# Patient Record
Sex: FEMALE | Race: BLACK OR AFRICAN AMERICAN | NOT HISPANIC OR LATINO | Employment: OTHER | URBAN - METROPOLITAN AREA
[De-identification: names, ages, dates, MRNs, and addresses within clinical notes are randomized per-mention and may not be internally consistent; named-entity substitution may affect disease eponyms.]

---

## 2017-01-23 ENCOUNTER — LAB VISIT (OUTPATIENT)
Dept: LAB | Facility: HOSPITAL | Age: 72
End: 2017-01-23
Attending: NURSE PRACTITIONER
Payer: MEDICARE

## 2017-01-23 DIAGNOSIS — Z79.4 TYPE 2 DIABETES MELLITUS WITH HYPERGLYCEMIA, WITH LONG-TERM CURRENT USE OF INSULIN: Chronic | ICD-10-CM

## 2017-01-23 DIAGNOSIS — E11.65 TYPE 2 DIABETES MELLITUS WITH HYPERGLYCEMIA, WITH LONG-TERM CURRENT USE OF INSULIN: Chronic | ICD-10-CM

## 2017-01-23 LAB
ALBUMIN SERPL BCP-MCNC: 3.3 G/DL
ALP SERPL-CCNC: 109 U/L
ALT SERPL W/O P-5'-P-CCNC: 13 U/L
ANION GAP SERPL CALC-SCNC: 6 MMOL/L
AST SERPL-CCNC: 14 U/L
BILIRUB SERPL-MCNC: 0.3 MG/DL
BUN SERPL-MCNC: 18 MG/DL
CALCIUM SERPL-MCNC: 9.6 MG/DL
CHLORIDE SERPL-SCNC: 104 MMOL/L
CHOLEST/HDLC SERPL: 3.7 {RATIO}
CO2 SERPL-SCNC: 30 MMOL/L
CREAT SERPL-MCNC: 0.9 MG/DL
EST. GFR  (AFRICAN AMERICAN): >60 ML/MIN/1.73 M^2
EST. GFR  (NON AFRICAN AMERICAN): >60 ML/MIN/1.73 M^2
ESTIMATED AVG GLUCOSE: 246 MG/DL
GLUCOSE SERPL-MCNC: 99 MG/DL
HBA1C MFR BLD HPLC: 10.2 %
HDL/CHOLESTEROL RATIO: 27.4 %
HDLC SERPL-MCNC: 157 MG/DL
HDLC SERPL-MCNC: 43 MG/DL
LDLC SERPL CALC-MCNC: 99 MG/DL
NONHDLC SERPL-MCNC: 114 MG/DL
POTASSIUM SERPL-SCNC: 4.2 MMOL/L
PROT SERPL-MCNC: 7.5 G/DL
SODIUM SERPL-SCNC: 140 MMOL/L
TRIGL SERPL-MCNC: 75 MG/DL

## 2017-01-23 PROCEDURE — 83036 HEMOGLOBIN GLYCOSYLATED A1C: CPT

## 2017-01-23 PROCEDURE — 80061 LIPID PANEL: CPT

## 2017-01-23 PROCEDURE — 80053 COMPREHEN METABOLIC PANEL: CPT

## 2017-01-23 PROCEDURE — 36415 COLL VENOUS BLD VENIPUNCTURE: CPT

## 2017-02-02 ENCOUNTER — CLINICAL SUPPORT (OUTPATIENT)
Dept: DIABETES | Facility: CLINIC | Age: 72
End: 2017-02-02
Payer: MEDICARE

## 2017-02-02 VITALS
HEIGHT: 65 IN | HEART RATE: 72 BPM | WEIGHT: 202.81 LBS | SYSTOLIC BLOOD PRESSURE: 140 MMHG | DIASTOLIC BLOOD PRESSURE: 90 MMHG | BODY MASS INDEX: 33.79 KG/M2

## 2017-02-02 DIAGNOSIS — I10 BENIGN ESSENTIAL HYPERTENSION: ICD-10-CM

## 2017-02-02 DIAGNOSIS — E66.9 OBESITY (BMI 30-39.9): Chronic | ICD-10-CM

## 2017-02-02 DIAGNOSIS — E11.42 TYPE 2 DIABETES MELLITUS WITH DIABETIC POLYNEUROPATHY, WITH LONG-TERM CURRENT USE OF INSULIN: Chronic | ICD-10-CM

## 2017-02-02 DIAGNOSIS — L65.9 ALOPECIA, UNSPECIFIED: ICD-10-CM

## 2017-02-02 DIAGNOSIS — E11.65 TYPE 2 DIABETES MELLITUS WITH HYPERGLYCEMIA, WITH LONG-TERM CURRENT USE OF INSULIN: Primary | Chronic | ICD-10-CM

## 2017-02-02 DIAGNOSIS — E04.2 MULTINODULAR GOITER: ICD-10-CM

## 2017-02-02 DIAGNOSIS — I15.2 HYPERTENSION ASSOCIATED WITH DIABETES: ICD-10-CM

## 2017-02-02 DIAGNOSIS — E78.5 HYPERLIPIDEMIA, UNSPECIFIED HYPERLIPIDEMIA TYPE: ICD-10-CM

## 2017-02-02 DIAGNOSIS — E11.59 HYPERTENSION ASSOCIATED WITH DIABETES: ICD-10-CM

## 2017-02-02 DIAGNOSIS — K21.00 GASTROESOPHAGEAL REFLUX DISEASE WITH ESOPHAGITIS: ICD-10-CM

## 2017-02-02 DIAGNOSIS — B37.2 CANDIDAL INTERTRIGO: ICD-10-CM

## 2017-02-02 DIAGNOSIS — E11.42 TYPE 2 DIABETES MELLITUS WITH DIABETIC POLYNEUROPATHY, UNSPECIFIED LONG TERM INSULIN USE STATUS: ICD-10-CM

## 2017-02-02 DIAGNOSIS — E11.22 CKD STAGE 2 DUE TO TYPE 2 DIABETES MELLITUS: Chronic | ICD-10-CM

## 2017-02-02 DIAGNOSIS — Z79.4 TYPE 2 DIABETES MELLITUS WITH HYPERGLYCEMIA, WITH LONG-TERM CURRENT USE OF INSULIN: Primary | Chronic | ICD-10-CM

## 2017-02-02 DIAGNOSIS — N18.2 CKD STAGE 2 DUE TO TYPE 2 DIABETES MELLITUS: Chronic | ICD-10-CM

## 2017-02-02 DIAGNOSIS — F43.9 STRESS: ICD-10-CM

## 2017-02-02 DIAGNOSIS — L21.9 SEBORRHEIC DERMATITIS, UNSPECIFIED: ICD-10-CM

## 2017-02-02 DIAGNOSIS — E78.5 HYPERLIPIDEMIA LDL GOAL <70: Chronic | ICD-10-CM

## 2017-02-02 DIAGNOSIS — I70.0 AORTIC ATHEROSCLEROSIS: ICD-10-CM

## 2017-02-02 DIAGNOSIS — Z79.4 TYPE 2 DIABETES MELLITUS WITH DIABETIC POLYNEUROPATHY, WITH LONG-TERM CURRENT USE OF INSULIN: Chronic | ICD-10-CM

## 2017-02-02 LAB
CREAT UR-MCNC: 117 MG/DL
MICROALBUMIN UR DL<=1MG/L-MCNC: 6 UG/ML
MICROALBUMIN/CREATININE RATIO: 5.1 UG/MG

## 2017-02-02 PROCEDURE — 99499 UNLISTED E&M SERVICE: CPT | Mod: S$PBB,,, | Performed by: NURSE PRACTITIONER

## 2017-02-02 PROCEDURE — 99999 PR PBB SHADOW E&M-EST. PATIENT-LVL IV: CPT | Mod: PBBFAC,,,

## 2017-02-02 PROCEDURE — 99214 OFFICE O/P EST MOD 30 MIN: CPT | Mod: S$GLB,,, | Performed by: NURSE PRACTITIONER

## 2017-02-02 PROCEDURE — 82570 ASSAY OF URINE CREATININE: CPT

## 2017-02-02 RX ORDER — INSULIN DEGLUDEC 200 U/ML
22 INJECTION, SOLUTION SUBCUTANEOUS DAILY
Qty: 3 SYRINGE | Refills: 3 | Status: SHIPPED | OUTPATIENT
Start: 2017-02-02 | End: 2017-03-03 | Stop reason: SDUPTHER

## 2017-02-02 RX ORDER — CANDESARTAN 8 MG/1
8 TABLET ORAL DAILY
Qty: 90 TABLET | Refills: 3 | Status: SHIPPED | OUTPATIENT
Start: 2017-02-02 | End: 2017-09-28 | Stop reason: SDUPTHER

## 2017-02-02 RX ORDER — METFORMIN HYDROCHLORIDE 1000 MG/1
1000 TABLET ORAL 2 TIMES DAILY WITH MEALS
Qty: 180 TABLET | Refills: 4 | Status: SHIPPED | OUTPATIENT
Start: 2017-02-02 | End: 2017-02-02

## 2017-02-02 RX ORDER — METFORMIN HYDROCHLORIDE 500 MG/1
500 TABLET, EXTENDED RELEASE ORAL 2 TIMES DAILY WITH MEALS
Qty: 360 TABLET | Refills: 3 | Status: SHIPPED | OUTPATIENT
Start: 2017-02-02 | End: 2017-03-03 | Stop reason: SDUPTHER

## 2017-02-02 RX ORDER — CANDESARTAN 8 MG/1
8 TABLET ORAL DAILY
Qty: 90 TABLET | Refills: 3 | Status: CANCELLED | OUTPATIENT
Start: 2017-02-02

## 2017-02-02 RX ORDER — HYDROCHLOROTHIAZIDE 25 MG/1
25 TABLET ORAL DAILY
Qty: 90 TABLET | Refills: 3 | Status: SHIPPED | OUTPATIENT
Start: 2017-02-02 | End: 2017-03-03 | Stop reason: SDUPTHER

## 2017-02-02 RX ORDER — ATORVASTATIN CALCIUM 40 MG/1
40 TABLET, FILM COATED ORAL DAILY
Qty: 90 TABLET | Refills: 3 | Status: SHIPPED | OUTPATIENT
Start: 2017-02-02 | End: 2017-03-03 | Stop reason: SDUPTHER

## 2017-02-02 RX ORDER — HYDROCHLOROTHIAZIDE 25 MG/1
25 TABLET ORAL DAILY
Qty: 90 TABLET | Refills: 3 | Status: CANCELLED | OUTPATIENT
Start: 2017-02-02

## 2017-02-02 RX ORDER — GABAPENTIN 300 MG/1
300 CAPSULE ORAL NIGHTLY
Qty: 90 CAPSULE | Refills: 3 | Status: SHIPPED | OUTPATIENT
Start: 2017-02-02 | End: 2018-04-13 | Stop reason: SDUPTHER

## 2017-02-02 NOTE — MR AVS SNAPSHOT
[facility]  [address]  [facility] [address]  Phone: [phone]                  Buck Ibarra   2017 7:30 AM   Clinical Support    Description:  Female : 1945   Provider:  DIABETES EMPOWERMENT PROGRAM   Department:  [facility]           Reason for Visit     Diabetes           Diagnoses this Visit        Comments    Type 2 diabetes mellitus with hyperglycemia, with long-term current use of insulin    -  Primary     Type 2 diabetes mellitus with diabetic polyneuropathy, with long-term current use of insulin         Gastroesophageal reflux disease with esophagitis         Aortic atherosclerosis         Benign essential hypertension         CKD stage 2 due to type 2 diabetes mellitus         Hyperlipidemia LDL goal <70         Multinodular goiter         Obesity (BMI 30-39.9)                To Do List           Future Appointments        Provider Department Dept Phone    2/15/2017 2:00 PM CGMS, ENDOCRINE [facility] [phone]    2017 8:20 AM CGMS, ENDOCRINE [facility] [phone]    2017 7:30 AM DIABETES EMPOWERMENT PROGRAM [facility] [phone]    3/16/2017 8:00 AM [facility] 11 ALL [facility] [phone]    10/26/2017 11:00 AM LAB, APPOINTMENT [place] [facility] [phone]      Goals (5 Years of Data)     None      Follow-Up and Disposition     Return in about 2 weeks (around 2017).       These Medications        Disp Refills Start End    atorvastatin (LIPITOR) 40 MG tablet 90 tablet 3 2017     Take 1 tablet (40 mg total) by mouth once daily. - Oral    Pharmacy: [facility] - [place], [place] - [address] [facility] Ph #: [phone]       metformin (GLUCOPHAGE-XR) 500 MG 24 hr tablet 360 tablet 3 2017     Take 1 tablet (500 mg total) by mouth 2 (two) times daily with meals. - Oral    Pharmacy: [facility] - [place], [address] Ph #: [phone]       gabapentin (NEURONTIN) 300 MG capsule 90 capsule 3 2/2/2017     Take 1 capsule (300 mg total) by mouth every evening. - Oral    Pharmacy: [facility] - [place], [address] Ph #: [phone]       insulin degludec (TRESIBA FLEXTOUCH U-200) 200 unit/mL (3 mL) InPn 3 Syringe 3 2/2/2017     Inject 22 Units into the skin once daily. - Subcutaneous    Pharmacy: [facility] - [place], [address] [facility] Ph #: [phone]         [facility]     [facility] Care Line - 24/7 Assistance  Registered nurses in the [facility] provide clinical advisement, health education, appointment booking, and other advisory services.  Call for this free service at [phone].             Medications           Message regarding Medications     Verify the changes and/or additions to your medication regime listed below are the same as discussed with your clinician today.  If any of these changes or additions are incorrect, please notify your healthcare provider.        START taking these NEW medications        Refills    metformin (GLUCOPHAGE-XR) 500 MG 24 hr tablet 3    Sig: Take 1 tablet (500 mg total) by mouth 2 (two) times daily with meals.    Class: Normal    Route: Oral      CHANGE how you are taking these medications     Start Taking Instead of    atorvastatin (LIPITOR) 40 MG tablet atorvastatin (LIPITOR) 20 MG tablet    Dosage:  Take 1 tablet (40 mg total) by mouth once daily. Dosage:  Take 1 tablet (20 mg total) by mouth once daily.    Reason for Change:  Reorder     gabapentin (NEURONTIN) 300 MG capsule gabapentin (NEURONTIN) 300 MG capsule    Dosage:  Take 1 capsule (300 mg total) by mouth every evening. Dosage:  Take 1 capsule (300 mg total) by mouth 3 (three) times daily.    Reason for Change:  Reorder     insulin degludec (TRESIBA FLEXTOUCH U-200) 200  unit/mL (3 mL) InPn insulin degludec (TRESIBA FLEXTOUCH U-200) 200 unit/mL (3 mL) InPn    Dosage:  Inject 22 Units into the skin once daily. Dosage:  Inject 24 Units into the skin once daily.    Reason for Change:  Reorder       STOP taking these medications     metformin (GLUCOPHAGE) 1000 MG tablet Take 1 tablet (1,000 mg total) by mouth 2 (two) times daily with meals.           Verify that the below list of medications is an accurate representation of the medications you are currently taking.  If none reported, the list may be blank. If incorrect, please contact your healthcare provider. Carry this list with you in case of emergency.           Current Medications     aspirin (ECOTRIN) 81 MG EC tablet Take 1 tablet (81 mg total) by mouth once daily.    atorvastatin (LIPITOR) 40 MG tablet Take 1 tablet (40 mg total) by mouth once daily.    blood sugar diagnostic Strp Bid testing    candesartan (ATACAND) 8 MG tablet Take 1 tablet (8 mg total) by mouth once daily.    clotrimazole-betamethasone 1-0.05% (LOTRISONE) cream Apply topically 2 (two) times daily.    fluocinonide (LIDEX) 0.05 % external solution AAA scalp qd prn pruritus    gabapentin (NEURONTIN) 300 MG capsule Take 1 capsule (300 mg total) by mouth every evening.    hydrochlorothiazide (HYDRODIURIL) 25 MG tablet Take 1 tablet (25 mg total) by mouth once daily.    insulin degludec (TRESIBA FLEXTOUCH U-200) 200 unit/mL (3 mL) InPn Inject 22 Units into the skin once daily.    ketoconazole (NIZORAL) 2 % shampoo Wash hair with medicated shampoo at least 2x/week - let sit on scalp at least 5 minutes prior to rinsing    lancets 33 gauge Misc 1 lancet by Misc.(Non-Drug; Combo Route) route 2 (two) times daily.    nicotine (NICODERM CQ) 14 mg/24 hr Place 1 patch onto the skin once daily.    nicotine polacrilex 2 MG Lozg Nicorette Mini Lozenge -one 2 mg lozenge by mouth as needed, max 5 in 6 hr period.    pen needle, diabetic (BD ULTRA-FINE MILAN PEN NEEDLES) 32 gauge x  "5/32" Ndle Uses 1 time daily, on insulin injections    ranitidine (ZANTAC) 150 MG tablet Take 1 tablet (150 mg total) by mouth 2 (two) times daily.    lorazepam (ATIVAN) 0.5 MG tablet Take 1 tablet (0.5 mg total) by mouth nightly as needed for Anxiety.    metformin (GLUCOPHAGE-XR) 500 MG 24 hr tablet Take 1 tablet (500 mg total) by mouth 2 (two) times daily with meals.    ZOSTAVAX, PF, 19,400 unit/0.65 mL injection            Clinical Reference Information           Vital Signs - Last Recorded  Most recent update: 2/2/2017  7:29 AM by Oz Marin LPN    BP Pulse Ht Wt BMI    (!) 140/90 72 5' 5" (1.651 m) 41.7 kg (92 lb) 15.31 kg/m2      Blood Pressure          Most Recent Value    BP  (!)  140/90      Allergies as of 2/2/2017     Erythromycin    Erythropoietin Analogues    Pegasys [Peginterferon Gonzalo-2a]    Lisinopril      Immunizations Administered on Date of Encounter - 2/2/2017     None      Orders Placed During Today's Visit      Normal Orders This Visit    Microalbumin/creatinine urine ratio       [facility] Sign-Up     Activating your [facility] account is as easy as 1-2-3!     1) Visit [email or web address], select Sign Up Now, enter this activation code and your date of birth, then select Next.  [email or web address]  Expires: 3/19/2017  8:03 AM      2) Create a username and password to use when you visit [facility] in the future and select a security question in case you lose your password and select Next.    3) Enter your e-mail address and click Sign Up!    Additional Information  If you have questions, please e-mail [email or web address] or call [phone] to talk to our [facility] staff. Remember, [facility] is NOT to be used for urgent needs. For medical emergencies, dial 911.         Instructions    Increase Lipitor to 40 mg daily    Decrease Tresiba to 22 units daily    Change Metformin to XR and continue 1000 mg twice daily (it will be 2 tablets twice daily) - if you still continue to have diarrhea, " decrease to 1000 mg once daily     Check your blood sugar twice daily before breakfast and before dinner    Continuous Glucose Monitoring (CGM)  Your healthcare provider has put you on the Freestyle Naomi Pro Sensor system. At your next appointment, your CGM will be downloaded and reviewed so your healthcare provider can see your blood sugar trends and patterns. Your medication and diet may be adjusted based on this downloaded information.    What You Need To Do:   Wear the sensor on the back of your upper arm for the amount of time designated by the CGM clinic    You have no restrictions on your diet or activity.    Maintain a daily log of your blood glucose readings, diet, exercise, and dosing/timing of your diabetes medications.     Continue regular blood glucose self-testing per your providers recommendation using your own glucometer.    Important Things About Your CGM Test   If you have an MRI, a CT scan, or a diathermy treatment, you must remove your sensor prior to the procedure. If this occurs, notify your healthcare provider.      A Little Extra Care:   Showering, Bathing, and Swimming: The sensor is water-resistant and can be worn while bathing, showering, or swimming as long as you do not take it deeper than 3 feet or keep it underwater for more than 30 minutes at a time.   Getting dressed: Use care to avoid catching the sensor on clothing while getting dressed.   Exercising: Intensive exercise may cause the sensor to loosen due to sweat or movement of the sensor.     Day of Test (Inserting the CGM Sensor device)    The CGM Sensor device is placed on the top of your skin on the back of your upper arm     Throughout the Test    Continue regular blood glucose self-testing per your providers recommendation using your own glucometer.   Maintain a daily log of your blood glucose readings, diet, exercise, and dosing/timing of your diabetes medications.      Last Day of the Test (Removing the CGM  Sensor device)    Loosen the adhesive attached to the CGM Sensor device.    Pull the CGM Sensor device away from your skin    Place the CGM Sensor device in the biohazard plastic bag   Return all items you were given on the first day of the test including log sheets and items you placed in the biohazard bag.    Bring items between 7:30 am and 9:00 am to the Endocrine/ Diabetes Specialty Clinic located at [address] [place] [address] 6th [place] [address].     When to Contact the Clinic   Call [phone] (Monday-Friday) or [phone] after hours if:    Your CGM Sensor falls off    You have pain, severe itching, or bleeding at the site     Revised: 01/2017      © 2015 [facility] ([email or web address]) is a non-profit, academic, multi-specialty, healthcare delivery system dedicated to patient care, research and education.     UNDERSTANDING CONTINUOUS GLUCOSE MONITORING     What is continuous glucose monitoring?   Continuous glucose monitoring system (CGMS) is a method used to record your blood sugar levels over a period of time (usually 5 days). Your home blood glucose monitoring is very helpful, but only measures blood glucose levels at various points in time and can miss dangerous high and low patterns, especially during the night while you sleep. Continuous glucose monitoring provides a continuous 24-hour measurement of your blood glucose levels.     Who benefits from continuous glucose monitoring?    People who experience dangerous high and low blood glucose readings.    People who suffer from hypoglycemia unawareness and cannot feel when their blood glucose is dropping.    People who desire better blood glucose control.    People with elevated A1C levels.     How does continuous glucose monitoring work?   A glucose sensor is inserted on the top of your skin on the back of your arm. We do not leave a needle under your skin. The blood glucose sensor measures your blood sugar level  every 15 minutes, 24 hours a day. At the end of the 5 days, the CGM Sensor is then downloaded and reviewed so your healthcare provider can see your blood sugar trends and patterns.     What are your responsibilities to ensure successful testing?   It is recommended that you monitor your blood readings as directed by your healthcare provider.  It is vital that you document the correct times of your medications, meals, snacks, blood sugar readings, and any exercise.       How do you prepare for the test?   There is nothing you need to do to prepare for the test. You do not need to fast (restrict food intake) the night before the test.     Revised: 01/2017    © 2015 [facility] ([email or web address]) is a non-profit, academic, multi-specialty, healthcare delivery system dedicated to patient care, research and education.          Smoking Cessation     If you would like to quit smoking:   You may be eligible for free services if you are a [place] resident and started smoking cigarettes before September 1, 1988.  Call the Smoking Cessation Trust (SCT) toll free at (489) 602-7418 or (659) 735-9939.   Call 4-800-QUIT-NOW if you do not meet the above criteria.

## 2017-02-02 NOTE — PROGRESS NOTES
CC:  Diabetes.     HPI: Buck Ibarra is a 71 y.o. female referred by Dr. Cortez  for [facility] - Visit # 5. The patient has had type 2 diabetes along with associated comorbidities indicated in the Visit Diagnosis section of this encounter. The patient was diagnosed with Type 2 diabetes in 2014. + family history of DM in multiple family members. Diagnosed after polyuria.     1st visit - She presented alone with no BG logs. At this visit, her Metformin was increased, Glipizide was discontinued and Januvia was started.     2nd visit - Presented with no BG logs. Had not increased Metformin as instructed at last visit, and attempted to start the Januvia but reported palpitations and dizziness and stopped the Januvia after 4 days (although she still had dizziness and palpitations since stopping the Januvia 2 months prior). She had resumed Glipizide when she stopped the Januvia. + hypoglycemia since Glipizide dose increased. At this visit, Victoza was started, Glipizide was stopped and Metformin was increased.     3rd visit - Presented with no BG readings. Was doing great with Victoza, but fluctuating dose from 0.6 to 1.2 to 1.8 mg daily every week because she misunderstood instructions. No exercise but does watch her grandchildren 7 days per week and is very active during that time. At this visit, Metformin was continued and Victoza dose was clarified    4th visit - Presented with no BG logs. Reported + belching and gas with Victoza, stopped Victoza on her own, then BG markedly elevated on labs. Started on Levemir at that time. At last visit, changed to Tresiba, Metformin continued.     5th visit - Today, she presents with logs, does not bring logs to visit/ CGMS not completed. Reports hypoglycemia, ~2pm. Started smoking again     PREFERRED METHOD OF COMMUNICATION:  Mail     DIABETES COMPLICATIONS: nephropathy and peripheral neuropathy     CURRENT A1C:    Hemoglobin A1C   Date Value Ref Range Status    01/23/2017 10.2 (H) 4.5 - 6.2 % Final     Comment:     According to ADA guidelines, hemoglobin A1C <7.0% represents  optimal control in non-pregnant diabetic patients.  Different  metrics may apply to specific populations.   Standards of Medical Care in Diabetes - 2016.  For the purpose of screening for the presence of diabetes:  <5.7%     Consistent with the absence of diabetes  5.7-6.4%  Consistent with increasing risk for diabetes   (prediabetes)  >or=6.5%  Consistent with diabetes  Currently no consensus exists for use of hemoglobin A1C  for diagnosis of diabetes for children.     10/19/2016 12.5 (H) 4.5 - 6.2 % Final     Comment:     According to ADA guidelines, hemoglobin A1C <7.0% represents  optimal control in non-pregnant diabetic patients.  Different  metrics may apply to specific populations.   Standards of Medical Care in Diabetes - 2016.  For the purpose of screening for the presence of diabetes:  <5.7%     Consistent with the absence of diabetes  5.7-6.4%  Consistent with increasing risk for diabetes   (prediabetes)  >or=6.5%  Consistent with diabetes  Currently no consensus exists for use of hemoglobin A1C  for diagnosis of diabetes for children.     07/14/2016 8.3 (H) 4.5 - 6.2 % Final     Comment:     According to ADA guidelines, hemoglobin A1C <7.0% represents  optimal control in non-pregnant diabetic patients.  Different  metrics may apply to specific populations.   Standards of Medical Care in Diabetes - 2016.  For the purpose of screening for the presence of diabetes:  <5.7%     Consistent with the absence of diabetes  5.7-6.4%  Consistent with increasing risk for diabetes   (prediabetes)  >or=6.5%  Consistent with diabetes  Currently no consensus exists for use of hemoglobin A1C  for diagnosis of diabetes for children.         GOAL A1C: <7%    MEDICATIONS UPON START OF PROGRAM: Metformin 1000 mg once daily, Glipizide 10 mg BID  Denies missing any doses of medications     CURRENT DIABETES  MEDICATIONS: Metformin 1000 mg BID, Tresiba  24 units daily    Denies missing any doses of medications     ANY DIFFICULTY AFFORDING YOUR CURRENT MEDICATION REGIMEN? None     HAVE YOU EVER APPLIED FOR MEDICARE EXTRA HELP PROGRAM? None     SIGNIFICANT DIABETES MED HISTORY: Victoza - gas and belching, Metformin, Tresiba    HOSPITALIZATIONS FOR DIABETES OR RELATED COMPLICATIONS:  No    BLOOD GLUCOSE MONITORING:  Reports checking BG once daily, no logs     HYPOGLYCEMIA:  Yes - reports BG 60's - approx once weekly     OCCUPATION: retired    CURRENT DAILY ROUTINE (meals/meds):   Eats 2 meals, skips either breakfast or lunch   Denies snacking in between meals or before bedtime     CURRENT EXERCISE:  None, takes care of her grandchildren often 1 year old grandchild     ALCOHOL: none     SMOKING: yes -     MEDICATIONS, ALLERGIES, LABS, VS's, MEDICAL, SURGICAL, SOCIAL, AND FAMILY HISTORY reviewed and updated in the appropriate sections during this encounter    ROS:   Constitutional: Negative for weight change  Endocrine:  Denies polyuria, polydipsia, nocturia.  HENT: Negative for mouth sores or dental problems. Denies any personal or family history of thyroid cancer.  Denies neck swelling, lumps, horseness or trouble swallowing.   Eyes: Negative for visual disturbance.   Respiratory: Negative for cough or shortness or breath.  Cardiovascular: Negative for chest pain or claudication, no history of amputation.  Gastrointestinal: Negative for nausea, vomiting, bloating.  No history of pancreatitis or gastroparesis.  Genitourinary: Negative for urgency, frequency, frequent urinary tract infections.    Musculoskeletal: Negative for back pain.   Skin: Denies prolonged wound healing, negative for rashes.  Neurological: Negative for syncope, gustatory sweating, + improved numbness/burning of extremities.  Psychiatric/Behavioral: Negative for depression or anxiety  All other systems reviewed and are negative.    CGMS interpretation:   "To be performed in 2 weeks     PE:  Constitutional:   Visit Vitals    BP (!) 140/90    Pulse 72    Ht 5' 5" (1.651 m)    Wt 92 kg (202 lb 13.2 oz)    BMI 33.75 kg/m2      Well developed, well nourished, NAD.    Neck:  No trachial deviation present, No neck masses noticed   Thyroid:  No thyromegaly present.  No thyroid tenderness.      Cardiovascular:    Auscultation:  No murmurs or abnormal sounds   Lower extremities:  No edema or cyanosis.     Respiratory:    Effort:  Normal, no use of accessory muscles.   Auscultation: breath sounds normal, no adventitious sounds.  Skin:    Inspection: no rashes, lesion or ulcers, + acanthosis nigracans.   Palpation: no induration or nodules.   Insulin injection sites: no lipoatrophy or lipohypertrophy.  Psychiatric:  Judgement and insight good with normal mood and affect.    Protective Sensation (w/ 10 gram monofilament):  Right: Intact  Left: Intact    Visual Inspection:  Callus -  bilateral heels    Pedal Pulses:   Right: Present  Left: Present    Posterior tibialis:   Right:Present  Left: Present    . Decreased vibratory response to 128 Hz tuning fork bilaterally.      ______________________________________________________________________   Microalbumin/Creatinine ratio:  Lab Results   Component Value Date    MICALBCREAT 7.9 04/21/2016     ______________________________________________________________________     ASSESSMENT and PLAN:    1- Type 2 diabetes with CKD, neuropathy and hyperglycemia - A1c remains markedly elevated. Suspect high BG due to uncontrolled prandial excursions. Pt reports hypoglycemia, decrease Tresiba to 22 units daily. Continue Metformin 1000 mg BID.    Continue to monitor BG 2 times daily, document on BG logs, and bring complete BG logs to all visits     MAC today   Empowerment 1-2 weeks after CGMS completed     2- CKD stage 2 -  Estimated Creatinine Clearance: 66.1 mL/min (based on Cr of 0.9). discussed with patient correlation between CKD and " uncontrolled DM, continue improved BG control     3 - Peripheral neuropathy - Educated patient to check feet daily for any foreign objects and/or wounds. Discussed with patient the importance of wearing appropriate footwear at all times, not to walk barefoot ever, and to check shoes before putting them on feet. Instructed patient to keep feet dry by regularly changing shoes and socks and drying feet after baths and exercises. Also, instructed patient to report any new lesions, discolorations, or swelling to a healthcare professional.    4- Hypertension - goal < 140/80 mmHg -  At goal, on ARB, continue current medications  BP Readings from Last 3 Encounters:   02/02/17 (!) 140/90   10/27/16 (!) 144/89   10/10/16 120/68     5- Hyperlipidemia - LDL goal <70 due to atherosclerosis of aorta in history,  Increase Lipitor to 40 mg daily, recheck CMP and lipid panel with RTC. LFT's WNL    Lab Results   Component Value Date    LDLCALC 99.0 01/23/2017       6- Body mass index is 33.75 kg/(m^2). = Obesity. Modest weight loss (5-10%) may greatly improve BG control. Encouraged patient to start a walking program when not watching grandchildren.     7- - Chronic Hep C - followed by hepatology. 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 Michelle treatment completed.     8 - Multinodular goiter - last US and FNA done 2015, recommend to repeat US yearly     9 - Tobacco use  - recommended to re-enroll in [facility] smoking cessation program, does not wish to enroll at this time        STANDARDS of CARE:  Statin:  Increase lipitor to 40 mg   ACEI or ARB:  Yes - ARB  ASA:  Yes - 81 mg   Last eye exam 12/2016 no retinopathy

## 2017-02-02 NOTE — PROGRESS NOTES
02/02/17 0000   Diabetes Type   Diabetes Type  Type II   Barriers to Change   Barriers to Change None   Learning Challenges  None   Diabetes Education Assessment/Progress   Acute Complications (preventing, detecting, and treating acute complications) 3;DC;IS  (Reviewed s/s and treatment of hypoglycemia)   Medications (states correct name, dose, onset, peak, duration, side effects & timing of meds) DC;IS;5  (reviewed timing and MOA of Metformin and Tresiba. Per NP, pt to decrease Tresiba to 22 units daily - pt states that the pen does not go to 22 - showed her using demo pen that it does go to 22, but there is only a has bark to identify it not a number. Stressed importance of taking meds as they are prescribed and not skipping doses)   Monitoring (monitoring blood glucose/other parameters &using results) DC;IS;5  (Instructed to continue SMBG BID at alternating times and bring logs to next empowerment visit. 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 that hinders he ablility to check her BG as often as she needs to and it also hinders her ability to record it)   Goal Setting and Problem Solving (verbalizes behavior change strategies & sets realistic goals) 1   Behavior Change (developing personal strategies to health & behavior change) 1   Psychosocial Issues (deveopling personal srategies to address psychosocial concerns) 1   Goals   Healthy Eating % Met  (Pt will begin using label reading to keep meals and snacks within rec range)   Met Percentage  0%   Monitoring Set  (Pt will SMBG BID and bring logs to next visit)   Start Date 02/02/17   Medications Set  (Pt will take all meds as prescribed)   Start Date 02/02/17   Target Date 04/06/17   Diabetes Self-Management Support Plan   Stress Management family   Review Status Patient reviewed and agreed to support plan.   Diabetes Care Plan/Intervention   Education Plan/Intervention In F/U DSMT    Education Units of Time    Time Spent 15 min

## 2017-02-02 NOTE — TELEPHONE ENCOUNTER
----- Message from Brenda Fairbanks sent at 2/2/2017  9:03 AM CST -----  Contact: Self/   Type: Rx    Name of medication(s): hydrochlorothiazide (HYDRODIURIL) 25 MG tablet, candesartan (ATACAND) 8 MG tablet, and ranitidine (ZANTAC) 150 MG tablet    Is this a refill? New rx? Refill     Who prescribed medication? Dr. Cortez     Pharmacy Name, Phone, & Location: [facility]     Comments: pt is calling to have a refill on the medication above. Please call and advise       Thank you

## 2017-02-02 NOTE — PATIENT INSTRUCTIONS
Increase Lipitor to 40 mg daily    Decrease Tresiba to 22 units daily    Change Metformin to XR and continue 1000 mg twice daily (it will be 2 tablets twice daily) - if you still continue to have diarrhea, decrease to 1000 mg once daily     Check your blood sugar twice daily before breakfast and before dinner    Continuous Glucose Monitoring (CGM)  Your healthcare provider has put you on the Freestyle Naomi Pro Sensor system. At your next appointment, your CGM will be downloaded and reviewed so your healthcare provider can see your blood sugar trends and patterns. Your medication and diet may be adjusted based on this downloaded information.    What You Need To Do:   Wear the sensor on the back of your upper arm for the amount of time designated by the CGM clinic    You have no restrictions on your diet or activity.    Maintain a daily log of your blood glucose readings, diet, exercise, and dosing/timing of your diabetes medications.     Continue regular blood glucose self-testing per your providers recommendation using your own glucometer.    Important Things About Your CGM Test   If you have an MRI, a CT scan, or a diathermy treatment, you must remove your sensor prior to the procedure. If this occurs, notify your healthcare provider.      A Little Extra Care:   Showering, Bathing, and Swimming: The sensor is water-resistant and can be worn while bathing, showering, or swimming as long as you do not take it deeper than 3 feet or keep it underwater for more than 30 minutes at a time.   Getting dressed: Use care to avoid catching the sensor on clothing while getting dressed.   Exercising: Intensive exercise may cause the sensor to loosen due to sweat or movement of the sensor.     Day of Test (Inserting the CGM Sensor device)    The CGM Sensor device is placed on the top of your skin on the back of your upper arm     Throughout the Test    Continue regular blood glucose self-testing per your providers  recommendation using your own glucometer.   Maintain a daily log of your blood glucose readings, diet, exercise, and dosing/timing of your diabetes medications.      Last Day of the Test (Removing the CGM Sensor device)    Loosen the adhesive attached to the CGM Sensor device.    Pull the CGM Sensor device away from your skin    Place the CGM Sensor device in the biohazard plastic bag   Return all items you were given on the first day of the test including log sheets and items you placed in the biohazard bag.    Bring items between 7:30 am and 9:00 am to the [facility] located at [address] [place] [address] [address] [place] [address].     When to Contact the Clinic   Call [phone] (Monday-Friday) or [phone] after hours if:    Your CGM Sensor falls off    You have pain, severe itching, or bleeding at the site     Revised: 01/2017      © 2015 [facility] ([email or web address]) is a non-profit, academic, multi-specialty, healthcare delivery system dedicated to patient care, research and education.     UNDERSTANDING CONTINUOUS GLUCOSE MONITORING     What is continuous glucose monitoring?   Continuous glucose monitoring system (CGMS) is a method used to record your blood sugar levels over a period of time (usually 5 days). Your home blood glucose monitoring is very helpful, but only measures blood glucose levels at various points in time and can miss dangerous high and low patterns, especially during the night while you sleep. Continuous glucose monitoring provides a continuous 24-hour measurement of your blood glucose levels.     Who benefits from continuous glucose monitoring?    People who experience dangerous high and low blood glucose readings.    People who suffer from hypoglycemia unawareness and cannot feel when their blood glucose is dropping.    People who desire better blood glucose control.    People with elevated A1C levels.     How does  continuous glucose monitoring work?   A glucose sensor is inserted on the top of your skin on the back of your arm. We do not leave a needle under your skin. The blood glucose sensor measures your blood sugar level every 15 minutes, 24 hours a day. At the end of the 5 days, the CGM Sensor is then downloaded and reviewed so your healthcare provider can see your blood sugar trends and patterns.     What are your responsibilities to ensure successful testing?   It is recommended that you monitor your blood readings as directed by your healthcare provider.  It is vital that you document the correct times of your medications, meals, snacks, blood sugar readings, and any exercise.       How do you prepare for the test?   There is nothing you need to do to prepare for the test. You do not need to fast (restrict food intake) the night before the test.     Revised: 01/2017    © 2015 [facility] [employer] [facility] ([email or web address]) is a non-profit, academic, multi-specialty, healthcare delivery system dedicated to patient care, research and education.

## 2017-02-03 RX ORDER — CLOTRIMAZOLE AND BETAMETHASONE DIPROPIONATE 10; .64 MG/G; MG/G
CREAM TOPICAL 2 TIMES DAILY
Qty: 45 G | Refills: 1 | Status: SHIPPED | OUTPATIENT
Start: 2017-02-03 | End: 2023-01-01

## 2017-02-03 RX ORDER — LORAZEPAM 0.5 MG/1
0.5 TABLET ORAL NIGHTLY PRN
Qty: 30 TABLET | Refills: 0 | Status: SHIPPED | OUTPATIENT
Start: 2017-02-03 | End: 2017-06-16

## 2017-02-03 RX ORDER — ASPIRIN 81 MG/1
81 TABLET ORAL DAILY
Qty: 30 TABLET | Refills: 3 | Status: SHIPPED | OUTPATIENT
Start: 2017-02-03 | End: 2022-08-18 | Stop reason: SDUPTHER

## 2017-02-03 RX ORDER — FLUOCINONIDE TOPICAL SOLUTION USP, 0.05% 0.5 MG/ML
SOLUTION TOPICAL
Qty: 60 ML | Refills: 1 | Status: SHIPPED | OUTPATIENT
Start: 2017-02-03 | End: 2021-07-29

## 2017-02-15 ENCOUNTER — CLINICAL SUPPORT (OUTPATIENT)
Dept: ENDOCRINOLOGY | Facility: CLINIC | Age: 72
End: 2017-02-15
Payer: MEDICARE

## 2017-02-15 DIAGNOSIS — E11.65 TYPE 2 DIABETES MELLITUS WITH HYPERGLYCEMIA, WITH LONG-TERM CURRENT USE OF INSULIN: Chronic | ICD-10-CM

## 2017-02-15 DIAGNOSIS — Z79.4 TYPE 2 DIABETES MELLITUS WITH HYPERGLYCEMIA, WITH LONG-TERM CURRENT USE OF INSULIN: Chronic | ICD-10-CM

## 2017-02-15 NOTE — PROGRESS NOTES
"DIABETES EDUCATOR NOTE   PLACEMENT OF FREESTYLE CHIARA PRO SENSOR  CONTINOUS GLUCOSE MONITORING SYSTEM (CGMS)    Patient is here in clinic today for placement of continuous glucose monitoring sensor.      Each patient verified that they were here for CGMS procedure ordered by their provider and that they have a working glucose meter and supplies at home.   Patient will be provided with a Freestyle Chiara Sensor and a copy of the Continuous Glucose Monitoring Patient Log to fill out during the study.   A detailed  explanation of Continuous Glucose Monitoring was  provided. Patient informed that this is a blind procedure and that they will not actually see the blood sugar tracing in real time.  Reviewed with patient the [employer] patient education handout called "Your Freestyle Chiara Pro sensor: What you need to know" to review self-care during the study to avoid sensor loosening or removal ie... Bathing, Swimming, dressing, and exercising.   Instructed patient to check blood sugar using home glucometer and to record the following on provided patient log sheets:Blood sugar taken at home, Meals and snacks, Activity, and Diabetes medications taken and dosage    Patient was brought to a private location.  Arm for insertion was selected and prepared and allowed to dry. Glucose Sensor Serial Number [id number]  was inserted to back of patient's left upper arm.    The following forms  were given and reviewed in detail with patient and all questions answered.   · Continuous Glucose Monitoring Patient Log #[id number]  · [employer] Patient Handout "Your Freestyle Chiara Pro Sensor: What you need to know"     Instructions held in a group: Time: 15 min   Insertion of sensor done individually in private:  Time: 5 minutes       "

## 2017-02-21 ENCOUNTER — CLINICAL SUPPORT (OUTPATIENT)
Dept: ENDOCRINOLOGY | Facility: CLINIC | Age: 72
End: 2017-02-21
Payer: MEDICARE

## 2017-02-21 DIAGNOSIS — E11.42 TYPE 2 DIABETES MELLITUS WITH DIABETIC POLYNEUROPATHY, WITH LONG-TERM CURRENT USE OF INSULIN: Chronic | ICD-10-CM

## 2017-02-21 DIAGNOSIS — Z79.4 TYPE 2 DIABETES MELLITUS WITH DIABETIC POLYNEUROPATHY, WITH LONG-TERM CURRENT USE OF INSULIN: Chronic | ICD-10-CM

## 2017-02-21 PROCEDURE — 95250 CONT GLUC MNTR PHYS/QHP EQP: CPT | Mod: S$GLB,,, | Performed by: DIETITIAN, REGISTERED

## 2017-02-21 PROCEDURE — 95251 CONT GLUC MNTR ANALYSIS I&R: CPT | Mod: S$GLB,,, | Performed by: NURSE PRACTITIONER

## 2017-02-22 NOTE — PROGRESS NOTES
DIABETES EDUCATOR NOTE   Return of the Freestyle Naomi Pro Sensor and Patient Log.    Patient returned to clinic today to return Glucose Sensor and signed patient log form used in CMGS procedure.    The CGMS Sensor will be scanned and downloaded. All reports will be imported into the patient's electronic medical record.    Endocrine Nurse Practitioner will complete data interpretation and make recommendations; will forward recommendations to the ordering provider for follow up with patient.

## 2017-03-03 ENCOUNTER — CLINICAL SUPPORT (OUTPATIENT)
Dept: DIABETES | Facility: CLINIC | Age: 72
End: 2017-03-03
Payer: MEDICARE

## 2017-03-03 ENCOUNTER — TELEPHONE (OUTPATIENT)
Dept: INTERNAL MEDICINE | Facility: CLINIC | Age: 72
End: 2017-03-03

## 2017-03-03 VITALS
WEIGHT: 202.81 LBS | BODY MASS INDEX: 33.79 KG/M2 | HEART RATE: 76 BPM | HEIGHT: 65 IN | SYSTOLIC BLOOD PRESSURE: 136 MMHG | DIASTOLIC BLOOD PRESSURE: 82 MMHG

## 2017-03-03 DIAGNOSIS — Z72.0 TOBACCO USE: ICD-10-CM

## 2017-03-03 DIAGNOSIS — E04.2 MULTINODULAR GOITER: ICD-10-CM

## 2017-03-03 DIAGNOSIS — E66.9 OBESITY (BMI 30-39.9): Chronic | ICD-10-CM

## 2017-03-03 DIAGNOSIS — E11.42 TYPE 2 DIABETES MELLITUS WITH DIABETIC POLYNEUROPATHY, WITH LONG-TERM CURRENT USE OF INSULIN: Primary | Chronic | ICD-10-CM

## 2017-03-03 DIAGNOSIS — E78.5 HYPERLIPIDEMIA LDL GOAL <70: Chronic | ICD-10-CM

## 2017-03-03 DIAGNOSIS — N18.2 CKD STAGE 2 DUE TO TYPE 2 DIABETES MELLITUS: Chronic | ICD-10-CM

## 2017-03-03 DIAGNOSIS — I10 BENIGN ESSENTIAL HYPERTENSION: ICD-10-CM

## 2017-03-03 DIAGNOSIS — I65.23 BILATERAL CAROTID ARTERY STENOSIS: ICD-10-CM

## 2017-03-03 DIAGNOSIS — E11.59 HYPERTENSION ASSOCIATED WITH DIABETES: ICD-10-CM

## 2017-03-03 DIAGNOSIS — E11.65 TYPE 2 DIABETES MELLITUS WITH HYPERGLYCEMIA, WITH LONG-TERM CURRENT USE OF INSULIN: Chronic | ICD-10-CM

## 2017-03-03 DIAGNOSIS — I70.0 AORTIC ATHEROSCLEROSIS: ICD-10-CM

## 2017-03-03 DIAGNOSIS — E11.22 CKD STAGE 2 DUE TO TYPE 2 DIABETES MELLITUS: Chronic | ICD-10-CM

## 2017-03-03 DIAGNOSIS — Z79.4 TYPE 2 DIABETES MELLITUS WITH DIABETIC POLYNEUROPATHY, WITH LONG-TERM CURRENT USE OF INSULIN: Primary | Chronic | ICD-10-CM

## 2017-03-03 DIAGNOSIS — I15.2 HYPERTENSION ASSOCIATED WITH DIABETES: ICD-10-CM

## 2017-03-03 DIAGNOSIS — Z79.4 TYPE 2 DIABETES MELLITUS WITH HYPERGLYCEMIA, WITH LONG-TERM CURRENT USE OF INSULIN: Chronic | ICD-10-CM

## 2017-03-03 PROCEDURE — 99999 PR PBB SHADOW E&M-EST. PATIENT-LVL III: CPT | Mod: PBBFAC,,,

## 2017-03-03 PROCEDURE — 99214 OFFICE O/P EST MOD 30 MIN: CPT | Mod: S$GLB,,, | Performed by: NURSE PRACTITIONER

## 2017-03-03 PROCEDURE — 99499 UNLISTED E&M SERVICE: CPT | Mod: S$PBB,,, | Performed by: NURSE PRACTITIONER

## 2017-03-03 RX ORDER — PEN NEEDLE, DIABETIC 30 GX3/16"
NEEDLE, DISPOSABLE MISCELLANEOUS
Qty: 90 EACH | Refills: 4 | Status: SHIPPED | OUTPATIENT
Start: 2017-03-03 | End: 2017-06-16 | Stop reason: SDUPTHER

## 2017-03-03 RX ORDER — METFORMIN HYDROCHLORIDE 500 MG/1
1000 TABLET, EXTENDED RELEASE ORAL 2 TIMES DAILY WITH MEALS
Qty: 360 TABLET | Refills: 3 | Status: SHIPPED | OUTPATIENT
Start: 2017-03-03 | End: 2017-06-16 | Stop reason: SDUPTHER

## 2017-03-03 RX ORDER — INSULIN DEGLUDEC 200 U/ML
18 INJECTION, SOLUTION SUBCUTANEOUS DAILY
Qty: 3 SYRINGE | Refills: 3 | Status: SHIPPED | OUTPATIENT
Start: 2017-03-03 | End: 2017-06-16 | Stop reason: SDUPTHER

## 2017-03-03 RX ORDER — ATORVASTATIN CALCIUM 40 MG/1
40 TABLET, FILM COATED ORAL DAILY
Qty: 90 TABLET | Refills: 3 | Status: SHIPPED | OUTPATIENT
Start: 2017-03-03 | End: 2017-06-16 | Stop reason: SDUPTHER

## 2017-03-03 RX ORDER — HYDROCHLOROTHIAZIDE 25 MG/1
25 TABLET ORAL DAILY
Qty: 90 TABLET | Refills: 3 | Status: SHIPPED | OUTPATIENT
Start: 2017-03-03 | End: 2017-03-03 | Stop reason: SDUPTHER

## 2017-03-03 RX ORDER — HYDROCHLOROTHIAZIDE 12.5 MG/1
12.5 TABLET ORAL DAILY
Qty: 90 TABLET | Refills: 3 | Status: SHIPPED | OUTPATIENT
Start: 2017-03-03 | End: 2017-06-16 | Stop reason: SDUPTHER

## 2017-03-03 NOTE — PROGRESS NOTES
CC:  Diabetes.     HPI: Buck Ibarra is a 71 y.o. female referred by Dr. Cortez  for [facility] - Visit # 6. The patient has had type 2 diabetes along with associated comorbidities indicated in the Visit Diagnosis section of this encounter. The patient was diagnosed with Type 2 diabetes in 2014. + family history of DM in multiple family members. Diagnosed after polyuria.     1st visit - She presented alone with no BG logs. At this visit, her Metformin was increased, Glipizide was discontinued and Januvia was started.     2nd visit - Presented with no BG logs. Had not increased Metformin as instructed at last visit, and attempted to start the Januvia but reported palpitations and dizziness and stopped the Januvia after 4 days (although she still had dizziness and palpitations since stopping the Januvia 2 months prior). She had resumed Glipizide when she stopped the Januvia. + hypoglycemia since Glipizide dose increased. At this visit, Victoza was started, Glipizide was stopped and Metformin was increased.     3rd visit - Presented with no BG readings. Was doing great with Victoza, but fluctuating dose from 0.6 to 1.2 to 1.8 mg daily every week because she misunderstood instructions. No exercise but does watch her grandchildren 7 days per week and is very active during that time. At this visit, Metformin was continued and Victoza dose was clarified    4th visit - Presented with no BG logs. Reported + belching and gas with Victoza, stopped Victoza on her own, then BG markedly elevated on labs. Started on Levemir at that time. At last visit, changed to Tresiba, Metformin continued.     5th visit - No logs to visit/ CGMS not completed. Reports hypoglycemia, ~2pm. Started smoking again. At this visit, Metformin continued and tresiba decreased     PREFERRED METHOD OF COMMUNICATION:  Mail     DIABETES COMPLICATIONS: nephropathy and peripheral neuropathy     CURRENT A1C:    Hemoglobin A1C   Date Value Ref  Range Status   01/23/2017 10.2 (H) 4.5 - 6.2 % Final     Comment:     According to ADA guidelines, hemoglobin A1C <7.0% represents  optimal control in non-pregnant diabetic patients.  Different  metrics may apply to specific populations.   Standards of Medical Care in Diabetes - 2016.  For the purpose of screening for the presence of diabetes:  <5.7%     Consistent with the absence of diabetes  5.7-6.4%  Consistent with increasing risk for diabetes   (prediabetes)  >or=6.5%  Consistent with diabetes  Currently no consensus exists for use of hemoglobin A1C  for diagnosis of diabetes for children.     10/19/2016 12.5 (H) 4.5 - 6.2 % Final     Comment:     According to ADA guidelines, hemoglobin A1C <7.0% represents  optimal control in non-pregnant diabetic patients.  Different  metrics may apply to specific populations.   Standards of Medical Care in Diabetes - 2016.  For the purpose of screening for the presence of diabetes:  <5.7%     Consistent with the absence of diabetes  5.7-6.4%  Consistent with increasing risk for diabetes   (prediabetes)  >or=6.5%  Consistent with diabetes  Currently no consensus exists for use of hemoglobin A1C  for diagnosis of diabetes for children.     07/14/2016 8.3 (H) 4.5 - 6.2 % Final     Comment:     According to ADA guidelines, hemoglobin A1C <7.0% represents  optimal control in non-pregnant diabetic patients.  Different  metrics may apply to specific populations.   Standards of Medical Care in Diabetes - 2016.  For the purpose of screening for the presence of diabetes:  <5.7%     Consistent with the absence of diabetes  5.7-6.4%  Consistent with increasing risk for diabetes   (prediabetes)  >or=6.5%  Consistent with diabetes  Currently no consensus exists for use of hemoglobin A1C  for diagnosis of diabetes for children.         GOAL A1C: <7%    MEDICATIONS UPON START OF PROGRAM: Metformin 1000 mg once daily, Glipizide 10 mg BID  Denies missing any doses of medications     CURRENT  "DIABETES MEDICATIONS: Metformin 500 mg BID, Tresiba  22 units daily    Denies missing any doses of medications     ANY DIFFICULTY AFFORDING YOUR CURRENT MEDICATION REGIMEN? None     HAVE YOU EVER APPLIED FOR MEDICARE EXTRA HELP PROGRAM? None     SIGNIFICANT DIABETES MED HISTORY: Victoza - gas and belching, Metformin, Tresiba, Januvia (dizziness, palpitations, vague ?)    HOSPITALIZATIONS FOR DIABETES OR RELATED COMPLICATIONS:  No    BLOOD GLUCOSE MONITORING:  Reports checking BG  1-2 times daily, see CGMS, hypo noted on logs     HYPOGLYCEMIA:  Yes - reports BG <70 1-2 times per week     OCCUPATION: retired    CURRENT DAILY ROUTINE (meals/meds):   Eats 2 meals, skips either breakfast or lunch   Denies snacking in between meals or before bedtime     CURRENT EXERCISE:  None, takes care of her grandchildren often 1 year old grandchild     ALCOHOL: none     SMOKING: Yes - smoking a pack every 3 days     MEDICATIONS, ALLERGIES, LABS, VS's, MEDICAL, SURGICAL, SOCIAL, AND FAMILY HISTORY reviewed and updated in the appropriate sections during this encounter    ROS:   Constitutional: Negative for weight change  Endocrine:  Denies polyuria, polydipsia, nocturia.  HENT: Negative for mouth sores or dental problems. Denies any personal or family history of thyroid cancer.  Denies neck swelling, lumps, horseness or trouble swallowing.   Eyes: Negative for visual disturbance.   Respiratory: Negative for cough or shortness or breath.  Cardiovascular: Negative for chest pain or claudication, no history of amputation.  Gastrointestinal: Negative for nausea, vomiting, bloating.  No history of pancreatitis or gastroparesis.  Genitourinary: Negative for urgency, frequency, frequent urinary tract infections.    Musculoskeletal: Negative for back pain.  +BLE "stiffness" in AM  Skin: Denies prolonged wound healing, negative for rashes.  Neurological: Negative for syncope, gustatory sweating, + improved numbness/burning of " "extremities.  Psychiatric/Behavioral: Negative for depression or anxiety  All other systems reviewed and are negative.    CGMS interpretation:  Done 2/2017  1. FBG at goal most days   2. Prandial excursions uncontrolled, some markedly uncontrolled   3. Some hypo 60-70 noted on logs submitted by patient, mostly at bedtime     PE:  Constitutional:   /82  Pulse 76  Ht 5' 5" (1.651 m)  Wt 92 kg (202 lb 13.2 oz)  BMI 33.75 kg/m2   Well developed, well nourished, NAD.    Neck:  No trachial deviation present, No neck masses noticed   Thyroid:  No thyromegaly present.  No thyroid tenderness.      Cardiovascular:    Auscultation:  No murmurs or abnormal sounds   Lower extremities:  No edema or cyanosis.     Respiratory:    Effort:  Normal, no use of accessory muscles.   Auscultation: breath sounds normal, no adventitious sounds.  Skin:    Inspection: no rashes, lesion or ulcers, + acanthosis nigracans.   Palpation: no induration or nodules.   Insulin injection sites: no lipoatrophy or lipohypertrophy.  Psychiatric:  Judgement and insight good with normal mood and affect.    Protective Sensation (w/ 10 gram monofilament):  Right: Intact  Left: Intact    Visual Inspection:  Callus -  bilateral heels    Pedal Pulses:   Right: Present  Left: Present    Posterior tibialis:   Right:Present  Left: Present    . Decreased vibratory response to 128 Hz tuning fork bilaterally.      ______________________________________________________________________   Microalbumin/Creatinine ratio:  Lab Results   Component Value Date    MICALBCREAT 5.1 02/02/2017     ______________________________________________________________________     ASSESSMENT and PLAN:    1- Type 2 diabetes with CKD, neuropathy and hyperglycemia - A1c improving but remains elevated, FBG at goal on CGMS and logs, prandial excursions uncontrolled. Discussed treatment options including re-attempting DPP-4 versus SGLT-2. GFR stable, increase Metformin XR to 1000 mg " BID. Start Invokana 100 mg once a day  x1 month, then increase to 300 mg once a day indefinitely. Discussed MOA, possible side effects including yeast infection, UTI, dehydration and ketoacidosis, importance of maintaining hydration and avoiding low carb diets. Decrease Tresiba to 18 units daily.     Continue to monitor BG 2 times daily, document on BG logs, and bring complete BG logs to all visits - instructed to notify me of any BG <70 so that insulin can be titrated     F/u in empowerment in 3 months with CMP, lipid and A1c before      2- CKD stage 2 - discussed with patient correlation between CKD and uncontrolled DM, continue improved BG control     3 - Peripheral neuropathy - Educated patient to check feet daily for any foreign objects and/or wounds. Discussed with patient the importance of wearing appropriate footwear at all times, not to walk barefoot ever, and to check shoes before putting them on feet. Instructed patient to keep feet dry by regularly changing shoes and socks and drying feet after baths and exercises. Also, instructed patient to report any new lesions, discolorations, or swelling to a healthcare professional.    4- Hypertension - goal < 140/80 mmHg -  At goal, on ARB, decrease HCTZ to 12.5 mg daily with start of Invokana.  BP Readings from Last 3 Encounters:   03/03/17 136/82   02/02/17 (!) 140/90   10/27/16 (!) 144/89     5- Hyperlipidemia, bilateral carotid artery stenosis - LDL goal <70 due to atherosclerosis of aorta in history,  Lipitor increased to 40 mg daily in Feb, recheck CMP and lipid panel with RTC. LFT's WNL    Lab Results   Component Value Date    LDLCALC 99.0 01/23/2017       6- Body mass index is 33.75 kg/(m^2). = Obesity. Modest weight loss (5-10%) may greatly improve BG control. Encouraged patient to start a walking program when not watching grandchildren.     7- - Chronic Hep C - followed by hepatology. 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 Harvoni treatment completed.     8 - Multinodular goiter - last US and FNA done 8/2016, recommend to repeat US yearly     9 - Tobacco use  - recommended to re-enroll in [facility] smoking cessation program, does not wish to enroll at this time        STANDARDS of CARE:  Statin: lipitor 40 mg   ACEI or ARB:  Yes - ARB  ASA:  Yes - 81 mg   Last eye exam 12/2016 no retinopathy

## 2017-03-03 NOTE — MR AVS SNAPSHOT
[facility]  [address]  [facility] [address]  Phone: [phone]                  Buck Ibarra   3/3/2017 7:30 AM   Clinical Support    Description:  Female : 1945   Provider:  DIABETES EMPOWERMENT PROGRAM   Department:  [facility]           Reason for Visit     Blood Sugar Problem           Diagnoses this Visit        Comments    Type 2 diabetes mellitus with diabetic polyneuropathy, with long-term current use of insulin    -  Primary     Hypertension associated with diabetes         Type 2 diabetes mellitus with hyperglycemia, with long-term current use of insulin         Aortic atherosclerosis         Benign essential hypertension                To Do List           Future Appointments        Provider Department Dept Phone    3/16/2017 8:00 AM RUST 11 ALL [facility] [phone]    3/31/2017 7:00 AM LAB, APPOINTMENT [facility] [phone]    2017 7:00 AM LAB, APPOINTMENT [facility] [phone]    2017 7:30 AM DIABETES EMPOWERMENT PROGRAM [facility] [phone]    10/26/2017 11:00 AM LAB, APPOINTMENT [place] [facility] [phone]      Goals (5 Years of Data)     None      Follow-Up and Disposition     Return in about 3 months (around 6/3/2017).       These Medications        Disp Refills Start End    hydrochlorothiazide (HYDRODIURIL) 12.5 MG Tab 90 tablet 3 3/3/2017     Take 1 tablet (12.5 mg total) by mouth once daily. - Oral    Pharmacy: [facility] - [place], [place] - [address] [facility] Ph #: [phone]       insulin degludec (TRESIBA FLEXTOUCH U-200) 200 unit/mL (3 mL) InPn 3 Syringe 3 3/3/2017     Inject 18 Units into the skin once daily. - Subcutaneous    Pharmacy: [facility] - [place], [place] - [address] [facility] Ph #: [phone]       metformin  "(GLUCOPHAGE-XR) 500 MG 24 hr tablet 360 tablet 3 3/3/2017     Take 2 tablets (1,000 mg total) by mouth 2 (two) times daily with meals. - Oral    Pharmacy: [facility] - [facility] [place] [facility] #: [phone]       canagliflozin (INVOKANA) 100 mg Tab 30 tablet 1 3/3/2017     Take 1 tablet (100 mg total) by mouth once daily. - Oral    Pharmacy: [facility] - [facility] [address] [place] [address] Ph #: [phone]       pen needle, diabetic (BD ULTRA-FINE MILAN PEN NEEDLES) 32 gauge x 5/32" Ndle 90 each 4 3/3/2017     Uses 1 time daily, on insulin injections    Pharmacy: [facility] - [place], [place] - [address] Ph #: [phone]         [facility]     [facility] Line - 24/7 Assistance  Registered nurses in the [facility] provide clinical advisement, health education, appointment booking, and other advisory services.  Call for this free service at [phone].             Medications           Message regarding Medications     Verify the changes and/or additions to your medication regime listed below are the same as discussed with your clinician today.  If any of these changes or additions are incorrect, please notify your healthcare provider.        START taking these NEW medications        Refills    canagliflozin (INVOKANA) 100 mg Tab 1    Sig: Take 1 tablet (100 mg total) by mouth once daily.    Class: Normal    Route: Oral      CHANGE how you are taking these medications     Start Taking Instead of    hydrochlorothiazide (HYDRODIURIL) 12.5 MG Tab hydrochlorothiazide (HYDRODIURIL) 25 MG tablet    Dosage:  Take 1 tablet (12.5 mg total) by mouth once daily. Dosage:  Take 1 tablet (25 mg total) by mouth once daily.    Reason for Change:  Reorder     insulin degludec (TRESIBA FLEXTOUCH U-200) 200 unit/mL (3 mL) InPn insulin degludec (TRESIBA FLEXTOUCH U-200) 200 unit/mL (3 mL) InPn    Dosage:  Inject 18 " Units into the skin once daily. Dosage:  Inject 22 Units into the skin once daily.    Reason for Change:  Reorder     metformin (GLUCOPHAGE-XR) 500 MG 24 hr tablet metformin (GLUCOPHAGE-XR) 500 MG 24 hr tablet    Dosage:  Take 2 tablets (1,000 mg total) by mouth 2 (two) times daily with meals. Dosage:  Take 1 tablet (500 mg total) by mouth 2 (two) times daily with meals.    Reason for Change:  Reorder            Verify that the below list of medications is an accurate representation of the medications you are currently taking.  If none reported, the list may be blank. If incorrect, please contact your healthcare provider. Carry this list with you in case of emergency.           Current Medications     aspirin (ECOTRIN) 81 MG EC tablet Take 1 tablet (81 mg total) by mouth once daily.    atorvastatin (LIPITOR) 40 MG tablet Take 1 tablet (40 mg total) by mouth once daily.    blood sugar diagnostic Strp Bid testing    candesartan (ATACAND) 8 MG tablet Take 1 tablet (8 mg total) by mouth once daily.    clotrimazole-betamethasone 1-0.05% (LOTRISONE) cream Apply topically 2 (two) times daily.    fluocinonide (LIDEX) 0.05 % external solution AAA scalp qd prn pruritus    gabapentin (NEURONTIN) 300 MG capsule Take 1 capsule (300 mg total) by mouth every evening.    hydrochlorothiazide (HYDRODIURIL) 12.5 MG Tab Take 1 tablet (12.5 mg total) by mouth once daily.    insulin degludec (TRESIBA FLEXTOUCH U-200) 200 unit/mL (3 mL) InPn Inject 18 Units into the skin once daily.    ketoconazole (NIZORAL) 2 % shampoo Wash hair with medicated shampoo at least 2x/week - let sit on scalp at least 5 minutes prior to rinsing    lancets 33 gauge Misc 1 lancet by Misc.(Non-Drug; Combo Route) route 2 (two) times daily.    lorazepam (ATIVAN) 0.5 MG tablet Take 1 tablet (0.5 mg total) by mouth nightly as needed for Anxiety.    metformin (GLUCOPHAGE-XR) 500 MG 24 hr tablet Take 2 tablets (1,000 mg total) by mouth 2 (two) times daily with meals.  "   nicotine (NICODERM CQ) 14 mg/24 hr Place 1 patch onto the skin once daily.    nicotine polacrilex 2 MG Lozg Nicorette Mini Lozenge -one 2 mg lozenge by mouth as needed, max 5 in 6 hr period.    pen needle, diabetic (BD ULTRA-FINE MILAN PEN NEEDLES) 32 gauge x 5/32" Ndle Uses 1 time daily, on insulin injections    ranitidine (ZANTAC) 150 MG tablet Take 1 tablet (150 mg total) by mouth 2 (two) times daily.    ZOSTAVAX, PF, 19,400 unit/0.65 mL injection     canagliflozin (INVOKANA) 100 mg Tab Take 1 tablet (100 mg total) by mouth once daily.           Clinical Reference Information           Your Vitals Were     BP Pulse Height Weight BMI    136/82 76 5' 5" (1.651 m) 92 kg (202 lb 13.2 oz) 33.75 kg/m2      Blood Pressure          Most Recent Value    BP  136/82      Allergies as of 3/3/2017     Erythromycin    Erythropoietin Analogues    Pegasys [Peginterferon Gonzalo-2a]    Lisinopril      Immunizations Administered on Date of Encounter - 3/3/2017     None      Orders Placed During Today's Visit     Future Labs/Procedures Expected by Expires    Basic metabolic panel  3/3/2017 3/3/2018    Comprehensive metabolic panel  3/3/2017 3/3/2018    Hemoglobin A1c  3/3/2017 3/3/2018    Lipid panel  3/3/2017 3/3/2018      [facility] Sign-Up     Activating your [facility] account is as easy as 1-2-3!     1) Visit [email or web address], select Sign Up Now, enter this activation code and your date of birth, then select Next.  [email or web address]  Expires: 3/19/2017  8:03 AM      2) Create a username and password to use when you visit [facility] in the future and select a security question in case you lose your password and select Next.    3) Enter your e-mail address and click Sign Up!    Additional Information  If you have questions, please e-mail [email or web address] or call [phone] to talk to our [facility] staff. Remember, [facility] is NOT to be used for urgent needs. For medical emergencies, dial 911.         Instructions    Decrease " your Tresiba to 18 units daily     Metformin Increase    This week: Take 2 tablets (1000 mg) with breakfast and take 1 tablet (500mg) with dinner    When you are ready: Take 2 tablets (1000 mg) with breakfast and with dinner - then stay on this dose if you can tolerate it     Start Invokana 100 mg once a day  x1 month, then increase to 300 mg once a day indefinitely. Discussed MOA, possible side effects including yeast infection, UTI, dehydration and ketoacidosis, importance of maintaining hydration and avoiding low carb diets..     I will check your labs in 1 month to make sure that you are not dehydrated          Smoking Cessation     If you would like to quit smoking:   You may be eligible for free services if you are a [place] resident and started smoking cigarettes before September 1, 1988.  Call the Smoking Cessation [employer] ([facility]) toll free at ([phone] or (737) [phone].   Call 4-800-QUIT-NOW if you do not meet the above criteria.            Language Assistance Services     ATTENTION: Language assistance services are available, free of charge. Please call [phone].      ATENCIÓN: Si habla español, tiene a eduardo disposición servicios gratuitos de asistencia lingüística. Llame al [phone].     CHÚ Ý: N?u b?n nói Ti?ng Vi?t, có các d?ch v? h? tr? ngôn ng? mi?n phí dành cho b?n. G?i s? [phone].         Rob Borjas -  Diabetes Program complies with applicable Federal civil rights laws and does not discriminate on the basis of race, color, national origin, age, disability, or sex.

## 2017-03-03 NOTE — PATIENT INSTRUCTIONS
Decrease your Tresiba to 18 units daily     Metformin Increase    This week: Take 2 tablets (1000 mg) with breakfast and take 1 tablet (500mg) with dinner    When you are ready: Take 2 tablets (1000 mg) with breakfast and with dinner - then stay on this dose if you can tolerate it     Start Invokana 100 mg once a day  x1 month, then increase to 300 mg once a day indefinitely. Discussed MOA, possible side effects including yeast infection, UTI, dehydration and ketoacidosis, importance of maintaining hydration and avoiding low carb diets..     I will check your labs in 1 month to make sure that you are not dehydrated

## 2017-03-16 ENCOUNTER — HOSPITAL ENCOUNTER (OUTPATIENT)
Dept: RADIOLOGY | Facility: HOSPITAL | Age: 72
Discharge: HOME | End: 2017-03-16
Attending: INTERNAL MEDICINE
Payer: MEDICARE

## 2017-03-16 DIAGNOSIS — Z86.19 HISTORY OF HEPATITIS C: ICD-10-CM

## 2017-03-16 DIAGNOSIS — K76.9 HEPATIC LESION: ICD-10-CM

## 2017-03-16 PROCEDURE — 76700 US EXAM ABDOM COMPLETE: CPT | Mod: 26,,, | Performed by: RADIOLOGY

## 2017-03-16 PROCEDURE — 76700 US EXAM ABDOM COMPLETE: CPT | Mod: TC

## 2017-03-21 ENCOUNTER — TELEPHONE (OUTPATIENT)
Dept: HEPATOLOGY | Facility: CLINIC | Age: 72
End: 2017-03-21

## 2017-03-21 DIAGNOSIS — K76.9 HEPATIC LESION: Primary | ICD-10-CM

## 2017-03-21 NOTE — TELEPHONE ENCOUNTER
Please call pt.   Imaging reviewed w/ Dr. Tobias, Ultrasound recommended in 6 months --pls schedule.   Lesion in liver is remains quite small and indeterminate.

## 2017-03-31 ENCOUNTER — LAB VISIT (OUTPATIENT)
Dept: LAB | Facility: HOSPITAL | Age: 72
End: 2017-03-31
Attending: INTERNAL MEDICINE
Payer: MEDICARE

## 2017-03-31 DIAGNOSIS — Z79.4 TYPE 2 DIABETES MELLITUS WITH DIABETIC POLYNEUROPATHY, WITH LONG-TERM CURRENT USE OF INSULIN: Chronic | ICD-10-CM

## 2017-03-31 DIAGNOSIS — E11.42 TYPE 2 DIABETES MELLITUS WITH DIABETIC POLYNEUROPATHY, WITH LONG-TERM CURRENT USE OF INSULIN: Chronic | ICD-10-CM

## 2017-03-31 LAB
ANION GAP SERPL CALC-SCNC: 8 MMOL/L
BUN SERPL-MCNC: 30 MG/DL
CALCIUM SERPL-MCNC: 9.4 MG/DL
CHLORIDE SERPL-SCNC: 104 MMOL/L
CO2 SERPL-SCNC: 27 MMOL/L
CREAT SERPL-MCNC: 1.1 MG/DL
EST. GFR  (AFRICAN AMERICAN): 59 ML/MIN/1.73 M^2
EST. GFR  (NON AFRICAN AMERICAN): 51 ML/MIN/1.73 M^2
GLUCOSE SERPL-MCNC: 101 MG/DL
POTASSIUM SERPL-SCNC: 4.8 MMOL/L
SODIUM SERPL-SCNC: 139 MMOL/L

## 2017-03-31 PROCEDURE — 36415 COLL VENOUS BLD VENIPUNCTURE: CPT

## 2017-03-31 PROCEDURE — 80048 BASIC METABOLIC PNL TOTAL CA: CPT

## 2017-04-02 DIAGNOSIS — E11.42 TYPE 2 DIABETES MELLITUS WITH DIABETIC POLYNEUROPATHY, WITH LONG-TERM CURRENT USE OF INSULIN: Chronic | ICD-10-CM

## 2017-04-02 DIAGNOSIS — Z79.4 TYPE 2 DIABETES MELLITUS WITH DIABETIC POLYNEUROPATHY, WITH LONG-TERM CURRENT USE OF INSULIN: Chronic | ICD-10-CM

## 2017-04-03 RX ORDER — CANAGLIFLOZIN 100 MG/1
TABLET, FILM COATED ORAL
Qty: 30 TABLET | Refills: 0 | OUTPATIENT
Start: 2017-04-03

## 2017-04-07 ENCOUNTER — TELEPHONE (OUTPATIENT)
Dept: DIABETES | Facility: CLINIC | Age: 72
End: 2017-04-07

## 2017-04-07 DIAGNOSIS — E11.42 TYPE 2 DIABETES MELLITUS WITH DIABETIC POLYNEUROPATHY, WITH LONG-TERM CURRENT USE OF INSULIN: Primary | ICD-10-CM

## 2017-04-07 DIAGNOSIS — Z79.4 TYPE 2 DIABETES MELLITUS WITH DIABETIC POLYNEUROPATHY, WITH LONG-TERM CURRENT USE OF INSULIN: Primary | ICD-10-CM

## 2017-04-07 NOTE — TELEPHONE ENCOUNTER
Called patient to discuss labs, including BUN elevated, kidney function slightly decreased. No answer, left message for patient to return my call

## 2017-04-07 NOTE — TELEPHONE ENCOUNTER
Please call pt to schedule patient for BMP in 2 weeks    Spoke to patient about elevated BUN. Pt reports drinking ~6 bottles of water daily. Increase to 7 bottles daily, repeat labs in 2 weeks. HCTZ dose confirmed 12.5 mg daily

## 2017-04-10 ENCOUNTER — TELEPHONE (OUTPATIENT)
Dept: ENDOCRINOLOGY | Facility: CLINIC | Age: 72
End: 2017-04-10

## 2017-04-10 NOTE — TELEPHONE ENCOUNTER
----- Message from Елена Otero sent at 4/7/2017  3:17 PM CDT -----  Contact: Patient  Patient is returning a call from Anika Catherine.    Please call [phone].

## 2017-04-18 ENCOUNTER — TELEPHONE (OUTPATIENT)
Dept: HEPATOLOGY | Facility: CLINIC | Age: 72
End: 2017-04-18

## 2017-04-18 ENCOUNTER — TELEPHONE (OUTPATIENT)
Dept: GASTROENTEROLOGY | Facility: CLINIC | Age: 72
End: 2017-04-18

## 2017-04-18 NOTE — TELEPHONE ENCOUNTER
----- Message from Suresh Canela sent at 4/18/2017  8:22 AM CDT -----  Contact: Pt:[phone]  Pt called and states she would like to know if she is due for her cancer screening.

## 2017-04-18 NOTE — TELEPHONE ENCOUNTER
Returned call to pt. Informed her that she does not need f/u at this time. Only repeat imaging in 9/2017 as scheduled.   Verbalized understanding.

## 2017-04-18 NOTE — TELEPHONE ENCOUNTER
----- Message from Darcie Masters PA-C sent at 2017 10:38 AM CDT -----  Contact: pt   No, just needs US on 2017 as scheduled.   ----- Message -----     From: Mali Suárez RN     Sent: 2017  10:33 AM       To: Darcie Masters PA-C    Does pt need f/u at this time? Please advise.   Thanks  ----- Message -----     From: Celso Dye     Sent: 2017   8:32 AM       To: [facility] Hepatitis C Staff    ..Patient wants to schedule a follow up appointment.     Patient's Name: Buck Ibarra  Patient's Phone Number:   Patient's : 1945

## 2017-04-21 ENCOUNTER — LAB VISIT (OUTPATIENT)
Dept: LAB | Facility: HOSPITAL | Age: 72
End: 2017-04-21
Attending: NURSE PRACTITIONER
Payer: MEDICARE

## 2017-04-21 DIAGNOSIS — Z79.4 TYPE 2 DIABETES MELLITUS WITH DIABETIC POLYNEUROPATHY, WITH LONG-TERM CURRENT USE OF INSULIN: ICD-10-CM

## 2017-04-21 DIAGNOSIS — E11.42 TYPE 2 DIABETES MELLITUS WITH DIABETIC POLYNEUROPATHY, WITH LONG-TERM CURRENT USE OF INSULIN: ICD-10-CM

## 2017-04-21 LAB
ANION GAP SERPL CALC-SCNC: 9 MMOL/L
BUN SERPL-MCNC: 21 MG/DL
CALCIUM SERPL-MCNC: 9.6 MG/DL
CHLORIDE SERPL-SCNC: 109 MMOL/L
CO2 SERPL-SCNC: 27 MMOL/L
CREAT SERPL-MCNC: 0.9 MG/DL
EST. GFR  (AFRICAN AMERICAN): >60 ML/MIN/1.73 M^2
EST. GFR  (NON AFRICAN AMERICAN): >60 ML/MIN/1.73 M^2
GLUCOSE SERPL-MCNC: 78 MG/DL
POTASSIUM SERPL-SCNC: 4.8 MMOL/L
SODIUM SERPL-SCNC: 145 MMOL/L

## 2017-04-21 PROCEDURE — 36415 COLL VENOUS BLD VENIPUNCTURE: CPT

## 2017-04-21 PROCEDURE — 80048 BASIC METABOLIC PNL TOTAL CA: CPT

## 2017-04-23 ENCOUNTER — TELEPHONE (OUTPATIENT)
Dept: DIABETES | Facility: CLINIC | Age: 72
End: 2017-04-23

## 2017-04-23 NOTE — TELEPHONE ENCOUNTER
Please call patient and notify her that her kidney function and hydration marker on recent labs was normal. Continue current medications and fluid intake. Much improved     Thanks

## 2017-04-24 NOTE — TELEPHONE ENCOUNTER
Spoke to the patient and notified her that her kidney function and hydration marker on recent labs was normal. Continue current medications and fluid intake. Much improved. Pt verbalzied understanding.

## 2017-05-10 ENCOUNTER — HOSPITAL ENCOUNTER (EMERGENCY)
Facility: HOSPITAL | Age: 72
Discharge: HOME | End: 2017-05-10
Attending: FAMILY MEDICINE
Payer: MEDICARE

## 2017-05-10 VITALS
DIASTOLIC BLOOD PRESSURE: 83 MMHG | OXYGEN SATURATION: 99 % | TEMPERATURE: 99 F | RESPIRATION RATE: 18 BRPM | HEART RATE: 66 BPM | SYSTOLIC BLOOD PRESSURE: 193 MMHG | BODY MASS INDEX: 33.75 KG/M2 | HEIGHT: 66 IN | WEIGHT: 210 LBS

## 2017-05-10 DIAGNOSIS — K62.5 RECTAL BLEEDING: ICD-10-CM

## 2017-05-10 DIAGNOSIS — R10.9 ABDOMINAL PAIN, UNSPECIFIED LOCATION: Primary | ICD-10-CM

## 2017-05-10 DIAGNOSIS — K62.89 PROCTITIS: ICD-10-CM

## 2017-05-10 DIAGNOSIS — R10.9 ABDOMINAL PAIN: ICD-10-CM

## 2017-05-10 LAB
ALBUMIN SERPL BCP-MCNC: 3.1 G/DL
ALP SERPL-CCNC: 99 U/L
ALT SERPL W/O P-5'-P-CCNC: 38 U/L
ANION GAP SERPL CALC-SCNC: 8 MMOL/L
AST SERPL-CCNC: 35 U/L
BASOPHILS # BLD AUTO: 0.03 K/UL
BASOPHILS NFR BLD: 0.4 %
BILIRUB SERPL-MCNC: 0.3 MG/DL
BUN SERPL-MCNC: 17 MG/DL
CALCIUM SERPL-MCNC: 8.7 MG/DL
CHLORIDE SERPL-SCNC: 107 MMOL/L
CO2 SERPL-SCNC: 22 MMOL/L
CREAT SERPL-MCNC: 0.8 MG/DL
DIFFERENTIAL METHOD: ABNORMAL
EOSINOPHIL # BLD AUTO: 0.1 K/UL
EOSINOPHIL NFR BLD: 0.8 %
ERYTHROCYTE [DISTWIDTH] IN BLOOD BY AUTOMATED COUNT: 16 %
EST. GFR  (AFRICAN AMERICAN): >60 ML/MIN/1.73 M^2
EST. GFR  (NON AFRICAN AMERICAN): >60 ML/MIN/1.73 M^2
GLUCOSE SERPL-MCNC: 158 MG/DL
HCT VFR BLD AUTO: 42.9 %
HGB BLD-MCNC: 13.7 G/DL
LIPASE SERPL-CCNC: 40 U/L
LYMPHOCYTES # BLD AUTO: 2.7 K/UL
LYMPHOCYTES NFR BLD: 35.7 %
MCH RBC QN AUTO: 27.6 PG
MCHC RBC AUTO-ENTMCNC: 31.9 %
MCV RBC AUTO: 87 FL
MONOCYTES # BLD AUTO: 0.2 K/UL
MONOCYTES NFR BLD: 2.9 %
NEUTROPHILS # BLD AUTO: 4.6 K/UL
NEUTROPHILS NFR BLD: 59.9 %
PLATELET # BLD AUTO: 358 K/UL
PMV BLD AUTO: 10 FL
POTASSIUM SERPL-SCNC: 3.9 MMOL/L
PROT SERPL-MCNC: 7 G/DL
RBC # BLD AUTO: 4.96 M/UL
SODIUM SERPL-SCNC: 137 MMOL/L
WBC # BLD AUTO: 7.67 K/UL

## 2017-05-10 PROCEDURE — 25500020 PHARM REV CODE 255: Performed by: FAMILY MEDICINE

## 2017-05-10 PROCEDURE — 83690 ASSAY OF LIPASE: CPT

## 2017-05-10 PROCEDURE — 93010 ELECTROCARDIOGRAM REPORT: CPT | Mod: ,,, | Performed by: INTERNAL MEDICINE

## 2017-05-10 PROCEDURE — 99285 EMERGENCY DEPT VISIT HI MDM: CPT | Mod: ,,, | Performed by: PHYSICIAN ASSISTANT

## 2017-05-10 PROCEDURE — 99284 EMERGENCY DEPT VISIT MOD MDM: CPT | Mod: 25

## 2017-05-10 PROCEDURE — 93005 ELECTROCARDIOGRAM TRACING: CPT

## 2017-05-10 PROCEDURE — 80053 COMPREHEN METABOLIC PANEL: CPT

## 2017-05-10 PROCEDURE — 85025 COMPLETE CBC W/AUTO DIFF WBC: CPT

## 2017-05-10 RX ORDER — METRONIDAZOLE 500 MG/1
500 TABLET ORAL 3 TIMES DAILY
Qty: 21 TABLET | Refills: 0 | Status: SHIPPED | OUTPATIENT
Start: 2017-05-10 | End: 2017-05-17

## 2017-05-10 RX ORDER — CIPROFLOXACIN 500 MG/1
500 TABLET ORAL 2 TIMES DAILY
Qty: 20 TABLET | Refills: 0 | Status: SHIPPED | OUTPATIENT
Start: 2017-05-10 | End: 2017-05-20

## 2017-05-10 RX ADMIN — IOHEXOL 100 ML: 350 INJECTION, SOLUTION INTRAVENOUS at 07:05

## 2017-05-10 NOTE — PROVIDER PROGRESS NOTES - EMERGENCY DEPT.
Encounter Date: 5/10/2017    ED Physician Progress Notes        Physician Note:   Normal sinus rhythm.  Normal EKG    EKG - STEMI Decision  Initial Reading: No STEMI present.

## 2017-05-10 NOTE — ED PROVIDER NOTES
Encounter Date: 5/10/2017       History     Chief Complaint   Patient presents with    Abdominal Pain     Pt reporting mid abdominal pain with nausea since last night.  Pt also reporting blood with wiping after a BM today.     Review of patient's allergies indicates:   Allergen Reactions    Erythromycin Anaphylaxis    Erythropoietin analogues Anaphylaxis    Pegasys [peginterferon ruben-2a] Anaphylaxis    Lisinopril Hives     HPI Comments: This is a 71-year-old female with a past medical history of hypertension, hyperlipidemia, hepatitis C, gastritis, GERD who presents to the ED with a chief complaint of abdominal pain and rectal bleeding.  Patient states that yesterday she developed sharp pain across her mid abdomen which is intermittent and progressively worsening.  She rates the pain 10/10.  Associated symptoms include urge to stool.  Patient states she awoke early this morning and had a large bowel movement.  She noticed a small amount of blood on the toilet tissue when wiping.  Denies gross bleeding.  Patient reports persistent abdominal pain, prompting her visit.  She had a colonoscopy in 2016 revealing multiple benign polyps without evidence of diverticulitis.  She denies fever, chills, nausea/vomiting, dysuria, urinary frequency, lightheadedness or dizziness.  Surgical history of hysterectomy.    The history is provided by the patient.     Past Medical History:   Diagnosis Date    Allergy     Cataract     Diabetes mellitus type II     Gastritis     with gastric ulcer    GERD (gastroesophageal reflux disease)     H. pylori infection     Hepatitis C     Hyperlipidemia     Hypertension     Osteopenia     Type 2 diabetes mellitus with diabetic polyneuropathy      Past Surgical History:   Procedure Laterality Date    COLONOSCOPY      COLONOSCOPY N/A 1/28/2016    Procedure: COLONOSCOPY;  Surgeon: Emeka Ahn MD;  Location: [address];  Service: Endoscopy;  Laterality: N/A;     HYSTERECTOMY      LIVER BIOPSY      UPPER GASTROINTESTINAL ENDOSCOPY       Family History   Problem Relation Age of Onset    Heart disease Mother     Diabetes Mother     Heart disease Father     Cancer Sister      breast cancer    Diabetes Sister     Cancer Sister 70     colon CA    Diabetes Sister     Diabetes Sister     Glaucoma Neg Hx     Melanoma Neg Hx     Cirrhosis Neg Hx     Psoriasis Neg Hx     Lupus Neg Hx      Social History   Substance Use Topics    Smoking status: Current Every Day Smoker     Packs/day: 0.25     Years: 48.00     Last attempt to quit: 9/4/2016    Smokeless tobacco: None    Alcohol use No      Comment: special occasions     Review of Systems   Constitutional: Negative for chills and fever.   HENT: Negative for sore throat.    Respiratory: Negative for shortness of breath.    Cardiovascular: Negative for chest pain.   Gastrointestinal: Positive for abdominal pain and anal bleeding. Negative for blood in stool, nausea and vomiting.   Genitourinary: Negative for dysuria and hematuria.   Musculoskeletal: Negative for back pain.   Skin: Negative for rash.   Neurological: Negative for weakness.   Hematological: Does not bruise/bleed easily.       Physical Exam   Initial Vitals   BP Pulse Resp Temp SpO2   05/10/17 1653 05/10/17 1653 05/10/17 1653 05/10/17 1653 05/10/17 1653   193/83 66 18 98.6 °F (37 °C) 99 %     Physical Exam    Constitutional: She appears well-developed and well-nourished. No distress.   HENT:   Head: Atraumatic.   Eyes: Conjunctivae and EOM are normal. Pupils are equal, round, and reactive to light.   Neck: Normal range of motion. Neck supple.   Cardiovascular: Normal rate, regular rhythm and normal heart sounds.   Pulmonary/Chest: Breath sounds normal. No respiratory distress. She has no wheezes. She has no rhonchi. She has no rales.   Abdominal: Soft. Bowel sounds are normal. There is generalized tenderness. There is guarding. There is no rebound.    Genitourinary: Rectal exam shows external hemorrhoid. Rectal exam shows guaiac negative stool. Guaiac negative stool. : Acceptable.  Neurological: She is alert and oriented to person, place, and time.   Skin: Skin is warm and dry. No rash noted.         ED Course   Procedures  Labs Reviewed   COMPREHENSIVE METABOLIC PANEL - Abnormal; Notable for the following:        Result Value    CO2 22 (*)     Glucose 158 (*)     Albumin 3.1 (*)     All other components within normal limits   CBC W/ AUTO DIFFERENTIAL - Abnormal; Notable for the following:     MCHC 31.9 (*)     RDW 16.0 (*)     Platelets 358 (*)     Mono # 0.2 (*)     Mono% 2.9 (*)     All other components within normal limits   LIPASE   URINALYSIS        Imaging Results         CT Abdomen Pelvis With Contrast (Final result) Result time:  05/10/17 19:45:30    Final result by Fox Osullivan MD (05/10/17 19:45:30)    Impression:         Scattered colonic diverticula.    There is circumferential wall thickening within the rectum, with narrowing of the rectal lumen. Suggest follow up proctoscopy to exclude neoplasm.    Subcentimeter hypodensity within the left hepatic lobe, which is too small to characterize, unchanged from prior study.    Simple right-sided renal cysts    Moderate calcific atherosclerosis of the aorta.    ______________________________________     Electronically signed by resident: FOX OSLULIVAN MD  Date:     05/10/17  Time:    19:31            As the supervising and teaching physician, I personally reviewed the images and resident's interpretation and I agree with the findings.          Electronically signed by: ANGELIQUE CARABALLO MD  Date:     05/10/17  Time:    19:45     Narrative:    CT ABDOMEN, and, PELVIS  with IV contrast    Protocol:  Axial images of the abdomen and pelvis were acquired  after the use of 100 cc cc Vezt047 IV contrast.  Coronal and sagittal reconstructions were also obtained    HISTORY:  71 year old F with  abdominal pain    COMPARISON: None.     FINDINGS:  Heart: Normal in size. No pericardial effusion.     Lung Bases: Well aerated, without consolidation or pleural fluid.     Liver: The liver is normal in size and attenuation, with a subcentimeter hypodensity within the left hepatic lobe.  This finding is too small to characterize, however, unchanged from prior study.       Gallbladder: No calcified gallstones.     Bile Ducts: No evidence of dilated ducts.     Pancreas: No mass or peripancreatic fat stranding.      Spleen: Unremarkable.     Adrenals: Unremarkable.     Kidneys/ Ureters: Normal in size and location.  There 2 simple right-sided renal cyst.  Normal concentration and excretion of contrast. No hydronephrosis or nephrolithiasis. No ureteral dilatation.     Bladder: No evidence of wall thickening.     Reproductive organs: Status post hysterectomy.     GI Tract/Mesentery: No evidence of bowel obstruction or inflammation. There are scattered colonic diverticula.  There is circumferential wall thickening with narrowing of the lumen in the distal rectum (axial series 2 image 78).  Appendix is visualized without abnormality.    Peritoneal Space: No ascites. No free air.     Retroperitoneum:  No significant adenopathy.      Abdominal wall:  Unremarkable.     Vasculature: There is moderate calcific atherosclerosis of the abdominal aorta, without evidence of aneurysmal dilatation.     Bones: No acute fracture. Age-appropriate degenerative changes.                 Medical Decision Making:   History:   Old Medical Records: I decided to obtain old medical records.       APC / Resident Notes:   71-year-old female presents with abdominal pain and anal bleeding.  On exam she is afebrile and nontoxic.  Heart rate and rhythm are regular.  Lungs are clear bilaterally.  Abdomen is soft diffuse tenderness to palpation  There is guarding in the left lower quadrant.  No CVA tenderness.  Rectal exam demonstrates a nonbleeding  "external hemorrhoid and rectal vault with no stool.  Rectal secretions are guaiac negative.    DDX includes but is not limited to diverticulitis, colitis, UTI, pyelonephritis, pancreatitis.    I will evaluate with labs and CT.  Patient was offered and declined pain medications.    Labs demonstrate no acute significant findings, WBC 7.67, H&H 13/42, lipase 40, Cr 0.8. Normal LFTs.  CT abdomen pelvis demonstrates "circumferential wall thickening within the rectum, with narrowing of the rectal lumen. Suggest follow up proctoscopy to exclude neoplasm."     Advised patient of findings. Given temporal history and acuity of onset feel that this is favored to represent an infectious etiology - will start cipro/flagyl. Recommend prompt follow up with colon and rectal surgery for further evaluation. Patient verbalized understanding and agrees with the course of treatment. Detailed return to ED precautions given. She is stable for discharge. I discussed the care of this patient with my supervising MD.               ED Course     Clinical Impression:   The primary encounter diagnosis was Abdominal pain, unspecified location. Diagnoses of Abdominal pain, Rectal bleeding, and Proctitis were also pertinent to this visit.    Disposition:   Disposition: Discharged  Condition: Stable       ISABEL Stringer  05/10/17 2059    "

## 2017-05-10 NOTE — ED NOTES
Pt reports Right and Left upper quadrant pain and noticing a small amount of blood after passing hard stool. She denies fever, chills and is not taking blood thinners.

## 2017-05-10 NOTE — ED AVS SNAPSHOT
[facility]  [address] [facility] [address]               Buck Ibarra   5/10/2017  5:12 PM   ED    Description:  Female : 1945   Department:  [facility]           Your Care was Coordinated By:     Provider Role From To    Khang Brar MD Attending Provider 05/10/17 7351 --    ISABEL Stringer Physician Assistant 05/10/17 0790 --      Reason for Visit     Abdominal Pain           Diagnoses this Visit        Comments    Abdominal pain, unspecified location    -  Primary     Abdominal pain         Rectal bleeding         Proctitis           ED Disposition     None           To Do List           Follow-up Information     Schedule an appointment as soon as possible for a visit with Ericka Cortez MD.    Specialty:  Internal Medicine    Contact information:    [address]  [place] [address]  [phone]          Schedule an appointment as soon as possible for a visit with [facility].    Specialty:  Colon and Rectal Surgery    Contact information:    [address] [facility] [address] [phone]    Additional information:    [facility] 2nd Floor, Please check in on the 1st floor       These Medications        Disp Refills Start End    ciprofloxacin HCl (CIPRO) 500 MG tablet 20 tablet 0 5/10/2017 2017    Take 1 tablet (500 mg total) by mouth 2 (two) times daily. - Oral    Pharmacy: [facility] - [place], [place] - [address] Ph #: [phone]       metronidazole (FLAGYL) 500 MG tablet 21 tablet 0 5/10/2017 2017    Take 1 tablet (500 mg total) by mouth 3 (three) times daily. - Oral    Pharmacy: [facility] - [place], [place] - [address] [place] [address] Ph #: [phone]         [facility]     [facility] -  Assistance  Unless otherwise directed by your provider, please contact [facility], our nurse  care line that is available for 24/7 assistance.     Registered nurses in the [facility] provide: appointment scheduling, clinical advisement, health education, and other advisory services.  Call: [phone] (toll free)               Medications           Message regarding Medications     Verify the changes and/or additions to your medication regime listed below are the same as discussed with your clinician today.  If any of these changes or additions are incorrect, please notify your healthcare provider.        START taking these NEW medications        Refills    ciprofloxacin HCl (CIPRO) 500 MG tablet 0    Sig: Take 1 tablet (500 mg total) by mouth 2 (two) times daily.    Class: Print    Route: Oral    metronidazole (FLAGYL) 500 MG tablet 0    Sig: Take 1 tablet (500 mg total) by mouth 3 (three) times daily.    Class: Print    Route: Oral      These medications were administered today        Dose Freq    omnipaque 350 iohexol 100 mL 100 mL IMG once as needed    Sig: Inject 100 mLs into the vein ONCE PRN for contrast.    Class: Normal    Route: Intravenous           Verify that the below list of medications is an accurate representation of the medications you are currently taking.  If none reported, the list may be blank. If incorrect, please contact your healthcare provider. Carry this list with you in case of emergency.           Current Medications     aspirin (ECOTRIN) 81 MG EC tablet Take 1 tablet (81 mg total) by mouth once daily.    atorvastatin (LIPITOR) 40 MG tablet Take 1 tablet (40 mg total) by mouth once daily.    blood sugar diagnostic Strp Bid testing    canagliflozin (INVOKANA) 100 mg Tab Take 1 tablet (100 mg total) by mouth once daily.    candesartan (ATACAND) 8 MG tablet Take 1 tablet (8 mg total) by mouth once daily.    clotrimazole-betamethasone 1-0.05% (LOTRISONE) cream Apply topically 2 (two) times daily.    fluocinonide (LIDEX) 0.05 % external solution AAA scalp qd prn  "pruritus    gabapentin (NEURONTIN) 300 MG capsule Take 1 capsule (300 mg total) by mouth every evening.    hydrochlorothiazide (HYDRODIURIL) 12.5 MG Tab Take 1 tablet (12.5 mg total) by mouth once daily.    insulin degludec (TRESIBA FLEXTOUCH U-200) 200 unit/mL (3 mL) InPn Inject 18 Units into the skin once daily.    ketoconazole (NIZORAL) 2 % shampoo Wash hair with medicated shampoo at least 2x/week - let sit on scalp at least 5 minutes prior to rinsing    lancets 33 gauge Misc 1 lancet by Misc.(Non-Drug; Combo Route) route 2 (two) times daily.    metformin (GLUCOPHAGE-XR) 500 MG 24 hr tablet Take 2 tablets (1,000 mg total) by mouth 2 (two) times daily with meals.    nicotine (NICODERM CQ) 14 mg/24 hr Place 1 patch onto the skin once daily.    nicotine polacrilex 2 MG Lozg Nicorette Mini Lozenge -one 2 mg lozenge by mouth as needed, max 5 in 6 hr period.    pen needle, diabetic (BD ULTRA-FINE MILAN PEN NEEDLES) 32 gauge x 5/32" Ndle Uses 1 time daily, on insulin injections    ranitidine (ZANTAC) 150 MG tablet Take 1 tablet (150 mg total) by mouth 2 (two) times daily.    ZOSTAVAX, PF, 19,400 unit/0.65 mL injection     ciprofloxacin HCl (CIPRO) 500 MG tablet Take 1 tablet (500 mg total) by mouth 2 (two) times daily.    influenza vaccine 180 mcg/0.5 mL(for patients > 65) injection Inject 0.5 mLs into the muscle vaccine x 1 dose for Meets Vaccination Criteria.    lorazepam (ATIVAN) 0.5 MG tablet Take 1 tablet (0.5 mg total) by mouth nightly as needed for Anxiety.    metronidazole (FLAGYL) 500 MG tablet Take 1 tablet (500 mg total) by mouth 3 (three) times daily.           Clinical Reference Information           Your Vitals Were     BP Pulse Temp Resp Height Weight    193/83 (BP Location: Right arm, Patient Position: Sitting) 66 98.6 °F (37 °C) (Oral) 18 5' 6" (1.676 m) 95.3 kg (210 lb)    SpO2 BMI             99% 33.89 kg/m2         Allergies as of 5/10/2017        Reactions    Erythromycin Anaphylaxis    " Erythropoietin Analogues Anaphylaxis    Pegasys [Peginterferon Gonzalo-2a] Anaphylaxis    Lisinopril Hives      Immunizations Administered on Date of Encounter - 5/10/2017     None      ED Micro, Lab, POCT     Start Ordered       Status Ordering Provider    05/10/17 1719 05/10/17 1719  Urinalysis  STAT      Acknowledged     05/10/17 1719 05/10/17 1719  Comprehensive metabolic panel  STAT      Final result     05/10/17 1719 05/10/17 1719  CBC auto differential  STAT      Final result     05/10/17 1719 05/10/17 1719  Lipase  STAT      Final result       ED Imaging Orders     Start Ordered       Status Ordering Provider    05/10/17 1743 05/10/17 1742  CT Abdomen Pelvis With Contrast  1 time imaging      Final result         Discharge Instructions         Lower GI Bleeding (Stable)  You have signs of blood in your stool. This is called rectal bleeding. The bleeding may have begun in another part of your gastrointestinal (GI) tract. If the blood is bright red, it is likely coming from the lower part of the GI tract. If the blood is black or dark, it might be coming from higher up in the GI tract. Very small amounts of GI bleeding may not be visible and can only be discovered during a test on your stool. Possible causes of lower GI bleeding include:  · Hemorrhoids  · Anal fissures  · Diverticulitis  · Inflammatory bowel disease (Crohn's disease or ulcerative colitis)  · Polyps (growths) in the intestine  Note: Iron supplements and medicines for diarrhea or upset stomach can cause black stools. Foods such as licorice and red beets can also discolor the stool and be mistaken for bleeding. These are not bleeding and are not a cause for alarm.  Home care  You have not lost a large amount of blood and your condition appears stable at this time. You may resume normal activity as long as you feel well.  Avoid NSAIDs, such as aspirin, ibuprofen, or naproxen. They can irritate the stomach and cause further bleeding. If you are taking  these medicines for other medical reasons, talk to your healthcare provider before you stop them.   Follow-up care  Follow up with your healthcare provider as advised. Further tests may be needed to find the cause of your bleeding.  When to seek medical advice  Call your healthcare provider for any of the following:  · Large amount of rectal bleeding   · Increasing abdominal pain  · Weakness, dizziness  Call 911  Get emergency medical care if any of the following occur:  · Loss of consciousness  · Vomiting blood  Date Last Reviewed: 6/24/2015  © 2625-8083 [employer]. [address] [place] [address]. All rights reserved. This information is not intended as a substitute for professional medical care. Always follow your healthcare professional's instructions.          Your Scheduled Appointments     Jun 09, 2017  7:00 AM CDT   Fasting Lab with LAB, APPOINTMENT [facility] INTMED [facility] ([facility])    [address]  [place] [address]   [phone]            Jun 16, 2017  7:30 AM CDT   Established Patient Visit with DIABETES EMPOWERMENT PROGRAM   [facility] -  Diabetes Program ([facility])    [address]  [place] [address]   [phone]            Sep 13, 2017  8:00 AM CDT   Us Abdomen with [facility] US 11 ALL [facility] ([facility] )    [address] [facility] [address]   [phone]            Oct 26, 2017 11:00 AM CDT   Non-Fasting Lab with LAB, APPOINTMENT [place] [facility] ([facility])    [address] [facility] [address]   [phone]              [facility] Sign-Up     Activating your [facility] account is as easy as 1-2-3!     1) Visit [email or web address], select Sign Up Now, enter this activation code and your date of birth, then select Next.  [email or web address]  Expires:  6/24/2017  8:05 PM      2) Create a username and password to use when you visit [facility] in the future and select a security question in case you lose your password and select Next.    3) Enter your e-mail address and click Sign Up!    Additional Information  If you have questions, please e-mail [email or web address] or call [phone] to talk to our [facility] staff. Remember, [facility] is NOT to be used for urgent needs. For medical emergencies, dial 911.         Smoking Cessation     If you would like to quit smoking:   You may be eligible for free services if you are a [place] resident and started smoking cigarettes before September 1, 1988.  Call the Smoking Cessation Trust (SCT) toll free at (962) 854-9203 or [phone]) 479-6393.   Call -800-QUIT-NOW if you do not meet the above criteria.   Contact us via email: [email or web address]   View our website for more information: [email or web address]         [facility] complies with applicable Federal civil rights laws and does not discriminate on the basis of race, color, national origin, age, disability, or sex.        Language Assistance Services     ATTENTION: Language assistance services are available, free of charge. Please call [phone].      ATENCIÓN: Si habla español, tiene a eduardo disposición servicios gratuitos de asistencia lingüística. Llame al [phone].     CHÚ Ý: N?u b?n nói Ti?ng Vi?t, có các d?ch v? h? tr? ngôn ng? mi?n phí dành cho b?n. G?i s? [phone].

## 2017-05-10 NOTE — ED NOTES
Patient identifiers verified and correct for Buck Ibarra.    LOC: The patient is awake, alert and aware of environment with an appropriate affect, the patient is oriented x 3 and speaking appropriately.  APPEARANCE: Patient resting comfortably and in no acute distress, patient is clean and well groomed, patient's clothing is properly fastened.  SKIN: The skin is warm and dry, color consistent with ethnicity, patient has normal skin turgor and moist mucus membranes, skin intact, no breakdown or bruising noted.  MUSCULOSKELETAL: Patient moving all extremities spontaneously, no obvious swelling or deformities noted.  RESPIRATORY: Airway is open and patent, respirations are spontaneous, patient has a normal effort and rate, no accessory muscle use noted, bilateral breath sounds clear and present.   CARDIAC: Patient has a normal rate and regular rhythm, no periphreal edema noted, capillary refill < 3 seconds.  ABDOMEN: Soft and non tender to palpation, no distention noted, normoactive bowel sounds present in all four quadrants.  NEUROLOGIC: PERRL, 3 mm bilaterally, eyes open spontaneously, behavior appropriate to situation, follows commands, facial expression symmetrical, bilateral hand grasp equal and even, purposeful motor response noted, normal sensation in all extremities when touched with a finger.

## 2017-05-11 ENCOUNTER — TELEPHONE (OUTPATIENT)
Dept: INTERNAL MEDICINE | Facility: CLINIC | Age: 72
End: 2017-05-11

## 2017-05-11 NOTE — TELEPHONE ENCOUNTER
----- Message from Elvi Ivey sent at 5/11/2017 12:23 PM CDT -----  Hi,    Ms. Ibarra has a few questions concerning her ED visit.  She is asking why suggested she f/u at [facility].    Please call the patient with any information.    Thanks,    Elvi Ivey  [facility]

## 2017-05-11 NOTE — DISCHARGE INSTRUCTIONS
Lower GI Bleeding (Stable)  You have signs of blood in your stool. This is called rectal bleeding. The bleeding may have begun in another part of your gastrointestinal (GI) tract. If the blood is bright red, it is likely coming from the lower part of the GI tract. If the blood is black or dark, it might be coming from higher up in the GI tract. Very small amounts of GI bleeding may not be visible and can only be discovered during a test on your stool. Possible causes of lower GI bleeding include:  · Hemorrhoids  · Anal fissures  · Diverticulitis  · Inflammatory bowel disease (Crohn's disease or ulcerative colitis)  · Polyps (growths) in the intestine  Note: Iron supplements and medicines for diarrhea or upset stomach can cause black stools. Foods such as licorice and red beets can also discolor the stool and be mistaken for bleeding. These are not bleeding and are not a cause for alarm.  Home care  You have not lost a large amount of blood and your condition appears stable at this time. You may resume normal activity as long as you feel well.  Avoid NSAIDs, such as aspirin, ibuprofen, or naproxen. They can irritate the stomach and cause further bleeding. If you are taking these medicines for other medical reasons, talk to your healthcare provider before you stop them.   Follow-up care  Follow up with your healthcare provider as advised. Further tests may be needed to find the cause of your bleeding.  When to seek medical advice  Call your healthcare provider for any of the following:  · Large amount of rectal bleeding   · Increasing abdominal pain  · Weakness, dizziness  Call 911  Get emergency medical care if any of the following occur:  · Loss of consciousness  · Vomiting blood  Date Last Reviewed: 6/24/2015  © 5291-0214 [employer]. [address] [place] [address]. All rights reserved. This information is not intended as a substitute for professional medical care. Always follow your  healthcare professional's instructions.

## 2017-05-17 ENCOUNTER — NURSE TRIAGE (OUTPATIENT)
Dept: ADMINISTRATIVE | Facility: CLINIC | Age: 72
End: 2017-05-17

## 2017-05-17 ENCOUNTER — OFFICE VISIT (OUTPATIENT)
Dept: INTERNAL MEDICINE | Facility: CLINIC | Age: 72
End: 2017-05-17
Payer: MEDICARE

## 2017-05-17 VITALS
HEART RATE: 79 BPM | OXYGEN SATURATION: 99 % | HEIGHT: 65 IN | SYSTOLIC BLOOD PRESSURE: 126 MMHG | BODY MASS INDEX: 33.24 KG/M2 | WEIGHT: 199.5 LBS | DIASTOLIC BLOOD PRESSURE: 88 MMHG | TEMPERATURE: 99 F

## 2017-05-17 DIAGNOSIS — R10.13 EPIGASTRIC PAIN: Primary | ICD-10-CM

## 2017-05-17 DIAGNOSIS — K21.00 GASTROESOPHAGEAL REFLUX DISEASE WITH ESOPHAGITIS: ICD-10-CM

## 2017-05-17 PROCEDURE — 3079F DIAST BP 80-89 MM HG: CPT | Mod: S$GLB,,, | Performed by: NURSE PRACTITIONER

## 2017-05-17 PROCEDURE — 99499 UNLISTED E&M SERVICE: CPT | Mod: S$PBB,,, | Performed by: NURSE PRACTITIONER

## 2017-05-17 PROCEDURE — 1125F AMNT PAIN NOTED PAIN PRSNT: CPT | Mod: S$GLB,,, | Performed by: NURSE PRACTITIONER

## 2017-05-17 PROCEDURE — 1159F MED LIST DOCD IN RCRD: CPT | Mod: S$GLB,,, | Performed by: NURSE PRACTITIONER

## 2017-05-17 PROCEDURE — 1160F RVW MEDS BY RX/DR IN RCRD: CPT | Mod: S$GLB,,, | Performed by: NURSE PRACTITIONER

## 2017-05-17 PROCEDURE — 3074F SYST BP LT 130 MM HG: CPT | Mod: S$GLB,,, | Performed by: NURSE PRACTITIONER

## 2017-05-17 PROCEDURE — 99213 OFFICE O/P EST LOW 20 MIN: CPT | Mod: S$GLB,,, | Performed by: NURSE PRACTITIONER

## 2017-05-17 PROCEDURE — 99999 PR PBB SHADOW E&M-EST. PATIENT-LVL V: CPT | Mod: PBBFAC,,, | Performed by: NURSE PRACTITIONER

## 2017-05-17 RX ORDER — ESOMEPRAZOLE MAGNESIUM 40 MG/1
40 CAPSULE, DELAYED RELEASE ORAL
Qty: 30 CAPSULE | Refills: 0 | Status: SHIPPED | OUTPATIENT
Start: 2017-05-17 | End: 2017-08-03 | Stop reason: SDUPTHER

## 2017-05-17 NOTE — PROGRESS NOTES
Subjective:       Patient ID: Buck Ibarra is a 71 y.o. female.    Chief Complaint: Abdominal Pain    Ms. Ibarra presents today for abdominal pain.  She was seen for this pain in the ED 1 week ago.  She was put on cipro and flagyl. She continues to have upper left quadrant and supraumbilical pain, which occurs every few hours and is a stabbing sensation. The lower abdominal pain has improved significantly. She has been taking Aleve for the pain.     Abdominal Pain   This is a recurrent problem. The current episode started in the past 7 days. The onset quality is sudden. The problem occurs 2 to 4 times per day. The most recent episode lasted 5 hours. The problem has been gradually worsening. The pain is located in the epigastric region. The pain is at a severity of 10/10. The pain is severe. The quality of the pain is sharp. Associated symptoms include anorexia, belching, flatus, nausea and weight loss. Pertinent negatives include no arthralgias, constipation, diarrhea, dysuria, fever, frequency, headaches, hematochezia, hematuria, melena, myalgias or vomiting. The pain is relieved by nothing. She has tried antibiotics for the symptoms. The treatment provided no relief. Her past medical history is significant for GERD. There is no history of abdominal surgery, colon cancer, Crohn's disease, gallstones, irritable bowel syndrome, pancreatitis, PUD or ulcerative colitis.     Review of Systems   Constitutional: Positive for weight loss. Negative for fever.   HENT: Negative for facial swelling.    Eyes: Negative for visual disturbance.   Respiratory: Negative for shortness of breath.    Cardiovascular: Negative for chest pain and leg swelling.   Gastrointestinal: Positive for abdominal pain, anorexia, flatus and nausea. Negative for constipation, diarrhea, hematochezia, melena and vomiting.   Genitourinary: Negative for dysuria, frequency and hematuria.   Musculoskeletal: Negative for arthralgias and myalgias.    Skin: Negative for rash.   Neurological: Negative for headaches.       Objective:      Physical Exam   Constitutional: She is oriented to person, place, and time. She appears well-developed and well-nourished. No distress.   HENT:   Head: Atraumatic.   Eyes: No scleral icterus.   Neck: Normal range of motion. Neck supple.   Cardiovascular: Normal rate, regular rhythm and normal heart sounds.  Exam reveals no friction rub.    No murmur heard.  Pulmonary/Chest: Effort normal and breath sounds normal.   Abdominal: Soft. Normal appearance and bowel sounds are normal. She exhibits no distension and no mass. There is no rebound, no guarding, no CVA tenderness, no tenderness at McBurney's point and negative Angel's sign.   Mild epigastric tenderness   Neurological: She is alert and oriented to person, place, and time.   Skin: Skin is warm and dry. She is not diaphoretic.   Psychiatric: She has a normal mood and affect. Her behavior is normal.   Nursing note and vitals reviewed.      Assessment:       1. Epigastric pain    2. Gastroesophageal reflux disease with esophagitis        Plan:   1. Epigastric pain  - Stop Aleve.   - esomeprazole (NEXIUM) 40 MG capsule; Take 1 capsule (40 mg total) by mouth before breakfast.  Dispense: 30 capsule; Refill: 0    2. Gastroesophageal reflux disease with esophagitis  - esomeprazole (NEXIUM) 40 MG capsule; Take 1 capsule (40 mg total) by mouth before breakfast.  Dispense: 30 capsule; Refill: 0  - If no improvement, will order endoscopy      Pt has been given instructions populated from [employer] database and has verbalized understanding of the after visit summary and information contained wherein.    Follow up with a primary care provider. May go to ER for acute shortness of breath, lightheadedness, fever, or any other emergent complaints or changes in condition.  Answers for HPI/ROS submitted by the patient on 5/17/2017   kidney stones: No  UTI: Yes

## 2017-05-17 NOTE — PATIENT INSTRUCTIONS
- Stop Ranitidine for 1 month and start taking Nexium daily    - Next time the pain hits, please take OTC Maalox, as directed.     - If this does not fix your pain, please let me know.  You may need an appointment with a GI specialist to have an upper endoscopy.

## 2017-05-17 NOTE — MR AVS SNAPSHOT
[facility]  [address]  [facility] [address]  Phone: [phone]  Fax: [phone]                  Buck Ibarra   2017 3:00 PM   Office Visit    Description:  Female : 1945   Provider:  Charlotte Botello NP   Department:  [facility]           Reason for Visit     Abdominal Pain           Diagnoses this Visit        Comments    Epigastric pain    -  Primary     Gastroesophageal reflux disease with esophagitis                To Do List           Future Appointments        Provider Department Dept Phone    2017 8:45 AM MD Rob Jhaveri [facility] [phone]    2017 11:40 AM Ericka Cortez MD [facility] [phone]    2017 7:00 AM LAB, APPOINTMENT [facility] INTMED [facility] [phone]    2017 7:30 AM DIABETES EMPOWERMENT PROGRAM [facility] [phone]    2017 8:00 AM [facility] US 11 ALL [facility] [phone]      Goals (5 Years of Data)     None       These Medications        Disp Refills Start End    esomeprazole (NEXIUM) 40 MG capsule 30 capsule 0 2017    Take 1 capsule (40 mg total) by mouth before breakfast. - Oral    Pharmacy: [facility] - [place], [place] - [address] Ph #: [phone]         [facility]     [facility] Nurse Care Line - / Assistance  Unless otherwise directed by your provider, please contact [facility], our nurse care line that is available for 24/ assistance.     Registered nurses in the [facility] provide: appointment scheduling, clinical advisement, health education, and other advisory services.  Call: [phone] (toll free)               Medications           Message regarding Medications     Verify the changes and/or additions to your medication regime listed below are the same as discussed with your clinician today.  If  any of these changes or additions are incorrect, please notify your healthcare provider.        START taking these NEW medications        Refills    esomeprazole (NEXIUM) 40 MG capsule 0    Sig: Take 1 capsule (40 mg total) by mouth before breakfast.    Class: Normal    Route: Oral           Verify that the below list of medications is an accurate representation of the medications you are currently taking.  If none reported, the list may be blank. If incorrect, please contact your healthcare provider. Carry this list with you in case of emergency.           Current Medications     aspirin (ECOTRIN) 81 MG EC tablet Take 1 tablet (81 mg total) by mouth once daily.    atorvastatin (LIPITOR) 40 MG tablet Take 1 tablet (40 mg total) by mouth once daily.    blood sugar diagnostic Strp Bid testing    canagliflozin (INVOKANA) 100 mg Tab Take 1 tablet (100 mg total) by mouth once daily.    candesartan (ATACAND) 8 MG tablet Take 1 tablet (8 mg total) by mouth once daily.    ciprofloxacin HCl (CIPRO) 500 MG tablet Take 1 tablet (500 mg total) by mouth 2 (two) times daily.    clotrimazole-betamethasone 1-0.05% (LOTRISONE) cream Apply topically 2 (two) times daily.    esomeprazole (NEXIUM) 40 MG capsule Take 1 capsule (40 mg total) by mouth before breakfast.    fluocinonide (LIDEX) 0.05 % external solution AAA scalp qd prn pruritus    gabapentin (NEURONTIN) 300 MG capsule Take 1 capsule (300 mg total) by mouth every evening.    hydrochlorothiazide (HYDRODIURIL) 12.5 MG Tab Take 1 tablet (12.5 mg total) by mouth once daily.    insulin degludec (TRESIBA FLEXTOUCH U-200) 200 unit/mL (3 mL) InPn Inject 18 Units into the skin once daily.    ketoconazole (NIZORAL) 2 % shampoo Wash hair with medicated shampoo at least 2x/week - let sit on scalp at least 5 minutes prior to rinsing    lancets 33 gauge Misc 1 lancet by Misc.(Non-Drug; Combo Route) route 2 (two) times daily.    lorazepam (ATIVAN) 0.5 MG tablet Take 1 tablet (0.5 mg total)  "by mouth nightly as needed for Anxiety.    metformin (GLUCOPHAGE-XR) 500 MG 24 hr tablet Take 2 tablets (1,000 mg total) by mouth 2 (two) times daily with meals.    metronidazole (FLAGYL) 500 MG tablet Take 1 tablet (500 mg total) by mouth 3 (three) times daily.    nicotine (NICODERM CQ) 14 mg/24 hr Place 1 patch onto the skin once daily.    nicotine polacrilex 2 MG Lozg Nicorette Mini Lozenge -one 2 mg lozenge by mouth as needed, max 5 in 6 hr period.    pen needle, diabetic (BD ULTRA-FINE MILAN PEN NEEDLES) 32 gauge x 5/32" Ndle Uses 1 time daily, on insulin injections    ranitidine (ZANTAC) 150 MG tablet Take 1 tablet (150 mg total) by mouth 2 (two) times daily.    ZOSTAVAX, PF, 19,400 unit/0.65 mL injection            Clinical Reference Information           Your Vitals Were     BP Pulse Temp Height Weight SpO2    126/88 79 98.7 °F (37.1 °C) (Oral) 5' 5" (1.651 m) 90.5 kg (199 lb 8.3 oz) 99%    BMI                33.2 kg/m2          Blood Pressure          Most Recent Value    BP  126/88      Allergies as of 5/17/2017     Erythromycin    Erythropoietin Analogues    Pegasys [Peginterferon Gonzalo-2a]    Lisinopril      Immunizations Administered on Date of Encounter - 5/17/2017     None      [facility] Sign-Up     Activating your [facility] account is as easy as 1-2-3!     1) Visit [email or web address], select Sign Up Now, enter this activation code and your date of birth, then select Next.  [email or web address]  Expires: 6/24/2017  8:05 PM      2) Create a username and password to use when you visit [facility] in the future and select a security question in case you lose your password and select Next.    3) Enter your e-mail address and click Sign Up!    Additional Information  If you have questions, please e-mail [email or web address] or call [phone] to talk to our [facility] staff. Remember, [facility] is NOT to be used for urgent needs. For medical emergencies, dial 911.         Instructions    - Stop Ranitidine for 1 " month and start taking Nexium daily    - Next time the pain hits, please take OTC Maalox, as directed.     - If this does not fix your pain, please let me know.  You may need an appointment with a GI specialist to have an upper endoscopy.        Smoking Cessation     If you would like to quit smoking:   You may be eligible for free services if you are a [place] resident and started smoking cigarettes before September 1, 1988.  Call the Smoking Cessation [employer] ([facility]) toll free at [phone] or [phone].   Call 1-800-QUIT-NOW if you do not meet the above criteria.   Contact us via email: [email or web address]   View our website for more information: [email or web address]        Language Assistance Services     ATTENTION: Language assistance services are available, free of charge. Please call [phone].      ATENCIÓN: Si habla español, tiene a eduardo disposición servicios gratuitos de asistencia lingüística. Llame al [phone].     ALOK Ý: N?u b?n nói Ti?ng Vi?t, có các d?ch v? h? tr? ngôn ng? mi?n phí dành cho b?n. G?i s? [phone].         [facility] complies with applicable Federal civil rights laws and does not discriminate on the basis of race, color, national origin, age, disability, or sex.

## 2017-05-22 ENCOUNTER — OFFICE VISIT (OUTPATIENT)
Dept: SURGERY | Facility: CLINIC | Age: 72
End: 2017-05-22
Payer: MEDICARE

## 2017-05-22 VITALS
BODY MASS INDEX: 33.09 KG/M2 | DIASTOLIC BLOOD PRESSURE: 92 MMHG | WEIGHT: 198.88 LBS | SYSTOLIC BLOOD PRESSURE: 162 MMHG

## 2017-05-22 DIAGNOSIS — Z86.010 HISTORY OF COLON POLYPS: ICD-10-CM

## 2017-05-22 DIAGNOSIS — K62.5 RECTAL BLEEDING: ICD-10-CM

## 2017-05-22 DIAGNOSIS — Z80.0 FAMILY HISTORY OF COLON CANCER: ICD-10-CM

## 2017-05-22 DIAGNOSIS — K64.4 EXTERNAL HEMORRHOIDS: Primary | ICD-10-CM

## 2017-05-22 DIAGNOSIS — K64.8 INTERNAL HEMORRHOIDS: ICD-10-CM

## 2017-05-22 PROCEDURE — 99203 OFFICE O/P NEW LOW 30 MIN: CPT | Mod: 25,S$GLB,, | Performed by: COLON & RECTAL SURGERY

## 2017-05-22 PROCEDURE — 1126F AMNT PAIN NOTED NONE PRSNT: CPT | Mod: S$GLB,,, | Performed by: COLON & RECTAL SURGERY

## 2017-05-22 PROCEDURE — 99499 UNLISTED E&M SERVICE: CPT | Mod: S$PBB,,, | Performed by: COLON & RECTAL SURGERY

## 2017-05-22 PROCEDURE — 99999 PR PBB SHADOW E&M-EST. PATIENT-LVL II: CPT | Mod: PBBFAC,,, | Performed by: COLON & RECTAL SURGERY

## 2017-05-22 PROCEDURE — 1159F MED LIST DOCD IN RCRD: CPT | Mod: S$GLB,,, | Performed by: COLON & RECTAL SURGERY

## 2017-05-22 PROCEDURE — 46600 DIAGNOSTIC ANOSCOPY SPX: CPT | Mod: S$GLB,,, | Performed by: COLON & RECTAL SURGERY

## 2017-05-22 NOTE — PROGRESS NOTES
CRS Office Visit    SUBJECTIVE:     Chief Complaint: Rectal bleeding    History of Present Illness: 71F presents s/p one episode of epigastric pain, BRBPR, and subsequent ED visit 2 weeks prior.  Was found to have an external hemorrhoid and and rectal thickening on CT scan.  She was discharged on oral abx and an antacid.  She followed up with her PCP who started her on Nexium, for acid reflux which she reports is helping.  She denies any more episodes of BRBPR and states she is having normal BMs.  Last CSP 2016 with 4 adenomatous polyps removed, recommended follow up in 3 years for polyps and family history.      Family history of colon CA in her sister at age 70.      Review of patient's allergies indicates:   Allergen Reactions    Erythromycin Anaphylaxis    Erythropoietin analogues Anaphylaxis    Pegasys [peginterferon ruben-2a] Anaphylaxis    Lisinopril Hives       Past Medical History:   Diagnosis Date    Allergy     Cataract     Diabetes mellitus type II     Gastritis     with gastric ulcer    GERD (gastroesophageal reflux disease)     H. pylori infection     Hepatitis C     Hyperlipidemia     Hypertension     Osteopenia     Type 2 diabetes mellitus with diabetic polyneuropathy      Past Surgical History:   Procedure Laterality Date    COLONOSCOPY      COLONOSCOPY N/A 1/28/2016    Procedure: COLONOSCOPY;  Surgeon: Emeka Ahn MD;  Location: [facility] ([address] [place] [address]);  Service: Endoscopy;  Laterality: N/A;    HYSTERECTOMY      LIVER BIOPSY      UPPER GASTROINTESTINAL ENDOSCOPY       Family History   Problem Relation Age of Onset    Heart disease Mother     Diabetes Mother     Heart disease Father     Cancer Sister      breast cancer    Diabetes Sister     Cancer Sister 70     colon CA    Diabetes Sister     Diabetes Sister     Glaucoma Neg Hx     Melanoma Neg Hx     Cirrhosis Neg Hx     Psoriasis Neg Hx     Lupus Neg Hx      Social History   Substance Use Topics    Smoking status:  Current Every Day Smoker     Packs/day: 0.25     Years: 48.00     Last attempt to quit: 9/4/2016    Smokeless tobacco: Not on file    Alcohol use No      Comment: special occasions        Review of Systems:  Constitutional: no fever or chills  Eyes: no visual changes  ENT: no nasal congestion or sore throat  Respiratory: no cough or shortness of breath  Cardiovascular: no chest pain or palpitations  Gastrointestinal: no nausea or vomiting, tolerating diet  Genitourinary: no hematuria or dysuria    OBJECTIVE:     Vital Signs (Most Recent)  BP: (!) 162/92 (05/22/17 0857)    Physical Exam:  General: Black or  female in NAD sitting in chair in clinic  Neuro: aaox4 maex4 perrl  Respiratory: resps even unlabored  Cardiac: cap refill <2 sec  Abdomen: Normal, benign.  Extremities: Warm dry and intact  Anorectal: negative    Anoscopy Procedure Note:   Verbal consent obtained  Indications:  Rectal bleeding  Post procedure diagnosis:  Normal  Procedure: anoscopy  Surgeon Suleiman  Assistant: ANG  Specimen None  Findings:  Normal anal and distal rectal mucosa  Right posterior hemorrhoid grade grade 1  Right anterior hemorrhoid grade grade 1  Left lateral hemorrhoid grade grade 1  Other findings:   None  Pt tolerated procedure well.    No complications.      ASSESSMENT/PLAN:     Rectal bleeding   No further interventions required   Follow up with PCP for reflux   Repeat CSP at recommended schedule    Canelo Connors MD  Colon and Rectal Surgery Fellow  [phone]    I have interviewed and examined the patient, reviewed the notes and assessments, and/or personally supervised the procedure(s) performed by Dr. Connors, and I concur with her/his documentation of Buck Ibarra.  See below addendum for my evaluation and additional findings.    72 yo F seen in ED 2 weeks ago with epigastric pain and single episode of BRBPR.  Found to have external hemorrhoid on exam and referred for further management.  No bleeding  since then,  Started on PPI by PCP with resolution of epigastric pain.  No constipation.  Last c'scope Jan 2016 - 4 adenomas (4-12 mm) removed. +Family hx of CRC - sister age 70 at dx/    Exam as noted above.  I personally supervised the anoscopy.     IMPRESSION:  Rectal bleeding  Internal/external hemorrhoidal disease - not presently symptomatic  Personal hx of colon polyps  Family hx of CRC    RECOMMENDATIONS:  Increased fiber/fluid intake, avoid constipation/straining  RTO prn  Colonoscopy 3 yrs from last    Porfirio Bearden MD, FACS, FASCRS

## 2017-05-22 NOTE — LETTER
May 28, 2017        Ericka Cortez MD  [address]  [facility] [address]             [facility]  [address]  [facility] [address]  Phone: [phone]   Patient: Buck Ibarra   MR Number: [id number]   YOB: 1945   Date of Visit: 5/22/2017       Dear Dr. Cortez:    Thank you for referring Buck Ibarra to me for evaluation. Attached you will find relevant portions of my assessment and plan of care.    If you have questions, please do not hesitate to call me. I look forward to following Buck Ibarra along with you.    Sincerely,      Porfirio Bearden MD            CC  No Recipients    Enclosure

## 2017-05-26 ENCOUNTER — OFFICE VISIT (OUTPATIENT)
Dept: INTERNAL MEDICINE | Facility: CLINIC | Age: 72
End: 2017-05-26
Payer: MEDICARE

## 2017-05-26 VITALS
WEIGHT: 199 LBS | SYSTOLIC BLOOD PRESSURE: 122 MMHG | RESPIRATION RATE: 17 BRPM | TEMPERATURE: 98 F | DIASTOLIC BLOOD PRESSURE: 76 MMHG | HEIGHT: 65 IN | HEART RATE: 74 BPM | BODY MASS INDEX: 33.15 KG/M2

## 2017-05-26 DIAGNOSIS — E11.69 DIABETES MELLITUS TYPE 2 IN OBESE: ICD-10-CM

## 2017-05-26 DIAGNOSIS — Z87.19 HISTORY OF COLITIS: Primary | ICD-10-CM

## 2017-05-26 DIAGNOSIS — Z23 NEED FOR STREPTOCOCCUS PNEUMONIAE VACCINATION: ICD-10-CM

## 2017-05-26 DIAGNOSIS — E66.9 DIABETES MELLITUS TYPE 2 IN OBESE: ICD-10-CM

## 2017-05-26 DIAGNOSIS — E11.59 HYPERTENSION ASSOCIATED WITH DIABETES: ICD-10-CM

## 2017-05-26 DIAGNOSIS — I15.2 HYPERTENSION ASSOCIATED WITH DIABETES: ICD-10-CM

## 2017-05-26 PROCEDURE — 1159F MED LIST DOCD IN RCRD: CPT | Mod: S$GLB,,, | Performed by: INTERNAL MEDICINE

## 2017-05-26 PROCEDURE — 99499 UNLISTED E&M SERVICE: CPT | Mod: S$PBB,,, | Performed by: INTERNAL MEDICINE

## 2017-05-26 PROCEDURE — 99999 PR PBB SHADOW E&M-EST. PATIENT-LVL III: CPT | Mod: PBBFAC,,, | Performed by: INTERNAL MEDICINE

## 2017-05-26 PROCEDURE — 90732 PPSV23 VACC 2 YRS+ SUBQ/IM: CPT | Mod: S$GLB,,, | Performed by: INTERNAL MEDICINE

## 2017-05-26 PROCEDURE — 3046F HEMOGLOBIN A1C LEVEL >9.0%: CPT | Mod: S$GLB,,, | Performed by: INTERNAL MEDICINE

## 2017-05-26 PROCEDURE — 1126F AMNT PAIN NOTED NONE PRSNT: CPT | Mod: S$GLB,,, | Performed by: INTERNAL MEDICINE

## 2017-05-26 PROCEDURE — 4010F ACE/ARB THERAPY RXD/TAKEN: CPT | Mod: S$GLB,,, | Performed by: INTERNAL MEDICINE

## 2017-05-26 PROCEDURE — 99214 OFFICE O/P EST MOD 30 MIN: CPT | Mod: 25,S$GLB,, | Performed by: INTERNAL MEDICINE

## 2017-05-26 PROCEDURE — G0009 ADMIN PNEUMOCOCCAL VACCINE: HCPCS | Mod: 59,S$GLB,, | Performed by: INTERNAL MEDICINE

## 2017-05-26 NOTE — PROGRESS NOTES
"Subjective:       Patient ID: Buck Ibarra is a 71 y.o. female.    Chief Complaint: Hospital Follow Up (ER follow up) and Hemorrhoids    HPI   Had constipation for a few days. When she finally had a BM, she saw a spot of blood.   She had a Cscope 1/2016 w/ colon polyps. Does have hemorrhoids.     Was having epigastric pain and went to ED. CT A/P 5/20/17 - diverticulosis, circumferential wall thickening w/in the rectum, L hepatic lobe, R renal cysts, aorta - moderate atherosclerosis. Given flagyl and cipro. Reports this resolved.     Saw Charlotte Botello 5/17/17. Stopped on aleve. Started on Nexium. Better. Every now and then she'll get a light pain. Saw Dr. Connors on 5/22/17 for office visit.     DM2 - invokana 100mg daily, metformin xr 1000 daily, tresiba 18 units daily. Reports when she was in pain, glucose in the 220s. No polyuria/polydipsia. Cannot drive at night 2/2 glare from lights.  MAC 2/2/17  EYE 12/2016  FOOT 2/2/17  a1c - 1/2017    HTN - hctz 12.5mg daily, candesartan 8mg daily. No CP/SOB/ROMERO/palpitations.     Review of Systems  no fevers/chills. Mood was good.   Comprehensive review of systems otherwise negative. See history/subjective section for more details.    Objective:      Physical Exam    /76   Pulse 74   Temp 97.7 °F (36.5 °C) (Oral)   Resp 17   Ht 5' 5" (1.651 m)   Wt 90.3 kg (199 lb)   BMI 33.12 kg/m²     Gen - A+OX4, NAD  HEENT - PERRL, OP clear. MMM.   Neck - thought initially she had R post cervical LAD but resolved when she laid down so likely muscular. No thyromegaly or nodules noted.   CV - RRR, no m/r  Chest - CTAB, no wheezing/rhonchi  abd - s/nt/nd/+bs  eXT -2 + b radial pulses. 1+ B DP. No LE edema.   MSK - normal gait.    Labs reviewed.    Assessment/Plan     Buck was seen today for hospital follow up and hemorrhoids.    Diagnoses and all orders for this visit:    History of colitis - s/p antibiotics. Pain resolved.     Diabetes mellitus type 2 in obese - recheck " a1c. UTD on foot and eye exams.   -     Hemoglobin A1c; Future  -     Comprehensive metabolic panel; Future    Hypertension associated with diabetes - Stable and controlled. Continue current medications.  -     TSH; Future  -     Comprehensive metabolic panel; Future    Need for Streptococcus pneumoniae vaccination  -     Pneumococcal Polysaccharide Vaccine (23 Valent) (SQ/IM)    Return in about 4 months (around 9/26/2017).      Ericka Cortez MD  Department of Internal Medicine - [facility]  11:44 AM

## 2017-06-01 ENCOUNTER — LAB VISIT (OUTPATIENT)
Dept: LAB | Facility: HOSPITAL | Age: 72
End: 2017-06-01
Attending: NURSE PRACTITIONER
Payer: MEDICARE

## 2017-06-01 DIAGNOSIS — Z79.4 TYPE 2 DIABETES MELLITUS WITH DIABETIC POLYNEUROPATHY, WITH LONG-TERM CURRENT USE OF INSULIN: Chronic | ICD-10-CM

## 2017-06-01 DIAGNOSIS — E11.59 HYPERTENSION ASSOCIATED WITH DIABETES: ICD-10-CM

## 2017-06-01 DIAGNOSIS — E11.42 TYPE 2 DIABETES MELLITUS WITH DIABETIC POLYNEUROPATHY, WITH LONG-TERM CURRENT USE OF INSULIN: Chronic | ICD-10-CM

## 2017-06-01 DIAGNOSIS — I15.2 HYPERTENSION ASSOCIATED WITH DIABETES: ICD-10-CM

## 2017-06-01 LAB
ALBUMIN SERPL BCP-MCNC: 3 G/DL
ALP SERPL-CCNC: 102 U/L
ALT SERPL W/O P-5'-P-CCNC: 47 U/L
ANION GAP SERPL CALC-SCNC: 9 MMOL/L
AST SERPL-CCNC: 33 U/L
BILIRUB SERPL-MCNC: 0.4 MG/DL
BUN SERPL-MCNC: 16 MG/DL
CALCIUM SERPL-MCNC: 9.1 MG/DL
CHLORIDE SERPL-SCNC: 104 MMOL/L
CHOLEST/HDLC SERPL: 3.6 {RATIO}
CO2 SERPL-SCNC: 27 MMOL/L
CREAT SERPL-MCNC: 1 MG/DL
EST. GFR  (AFRICAN AMERICAN): >60 ML/MIN/1.73 M^2
EST. GFR  (NON AFRICAN AMERICAN): 57.6 ML/MIN/1.73 M^2
ESTIMATED AVG GLUCOSE: 186 MG/DL
GLUCOSE SERPL-MCNC: 85 MG/DL
HBA1C MFR BLD HPLC: 8.1 %
HDL/CHOLESTEROL RATIO: 27.9 %
HDLC SERPL-MCNC: 154 MG/DL
HDLC SERPL-MCNC: 43 MG/DL
LDLC SERPL CALC-MCNC: 93.6 MG/DL
NONHDLC SERPL-MCNC: 111 MG/DL
POTASSIUM SERPL-SCNC: 3.7 MMOL/L
PROT SERPL-MCNC: 7.1 G/DL
SODIUM SERPL-SCNC: 140 MMOL/L
TRIGL SERPL-MCNC: 87 MG/DL
TSH SERPL DL<=0.005 MIU/L-ACNC: 2.18 UIU/ML

## 2017-06-01 PROCEDURE — 80061 LIPID PANEL: CPT

## 2017-06-01 PROCEDURE — 83036 HEMOGLOBIN GLYCOSYLATED A1C: CPT

## 2017-06-01 PROCEDURE — 36415 COLL VENOUS BLD VENIPUNCTURE: CPT

## 2017-06-01 PROCEDURE — 84443 ASSAY THYROID STIM HORMONE: CPT

## 2017-06-01 PROCEDURE — 80053 COMPREHEN METABOLIC PANEL: CPT

## 2017-06-09 DIAGNOSIS — Z79.4 TYPE 2 DIABETES MELLITUS WITH DIABETIC POLYNEUROPATHY, WITH LONG-TERM CURRENT USE OF INSULIN: Chronic | ICD-10-CM

## 2017-06-09 DIAGNOSIS — E11.42 TYPE 2 DIABETES MELLITUS WITH DIABETIC POLYNEUROPATHY, WITH LONG-TERM CURRENT USE OF INSULIN: Chronic | ICD-10-CM

## 2017-06-09 RX ORDER — CANAGLIFLOZIN 100 MG/1
TABLET, FILM COATED ORAL
Qty: 30 TABLET | Refills: 1 | OUTPATIENT
Start: 2017-06-09

## 2017-06-09 NOTE — TELEPHONE ENCOUNTER
----- Message from Laurie Jha sent at 6/9/2017  1:30 PM CDT -----  Contact: Self  Good afternoon,    Pt needs a refill on canagliflozin (INVOKANA) 100 mg Tab called into [facility] at [phone].    Pt can be reached at [phone].    Thank you!

## 2017-06-16 ENCOUNTER — CLINICAL SUPPORT (OUTPATIENT)
Dept: DIABETES | Facility: CLINIC | Age: 72
End: 2017-06-16
Payer: MEDICARE

## 2017-06-16 VITALS
HEIGHT: 66 IN | DIASTOLIC BLOOD PRESSURE: 78 MMHG | WEIGHT: 199.94 LBS | SYSTOLIC BLOOD PRESSURE: 160 MMHG | HEART RATE: 62 BPM | BODY MASS INDEX: 32.13 KG/M2

## 2017-06-16 DIAGNOSIS — E11.42 TYPE 2 DIABETES MELLITUS WITH DIABETIC POLYNEUROPATHY, WITH LONG-TERM CURRENT USE OF INSULIN: Chronic | ICD-10-CM

## 2017-06-16 DIAGNOSIS — I65.23 BILATERAL CAROTID ARTERY STENOSIS: ICD-10-CM

## 2017-06-16 DIAGNOSIS — Z86.19 HISTORY OF HEPATITIS C: ICD-10-CM

## 2017-06-16 DIAGNOSIS — E11.22 CKD STAGE 2 DUE TO TYPE 2 DIABETES MELLITUS: Chronic | ICD-10-CM

## 2017-06-16 DIAGNOSIS — E66.9 OBESITY (BMI 30-39.9): Chronic | ICD-10-CM

## 2017-06-16 DIAGNOSIS — E78.5 HYPERLIPIDEMIA LDL GOAL <70: Chronic | ICD-10-CM

## 2017-06-16 DIAGNOSIS — N18.2 CKD STAGE 2 DUE TO TYPE 2 DIABETES MELLITUS: Chronic | ICD-10-CM

## 2017-06-16 DIAGNOSIS — I70.0 AORTIC ATHEROSCLEROSIS: ICD-10-CM

## 2017-06-16 DIAGNOSIS — I10 BENIGN ESSENTIAL HYPERTENSION: ICD-10-CM

## 2017-06-16 DIAGNOSIS — Z72.0 TOBACCO USE: ICD-10-CM

## 2017-06-16 DIAGNOSIS — E11.65 TYPE 2 DIABETES MELLITUS WITH HYPERGLYCEMIA, WITH LONG-TERM CURRENT USE OF INSULIN: Primary | Chronic | ICD-10-CM

## 2017-06-16 DIAGNOSIS — Z79.4 TYPE 2 DIABETES MELLITUS WITH HYPERGLYCEMIA, WITH LONG-TERM CURRENT USE OF INSULIN: Primary | Chronic | ICD-10-CM

## 2017-06-16 DIAGNOSIS — K21.00 GASTROESOPHAGEAL REFLUX DISEASE WITH ESOPHAGITIS: ICD-10-CM

## 2017-06-16 DIAGNOSIS — Z79.4 TYPE 2 DIABETES MELLITUS WITH DIABETIC POLYNEUROPATHY, WITH LONG-TERM CURRENT USE OF INSULIN: Chronic | ICD-10-CM

## 2017-06-16 DIAGNOSIS — E04.2 MULTINODULAR GOITER: ICD-10-CM

## 2017-06-16 PROCEDURE — 99999 PR PBB SHADOW E&M-EST. PATIENT-LVL IV: CPT | Mod: PBBFAC,,,

## 2017-06-16 PROCEDURE — 99214 OFFICE O/P EST MOD 30 MIN: CPT | Mod: S$GLB,,, | Performed by: NURSE PRACTITIONER

## 2017-06-16 PROCEDURE — 99499 UNLISTED E&M SERVICE: CPT | Mod: S$PBB,,, | Performed by: NURSE PRACTITIONER

## 2017-06-16 RX ORDER — PEN NEEDLE, DIABETIC 30 GX3/16"
NEEDLE, DISPOSABLE MISCELLANEOUS
Qty: 90 EACH | Refills: 4 | Status: SHIPPED | OUTPATIENT
Start: 2017-06-16 | End: 2017-12-07 | Stop reason: SDUPTHER

## 2017-06-16 RX ORDER — INSULIN DEGLUDEC 200 U/ML
22 INJECTION, SOLUTION SUBCUTANEOUS NIGHTLY
Qty: 3 SYRINGE | Refills: 3 | Status: SHIPPED | OUTPATIENT
Start: 2017-06-16 | End: 2017-08-09 | Stop reason: SDUPTHER

## 2017-06-16 RX ORDER — ATORVASTATIN CALCIUM 40 MG/1
40 TABLET, FILM COATED ORAL DAILY
Qty: 90 TABLET | Refills: 3 | Status: SHIPPED | OUTPATIENT
Start: 2017-06-16 | End: 2017-12-07 | Stop reason: SDUPTHER

## 2017-06-16 RX ORDER — HYDROCHLOROTHIAZIDE 25 MG/1
25 TABLET ORAL DAILY
Qty: 90 TABLET | Refills: 2 | Status: SHIPPED | OUTPATIENT
Start: 2017-06-16 | End: 2017-09-28 | Stop reason: SDUPTHER

## 2017-06-16 RX ORDER — METFORMIN HYDROCHLORIDE 500 MG/1
1000 TABLET, EXTENDED RELEASE ORAL 2 TIMES DAILY WITH MEALS
Qty: 360 TABLET | Refills: 3 | Status: SHIPPED | OUTPATIENT
Start: 2017-06-16 | End: 2017-08-24 | Stop reason: SDUPTHER

## 2017-06-16 NOTE — PATIENT INSTRUCTIONS
Continue current medications until Vgo training     Continuous Glucose Monitoring (CGM)  Your healthcare provider has put you on the Freestyle Naomi Pro Sensor system. At your next appointment, your CGM will be downloaded and reviewed so your healthcare provider can see your blood sugar trends and patterns. Your medication and diet may be adjusted based on this downloaded information.    What You Need To Do:   Wear the sensor on the back of your upper arm for the amount of time designated by the CGM clinic    You have no restrictions on your diet or activity.    Maintain a daily log of your blood glucose readings, diet, exercise, and dosing/timing of your diabetes medications.     Continue regular blood glucose self-testing per your providers recommendation using your own glucometer.    Important Things About Your CGM Test   If you have an MRI, a CT scan, or a diathermy treatment, you must remove your sensor prior to the procedure. If this occurs, notify your healthcare provider.      A Little Extra Care:   Showering, Bathing, and Swimming: The sensor is water-resistant and can be worn while bathing, showering, or swimming as long as you do not take it deeper than 3 feet or keep it underwater for more than 30 minutes at a time.   Getting dressed: Use care to avoid catching the sensor on clothing while getting dressed.   Exercising: Intensive exercise may cause the sensor to loosen due to sweat or movement of the sensor.     Day of Test (Inserting the CGM Sensor device)    The CGM Sensor device is placed on the top of your skin on the back of your upper arm     Throughout the Test    Continue regular blood glucose self-testing per your providers recommendation using your own glucometer.   Maintain a daily log of your blood glucose readings, diet, exercise, and dosing/timing of your diabetes medications.      Last Day of the Test (Removing the CGM Sensor device)    Loosen the adhesive attached to the CGM  Sensor device.    Pull the CGM Sensor device away from your skin    Place the CGM Sensor device in the biohazard plastic bag   Return all items you were given on the first day of the test including log sheets and items you placed in the biohazard bag.    Bring items between 7:30 am and 9:00 am to the [facility] located at [address] [place] [address], 6th [place] [address].     When to Contact the Clinic   Call [phone] (Monday-Friday) or [phone] after hours if:    Your CGM Sensor falls off    You have pain, severe itching, or bleeding at the site     Revised: 01/2017      © 2015 [facility] ([email or web address]) is a non-profit, academic, multi-specialty, healthcare delivery system dedicated to patient care, research and education.         UNDERSTANDING CONTINUOUS GLUCOSE MONITORING     What is continuous glucose monitoring?   Continuous glucose monitoring system (CGMS) is a method used to record your blood sugar levels over a period of time (usually 5 days). Your home blood glucose monitoring is very helpful, but only measures blood glucose levels at various points in time and can miss dangerous high and low patterns, especially during the night while you sleep. Continuous glucose monitoring provides a continuous 24-hour measurement of your blood glucose levels.     Who benefits from continuous glucose monitoring?    People who experience dangerous high and low blood glucose readings.    People who suffer from hypoglycemia unawareness and cannot feel when their blood glucose is dropping.    People who desire better blood glucose control.    People with elevated A1C levels.     How does continuous glucose monitoring work?   A glucose sensor is inserted on the top of your skin on the back of your arm. We do not leave a needle under your skin. The blood glucose sensor measures your blood sugar level every 15 minutes, 24 hours a day. At the end of the 5  days, the CGM Sensor is then downloaded and reviewed so your healthcare provider can see your blood sugar trends and patterns.     What are your responsibilities to ensure successful testing?   It is recommended that you monitor your blood readings as directed by your healthcare provider.  It is vital that you document the correct times of your medications, meals, snacks, blood sugar readings, and any exercise.       How do you prepare for the test?   There is nothing you need to do to prepare for the test. You do not need to fast (restrict food intake) the night before the test.     Revised: 01/2017    © 2015 [facility] [employer] [facility] ([email or web address]) is a non-profit, academic, multi-specialty, healthcare delivery system dedicated to patient care, research and education.

## 2017-06-16 NOTE — PROGRESS NOTES
CC:  Diabetes.     HPI: Buck Ibarra is a 71 y.o. female referred by Dr. Cortez  for [facility]. The patient has had type 2 diabetes along with associated comorbidities indicated in the Visit Diagnosis section of this encounter. The patient was diagnosed with Type 2 diabetes in 2014. + family history of DM in multiple family members. Diagnosed after polyuria.     Visit #7    Of note, in previous visits, Metformin was increased, Glipizide discontinued, Januvia started, stopped Januvia on her own, restarted Glipizide, started Victoza, stopped Glipizide, Metformin increased, stopped Victoza, started Levemir, changed to Tresiba.     Has history of noncompliance with suggest medication adjustments, along with multiple reported side effects (dizziness and palpitations with januvia causing her to stop, although symptoms did not improve when she stopped; Victoza caused +belching and gas)      Today, she presents with no BG logs. A1c markedly improved but remains elevated, pt reports FBG always <130 but dinner/bedtime reading elevated.     PREFERRED METHOD OF COMMUNICATION:  Mail     DIABETES COMPLICATIONS: nephropathy, peripheral neuropathy and cardiovascular disease     CURRENT A1C:    Hemoglobin A1C   Date Value Ref Range Status   06/01/2017 8.1 (H) 4.5 - 6.2 % Final     Comment:     According to ADA guidelines, hemoglobin A1C <7.0% represents  optimal control in non-pregnant diabetic patients.  Different  metrics may apply to specific populations.   Standards of Medical Care in Diabetes - 2016.  For the purpose of screening for the presence of diabetes:  <5.7%     Consistent with the absence of diabetes  5.7-6.4%  Consistent with increasing risk for diabetes   (prediabetes)  >or=6.5%  Consistent with diabetes  Currently no consensus exists for use of hemoglobin A1C  for diagnosis of diabetes for children.     01/23/2017 10.2 (H) 4.5 - 6.2 % Final     Comment:     According to ADA guidelines, hemoglobin  A1C <7.0% represents  optimal control in non-pregnant diabetic patients.  Different  metrics may apply to specific populations.   Standards of Medical Care in Diabetes - 2016.  For the purpose of screening for the presence of diabetes:  <5.7%     Consistent with the absence of diabetes  5.7-6.4%  Consistent with increasing risk for diabetes   (prediabetes)  >or=6.5%  Consistent with diabetes  Currently no consensus exists for use of hemoglobin A1C  for diagnosis of diabetes for children.     10/19/2016 12.5 (H) 4.5 - 6.2 % Final     Comment:     According to ADA guidelines, hemoglobin A1C <7.0% represents  optimal control in non-pregnant diabetic patients.  Different  metrics may apply to specific populations.   Standards of Medical Care in Diabetes - 2016.  For the purpose of screening for the presence of diabetes:  <5.7%     Consistent with the absence of diabetes  5.7-6.4%  Consistent with increasing risk for diabetes   (prediabetes)  >or=6.5%  Consistent with diabetes  Currently no consensus exists for use of hemoglobin A1C  for diagnosis of diabetes for children.         GOAL A1C: <7%    MEDICATIONS UPON START OF PROGRAM: Metformin 1000 mg once daily, Glipizide 10 mg BID  Denies missing any doses of medications     CURRENT DIABETES MEDICATIONS: Metformin 1000 mg BID, Tresiba  22 units nightly, Invokana 100 mg daily   Denies missing any doses of medications     ANY DIFFICULTY AFFORDING YOUR CURRENT MEDICATION REGIMEN? None     HAVE YOU EVER APPLIED FOR MEDICARE EXTRA HELP PROGRAM? None     SIGNIFICANT DIABETES MED HISTORY: Victoza - gas and belching, Metformin, Tresiba, Januvia (dizziness, palpitations, vague ?)    HOSPITALIZATIONS FOR DIABETES OR RELATED COMPLICATIONS:  No    BLOOD GLUCOSE MONITORING:  Reports checking BG  1-2 times daily, reports FBG always <130, reports BG elevated in evening     HYPOGLYCEMIA:  No     OCCUPATION: retired    CURRENT DAILY ROUTINE (meals/meds):   Eats 2 meals, skips either  "breakfast or lunch   Denies snacking in between meals or before bedtime     CURRENT EXERCISE:  None, takes care of her grandchildren often 1 year old grandchild     SMOKING: Yes - smoking 5-6 cigarettes daily     MEDICATIONS, ALLERGIES, LABS, VS's, MEDICAL, SURGICAL, SOCIAL, AND FAMILY HISTORY reviewed and updated in the appropriate sections during this encounter    ROS:   Constitutional: Negative for weight change  Endocrine:  Denies polyuria, polydipsia, nocturia.  HENT: Negative for mouth sores or dental problems. Denies any personal or family history of thyroid cancer.  Denies neck swelling, lumps, horseness or trouble swallowing.   Eyes: Negative for visual disturbance.   Respiratory: Negative for cough or shortness or breath.  Cardiovascular: Negative for chest pain or claudication, no history of amputation.  Gastrointestinal: Negative for nausea, vomiting, bloating.  No history of pancreatitis or gastroparesis.  Genitourinary: Negative for urgency, frequency, frequent urinary tract infections.    Musculoskeletal: Negative for back pain.    Skin: Denies prolonged wound healing, negative for rashes.  Neurological: Negative for syncope, gustatory sweating, + improved numbness/burning of extremities.  Psychiatric/Behavioral: Negative for depression or anxiety  All other systems reviewed and are negative.    CGMS interpretation:  Done 2/2017  1. FBG at goal most days   2. Prandial excursions uncontrolled, some markedly uncontrolled   3. Some hypo 60-70 noted on logs submitted by patient, mostly at bedtime     PE:  Constitutional:   BP (!) 160/78 (BP Location: Left arm, Patient Position: Sitting, BP Method: Manual)   Pulse 62   Ht 5' 5.5" (1.664 m)   Wt 90.7 kg (199 lb 15.3 oz)   BMI 32.77 kg/m²    Well developed, well nourished, NAD.    Neck:  No trachial deviation present, No neck masses noticed   Thyroid:  No thyromegaly present.  No thyroid tenderness.      Cardiovascular:    Auscultation:  No murmurs or " abnormal sounds   Lower extremities:  No edema or cyanosis.     Respiratory:    Effort:  Normal, no use of accessory muscles.   Auscultation: breath sounds normal, no adventitious sounds.  Skin:    Inspection: no rashes, lesion or ulcers, + acanthosis nigracans.   Palpation: no induration or nodules.   Insulin injection sites: no lipoatrophy or lipohypertrophy.  Psychiatric:  Judgement and insight good with normal mood and affect.    Protective Sensation (w/ 10 gram monofilament):  Right: Intact  Left: Intact    Visual Inspection:  Callus -  bilateral heels    Pedal Pulses:   Right: Present  Left: Present    Posterior tibialis:   Right:Present  Left: Present    . Decreased vibratory response to 128 Hz tuning fork bilaterally.      ______________________________________________________________________   Microalbumin/Creatinine ratio:  Lab Results   Component Value Date    MICALBCREAT 5.1 02/02/2017     ______________________________________________________________________     ASSESSMENT and PLAN:    1- Type 2 diabetes with CKD 2, neuropathy and hyperglycemia - A1c much improved but remains elevated, FBG at goal per patient report, reports BG readings elevated in evening, likely due to prandial excursions uncontrolled.     Limited treatment options (adverse effects with GLP-1 and DPP-4 medications, caution with increasing Invokana dose due to renal function) - likely needs prandial insulin with meals    Discussed use of the Vgo insulin delivery device. Patient wishes to try, prefers to start in August. Until Vgo start, continue Metformin 1000 mg BID, Invokana 100 mg daily, Tresiba 22 units nightly. Once Vgo started, D/c Tresiba and Invokana. Will use Vgo 20 with 2 clicks with meals.     Continue to monitor BG 2 times daily, document on BG logs, and bring complete BG logs to all visits - instructed to notify me of any BG <70 so that insulin can be titrated     F/u in [facility] in 3 months with BMP and A1c  before    2- CKD stage 2 - discussed with patient correlation between CKD and uncontrolled DM, continue improved BG control     3 - Peripheral neuropathy - Educated patient to check feet daily for any foreign objects and/or wounds. Discussed with patient the importance of wearing appropriate footwear at all times, not to walk barefoot ever, and to check shoes before putting them on feet. Instructed patient to keep feet dry by regularly changing shoes and socks and drying feet after baths and exercises. Also, instructed patient to report any new lesions, discolorations, or swelling to a healthcare professional.    4- Hypertension - goal < 140/80 mmHg -  above goal, on ARB, increase HCTZ to 25 mg daily   BP Readings from Last 3 Encounters:   06/16/17 (!) 160/78   05/26/17 122/76   05/22/17 (!) 162/92     5- Hyperlipidemia, bilateral carotid artery stenosis - LDL goal <70 due to atherosclerosis of aorta in history,  Lipitor increased to 40 mg daily in Feb but patient unsure if she started higher dose, continue current lipitor dose, recheck CMP and lipid panel with RTC. LFT's WNL    Lab Results   Component Value Date    LDLCALC 93.6 06/01/2017       6- Body mass index is 32.77 kg/m². = Obesity. Modest weight loss (5-10%) may greatly improve BG control. Encouraged patient to start a walking program when not watching grandchildren.     7- - Chronic Hep C - followed by hepatology. 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 Michelle treatment completed.     8 - Multinodular goiter - last US and FNA done 8/2016, recommend to repeat US yearly     9 - Tobacco use  -reports she plans to enroll in the [facility] smoking cessation program      STANDARDS of CARE:  Statin: lipitor 40 mg   ACEI or ARB:  Yes - ARB  ASA:  Yes - 81 mg   Last eye exam 12/2016 no retinopathy

## 2017-07-19 ENCOUNTER — TELEPHONE (OUTPATIENT)
Dept: INTERNAL MEDICINE | Facility: CLINIC | Age: 72
End: 2017-07-19

## 2017-07-19 DIAGNOSIS — Z12.31 ENCOUNTER FOR SCREENING MAMMOGRAM FOR BREAST CANCER: Primary | ICD-10-CM

## 2017-07-19 NOTE — TELEPHONE ENCOUNTER
----- Message from Sushil Gamboa sent at 7/19/2017  4:07 PM CDT -----  Contact: self/ [phone] home  Type: Orders Request    What orders/ testing are being requested? Mammogram    Is there a future appointment scheduled for the patient with PCP? Yes    When? 09/29/2017    Comments: Pt would like orders put in and scheduled for the first available [facility] app[facility].  Please call when orders are in.      Thank you

## 2017-07-30 DIAGNOSIS — I15.2 HYPERTENSION ASSOCIATED WITH DIABETES: ICD-10-CM

## 2017-07-30 DIAGNOSIS — E11.59 HYPERTENSION ASSOCIATED WITH DIABETES: ICD-10-CM

## 2017-07-30 RX ORDER — CANDESARTAN 8 MG/1
TABLET ORAL
Qty: 90 TABLET | Refills: 3 | OUTPATIENT
Start: 2017-07-30

## 2017-08-03 ENCOUNTER — HOSPITAL ENCOUNTER (OUTPATIENT)
Dept: RADIOLOGY | Facility: HOSPITAL | Age: 72
Discharge: HOME | End: 2017-08-03
Attending: INTERNAL MEDICINE
Payer: MEDICARE

## 2017-08-03 VITALS — WEIGHT: 199 LBS | HEIGHT: 65 IN | BODY MASS INDEX: 33.15 KG/M2

## 2017-08-03 DIAGNOSIS — Z12.31 ENCOUNTER FOR SCREENING MAMMOGRAM FOR BREAST CANCER: ICD-10-CM

## 2017-08-03 DIAGNOSIS — R10.13 EPIGASTRIC PAIN: ICD-10-CM

## 2017-08-03 DIAGNOSIS — I15.2 HYPERTENSION ASSOCIATED WITH DIABETES: ICD-10-CM

## 2017-08-03 DIAGNOSIS — K21.00 GASTROESOPHAGEAL REFLUX DISEASE WITH ESOPHAGITIS: ICD-10-CM

## 2017-08-03 DIAGNOSIS — E11.59 HYPERTENSION ASSOCIATED WITH DIABETES: ICD-10-CM

## 2017-08-03 PROCEDURE — 77067 SCR MAMMO BI INCL CAD: CPT | Mod: 26,,, | Performed by: RADIOLOGY

## 2017-08-03 PROCEDURE — 77067 SCR MAMMO BI INCL CAD: CPT | Mod: TC

## 2017-08-03 PROCEDURE — 77063 BREAST TOMOSYNTHESIS BI: CPT | Mod: 26,,, | Performed by: RADIOLOGY

## 2017-08-03 RX ORDER — ESOMEPRAZOLE MAGNESIUM 40 MG/1
40 CAPSULE, DELAYED RELEASE ORAL
Qty: 30 CAPSULE | Refills: 0 | Status: SHIPPED | OUTPATIENT
Start: 2017-08-03 | End: 2017-09-28

## 2017-08-03 RX ORDER — HYDROCHLOROTHIAZIDE 25 MG/1
TABLET ORAL
Qty: 90 TABLET | Refills: 3 | Status: SHIPPED | OUTPATIENT
Start: 2017-08-03 | End: 2021-02-09

## 2017-08-09 ENCOUNTER — TELEPHONE (OUTPATIENT)
Dept: DIABETES | Facility: CLINIC | Age: 72
End: 2017-08-09

## 2017-08-09 DIAGNOSIS — E11.65 TYPE 2 DIABETES MELLITUS WITH HYPERGLYCEMIA, WITH LONG-TERM CURRENT USE OF INSULIN: Primary | Chronic | ICD-10-CM

## 2017-08-09 DIAGNOSIS — Z79.4 TYPE 2 DIABETES MELLITUS WITH DIABETIC POLYNEUROPATHY, WITH LONG-TERM CURRENT USE OF INSULIN: Chronic | ICD-10-CM

## 2017-08-09 DIAGNOSIS — Z79.4 TYPE 2 DIABETES MELLITUS WITH HYPERGLYCEMIA, WITH LONG-TERM CURRENT USE OF INSULIN: Primary | Chronic | ICD-10-CM

## 2017-08-09 DIAGNOSIS — E11.42 TYPE 2 DIABETES MELLITUS WITH DIABETIC POLYNEUROPATHY, WITH LONG-TERM CURRENT USE OF INSULIN: Chronic | ICD-10-CM

## 2017-08-09 RX ORDER — INSULIN ASPART 100 [IU]/ML
INJECTION, SOLUTION INTRAVENOUS; SUBCUTANEOUS
Qty: 3 VIAL | Refills: 3 | Status: SHIPPED | OUTPATIENT
Start: 2017-08-09 | End: 2017-08-24

## 2017-08-09 RX ORDER — INSULIN DEGLUDEC 200 U/ML
22 INJECTION, SOLUTION SUBCUTANEOUS NIGHTLY
Qty: 3 SYRINGE | Refills: 3
Start: 2017-08-09 | End: 2017-08-17 | Stop reason: ALTCHOICE

## 2017-08-09 NOTE — TELEPHONE ENCOUNTER
Please notify pt that I sent her prescriptions for her Vgo insulin delivery devices and her bottle Novolog insulin to the [facility] primary care pharmacy.    For her upcoming diabetes education visit for Vgo training, she needs to bring 1 bottle of the Novolog and the box of Vgo devices to the education visit.     The night before her diabetes education visit for Vgo start, she should NOT give her Tresiba insulin.     I will start calling her every week starting the week after she starts the Vgo device.    Thanks

## 2017-08-10 NOTE — TELEPHONE ENCOUNTER
Patient was notified of the following: Prescriptions for  Vgo insulin delivery devices and  bottle Novolog insulin was sent to the [facility] primary care pharmacy.     For upcoming diabetes education visit for Vgo training, you need to bring 1 bottle of the Novolog and the box of Vgo devices to the education visit.      The night before diabetes education visit for Vgo start, you should NOT take Tresiba insulin.      NP will start calling you every week starting the week after you start the Vgo device.

## 2017-08-17 ENCOUNTER — CLINICAL SUPPORT (OUTPATIENT)
Dept: DIABETES | Facility: CLINIC | Age: 72
End: 2017-08-17
Payer: MEDICARE

## 2017-08-17 DIAGNOSIS — Z79.4 TYPE 2 DIABETES MELLITUS WITH HYPERGLYCEMIA, WITH LONG-TERM CURRENT USE OF INSULIN: Chronic | ICD-10-CM

## 2017-08-17 DIAGNOSIS — E11.65 TYPE 2 DIABETES MELLITUS WITH HYPERGLYCEMIA, WITH LONG-TERM CURRENT USE OF INSULIN: Chronic | ICD-10-CM

## 2017-08-17 PROCEDURE — 99999 PR PBB SHADOW E&M-EST. PATIENT-LVL II: CPT | Mod: PBBFAC,,,

## 2017-08-17 PROCEDURE — G0108 DIAB MANAGE TRN  PER INDIV: HCPCS | Mod: S$GLB,,, | Performed by: INTERNAL MEDICINE

## 2017-08-17 NOTE — PROGRESS NOTES
Diabetes Education  Author: Cynthia Perry RD, CDE  Date: 8/17/2017    Diabetes Education Visit  Diabetes Education Record Assessment/Progress: Comprehensive/Group (Patient is here for VGO start - see note for details)    Diabetes Type  Diabetes Type : Type II      Diabetes Education Assessment/Progress  Medications (states correct name, dose, onset, peak, duration, side effects & timing of meds): Instructed, Discussion, Individual Session, Written Materials Provided, Competent (verbalizes/demonstrates) (Patient instructed on VGO)      Education Units of Time   Time Spent: 60 min      Health Maintenance Topics with due status: Not Due       Topic Last Completion Date    TETANUS VACCINE 07/22/2015    Colonoscopy 02/26/2016    Eye Exam 12/15/2016    Foot Exam 02/02/2017    Lipid Panel 06/01/2017    Hemoglobin A1c 06/01/2017    Mammogram 08/03/2017     Health Maintenance Due   Topic Date Due    DEXA SCAN  07/15/2017    Influenza Vaccine  08/01/2017

## 2017-08-17 NOTE — PROGRESS NOTES
DIABETES EDUCATOR NOTE   V-GO INSULIN DELIVERY INSTRUCTIONS     Patient is here for instructions on use of the V-GO 20 which will provide 20 units of basal insulin given over 24 hours (0.83 units/hr) and up to 36 units of on-demand bolus dosing in 2-Unit increments. Patient will be giving 6 units of Novolog at each meal (3 clicks).     Patient was instructed on the following:    Insert vial into EZ Fill and leave in place until vial is empty   Remove plug and insert V-Go    Draw insulin into EZ Fill and fill V-Go with insulin (check that V-Go is full of insulin)    Remove V-GO and clean and replace plug (advised to wipe the circular opening with an alcohol swab before replacing)    Select site for V-Go (site selection reviewed) and prepare infusion site with aseptic technique    Remove button cover and adhesive liner    Attach V-Go to site selected and insert needle by pushing needle button in to activate basal rate    Instructed patient on how to activate the bolus ready button and to push the bolus delivery button into the V-Go to deliver 2 units of insulin (1 push = 2 units). Patient advised that once all 36 units of the on-demand bolus have been given, the bolus buttons will lock, but the basal insulin will continue for the remainder of the 24 hours    To remove, release needle by sliding and pressing the needle release button    Remove the V-Go and discard (the V-Go is 100% disposable)   Patient instructed that the V-Go needs to be changed every 24 hours.    Patient was able to perform successful return demonstration of all.     General precautions reviewed with the patient:    V-Go needs to be removed before having an X-ray, MRI or CT scan (or any similar test or procedure). Replace with a new V-Go after the test or procedure is completed. Patient also advised to check with her doctor before any type of surgical procedure to see if the V-Go can be worn.    Patient advised to monitor her BG 4 times  a day (pre-meals and at HS)    Patient also advised that she should have directions from her MD regarding insulin doses should she need to switch back to MDI temporarily.    The V-Go should not be exposed to direct sunlight, hot tub, whirlpool or sauna use (replace with a new filled V-Go afterward)    Check that the V-Go is securely in place during and after periods of increased physical activity, exposure to water or gone under water to the depth of 3 feet, 3 inches (the V-Go can go under water and continue to work safely)    Reviewed s/s and tx of hypoglycemia     All of patient's questions and concerns were addressed. Patient instructed to keep a BG log (log sheets provided) and send BG readings to MD in 1 week for review. Phone number was provided and patient advised to call with any questions or concerns.     Time spent with patient: 60 minutes

## 2017-08-18 ENCOUNTER — NURSE TRIAGE (OUTPATIENT)
Dept: ADMINISTRATIVE | Facility: CLINIC | Age: 72
End: 2017-08-18

## 2017-08-19 NOTE — TELEPHONE ENCOUNTER
"    Reason for Disposition   [1] Low blood glucose (< 70 mg/dl  or 3.9 mmol/l) persists > 15 minutes AND [2] using low blood sugar Care Advice     Called Dr Maldonado, on call for Treva Catherine in Endocrine.  Briefed her on Buck's issues (lows of 59 last night, 65 this morning, 114 mid-day after eating) since she was put on Vgo 20.  She also had shakiness, and nausea early today, now completely resolved.  Dr Maldonado recommended she discontinue the Vgo completely, and resume the Tresiva, but now at reduced dose of 18 units instead of the 22 units she was on before.  She also wants her to call endocrine on Monday to check in with them regarding her CBGs.  Did relay this information to Buck, and she states understanding.  She will remove the Vgo now.  She states she will not begin the Tresiva until tomorrow night, as she is concerned she will be low again tonight.  Home care advice given, and information on appropriate dosing of glucose/sugar for lows shared.  Message to Dr Maldonado, as well as to Treva Catherine and Ericka Cortez , pcp.  Please contact caller directly with any additional care advice.    Answer Assessment - Initial Assessment Questions  1. SYMPTOMS: "What symptoms are you concerned about?"      I started doing the Vgo machine for my insulin yesterday, and I am shaky, my stomach is hot, and I get real nervous.    2. ONSET:  "When did the symptoms start?"      The first low was last night, and it was 59.  I took a peppermint.    3. BLOOD GLUCOSE: "What is your blood glucose level?"       114 at 1500; she did not check since.    4. USUAL RANGE: "What is your blood glucose level usually?" (e.g., usual fasting morning value, usual evening value)      Usually it is 92 in the morning. Usually in the evening it is .    5. TYPE 1 or 2:  "Do you know what type of diabetes you have?"  (e.g., Type 1, Type 2, Gestational; doesn't know)       Type 2.    6. INSULIN: "Do you take insulin?"       Yes. Novolog via " "Vgo.    7. DIABETES PILLS: "Do you take any pills for your diabetes?"      No pills.    8. OTHER SYMPTOMS: "Do you have any symptoms?" (e.g., fever, frequent urination, difficulty breathing, vomiting)        No but earlier today I felt hot, shaky, and I eat something.    9. LOW BLOOD GLUCOSE TREATMENT: "What have you done so far to treat the low blood glucose level?"      See notes.    10. ALONE: "Are you alone right now or is someone with you?"         No symptoms now. Yes, family with me.    11. PREGNANCY: "Is there any chance you are pregnant?" "When was your last menstrual period?"        N/a    Protocols used: ST DIABETES - LOW BLOOD SUGAR-A-      "

## 2017-08-21 ENCOUNTER — TELEPHONE (OUTPATIENT)
Dept: ENDOCRINOLOGY | Facility: CLINIC | Age: 72
End: 2017-08-21

## 2017-08-21 DIAGNOSIS — E11.65 TYPE 2 DIABETES MELLITUS WITH HYPERGLYCEMIA, WITH LONG-TERM CURRENT USE OF INSULIN: Primary | Chronic | ICD-10-CM

## 2017-08-21 DIAGNOSIS — Z79.4 TYPE 2 DIABETES MELLITUS WITH HYPERGLYCEMIA, WITH LONG-TERM CURRENT USE OF INSULIN: Primary | Chronic | ICD-10-CM

## 2017-08-21 NOTE — TELEPHONE ENCOUNTER
Patient stopped V-go as per Dr. Maldonado on call when she was having the low blood sugars.  She is now on Tresiba, Invokana, and metformin.  She is feeling much better since off the V-go.

## 2017-08-21 NOTE — TELEPHONE ENCOUNTER
Called and spoke with patient to follow up on this weekend blood sugars while off the V-go and since hypoglycemia Thursday and Friday am.    Thurs. At 12-1am- 59- woke up from a deep sleep with shaking, heat, stomach upset.  Ate something and went back to bed.  Fri morning- 92  Fri Ac lunch- 65  Sat. 114, 102, 100  Sun. 4-5pm because she was busy- 120

## 2017-08-21 NOTE — TELEPHONE ENCOUNTER
----- Message from Pinky Kingsley sent at 8/21/2017  9:33 AM CDT -----  Contact: Self [phone]  Physician on called advised pt to take the VGO off because her BG kept dropping.

## 2017-08-22 NOTE — TELEPHONE ENCOUNTER
Called and spoke with patient.  Explains that her medications for Diabetes are as follows:    Metformin XR 1000 mg once a day  Invokana 100mg once a day  Tresiba 22 units once a day

## 2017-08-24 RX ORDER — METFORMIN HYDROCHLORIDE 500 MG/1
1000 TABLET, EXTENDED RELEASE ORAL DAILY
Qty: 180 TABLET | Refills: 3
Start: 2017-08-24 | End: 2017-12-07 | Stop reason: SDUPTHER

## 2017-08-24 RX ORDER — INSULIN DEGLUDEC 100 U/ML
22 INJECTION, SOLUTION SUBCUTANEOUS DAILY
Qty: 5 SYRINGE | Refills: 3
Start: 2017-08-24 | End: 2017-09-28 | Stop reason: SDUPTHER

## 2017-08-24 NOTE — TELEPHONE ENCOUNTER
Noted, thanks. Please notify pt that I recommend a CGMS before next visit to fully assess BG control on her current regimen. Please schedule CGMS before Sept empowerment visit    Thanks

## 2017-08-25 NOTE — TELEPHONE ENCOUNTER
Called and spoke with patient regarding scheduling CGMS.  Patient is scheduled for application 8/30/17 and removal 9/5/17.  Reminded that her appointment for lab work is 9/21/17 and appointment with Treva Catherine NP at [facility] is for 9/28/2017.  Patient verbalizes understanding and requests an appointment reminder letter to be mailed.  Sent to address on file in profile as requested.

## 2017-08-30 ENCOUNTER — CLINICAL SUPPORT (OUTPATIENT)
Dept: ENDOCRINOLOGY | Facility: CLINIC | Age: 72
End: 2017-08-30
Payer: MEDICARE

## 2017-08-30 DIAGNOSIS — Z79.4 TYPE 2 DIABETES MELLITUS WITH HYPERGLYCEMIA, WITH LONG-TERM CURRENT USE OF INSULIN: Chronic | ICD-10-CM

## 2017-08-30 DIAGNOSIS — E11.65 TYPE 2 DIABETES MELLITUS WITH HYPERGLYCEMIA, WITH LONG-TERM CURRENT USE OF INSULIN: Chronic | ICD-10-CM

## 2017-08-30 NOTE — PROGRESS NOTES
"DIABETES EDUCATOR NOTE   PLACEMENT OF FREESTYLE CHIARA PRO SENSOR  CONTINOUS GLUCOSE MONITORING SYSTEM (CGMS)    Patient is here in clinic today for placement of continuous glucose monitoring sensor.      Each patient verified that they were here for CGMS procedure ordered by their provider and that they have a working glucose meter and supplies at home.   Patient will be provided with a Freestyle Chiara Sensor and a copy of the Continuous Glucose Monitoring Patient Log to fill out during the study.   A detailed  explanation of Continuous Glucose Monitoring was  provided. Patient informed that this is a blind procedure and that they will not actually see the blood sugar tracing in real time.  Reviewed with patient the [employer] patient education handout called "Your Freestyle Chiara Pro sensor: What you need to know" to review self-care during the study to avoid sensor loosening or removal ie... Bathing, Swimming, dressing, and exercising.   Instructed patient to check blood sugar using home glucometer and to record the following on provided patient log sheets:Blood sugar taken at home, Meals and snacks, Activity, and Diabetes medications taken and dosage    Patient was brought to a private location.  Arm for insertion was selected and prepared and allowed to dry. Glucose Sensor Serial Number [id number]  was inserted to back of patient's left upper arm.    The following forms  were given and reviewed in detail with patient and all questions answered.   · Continuous Glucose Monitoring Patient Log #[id number]  · Freestyle [employer] Patient Handout "Your Freestyle Chiara Pro Sensor: What you need to know"     Instructions held in a group: Time: 15 min   Insertion of sensor done individually in private:  Time: 5 minutes       "

## 2017-09-05 ENCOUNTER — CLINICAL SUPPORT (OUTPATIENT)
Dept: ENDOCRINOLOGY | Facility: CLINIC | Age: 72
End: 2017-09-05
Payer: MEDICARE

## 2017-09-05 DIAGNOSIS — Z79.4 TYPE 2 DIABETES MELLITUS WITH HYPERGLYCEMIA, WITH LONG-TERM CURRENT USE OF INSULIN: Chronic | ICD-10-CM

## 2017-09-05 DIAGNOSIS — E11.65 TYPE 2 DIABETES MELLITUS WITH HYPERGLYCEMIA, WITH LONG-TERM CURRENT USE OF INSULIN: Chronic | ICD-10-CM

## 2017-09-05 PROCEDURE — 95251 CONT GLUC MNTR ANALYSIS I&R: CPT | Mod: S$GLB,,, | Performed by: NURSE PRACTITIONER

## 2017-09-05 PROCEDURE — 95250 CONT GLUC MNTR PHYS/QHP EQP: CPT | Mod: S$GLB,,, | Performed by: DIETITIAN, REGISTERED

## 2017-09-13 ENCOUNTER — HOSPITAL ENCOUNTER (OUTPATIENT)
Dept: RADIOLOGY | Facility: HOSPITAL | Age: 72
Discharge: HOME | End: 2017-09-13
Attending: INTERNAL MEDICINE
Payer: MEDICARE

## 2017-09-13 DIAGNOSIS — K76.9 HEPATIC LESION: ICD-10-CM

## 2017-09-13 PROCEDURE — 76700 US EXAM ABDOM COMPLETE: CPT | Mod: 26,,, | Performed by: RADIOLOGY

## 2017-09-13 PROCEDURE — 76700 US EXAM ABDOM COMPLETE: CPT | Mod: TC

## 2017-09-15 ENCOUNTER — TELEPHONE (OUTPATIENT)
Dept: HEPATOLOGY | Facility: CLINIC | Age: 72
End: 2017-09-15

## 2017-09-15 DIAGNOSIS — K76.9 LIVER LESION: Primary | ICD-10-CM

## 2017-09-15 NOTE — TELEPHONE ENCOUNTER
Please call pt.   Ultrasound reviewed and Liver lesion is again unchanged, very small and indeterminate   have added a lab to her upcoming draw on 9/21, so will call her after that is reviewed w/ further recommendations as to when next imaging should be done

## 2017-09-21 ENCOUNTER — TELEPHONE (OUTPATIENT)
Dept: HEPATOLOGY | Facility: CLINIC | Age: 72
End: 2017-09-21

## 2017-09-21 ENCOUNTER — LAB VISIT (OUTPATIENT)
Dept: LAB | Facility: HOSPITAL | Age: 72
End: 2017-09-21
Attending: NURSE PRACTITIONER
Payer: MEDICARE

## 2017-09-21 DIAGNOSIS — Z79.4 TYPE 2 DIABETES MELLITUS WITH HYPERGLYCEMIA, WITH LONG-TERM CURRENT USE OF INSULIN: Chronic | ICD-10-CM

## 2017-09-21 DIAGNOSIS — Z86.19 HISTORY OF HEPATITIS C: Primary | ICD-10-CM

## 2017-09-21 DIAGNOSIS — E11.65 TYPE 2 DIABETES MELLITUS WITH HYPERGLYCEMIA, WITH LONG-TERM CURRENT USE OF INSULIN: Chronic | ICD-10-CM

## 2017-09-21 DIAGNOSIS — K76.9 LIVER LESION: ICD-10-CM

## 2017-09-21 LAB
AFP SERPL-MCNC: 1.9 NG/ML
ANION GAP SERPL CALC-SCNC: 9 MMOL/L
BUN SERPL-MCNC: 23 MG/DL
CALCIUM SERPL-MCNC: 9.1 MG/DL
CHLORIDE SERPL-SCNC: 107 MMOL/L
CO2 SERPL-SCNC: 25 MMOL/L
CREAT SERPL-MCNC: 0.8 MG/DL
EST. GFR  (AFRICAN AMERICAN): >60 ML/MIN/1.73 M^2
EST. GFR  (NON AFRICAN AMERICAN): >60 ML/MIN/1.73 M^2
ESTIMATED AVG GLUCOSE: 166 MG/DL
GLUCOSE SERPL-MCNC: 142 MG/DL
HBA1C MFR BLD HPLC: 7.4 %
POTASSIUM SERPL-SCNC: 4.1 MMOL/L
SODIUM SERPL-SCNC: 141 MMOL/L

## 2017-09-21 PROCEDURE — 36415 COLL VENOUS BLD VENIPUNCTURE: CPT

## 2017-09-21 PROCEDURE — 80048 BASIC METABOLIC PNL TOTAL CA: CPT

## 2017-09-21 PROCEDURE — 82105 ALPHA-FETOPROTEIN SERUM: CPT

## 2017-09-21 PROCEDURE — 83036 HEMOGLOBIN GLYCOSYLATED A1C: CPT

## 2017-09-21 NOTE — TELEPHONE ENCOUNTER
Lab Results   Component Value Date    AFP 1.9 09/21/2017     Please call pt.   Liver lab is normal   DrPatty Recommends continue checking this lesion in 6 months with an Ultrasound & AFP (orders in)

## 2017-09-21 NOTE — TELEPHONE ENCOUNTER
----- Message from Rubio Tobias MD sent at 9/21/2017  1:46 PM CDT -----  Regarding: RE: nonspecific liver lesion rt lobe ,   Yes- US only      ----- Message -----  From: Darcie Masters PA-C  Sent: 9/21/2017   1:07 PM  To: Rubio Tobias MD  Subject: nonspecific liver lesion rt lobe ,               Pt w/ h/o HCV.  NO cirrhosis. AFP normal    and liver lesion (rt lobe unclear , left lobe cyst)      u/s in 12/2012 that commented on at 12 mm cystic lesion    1/14/16 tPCT --hypodensity in the left hepatic lobe, likely   cyst.       8-2016 US There is a focal area of hyperechogenicity within the right hepatic lobe measuring 0.8 x 0.6 x 0.8 cm which is a new finding compared to a CT from 1/14/2016.     9-2016 tpCT: There is a 7 mm hypoenhancing lesion seen within the mid right lobe of the liver. This lesion does not demonstrate early enhancement. This correlates in location with the lesion seen on the August 11, 2016 US.This lesion does not demonstrate early enhancement. This lesion is new from January 14, 2016.  This lesion remains indeterminate. HCC cannot be excluded.  a 9 mm cyst seen within the left lobe of the liver.     3-2017 US: Stable appearance of right hepatic echogenic focus, this lesion remains nonspecific and continued surveillance is recommended. Left hepatic lobe cyst.    9-2017 US: There is a anechoic lesion in the left hepatic lobe most compatible with a simple hepatic cyst, measuring 1.1 cm. Again visualized is a stable-appearing, nonspecific 0.9 x 0.9 x 0.7 cm echogenic focus in the right hepatic lobe.    Impression: Stable-appearing nonspecific subcentimeter echogenic focus in the right hepatic lobe. Recommend continued surveillance with a follow-up abdominal ultrasound in 6 months.  AFP normal.     Continue w/ Ultrasound again in 6 months?

## 2017-09-28 ENCOUNTER — TELEPHONE (OUTPATIENT)
Dept: DIABETES | Facility: CLINIC | Age: 72
End: 2017-09-28

## 2017-09-28 ENCOUNTER — OFFICE VISIT (OUTPATIENT)
Dept: DIABETES | Facility: CLINIC | Age: 72
End: 2017-09-28
Payer: MEDICARE

## 2017-09-28 VITALS
HEART RATE: 70 BPM | HEIGHT: 65 IN | WEIGHT: 207.69 LBS | DIASTOLIC BLOOD PRESSURE: 90 MMHG | BODY MASS INDEX: 34.6 KG/M2 | SYSTOLIC BLOOD PRESSURE: 146 MMHG

## 2017-09-28 DIAGNOSIS — Z86.19 HISTORY OF HEPATITIS C: ICD-10-CM

## 2017-09-28 DIAGNOSIS — Z72.0 TOBACCO USE: ICD-10-CM

## 2017-09-28 DIAGNOSIS — Z79.4 TYPE 2 DIABETES MELLITUS WITH DIABETIC POLYNEUROPATHY, WITH LONG-TERM CURRENT USE OF INSULIN: Chronic | ICD-10-CM

## 2017-09-28 DIAGNOSIS — E11.42 TYPE 2 DIABETES MELLITUS WITH DIABETIC POLYNEUROPATHY, WITH LONG-TERM CURRENT USE OF INSULIN: Chronic | ICD-10-CM

## 2017-09-28 DIAGNOSIS — E04.2 MULTINODULAR GOITER: ICD-10-CM

## 2017-09-28 DIAGNOSIS — E11.22 CKD STAGE 2 DUE TO TYPE 2 DIABETES MELLITUS: Chronic | ICD-10-CM

## 2017-09-28 DIAGNOSIS — E66.9 OBESITY (BMI 30-39.9): Chronic | ICD-10-CM

## 2017-09-28 DIAGNOSIS — E11.65 TYPE 2 DIABETES MELLITUS WITH HYPERGLYCEMIA, WITH LONG-TERM CURRENT USE OF INSULIN: Primary | Chronic | ICD-10-CM

## 2017-09-28 DIAGNOSIS — I15.2 HYPERTENSION ASSOCIATED WITH DIABETES: ICD-10-CM

## 2017-09-28 DIAGNOSIS — Z79.4 TYPE 2 DIABETES MELLITUS WITH HYPERGLYCEMIA, WITH LONG-TERM CURRENT USE OF INSULIN: Primary | Chronic | ICD-10-CM

## 2017-09-28 DIAGNOSIS — N18.2 CKD STAGE 2 DUE TO TYPE 2 DIABETES MELLITUS: Chronic | ICD-10-CM

## 2017-09-28 DIAGNOSIS — E11.59 HYPERTENSION ASSOCIATED WITH DIABETES: ICD-10-CM

## 2017-09-28 DIAGNOSIS — E78.5 HYPERLIPIDEMIA LDL GOAL <70: Chronic | ICD-10-CM

## 2017-09-28 DIAGNOSIS — I10 BENIGN ESSENTIAL HYPERTENSION: ICD-10-CM

## 2017-09-28 PROCEDURE — 99999 PR PBB SHADOW E&M-EST. PATIENT-LVL IV: CPT | Mod: PBBFAC,,, | Performed by: NURSE PRACTITIONER

## 2017-09-28 PROCEDURE — 3080F DIAST BP >= 90 MM HG: CPT | Mod: S$GLB,,, | Performed by: NURSE PRACTITIONER

## 2017-09-28 PROCEDURE — 99499 UNLISTED E&M SERVICE: CPT | Mod: S$PBB,,, | Performed by: NURSE PRACTITIONER

## 2017-09-28 PROCEDURE — 3008F BODY MASS INDEX DOCD: CPT | Mod: S$GLB,,, | Performed by: NURSE PRACTITIONER

## 2017-09-28 PROCEDURE — 99214 OFFICE O/P EST MOD 30 MIN: CPT | Mod: S$GLB,,, | Performed by: NURSE PRACTITIONER

## 2017-09-28 PROCEDURE — 3077F SYST BP >= 140 MM HG: CPT | Mod: S$GLB,,, | Performed by: NURSE PRACTITIONER

## 2017-09-28 PROCEDURE — 1126F AMNT PAIN NOTED NONE PRSNT: CPT | Mod: S$GLB,,, | Performed by: NURSE PRACTITIONER

## 2017-09-28 PROCEDURE — 1159F MED LIST DOCD IN RCRD: CPT | Mod: S$GLB,,, | Performed by: NURSE PRACTITIONER

## 2017-09-28 RX ORDER — CANDESARTAN 8 MG/1
8 TABLET ORAL DAILY
Qty: 90 TABLET | Refills: 3 | Status: SHIPPED | OUTPATIENT
Start: 2017-09-28 | End: 2017-09-28 | Stop reason: SDUPTHER

## 2017-09-28 RX ORDER — CANDESARTAN 8 MG/1
8 TABLET ORAL DAILY
Qty: 90 TABLET | Refills: 3 | Status: CANCELLED | OUTPATIENT
Start: 2017-09-28

## 2017-09-28 RX ORDER — CANDESARTAN 8 MG/1
8 TABLET ORAL DAILY
Qty: 90 TABLET | Refills: 3 | Status: SHIPPED | OUTPATIENT
Start: 2017-09-28 | End: 2018-06-07 | Stop reason: SDUPTHER

## 2017-09-28 RX ORDER — INSULIN DEGLUDEC 100 U/ML
16 INJECTION, SOLUTION SUBCUTANEOUS DAILY
Qty: 5 SYRINGE | Refills: 3 | Status: SHIPPED | OUTPATIENT
Start: 2017-09-28 | End: 2017-12-07 | Stop reason: SDUPTHER

## 2017-09-28 NOTE — PROGRESS NOTES
"CC:  Diabetes.     HPI: Buck Ibarra is a 71 y.o. female referred by Dr. Cortez  [facility]. The patient has had type 2 diabetes along with associated comorbidities indicated in the Visit Diagnosis section of this encounter. The patient was diagnosed with Type 2 diabetes in 2014. + family history of DM in multiple family members. Diagnosed after polyuria.     Visit #8    Of note, in previous visits, Metformin was increased, Glipizide discontinued, Januvia started, stopped Januvia on her own, restarted Glipizide, started Victoza, stopped Glipizide, Metformin increased, stopped Victoza, started Levemir, changed to Tresiba, attempted Vgo but resulted in overnight hypoglycemia on lowest dose, resumed Tresiba and Invokana  But pt then stopped Invokana on her own.   Has history of noncompliance with suggest medication adjustments, along with multiple reported side effects (dizziness and palpitations with januvia causing her to stop, although symptoms did not improve when she stopped; Victoza caused +belching and gas)    Today's visit: Presents alone today with no BG logs. Reports checking BG 3 times a day, FBG 70-80, before lunch 120-136., and bedtime 142 highest. She reports being off Invokana for over 1 month. She states she stopped it because "I could not refill it, so someone must have taken me off this" - no notation of discontinuation in chart. She also has been off her BP medicine for 1 month due to needing refills. Reports having BP cuff at home but it is broken. BP elevated today but states she is controlled on her current dose when she is taking it.  Some uncontrolled prandial excursions on CGMS    Complains of sharp pain in chest that started yesterday. Denies chest pain currently. Describes the CP as intermittent, coming anytime of the day. No provoking factors noted. She states it hurts slightly when she presses on left chest area. Denies any injury or trauma to left arm or chest. She " also reports palpitations for about one week. Denies palpitations at this time. Patient already planned to go to ER across the street after this visit.    DIABETES COMPLICATIONS: nephropathy, peripheral neuropathy and cardiovascular disease     CURRENT A1C:    Hemoglobin A1C   Date Value Ref Range Status   09/21/2017 7.4 (H) 4.0 - 5.6 % Final     Comment:     According to ADA guidelines, hemoglobin A1c <7.0% represents  optimal control in non-pregnant diabetic patients. Different  metrics may apply to specific patient populations.   Standards of Medical Care in Diabetes-2016.  For the purpose of screening for the presence of diabetes:  <5.7%     Consistent with the absence of diabetes  5.7-6.4%  Consistent with increasing risk for diabetes   (prediabetes)  >or=6.5%  Consistent with diabetes  Currently, no consensus exists for use of hemoglobin A1c  for diagnosis of diabetes for children.  This Hemoglobin A1c assay has significant interference with fetal   hemoglobin   (HbF). The results are invalid for patients with abnormal amounts of   HbF,   including those with known Hereditary Persistence   of Fetal Hemoglobin. Heterozygous hemoglobin variants (HbAS, HbAC,   HbAD, HbAE, HbA2) do not significantly interfere with this assay;   however, presence of multiple variants in a sample may impact the %   interference.     06/01/2017 8.1 (H) 4.5 - 6.2 % Final     Comment:     According to ADA guidelines, hemoglobin A1C <7.0% represents  optimal control in non-pregnant diabetic patients.  Different  metrics may apply to specific populations.   Standards of Medical Care in Diabetes - 2016.  For the purpose of screening for the presence of diabetes:  <5.7%     Consistent with the absence of diabetes  5.7-6.4%  Consistent with increasing risk for diabetes   (prediabetes)  >or=6.5%  Consistent with diabetes  Currently no consensus exists for use of hemoglobin A1C  for diagnosis of diabetes for children.     01/23/2017 10.2 (H)  4.5 - 6.2 % Final     Comment:     According to ADA guidelines, hemoglobin A1C <7.0% represents  optimal control in non-pregnant diabetic patients.  Different  metrics may apply to specific populations.   Standards of Medical Care in Diabetes - 2016.  For the purpose of screening for the presence of diabetes:  <5.7%     Consistent with the absence of diabetes  5.7-6.4%  Consistent with increasing risk for diabetes   (prediabetes)  >or=6.5%  Consistent with diabetes  Currently no consensus exists for use of hemoglobin A1C  for diagnosis of diabetes for children.         GOAL A1C: <7%    MEDICATIONS UPON START OF PROGRAM: Metformin 1000 mg once daily, Glipizide 10 mg BID  Denies missing any doses of medications     CURRENT DIABETES MEDICATIONS: Metformin 1000 mg daily, Tresiba 22 units nightly, Invokana 100 mg daily (stopped invokana 1 month ago)  Reports stopping Invokana 1 month ago; however, CGMS indicates she was still taking it during this.    ANY DIFFICULTY AFFORDING YOUR CURRENT MEDICATION REGIMEN? None     SIGNIFICANT DIABETES MED HISTORY:   Victoza - gas and belching  Metformin  Tresiba  Januvia (dizziness, palpitations)    HOSPITALIZATIONS FOR DIABETES OR RELATED COMPLICATIONS:  No    BLOOD GLUCOSE MONITORING:  Reports checking BG 3 times daily, reports FBG always <130, reports BG elevated in evening , see CGMS report     HYPOGLYCEMIA:  Denies but hypo x1 overnight on CGMS    OCCUPATION: retired    CURRENT DAILY ROUTINE (meals/meds):   Eats 2 meals, skips either breakfast or lunch   Denies snacking in between meals or before bedtime     Reports dinner can be as late as 9 pm at night - home cooked meal, veggies (okra), rice, protein     CURRENT EXERCISE:  None, takes care of her grandchildren often 1 year old grandchild     SMOKING: Yes - smoking 5-6 cigarettes daily     MEDICATIONS, ALLERGIES, LABS, VS's, MEDICAL, SURGICAL, SOCIAL, AND FAMILY HISTORY reviewed and updated in the appropriate sections during  "this encounter    ROS:  Constitutional: (+) weight gain (8 lbs)  Endocrine:  Denies polyuria, polydipsia, nocturia.  HENT: Denies neck swelling, lumps, horseness or trouble swallowing. Denies any personal or family history of thyroid cancer.    Eyes: + blurry vision  Cardiovascular: (+) chest pain (started yesterday), (+) palpitations (one week) - NO current chest pain   Respiratory:  Denies dyspnea or SOB.  Gastrointestinal: Negative for nausea, diarrhea, vomiting, bloating.  No history of pancreatitis or gastroparesis.  Genitourinary: Negative for urgency, frequency, frequent urinary tract infections.  Neurological: Negative for syncope, + improved numbness/burning of extremities.      CGMS interpretation:  Done 8/2017  1. FBG at or near goal most days, hypo x1 from 6a-8a  2. Some uncontrolled prandial excursions , notably after dinner/extending into overnight    PE:  Constitutional:   BP (!) 146/90 (BP Location: Left arm, Patient Position: Sitting, BP Method: Medium (Manual))   Pulse 70   Ht 5' 5" (1.651 m)   Wt 94.2 kg (207 lb 10.8 oz)   BMI 34.56 kg/m²   Well developed, well nourished, NAD.    Neck:  No trachial deviation present, No neck masses noticed   Thyroid:  No thyromegaly present.  No thyroid tenderness.      Cardiovascular:    Auscultation:  No murmurs or abnormal sounds   Lower extremities:  No edema or cyanosis.     Respiratory:    Effort:  Normal, no use of accessory muscles.   Auscultation: breath sounds normal, no adventitious sounds.  Skin:    Inspection: no rashes, lesion or ulcers, + acanthosis nigracans.   Palpation: no induration or nodules.   Tresiba injection sites: no lipoatrophy or lipohypertrophy.  Psychiatric:  Judgement and insight good with normal mood and affect.      Foot Examination:  Full foot exam done 6/16/2017 with intact PS, decreased vibratory   Visual Inspection:  Callus -  bilateral heels    Pedal Pulses:   Right: Present  Left: Present    Posterior tibialis: "   Right:Present  Left: Present    ______________________________________________________________________   Microalbumin/Creatinine ratio:  Lab Results   Component Value Date    MICALBCREAT 5.1 02/02/2017     ______________________________________________________________________     ASSESSMENT and PLAN:    1- Type 2 diabetes with CKD 2, neuropathy and hyperglycemia - A1c much improved but remains elevated > 7%, FBG at goal per patient report, reports BG readings elevated in evening, likely due to prandial excursions uncontrolled.   Limited treatment options (adverse effects with GLP-1 and Januvia, caution with increasing Invokana dose higher than low dose due to renal function).     Patient tried Vgo 20 with 2 clicks with meals - experience hypos, stopped device.    Continue Metformin 1000 mg daily, restart Invokana 100 mg daily, decrease Tresiba to 16 units nightly as FBG within range and 2 episodes of hypoglycemia noted on CGMS 8/2017.     Start Trajenta 5 mg daily to help with prandial excursions in evening.    Discussed with pt recent finding of increased risk of lower extremity amputations with invokana use. Discussed with patient that she does fall into higher risk category (presence of CAD, bilateral carotid artery stenosis, PN, and current smoker). Pt aware of all risks, no current foot ulcers, wishes to continue medication     Continue to monitor BG 2 times daily, document on BG logs, and bring complete BG logs to all visits - instructed to notify me of any BG <80 so that insulin can be titrated     F/u in empowerment in 3 months with BMP and A1c before    3- CKD stage 2 - discussed with patient correlation between CKD and uncontrolled DM, continue improved BG control     4 - Peripheral neuropathy - Educated patient to check feet daily for any foreign objects and/or wounds. Discussed with patient the importance of wearing appropriate footwear at all times, not to walk barefoot ever, and to check shoes before  "putting them on feet. Instructed patient to keep feet dry by regularly changing shoes and socks and drying feet after baths and exercises. Also, instructed patient to report any new lesions, discolorations, or swelling to a healthcare professional.    5- Hypertension - goal < 140/80 mmHg -  above goal (due to nonadherence to HTN medications in past month), on ARB and HCTZ, refilled BP prescription, instructed pt to  medication on way out and start ASAP this AM. Patient scheduled to see PCP Dr. Cortez 11/15/2017.  BP Readings from Last 3 Encounters:   09/28/17 (!) 146/90   06/16/17 (!) 160/78   05/26/17 122/76     6- Hyperlipidemia, aortic atherosclerosis, bilateral carotid artery stenosis - LDL goal <70 due to atherosclerosis of aorta in history,  On high dose statin, Lipitor 40 mg daily. Repeat CMP and lipid panel with RTC  LFT's WNL    Lab Results   Component Value Date    LDLCALC 93.6 06/01/2017       7- Body mass index is 34.56 kg/m². = Obesity. Modest weight loss (5-10%) may greatly improve BG control. Encouraged patient to start a walking program when not watching grandchildren.     8- - Chronic Hep C - followed by hepatology. 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 Harvcurt treatment completed.     9 - Multinodular goiter - last US and FNA done 8/2016, recommend to repeat US yearly     10 - Tobacco use - reports she tried [facility] smoking cessation program but had "real bad withdrawals" around day 40 because the nicoderm patch " burned her". Returned to smoking 5-6 cigarettes a day. Encouraged patient to re-enroll in [facility] smoking cessation program as there are other options besides patch that can help with withdrawal symptoms.    11 - Chest pain and palpitations - Patient not experiencing chest pain or palpitations currently but has been experiencing CP for 1 day and palpitations for 1 week. Patient already planned to go to ER across the " street after visit, pt stable to proceed to ER alone       STANDARDS of CARE:  Statin: lipitor 40 mg   ACEI or ARB:  Yes - ARB   ASA:  Yes - 81 mg   Last eye exam 12/2016 no retinopathy- due for eye exam at next visit, message sent to staff to call patient to schedule this

## 2017-09-28 NOTE — PATIENT INSTRUCTIONS
Start Tradjenta 5 mg daily    Restart Invokana 100 mg daily    Decrease Tresiba to 16 units daily     Notify me of any sugar <80

## 2017-10-04 ENCOUNTER — TELEPHONE (OUTPATIENT)
Dept: SMOKING CESSATION | Facility: CLINIC | Age: 72
End: 2017-10-04

## 2017-10-04 ENCOUNTER — CLINICAL SUPPORT (OUTPATIENT)
Dept: SMOKING CESSATION | Facility: CLINIC | Age: 72
End: 2017-10-04
Payer: COMMERCIAL

## 2017-10-04 DIAGNOSIS — F17.200 NICOTINE DEPENDENCE: Primary | ICD-10-CM

## 2017-10-04 PROCEDURE — 99407 BEHAV CHNG SMOKING > 10 MIN: CPT | Mod: S$GLB,,, | Performed by: INTERNAL MEDICINE

## 2017-10-24 ENCOUNTER — TELEPHONE (OUTPATIENT)
Dept: ADMINISTRATIVE | Facility: HOSPITAL | Age: 72
End: 2017-10-24

## 2017-10-24 NOTE — TELEPHONE ENCOUNTER
Please see message below from [facility] nurse-     Buck Ibarra [id number] Patient is currently in blood pressure measure, last bp on file 146/90 DOS 9/28/17 endo. Measure considers blood pressure controlled <140/90. This prompted  review of medications,  gaps in fills of Candesartan was noted 90d last fills: 9/28/17, 12/1/16. Patient has a scheduled appointment with Dr. Cortez 11/15/17.  Please review with patient at scheduled appointment medication adherence.    Thanks,    Jane Newton, RN  RN Navigator  [facility]

## 2017-11-15 ENCOUNTER — OFFICE VISIT (OUTPATIENT)
Dept: INTERNAL MEDICINE | Facility: CLINIC | Age: 72
End: 2017-11-15
Payer: MEDICARE

## 2017-11-15 VITALS
WEIGHT: 204.19 LBS | DIASTOLIC BLOOD PRESSURE: 80 MMHG | SYSTOLIC BLOOD PRESSURE: 130 MMHG | TEMPERATURE: 99 F | HEART RATE: 79 BPM | HEIGHT: 65 IN | BODY MASS INDEX: 34.02 KG/M2 | RESPIRATION RATE: 16 BRPM

## 2017-11-15 DIAGNOSIS — E11.69 DIABETES MELLITUS TYPE 2 IN OBESE: Primary | ICD-10-CM

## 2017-11-15 DIAGNOSIS — R14.0 BLOATING: ICD-10-CM

## 2017-11-15 DIAGNOSIS — E11.59 HYPERTENSION ASSOCIATED WITH DIABETES: ICD-10-CM

## 2017-11-15 DIAGNOSIS — I15.2 HYPERTENSION ASSOCIATED WITH DIABETES: ICD-10-CM

## 2017-11-15 DIAGNOSIS — E66.9 DIABETES MELLITUS TYPE 2 IN OBESE: Primary | ICD-10-CM

## 2017-11-15 DIAGNOSIS — K63.5 POLYP OF COLON, UNSPECIFIED PART OF COLON, UNSPECIFIED TYPE: ICD-10-CM

## 2017-11-15 DIAGNOSIS — Z78.0 POSTMENOPAUSAL ESTROGEN DEFICIENCY: ICD-10-CM

## 2017-11-15 DIAGNOSIS — E78.5 HYPERLIPIDEMIA ASSOCIATED WITH TYPE 2 DIABETES MELLITUS: ICD-10-CM

## 2017-11-15 DIAGNOSIS — E11.69 HYPERLIPIDEMIA ASSOCIATED WITH TYPE 2 DIABETES MELLITUS: ICD-10-CM

## 2017-11-15 DIAGNOSIS — D75.839 THROMBOCYTOSIS: ICD-10-CM

## 2017-11-15 DIAGNOSIS — K21.9 GASTROESOPHAGEAL REFLUX DISEASE, ESOPHAGITIS PRESENCE NOT SPECIFIED: ICD-10-CM

## 2017-11-15 DIAGNOSIS — E04.2 MULTIPLE THYROID NODULES: ICD-10-CM

## 2017-11-15 PROCEDURE — 99214 OFFICE O/P EST MOD 30 MIN: CPT | Mod: S$GLB,,, | Performed by: INTERNAL MEDICINE

## 2017-11-15 PROCEDURE — 99999 PR PBB SHADOW E&M-EST. PATIENT-LVL IV: CPT | Mod: PBBFAC,,, | Performed by: INTERNAL MEDICINE

## 2017-11-15 PROCEDURE — 99499 UNLISTED E&M SERVICE: CPT | Mod: S$PBB,,, | Performed by: INTERNAL MEDICINE

## 2017-11-15 RX ORDER — ESOMEPRAZOLE MAGNESIUM 40 MG/1
40 CAPSULE, DELAYED RELEASE ORAL
Qty: 30 CAPSULE | Refills: 1 | Status: SHIPPED | OUTPATIENT
Start: 2017-11-15 | End: 2018-02-28 | Stop reason: SDUPTHER

## 2017-11-15 NOTE — PROGRESS NOTES
"Subjective:       Patient ID: Buck Ibarra is a 71 y.o. female.    Chief Complaint: Hyperlipidemia; Hypertension; and Diabetes    HPI   DM2 - VGo. Metformin XR 1000mg daily, invokana 100mg daily, tresiba 16 units nightly, tradjenta 5mg daily.   Follows w/ Treva Catherine NP in [facility].  Upcoming eye exam w/ Dr. Wasserman 12/7/17  Lately checking her glucose twice a day. 120s.   a1c 9/21/17 7.4<--8.1  Foot 2/2/17  MAC 2/2/17  Eye 12/15/16  LDL 6/1/17 93.6    CKD2 - Cr 0.8-1    Chronic Hep c s/p treatment w/ Harvoni 5/2016.     HTN - atacand 8mg daily, hctz 25mg daily    Thrombocytosis on CBC 5/10/17. Needs repeat CBC.    CT A/P 5/10/17 - moderate calcific atherosclerosis of the abdominal aorta  Circumferential wall thickening of colon.  Saw Dr. Bearden and Dr. Connors - anoscopy performed w/ ext/int hemorrhoid.   Cscope 1/28/16 - benign polyps. Neg. Repeat in 3 yrs.     Thyroid nodules. Need repeat US.     Review of Systems  occasional rhinorrhea.   Bloating in the mid epigastric region. No pain. Reports lots of burping. Sometimes w/ acid reflux - takes nexium occasionally. No weight loss.   Comprehensive review of systems otherwise negative. See history/subjective section for more details.    Objective:      Physical Exam    /80   Pulse 79   Temp 98.5 °F (36.9 °C) (Oral)   Resp 16   Ht 5' 5" (1.651 m)   Wt 92.6 kg (204 lb 3.2 oz)   BMI 33.98 kg/m²     Gen - A+OX4, NAD  HEENT - PERRL, OP clear. MMM.   Neck - no LAD. Thyroid fullness.   Cardiovascular-regular rate and rhythm, no murmurs or rubs.  Chest-clear to auscultation bilaterally, no wheezing or rhonchi  Abdomen-soft nontender nondistended positive bowel sounds ×4.  Extremities 2+ bilateral DP and radial and DP pulses.  No lower extremity edema.  MSK-normal gait.  2+ DTRs.  Skin-no rash.    Labs reviewed with patient.  CT A/P 5/10/17 reviewed.    Assessment/Plan     Buck was seen today for hyperlipidemia, hypertension and " diabetes.    Diagnoses and all orders for this visit:    Diabetes mellitus type 2 in obese - a1c at goal. Continue to work w/ DM empowerment. Eye exam upcoming.     Hypertension associated with diabetes - Stable and controlled. Continue current medications.    Hyperlipidemia associated with type 2 diabetes mellitus - cont atorva 40mg daily.     Thrombocytosis  -     CBC auto differential; Future    Polyp of colon, unspecified part of colon, unspecified type - repeat Cscope 1/2019    Multiple thyroid nodules  -     US Soft Tissue Head Neck Thyroid; Future    Postmenopausal estrogen deficiency  -     DXA Bone Density Spine And Hip; Future    Bloating - CT A/P 5/2017 reviewed.  -     Lipase; Future  -     esomeprazole (NEXIUM) 40 MG capsule; Take 1 capsule (40 mg total) by mouth before breakfast.    Gastroesophageal reflux disease, esophagitis presence not specified  -     esomeprazole (NEXIUM) 40 MG capsule; Take 1 capsule (40 mg total) by mouth before breakfast.    Return in about 4 months (around 3/15/2018).      Ericka Cortez MD  Department of Internal Medicine - [facility]  8:37 AM

## 2017-11-18 ENCOUNTER — HOSPITAL ENCOUNTER (EMERGENCY)
Facility: HOSPITAL | Age: 72
Discharge: HOME | End: 2017-11-19
Attending: EMERGENCY MEDICINE
Payer: MEDICARE

## 2017-11-18 DIAGNOSIS — R35.89 POLYURIA: ICD-10-CM

## 2017-11-18 DIAGNOSIS — R31.9 URINARY TRACT INFECTION WITH HEMATURIA, SITE UNSPECIFIED: Primary | ICD-10-CM

## 2017-11-18 DIAGNOSIS — N39.0 URINARY TRACT INFECTION WITH HEMATURIA, SITE UNSPECIFIED: Primary | ICD-10-CM

## 2017-11-18 LAB
BACTERIA #/AREA URNS AUTO: ABNORMAL /HPF
BILIRUB UR QL STRIP: NEGATIVE
CLARITY UR REFRACT.AUTO: ABNORMAL
COLOR UR AUTO: ABNORMAL
GLUCOSE UR QL STRIP: ABNORMAL
HGB UR QL STRIP: ABNORMAL
HYALINE CASTS UR QL AUTO: 10 /LPF
KETONES UR QL STRIP: NEGATIVE
LEUKOCYTE ESTERASE UR QL STRIP: ABNORMAL
MICROSCOPIC COMMENT: ABNORMAL
NITRITE UR QL STRIP: NEGATIVE
PH UR STRIP: 5 [PH] (ref 5–8)
PROT UR QL STRIP: NEGATIVE
RBC #/AREA URNS AUTO: 1 /HPF (ref 0–4)
SP GR UR STRIP: 1.03 (ref 1–1.03)
SQUAMOUS #/AREA URNS AUTO: 6 /HPF
URN SPEC COLLECT METH UR: ABNORMAL
UROBILINOGEN UR STRIP-ACNC: NEGATIVE EU/DL
WBC #/AREA URNS AUTO: >100 /HPF (ref 0–5)
YEAST UR QL AUTO: ABNORMAL

## 2017-11-18 PROCEDURE — 99283 EMERGENCY DEPT VISIT LOW MDM: CPT

## 2017-11-18 PROCEDURE — 87077 CULTURE AEROBIC IDENTIFY: CPT | Mod: 59

## 2017-11-18 PROCEDURE — 87186 SC STD MICRODIL/AGAR DIL: CPT

## 2017-11-18 PROCEDURE — 99284 EMERGENCY DEPT VISIT MOD MDM: CPT | Mod: ,,, | Performed by: PHYSICIAN ASSISTANT

## 2017-11-18 PROCEDURE — 87088 URINE BACTERIA CULTURE: CPT

## 2017-11-18 PROCEDURE — 87086 URINE CULTURE/COLONY COUNT: CPT

## 2017-11-18 PROCEDURE — 81001 URINALYSIS AUTO W/SCOPE: CPT

## 2017-11-19 VITALS
WEIGHT: 202 LBS | DIASTOLIC BLOOD PRESSURE: 81 MMHG | OXYGEN SATURATION: 100 % | HEART RATE: 65 BPM | SYSTOLIC BLOOD PRESSURE: 189 MMHG | BODY MASS INDEX: 33.66 KG/M2 | TEMPERATURE: 98 F | HEIGHT: 65 IN | RESPIRATION RATE: 16 BRPM

## 2017-11-19 RX ORDER — CEPHALEXIN 500 MG/1
500 CAPSULE ORAL 2 TIMES DAILY
Qty: 14 CAPSULE | Refills: 0 | Status: SHIPPED | OUTPATIENT
Start: 2017-11-19 | End: 2017-11-26

## 2017-11-19 NOTE — ED TRIAGE NOTES
Buck Ibarra, a 71 y.o. female presents to the ED c/o polyuria. Pt reports slight burning with urination. Pt denies hematuria. +urgency. Pt reports similar symptoms when first diagnosed with DM but CBG PTA was 115.      Chief Complaint   Patient presents with    Polyuria     Pt reports polyuria and believes she has a UTI. Pt also has DM but BG was 115 at home.     Review of patient's allergies indicates:   Allergen Reactions    Erythromycin Anaphylaxis    Erythropoietin analogues Anaphylaxis    Pegasys [peginterferon ruben-2a] Anaphylaxis    Lisinopril Hives     Past Medical History:   Diagnosis Date    Allergy     Cataract     Diabetes mellitus type II     Gastritis     with gastric ulcer    GERD (gastroesophageal reflux disease)     H. pylori infection     Hepatitis C     Hyperlipidemia     Hypertension     Osteopenia     Type 2 diabetes mellitus with diabetic polyneuropathy         Adult Physical Assessment  LOC: Buck Ibarra, 71 y.o. female verified via two identifiers.  The patient is awake, alert, oriented and speaking appropriately at this time.  APPEARANCE: Patient resting comfortably and appears to be in no acute distress at this time. Patient is clean and well groomed, patient's clothing is properly fastened.  SKIN:The skin is warm and dry, color consistent with ethnicity, patient has normal skin turgor and moist mucus membranes, skin intact, no breakdown or brusing noted.  MUSCULOSKELETAL: Patient moving all extremities well, no obvious swelling or deformities noted.  RESPIRATORY: Airway is open and patent, respirations are spontaneous, patient has a normal effort and rate, no accessory muscle use noted.  CARDIAC: Patient has a normal rate and rhythm, no periphreal edema noted in any extremity, capillary refill < 3 seconds in all extremities  ABDOMEN: Soft and non tender to palpation, no abdominal distention noted. Bowel sounds present in all four quadrants.  NEUROLOGIC: Eyes  open spontaneously, behavior appropriate to situation, follows commands, facial expression symmetrical, bilateral hand grasp equal and even, purposeful motor response noted, normal sensation in all extremities when touched with a finger.

## 2017-11-19 NOTE — ED PROVIDER NOTES
Encounter Date: 11/18/2017       History     Chief Complaint   Patient presents with    Polyuria     Pt reports polyuria and believes she has a UTI. Pt also has DM but BG was 115 at home.     Patient is a 71 year old female with PMHx of Hep C, HTN, HLD, DM2, and GERD with ulcer. She presents to the ED for polyuria. She reports increased urination beginning today associated with dysuria, frequency, hesitancy, and urgency. She denies fever,chills, nausea, vomiting, sob, chest pain, abd pain, diarrhea, or constipation. She is a current everyday smoker and reports alcohol use.             Review of patient's allergies indicates:   Allergen Reactions    Erythromycin Anaphylaxis    Erythropoietin analogues Anaphylaxis    Pegasys [peginterferon ruben-2a] Anaphylaxis    Lisinopril Hives     Past Medical History:   Diagnosis Date    Allergy     Cataract     Diabetes mellitus type II     Gastritis     with gastric ulcer    GERD (gastroesophageal reflux disease)     H. pylori infection     Hepatitis C     Hyperlipidemia     Hypertension     Osteopenia     Type 2 diabetes mellitus with diabetic polyneuropathy      Past Surgical History:   Procedure Laterality Date    COLONOSCOPY      COLONOSCOPY N/A 1/28/2016    Procedure: COLONOSCOPY;  Surgeon: Emeka Ahn MD;  Location: [address]);  Service: Endoscopy;  Laterality: N/A;    HYSTERECTOMY      LIVER BIOPSY      OOPHORECTOMY      UPPER GASTROINTESTINAL ENDOSCOPY       Family History   Problem Relation Age of Onset    Heart disease Mother     Diabetes Mother     Heart disease Father     Cancer Sister      breast cancer    Diabetes Sister     Cancer Sister 70     colon CA    Diabetes Sister     Diabetes Sister     Glaucoma Neg Hx     Melanoma Neg Hx     Cirrhosis Neg Hx     Psoriasis Neg Hx     Lupus Neg Hx      Social History   Substance Use Topics    Smoking status: Current Every Day Smoker     Packs/day: 0.25     Years: 48.00      Last attempt to quit: 9/4/2016    Smokeless tobacco: Never Used    Alcohol use No      Comment: special occasions     Review of Systems   Constitutional: Negative for fever.   HENT: Negative for sore throat.    Respiratory: Negative for shortness of breath.    Cardiovascular: Negative for chest pain.   Gastrointestinal: Negative for nausea.   Genitourinary: Positive for dysuria, frequency and urgency. Negative for flank pain.   Musculoskeletal: Negative for back pain.   Skin: Negative for rash.   Neurological: Negative for weakness.   Hematological: Does not bruise/bleed easily.       Physical Exam     Initial Vitals [11/18/17 2214]   BP Pulse Resp Temp SpO2   (!) 198/88 75 17 97.7 °F (36.5 °C) 100 %      MAP       124.67         Physical Exam    Vitals reviewed.  Constitutional: She appears well-developed and well-nourished. She is not diaphoretic. No distress.   Patient is resting comfortably in NAD on RA.    HENT:   Head: Normocephalic and atraumatic.   Nose: Nose normal.   Eyes: Conjunctivae and EOM are normal. Pupils are equal, round, and reactive to light.   Neck: Normal range of motion.   Cardiovascular: Normal rate and regular rhythm. Exam reveals no friction rub.    No murmur heard.  Pulmonary/Chest: Breath sounds normal. No respiratory distress. She has no wheezes. She has no rales.   Abdominal: Soft. Bowel sounds are normal. She exhibits no distension. There is no tenderness. There is no rebound.   Musculoskeletal: Normal range of motion.   Neurological: She is alert and oriented to person, place, and time. She has normal strength. No sensory deficit.   Skin: Skin is warm and dry. No erythema.   Psychiatric: She has a normal mood and affect. Thought content normal.         ED Course   Procedures  Labs Reviewed   URINALYSIS, REFLEX TO URINE CULTURE - Abnormal; Notable for the following:        Result Value    Appearance, UA Cloudy (*)     Glucose, UA 3+ (*)     Occult Blood UA 1+ (*)     Leukocytes, UA  1+ (*)     All other components within normal limits    Narrative:     Preferred Collection Type->Urine, Clean Catch   URINALYSIS MICROSCOPIC - Abnormal; Notable for the following:     WBC, UA >100 (*)     Bacteria, UA Many (*)     Hyaline Casts, UA 10 (*)     All other components within normal limits    Narrative:     Preferred Collection Type->Urine, Clean Catch   CULTURE, URINE                   APC / Resident Notes:   Patient is a 71 year old female with PMHx of Hep C, HTN, HLD, DM2, and GERD with ulcer. She presents to the ED for polyuria. Patient is in no acute distress. Vitals stable. AAOx3. RRR. Lungs CTA. Abdomen is soft, non tender, non distended. Skin is normal appearance without rashes.     Will order urinalysis with culture.     Differential diagnoses include, but are not limited to: UTI, vaginitis, urethritis, or pelvic inflammatory disease. I considered but do not suspect pyelonephritis.     UA remarkable for 3+ sugar, 1+ occult blood, 1+ leukocytes. On microscopic analysis found to have >100 WBC and many bacteria.   Will discharge home with keflex for seven days and F/U with PCP.     I have discussed and reviewed with my supervising physician.                  ED Course      Clinical Impression:   The primary encounter diagnosis was Urinary tract infection with hematuria, site unspecified. A diagnosis of Polyuria was also pertinent to this visit.    Disposition:   Disposition: Discharged  Condition: Stable                        Yaneth Paez PA-C  11/19/17 0038

## 2017-11-22 LAB
BACTERIA UR CULT: NORMAL
BACTERIA UR CULT: NORMAL

## 2017-12-04 ENCOUNTER — LAB VISIT (OUTPATIENT)
Dept: LAB | Facility: HOSPITAL | Age: 72
End: 2017-12-04
Payer: MEDICARE

## 2017-12-04 DIAGNOSIS — E11.65 TYPE 2 DIABETES MELLITUS WITH HYPERGLYCEMIA, WITH LONG-TERM CURRENT USE OF INSULIN: Chronic | ICD-10-CM

## 2017-12-04 DIAGNOSIS — R14.0 BLOATING: ICD-10-CM

## 2017-12-04 DIAGNOSIS — Z79.4 TYPE 2 DIABETES MELLITUS WITH HYPERGLYCEMIA, WITH LONG-TERM CURRENT USE OF INSULIN: Chronic | ICD-10-CM

## 2017-12-04 DIAGNOSIS — D75.839 THROMBOCYTOSIS: ICD-10-CM

## 2017-12-04 LAB
ALBUMIN SERPL BCP-MCNC: 3.1 G/DL
ALP SERPL-CCNC: 110 U/L
ALT SERPL W/O P-5'-P-CCNC: 19 U/L
ANION GAP SERPL CALC-SCNC: 10 MMOL/L
AST SERPL-CCNC: 17 U/L
BASOPHILS # BLD AUTO: 0.04 K/UL
BASOPHILS NFR BLD: 0.4 %
BILIRUB SERPL-MCNC: 0.4 MG/DL
BUN SERPL-MCNC: 27 MG/DL
CALCIUM SERPL-MCNC: 9.6 MG/DL
CHLORIDE SERPL-SCNC: 103 MMOL/L
CHOLEST SERPL-MCNC: 149 MG/DL
CHOLEST/HDLC SERPL: 3.7 {RATIO}
CO2 SERPL-SCNC: 26 MMOL/L
CREAT SERPL-MCNC: 1.1 MG/DL
DIFFERENTIAL METHOD: ABNORMAL
EOSINOPHIL # BLD AUTO: 0.1 K/UL
EOSINOPHIL NFR BLD: 0.7 %
ERYTHROCYTE [DISTWIDTH] IN BLOOD BY AUTOMATED COUNT: 16.7 %
EST. GFR  (AFRICAN AMERICAN): 59 ML/MIN/1.73 M^2
EST. GFR  (NON AFRICAN AMERICAN): 51 ML/MIN/1.73 M^2
ESTIMATED AVG GLUCOSE: 180 MG/DL
GLUCOSE SERPL-MCNC: 177 MG/DL
HBA1C MFR BLD HPLC: 7.9 %
HCT VFR BLD AUTO: 46.4 %
HDLC SERPL-MCNC: 40 MG/DL
HDLC SERPL: 26.8 %
HGB BLD-MCNC: 14.5 G/DL
LDLC SERPL CALC-MCNC: 88.4 MG/DL
LIPASE SERPL-CCNC: 18 U/L
LYMPHOCYTES # BLD AUTO: 3.9 K/UL
LYMPHOCYTES NFR BLD: 42.6 %
MCH RBC QN AUTO: 27 PG
MCHC RBC AUTO-ENTMCNC: 31.3 G/DL
MCV RBC AUTO: 86 FL
MONOCYTES # BLD AUTO: 0.5 K/UL
MONOCYTES NFR BLD: 5.5 %
NEUTROPHILS # BLD AUTO: 4.6 K/UL
NEUTROPHILS NFR BLD: 50.6 %
NONHDLC SERPL-MCNC: 109 MG/DL
PLATELET # BLD AUTO: 352 K/UL
PMV BLD AUTO: 10.9 FL
POTASSIUM SERPL-SCNC: 4.4 MMOL/L
PROT SERPL-MCNC: 7.3 G/DL
RBC # BLD AUTO: 5.37 M/UL
SODIUM SERPL-SCNC: 139 MMOL/L
TRIGL SERPL-MCNC: 103 MG/DL
WBC # BLD AUTO: 9.13 K/UL

## 2017-12-04 PROCEDURE — 83690 ASSAY OF LIPASE: CPT

## 2017-12-04 PROCEDURE — 85025 COMPLETE CBC W/AUTO DIFF WBC: CPT

## 2017-12-04 PROCEDURE — 36415 COLL VENOUS BLD VENIPUNCTURE: CPT

## 2017-12-04 PROCEDURE — 83036 HEMOGLOBIN GLYCOSYLATED A1C: CPT

## 2017-12-04 PROCEDURE — 80053 COMPREHEN METABOLIC PANEL: CPT

## 2017-12-04 PROCEDURE — 80061 LIPID PANEL: CPT

## 2017-12-07 ENCOUNTER — CLINICAL SUPPORT (OUTPATIENT)
Dept: DIABETES | Facility: CLINIC | Age: 72
End: 2017-12-07
Payer: MEDICARE

## 2017-12-07 ENCOUNTER — OFFICE VISIT (OUTPATIENT)
Dept: OPTOMETRY | Facility: CLINIC | Age: 72
End: 2017-12-07
Payer: MEDICARE

## 2017-12-07 VITALS
HEART RATE: 70 BPM | DIASTOLIC BLOOD PRESSURE: 80 MMHG | BODY MASS INDEX: 34.6 KG/M2 | HEIGHT: 65 IN | RESPIRATION RATE: 16 BRPM | SYSTOLIC BLOOD PRESSURE: 132 MMHG | WEIGHT: 207.69 LBS

## 2017-12-07 DIAGNOSIS — E66.9 OBESITY (BMI 30-39.9): Chronic | ICD-10-CM

## 2017-12-07 DIAGNOSIS — H26.9 CORTICAL CATARACT OF BOTH EYES: ICD-10-CM

## 2017-12-07 DIAGNOSIS — Z13.5 SCREENING FOR EYE CONDITION: ICD-10-CM

## 2017-12-07 DIAGNOSIS — E11.42 TYPE 2 DIABETES MELLITUS WITH DIABETIC POLYNEUROPATHY, WITH LONG-TERM CURRENT USE OF INSULIN: Chronic | ICD-10-CM

## 2017-12-07 DIAGNOSIS — I65.23 BILATERAL CAROTID ARTERY STENOSIS: ICD-10-CM

## 2017-12-07 DIAGNOSIS — Z79.4 TYPE 2 DIABETES MELLITUS WITH DIABETIC POLYNEUROPATHY, WITH LONG-TERM CURRENT USE OF INSULIN: Chronic | ICD-10-CM

## 2017-12-07 DIAGNOSIS — E11.22 CKD STAGE 2 DUE TO TYPE 2 DIABETES MELLITUS: Chronic | ICD-10-CM

## 2017-12-07 DIAGNOSIS — E11.9 TYPE 2 DIABETES MELLITUS WITHOUT RETINOPATHY: ICD-10-CM

## 2017-12-07 DIAGNOSIS — E11.9 DIABETIC EYE EXAM: Primary | ICD-10-CM

## 2017-12-07 DIAGNOSIS — E04.2 MULTINODULAR GOITER: ICD-10-CM

## 2017-12-07 DIAGNOSIS — I70.0 AORTIC ATHEROSCLEROSIS: ICD-10-CM

## 2017-12-07 DIAGNOSIS — I10 BENIGN ESSENTIAL HYPERTENSION: ICD-10-CM

## 2017-12-07 DIAGNOSIS — N18.2 CKD STAGE 2 DUE TO TYPE 2 DIABETES MELLITUS: Chronic | ICD-10-CM

## 2017-12-07 DIAGNOSIS — H25.13 NUCLEAR SCLEROSIS, BILATERAL: ICD-10-CM

## 2017-12-07 DIAGNOSIS — Z72.0 TOBACCO USE: ICD-10-CM

## 2017-12-07 DIAGNOSIS — E78.5 HYPERLIPIDEMIA LDL GOAL <70: Chronic | ICD-10-CM

## 2017-12-07 DIAGNOSIS — H52.03 HYPERMETROPIA OF BOTH EYES: ICD-10-CM

## 2017-12-07 DIAGNOSIS — E11.65 TYPE 2 DIABETES MELLITUS WITH HYPERGLYCEMIA, WITH LONG-TERM CURRENT USE OF INSULIN: Primary | Chronic | ICD-10-CM

## 2017-12-07 DIAGNOSIS — Z01.00 DIABETIC EYE EXAM: Primary | ICD-10-CM

## 2017-12-07 DIAGNOSIS — Z79.4 TYPE 2 DIABETES MELLITUS WITH HYPERGLYCEMIA, WITH LONG-TERM CURRENT USE OF INSULIN: Primary | Chronic | ICD-10-CM

## 2017-12-07 DIAGNOSIS — I10 ESSENTIAL HYPERTENSION: ICD-10-CM

## 2017-12-07 DIAGNOSIS — H52.4 PRESBYOPIA OF BOTH EYES: ICD-10-CM

## 2017-12-07 DIAGNOSIS — Z86.19 HISTORY OF HEPATITIS C: ICD-10-CM

## 2017-12-07 PROCEDURE — 99214 OFFICE O/P EST MOD 30 MIN: CPT | Mod: S$GLB,,, | Performed by: NURSE PRACTITIONER

## 2017-12-07 PROCEDURE — 99499 UNLISTED E&M SERVICE: CPT | Mod: S$PBB,,, | Performed by: OPTOMETRIST

## 2017-12-07 PROCEDURE — 99499 UNLISTED E&M SERVICE: CPT | Mod: S$PBB,,, | Performed by: NURSE PRACTITIONER

## 2017-12-07 PROCEDURE — 92014 COMPRE OPH EXAM EST PT 1/>: CPT | Mod: S$GLB,,, | Performed by: OPTOMETRIST

## 2017-12-07 PROCEDURE — 99999 PR PBB SHADOW E&M-EST. PATIENT-LVL II: CPT | Mod: PBBFAC,,, | Performed by: OPTOMETRIST

## 2017-12-07 PROCEDURE — 92015 DETERMINE REFRACTIVE STATE: CPT | Mod: S$GLB,,, | Performed by: OPTOMETRIST

## 2017-12-07 PROCEDURE — 99999 PR PBB SHADOW E&M-EST. PATIENT-LVL IV: CPT | Mod: PBBFAC,,, | Performed by: NURSE PRACTITIONER

## 2017-12-07 RX ORDER — METFORMIN HYDROCHLORIDE 500 MG/1
500 TABLET, EXTENDED RELEASE ORAL 2 TIMES DAILY WITH MEALS
Qty: 180 TABLET | Refills: 3 | Status: SHIPPED | OUTPATIENT
Start: 2017-12-07 | End: 2018-12-20

## 2017-12-07 RX ORDER — INSULIN DEGLUDEC 100 U/ML
16 INJECTION, SOLUTION SUBCUTANEOUS DAILY
Qty: 15 ML | Refills: 3 | Status: SHIPPED | OUTPATIENT
Start: 2017-12-07 | End: 2018-06-07 | Stop reason: SDUPTHER

## 2017-12-07 RX ORDER — PEN NEEDLE, DIABETIC 30 GX3/16"
NEEDLE, DISPOSABLE MISCELLANEOUS
Qty: 90 EACH | Refills: 4 | Status: SHIPPED | OUTPATIENT
Start: 2017-12-07 | End: 2020-06-11 | Stop reason: SDUPTHER

## 2017-12-07 RX ORDER — ATORVASTATIN CALCIUM 80 MG/1
80 TABLET, FILM COATED ORAL DAILY
Qty: 90 TABLET | Refills: 3 | Status: SHIPPED | OUTPATIENT
Start: 2017-12-07 | End: 2019-01-24

## 2017-12-07 NOTE — PATIENT INSTRUCTIONS
"Early nuclear sclerosis of lens of each eye, and early peripheral cortical cataract in both eyes. No need for cataract surgery in either eye.  Otherwise, good ocular health in each eye.   No evidence of diabetic or hypertensive retinopathy.    Ms. Ibarra reports periodic episodes of pain/discomfort in the right eye.  No apparent cause for symptoms found on examination.     Hyperopia in each eye, with good correctable VA in each eye.  Presbyopia consistent with age.  Spectacle lens Rx issued for use as desired. Okay to use +1.00  single vision glasses for distance and +3.00 single vision glasses for reading, if desired.   Ms. Ibarra notes "droopy" eyelids bilaterally.  No evidence of restriction of superior visual field on confrontation visual field.  No compelling need for ptosis repair surgery.    Recheck in one year, or prior if any problems in the interim.            "

## 2017-12-07 NOTE — PATIENT INSTRUCTIONS
Increase atorvastatin to 80 mg daily for cholesterol    Increase Invokana to 300 mg daily    Continue Metformin 500 mg twice daily     Continue insulin 16 units daily and Tradjenta 5 mg daily
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
## 2017-12-07 NOTE — PROGRESS NOTES
CC:  Diabetes.     HPI: Buck Ibarra is a 72 y.o. female presents for diabetes visit. The patient has had type 2 diabetes along with associated comorbidities indicated in the Visit Diagnosis section of this encounter. The patient was diagnosed with Type 2 diabetes in 2014. + family history of DM in multiple family members. Diagnosed after polyuria.     Previous visits - Metformin increased, Glipizide discontinued, Januvia started, stopped Januvia on her own, restarted Glipizide, started Victoza, stopped Glipizide, Metformin increased, stopped Victoza, started Levemir, changed to Tresiba, attempted Vgo but resulted in overnight hypoglycemia on lowest dose, resumed Tresiba and Invokana, stopped Invokana on her own, resumed Invokana, started Tradjenta, decreased basal insulin.       Has history of noncompliance with suggest medication adjustments, along with multiple reported side effects (dizziness and palpitations with januvia causing her to stop, although symptoms did not improve when she stopped; Victoza caused +belching and gas)    Today, presents alone with no BG logs, did not know that she had an appt today so did not bring them. Reports BG readings much improved, most <140 but some 160s. FBG always <140 per pt report. Doing well on current medication regime. Attempted to change to Metformin 1000 mg daily but + diarrhea, so changed back to 500 mg BID    DIABETES COMPLICATIONS: nephropathy, peripheral neuropathy and cardiovascular disease     CURRENT A1C:    Hemoglobin A1C   Date Value Ref Range Status   12/04/2017 7.9 (H) 4.0 - 5.6 % Final     Comment:     According to ADA guidelines, hemoglobin A1c <7.0% represents  optimal control in non-pregnant diabetic patients. Different  metrics may apply to specific patient populations.   Standards of Medical Care in Diabetes-2016.  For the purpose of screening for the presence of diabetes:  <5.7%     Consistent with the absence of diabetes  5.7-6.4%  Consistent  with increasing risk for diabetes   (prediabetes)  >or=6.5%  Consistent with diabetes  Currently, no consensus exists for use of hemoglobin A1c  for diagnosis of diabetes for children.  This Hemoglobin A1c assay has significant interference with fetal   hemoglobin   (HbF). The results are invalid for patients with abnormal amounts of   HbF,   including those with known Hereditary Persistence   of Fetal Hemoglobin. Heterozygous hemoglobin variants (HbAS, HbAC,   HbAD, HbAE, HbA2) do not significantly interfere with this assay;   however, presence of multiple variants in a sample may impact the %   interference.     09/21/2017 7.4 (H) 4.0 - 5.6 % Final     Comment:     According to ADA guidelines, hemoglobin A1c <7.0% represents  optimal control in non-pregnant diabetic patients. Different  metrics may apply to specific patient populations.   Standards of Medical Care in Diabetes-2016.  For the purpose of screening for the presence of diabetes:  <5.7%     Consistent with the absence of diabetes  5.7-6.4%  Consistent with increasing risk for diabetes   (prediabetes)  >or=6.5%  Consistent with diabetes  Currently, no consensus exists for use of hemoglobin A1c  for diagnosis of diabetes for children.  This Hemoglobin A1c assay has significant interference with fetal   hemoglobin   (HbF). The results are invalid for patients with abnormal amounts of   HbF,   including those with known Hereditary Persistence   of Fetal Hemoglobin. Heterozygous hemoglobin variants (HbAS, HbAC,   HbAD, HbAE, HbA2) do not significantly interfere with this assay;   however, presence of multiple variants in a sample may impact the %   interference.     06/01/2017 8.1 (H) 4.5 - 6.2 % Final     Comment:     According to ADA guidelines, hemoglobin A1C <7.0% represents  optimal control in non-pregnant diabetic patients.  Different  metrics may apply to specific populations.   Standards of Medical Care in Diabetes - 2016.  For the purpose of  screening for the presence of diabetes:  <5.7%     Consistent with the absence of diabetes  5.7-6.4%  Consistent with increasing risk for diabetes   (prediabetes)  >or=6.5%  Consistent with diabetes  Currently no consensus exists for use of hemoglobin A1C  for diagnosis of diabetes for children.         GOAL A1C: <7%    MEDICATIONS UPON START OF PROGRAM: Metformin 1000 mg once daily, Glipizide 10 mg BID  Denies missing any doses of medications     CURRENT DIABETES MEDICATIONS: Metformin 500 mg daily, Tresiba 16 units nightly, Invokana 100 mg daily, Tradjenta 5 mg daily     ANY DIFFICULTY AFFORDING YOUR CURRENT MEDICATION REGIMEN? None     SIGNIFICANT DIABETES MED HISTORY:   Victoza - gas and belching  Metformin - diarrhea with higher dose  Tresiba  Januvia (dizziness, palpitations)  Tradjenta  Glipizide     BLOOD GLUCOSE MONITORING:  Reports checking BG 2-3 times daily, reports FBG always <130, reports BG elevated in evening, rarely to 160s    HYPOGLYCEMIA: none     OCCUPATION: retired    CURRENT DAILY ROUTINE (meals/meds):   Eats 2 meals, skips either breakfast or lunch   Denies snacking in between meals or before bedtime       CURRENT EXERCISE:  None, takes care of her grandchildren often 1 year old grandchild     SMOKING: Yes - smoking 5-6 cigarettes daily     MEDICATIONS, ALLERGIES, LABS, VS's, MEDICAL, SURGICAL, SOCIAL, AND FAMILY HISTORY reviewed and updated in the appropriate sections during this encounter    ROS:  Constitutional: no significant weight change   Endocrine:  Denies polyuria, polydipsia, nocturia.  HENT: Denies neck swelling, lumps, horseness or trouble swallowing. Denies any personal or family history of thyroid cancer.    Eyes: + blurry vision  Cardiovascular: denies chest pain   Respiratory:  Denies dyspnea or SOB.  Gastrointestinal: Negative for nausea, diarrhea, vomiting, bloating.  No history of pancreatitis or gastroparesis.  Genitourinary: Negative for urgency, frequency, frequent  "urinary tract infections.  Neurological: Negative for syncope, + improved numbness/burning of extremities.      CGMS interpretation:  Done 8/2017  1. FBG at or near goal most days, hypo x1 from 6a-8a  2. Some uncontrolled prandial excursions , notably after dinner/extending into overnight    PE:  Constitutional:   BP (!) 146/90 (BP Location: Left arm, Patient Position: Sitting, BP Method: Medium (Manual))   Pulse 70   Ht 5' 5" (1.651 m)   Wt 94.2 kg (207 lb 10.8 oz)   BMI 34.56 kg/m²   Well developed, well nourished, NAD.    Neck:  No trachial deviation present, No neck masses noticed   Thyroid:  No thyromegaly present.  No thyroid tenderness.      Cardiovascular:    Auscultation:  No murmurs or abnormal sounds   Lower extremities:  No edema or cyanosis.     Respiratory:    Effort:  Normal, no use of accessory muscles.   Auscultation: breath sounds normal, no adventitious sounds.  Skin:    Inspection: no rashes, lesion or ulcers, + acanthosis nigracans.   Palpation: no induration or nodules.   Tresiba injection sites: no lipoatrophy or lipohypertrophy.  Psychiatric:  Judgement and insight good with normal mood and affect.      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 FBG at goal per patient report, reports BG readings elevated in evening, likely due to prandial excursions uncontrolled.   Continue Metformin 500 mg BID, increase Invokana to 300 mg daily, " continue Tresiba 16 units nightly and Tresiba 5 mg daily     Previously discussed with pt recent finding of increased risk of lower extremity amputations with invokana use. Discussed with patient that she does fall into higher risk category (presence of CAD, bilateral carotid artery stenosis, PN, and current smoker). Pt aware of all risks, no current foot ulcers, wishes to continue medication     Continue to monitor BG 2 times daily, document on BG logs, and bring complete BG logs to all visits - instructed to notify me of any BG <80 so that insulin can be titrated     Return in about 3 months (around 3/7/2018). in empowerment with labs before  Orders Placed This Encounter   Procedures    Comprehensive metabolic panel    Hemoglobin A1c    Lipid panel    Microalbumin/creatinine urine ratio       3- CKD stage 2 - discussed with patient correlation between CKD and uncontrolled DM, continue improved BG control     4 - Peripheral neuropathy - Educated patient to check feet daily for any foreign objects and/or wounds. Discussed with patient the importance of wearing appropriate footwear at all times, not to walk barefoot ever, and to check shoes before putting them on feet. Instructed patient to keep feet dry by regularly changing shoes and socks and drying feet after baths and exercises. Also, instructed patient to report any new lesions, discolorations, or swelling to a healthcare professional.    5- Hypertension - goal < 140/80 mmHg -  At goal, on ARB, continue current medications   BP Readings from Last 3 Encounters:   12/07/17 132/80   11/19/17 (!) 189/81   11/15/17 130/80     6- Hyperlipidemia, aortic atherosclerosis, bilateral carotid artery stenosis - LDL goal <70 due to atherosclerosis of aorta in history,  On high dose statin, increase Lipitorto 80 mg daily. Repeat CMP and lipid panel with RTC  LFT's WNL    Lab Results   Component Value Date    LDLCALC 88.4 12/04/2017       7- Body mass index is 34.56 kg/m².  = Obesity. Modest weight loss (5-10%) may greatly improve BG control. Encouraged patient to start a walking program when not watching grandchildren.     8- - Chronic Hep C - followed by hepatology. 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 Harvoni treatment completed.     9 - Multinodular goiter - last US and FNA done 8/2016, US to be scheduled     10 - Tobacco use - encouraged to attend smoking cessation program if interested      STANDARDS of CARE:  Statin: lipitor 40 mg - increase to 80 mg daily today   ACEI or ARB:  Yes - ARB   ASA:  Yes - 81 mg   Last eye exam 12/2016 no retinopathy-eye exam done today 12/2017 no retinopathy

## 2017-12-10 ENCOUNTER — HOSPITAL ENCOUNTER (EMERGENCY)
Facility: HOSPITAL | Age: 72
Discharge: HOME | End: 2017-12-10
Attending: FAMILY MEDICINE
Payer: MEDICARE

## 2017-12-10 VITALS
RESPIRATION RATE: 18 BRPM | HEART RATE: 99 BPM | DIASTOLIC BLOOD PRESSURE: 83 MMHG | BODY MASS INDEX: 32.62 KG/M2 | OXYGEN SATURATION: 99 % | TEMPERATURE: 99 F | HEIGHT: 66 IN | SYSTOLIC BLOOD PRESSURE: 165 MMHG | WEIGHT: 203 LBS

## 2017-12-10 DIAGNOSIS — R04.0 EPISTAXIS: ICD-10-CM

## 2017-12-10 DIAGNOSIS — R07.9 CHEST PAIN: Primary | ICD-10-CM

## 2017-12-10 LAB
ALBUMIN SERPL BCP-MCNC: 3.1 G/DL
ALP SERPL-CCNC: 127 U/L
ALT SERPL W/O P-5'-P-CCNC: 22 U/L
ANION GAP SERPL CALC-SCNC: 9 MMOL/L
AST SERPL-CCNC: 17 U/L
BASOPHILS # BLD AUTO: 0.06 K/UL
BASOPHILS NFR BLD: 0.8 %
BILIRUB SERPL-MCNC: 0.4 MG/DL
BNP SERPL-MCNC: 14 PG/ML
BUN SERPL-MCNC: 23 MG/DL
CALCIUM SERPL-MCNC: 9.5 MG/DL
CHLORIDE SERPL-SCNC: 101 MMOL/L
CO2 SERPL-SCNC: 25 MMOL/L
CREAT SERPL-MCNC: 1.2 MG/DL
DIFFERENTIAL METHOD: ABNORMAL
EOSINOPHIL # BLD AUTO: 0.1 K/UL
EOSINOPHIL NFR BLD: 1.1 %
ERYTHROCYTE [DISTWIDTH] IN BLOOD BY AUTOMATED COUNT: 16.2 %
EST. GFR  (AFRICAN AMERICAN): 52.9 ML/MIN/1.73 M^2
EST. GFR  (NON AFRICAN AMERICAN): 45.9 ML/MIN/1.73 M^2
GLUCOSE SERPL-MCNC: 305 MG/DL
HCT VFR BLD AUTO: 46.5 %
HGB BLD-MCNC: 14.6 G/DL
IMM GRANULOCYTES # BLD AUTO: 0.02 K/UL
IMM GRANULOCYTES NFR BLD AUTO: 0.3 %
LYMPHOCYTES # BLD AUTO: 2.5 K/UL
LYMPHOCYTES NFR BLD: 34 %
MCH RBC QN AUTO: 27.2 PG
MCHC RBC AUTO-ENTMCNC: 31.4 G/DL
MCV RBC AUTO: 87 FL
MONOCYTES # BLD AUTO: 0.4 K/UL
MONOCYTES NFR BLD: 4.8 %
NEUTROPHILS # BLD AUTO: 4.3 K/UL
NEUTROPHILS NFR BLD: 59 %
NRBC BLD-RTO: 0 /100 WBC
PLATELET # BLD AUTO: 367 K/UL
PMV BLD AUTO: 10.7 FL
POTASSIUM SERPL-SCNC: 4 MMOL/L
PROT SERPL-MCNC: 7.6 G/DL
RBC # BLD AUTO: 5.36 M/UL
SODIUM SERPL-SCNC: 135 MMOL/L
TROPONIN I SERPL DL<=0.01 NG/ML-MCNC: <0.006 NG/ML
WBC # BLD AUTO: 7.32 K/UL

## 2017-12-10 PROCEDURE — 93005 ELECTROCARDIOGRAM TRACING: CPT

## 2017-12-10 PROCEDURE — 80053 COMPREHEN METABOLIC PANEL: CPT

## 2017-12-10 PROCEDURE — 25000003 PHARM REV CODE 250: Performed by: PHYSICIAN ASSISTANT

## 2017-12-10 PROCEDURE — 99285 EMERGENCY DEPT VISIT HI MDM: CPT | Mod: ,,, | Performed by: PHYSICIAN ASSISTANT

## 2017-12-10 PROCEDURE — 85025 COMPLETE CBC W/AUTO DIFF WBC: CPT

## 2017-12-10 PROCEDURE — 99284 EMERGENCY DEPT VISIT MOD MDM: CPT | Mod: 25

## 2017-12-10 PROCEDURE — 93010 ELECTROCARDIOGRAM REPORT: CPT | Mod: ,,, | Performed by: INTERNAL MEDICINE

## 2017-12-10 PROCEDURE — 84484 ASSAY OF TROPONIN QUANT: CPT

## 2017-12-10 PROCEDURE — 83880 ASSAY OF NATRIURETIC PEPTIDE: CPT

## 2017-12-10 RX ORDER — HYDROCHLOROTHIAZIDE 25 MG/1
25 TABLET ORAL
Status: DISCONTINUED | OUTPATIENT
Start: 2017-12-10 | End: 2017-12-10 | Stop reason: HOSPADM

## 2017-12-10 RX ORDER — ASPIRIN 325 MG
325 TABLET ORAL
Status: COMPLETED | OUTPATIENT
Start: 2017-12-10 | End: 2017-12-10

## 2017-12-10 RX ORDER — CANDESARTAN 8 MG/1
8 TABLET ORAL
Status: DISCONTINUED | OUTPATIENT
Start: 2017-12-10 | End: 2017-12-10 | Stop reason: HOSPADM

## 2017-12-10 RX ADMIN — ASPIRIN 325 MG ORAL TABLET 325 MG: 325 PILL ORAL at 02:12
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
## 2017-12-10 NOTE — ED NOTES
Patient identifiers verified and correct for Buck Ibarra.    LOC: The patient is awake, alert and aware of environment with an appropriate affect, the patient is oriented x 3 and speaking appropriately.  APPEARANCE: Patient resting comfortably and in no acute distress, patient is clean and well groomed, patient's clothing is properly fastened.  SKIN: The skin is warm and dry, color consistent with ethnicity, patient has normal skin turgor and moist mucus membranes, skin intact, no breakdown or bruising noted.  MUSCULOSKELETAL: Patient moving all extremities spontaneously, no obvious swelling or deformities noted.  RESPIRATORY: Airway is open and patent, respirations are spontaneous, patient has a normal effort and rate, no accessory muscle use noted,   CARDIAC: Patient has a normal rate and regular rhythm, no periphreal edema noted, capillary refill < 3 seconds.  ABDOMEN: Soft and non tender to palpation, no distention noted, behavior appropriate to situation, follows commands, facial expression symmetrical, bilateral hand grasp equal and even, purposeful motor response noted, normal sensation in all extremities when touched with a finger.

## 2017-12-10 NOTE — ED NOTES
"Patient reports nosebleed on Friday after grandbaby hit her face with head. Patient reports second episode of nosebleed Friday night, 1 episode yesterday, and most recent nosebleed episode at 3am.  Nosebleeds last approx 3 minutes. Patient reports bright red/clots. Patient reports with last nosebleed at 3 am, she felt a sharp pain in the left side of neck, accompanied with left sided "pressure" chest pain that lasted 5 minutes.  Denies chest pain, SOB currently. Denies use of blood thinners.  Denies blurred vision.  Patient reports she has not taken BP meds this morning.     "

## 2017-12-10 NOTE — ED NOTES
Per PA, ok to hold BP meds until labs come back. Patient stating she can not take them unless she eats.

## 2017-12-10 NOTE — PROVIDER PROGRESS NOTES - EMERGENCY DEPT.
Encounter Date: 12/10/2017    72 year old female with past medical history of DMII, HTN, CKD, GERD presenting to the ED with the chief complaint of epitaxis. Patient reports her 30-40 pound grandchild falling on her face 2 days ago. She reports experiencing a 3-5 minute nose bleed out of her left nostril with blood clots. She reports achieving hemostasis by holding pressure on her nose with a kleenex. She reports two unprovoked episodes of epitaxis 1 days ago that also lasted 3-5 minutes. She reports today at 3am she experienced another 3-5 minute episode of epitaxis. She reports during that time she also experienced chest pain. She describes it as a left-sided pressure type feeling in her chest. She reports it lasted about 5 minutes. She has not experienced chest pain since then. She denies fever, shortness of breath, nausea, vomiting, abdominal pain. She reports smoking 1/4 PPD cigarettes per day. She denies ETOH and recreational drug use. She reports she did not take her blood pressure medications today.     Physical exam:  Vitals significant for BP elevated (198/86), otherwise stable  Patient is AAOx3 and follows commands appropriately. There is no tenderness or edema to the nose. Heart and lung sounds unremarkable. She reports the chest pain is reproducible when the left chest wall is palpated. She denies pain with moving her arms. There is no abdominal or CVA tenderness. There is no midline spinal tenderness. Patient is neurovascularly intact.     Will proceed with a chest pain workup given patient's comorbidities. Will sign patient out to the pods for follow-up. Will defer to them regarding blood pressure management.     I initially evaluated this patient and ordered workup while in intake.  The patient will receive a full evaluation in an ED pod when space is available.  All results from tests ordered in intake will not be followed by the intake team, including myself. All results will be followed by the ED  Pod team.    ED Physician Progress Notes

## 2017-12-14 ENCOUNTER — HOSPITAL ENCOUNTER (OUTPATIENT)
Dept: RADIOLOGY | Facility: HOSPITAL | Age: 72
Discharge: HOME | End: 2017-12-14
Attending: INTERNAL MEDICINE
Payer: MEDICARE

## 2017-12-14 DIAGNOSIS — E04.2 MULTIPLE THYROID NODULES: ICD-10-CM

## 2017-12-14 PROCEDURE — 76536 US EXAM OF HEAD AND NECK: CPT | Mod: TC

## 2017-12-14 PROCEDURE — 76536 US EXAM OF HEAD AND NECK: CPT | Mod: 26,,, | Performed by: RADIOLOGY

## 2018-02-28 DIAGNOSIS — R14.0 BLOATING: ICD-10-CM

## 2018-02-28 DIAGNOSIS — K21.9 GASTROESOPHAGEAL REFLUX DISEASE, ESOPHAGITIS PRESENCE NOT SPECIFIED: ICD-10-CM

## 2018-02-28 RX ORDER — ESOMEPRAZOLE MAGNESIUM 40 MG/1
40 CAPSULE, DELAYED RELEASE ORAL DAILY PRN
Qty: 30 CAPSULE | Refills: 3 | Status: SHIPPED | OUTPATIENT
Start: 2018-02-28 | End: 2018-07-12 | Stop reason: SDUPTHER

## 2018-03-15 ENCOUNTER — HOSPITAL ENCOUNTER (OUTPATIENT)
Dept: RADIOLOGY | Facility: HOSPITAL | Age: 73
Discharge: HOME | End: 2018-03-15
Attending: PHYSICIAN ASSISTANT
Payer: MEDICARE

## 2018-03-15 ENCOUNTER — OFFICE VISIT (OUTPATIENT)
Dept: INTERNAL MEDICINE | Facility: CLINIC | Age: 73
End: 2018-03-15
Payer: MEDICARE

## 2018-03-15 VITALS
OXYGEN SATURATION: 98 % | WEIGHT: 200 LBS | HEART RATE: 68 BPM | HEIGHT: 65 IN | SYSTOLIC BLOOD PRESSURE: 138 MMHG | BODY MASS INDEX: 33.32 KG/M2 | DIASTOLIC BLOOD PRESSURE: 89 MMHG

## 2018-03-15 DIAGNOSIS — E78.5 HYPERLIPIDEMIA ASSOCIATED WITH TYPE 2 DIABETES MELLITUS: ICD-10-CM

## 2018-03-15 DIAGNOSIS — K76.9 LIVER LESION: ICD-10-CM

## 2018-03-15 DIAGNOSIS — E11.69 DIABETES MELLITUS TYPE 2 IN OBESE: Primary | ICD-10-CM

## 2018-03-15 DIAGNOSIS — I15.2 HYPERTENSION ASSOCIATED WITH DIABETES: ICD-10-CM

## 2018-03-15 DIAGNOSIS — E11.69 HYPERLIPIDEMIA ASSOCIATED WITH TYPE 2 DIABETES MELLITUS: ICD-10-CM

## 2018-03-15 DIAGNOSIS — E11.65 TYPE 2 DIABETES MELLITUS WITH HYPERGLYCEMIA, WITH LONG-TERM CURRENT USE OF INSULIN: ICD-10-CM

## 2018-03-15 DIAGNOSIS — I70.0 AORTIC ATHEROSCLEROSIS: ICD-10-CM

## 2018-03-15 DIAGNOSIS — Z79.4 TYPE 2 DIABETES MELLITUS WITH HYPERGLYCEMIA, WITH LONG-TERM CURRENT USE OF INSULIN: ICD-10-CM

## 2018-03-15 DIAGNOSIS — E66.9 DIABETES MELLITUS TYPE 2 IN OBESE: Primary | ICD-10-CM

## 2018-03-15 DIAGNOSIS — Z86.19 HISTORY OF HEPATITIS C: ICD-10-CM

## 2018-03-15 DIAGNOSIS — D75.839 THROMBOCYTOSIS: ICD-10-CM

## 2018-03-15 DIAGNOSIS — E11.59 HYPERTENSION ASSOCIATED WITH DIABETES: ICD-10-CM

## 2018-03-15 PROCEDURE — 76700 US EXAM ABDOM COMPLETE: CPT | Mod: 26,,, | Performed by: RADIOLOGY

## 2018-03-15 PROCEDURE — 3079F DIAST BP 80-89 MM HG: CPT | Mod: CPTII,S$GLB,, | Performed by: INTERNAL MEDICINE

## 2018-03-15 PROCEDURE — 99999 PR PBB SHADOW E&M-EST. PATIENT-LVL III: CPT | Mod: PBBFAC,,, | Performed by: INTERNAL MEDICINE

## 2018-03-15 PROCEDURE — 99499 UNLISTED E&M SERVICE: CPT | Mod: S$GLB,,, | Performed by: INTERNAL MEDICINE

## 2018-03-15 PROCEDURE — 3075F SYST BP GE 130 - 139MM HG: CPT | Mod: CPTII,S$GLB,, | Performed by: INTERNAL MEDICINE

## 2018-03-15 PROCEDURE — 76700 US EXAM ABDOM COMPLETE: CPT | Mod: TC

## 2018-03-15 PROCEDURE — 99214 OFFICE O/P EST MOD 30 MIN: CPT | Mod: S$GLB,,, | Performed by: INTERNAL MEDICINE

## 2018-03-15 PROCEDURE — 3045F PR MOST RECENT HEMOGLOBIN A1C LEVEL 7.0-9.0%: CPT | Mod: CPTII,S$GLB,, | Performed by: INTERNAL MEDICINE

## 2018-03-15 NOTE — PROGRESS NOTES
Subjective:       Patient ID: Buck Ibarra is a 72 y.o. female.    Chief Complaint: Follow-up (4 month follow up.)    HPI   DM2 - VGo. Metformin XR 500mg daily, invokana 300mg daily, tresiba 16 units nightly, tradjenta 5mg daily.   [facility] in [facility]. Next appt w/ Graeme Forde, NP 3/20/18  Hasn't been checking her glucose as she's been very busy taking care of her 3 y/o son 7 days a week.   Sometimes w/ lightheadedness 2x/week and that's when she checks her glucose - last one was 170s.  a1c 9/21/17 7.4-->12/4/17 7.9  Foot 2/2/17 - due  MAC 2/2/17 - ordered  Eye 12/7/17  LDL 12/4/17 - 88.4  asa 81, atacand, atorvastatin.    Chronic Hep c s/p treatment w/ Harvoni 5/2016.   Hep C quant <12 10/27/16  US 9/2017 w/ anechoic lesion of L hepatic lobe, likely simple cyst. R hepatic lobe echogenic focus. Repeat US in 6 mo.    HTN - atacand 8mg daily, hctz 25mg daily     Thrombocytosis stable. No h/o blood clot.     Carotid US 7/22/15 - 1-39% B.     CT A/P 5/10/17 - moderate calcific atherosclerosis of the abdominal aorta  Circumferential wall thickening of colon.  Saw Dr. Bearden and Dr. Connors - anoscopy performed w/ ext/int hemorrhoid.   Cscope 1/28/16 - benign polyps. Neg. Repeat in 3 yrs.     Multiple thyroid nodules - US 12/14/17 - grossly stable.     Sometimes w/ urgent BM. Occasionally w/ loose stools but most times normal. No bloody stools. No abdominal pain/nausea/vomiting.    Review of Systems   Constitutional: Negative for chills and fever.   HENT: Positive for rhinorrhea. Negative for congestion, ear pain, hearing loss, sinus pain and sore throat.    Respiratory: Negative for cough, shortness of breath and wheezing.    Cardiovascular: Negative for chest pain, palpitations and leg swelling.   Gastrointestinal: Negative for abdominal pain, blood in stool, constipation, diarrhea, nausea and vomiting.   Genitourinary: Negative for dysuria, frequency and urgency.   Musculoskeletal:        C/o R upper thigh w/  "sharp pain on Sunday and all of a sudden. Lasted all day. Pain whenever she was putting her leg down while walking. Improved w/ rest. Improved on Monday. By Wednesday, moved to knee. Now both resolved. No trauma. No heavy lifting. No weakness.   Skin: Negative for rash.   Neurological: Positive for dizziness (occasional when sugars are high).         Objective:      Physical Exam    /89 (BP Location: Left arm, Patient Position: Sitting, BP Method: Medium (Manual))   Pulse 68   Ht 5' 5" (1.651 m)   Wt 90.7 kg (200 lb)   SpO2 98%   BMI 33.28 kg/m²     Gen - A+OX4, NAD  HEENT - PERRL, OP clear. MMM.   Neck - multiple thyroid nodules  CV - RRR  Chest - ctab, no wheezing/rhonchi.   Abd - S/NT/ND/+BS  Ext -2 + B DP and radial pulses. No LE edema.   Feet - dry skin. Thickened nails. Sensation to monofilament intact except at the heels B. No open lesions.     Labs reviewed.    Assessment/Plan     Buck was seen today for follow-up.    Diagnoses and all orders for this visit:    Diabetes mellitus type 2 in obese - has appt w/ [facility] next Tues.   -     MICROALBUMIN / CREATININE RATIO URINE; Future    Hypertension associated with diabetes - cont current meds.     Hyperlipidemia associated with type 2 diabetes mellitus - cont atorva.     History of hepatitis C - US scheduled today.     Aortic atherosclerosis - cont asa 81 and atorva 80    Thrombocytosis - stable. No h/o clots.     Type 2 diabetes mellitus with hyperglycemia, with long-term current use of insulin  -     MICROALBUMIN / CREATININE RATIO URINE; Future      Follow-up in about 3 months (around 6/15/2018).      Ericka Cortez MD  Department of Internal Medicine - [facility]  7:11 AM  "

## 2018-03-16 ENCOUNTER — TELEPHONE (OUTPATIENT)
Dept: HEPATOLOGY | Facility: CLINIC | Age: 73
End: 2018-03-16

## 2018-03-16 DIAGNOSIS — Z86.19 HISTORY OF HEPATITIS C: Primary | ICD-10-CM

## 2018-03-16 DIAGNOSIS — K76.9 HEPATIC LESION: ICD-10-CM

## 2018-03-16 NOTE — TELEPHONE ENCOUNTER
----- Message from Drew Cortes PA-C sent at 3/16/2018  8:41 AM CDT -----  Please let her know that Darcie is no longer here, so I reviewed her U/S and labs which are stable. The spots in her liver is stable from previous scans. She will be due for repeat labs and blood work with clinic visit in 6 months, please enter recall  Thanks

## 2018-03-16 NOTE — TELEPHONE ENCOUNTER
Pt informed of this via msg left onpts VM today pt also added to recalllist for f/u with stefan in 6 months with labs and u/s prior to that appt.

## 2018-03-19 ENCOUNTER — TELEPHONE (OUTPATIENT)
Dept: ENDOCRINOLOGY | Facility: CLINIC | Age: 73
End: 2018-03-19

## 2018-03-19 NOTE — TELEPHONE ENCOUNTER
----- Message from Reji Yanez MA sent at 3/19/2018  3:12 PM CDT -----  Contact: Pt   Pt called and would like to reschedule her appt on [id number].    Pt can be reached at [phone].    Thanks

## 2018-03-20 ENCOUNTER — TELEPHONE (OUTPATIENT)
Dept: INTERNAL MEDICINE | Facility: CLINIC | Age: 73
End: 2018-03-20

## 2018-03-20 DIAGNOSIS — R15.2 FECAL URGENCY: Primary | ICD-10-CM

## 2018-03-20 NOTE — TELEPHONE ENCOUNTER
Spoke with pt. Pt states when she called on 9/16 her bowels were watery. Pt states since then her bowels have corrected itself but she is still feeling the urgency.       Please advise

## 2018-03-20 NOTE — TELEPHONE ENCOUNTER
Spoke with pt, pt states she did not have any other symptoms. Pt states her stool has returned back to normal. Pt states at times she barely makes it to the bathroom in time.

## 2018-03-20 NOTE — TELEPHONE ENCOUNTER
Any other symptoms? Abdominal pain? Bloody stools? Constipation? Urgency as in when she feels like she has to go, she has to go right away? But the stool is normal?

## 2018-03-20 NOTE — TELEPHONE ENCOUNTER
----- Message from Joselin Alonzo sent at 3/16/2018 10:51 AM CDT -----  Contact: Patient [phone]  Prescription Request:     Name of medication: See Symptoms    Reason for request: Loose bowels    Pharmacy: [facility] - [place] [id number] [address]    Please advise.    Thank You

## 2018-04-13 DIAGNOSIS — E11.65 TYPE 2 DIABETES MELLITUS WITH HYPERGLYCEMIA, WITH LONG-TERM CURRENT USE OF INSULIN: Chronic | ICD-10-CM

## 2018-04-13 DIAGNOSIS — Z79.4 TYPE 2 DIABETES MELLITUS WITH HYPERGLYCEMIA, WITH LONG-TERM CURRENT USE OF INSULIN: Chronic | ICD-10-CM

## 2018-04-13 RX ORDER — GABAPENTIN 300 MG/1
CAPSULE ORAL
Qty: 90 CAPSULE | Refills: 3 | Status: SHIPPED | OUTPATIENT
Start: 2018-04-13 | End: 2020-03-04

## 2018-06-07 ENCOUNTER — OFFICE VISIT (OUTPATIENT)
Dept: INTERNAL MEDICINE | Facility: CLINIC | Age: 73
End: 2018-06-07
Payer: MEDICARE

## 2018-06-07 ENCOUNTER — TELEPHONE (OUTPATIENT)
Dept: INTERNAL MEDICINE | Facility: CLINIC | Age: 73
End: 2018-06-07

## 2018-06-07 ENCOUNTER — LAB VISIT (OUTPATIENT)
Dept: LAB | Facility: HOSPITAL | Age: 73
End: 2018-06-07
Attending: INTERNAL MEDICINE
Payer: MEDICARE

## 2018-06-07 VITALS
HEART RATE: 64 BPM | SYSTOLIC BLOOD PRESSURE: 134 MMHG | TEMPERATURE: 98 F | HEIGHT: 65 IN | BODY MASS INDEX: 33.02 KG/M2 | DIASTOLIC BLOOD PRESSURE: 74 MMHG | WEIGHT: 198.19 LBS

## 2018-06-07 DIAGNOSIS — E04.2 MULTINODULAR GOITER: ICD-10-CM

## 2018-06-07 DIAGNOSIS — Z79.4 TYPE 2 DIABETES MELLITUS WITH HYPERGLYCEMIA, WITH LONG-TERM CURRENT USE OF INSULIN: Chronic | ICD-10-CM

## 2018-06-07 DIAGNOSIS — I15.2 HYPERTENSION ASSOCIATED WITH DIABETES: ICD-10-CM

## 2018-06-07 DIAGNOSIS — Z12.31 ENCOUNTER FOR SCREENING MAMMOGRAM FOR BREAST CANCER: ICD-10-CM

## 2018-06-07 DIAGNOSIS — Z78.0 POSTMENOPAUSAL ESTROGEN DEFICIENCY: ICD-10-CM

## 2018-06-07 DIAGNOSIS — I70.0 AORTIC ATHEROSCLEROSIS: ICD-10-CM

## 2018-06-07 DIAGNOSIS — Z79.4 TYPE 2 DIABETES MELLITUS WITH DIABETIC POLYNEUROPATHY, WITH LONG-TERM CURRENT USE OF INSULIN: Primary | Chronic | ICD-10-CM

## 2018-06-07 DIAGNOSIS — E11.69 HYPERLIPIDEMIA ASSOCIATED WITH TYPE 2 DIABETES MELLITUS: ICD-10-CM

## 2018-06-07 DIAGNOSIS — Z79.4 TYPE 2 DIABETES MELLITUS WITH DIABETIC POLYNEUROPATHY, WITH LONG-TERM CURRENT USE OF INSULIN: Chronic | ICD-10-CM

## 2018-06-07 DIAGNOSIS — E78.5 HYPERLIPIDEMIA ASSOCIATED WITH TYPE 2 DIABETES MELLITUS: ICD-10-CM

## 2018-06-07 DIAGNOSIS — E11.65 TYPE 2 DIABETES MELLITUS WITH HYPERGLYCEMIA, WITH LONG-TERM CURRENT USE OF INSULIN: Chronic | ICD-10-CM

## 2018-06-07 DIAGNOSIS — E11.69 DIABETES MELLITUS TYPE 2 IN OBESE: ICD-10-CM

## 2018-06-07 DIAGNOSIS — E11.42 TYPE 2 DIABETES MELLITUS WITH DIABETIC POLYNEUROPATHY, WITH LONG-TERM CURRENT USE OF INSULIN: Chronic | ICD-10-CM

## 2018-06-07 DIAGNOSIS — E11.42 TYPE 2 DIABETES MELLITUS WITH DIABETIC POLYNEUROPATHY, WITH LONG-TERM CURRENT USE OF INSULIN: Primary | Chronic | ICD-10-CM

## 2018-06-07 DIAGNOSIS — E66.9 DIABETES MELLITUS TYPE 2 IN OBESE: ICD-10-CM

## 2018-06-07 DIAGNOSIS — E11.59 HYPERTENSION ASSOCIATED WITH DIABETES: ICD-10-CM

## 2018-06-07 LAB
ALBUMIN SERPL BCP-MCNC: 3.4 G/DL
ALP SERPL-CCNC: 111 U/L
ALT SERPL W/O P-5'-P-CCNC: 25 U/L
ANION GAP SERPL CALC-SCNC: 8 MMOL/L
AST SERPL-CCNC: 16 U/L
BILIRUB SERPL-MCNC: 0.4 MG/DL
BUN SERPL-MCNC: 11 MG/DL
CALCIUM SERPL-MCNC: 9.5 MG/DL
CHLORIDE SERPL-SCNC: 103 MMOL/L
CO2 SERPL-SCNC: 27 MMOL/L
CREAT SERPL-MCNC: 0.9 MG/DL
EST. GFR  (AFRICAN AMERICAN): >60 ML/MIN/1.73 M^2
EST. GFR  (NON AFRICAN AMERICAN): >60 ML/MIN/1.73 M^2
ESTIMATED AVG GLUCOSE: 255 MG/DL
GLUCOSE SERPL-MCNC: 243 MG/DL
HBA1C MFR BLD HPLC: 10.5 %
POTASSIUM SERPL-SCNC: 4.5 MMOL/L
PROT SERPL-MCNC: 7 G/DL
SODIUM SERPL-SCNC: 138 MMOL/L
TSH SERPL DL<=0.005 MIU/L-ACNC: 1.17 UIU/ML

## 2018-06-07 PROCEDURE — 83036 HEMOGLOBIN GLYCOSYLATED A1C: CPT

## 2018-06-07 PROCEDURE — 3046F HEMOGLOBIN A1C LEVEL >9.0%: CPT | Mod: CPTII,S$GLB,, | Performed by: INTERNAL MEDICINE

## 2018-06-07 PROCEDURE — 84443 ASSAY THYROID STIM HORMONE: CPT

## 2018-06-07 PROCEDURE — 99499 UNLISTED E&M SERVICE: CPT | Mod: S$GLB,,, | Performed by: INTERNAL MEDICINE

## 2018-06-07 PROCEDURE — 99999 PR PBB SHADOW E&M-EST. PATIENT-LVL IV: CPT | Mod: PBBFAC,,, | Performed by: INTERNAL MEDICINE

## 2018-06-07 PROCEDURE — 3075F SYST BP GE 130 - 139MM HG: CPT | Mod: CPTII,S$GLB,, | Performed by: INTERNAL MEDICINE

## 2018-06-07 PROCEDURE — 3078F DIAST BP <80 MM HG: CPT | Mod: CPTII,S$GLB,, | Performed by: INTERNAL MEDICINE

## 2018-06-07 PROCEDURE — 36415 COLL VENOUS BLD VENIPUNCTURE: CPT

## 2018-06-07 PROCEDURE — 99214 OFFICE O/P EST MOD 30 MIN: CPT | Mod: S$GLB,,, | Performed by: INTERNAL MEDICINE

## 2018-06-07 PROCEDURE — 80053 COMPREHEN METABOLIC PANEL: CPT

## 2018-06-07 RX ORDER — CANDESARTAN 8 MG/1
8 TABLET ORAL DAILY
Qty: 90 TABLET | Refills: 3 | Status: ON HOLD | OUTPATIENT
Start: 2018-06-07 | End: 2019-04-21 | Stop reason: SDUPTHER

## 2018-06-07 RX ORDER — INSULIN DEGLUDEC 100 U/ML
20 INJECTION, SOLUTION SUBCUTANEOUS DAILY
Qty: 15 ML | Refills: 3
Start: 2018-06-07 | End: 2018-06-08 | Stop reason: SDUPTHER

## 2018-06-07 NOTE — PROGRESS NOTES
"Subjective:       Patient ID: Buck Ibarra is a 72 y.o. female.    Chief Complaint: No chief complaint on file.    HPI f/u for DM    DM2 - VGo. Metformin XR 500mg BID, invokana 300mg daily, tresiba 16 units nightly, tradjenta 5mg daily.   Reports invokana made her neuropathy worse - taking it every other day instead and that helped w/ neuropathy.   Follows w/ [facility]. Missed last appt w/ Ms. Forde. Next appt 7/12/18  A1C 3/15/18 9.3<--12/4/17 7.9  MAC neg 3/15/18  Eye 12/7/17 Dr. Wasserman. No retinopathy.  Foot 3/20/18.   Peripheral neuropathy - gabapentin 300mg nightly.  LDL 65.4 atorva 80mg daily.    HTN - atacand 8mg daily, hctz 25mg daily.     CT A/P 5/10/17 - moderate calcific atherosclerosis of the abdominal aorta  Circumferential wall thickening of colon.  Saw Dr. Bearden and Dr. Connors - anoscopy performed w/ ext/int hemorrhoid.   Cscope 1/28/16 - benign polyps. Neg. Repeat in 3 yrs.     Thyroid nodule.  US thyroid 12/14/17 - grossly stable multinodular thyroid.    Hep C s/p Harvoni 5/2016. AFP 3/15/18 1.7  Follows w/ hepatology.    Due for DEXA    Review of Systems   Constitutional: Negative for chills and fever.   HENT: Negative.    Respiratory: Negative for cough, shortness of breath and wheezing.    Cardiovascular: Negative for chest pain, palpitations and leg swelling.   Gastrointestinal: Negative.         Reports passing gas.   Endocrine: Positive for polydipsia. Negative for polyuria.   Genitourinary: Negative for dysuria, frequency and vaginal discharge.   Skin: Negative for rash.   Neurological: Negative for dizziness, weakness, numbness and headaches.   Psychiatric/Behavioral: Negative for dysphoric mood. The patient is not nervous/anxious.          Objective:      Physical Exam    /74 (BP Location: Left arm, Patient Position: Sitting, BP Method: Large (Manual))   Pulse 64   Temp 98 °F (36.7 °C)   Ht 5' 5" (1.651 m)   Wt 89.9 kg (198 lb 3.1 oz)   BMI 32.98 kg/m²     Gen - " A+OX4, NAD  HEENT - PERRL, OP clear. MMM.   Neck - multiple thyroid nodules  CV - RRR  Chest - ctab, no wheezing/rhonchi.   Abd - S/NT/ND/+BS  Ext -2 + B DP and radial pulses. No LE edema.   MSK - 1+ DTRs.  No spinal tenderness to palpation. Normal gait.   Skin - no rash.     Labs reviewed.     Assessment/Plan     Buck was seen today for follow-up.    Diagnoses and all orders for this visit:    Type 2 diabetes mellitus with diabetic polyneuropathy, with long-term current use of insulin - cont current meds. F/u w/ MsPatty Forde in July.   -     Comprehensive metabolic panel; Future  -     linagliptin (TRADJENTA) 5 mg Tab tablet; Take 1 tablet (5 mg total) by mouth once daily.    Diabetes mellitus type 2 in obese  -     Hemoglobin A1c; Future  -     Comprehensive metabolic panel; Future  -     linagliptin (TRADJENTA) 5 mg Tab tablet; Take 1 tablet (5 mg total) by mouth once daily.    Hypertension associated with diabetes - Stable and controlled. Continue current medications.  -     Comprehensive metabolic panel; Future  -     candesartan (ATACAND) 8 MG tablet; Take 1 tablet (8 mg total) by mouth once daily.    Hyperlipidemia associated with type 2 diabetes mellitus - cont current meds.   -     Comprehensive metabolic panel; Future    Aortic atherosclerosis - cont asa and statin.   -     Comprehensive metabolic panel; Future    Multinodular goiter - stable nodules on US 12/2017. Repeat in a yr.   -     TSH; Future    Postmenopausal estrogen deficiency  -     DXA Bone Density Spine And Hip; Future    Encounter for screening mammogram for breast cancer - schedule p 8/3/18.  -     Mammo Digital Screening Bilat with CAD; Future    Type 2 diabetes mellitus with hyperglycemia, with long-term current use of insulin  -     linagliptin (TRADJENTA) 5 mg Tab tablet; Take 1 tablet (5 mg total) by mouth once daily.      Follow-up in about 6 months (around 12/7/2018).      Ericka Cortez MD  Department of Internal Medicine - [facility]  [address]  7:15 AM

## 2018-06-07 NOTE — TELEPHONE ENCOUNTER
Please call and let patient know that her A1c has worsened from 9.3 to 10.5.  Increased tresiba to 20 units daily. Please confirm she's using her Vgo. F/u w/ endo as scheduled for next mo.

## 2018-06-08 RX ORDER — INSULIN DEGLUDEC 100 U/ML
20 INJECTION, SOLUTION SUBCUTANEOUS DAILY
Qty: 15 ML | Refills: 3 | Status: SHIPPED | OUTPATIENT
Start: 2018-06-08 | End: 2018-07-12 | Stop reason: SDUPTHER

## 2018-06-08 NOTE — TELEPHONE ENCOUNTER
----- Message from Shayla Bonilla MA sent at 6/8/2018  7:29 AM CDT -----  Regarding: FW: New Rx Request      ----- Message -----  From: Alec La, PharmD  Sent: 6/7/2018   5:01 PM  To: [facility] Staff  Subject: New Rx Request                                   Old Rx was canceled, due to dosage change. Please send new Rx for Tresiba to [facility].    Thanks,  Alec La PharmD.

## 2018-06-21 ENCOUNTER — HOSPITAL ENCOUNTER (OUTPATIENT)
Dept: RADIOLOGY | Facility: CLINIC | Age: 73
Discharge: HOME | End: 2018-06-21
Attending: INTERNAL MEDICINE
Payer: MEDICARE

## 2018-06-21 DIAGNOSIS — Z78.0 POSTMENOPAUSAL ESTROGEN DEFICIENCY: ICD-10-CM

## 2018-06-21 PROCEDURE — 77080 DXA BONE DENSITY AXIAL: CPT | Mod: 26,,, | Performed by: INTERNAL MEDICINE

## 2018-06-21 PROCEDURE — 77080 DXA BONE DENSITY AXIAL: CPT | Mod: TC

## 2018-07-12 ENCOUNTER — OFFICE VISIT (OUTPATIENT)
Dept: ENDOCRINOLOGY | Facility: CLINIC | Age: 73
End: 2018-07-12
Payer: MEDICARE

## 2018-07-12 VITALS
WEIGHT: 198.5 LBS | RESPIRATION RATE: 20 BRPM | HEART RATE: 78 BPM | SYSTOLIC BLOOD PRESSURE: 164 MMHG | HEIGHT: 65 IN | DIASTOLIC BLOOD PRESSURE: 84 MMHG | BODY MASS INDEX: 33.07 KG/M2

## 2018-07-12 DIAGNOSIS — Z79.4 TYPE 2 DIABETES MELLITUS WITH HYPERGLYCEMIA, WITH LONG-TERM CURRENT USE OF INSULIN: Chronic | ICD-10-CM

## 2018-07-12 DIAGNOSIS — E04.2 MULTINODULAR GOITER: ICD-10-CM

## 2018-07-12 DIAGNOSIS — E11.22 CKD STAGE 2 DUE TO TYPE 2 DIABETES MELLITUS: Chronic | ICD-10-CM

## 2018-07-12 DIAGNOSIS — E11.65 TYPE 2 DIABETES MELLITUS WITH HYPERGLYCEMIA, WITH LONG-TERM CURRENT USE OF INSULIN: Chronic | ICD-10-CM

## 2018-07-12 DIAGNOSIS — K76.9 HEPATIC LESION: ICD-10-CM

## 2018-07-12 DIAGNOSIS — N18.2 CKD STAGE 2 DUE TO TYPE 2 DIABETES MELLITUS: Chronic | ICD-10-CM

## 2018-07-12 DIAGNOSIS — E11.42 TYPE 2 DIABETES MELLITUS WITH DIABETIC POLYNEUROPATHY, WITH LONG-TERM CURRENT USE OF INSULIN: Primary | Chronic | ICD-10-CM

## 2018-07-12 DIAGNOSIS — E78.5 HYPERLIPIDEMIA LDL GOAL <70: Chronic | ICD-10-CM

## 2018-07-12 DIAGNOSIS — L84 CALLUS OF FOOT: ICD-10-CM

## 2018-07-12 DIAGNOSIS — I10 BENIGN ESSENTIAL HYPERTENSION: ICD-10-CM

## 2018-07-12 DIAGNOSIS — Z72.0 TOBACCO USE: ICD-10-CM

## 2018-07-12 DIAGNOSIS — E66.9 OBESITY (BMI 30-39.9): Chronic | ICD-10-CM

## 2018-07-12 DIAGNOSIS — Z79.4 TYPE 2 DIABETES MELLITUS WITH DIABETIC POLYNEUROPATHY, WITH LONG-TERM CURRENT USE OF INSULIN: Primary | Chronic | ICD-10-CM

## 2018-07-12 DIAGNOSIS — G47.00 INSOMNIA, UNSPECIFIED TYPE: ICD-10-CM

## 2018-07-12 PROCEDURE — 3079F DIAST BP 80-89 MM HG: CPT | Mod: CPTII,S$GLB,, | Performed by: NURSE PRACTITIONER

## 2018-07-12 PROCEDURE — 3077F SYST BP >= 140 MM HG: CPT | Mod: CPTII,S$GLB,, | Performed by: NURSE PRACTITIONER

## 2018-07-12 PROCEDURE — 99215 OFFICE O/P EST HI 40 MIN: CPT | Mod: S$GLB,,, | Performed by: NURSE PRACTITIONER

## 2018-07-12 PROCEDURE — 99999 PR PBB SHADOW E&M-EST. PATIENT-LVL V: CPT | Mod: PBBFAC,,, | Performed by: NURSE PRACTITIONER

## 2018-07-12 PROCEDURE — 3046F HEMOGLOBIN A1C LEVEL >9.0%: CPT | Mod: CPTII,S$GLB,, | Performed by: NURSE PRACTITIONER

## 2018-07-12 RX ORDER — INSULIN DEGLUDEC 100 U/ML
18 INJECTION, SOLUTION SUBCUTANEOUS NIGHTLY
Qty: 15 ML | Refills: 3
Start: 2018-07-12 | End: 2018-10-11 | Stop reason: SDUPTHER

## 2018-07-12 NOTE — PROGRESS NOTES
"CHIEF COMPLAINT: Type 2 Diabetes     HPI: Mrs. Buck Ibarra is a 72 y.o. female who was diagnosed with Type 2 DM x 5-6 years.  Pt was last seen by ADRIANA Catherine NP and is now being seen by me for the first time.  Lab Results   Component Value Date    HGBA1C 10.5 (H) 06/07/2018     A1c has been elevated, pt takes invokana every other day-reports neuropathy/cramping.     Pt checks bg 2 times a day.     Retired . Has children (5), will watch grandchildren---age  2, 8, 11.    PREVIOUS DIABETES MEDICATIONS TRIED  novolog  vgo--did not feel right on it.  victoza     CURRENT DIABETIC MEDS: invokana 300 mg daily, tradjenta 5 mg daily, tresiba 20 units daily, metformin 500 mg bid    Pt is monitoring blood glucose readings 3 times a day (max).  Needs >100 strips per month related to fluctuations with blood glucose reading, a1c trends, and activity level.    Last Podiatry Exam: > 1 year ago     REVIEW OF SYSTEMS  General: no weakness, fatigue, or weight changes #5-6 (loss)-since Dec 2017.   Eyes: no double or blurred vision, eye pain, or redness; Last Eye Exam=Dec 2017.   Cardiovascular: no chest pain, palpitations, edema, or murmurs.   Respiratory: no cough or dyspnea.   GI: no heartburn, nausea, or changes in bowel patterns; good appetite.   Skin: no rashes, dryness, itching, or reactions at insulin injection sites.  Neuro: + numbness, tingling, tremors, or vertigo.   Endocrine: no polyuria, polydipsia, polyphagia, heat or cold intolerance.     Vital Signs  BP (!) 164/84 (BP Location: Left arm, Patient Position: Sitting, BP Method: Medium (Manual))   Pulse 78   Resp 20   Ht 5' 5" (1.651 m)   Wt 90.1 kg (198 lb 8.4 oz)   BMI 33.04 kg/m²     Hemoglobin A1C   Date Value Ref Range Status   06/07/2018 10.5 (H) 4.0 - 5.6 % Final     Comment:     ADA Screening Guidelines:  5.7-6.4%  Consistent with prediabetes  >or=6.5%  Consistent with diabetes  High levels of fetal hemoglobin interfere with the HbA1C  assay. " Heterozygous hemoglobin variants (HbS, HgC, etc)do  not significantly interfere with this assay.   However, presence of multiple variants may affect accuracy.     03/15/2018 9.3 (H) 4.0 - 5.6 % Final     Comment:     According to ADA guidelines, hemoglobin A1c <7.0% represents  optimal control in non-pregnant diabetic patients. Different  metrics may apply to specific patient populations.   Standards of Medical Care in Diabetes-2016.  For the purpose of screening for the presence of diabetes:  <5.7%     Consistent with the absence of diabetes  5.7-6.4%  Consistent with increasing risk for diabetes   (prediabetes)  >or=6.5%  Consistent with diabetes  Currently, no consensus exists for use of hemoglobin A1c  for diagnosis of diabetes for children.  This Hemoglobin A1c assay has significant interference with fetal   hemoglobin   (HbF). The results are invalid for patients with abnormal amounts of   HbF,   including those with known Hereditary Persistence   of Fetal Hemoglobin. Heterozygous hemoglobin variants (HbAS, HbAC,   HbAD, HbAE, HbA2) do not significantly interfere with this assay;   however, presence of multiple variants in a sample may impact the %   interference.     12/04/2017 7.9 (H) 4.0 - 5.6 % Final     Comment:     According to ADA guidelines, hemoglobin A1c <7.0% represents  optimal control in non-pregnant diabetic patients. Different  metrics may apply to specific patient populations.   Standards of Medical Care in Diabetes-2016.  For the purpose of screening for the presence of diabetes:  <5.7%     Consistent with the absence of diabetes  5.7-6.4%  Consistent with increasing risk for diabetes   (prediabetes)  >or=6.5%  Consistent with diabetes  Currently, no consensus exists for use of hemoglobin A1c  for diagnosis of diabetes for children.  This Hemoglobin A1c assay has significant interference with fetal   hemoglobin   (HbF). The results are invalid for patients with abnormal amounts of   HbF,    including those with known Hereditary Persistence   of Fetal Hemoglobin. Heterozygous hemoglobin variants (HbAS, HbAC,   HbAD, HbAE, HbA2) do not significantly interfere with this assay;   however, presence of multiple variants in a sample may impact the %   interference.          Chemistry        Component Value Date/Time     06/07/2018 0200    K 4.5 06/07/2018 0200     06/07/2018 0200    CO2 27 06/07/2018 0200    BUN 11 06/07/2018 0200    CREATININE 0.9 06/07/2018 0200     (H) 06/07/2018 0200        Component Value Date/Time    CALCIUM 9.5 06/07/2018 0200    ALKPHOS 111 06/07/2018 0200    AST 16 06/07/2018 0200    ALT 25 06/07/2018 0200    BILITOT 0.4 06/07/2018 0200    ESTGFRAFRICA >60 06/07/2018 0200    EGFRNONAA >60 06/07/2018 0200           Lab Results   Component Value Date    TSH 1.168 06/07/2018      Lab Results   Component Value Date    CHOL 118 (L) 03/15/2018    CHOL 149 12/04/2017    CHOL 154 06/01/2017     Lab Results   Component Value Date    HDL 35 (L) 03/15/2018    HDL 40 12/04/2017    HDL 43 06/01/2017     Lab Results   Component Value Date    LDLCALC 65.4 03/15/2018    LDLCALC 88.4 12/04/2017    LDLCALC 93.6 06/01/2017     Lab Results   Component Value Date    TRIG 88 03/15/2018    TRIG 103 12/04/2017    TRIG 87 06/01/2017     Lab Results   Component Value Date    CHOLHDL 29.7 03/15/2018    CHOLHDL 26.8 12/04/2017    CHOLHDL 27.9 06/01/2017         PHYSICAL EXAMINATION  Constitutional: Appears well, no distress  Neck: Supple, trachea midline.   Respiratory: CTA without wheezes, even and unlabored.  Cardiovascular: RRR; no carotid bruits or murmurs; (+) DP pulses; no edema.   Lymph: no lymphadenopathy palpated  Skin: warm and dry; no injection site reactions, + acanthosis nigracans observed.  Neuro:patient alert and cooperative, normal affect; steady gait.    Diabetes Foot Exam:   Protective Sensation (w/ 10 gram monofilament):  Right: Intact  Left: Intact    Visual  Inspection:  Callus -  Bilateral and Dry Skin -  Bilateral   Ball of feet, heels -bilateral     Pedal Pulses:   Right: Present  Left: Present    Posterior tibialis:   Right:Present  Left: Present        Assessment/Plan  1. Type 2 diabetes mellitus with diabetic polyneuropathy, with long-term current use of insulin  Hemoglobin A1c next time  F/u in 3 mos  a1c goal less than 8%  Change tresiba 18 units at night  Change invokana to 100 mg daily vs 300 mg q other day  D/c tradjenta  Start trulicity 0.75 mg weekly  BG at least 3 times a day.  Logs given, support group info given, contact info given.    Podiatry referral ---thurs morning appt preferred    Counseling >35 mins       Ambulatory Referral to Diabetes Education-in 2 weeks-nutrition/log review   2. Type 2 diabetes mellitus with hyperglycemia, with long-term current use of insulin  See above   3. Benign essential hypertension  Elevated this visit, continue med(s)   4. CKD stage 2 due to type 2 diabetes mellitus  stable   5. Hyperlipidemia LDL goal <70  Lab Results   Component Value Date    LDLCALC 65.4 03/15/2018     At goal   6. Obesity (BMI 30-39.9)  Body mass index is 33.04 kg/m². may increase insulin resistance  Discussed exercise at least 15 min (2 intervals) or 30 mins 3 times a week   7. Tobacco use  Discussed cessation   6 cigs a day  Has tried cessation program in the past    8. Multinodular goiter  Thyroid scan   F/u with endocrinology (general) q 1 to 2 years   9. Insomnia, unspecified type  Discussed insulin resistance w/ sleep disturbances.   10. Hepatic lesion  F/u with hepatology          FOLLOW UP  Follow-up in about 3 months (around 10/12/2018).

## 2018-07-12 NOTE — PATIENT INSTRUCTIONS
Snacks can be an important part of a balanced, healthy meal plan. They allow you to eat more frequently, feeling full and satisfied throughout the day. Also, they allow you to spread carbohydrates evenly, which may stabilize blood sugars.  Plus, snacks are enjoyable!     The amount of carbohydrate needed at snacks varies. Generally, about 15-30 grams of carbohydrate per snack is recommended.  Below you will find some tasty treats.       0-5 gm carb   Crystal Light   Vitamin Water Zero   Herbal tea, unsweetened   2 tsp peanut butter on celery   1./2 cup sugar-free jell-o   1 sugar-free popsicle   ¼ cup blueberries   8oz Blue Viola unsweetened almond milk   5 baby carrots & celery sticks, cucumbers, bell peppers dipped in ¼ cup salsa, 2Tbsp light ranch dressing or 2Tbsp plain Greek yogurt   10 Goldfish crackers   ½ oz low-fat cheese or string cheese   1 closed handful of nuts, unsalted   1 Tbsp of sunflower seeds, unsalted   1 cup Smart Pop popcorn   1 whole grain brown rice cake        15 gm carb   1 small piece of fruit or ½ banana or 1/2 cup lite canned fruit   3 octavia cracker squares   3 cups Smart Pop popcorn, top spray butter, Dodson lite salt or cinnamon and Truvia   5 Vanilla Wafers   ½ cup low fat, no added sugar ice cream or frozen yogurt (Blue bell, Blue Bunny, Weight Watchers, Skinny Cow)   ½ turkey, ham, or chicken sandwich   ½ c fruit with ½ c Cottage cheese   4-6 unsalted wheat crackers with 1 oz low fat cheese or 1 tbsp peanut butter    30-45 goldfish crackers (depending on flavor)    7-8 Spiritism mini brown rice cakes (caramel, apple cinnamon, chocolate)    12 Spiritism mini brown rice cakes (cheddar, bbq, ranch)    1/3 cup hummus dip with raw veg   1/2 whole wheat rose, 1Tbsp hummus   Mini Pizza (1/2 whole wheat English muffin, low-fat  cheese, tomato sauce)   100 calorie snack pack (Oreo, Chips Ahoy, Ritz Mix, Baked Cheetos)   4-6 oz. light or Greek Style yogurt  (Dago, Gaurang, Kenia, [facility])   ½ cup sugar-free pudding     6 in. wheat tortilla or rose oven toasted chips (topped with spray butter flavoring, cinnamon, Truvia OR spray butter, garlic powder, chili powder)    18 BBQ Popchips (available at Target, Whole Foods, Fresh Market)                   Diabetes Support Group Meetings         Date: Topic:   February 8 Health Promotion/Cooking Demo   March 8 Taking Care of Your Kidneys   April 12 Taking Care of Your Feet   May 10 Ease Your Mind with Diabetes   Gini 14 Summer Treats/Cooking Demo   July 12 Super Market Sweep   August 9 Taking Care of Your Eyes   Sept 13 Technology/ADA updates   October 11 Recipes & Treats/Cooking Demo   November 8 Heart Health/Pump it up!   December 13 Year-End Close Out        Meetings are held in the [facility] (A) of the [facility] located at [address] [place] [address]. Please call [phone] for additional information.    Free service, offered every 2nd Thursday of every month! Family members and/or friends are welcome as well!  Support group is for patients with type 1 or type 2 diabetes.    From 3:30p to 4:30p        Dulaglutide injection  What is this medicine?  DULAGLUTIDE (DOO la GLOO tide) is used to improve blood sugar control in adults with type 2 diabetes. This medicine may be used with other oral diabetes medicines.  How should I use this medicine?  This medicine is for injection under the skin of your upper leg (thigh), stomach area, or upper arm. It is usually given once every week (every 7 days). You will be taught how to prepare and give this medicine. Use exactly as directed. Take your medicine at regular intervals. Do not take it more often than directed.  If you use this medicine with insulin, you should inject this medicine and the insulin separately. Do not mix them together. Do not give the injections right next to each other. Change (rotate) injection  sites with each injection.  It is important that you put your used needles and syringes in a special sharps container. Do not put them in a trash can. If you do not have a sharps container, call your pharmacist or healthcare provider to get one.  A special MedGuide will be given to you by the pharmacist with each prescription and refill. Be sure to read this information carefully each time.  Talk to your pediatrician regarding the use of this medicine in children. Special care may be needed.  What side effects may I notice from receiving this medicine?  Side effects that you should report to your doctor or health care professional as soon as possible:  · allergic reactions like skin rash, itching or hives, swelling of the face, lips, or tongue  · breathing problems  · signs and symptoms of low blood sugar such as feeling anxious, confusion, dizziness, increased hunger, unusually weak or tired, sweating, shakiness, cold, irritable, headache, blurred vision, fast heartbeat, loss of consciousness  · unusual stomach upset or pain  · vomiting  Side effects that usually do not require medical attention (Report these to your doctor or health care professional if they continue or are bothersome.):diarrhea  · heartburn  · loss of appetite  · nausea  · pain, redness, or irritation at site where injected  What may interact with this medicine?  Do not take this medicine with any of the following medications:  · gatifloxacin  Many medications may cause changes in blood sugar, these include:  · alcohol containing beverages  · aspirin and aspirin-like drugs  · chloramphenicol  · chromium  · diuretics  · female hormones, such as estrogens or progestins, birth control pills  · heart medicines  · isoniazid  · male hormones or anabolic steroids  · medications for weight loss  · medicines for allergies, asthma, cold, or cough  · medicines for mental problems  · medicines called MAO inhibitors - Nardil, Parnate, Marplan,  Eldepryl  · niacin  · NSAIDS, such as ibuprofen  · pentamidine  · phenytoin  · probenecid  · quinolone antibiotics such as ciprofloxacin, levofloxacin, ofloxacin  · some herbal dietary supplements  · steroid medicines such as prednisone or cortisone  · thyroid hormonesSome medications can hide the warning symptoms of low blood sugar (hypoglycemia). You may need to monitor your blood sugar more closely if you are taking one of these medications. These include:  · beta-blockers, often used for high blood pressure or heart problems (examples include atenolol, metoprolol, propranolol)  · clonidine  · guanethidine  · reserpine  What if I miss a dose?  If you miss a dose, take it as soon as you can within 3 days after the missed dose. Then take your next dose at your regular weekly time. If it has been longer than 3 days after the missed dose, do not take the missed dose. Take the next dose at your regular time. Do not take double or extra doses. If you have questions about a missed dose, contact your health care provider for advice.  Where should I keep my medicine?  Keep out of the reach of children.  Store this medicine in a refrigerator between 2 and 8 degrees C (36 and 46 degrees F). Do not freeze or use if the medicine has been frozen. Protect from light and excessive heat. Each single-dose pen or prefilled syringe can be kept at room temperature, not to exceed 30 degrees C (86 degrees F) for a total of 14 days, if needed. Store in the carton until use. Throw away any unused medicine after the expiration date.  What should I tell my health care provider before I take this medicine?  They need to know if you have any of these conditions:  · endocrine tumors (MEN 2) or if someone in your family had these tumors  · history of pancreatitis  · kidney disease  · liver disease  · stomach problems  · thyroid cancer or if someone in your family had thyroid cancer  · an unusual or allergic reaction to dulaglutide, other  medicines, foods, dyes, or preservatives  · pregnant or trying to get pregnant  · breast-feeding  What should I watch for while using this medicine?  Visit your doctor or health care professional for regular checks on your progress.  A test called the HbA1C (A1C) will be monitored. This is a simple blood test. It measures your blood sugar control over the last 2 to 3 months. You will receive this test every 3 to 6 months.  Learn how to check your blood sugar. Learn the symptoms of low and high blood sugar and how to manage them.  Always carry a quick-source of sugar with you in case you have symptoms of low blood sugar. Examples include hard sugar candy or glucose tablets. Make sure others know that you can choke if you eat or drink when you develop serious symptoms of low blood sugar, such as seizures or unconsciousness. They must get medical help at once.  Tell your doctor or health care professional if you have high blood sugar. You might need to change the dose of your medicine. If you are sick or exercising more than usual, you might need to change the dose of your medicine.  Do not skip meals. Ask your doctor or health care professional if you should avoid alcohol. Many nonprescription cough and cold products contain sugar or alcohol. These can affect blood sugar.  Wear a medical ID bracelet or chain, and carry a card that describes your disease and details of your medicine and dosage times.  NOTE:This sheet is a summary. It may not cover all possible information. If you have questions about this medicine, talk to your doctor, pharmacist, or health care provider. Copyright© 2017 Gold Standard

## 2018-07-19 ENCOUNTER — CLINICAL SUPPORT (OUTPATIENT)
Dept: DIABETES | Facility: CLINIC | Age: 73
End: 2018-07-19
Payer: MEDICARE

## 2018-07-19 DIAGNOSIS — E11.42 TYPE 2 DIABETES MELLITUS WITH DIABETIC POLYNEUROPATHY, WITH LONG-TERM CURRENT USE OF INSULIN: Chronic | ICD-10-CM

## 2018-07-19 DIAGNOSIS — E11.65 TYPE 2 DIABETES MELLITUS WITH HYPERGLYCEMIA, WITH LONG-TERM CURRENT USE OF INSULIN: Chronic | ICD-10-CM

## 2018-07-19 DIAGNOSIS — Z79.4 TYPE 2 DIABETES MELLITUS WITH DIABETIC POLYNEUROPATHY, WITH LONG-TERM CURRENT USE OF INSULIN: Chronic | ICD-10-CM

## 2018-07-19 DIAGNOSIS — Z79.4 TYPE 2 DIABETES MELLITUS WITH HYPERGLYCEMIA, WITH LONG-TERM CURRENT USE OF INSULIN: Chronic | ICD-10-CM

## 2018-07-19 PROCEDURE — 99999 PR PBB SHADOW E&M-EST. PATIENT-LVL II: CPT | Mod: PBBFAC,,,

## 2018-07-19 PROCEDURE — G0108 DIAB MANAGE TRN  PER INDIV: HCPCS | Mod: S$GLB,,, | Performed by: INTERNAL MEDICINE

## 2018-07-19 RX ORDER — INSULIN DEGLUDEC 100 U/ML
18 INJECTION, SOLUTION SUBCUTANEOUS DAILY
Qty: 3 SYRINGE | Refills: 6 | Status: CANCELLED | OUTPATIENT
Start: 2018-07-19

## 2018-07-19 NOTE — PROGRESS NOTES
Diabetes Education  Author: Cynthia Perry RD, CDE  Date: 7/19/2018    Diabetes Education Visit  Diabetes Education Record Assessment/Progress: Initial    Diabetes Type  Diabetes Type : Type II    Diabetes History  Diabetes Diagnosis: 5-10 years    Nutrition  Meal Planning: water, eats out seldom (Tends to eat 2 meals a day - Skips bfast; does not usually have a snack; occasionally has SF punch)  What type of sweetener do you use?: none  What type of beverages do you drink?: water, sport drinks (Occasionally has a small amount of G2 (4 oz))    Monitoring   Self Monitoring :  (Checks BG BID)  Blood Glucose Logs: No  Do you use a personal glucose monitor?: No  In the last month, how often have you had a low blood sugar reaction?: once  What are your symptoms of low blood sugar?:  (Sylvia)  How do you treat low blood sugar?:  (Gatorade, eat something, keeps peppermints in purse)  Can you tell when your blood sugar is too high?: no  How do you treat high blood sugar?:  (None)    Exercise   Exercise Type: none (No formal, but watches grandchildren)    Current Diabetes Treatment   Current Treatment: Oral Medication, Injectable, Insulin (Denies missing doses of medication)    Social History  Preferred Learning Method: Face to Face  Primary Support: Self  Smoking Status: Current Smoker  Alcohol Use: Rarely    DDS-2 Score  ( > 3 = SIGNIFICANT DISTRESS): 1.5    Barriers to Change  Barriers to Change: None  Learning Challenges : None    Readiness to Learn   Readiness to Learn : Acceptance    Cultural Influences  Cultural Influences: No    Diabetes Education Assessment/Progress  Diabetes Disease Process (diabetes disease process and treatment options): Instructed, Discussion, Individual Session, Written Materials Provided  Nutrition (Incorporating nutritional management into one's lifestyle): Instructed, Discussion, Individual Session, Written Materials Provided (Reviewed general nutrition guidelines and plate method)  Physical  Activity (incorporating physical activity into one's lifestyle): Instructed, Discussion, Individual Session, Written Materials Provided (Reviewed goals and benefits; helped set up health clarita on phone to track steps)  Medications (states correct name, dose, onset, peak, duration, side effects & timing of meds): Instructed, Discussion, Individual Session, Written Materials Provided (Reviewed medication regimen)  Monitoring (monitoring blood glucose/other parameters & using results): Instructed, Discussion, Individual Session, Written Materials Provided (Reviewed SMBG schedule and BG goals)  Acute Complications (preventing, detecting, and treating acute complications): Instructed, Discussion, Individual Session, Written Materials Provided (Reviewed s/s and treatment of hypoglycemia)  Chronic Complications (preventing, detecting, and treating chronic complications): Instructed, Discussion, Individual Session, Written Materials Provided (Reviewed standards of care)  Clinical (diabetes, other pertinent medical history, and relevant comorbidities reviewed during visit): Instructed, Discussion, Individual Session  Cognitive (knowledge of self-management skills, functional health literacy): Instructed, Discussion, Individual Session  Psychosocial (emotional response to diabetes): Instructed, Discussion, Individual Session  Diabetes Distress and Support Systems: Instructed, Discussion, Individual Session  Behavioral (readiness for change, lifestyle practices, self-care behaviors): Instructed, Discussion, Individual Session    Goals  Patient has selected/evaluated goals during today's session: Yes, selected  Physical Activity: Set (Track steps with phone clarita)    Diabetes Care Plan/Intervention  Education Plan/Intervention: Individual Follow-Up DSMT    Diabetes Meal Plan  Restrictions: Restricted Carbohydrate    Education Units of Time   Time Spent: 45 min    Health Maintenance was reviewed today with patient. Discussed with  patient importance of routine eye exams, foot exams/foot care, blood work (i.e.: A1c, microalbumin, and lipid), dental visits, yearly flu vaccine, and pneumonia vaccine as indicated by PCP. Patient verbalized understanding.     Health Maintenance Topics with due status: Not Due       Topic Last Completion Date    TETANUS VACCINE 07/22/2015    Colonoscopy 02/26/2016    Mammogram 08/03/2017    Influenza Vaccine 09/27/2017    Eye Exam 12/07/2017    Lipid Panel 03/15/2018    Urine Microalbumin 03/15/2018    Hemoglobin A1c 06/07/2018    DEXA SCAN 06/21/2018    High Dose Statin 07/12/2018    Foot Exam 07/12/2018     There are no preventive care reminders to display for this patient.

## 2018-07-23 ENCOUNTER — TELEPHONE (OUTPATIENT)
Dept: HEPATOLOGY | Facility: CLINIC | Age: 73
End: 2018-07-23

## 2018-07-23 NOTE — TELEPHONE ENCOUNTER
----- Message from Alissa Guerrero MA sent at 7/23/2018  4:06 PM CDT -----      ----- Message -----  From: Jaclyn Reese  Sent: 7/23/2018   3:30 PM  To: [facility] Non-Clinical Staff    Patient Requesting Sooner Appointment.     Reason for sooner appt.:  When is the first available appointment?  Communication Preference: [phone]  Additional Information: needs f/u scheduled

## 2018-08-09 ENCOUNTER — OFFICE VISIT (OUTPATIENT)
Dept: PODIATRY | Facility: CLINIC | Age: 73
End: 2018-08-09
Payer: MEDICARE

## 2018-08-09 ENCOUNTER — HOSPITAL ENCOUNTER (OUTPATIENT)
Dept: RADIOLOGY | Facility: HOSPITAL | Age: 73
Discharge: HOME | End: 2018-08-09
Attending: INTERNAL MEDICINE
Payer: MEDICARE

## 2018-08-09 VITALS
HEART RATE: 73 BPM | DIASTOLIC BLOOD PRESSURE: 87 MMHG | BODY MASS INDEX: 32.49 KG/M2 | HEIGHT: 65 IN | WEIGHT: 195 LBS | SYSTOLIC BLOOD PRESSURE: 156 MMHG

## 2018-08-09 DIAGNOSIS — B35.1 ONYCHOMYCOSIS DUE TO DERMATOPHYTE: Primary | ICD-10-CM

## 2018-08-09 DIAGNOSIS — E11.49 TYPE II DIABETES MELLITUS WITH NEUROLOGICAL MANIFESTATIONS: ICD-10-CM

## 2018-08-09 DIAGNOSIS — Z12.31 ENCOUNTER FOR SCREENING MAMMOGRAM FOR BREAST CANCER: ICD-10-CM

## 2018-08-09 PROCEDURE — 99213 OFFICE O/P EST LOW 20 MIN: CPT | Mod: S$GLB,,, | Performed by: PODIATRIST

## 2018-08-09 PROCEDURE — 77067 SCR MAMMO BI INCL CAD: CPT | Mod: TC

## 2018-08-09 PROCEDURE — 77067 SCR MAMMO BI INCL CAD: CPT | Mod: 26,,, | Performed by: RADIOLOGY

## 2018-08-09 PROCEDURE — 3046F HEMOGLOBIN A1C LEVEL >9.0%: CPT | Mod: CPTII,S$GLB,, | Performed by: PODIATRIST

## 2018-08-09 PROCEDURE — 3077F SYST BP >= 140 MM HG: CPT | Mod: CPTII,S$GLB,, | Performed by: PODIATRIST

## 2018-08-09 PROCEDURE — 77063 BREAST TOMOSYNTHESIS BI: CPT | Mod: 26,,, | Performed by: RADIOLOGY

## 2018-08-09 PROCEDURE — 99999 PR PBB SHADOW E&M-EST. PATIENT-LVL III: CPT | Mod: PBBFAC,,, | Performed by: PODIATRIST

## 2018-08-09 PROCEDURE — 3079F DIAST BP 80-89 MM HG: CPT | Mod: CPTII,S$GLB,, | Performed by: PODIATRIST

## 2018-08-09 RX ORDER — CICLOPIROX 80 MG/ML
SOLUTION TOPICAL NIGHTLY
Qty: 6.6 ML | Refills: 11 | Status: SHIPPED | OUTPATIENT
Start: 2018-08-09 | End: 2021-07-29

## 2018-08-09 NOTE — PROGRESS NOTES
Subjective:      Patient ID: Buck Ibarra is a 72 y.o. female.    Chief Complaint: Diabetic Foot Exam (ARNLOD Betts, FNP 07/12/2018)    Buck is a 72 y.o. female who presents to the clinic upon referral from Dr. Rivera  for evaluation and treatment of diabetic feet. Buck has a past medical history of Allergy; Cataract; Diabetes mellitus type II; Gastritis; GERD (gastroesophageal reflux disease); H. pylori infection; Hepatitis C; Hyperlipidemia; Hypertension; Osteopenia; and Type 2 diabetes mellitus with diabetic polyneuropathy. Patient relates no major problem with feet. Only complaints today consist of Diabetes, increased risk amputation needing evaluation/management/optomization of foot care.  No current symptoms..    PCP: Ericka Cortez MD    Date Last Seen by PCP:   Chief Complaint   Patient presents with    Diabetic Foot Exam     ARNOLD Betts, FNP 07/12/2018        Current shoe gear: Casual shoes    Hemoglobin A1C   Date Value Ref Range Status   06/07/2018 10.5 (H) 4.0 - 5.6 % Final     Comment:     ADA Screening Guidelines:  5.7-6.4%  Consistent with prediabetes  >or=6.5%  Consistent with diabetes  High levels of fetal hemoglobin interfere with the HbA1C  assay. Heterozygous hemoglobin variants (HbS, HgC, etc)do  not significantly interfere with this assay.   However, presence of multiple variants may affect accuracy.     03/15/2018 9.3 (H) 4.0 - 5.6 % Final     Comment:     According to ADA guidelines, hemoglobin A1c <7.0% represents  optimal control in non-pregnant diabetic patients. Different  metrics may apply to specific patient populations.   Standards of Medical Care in Diabetes-2016.  For the purpose of screening for the presence of diabetes:  <5.7%     Consistent with the absence of diabetes  5.7-6.4%  Consistent with increasing risk for diabetes   (prediabetes)  >or=6.5%  Consistent with diabetes  Currently, no consensus exists for use of hemoglobin A1c  for diagnosis of  diabetes for children.  This Hemoglobin A1c assay has significant interference with fetal   hemoglobin   (HbF). The results are invalid for patients with abnormal amounts of   HbF,   including those with known Hereditary Persistence   of Fetal Hemoglobin. Heterozygous hemoglobin variants (HbAS, HbAC,   HbAD, HbAE, HbA2) do not significantly interfere with this assay;   however, presence of multiple variants in a sample may impact the %   interference.     12/04/2017 7.9 (H) 4.0 - 5.6 % Final     Comment:     According to ADA guidelines, hemoglobin A1c <7.0% represents  optimal control in non-pregnant diabetic patients. Different  metrics may apply to specific patient populations.   Standards of Medical Care in Diabetes-2016.  For the purpose of screening for the presence of diabetes:  <5.7%     Consistent with the absence of diabetes  5.7-6.4%  Consistent with increasing risk for diabetes   (prediabetes)  >or=6.5%  Consistent with diabetes  Currently, no consensus exists for use of hemoglobin A1c  for diagnosis of diabetes for children.  This Hemoglobin A1c assay has significant interference with fetal   hemoglobin   (HbF). The results are invalid for patients with abnormal amounts of   HbF,   including those with known Hereditary Persistence   of Fetal Hemoglobin. Heterozygous hemoglobin variants (HbAS, HbAC,   HbAD, HbAE, HbA2) do not significantly interfere with this assay;   however, presence of multiple variants in a sample may impact the %   interference.             Review of Systems   Constitution: Negative for chills, diaphoresis, fever, malaise/fatigue and night sweats.   Cardiovascular: Negative for claudication, cyanosis, leg swelling and syncope.   Skin: Positive for nail changes. Negative for color change, dry skin, rash, suspicious lesions and unusual hair distribution.   Musculoskeletal: Negative for falls, joint pain, joint swelling, muscle cramps, muscle weakness and stiffness.   Gastrointestinal:  Negative for constipation, diarrhea, nausea and vomiting.   Neurological: Positive for numbness and sensory change. Negative for brief paralysis, disturbances in coordination, focal weakness, paresthesias and tremors.           Objective:      Physical Exam   Constitutional: She is oriented to person, place, and time. She appears well-developed and well-nourished. She is cooperative. No distress.   Cardiovascular:   Pulses:       Popliteal pulses are 2+ on the right side, and 2+ on the left side.        Dorsalis pedis pulses are 1+ on the right side, and 1+ on the left side.        Posterior tibial pulses are 1+ on the right side, and 1+ on the left side.   Capillary refill 3 seconds all toes/distal feet, all toes/both feet warm to touch.      Negative lymphadenopathy bilateral popliteal fossa and tarsal tunnel.      Negavie lower extremity edema bilateral.     Musculoskeletal:        Right ankle: She exhibits normal range of motion, no swelling, no ecchymosis, no deformity, no laceration and normal pulse. Achilles tendon normal. Achilles tendon exhibits no pain, no defect and normal Irene's test results.   Normal angle, base, station of gait. All ten toes without clubbing, cyanosis, or signs of ischemia.  No pain to palpation bilateral lower extremities.  Range of motion, stability, muscle strength, and muscle tone normal bilateral feet and legs.     Lymphadenopathy:   Negative lymphadenopathy bilateral popliteal fossa and tarsal tunnel.    Negative lymphangitic streaking bilateral feet/ankles/legs.   Neurological: She is alert and oriented to person, place, and time. She has normal strength. She displays no atrophy and no tremor. A sensory deficit is present. She exhibits normal muscle tone. Gait normal.   Reflex Scores:       Patellar reflexes are 2+ on the right side and 2+ on the left side.       Achilles reflexes are 2+ on the right side and 2+ on the left side.  Diminished/loss of protective sensation all  toes bilateral to 10 gram monofilament.       Skin: Skin is warm, dry and intact. Capillary refill takes 2 to 3 seconds. No abrasion, no bruising, no burn, no ecchymosis, no laceration, no lesion and no rash noted. She is not diaphoretic. No cyanosis or erythema. No pallor. Nails show no clubbing.   Skin is normal age and health appropriate color, turgor, texture, and temperature bilateral lower extremities without ulceration, hyperpigmentation, discoloration, masses nodules or cords palpated.  No ecchymosis, erythema, edema, or cardinal signs of infection bilateral lower extremities.      Toenails 1st, 2nd, 3rd, 4th, 5th  bilateral are hypertrophic thickened 2-3 mm, dystrophic, discolored tanish brown with tan, gray crumbly subungual debris.  No tender to distal nail plate pressure, without periungual skin abnormality of each.       Psychiatric: She has a normal mood and affect.             Assessment:       Encounter Diagnoses   Name Primary?    Onychomycosis due to dermatophyte Yes    Type II diabetes mellitus with neurological manifestations          Plan:       Buck was seen today for diabetic foot exam.    Diagnoses and all orders for this visit:    Onychomycosis due to dermatophyte    Type II diabetes mellitus with neurological manifestations    Other orders  -     ciclopirox (PENLAC) 8 % Soln; Apply topically nightly.      I counseled the patient on her conditions, their implications and medical management.        - Shoe inspection. Diabetic Foot Education. Patient reminded of the importance of good nutrition and blood sugar control to help prevent podiatric complications of diabetes. Patient instructed on proper foot hygeine. We discussed wearing proper shoe gear, daily foot inspections, never walking without protective shoe gear, never putting sharp instruments to feet, routine podiatric visits at least annually.      Rx penlac    Discussed conservative treatment with shoes of adequate dimensions,  material, and style to alleviate symptoms and delay or prevent surgical intervention.         Follow-up in about 1 year (around 8/9/2019).

## 2018-08-09 NOTE — LETTER
August 9, 2018      Graeme Rivera, APRN, FNP  [address]  [facility] [address]           [facility] Podiat[facility]  [address]  [facility] [address]  Phone: [phone]          Patient: Buck Ibarra   MR Number: [id number]   YOB: 1945   Date of Visit: 8/9/2018       Dear Graeme Rivera:    Thank you for referring Buck Ibarra to me for evaluation. Attached you will find relevant portions of my assessment and plan of care.    If you have questions, please do not hesitate to call me. I look forward to following Buck Ibarra along with you.    Sincerely,    Kyle Terrazas, BILL    Enclosure  CC:  No Recipients    If you would like to receive this communication electronically, please contact [email or web address] or [phone] to request more information on [facility] [employer] access.    For providers and/or their staff who would like to refer a patient to [facility], please contact us through our one-stop-shop provider referral line, [facility] [place], at [phone].    If you feel you have received this communication in error or would no longer like to receive these types of communications, please e-mail [email or web address]

## 2018-08-10 ENCOUNTER — TELEPHONE (OUTPATIENT)
Dept: PODIATRY | Facility: CLINIC | Age: 73
End: 2018-08-10

## 2018-08-10 NOTE — TELEPHONE ENCOUNTER
Received incoming call from [facility] pharmacy stating Ciclopirox is not covered by insurance, it is excluded. Cash price $84. Patient notified.

## 2018-10-02 ENCOUNTER — TELEPHONE (OUTPATIENT)
Dept: HEPATOLOGY | Facility: CLINIC | Age: 73
End: 2018-10-02

## 2018-10-02 DIAGNOSIS — K76.9 LIVER LESION: ICD-10-CM

## 2018-10-02 DIAGNOSIS — K73.9 CHRONIC HEPATITIS: ICD-10-CM

## 2018-10-02 DIAGNOSIS — Z86.19 HISTORY OF HEPATITIS C: Primary | ICD-10-CM

## 2018-10-02 DIAGNOSIS — K74.00 LIVER FIBROSIS: ICD-10-CM

## 2018-10-02 NOTE — TELEPHONE ENCOUNTER
Pt needs f/u in Hepatology clinic w/ Ella Brown, NP - due now  CBC, CMP, INR, AFP, HCV RNA, U/S ABDOMEN

## 2018-10-03 ENCOUNTER — TELEPHONE (OUTPATIENT)
Dept: HEPATOLOGY | Facility: CLINIC | Age: 73
End: 2018-10-03

## 2018-10-03 NOTE — TELEPHONE ENCOUNTER
----- Message from Sia Cesar MA sent at 10/3/2018  2:07 PM CDT -----  Contact: Patient      ----- Message -----  From: Fozia Stark  Sent: 10/3/2018   1:47 PM  To: [facility] Hepatitis C Staff    Patient Returning Call from [facility]    Who Left Message for Patient: Alissa SALMON    Communication Preference: [phone]    Additional Information:

## 2018-10-03 NOTE — TELEPHONE ENCOUNTER
MA called patient back, schedule her labs, US and follow up visit mailed appt reminder to patient.

## 2018-10-04 ENCOUNTER — LAB VISIT (OUTPATIENT)
Dept: LAB | Facility: HOSPITAL | Age: 73
End: 2018-10-04
Payer: MEDICARE

## 2018-10-04 DIAGNOSIS — E11.65 TYPE 2 DIABETES MELLITUS WITH HYPERGLYCEMIA, WITH LONG-TERM CURRENT USE OF INSULIN: Chronic | ICD-10-CM

## 2018-10-04 DIAGNOSIS — Z79.4 TYPE 2 DIABETES MELLITUS WITH DIABETIC POLYNEUROPATHY, WITH LONG-TERM CURRENT USE OF INSULIN: Chronic | ICD-10-CM

## 2018-10-04 DIAGNOSIS — Z79.4 TYPE 2 DIABETES MELLITUS WITH HYPERGLYCEMIA, WITH LONG-TERM CURRENT USE OF INSULIN: Chronic | ICD-10-CM

## 2018-10-04 DIAGNOSIS — E11.42 TYPE 2 DIABETES MELLITUS WITH DIABETIC POLYNEUROPATHY, WITH LONG-TERM CURRENT USE OF INSULIN: Chronic | ICD-10-CM

## 2018-10-04 LAB
ESTIMATED AVG GLUCOSE: 183 MG/DL
HBA1C MFR BLD HPLC: 8 %

## 2018-10-04 PROCEDURE — 83036 HEMOGLOBIN GLYCOSYLATED A1C: CPT

## 2018-10-04 PROCEDURE — 36415 COLL VENOUS BLD VENIPUNCTURE: CPT

## 2018-10-11 ENCOUNTER — OFFICE VISIT (OUTPATIENT)
Dept: ENDOCRINOLOGY | Facility: CLINIC | Age: 73
End: 2018-10-11
Payer: MEDICARE

## 2018-10-11 VITALS
HEART RATE: 76 BPM | DIASTOLIC BLOOD PRESSURE: 72 MMHG | HEIGHT: 65 IN | SYSTOLIC BLOOD PRESSURE: 130 MMHG | WEIGHT: 199.31 LBS | BODY MASS INDEX: 33.21 KG/M2

## 2018-10-11 DIAGNOSIS — Z79.4 TYPE 2 DIABETES MELLITUS WITH DIABETIC POLYNEUROPATHY, WITH LONG-TERM CURRENT USE OF INSULIN: Primary | Chronic | ICD-10-CM

## 2018-10-11 DIAGNOSIS — E78.5 HYPERLIPIDEMIA LDL GOAL <70: Chronic | ICD-10-CM

## 2018-10-11 DIAGNOSIS — E04.2 MULTINODULAR GOITER: ICD-10-CM

## 2018-10-11 DIAGNOSIS — E11.22 CKD STAGE 2 DUE TO TYPE 2 DIABETES MELLITUS: Chronic | ICD-10-CM

## 2018-10-11 DIAGNOSIS — N18.2 CKD STAGE 2 DUE TO TYPE 2 DIABETES MELLITUS: Chronic | ICD-10-CM

## 2018-10-11 DIAGNOSIS — I10 BENIGN ESSENTIAL HYPERTENSION: ICD-10-CM

## 2018-10-11 DIAGNOSIS — E11.42 TYPE 2 DIABETES MELLITUS WITH DIABETIC POLYNEUROPATHY, WITH LONG-TERM CURRENT USE OF INSULIN: Primary | Chronic | ICD-10-CM

## 2018-10-11 DIAGNOSIS — Z79.4 TYPE 2 DIABETES MELLITUS WITH HYPERGLYCEMIA, WITH LONG-TERM CURRENT USE OF INSULIN: Chronic | ICD-10-CM

## 2018-10-11 DIAGNOSIS — E66.9 OBESITY (BMI 30-39.9): Chronic | ICD-10-CM

## 2018-10-11 DIAGNOSIS — E11.65 TYPE 2 DIABETES MELLITUS WITH HYPERGLYCEMIA, WITH LONG-TERM CURRENT USE OF INSULIN: Chronic | ICD-10-CM

## 2018-10-11 PROCEDURE — 99214 OFFICE O/P EST MOD 30 MIN: CPT | Mod: PBBFAC | Performed by: NURSE PRACTITIONER

## 2018-10-11 PROCEDURE — 99999 PR PBB SHADOW E&M-EST. PATIENT-LVL IV: CPT | Mod: PBBFAC,,, | Performed by: NURSE PRACTITIONER

## 2018-10-11 PROCEDURE — 3075F SYST BP GE 130 - 139MM HG: CPT | Mod: CPTII,,, | Performed by: NURSE PRACTITIONER

## 2018-10-11 PROCEDURE — 1101F PT FALLS ASSESS-DOCD LE1/YR: CPT | Mod: CPTII,,, | Performed by: NURSE PRACTITIONER

## 2018-10-11 PROCEDURE — 3078F DIAST BP <80 MM HG: CPT | Mod: CPTII,,, | Performed by: NURSE PRACTITIONER

## 2018-10-11 PROCEDURE — 99214 OFFICE O/P EST MOD 30 MIN: CPT | Mod: S$PBB,,, | Performed by: NURSE PRACTITIONER

## 2018-10-11 PROCEDURE — 3045F PR MOST RECENT HEMOGLOBIN A1C LEVEL 7.0-9.0%: CPT | Mod: CPTII,,, | Performed by: NURSE PRACTITIONER

## 2018-10-11 RX ORDER — INSULIN DEGLUDEC 100 U/ML
20 INJECTION, SOLUTION SUBCUTANEOUS NIGHTLY
Qty: 15 ML | Refills: 3
Start: 2018-10-11 | End: 2019-01-31

## 2018-10-11 RX ORDER — ESOMEPRAZOLE MAGNESIUM 40 MG/1
CAPSULE, DELAYED RELEASE ORAL
Qty: 30 CAPSULE | Refills: 3 | Status: SHIPPED | OUTPATIENT
Start: 2018-10-11 | End: 2019-03-28

## 2018-10-11 NOTE — PATIENT INSTRUCTIONS
Snacks can be an important part of a balanced, healthy meal plan. They allow you to eat more frequently, feeling full and satisfied throughout the day. Also, they allow you to spread carbohydrates evenly, which may stabilize blood sugars.  Plus, snacks are enjoyable!     The amount of carbohydrate needed at snacks varies. Generally, about 15-30 grams of carbohydrate per snack is recommended.  Below you will find some tasty treats.       0-5 gm carb   Crystal Light   Vitamin Water Zero   Herbal tea, unsweetened   2 tsp peanut butter on celery   1./2 cup sugar-free jell-o   1 sugar-free popsicle   ¼ cup blueberries   8oz Blue Viola unsweetened almond milk   5 baby carrots & celery sticks, cucumbers, bell peppers dipped in ¼ cup salsa, 2Tbsp light ranch dressing or 2Tbsp plain Greek yogurt   10 Goldfish crackers   ½ oz low-fat cheese or string cheese   1 closed handful of nuts, unsalted   1 Tbsp of sunflower seeds, unsalted   1 cup Smart Pop popcorn   1 whole grain brown rice cake        15 gm carb   1 small piece of fruit or ½ banana or 1/2 cup lite canned fruit   3 octavia cracker squares   3 cups Smart Pop popcorn, top spray butter, Dodson lite salt or cinnamon and Truvia   5 Vanilla Wafers   ½ cup low fat, no added sugar ice cream or frozen yogurt (Blue bell, Blue Bunny, Weight Watchers, Skinny Cow)   ½ turkey, ham, or chicken sandwich   ½ c fruit with ½ c Cottage cheese   4-6 unsalted wheat crackers with 1 oz low fat cheese or 1 tbsp peanut butter    30-45 goldfish crackers (depending on flavor)    7-8 Adventism mini brown rice cakes (caramel, apple cinnamon, chocolate)    12 Adventism mini brown rice cakes (cheddar, bbq, ranch)    1/3 cup hummus dip with raw veg   1/2 whole wheat rose, 1Tbsp hummus   Mini Pizza (1/2 whole wheat English muffin, low-fat  cheese, tomato sauce)   100 calorie snack pack (Oreo, Chips Ahoy, Ritz Mix, Baked Cheetos)   4-6 oz. light or Greek Style yogurt  (Gaurang Loza, [place], [facility])   ½ cup sugar-free pudding     6 in. wheat tortilla or rose oven toasted chips (topped with spray butter flavoring, cinnamon, Truvia OR spray butter, garlic powder, chili powder)    18 BBQ Popchips (available at [employer], Whole Foods, Fresh Market)                   Diabetes Support Group Meetings         Date: Topic:   February 8 Health Promotion/Cooking Demo   March 8 Taking Care of Your Kidneys   April 12 Taking Care of Your Feet   May 10 Ease Your Mind with Diabetes   Gini 14 Summer Treats/Cooking Demo   July 12 Super Market Sweep   August 9 Taking Care of Your Eyes   Sept 13 Technology/ADA updates   October 11 Recipes & Treats/Cooking Demo   November 8 Heart Health/Pump it up!   December 13 Year-End Close Out        Meetings are held in the [facility] (A) of the [facility] located at [address] [place] [address]. Please call [phone] for additional information.    Free service, offered every 2nd Thursday of every month! Family members and/or friends are welcome as well!  Support group is for patients with type 1 or type 2 diabetes.    From 3:30p to 4:30p

## 2018-10-11 NOTE — PROGRESS NOTES
"CHIEF COMPLAINT: Type 2 Diabetes     HPI: Mrs. Buck Ibarra is a 72 y.o. female who was diagnosed with Type 2 DM x 5-6 years.    Pt was last seen by me in July 2018 and is now being seen by me again today.    a1c has improved by 2.5%, 10.5 to 8%.    Doing well with trulicity addition. Has more energy.     Lab Results   Component Value Date    HGBA1C 8.0 (H) 10/04/2018     Invokana decreased, had dehydration episode late 2017.    Pt checks bg 3 times a day-max. 120s-150s    Retired . Has children (5), 16 grandchildren, babysits grandchildren---age  2, 8, 11, has 10 great grandchildren.    PREVIOUS DIABETES MEDICATIONS TRIED  novolog  vgo--did not feel right on it.  victoza     CURRENT DIABETIC MEDS: invokana 100 mg daily, trulicity 0.75 mg weekly, tresiba 18 units daily, metformin 500 mg bid    Needs >100 strips per month related to fluctuations with blood glucose reading, a1c trends, and activity level.    Last Podiatry Exam: Dr Terrazas 8/9/2018     REVIEW OF SYSTEMS  General: no weakness, fatigue, +weight changes 1# fluctuations  Eyes: no double or blurred vision, eye pain, or redness; Last Eye Exam=Dec 2017.   Cardiovascular: no chest pain, palpitations, edema, or murmurs.   Respiratory: no cough or dyspnea.   GI: no heartburn, nausea, or changes in bowel patterns; good appetite.   Skin: no rashes, dryness, itching, or reactions at insulin injection sites.  Neuro: + numbness, tingling, tremors, or vertigo.   Endocrine: no polyuria, polydipsia, polyphagia, heat or cold intolerance.     Vital Signs  /72 (BP Location: Right arm, Patient Position: Sitting, BP Method: Medium (Manual))   Pulse 76   Ht 5' 5" (1.651 m)   Wt 90.4 kg (199 lb 4.7 oz)   BMI 33.16 kg/m²     Hemoglobin A1C   Date Value Ref Range Status   10/04/2018 8.0 (H) 4.0 - 5.6 % Final     Comment:     ADA Screening Guidelines:  5.7-6.4%  Consistent with prediabetes  >or=6.5%  Consistent with diabetes  High levels of fetal hemoglobin " interfere with the HbA1C  assay. Heterozygous hemoglobin variants (HbS, HgC, etc)do  not significantly interfere with this assay.   However, presence of multiple variants may affect accuracy.     06/07/2018 10.5 (H) 4.0 - 5.6 % Final     Comment:     ADA Screening Guidelines:  5.7-6.4%  Consistent with prediabetes  >or=6.5%  Consistent with diabetes  High levels of fetal hemoglobin interfere with the HbA1C  assay. Heterozygous hemoglobin variants (HbS, HgC, etc)do  not significantly interfere with this assay.   However, presence of multiple variants may affect accuracy.     03/15/2018 9.3 (H) 4.0 - 5.6 % Final     Comment:     According to ADA guidelines, hemoglobin A1c <7.0% represents  optimal control in non-pregnant diabetic patients. Different  metrics may apply to specific patient populations.   Standards of Medical Care in Diabetes-2016.  For the purpose of screening for the presence of diabetes:  <5.7%     Consistent with the absence of diabetes  5.7-6.4%  Consistent with increasing risk for diabetes   (prediabetes)  >or=6.5%  Consistent with diabetes  Currently, no consensus exists for use of hemoglobin A1c  for diagnosis of diabetes for children.  This Hemoglobin A1c assay has significant interference with fetal   hemoglobin   (HbF). The results are invalid for patients with abnormal amounts of   HbF,   including those with known Hereditary Persistence   of Fetal Hemoglobin. Heterozygous hemoglobin variants (HbAS, HbAC,   HbAD, HbAE, HbA2) do not significantly interfere with this assay;   however, presence of multiple variants in a sample may impact the %   interference.          Chemistry        Component Value Date/Time     06/07/2018 0200    K 4.5 06/07/2018 0200     06/07/2018 0200    CO2 27 06/07/2018 0200    BUN 11 06/07/2018 0200    CREATININE 0.9 06/07/2018 0200     (H) 06/07/2018 0200        Component Value Date/Time    CALCIUM 9.5 06/07/2018 0200    ALKPHOS 111 06/07/2018 0200     AST 16 06/07/2018 0200    ALT 25 06/07/2018 0200    BILITOT 0.4 06/07/2018 0200    ESTGFRAFRICA >60 06/07/2018 0200    EGFRNONAA >60 06/07/2018 0200           Lab Results   Component Value Date    TSH 1.168 06/07/2018      Lab Results   Component Value Date    CHOL 118 (L) 03/15/2018    CHOL 149 12/04/2017    CHOL 154 06/01/2017     Lab Results   Component Value Date    HDL 35 (L) 03/15/2018    HDL 40 12/04/2017    HDL 43 06/01/2017     Lab Results   Component Value Date    LDLCALC 65.4 03/15/2018    LDLCALC 88.4 12/04/2017    LDLCALC 93.6 06/01/2017     Lab Results   Component Value Date    TRIG 88 03/15/2018    TRIG 103 12/04/2017    TRIG 87 06/01/2017     Lab Results   Component Value Date    CHOLHDL 29.7 03/15/2018    CHOLHDL 26.8 12/04/2017    CHOLHDL 27.9 06/01/2017         PHYSICAL EXAMINATION  Constitutional: Appears well, no distress  Neck: Supple, trachea midline.   Respiratory: CTA without wheezes, even and unlabored.  Cardiovascular: RRR; no carotid bruits or murmurs; (+) DP pulses; no edema.   Lymph: no lymphadenopathy palpated  Skin: warm and dry; no injection site reactions, + acanthosis nigracans observed.  Neuro:patient alert and cooperative, normal affect; steady gait.    Diabetes Foot Exam:   deferred      Assessment/Plan  1. Type 2 diabetes mellitus with diabetic polyneuropathy, with long-term current use of insulin  F/u in 3 mos  a1c next time  a1c improved by 2.5 % since last visit  a1c goal less than 7.5%  Change trulicity 1.5 mg weekly  Continue metformin 500 mg bid  And invokana 100 mg daily-remind pt to hydrate well   Increase tresiba 20 units daily  BG 2 times a day     2. Type 2 diabetes mellitus with hyperglycemia, with long-term current use of insulin  insulin degludec (TRESIBA FLEXTOUCH U-100) 100 unit/mL (3 mL) InPn-rx changed to 20 units daily  See above   3. Obesity (BMI 30-39.9)  Body mass index is 33.16 kg/m². may increase insulin resistance   Encourage to exercise 3 times a  week  30 mins per session    4. Hyperlipidemia LDL goal <70  Lab Results   Component Value Date    LDLCALC 65.4 03/15/2018     At goal    5. CKD stage 2 due to type 2 diabetes mellitus  See above  Continue lower dose for metformin, invokana    6. Multinodular goiter  U/s 2017 (late) q 2 years   7. Benign essential hypertension  Controlled, continue med(s) arb, asa        FOLLOW UP  Follow-up in about 3 months (around 1/11/2019).

## 2018-11-29 ENCOUNTER — HOSPITAL ENCOUNTER (OUTPATIENT)
Dept: RADIOLOGY | Facility: HOSPITAL | Age: 73
Discharge: HOME | End: 2018-11-29
Attending: PHYSICIAN ASSISTANT
Payer: MEDICARE

## 2018-11-29 ENCOUNTER — OFFICE VISIT (OUTPATIENT)
Dept: HEPATOLOGY | Facility: CLINIC | Age: 73
End: 2018-11-29
Payer: MEDICARE

## 2018-11-29 ENCOUNTER — TELEPHONE (OUTPATIENT)
Dept: HEPATOLOGY | Facility: CLINIC | Age: 73
End: 2018-11-29

## 2018-11-29 VITALS
SYSTOLIC BLOOD PRESSURE: 177 MMHG | BODY MASS INDEX: 33.76 KG/M2 | TEMPERATURE: 98 F | OXYGEN SATURATION: 99 % | HEIGHT: 65 IN | HEART RATE: 78 BPM | DIASTOLIC BLOOD PRESSURE: 84 MMHG | WEIGHT: 202.63 LBS | RESPIRATION RATE: 18 BRPM

## 2018-11-29 DIAGNOSIS — K74.00 LIVER FIBROSIS: ICD-10-CM

## 2018-11-29 DIAGNOSIS — Z86.19 HISTORY OF HEPATITIS C: ICD-10-CM

## 2018-11-29 DIAGNOSIS — R16.0 LIVER MASS: Primary | ICD-10-CM

## 2018-11-29 DIAGNOSIS — K76.9 LIVER LESION: ICD-10-CM

## 2018-11-29 PROCEDURE — 99213 OFFICE O/P EST LOW 20 MIN: CPT | Mod: S$GLB,,, | Performed by: NURSE PRACTITIONER

## 2018-11-29 PROCEDURE — 1101F PT FALLS ASSESS-DOCD LE1/YR: CPT | Mod: CPTII,S$GLB,, | Performed by: NURSE PRACTITIONER

## 2018-11-29 PROCEDURE — 76705 ECHO EXAM OF ABDOMEN: CPT | Mod: 26,,, | Performed by: RADIOLOGY

## 2018-11-29 PROCEDURE — 3079F DIAST BP 80-89 MM HG: CPT | Mod: CPTII,S$GLB,, | Performed by: NURSE PRACTITIONER

## 2018-11-29 PROCEDURE — 99999 PR PBB SHADOW E&M-EST. PATIENT-LVL V: CPT | Mod: PBBFAC,,, | Performed by: NURSE PRACTITIONER

## 2018-11-29 PROCEDURE — 3077F SYST BP >= 140 MM HG: CPT | Mod: CPTII,S$GLB,, | Performed by: NURSE PRACTITIONER

## 2018-11-29 PROCEDURE — 76705 ECHO EXAM OF ABDOMEN: CPT | Mod: TC

## 2018-11-29 NOTE — TELEPHONE ENCOUNTER
----- Message from Ella Zavala, NP sent at 11/29/2018 12:37 PM CST -----  Please inform pt that the labs are stable.

## 2018-12-20 DIAGNOSIS — Z79.4 TYPE 2 DIABETES MELLITUS WITH HYPERGLYCEMIA, WITH LONG-TERM CURRENT USE OF INSULIN: Chronic | ICD-10-CM

## 2018-12-20 DIAGNOSIS — E11.65 TYPE 2 DIABETES MELLITUS WITH HYPERGLYCEMIA, WITH LONG-TERM CURRENT USE OF INSULIN: Chronic | ICD-10-CM

## 2018-12-20 RX ORDER — METFORMIN HYDROCHLORIDE 500 MG/1
500 TABLET, EXTENDED RELEASE ORAL 2 TIMES DAILY WITH MEALS
Qty: 180 TABLET | Refills: 2 | Status: SHIPPED | OUTPATIENT
Start: 2018-12-20 | End: 2020-03-04

## 2018-12-28 RX ORDER — INSULIN DEGLUDEC 200 U/ML
INJECTION, SOLUTION SUBCUTANEOUS
Qty: 15 ML | Refills: 3 | Status: CANCELLED | OUTPATIENT
Start: 2018-12-28 | End: 2019-12-28

## 2018-12-28 RX ORDER — INSULIN DEGLUDEC 200 U/ML
INJECTION, SOLUTION SUBCUTANEOUS
Qty: 15 ML | Refills: 0 | Status: SHIPPED | OUTPATIENT
Start: 2018-12-28 | End: 2019-03-28

## 2019-01-01 ENCOUNTER — HOSPITAL ENCOUNTER (EMERGENCY)
Facility: HOSPITAL | Age: 74
Discharge: HOME | End: 2019-01-01
Attending: EMERGENCY MEDICINE
Payer: MEDICARE

## 2019-01-01 VITALS
SYSTOLIC BLOOD PRESSURE: 189 MMHG | DIASTOLIC BLOOD PRESSURE: 79 MMHG | OXYGEN SATURATION: 100 % | RESPIRATION RATE: 18 BRPM | TEMPERATURE: 98 F | BODY MASS INDEX: 34.95 KG/M2 | HEART RATE: 64 BPM | WEIGHT: 210 LBS

## 2019-01-01 DIAGNOSIS — R07.89 ATYPICAL CHEST PAIN: Primary | ICD-10-CM

## 2019-01-01 DIAGNOSIS — R07.89 CHEST WALL TENDERNESS: ICD-10-CM

## 2019-01-01 DIAGNOSIS — R07.9 CHEST PAIN: ICD-10-CM

## 2019-01-01 DIAGNOSIS — R06.2 WHEEZING: ICD-10-CM

## 2019-01-01 LAB
ALBUMIN SERPL BCP-MCNC: 3.1 G/DL
ALP SERPL-CCNC: 105 U/L
ALT SERPL W/O P-5'-P-CCNC: 19 U/L
ANION GAP SERPL CALC-SCNC: 8 MMOL/L
AST SERPL-CCNC: 19 U/L
BASOPHILS # BLD AUTO: 0.05 K/UL
BASOPHILS NFR BLD: 0.6 %
BILIRUB SERPL-MCNC: 0.2 MG/DL
BNP SERPL-MCNC: 48 PG/ML
BUN SERPL-MCNC: 21 MG/DL
CALCIUM SERPL-MCNC: 9.6 MG/DL
CHLORIDE SERPL-SCNC: 106 MMOL/L
CO2 SERPL-SCNC: 27 MMOL/L
CREAT SERPL-MCNC: 1 MG/DL
DIFFERENTIAL METHOD: ABNORMAL
EOSINOPHIL # BLD AUTO: 0.1 K/UL
EOSINOPHIL NFR BLD: 0.7 %
ERYTHROCYTE [DISTWIDTH] IN BLOOD BY AUTOMATED COUNT: 14.7 %
EST. GFR  (AFRICAN AMERICAN): >60 ML/MIN/1.73 M^2
EST. GFR  (NON AFRICAN AMERICAN): 56.8 ML/MIN/1.73 M^2
GLUCOSE SERPL-MCNC: 196 MG/DL
HCT VFR BLD AUTO: 44.3 %
HGB BLD-MCNC: 13.8 G/DL
IMM GRANULOCYTES # BLD AUTO: 0.05 K/UL
IMM GRANULOCYTES NFR BLD AUTO: 0.6 %
LYMPHOCYTES # BLD AUTO: 3.1 K/UL
LYMPHOCYTES NFR BLD: 35.8 %
MCH RBC QN AUTO: 27.7 PG
MCHC RBC AUTO-ENTMCNC: 31.2 G/DL
MCV RBC AUTO: 89 FL
MONOCYTES # BLD AUTO: 0.4 K/UL
MONOCYTES NFR BLD: 4.3 %
NEUTROPHILS # BLD AUTO: 5 K/UL
NEUTROPHILS NFR BLD: 58 %
NRBC BLD-RTO: 0 /100 WBC
PLATELET # BLD AUTO: 335 K/UL
PMV BLD AUTO: 10.4 FL
POTASSIUM SERPL-SCNC: 3.7 MMOL/L
PROT SERPL-MCNC: 7.1 G/DL
RBC # BLD AUTO: 4.99 M/UL
SODIUM SERPL-SCNC: 141 MMOL/L
TROPONIN I SERPL DL<=0.01 NG/ML-MCNC: <0.006 NG/ML
TROPONIN I SERPL DL<=0.01 NG/ML-MCNC: <0.006 NG/ML
WBC # BLD AUTO: 8.63 K/UL

## 2019-01-01 PROCEDURE — 63600175 PHARM REV CODE 636 W HCPCS: Performed by: EMERGENCY MEDICINE

## 2019-01-01 PROCEDURE — 64420 NJX AA&/STRD NTRCOST NRV 1: CPT | Mod: ,,, | Performed by: EMERGENCY MEDICINE

## 2019-01-01 PROCEDURE — 99285 EMERGENCY DEPT VISIT HI MDM: CPT | Mod: 25,,, | Performed by: EMERGENCY MEDICINE

## 2019-01-01 PROCEDURE — 94640 AIRWAY INHALATION TREATMENT: CPT

## 2019-01-01 PROCEDURE — 85025 COMPLETE CBC W/AUTO DIFF WBC: CPT

## 2019-01-01 PROCEDURE — 93010 EKG 12-LEAD: ICD-10-PCS | Mod: ,,, | Performed by: INTERNAL MEDICINE

## 2019-01-01 PROCEDURE — 94761 N-INVAS EAR/PLS OXIMETRY MLT: CPT

## 2019-01-01 PROCEDURE — 93010 ELECTROCARDIOGRAM REPORT: CPT | Mod: ,,, | Performed by: INTERNAL MEDICINE

## 2019-01-01 PROCEDURE — 99285 EMERGENCY DEPT VISIT HI MDM: CPT | Mod: 25

## 2019-01-01 PROCEDURE — 93005 ELECTROCARDIOGRAM TRACING: CPT | Mod: 59

## 2019-01-01 PROCEDURE — 96374 THER/PROPH/DIAG INJ IV PUSH: CPT

## 2019-01-01 PROCEDURE — 25000242 PHARM REV CODE 250 ALT 637 W/ HCPCS: Performed by: EMERGENCY MEDICINE

## 2019-01-01 PROCEDURE — 64420 PR NERVE BLOCK INJ, ANES/STEROID, INTERCOSTAL, SNGL LVL: ICD-10-PCS | Mod: ,,, | Performed by: EMERGENCY MEDICINE

## 2019-01-01 PROCEDURE — 84484 ASSAY OF TROPONIN QUANT: CPT | Mod: 91

## 2019-01-01 PROCEDURE — 83880 ASSAY OF NATRIURETIC PEPTIDE: CPT

## 2019-01-01 PROCEDURE — 80053 COMPREHEN METABOLIC PANEL: CPT

## 2019-01-01 PROCEDURE — 64420 NJX AA&/STRD NTRCOST NRV 1: CPT | Mod: LT

## 2019-01-01 PROCEDURE — 99285 PR EMERGENCY DEPT VISIT,LEVEL V: ICD-10-PCS | Mod: 25,,, | Performed by: EMERGENCY MEDICINE

## 2019-01-01 PROCEDURE — 25000003 PHARM REV CODE 250: Performed by: EMERGENCY MEDICINE

## 2019-01-01 RX ORDER — LIDOCAINE HYDROCHLORIDE AND EPINEPHRINE 10; 10 MG/ML; UG/ML
20 INJECTION, SOLUTION INFILTRATION; PERINEURAL ONCE
Status: COMPLETED | OUTPATIENT
Start: 2019-01-01 | End: 2019-01-01

## 2019-01-01 RX ORDER — NITROGLYCERIN 0.4 MG/1
0.4 TABLET SUBLINGUAL EVERY 5 MIN PRN
Status: DISCONTINUED | OUTPATIENT
Start: 2019-01-01 | End: 2019-01-02 | Stop reason: HOSPADM

## 2019-01-01 RX ORDER — CLONIDINE HYDROCHLORIDE 0.1 MG/1
0.1 TABLET ORAL
Status: COMPLETED | OUTPATIENT
Start: 2019-01-01 | End: 2019-01-01

## 2019-01-01 RX ORDER — KETOROLAC TROMETHAMINE 30 MG/ML
15 INJECTION, SOLUTION INTRAMUSCULAR; INTRAVENOUS
Status: COMPLETED | OUTPATIENT
Start: 2019-01-01 | End: 2019-01-01

## 2019-01-01 RX ORDER — ALBUTEROL SULFATE 90 UG/1
1-2 AEROSOL, METERED RESPIRATORY (INHALATION) EVERY 6 HOURS PRN
Qty: 18 G | Refills: 0 | Status: SHIPPED | OUTPATIENT
Start: 2019-01-01 | End: 2023-01-01

## 2019-01-01 RX ORDER — ASPIRIN 325 MG
325 TABLET ORAL
Status: COMPLETED | OUTPATIENT
Start: 2019-01-01 | End: 2019-01-01

## 2019-01-01 RX ORDER — LEVOFLOXACIN 750 MG/1
750 TABLET ORAL DAILY
Qty: 5 TABLET | Refills: 0 | Status: SHIPPED | OUTPATIENT
Start: 2019-01-01 | End: 2019-01-08

## 2019-01-01 RX ORDER — IPRATROPIUM BROMIDE AND ALBUTEROL SULFATE 2.5; .5 MG/3ML; MG/3ML
3 SOLUTION RESPIRATORY (INHALATION)
Status: COMPLETED | OUTPATIENT
Start: 2019-01-01 | End: 2019-01-01

## 2019-01-01 RX ADMIN — NITROGLYCERIN 0.4 MG: 0.4 TABLET SUBLINGUAL at 07:01

## 2019-01-01 RX ADMIN — ALUMINUM HYDROXIDE, MAGNESIUM HYDROXIDE, AND SIMETHICONE 50 ML: 200; 200; 20 SUSPENSION ORAL at 06:01

## 2019-01-01 RX ADMIN — ASPIRIN 325 MG ORAL TABLET 325 MG: 325 PILL ORAL at 06:01

## 2019-01-01 RX ADMIN — KETOROLAC TROMETHAMINE 15 MG: 30 INJECTION, SOLUTION INTRAMUSCULAR at 08:01

## 2019-01-01 RX ADMIN — IPRATROPIUM BROMIDE AND ALBUTEROL SULFATE 3 ML: .5; 3 SOLUTION RESPIRATORY (INHALATION) at 06:01

## 2019-01-01 RX ADMIN — CLONIDINE HYDROCHLORIDE 0.1 MG: 0.1 TABLET ORAL at 08:01

## 2019-01-01 RX ADMIN — LIDOCAINE HYDROCHLORIDE AND EPINEPHRINE 20 ML: 10; 10 INJECTION, SOLUTION INFILTRATION; PERINEURAL at 09:01

## 2019-01-02 NOTE — ED PROVIDER NOTES
"Encounter Date: 1/1/2019    SCRIBE #1 NOTE: I, Malachi Ahn, am scribing for, and in the presence of,  Sebastian Connors M.D. I have scribed the entire note.       History     Chief Complaint   Patient presents with    Chest Pain     all days. Denies cardiac hx.      Pt is a 73 y.o. F with PMHx of DM, gastritis, GERD, HTN, and HLD and SHx of smoking 5-6 cigarettes a day since age 26 presents to the ED for evaluation of "stabbing," CP. States that she has been having intermittent episodes of CP and back pain since she woke up at 10:30 am and a cough that produces yellow sputum. Denies any SOB, fever, or any other complaint at this time.     Patient was preparing a meal for the majority of today and reports that exertion does not worsen the chest pain.  Patient states that the chest pain is worsened by deep inspiration and coughing.  Patient reports that for the past 2 weeks she has been experiencing congestion that is associated with a recurrent productive cough.  Patient states that after a few days of coughing the chest pain developed.  She comes in today due to the persistence of the chest pain. Patient denies any orthopnea, shortness of breath, worsening with exertion, or radiation of the chest pain.          Review of patient's allergies indicates:   Allergen Reactions    Erythromycin Anaphylaxis    Erythropoietin analogues Anaphylaxis    Pegasys [peginterferon ruben-2a] Anaphylaxis    Lisinopril Hives     Past Medical History:   Diagnosis Date    Allergy     Cataract     Diabetes mellitus type II     Gastritis     with gastric ulcer    GERD (gastroesophageal reflux disease)     H. pylori infection     History of hepatitis C, SVR as of 8-2016 8/25/2015    Harvoni 12 wks completed.  SVR(12) as of 8/4/16 SVR(24) as of 10-     Hyperlipidemia     Hypertension     Osteopenia     Type 2 diabetes mellitus with diabetic polyneuropathy      Past Surgical History:   Procedure Laterality Date    " COLONOSCOPY      COLONOSCOPY N/A 2016    Performed by [facility] at [facility] ([facility]    HYSTERECTOMY      LIVER BIOPSY      OOPHORECTOMY      UPPER GASTROINTESTINAL ENDOSCOPY       Family History   Problem Relation Age of Onset    Heart disease Mother     Diabetes Mother     Heart disease Father     Cancer Sister         breast cancer    Diabetes Sister     Cancer Sister 70        colon CA    Diabetes Sister     Breast cancer Sister     Diabetes Sister     Glaucoma Neg Hx     Melanoma Neg Hx     Cirrhosis Neg Hx     Psoriasis Neg Hx     Lupus Neg Hx      Social History     Tobacco Use    Smoking status: Current Every Day Smoker     Packs/day: 0.25     Years: 48.00     Pack years: 12.00     Last attempt to quit: 2016     Years since quittin.3    Smokeless tobacco: Never Used   Substance Use Topics    Alcohol use: No     Comment: special occasions    Drug use: No     Review of Systems   Constitutional: Negative for activity change, appetite change, chills, fatigue and fever.   HENT: Negative for congestion, drooling, rhinorrhea, sinus pain, sore throat and trouble swallowing.    Eyes: Negative for pain, discharge and visual disturbance.        Neg vision changes   Respiratory: Negative for cough, chest tightness, shortness of breath, wheezing and stridor.    Cardiovascular: Positive for chest pain. Negative for palpitations and leg swelling.   Gastrointestinal: Negative for abdominal distention, abdominal pain, anal bleeding, blood in stool, constipation, diarrhea, nausea and vomiting.        Neg changes in stool   Endocrine: Negative for polyuria.   Genitourinary: Negative for decreased urine volume, difficulty urinating, dysuria, flank pain, frequency, vaginal bleeding and vaginal discharge.        Neg changes in urination   Musculoskeletal: Negative for arthralgias, back pain, joint swelling, myalgias and neck pain.   Skin: Negative for color change and rash.    Neurological: Negative for dizziness, speech difficulty, weakness, light-headedness, numbness and headaches.   Hematological: Does not bruise/bleed easily.   Psychiatric/Behavioral: Negative for agitation, confusion and suicidal ideas.       Physical Exam     Initial Vitals [01/01/19 1806]   BP Pulse Resp Temp SpO2   (!) 239/114 74 18 98 °F (36.7 °C) 100 %      MAP       --         Physical Exam    Nursing note and vitals reviewed.  Constitutional: She appears well-developed and well-nourished. She is not diaphoretic. No distress.   HENT:   Head: Normocephalic and atraumatic.   Right Ear: External ear normal.   Left Ear: External ear normal.   Nose: Nose normal.   Mouth/Throat: Oropharynx is clear and moist.   Eyes: EOM are normal. Pupils are equal, round, and reactive to light. Right eye exhibits no discharge. Left eye exhibits no discharge. No scleral icterus.   Neck: Normal range of motion. No thyromegaly present. No tracheal deviation present. No JVD present.   Cardiovascular: Normal rate, regular rhythm, normal heart sounds and intact distal pulses.   Pulmonary/Chest: No stridor. No respiratory distress. She has wheezes. She has no rales. She exhibits tenderness. She exhibits no mass, no crepitus, no edema, no deformity, no swelling and no retraction.       +intermittent bilateral wheezing    Abdominal: Soft. She exhibits no distension and no mass. There is no tenderness. There is no rebound and no guarding.   Musculoskeletal: Normal range of motion. She exhibits no edema or tenderness.   Moving all 4 extremities   Neurological: She is alert and oriented to person, place, and time. She displays normal reflexes. No cranial nerve deficit or sensory deficit. GCS score is 15. GCS eye subscore is 4. GCS verbal subscore is 5. GCS motor subscore is 6.   Skin: Skin is warm. Capillary refill takes less than 2 seconds. No rash noted. No erythema.   Psychiatric: She has a normal mood and affect. Her behavior is normal.  "Thought content normal.         ED Course   Nerve Block  Date/Time: 2019 4:45 AM  Location procedure was performed: [facility] EMERGENCY DEPARTMENT  Performed by: Sebastian Connors MD  Authorized by: Sebastian Connors MD   Assisting provider: Sebastian Connors MD  Pre-operative diagnosis: Chest wall tenderness  Post-operative diagnosis: Chest wall tenderness  Consent Done: Yes  Consent: Verbal consent obtained.  Risks and benefits: risks, benefits and alternatives were discussed  Consent given by: patient  Patient understanding: patient states understanding of the procedure being performed  Patient consent: the patient's understanding of the procedure matches consent given  Procedure consent: procedure consent matches procedure scheduled  Relevant documents: relevant documents present and verified  Test results: test results available and properly labeled  Site marked: the operative site was marked  Imaging studies: imaging studies available  Required items: required blood products, implants, devices, and special equipment available  Patient identity confirmed: , name and verbally with patient  Time out: Immediately prior to procedure a "time out" was called to verify the correct patient, procedure, equipment, support staff and site/side marked as required.  Indications: pain relief  Body area: trunk  Nerve: intercostal  Laterality: left  Patient sedated: no  Description of findings: Injection site at the anterior aspect of T3 rib space.   Preparation: Patient was prepped and draped in the usual sterile fashion.  Patient position: supine  Needle size: 24 G  Location technique: anatomical landmarks  Local Anesthetic: lidocaine 1% with epinephrine  Anesthetic total: 5 mL  Technical procedures used: Kallie negative pressure, intermittent injection.  Significant surgical tasks conducted by the assistant(s): I performed the entirety of the procedure.  Complications: No  Estimated blood loss (mL): 0  Specimens: " No  Implants: No  Outcome: pain improved (Pain resolved)  Patient tolerance: Patient tolerated the procedure well with no immediate complications  Comments: Patient verbally consented to procedure.  Risk factors of introducing infection, causing bleeding, the procedure not providing relief, worsening of pain, damage to nerve/muscles/tissue/organs/long/bones/vascular structures were discussed.  Alternatives such as topical anesthetics and oral pain medications were discussed.  Options such as no treatment were also discussed.  Patient expressed understanding and states she consents to the procedure being performed.        Labs Reviewed   CBC W/ AUTO DIFFERENTIAL - Abnormal; Notable for the following components:       Result Value    MCHC 31.2 (*)     RDW 14.7 (*)     Immature Granulocytes 0.6 (*)     Immature Grans (Abs) 0.05 (*)     All other components within normal limits   COMPREHENSIVE METABOLIC PANEL - Abnormal; Notable for the following components:    Glucose 196 (*)     Albumin 3.1 (*)     eGFR if non  56.8 (*)     All other components within normal limits   TROPONIN I   TROPONIN I   B-TYPE NATRIURETIC PEPTIDE     EKG Readings: (Independently Interpreted)   Initial Reading: No STEMI. Previous EKG: Compared with most recent EKG Previous EKG Date: 12/10/2017. Rhythm: Normal Sinus Rhythm. Heart Rate: 72 bpm. ST Segments: Normal ST Segments. T Waves Flipped: AVF, V6 and V5.   18:12  + biphasic T wave in lead II       Imaging Results          X-Ray Chest AP Portable (Final result)  Result time 01/01/19 19:00:32    Final result by Porfirio Hebert MD (01/01/19 19:00:32)                 Impression:      No detrimental change or radiographic acute intrathoracic process seen on this single view.      Electronically signed by: Porfirio Hebert MD  Date:    01/01/2019  Time:    19:00             Narrative:    EXAMINATION:  XR CHEST AP PORTABLE    CLINICAL HISTORY:  Chest Pain;    TECHNIQUE:  Single frontal  view of the chest was performed.    COMPARISON:  None    FINDINGS:  No detrimental change.  Monitoring leads overlie the chest.  Chronic mild nonspecific elevation of the right hemidiaphragm.  Cardiomediastinal silhouette is stable without convincing evidence of failure noting calcific atherosclerosis of the thoracic aorta.  No large consolidation or new focal opacity.  No pleural effusion or pneumothorax.  No acute osseous process seen.  PA and lateral views of the chest can be obtained.                                 Medical Decision Making:   Clinical Tests:   Lab Tests: Ordered and Reviewed  Radiological Study: Ordered and Reviewed  Medical Tests: Ordered and Reviewed  ED Management:  Patient presents without chest pain or shortness of breath. Patient reports that it intermittently reoccurs and is not associated with any exertional activities.  Patient given a GI cocktail with no resolution of chest pain when it did reoccur.  Patient states that she has had a cough for the past 2 weeks that she believes might be associated with her chest pain.  Patient given DuoNeb treatment with resolution of wheezing on examination.  The chest x-ray shows no focal consolidation or other acute abnormality.  Patient given nitroglycerin for treatment of chest pain and hypertension.  The patient denies missing any medication dosages at home.  Patient's hypertension improved with administration of nitroglycerin, but she states the chest pain has not changed.    The patient states that the chest pain has a throbbing pain at a level of 4/10 with intermittent increases to 8/10 that are stabbing in nature.  No radiation.  No associated shortness of breath. Patient's EKG shows no acute ischemic changes.  Patient presents with atypical chest pain.  Chest pain is reproducible with palpation of chest wall.  Palpation associated pain is the same pain patient reports she is experiencing.    Due to the recurrence coughing, patient might have  chest wall tenderness due to musculoskeletal strain.  Patient with 2 troponins within normal limits.    Patient consented for lidocaine injection at site of chest wall tenderness. After procedure performed, the patient reports complete resolution of pain.    Patient discharged with albuterol inhaler and Levaquin.  Patient given strict return precautions.  Patient instructed to monitor her elevated blood pressure.  Patient states she does not currently monitor her blood pressure.  She denies any headaches.  Patient's blood pressure remains improved after nitroglycerin.  Possibly elevated secondary to pain and breathing treatment, but patient instructed to follow up with primary care doctor for re-evaluation of hypertensive medications.  Smoking cessation discussed.            Scribe Attestation:   Scribe #1: I performed the above scribed service and the documentation accurately describes the services I performed. I attest to the accuracy of the note.               Clinical Impression:   The primary encounter diagnosis was Atypical chest pain. Diagnoses of Chest pain, Chest wall tenderness, and Wheezing were also pertinent to this visit.      Disposition:   Disposition: Discharged  Condition: Stable                        Sebastian Connors MD  01/02/19 [id number]

## 2019-01-10 ENCOUNTER — OFFICE VISIT (OUTPATIENT)
Dept: OPTOMETRY | Facility: CLINIC | Age: 74
End: 2019-01-10
Payer: MEDICARE

## 2019-01-10 DIAGNOSIS — E11.9 DIABETIC EYE EXAM: Primary | ICD-10-CM

## 2019-01-10 DIAGNOSIS — Z13.5 SCREENING FOR EYE CONDITION: ICD-10-CM

## 2019-01-10 DIAGNOSIS — H57.11 EYE PAIN, RIGHT: ICD-10-CM

## 2019-01-10 DIAGNOSIS — H52.4 PRESBYOPIA OF BOTH EYES: ICD-10-CM

## 2019-01-10 DIAGNOSIS — H25.13 NUCLEAR SCLEROSIS, BILATERAL: ICD-10-CM

## 2019-01-10 DIAGNOSIS — H52.03 HYPERMETROPIA OF BOTH EYES: ICD-10-CM

## 2019-01-10 DIAGNOSIS — E11.9 TYPE 2 DIABETES MELLITUS WITHOUT RETINOPATHY: ICD-10-CM

## 2019-01-10 DIAGNOSIS — Z01.00 DIABETIC EYE EXAM: Primary | ICD-10-CM

## 2019-01-10 DIAGNOSIS — I10 ESSENTIAL HYPERTENSION: ICD-10-CM

## 2019-01-10 DIAGNOSIS — H26.9 CORTICAL CATARACT OF BOTH EYES: ICD-10-CM

## 2019-01-10 PROCEDURE — 92015 PR REFRACTION: ICD-10-PCS | Mod: S$GLB,,, | Performed by: OPTOMETRIST

## 2019-01-10 PROCEDURE — 99999 PR PBB SHADOW E&M-EST. PATIENT-LVL III: ICD-10-PCS | Mod: PBBFAC,,, | Performed by: OPTOMETRIST

## 2019-01-10 PROCEDURE — 99999 PR PBB SHADOW E&M-EST. PATIENT-LVL III: CPT | Mod: PBBFAC,,, | Performed by: OPTOMETRIST

## 2019-01-10 PROCEDURE — 92014 PR EYE EXAM, EST PATIENT,COMPREHESV: ICD-10-PCS | Mod: S$GLB,,, | Performed by: OPTOMETRIST

## 2019-01-10 PROCEDURE — 92015 DETERMINE REFRACTIVE STATE: CPT | Mod: S$GLB,,, | Performed by: OPTOMETRIST

## 2019-01-10 PROCEDURE — 92014 COMPRE OPH EXAM EST PT 1/>: CPT | Mod: S$GLB,,, | Performed by: OPTOMETRIST

## 2019-01-10 PROCEDURE — 99213 OFFICE O/P EST LOW 20 MIN: CPT | Mod: PBBFAC | Performed by: OPTOMETRIST

## 2019-01-10 NOTE — PROGRESS NOTES
HPI     Diabetic Eye Exam      Additional comments: Diabetic eye examination.   Diabetes x 5+ years  Taking Tulicity.    Wears reading glasses only              Comments     Patient states returns for diabetic eye exam. Pt complains of night   driving         Patient's age: 73 y.o. AA female  Approximate date of last eye examination:  12/07/2017  Name of last eye doctor seen: Dr. Wasserman  Wears glasses? OTC reading glasses      If yes, wears  Full-time or part-time?  Part-time +2.00  Present glasses are: Bifocal, SV Distance, SV Reading?  SV  Got new glasses following last exam, or subsequently?:  no   Any problem with VA with glasses?  No   Wears CLs?:  no  Headaches?  no  Eye pain/discomfort?  No                                                                                   Flashes?  No   Floaters?  Occasionally    Diplopia/Double vision?  no  Patient's Ocular History:         Any eye surgeries? no         Any eye injury?  No          Any treatment for eye disease?  No   Family history of eye disease?  Mother: detached retina and cataracts  Significant patient medical history:         1. Diabetes?  Yes.  X 7 years       If yes, IDDM or NIDDM? IDDM. BS was 156 this morning    2. HBP?  Yes, controlled               3. Other (describe):  High cholesteral.  Takes meds.  States   well controlled   ! OTC eyedrops currently using:  no   ! Prescription eye meds currently using:  no   ! Any history of allergy/adverse reaction to any eye meds used   previously?  no    ! Any history of allergy/adverse reaction to eyedrops used during prior   eye exam(s)? no    ! Any history of allergy/adverse reaction to Novacaine or similar meds?   no   ! Any history of allergy/adverse reaction to Epinephrine or similar meds?   no    ! Patient okay with use of anesthetic eyedrops to check eye pressure?    yes        ! Patient okay with use of eyedrops to dilate pupils today?  yes   !  Allergies/Medications/Medical History/Family  "History reviewed today?    yes      PD =   68/64  Desired reading distance =  15.5"                                                                       Last edited by Manjinder Wasserman, OD on 1/10/2019  1:39 PM. (History)            Assessment /Plan     For exam results, see Encounter Report.    1. Diabetic eye exam     2. Type 2 diabetes mellitus without retinopathy     3. Nuclear sclerosis, bilateral     4. Cortical cataract of both eyes     5. Essential hypertension     6. Screening for eye condition     7. Eye pain, right     8. Hypermetropia of both eyes     9. Presbyopia of both eyes                  Early nuclear sclerosis of lens of each eye, and early peripheral cortical cataract in both eyes.  Still no need for cataract surgery in either eye.  Otherwise, good ocular health in each eye.   No evidence of diabetic or hypertensive retinopathy.     Ms. Ibarra reports periodic episodes of pain/discomfort in the right eye.  No apparent cause for symptoms found on examination.      Hyperopia in each eye, greater in the right eye than in the left eye,  with satisfactory best-correctable VA in each eye.  Presbyopia consistent with age.    Spectacle lens Rx issued for use as desired.   Ms. Ibarra previously noted "droopy" eyelids bilaterally.  No evidence of restriction of superior visual field on confrontation visual field.  No compelling need for ptosis repair surgery.     Recheck in one year, or prior if any problems in the interim.            "

## 2019-01-10 NOTE — PATIENT INSTRUCTIONS
"Early nuclear sclerosis of lens of each eye, and early peripheral cortical cataract in both eyes.  Still no need for cataract surgery in either eye.  Otherwise, good ocular health in each eye.   No evidence of diabetic or hypertensive retinopathy.     Ms. Ibarra reports periodic episodes of pain/discomfort in the right eye.  No apparent cause for symptoms found on examination.      Hyperopia in each eye, greater in the right eye than in the left eye,  with satisfactory best-correctable VA in each eye.  Presbyopia consistent with age.    Spectacle lens Rx issued for use as desired.   Ms. Ibarra previously noted "droopy" eyelids bilaterally.  No evidence of restriction of superior visual field on confrontation visual field.  No compelling need for ptosis repair surgery.     Recheck in one year, or prior if any problems in the interim.      "

## 2019-01-24 ENCOUNTER — CLINICAL SUPPORT (OUTPATIENT)
Dept: DIABETES | Facility: CLINIC | Age: 74
End: 2019-01-24
Payer: MEDICARE

## 2019-01-24 ENCOUNTER — TELEPHONE (OUTPATIENT)
Dept: ENDOCRINOLOGY | Facility: CLINIC | Age: 74
End: 2019-01-24

## 2019-01-24 ENCOUNTER — LAB VISIT (OUTPATIENT)
Dept: LAB | Facility: HOSPITAL | Age: 74
End: 2019-01-24
Payer: MEDICARE

## 2019-01-24 ENCOUNTER — IMMUNIZATION (OUTPATIENT)
Dept: PHARMACY | Facility: CLINIC | Age: 74
End: 2019-01-24
Payer: MEDICARE

## 2019-01-24 DIAGNOSIS — E11.42 TYPE 2 DIABETES MELLITUS WITH DIABETIC POLYNEUROPATHY, WITH LONG-TERM CURRENT USE OF INSULIN: Chronic | ICD-10-CM

## 2019-01-24 DIAGNOSIS — E78.5 HYPERLIPIDEMIA LDL GOAL <70: Chronic | ICD-10-CM

## 2019-01-24 DIAGNOSIS — Z79.4 TYPE 2 DIABETES MELLITUS WITH HYPERGLYCEMIA, WITH LONG-TERM CURRENT USE OF INSULIN: Chronic | ICD-10-CM

## 2019-01-24 DIAGNOSIS — E11.65 TYPE 2 DIABETES MELLITUS WITH HYPERGLYCEMIA, WITH LONG-TERM CURRENT USE OF INSULIN: Chronic | ICD-10-CM

## 2019-01-24 DIAGNOSIS — E11.65 TYPE 2 DIABETES MELLITUS WITH HYPERGLYCEMIA, WITH LONG-TERM CURRENT USE OF INSULIN: Primary | Chronic | ICD-10-CM

## 2019-01-24 DIAGNOSIS — Z79.4 TYPE 2 DIABETES MELLITUS WITH HYPERGLYCEMIA, WITH LONG-TERM CURRENT USE OF INSULIN: Primary | Chronic | ICD-10-CM

## 2019-01-24 DIAGNOSIS — Z79.4 TYPE 2 DIABETES MELLITUS WITH DIABETIC POLYNEUROPATHY, WITH LONG-TERM CURRENT USE OF INSULIN: Chronic | ICD-10-CM

## 2019-01-24 LAB
CHOLEST SERPL-MCNC: 180 MG/DL
CHOLEST/HDLC SERPL: 3.8 {RATIO}
ESTIMATED AVG GLUCOSE: 183 MG/DL
HBA1C MFR BLD HPLC: 8 %
HDLC SERPL-MCNC: 48 MG/DL
HDLC SERPL: 26.7 %
LDLC SERPL CALC-MCNC: 110.2 MG/DL
NONHDLC SERPL-MCNC: 132 MG/DL
TRIGL SERPL-MCNC: 109 MG/DL

## 2019-01-24 PROCEDURE — G0108 PR DIAB MANAGE TRN  PER INDIV: ICD-10-PCS | Mod: S$GLB,,, | Performed by: INTERNAL MEDICINE

## 2019-01-24 PROCEDURE — G0108 DIAB MANAGE TRN  PER INDIV: HCPCS | Mod: S$GLB,,, | Performed by: INTERNAL MEDICINE

## 2019-01-24 PROCEDURE — 99999 PR PBB SHADOW E&M-EST. PATIENT-LVL II: ICD-10-PCS | Mod: PBBFAC,,,

## 2019-01-24 PROCEDURE — 83036 HEMOGLOBIN GLYCOSYLATED A1C: CPT

## 2019-01-24 PROCEDURE — 36415 COLL VENOUS BLD VENIPUNCTURE: CPT

## 2019-01-24 PROCEDURE — 99999 PR PBB SHADOW E&M-EST. PATIENT-LVL II: CPT | Mod: PBBFAC,,,

## 2019-01-24 PROCEDURE — 80061 LIPID PANEL: CPT

## 2019-01-24 RX ORDER — ATORVASTATIN CALCIUM 80 MG/1
80 TABLET, FILM COATED ORAL DAILY
Qty: 90 TABLET | Refills: 3 | Status: SHIPPED | OUTPATIENT
Start: 2019-01-24 | End: 2020-09-16 | Stop reason: SDUPTHER

## 2019-01-24 NOTE — PROGRESS NOTES
Diabetes Education  Author: Cynthia Perry RD, CDE  Date: 1/24/2019    Diabetes Care Management Summary  Diabetes Education Record Assessment/Progress: Post Program/Follow-up(Patient was last seen July 2018)     Diabetes Type  Diabetes Type : Type II    Diabetes History  Diabetes Diagnosis: >10 years  Current Treatment: Oral Medication, Injectable, Insulin    Health Maintenance was reviewed today with patient. Discussed with patient importance of routine eye exams, foot exams/foot care, blood work (i.e.: A1c, microalbumin, and lipid), dental visits, yearly flu vaccine, and pneumonia vaccine as indicated by PCP. Patient verbalized understanding.     Health Maintenance Topics with due status: Not Due       Topic Last Completion Date    TETANUS VACCINE 07/22/2015    Colonoscopy 02/26/2016    Lipid Panel 03/15/2018    DEXA SCAN 06/21/2018    Foot Exam 08/09/2018    Mammogram 08/09/2018    Hemoglobin A1c 10/11/2018    Eye Exam 01/10/2019    High Dose Statin 01/13/2019     Health Maintenance Due   Topic Date Due    Urine Microalbumin  03/15/2019     Nutrition  Meal Planning: skipping meals, water, drinks regular soda, eats out seldom(Tends to skip lunch, may have a snack - banana; very rare, but may have a root beer)  What type of sweetener do you use?: none  What type of beverages do you drink?: water, other (see comments), sport drinks(G2, lemonade)    Monitoring   Self Monitoring : (Checks 2 times a day - states she run 100-140s)  Blood Glucose Logs: No  Do you use a personal continuous glucose monitor?: No  In the last month, how often have you had a low blood sugar reaction?: never  What are your symptoms of low blood sugar?: (Shaky, sweaty)  How do you treat low blood sugar?: (Lemonade or juice)  Can you tell when your blood sugar is too high?: no  How do you treat high blood sugar?: (None)    Exercise   Exercise Type: (Takes care of grandkids 6 days a week)    Current Diabetes Treatment   Current Treatment: Oral  Medication, Injectable, Insulin    Diabetes Education Assessment/Progress  Diabetes Disease Process (diabetes disease process and treatment options): Instructed, Discussion, Individual Session, Written Materials Provided  Nutrition (Incorporating nutritional management into one's lifestyle): Instructed, Discussion, Individual Session, Written Materials Provided(Reviewed general nutrition guidelines and plate method)  Physical Activity (incorporating physical activity into one's lifestyle): Instructed, Discussion, Individual Session, Written Materials Provided(Reviewed goals and benefits)  Medications (states correct name, dose, onset, peak, duration, side effects & timing of meds): Instructed, Discussion, Individual Session, Written Materials Provided(Reviewed medication regimen)  Monitoring (monitoring blood glucose/other parameters & using results): Instructed, Discussion, Individual Session, Written Materials Provided(Reviewed SMBG schedule and BG goals)  Acute Complications (preventing, detecting, and treating acute complications): Instructed, Discussion, Individual Session, Written Materials Provided(Reviewed s/s and treatment of hypoglycemia)  Chronic Complications (preventing, detecting, and treating chronic complications): Instructed, Discussion, Individual Session, Written Materials Provided(Reviewed standards of care)  Clinical (diabetes, other pertinent medical history, and relevant comorbidities reviewed during visit): Instructed, Discussion, Individual Session  Cognitive (knowledge of self-management skills, functional health literacy): Instructed, Discussion, Individual Session  Psychosocial (emotional response to diabetes): Instructed, Discussion, Individual Session  Diabetes Distress and Support Systems: Instructed, Discussion, Individual Session  Behavioral (readiness for change, lifestyle practices, self-care behaviors): Instructed, Discussion, Individual Session    Goals  Patient has  selected/evaluated goals during today's session: Yes, evaluated  Physical Activity: In Progress    Diabetes Self-Management Support Plan  Exercise/Nutrition: other(Track steps with phone clarita)  Review Status: Patient has selected and agrees to support plan.    Diabetes Meal Plan  Restrictions: Restricted Carbohydrate    Today's Self-Management Care Plan was developed with the patient's input and is based on barriers identified during today's assessment.    The long and short-term goals in the care plan were written with the patient/caregiver's input. The patient has agreed to work toward these goals to improve her overall diabetes control.      The patient received a copy of today's self-management plan and verbalized understanding of the care plan, goals, and all of today's instructions.      The patient was encouraged to communicate with her physician and care team regarding her condition(s) and treatment.  I provided the patient with my contact information today and encouraged her to contact me via phone or patient portal as needed.     Education Units of Time   Time Spent: 45 min

## 2019-01-30 PROBLEM — R06.2 WHEEZING: Status: RESOLVED | Noted: 2019-01-01 | Resolved: 2019-01-30

## 2019-01-30 PROBLEM — R07.89 ATYPICAL CHEST PAIN: Status: RESOLVED | Noted: 2019-01-01 | Resolved: 2019-01-30

## 2019-01-30 NOTE — PROGRESS NOTES
"CHIEF COMPLAINT: Type 2 Diabetes     HPI: Mrs. Buck Ibarra is a 73 y.o. female who was diagnosed with Type 2 DM x 5-6 years.    Pt was last seen by me in October 2018 and is now being seen by me again today.  Doing well with trulicity addition.  ED visit Jan 1st for wheezing, atypical chest pain-treated w/ nebulizer, nitro, levaquin.   Pt has been dealing with cough x 4 weeks.    Lab Results   Component Value Date    HGBA1C 8.0 (H) 01/24/2019   Pt checks bg 3 times a day-max.    100-150 --- am    <200 bedtime    Retired . Has children (5), 16 grandchildren, babysits grandchildren---age  2, 8, 11, has 10 great grandchildren.    PREVIOUS DIABETES MEDICATIONS TRIED  novolog  vgo--did not feel right on it.  victoza   invokana-SEs /dehydration episode in 2017    CURRENT DIABETIC MEDS: trulicity 1.5 mg weekly, tresiba 20 units qhs, metformin 500 mg bid    Needs >100 strips per month related to fluctuations with blood glucose reading, a1c trends, and activity level.    Last Podiatry Exam: Dr Terrazas 8/9/2018     REVIEW OF SYSTEMS  General: no weakness, fatigue, +weight gain 5#   Eyes: no double or blurred vision, eye pain, or redness; Last Eye Exam=1/10/19  Cardiovascular: no chest pain, palpitations, edema, or murmurs.   Respiratory: + cough or dyspnea.   GI: no heartburn, nausea, +diarrhea w/ metformin; good appetite.   Skin: no rashes, dryness, itching, or reactions at insulin injection sites.  Neuro: + numbness, tingling, tremors, or vertigo.   Endocrine: no polyuria, polydipsia, polyphagia, heat or cold intolerance.     Vital Signs  /81 (BP Location: Right arm, Patient Position: Sitting, BP Method: Medium (Manual))   Pulse 74   Ht 5' 5" (1.651 m)   Wt 92.9 kg (204 lb 12.9 oz)   BMI 34.08 kg/m²     Hemoglobin A1C   Date Value Ref Range Status   01/24/2019 8.0 (H) 4.0 - 5.6 % Final     Comment:     ADA Screening Guidelines:  5.7-6.4%  Consistent with prediabetes  >or=6.5%  Consistent with " diabetes  High levels of fetal hemoglobin interfere with the HbA1C  assay. Heterozygous hemoglobin variants (HbS, HgC, etc)do  not significantly interfere with this assay.   However, presence of multiple variants may affect accuracy.     10/04/2018 8.0 (H) 4.0 - 5.6 % Final     Comment:     ADA Screening Guidelines:  5.7-6.4%  Consistent with prediabetes  >or=6.5%  Consistent with diabetes  High levels of fetal hemoglobin interfere with the HbA1C  assay. Heterozygous hemoglobin variants (HbS, HgC, etc)do  not significantly interfere with this assay.   However, presence of multiple variants may affect accuracy.     06/07/2018 10.5 (H) 4.0 - 5.6 % Final     Comment:     ADA Screening Guidelines:  5.7-6.4%  Consistent with prediabetes  >or=6.5%  Consistent with diabetes  High levels of fetal hemoglobin interfere with the HbA1C  assay. Heterozygous hemoglobin variants (HbS, HgC, etc)do  not significantly interfere with this assay.   However, presence of multiple variants may affect accuracy.          Chemistry        Component Value Date/Time     01/01/2019 1854    K 3.7 01/01/2019 1854     01/01/2019 1854    CO2 27 01/01/2019 1854    BUN 21 01/01/2019 1854    CREATININE 1.0 01/01/2019 1854     (H) 01/01/2019 1854        Component Value Date/Time    CALCIUM 9.6 01/01/2019 1854    ALKPHOS 105 01/01/2019 1854    AST 19 01/01/2019 1854    ALT 19 01/01/2019 1854    BILITOT 0.2 01/01/2019 1854    ESTGFRAFRICA >60.0 01/01/2019 1854    EGFRNONAA 56.8 (A) 01/01/2019 1854           Lab Results   Component Value Date    TSH 1.168 06/07/2018      Lab Results   Component Value Date    CHOL 180 01/24/2019    CHOL 118 (L) 03/15/2018    CHOL 149 12/04/2017     Lab Results   Component Value Date    HDL 48 01/24/2019    HDL 35 (L) 03/15/2018    HDL 40 12/04/2017     Lab Results   Component Value Date    LDLCALC 110.2 01/24/2019    LDLCALC 65.4 03/15/2018    LDLCALC 88.4 12/04/2017     Lab Results   Component Value  Date    TRIG 109 01/24/2019    TRIG 88 03/15/2018    TRIG 103 12/04/2017     Lab Results   Component Value Date    CHOLHDL 26.7 01/24/2019    CHOLHDL 29.7 03/15/2018    CHOLHDL 26.8 12/04/2017         PHYSICAL EXAMINATION  Constitutional: Appears well, no distress  Neck: Supple, trachea midline.   Respiratory: +CO bilaterally, no wheezes, even and unlabored.  Cardiovascular: RRR; no carotid bruits or murmurs; (+) DP pulses; no edema.   Lymph: no lymphadenopathy palpated  Skin: warm and dry; no injection site reactions, + acanthosis nigracans observed.  Neuro:patient alert and cooperative, normal affect; steady gait.    Diabetes Foot Exam:   deferred      Assessment/Plan  1. Type 2 diabetes mellitus with diabetic polyneuropathy, with long-term current use of insulin  Hemoglobin A1c next time  F/u in 3-4 mos  a1c goal less than 7.5%  Discussed meter > 4 years old  Issue new meter-sent to [facility]  Increase tresiba to 22 units at night  Continue metformin 500 mg bid-do not titrate r/t diarrhea  Continue trulicity 1.5 mg weekly     Notify office if sugars trend up, may need novolog correction scale-message via office line     2. Benign essential hypertension  Controlled, continue med(s)   3. CKD stage 2 due to type 2 diabetes mellitus  Continue to monitor   4. Hyperlipidemia LDL goal <70  Lab Results   Component Value Date    LDLCALC 110.2 01/24/2019     Slightly above goal   On atorvastatin 80 mg qhs   5. Multinodular goiter  Done 12/2017, check q 2 years   6. Insomnia, unspecified type  May increase insulin resistance   7. Tobacco use  Discussed cessation        FOLLOW UP  Follow-up in about 4 months (around 5/31/2019).

## 2019-01-31 ENCOUNTER — OFFICE VISIT (OUTPATIENT)
Dept: ENDOCRINOLOGY | Facility: CLINIC | Age: 74
End: 2019-01-31
Payer: MEDICARE

## 2019-01-31 VITALS
BODY MASS INDEX: 34.12 KG/M2 | HEART RATE: 74 BPM | HEIGHT: 65 IN | DIASTOLIC BLOOD PRESSURE: 81 MMHG | WEIGHT: 204.81 LBS | SYSTOLIC BLOOD PRESSURE: 123 MMHG

## 2019-01-31 DIAGNOSIS — Z72.0 TOBACCO USE: ICD-10-CM

## 2019-01-31 DIAGNOSIS — I10 BENIGN ESSENTIAL HYPERTENSION: ICD-10-CM

## 2019-01-31 DIAGNOSIS — Z79.4 TYPE 2 DIABETES MELLITUS WITH DIABETIC POLYNEUROPATHY, WITH LONG-TERM CURRENT USE OF INSULIN: Primary | Chronic | ICD-10-CM

## 2019-01-31 DIAGNOSIS — G47.00 INSOMNIA, UNSPECIFIED TYPE: ICD-10-CM

## 2019-01-31 DIAGNOSIS — E11.42 TYPE 2 DIABETES MELLITUS WITH DIABETIC POLYNEUROPATHY: Chronic | ICD-10-CM

## 2019-01-31 DIAGNOSIS — E78.5 HYPERLIPIDEMIA LDL GOAL <70: Chronic | ICD-10-CM

## 2019-01-31 DIAGNOSIS — E04.2 MULTINODULAR GOITER: ICD-10-CM

## 2019-01-31 DIAGNOSIS — N18.2 CKD STAGE 2 DUE TO TYPE 2 DIABETES MELLITUS: Chronic | ICD-10-CM

## 2019-01-31 DIAGNOSIS — E11.22 CKD STAGE 2 DUE TO TYPE 2 DIABETES MELLITUS: Chronic | ICD-10-CM

## 2019-01-31 DIAGNOSIS — E11.42 TYPE 2 DIABETES MELLITUS WITH DIABETIC POLYNEUROPATHY, WITH LONG-TERM CURRENT USE OF INSULIN: Primary | Chronic | ICD-10-CM

## 2019-01-31 PROCEDURE — 99999 PR PBB SHADOW E&M-EST. PATIENT-LVL IV: ICD-10-PCS | Mod: PBBFAC,,, | Performed by: NURSE PRACTITIONER

## 2019-01-31 PROCEDURE — 99214 OFFICE O/P EST MOD 30 MIN: CPT | Mod: S$GLB,,, | Performed by: NURSE PRACTITIONER

## 2019-01-31 PROCEDURE — 3045F PR MOST RECENT HEMOGLOBIN A1C LEVEL 7.0-9.0%: CPT | Mod: CPTII,S$GLB,, | Performed by: NURSE PRACTITIONER

## 2019-01-31 PROCEDURE — 3074F SYST BP LT 130 MM HG: CPT | Mod: CPTII,S$GLB,, | Performed by: NURSE PRACTITIONER

## 2019-01-31 PROCEDURE — 3074F PR MOST RECENT SYSTOLIC BLOOD PRESSURE < 130 MM HG: ICD-10-PCS | Mod: CPTII,S$GLB,, | Performed by: NURSE PRACTITIONER

## 2019-01-31 PROCEDURE — 1101F PT FALLS ASSESS-DOCD LE1/YR: CPT | Mod: CPTII,S$GLB,, | Performed by: NURSE PRACTITIONER

## 2019-01-31 PROCEDURE — 99214 PR OFFICE/OUTPT VISIT, EST, LEVL IV, 30-39 MIN: ICD-10-PCS | Mod: S$GLB,,, | Performed by: NURSE PRACTITIONER

## 2019-01-31 PROCEDURE — 3045F PR MOST RECENT HEMOGLOBIN A1C LEVEL 7.0-9.0%: ICD-10-PCS | Mod: CPTII,S$GLB,, | Performed by: NURSE PRACTITIONER

## 2019-01-31 PROCEDURE — 99999 PR PBB SHADOW E&M-EST. PATIENT-LVL IV: CPT | Mod: PBBFAC,,, | Performed by: NURSE PRACTITIONER

## 2019-01-31 PROCEDURE — 3079F PR MOST RECENT DIASTOLIC BLOOD PRESSURE 80-89 MM HG: ICD-10-PCS | Mod: CPTII,S$GLB,, | Performed by: NURSE PRACTITIONER

## 2019-01-31 PROCEDURE — 1101F PR PT FALLS ASSESS DOC 0-1 FALLS W/OUT INJ PAST YR: ICD-10-PCS | Mod: CPTII,S$GLB,, | Performed by: NURSE PRACTITIONER

## 2019-01-31 PROCEDURE — 3079F DIAST BP 80-89 MM HG: CPT | Mod: CPTII,S$GLB,, | Performed by: NURSE PRACTITIONER

## 2019-01-31 RX ORDER — LANCETS 33 GAUGE
EACH MISCELLANEOUS
Qty: 300 EACH | Refills: 3 | Status: SHIPPED | OUTPATIENT
Start: 2019-01-31 | End: 2019-04-04

## 2019-01-31 RX ORDER — INSULIN PUMP SYRINGE, 3 ML
EACH MISCELLANEOUS
Qty: 1 EACH | Refills: 0 | Status: SHIPPED | OUTPATIENT
Start: 2019-01-31 | End: 2023-01-01

## 2019-01-31 RX ORDER — INSULIN PUMP SYRINGE, 3 ML
EACH MISCELLANEOUS
Qty: 1 EACH | Refills: 0 | Status: SHIPPED | OUTPATIENT
Start: 2019-01-31 | End: 2019-01-31 | Stop reason: SDUPTHER

## 2019-01-31 RX ORDER — LANCETS
EACH MISCELLANEOUS
Qty: 300 EACH | Refills: 3 | Status: SHIPPED | OUTPATIENT
Start: 2019-01-31 | End: 2019-04-04

## 2019-01-31 NOTE — TELEPHONE ENCOUNTER
----- Message from Pinky Kingsley sent at 1/31/2019  8:37 AM CST -----  Contact: [facility] [phone]  [facility] is not able to bill insurance for Meter, test strips and lancets. pls send to  [facility], [facility] or [facility] the preferred pharmacy.

## 2019-01-31 NOTE — PATIENT INSTRUCTIONS
Snacks can be an important part of a balanced, healthy meal plan. They allow you to eat more frequently, feeling full and satisfied throughout the day. Also, they allow you to spread carbohydrates evenly, which may stabilize blood sugars.  Plus, snacks are enjoyable!     The amount of carbohydrate needed at snacks varies. Generally, about 15-30 grams of carbohydrate per snack is recommended.  Below you will find some tasty treats.       0-5 gm carb   Crystal Light   Vitamin Water Zero   Herbal tea, unsweetened   2 tsp peanut butter on celery   1./2 cup sugar-free jell-o   1 sugar-free popsicle   ¼ cup blueberries   8oz Blue Viola unsweetened almond milk   5 baby carrots & celery sticks, cucumbers, bell peppers dipped in ¼ cup salsa, 2Tbsp light ranch dressing or 2Tbsp plain Greek yogurt   10 Goldfish crackers   ½ oz low-fat cheese or string cheese   1 closed handful of nuts, unsalted   1 Tbsp of sunflower seeds, unsalted   1 cup Smart Pop popcorn   1 whole grain brown rice cake        15 gm carb   1 small piece of fruit or ½ banana or 1/2 cup lite canned fruit   3 octavia cracker squares   3 cups Smart Pop popcorn, top spray butter, Dodson lite salt or cinnamon and Truvia   5 Vanilla Wafers   ½ cup low fat, no added sugar ice cream or frozen yogurt (Blue bell, Blue Bunny, Weight Watchers, Skinny Cow)   ½ turkey, ham, or chicken sandwich   ½ c fruit with ½ c Cottage cheese   4-6 unsalted wheat crackers with 1 oz low fat cheese or 1 tbsp peanut butter    30-45 goldfish crackers (depending on flavor)    7-8 Jewish mini brown rice cakes (caramel, apple cinnamon, chocolate)    12 Jewish mini brown rice cakes (cheddar, bbq, ranch)    1/3 cup hummus dip with raw veg   1/2 whole wheat rose, 1Tbsp hummus   Mini Pizza (1/2 whole wheat English muffin, low-fat  cheese, tomato sauce)   100 calorie snack pack (Oreo, Chips Ahoy, Ritz Mix, Baked Cheetos)   4-6 oz. light or Greek Style yogurt  (Dago, Gaurang, Kenia, [facility])   ½ cup sugar-free pudding     6 in. wheat tortilla or rose oven toasted chips (topped with spray butter flavoring, cinnamon, Truvia OR spray butter, garlic powder, chili powder)    18 BBQ Popchips (available at [employer], Whole Foods, Fresh Market)                 Diabetes Support Group Meetings         Date: Topic:   February 14 Eat Fit [employer]SCAR/Health Promotion   March 14 Taking Care of Your Smile   April 11 Spring into Healthy Eating/Cooking Demo   May 9 Ease Your Mind with Diabetes   Gini 13 Summer Treats/Cooking Demo   July 11 Eat Fit [employer]/Super Market Sweep   August 8 Taking Care of Your Eyes and Feet   Sept 12 Technology/ADA updates   October 10 Recipes & Treats/Cooking Demo   November 14 Heart Health/Pump it up!   December 12 Year-End Close Out        Meetings are held in the [facility] (A) of the [facility] located at [address] [place] [address]. Please call [phone] for additional information.    Free service, offered every 2nd Thursday of every month! Family members and/or friends are welcome as well!  Support group is for patients with type 1 or type 2 diabetes.    From 3:30p to 4:30p

## 2019-02-07 ENCOUNTER — TELEPHONE (OUTPATIENT)
Dept: INTERNAL MEDICINE | Facility: CLINIC | Age: 74
End: 2019-02-07

## 2019-02-07 DIAGNOSIS — G47.00 INSOMNIA, UNSPECIFIED TYPE: Primary | ICD-10-CM

## 2019-02-07 NOTE — TELEPHONE ENCOUNTER
----- Message from Farhana Schilling sent at 2/7/2019 11:40 AM CST -----  Contact: [phone]  Patient is requesting a call from the office in regards to her medical problems. Patient didn't say what..    Please advise, thanks

## 2019-02-07 NOTE — TELEPHONE ENCOUNTER
Called and spoke to pt and she stated that she hasn't been able to sleep in three days.  Pt stated she feels drunk and hasn't a drink in she doesn't know how long. She states she's been off balance from not sleeping she feels like she's going crazy. Pt stated Dr. Cortez prescribed her some medication years ago for not sleeping and she didn't really take them but now she needs them. Wants to know if Dr. Cortez can prescribe her that medication again.

## 2019-02-08 RX ORDER — TRAZODONE HYDROCHLORIDE 50 MG/1
50-100 TABLET ORAL NIGHTLY
Qty: 60 TABLET | Refills: 3 | Status: SHIPPED | OUTPATIENT
Start: 2019-02-08 | End: 2021-12-02

## 2019-02-11 ENCOUNTER — TELEPHONE (OUTPATIENT)
Dept: ENDOCRINOLOGY | Facility: CLINIC | Age: 74
End: 2019-02-11

## 2019-02-11 NOTE — TELEPHONE ENCOUNTER
----- Message from Mayra Nuñez RN sent at 2/11/2019  1:52 PM CST -----  Contact:     Patient   [phone]      ----- Message -----  From: Humaira Michael  Sent: 2/11/2019   9:49 AM  To: [facility]olla Staff    Needs Advice    Reason for call:   Patient  Received an letter to schedule an appt with the Dr,  Pt requesting an Thursday early morning ,  Give the pt a call back to schedule          Communication Preference:  Phone      Additional Information:

## 2019-03-28 RX ORDER — INSULIN DEGLUDEC 200 U/ML
INJECTION, SOLUTION SUBCUTANEOUS
Qty: 15 ML | Refills: 11 | Status: SHIPPED | OUTPATIENT
Start: 2019-03-28 | End: 2019-04-17 | Stop reason: SDUPTHER

## 2019-03-28 RX ORDER — ESOMEPRAZOLE MAGNESIUM 40 MG/1
CAPSULE, DELAYED RELEASE ORAL
Qty: 30 CAPSULE | Refills: 11 | Status: SHIPPED | OUTPATIENT
Start: 2019-03-28 | End: 2020-05-14

## 2019-04-03 ENCOUNTER — TELEPHONE (OUTPATIENT)
Dept: INTERNAL MEDICINE | Facility: CLINIC | Age: 74
End: 2019-04-03

## 2019-04-03 DIAGNOSIS — E11.42 TYPE 2 DIABETES MELLITUS WITH DIABETIC POLYNEUROPATHY: Chronic | ICD-10-CM

## 2019-04-03 DIAGNOSIS — Z79.4 DIABETES MELLITUS TYPE 2, INSULIN DEPENDENT: Primary | ICD-10-CM

## 2019-04-03 DIAGNOSIS — E11.9 DIABETES MELLITUS TYPE 2, INSULIN DEPENDENT: Primary | ICD-10-CM

## 2019-04-03 NOTE — TELEPHONE ENCOUNTER
----- Message from Mary Barnett sent at 4/3/2019  2:28 PM CDT -----  Contact: [facility] [facility]/[phone]/ [phone]  Diabetic or Medical Supplies.  What supplies are needed: medical suplies - strips and lancets  What is the brand name of the supplies:   Is this a refill or new prescription:    Who prescribed the supplies: Dr Cortez   Pharmacy or company name, phone # and location:  [facility] [facility]/[phone]/ [phone]  Comments: [facility] is asking for necessity form, doctors notes, and orders to be fax to [employer]. Thank you

## 2019-04-04 RX ORDER — LANCETS 33 GAUGE
EACH MISCELLANEOUS
Qty: 300 EACH | Refills: 3 | Status: SHIPPED | OUTPATIENT
Start: 2019-04-04 | End: 2019-04-17 | Stop reason: SDUPTHER

## 2019-04-17 DIAGNOSIS — Z79.4 DIABETES MELLITUS TYPE 2, INSULIN DEPENDENT: ICD-10-CM

## 2019-04-17 DIAGNOSIS — E11.9 DIABETES MELLITUS TYPE 2, INSULIN DEPENDENT: ICD-10-CM

## 2019-04-17 DIAGNOSIS — E11.42 TYPE 2 DIABETES MELLITUS WITH DIABETIC POLYNEUROPATHY: Chronic | ICD-10-CM

## 2019-04-17 NOTE — TELEPHONE ENCOUNTER
[employer] would like another rx for pt diabetic supplies faxed over. Please see form on desk also

## 2019-04-17 NOTE — TELEPHONE ENCOUNTER
----- Message from Farhana Schilling sent at 4/17/2019  4:02 PM CDT -----  Contact: [employer] [facility] [phone]  Buddy from [employer] is requesting new orders for patients diabetic supplies.    Fax# [phone]

## 2019-04-17 NOTE — TELEPHONE ENCOUNTER
----- Message from Farhana Schilling sent at 4/17/2019  4:02 PM CDT -----  Contact: [employer] [phone]  Buddy from [employer] is requesting new orders for patients diabetic supplies.    Fax# [phone]

## 2019-04-18 RX ORDER — INSULIN DEGLUDEC 200 U/ML
INJECTION, SOLUTION SUBCUTANEOUS
Qty: 15 ML | Refills: 11 | Status: SHIPPED | OUTPATIENT
Start: 2019-04-18 | End: 2019-04-25 | Stop reason: SDUPTHER

## 2019-04-18 RX ORDER — LANCETS 33 GAUGE
EACH MISCELLANEOUS
Qty: 300 EACH | Refills: 3 | Status: SHIPPED | OUTPATIENT
Start: 2019-04-18 | End: 2023-01-01

## 2019-04-20 ENCOUNTER — HOSPITAL ENCOUNTER (INPATIENT)
Facility: HOSPITAL | Age: 74
LOS: 1 days | Discharge: HOME | DRG: 103 | End: 2019-04-21
Attending: EMERGENCY MEDICINE | Admitting: HOSPITALIST
Payer: MEDICARE

## 2019-04-20 DIAGNOSIS — Z82.69: ICD-10-CM

## 2019-04-20 DIAGNOSIS — I15.2 HYPERTENSION ASSOCIATED WITH DIABETES: ICD-10-CM

## 2019-04-20 DIAGNOSIS — I10 HTN (HYPERTENSION): ICD-10-CM

## 2019-04-20 DIAGNOSIS — E11.59 HYPERTENSION ASSOCIATED WITH DIABETES: ICD-10-CM

## 2019-04-20 DIAGNOSIS — M31.6 GCA (GIANT CELL ARTERITIS): Primary | ICD-10-CM

## 2019-04-20 DIAGNOSIS — M31.6 GIANT CELL ARTERITIS: ICD-10-CM

## 2019-04-20 DIAGNOSIS — R51.9 ACUTE NONINTRACTABLE HEADACHE, UNSPECIFIED HEADACHE TYPE: ICD-10-CM

## 2019-04-20 PROBLEM — I16.0 HYPERTENSIVE URGENCY: Status: ACTIVE | Noted: 2019-04-20

## 2019-04-20 LAB
ANION GAP SERPL CALC-SCNC: 9 MMOL/L (ref 8–16)
BASOPHILS # BLD AUTO: 0.04 K/UL (ref 0–0.2)
BASOPHILS NFR BLD: 0.5 % (ref 0–1.9)
BUN SERPL-MCNC: 14 MG/DL (ref 8–23)
CALCIUM SERPL-MCNC: 9.5 MG/DL (ref 8.7–10.5)
CHLORIDE SERPL-SCNC: 105 MMOL/L (ref 95–110)
CO2 SERPL-SCNC: 27 MMOL/L (ref 23–29)
CREAT SERPL-MCNC: 0.8 MG/DL (ref 0.5–1.4)
CRP SERPL-MCNC: 3.3 MG/L (ref 0–8.2)
DIFFERENTIAL METHOD: ABNORMAL
EOSINOPHIL # BLD AUTO: 0.1 K/UL (ref 0–0.5)
EOSINOPHIL NFR BLD: 0.9 % (ref 0–8)
ERYTHROCYTE [DISTWIDTH] IN BLOOD BY AUTOMATED COUNT: 14.8 % (ref 11.5–14.5)
ERYTHROCYTE [SEDIMENTATION RATE] IN BLOOD BY WESTERGREN METHOD: 71 MM/HR (ref 0–36)
EST. GFR  (AFRICAN AMERICAN): >60 ML/MIN/1.73 M^2
EST. GFR  (NON AFRICAN AMERICAN): >60 ML/MIN/1.73 M^2
ESTIMATED AVG GLUCOSE: 166 MG/DL (ref 68–131)
GLUCOSE SERPL-MCNC: 100 MG/DL (ref 70–110)
HBA1C MFR BLD HPLC: 7.4 % (ref 4–5.6)
HCT VFR BLD AUTO: 44.3 % (ref 37–48.5)
HGB BLD-MCNC: 13.9 G/DL (ref 12–16)
IMM GRANULOCYTES # BLD AUTO: 0.04 K/UL (ref 0–0.04)
IMM GRANULOCYTES NFR BLD AUTO: 0.5 % (ref 0–0.5)
LYMPHOCYTES # BLD AUTO: 3.8 K/UL (ref 1–4.8)
LYMPHOCYTES NFR BLD: 44.2 % (ref 18–48)
MCH RBC QN AUTO: 28 PG (ref 27–31)
MCHC RBC AUTO-ENTMCNC: 31.4 G/DL (ref 32–36)
MCV RBC AUTO: 89 FL (ref 82–98)
MONOCYTES # BLD AUTO: 0.4 K/UL (ref 0.3–1)
MONOCYTES NFR BLD: 4.6 % (ref 4–15)
NEUTROPHILS # BLD AUTO: 4.2 K/UL (ref 1.8–7.7)
NEUTROPHILS NFR BLD: 49.3 % (ref 38–73)
NRBC BLD-RTO: 0 /100 WBC
PLATELET # BLD AUTO: 348 K/UL (ref 150–350)
PMV BLD AUTO: 10.3 FL (ref 9.2–12.9)
POCT GLUCOSE: 155 MG/DL (ref 70–110)
POTASSIUM SERPL-SCNC: 3.8 MMOL/L (ref 3.5–5.1)
RBC # BLD AUTO: 4.97 M/UL (ref 4–5.4)
SODIUM SERPL-SCNC: 141 MMOL/L (ref 136–145)
WBC # BLD AUTO: 8.56 K/UL (ref 3.9–12.7)

## 2019-04-20 PROCEDURE — 63600175 PHARM REV CODE 636 W HCPCS: Performed by: HOSPITALIST

## 2019-04-20 PROCEDURE — 93010 EKG 12-LEAD: ICD-10-PCS | Mod: ,,, | Performed by: INTERNAL MEDICINE

## 2019-04-20 PROCEDURE — 96376 TX/PRO/DX INJ SAME DRUG ADON: CPT

## 2019-04-20 PROCEDURE — 12000002 HC ACUTE/MED SURGE SEMI-PRIVATE ROOM

## 2019-04-20 PROCEDURE — 99284 PR EMERGENCY DEPT VISIT,LEVEL IV: ICD-10-PCS | Mod: ,,, | Performed by: EMERGENCY MEDICINE

## 2019-04-20 PROCEDURE — 96366 THER/PROPH/DIAG IV INF ADDON: CPT

## 2019-04-20 PROCEDURE — 96372 THER/PROPH/DIAG INJ SC/IM: CPT

## 2019-04-20 PROCEDURE — 82962 GLUCOSE BLOOD TEST: CPT

## 2019-04-20 PROCEDURE — 85025 COMPLETE CBC W/AUTO DIFF WBC: CPT

## 2019-04-20 PROCEDURE — 86140 C-REACTIVE PROTEIN: CPT

## 2019-04-20 PROCEDURE — 96375 TX/PRO/DX INJ NEW DRUG ADDON: CPT

## 2019-04-20 PROCEDURE — 99285 EMERGENCY DEPT VISIT HI MDM: CPT | Mod: 25

## 2019-04-20 PROCEDURE — 93005 ELECTROCARDIOGRAM TRACING: CPT

## 2019-04-20 PROCEDURE — 63600175 PHARM REV CODE 636 W HCPCS: Performed by: EMERGENCY MEDICINE

## 2019-04-20 PROCEDURE — 63600175 PHARM REV CODE 636 W HCPCS: Performed by: STUDENT IN AN ORGANIZED HEALTH CARE EDUCATION/TRAINING PROGRAM

## 2019-04-20 PROCEDURE — 96361 HYDRATE IV INFUSION ADD-ON: CPT

## 2019-04-20 PROCEDURE — 99284 EMERGENCY DEPT VISIT MOD MDM: CPT | Mod: ,,, | Performed by: EMERGENCY MEDICINE

## 2019-04-20 PROCEDURE — 96365 THER/PROPH/DIAG IV INF INIT: CPT

## 2019-04-20 PROCEDURE — 80048 BASIC METABOLIC PNL TOTAL CA: CPT

## 2019-04-20 PROCEDURE — 20600001 HC STEP DOWN PRIVATE ROOM

## 2019-04-20 PROCEDURE — 25000003 PHARM REV CODE 250: Performed by: STUDENT IN AN ORGANIZED HEALTH CARE EDUCATION/TRAINING PROGRAM

## 2019-04-20 PROCEDURE — 25000003 PHARM REV CODE 250: Performed by: EMERGENCY MEDICINE

## 2019-04-20 PROCEDURE — 83036 HEMOGLOBIN GLYCOSYLATED A1C: CPT

## 2019-04-20 PROCEDURE — 93010 ELECTROCARDIOGRAM REPORT: CPT | Mod: ,,, | Performed by: INTERNAL MEDICINE

## 2019-04-20 PROCEDURE — 85652 RBC SED RATE AUTOMATED: CPT

## 2019-04-20 RX ORDER — INSULIN ASPART 100 [IU]/ML
1-10 INJECTION, SOLUTION INTRAVENOUS; SUBCUTANEOUS
Status: DISCONTINUED | OUTPATIENT
Start: 2019-04-20 | End: 2019-04-21 | Stop reason: HOSPADM

## 2019-04-20 RX ORDER — METOCLOPRAMIDE HYDROCHLORIDE 5 MG/ML
10 INJECTION INTRAMUSCULAR; INTRAVENOUS
Status: COMPLETED | OUTPATIENT
Start: 2019-04-20 | End: 2019-04-20

## 2019-04-20 RX ORDER — PANTOPRAZOLE SODIUM 40 MG/1
40 TABLET, DELAYED RELEASE ORAL DAILY
Status: DISCONTINUED | OUTPATIENT
Start: 2019-04-21 | End: 2019-04-21 | Stop reason: HOSPADM

## 2019-04-20 RX ORDER — HYDROMORPHONE HYDROCHLORIDE 1 MG/ML
1 INJECTION, SOLUTION INTRAMUSCULAR; INTRAVENOUS; SUBCUTANEOUS ONCE
Status: COMPLETED | OUTPATIENT
Start: 2019-04-20 | End: 2019-04-20

## 2019-04-20 RX ORDER — DEXTROSE 50 % IN WATER (D50W) INTRAVENOUS SYRINGE
25
Status: DISCONTINUED | OUTPATIENT
Start: 2019-04-20 | End: 2019-04-21 | Stop reason: HOSPADM

## 2019-04-20 RX ORDER — LABETALOL HCL 20 MG/4 ML
10 SYRINGE (ML) INTRAVENOUS
Status: COMPLETED | OUTPATIENT
Start: 2019-04-20 | End: 2019-04-20

## 2019-04-20 RX ORDER — ALBUTEROL SULFATE 90 UG/1
2 AEROSOL, METERED RESPIRATORY (INHALATION) EVERY 6 HOURS PRN
Status: DISCONTINUED | OUTPATIENT
Start: 2019-04-20 | End: 2019-04-21 | Stop reason: HOSPADM

## 2019-04-20 RX ORDER — DEXTROSE 50 % IN WATER (D50W) INTRAVENOUS SYRINGE
12.5
Status: DISCONTINUED | OUTPATIENT
Start: 2019-04-20 | End: 2019-04-21 | Stop reason: HOSPADM

## 2019-04-20 RX ORDER — HYDRALAZINE HYDROCHLORIDE 20 MG/ML
10 INJECTION INTRAMUSCULAR; INTRAVENOUS
Status: COMPLETED | OUTPATIENT
Start: 2019-04-20 | End: 2019-04-20

## 2019-04-20 RX ORDER — GLUCAGON 1 MG
1 KIT INJECTION
Status: DISCONTINUED | OUTPATIENT
Start: 2019-04-20 | End: 2019-04-21 | Stop reason: HOSPADM

## 2019-04-20 RX ORDER — ACETAMINOPHEN 500 MG
1000 TABLET ORAL
Status: COMPLETED | OUTPATIENT
Start: 2019-04-20 | End: 2019-04-20

## 2019-04-20 RX ORDER — LABETALOL HCL 20 MG/4 ML
10 SYRINGE (ML) INTRAVENOUS EVERY 6 HOURS PRN
Status: DISCONTINUED | OUTPATIENT
Start: 2019-04-21 | End: 2019-04-21 | Stop reason: HOSPADM

## 2019-04-20 RX ORDER — IBUPROFEN 200 MG
24 TABLET ORAL
Status: DISCONTINUED | OUTPATIENT
Start: 2019-04-20 | End: 2019-04-21 | Stop reason: HOSPADM

## 2019-04-20 RX ORDER — METHYLPREDNISOLONE SOD SUCC 125 MG
250 VIAL (EA) INJECTION
Status: COMPLETED | OUTPATIENT
Start: 2019-04-20 | End: 2019-04-20

## 2019-04-20 RX ORDER — HYDROCHLOROTHIAZIDE 25 MG/1
25 TABLET ORAL DAILY
Status: DISCONTINUED | OUTPATIENT
Start: 2019-04-21 | End: 2019-04-21

## 2019-04-20 RX ORDER — CANDESARTAN 8 MG/1
8 TABLET ORAL DAILY
Status: DISCONTINUED | OUTPATIENT
Start: 2019-04-21 | End: 2019-04-21

## 2019-04-20 RX ORDER — IBUPROFEN 200 MG
16 TABLET ORAL
Status: DISCONTINUED | OUTPATIENT
Start: 2019-04-20 | End: 2019-04-21 | Stop reason: HOSPADM

## 2019-04-20 RX ORDER — SODIUM CHLORIDE 0.9 % (FLUSH) 0.9 %
10 SYRINGE (ML) INJECTION
Status: DISCONTINUED | OUTPATIENT
Start: 2019-04-20 | End: 2019-04-21 | Stop reason: HOSPADM

## 2019-04-20 RX ORDER — ATORVASTATIN CALCIUM 20 MG/1
80 TABLET, FILM COATED ORAL DAILY
Status: DISCONTINUED | OUTPATIENT
Start: 2019-04-21 | End: 2019-04-21 | Stop reason: HOSPADM

## 2019-04-20 RX ORDER — PREDNISONE 20 MG/1
60 TABLET ORAL DAILY
Status: DISCONTINUED | OUTPATIENT
Start: 2019-04-21 | End: 2019-04-21 | Stop reason: HOSPADM

## 2019-04-20 RX ORDER — HYDRALAZINE HYDROCHLORIDE 20 MG/ML
10 INJECTION INTRAMUSCULAR; INTRAVENOUS EVERY 6 HOURS PRN
Status: DISCONTINUED | OUTPATIENT
Start: 2019-04-20 | End: 2019-04-20

## 2019-04-20 RX ORDER — MAGNESIUM SULFATE HEPTAHYDRATE 40 MG/ML
2 INJECTION, SOLUTION INTRAVENOUS
Status: COMPLETED | OUTPATIENT
Start: 2019-04-20 | End: 2019-04-20

## 2019-04-20 RX ORDER — ASPIRIN 81 MG/1
81 TABLET ORAL DAILY
Status: DISCONTINUED | OUTPATIENT
Start: 2019-04-21 | End: 2019-04-21 | Stop reason: HOSPADM

## 2019-04-20 RX ORDER — SODIUM CHLORIDE 9 MG/ML
500 INJECTION, SOLUTION INTRAVENOUS
Status: COMPLETED | OUTPATIENT
Start: 2019-04-20 | End: 2019-04-20

## 2019-04-20 RX ORDER — HEPARIN SODIUM 5000 [USP'U]/ML
5000 INJECTION, SOLUTION INTRAVENOUS; SUBCUTANEOUS EVERY 8 HOURS
Status: DISCONTINUED | OUTPATIENT
Start: 2019-04-20 | End: 2019-04-21 | Stop reason: HOSPADM

## 2019-04-20 RX ORDER — GABAPENTIN 300 MG/1
300 CAPSULE ORAL NIGHTLY
Status: DISCONTINUED | OUTPATIENT
Start: 2019-04-20 | End: 2019-04-21 | Stop reason: HOSPADM

## 2019-04-20 RX ORDER — ONDANSETRON 8 MG/1
8 TABLET, ORALLY DISINTEGRATING ORAL EVERY 8 HOURS PRN
Status: DISCONTINUED | OUTPATIENT
Start: 2019-04-20 | End: 2019-04-21 | Stop reason: HOSPADM

## 2019-04-20 RX ADMIN — SODIUM CHLORIDE 500 ML: 0.9 INJECTION, SOLUTION INTRAVENOUS at 07:04

## 2019-04-20 RX ADMIN — LABETALOL HCL IV SOLN PREFILLED SYRINGE 20 MG/4ML (5 MG/ML) 10 MG: 20/4 SOLUTION PREFILLED SYRINGE at 08:04

## 2019-04-20 RX ADMIN — ACETAMINOPHEN 1000 MG: 500 TABLET ORAL at 07:04

## 2019-04-20 RX ADMIN — METHYLPREDNISOLONE SODIUM SUCCINATE 250 MG: 125 INJECTION, POWDER, FOR SOLUTION INTRAMUSCULAR; INTRAVENOUS at 08:04

## 2019-04-20 RX ADMIN — HEPARIN SODIUM 5000 UNITS: 5000 INJECTION, SOLUTION INTRAVENOUS; SUBCUTANEOUS at 11:04

## 2019-04-20 RX ADMIN — METOCLOPRAMIDE 10 MG: 5 INJECTION, SOLUTION INTRAMUSCULAR; INTRAVENOUS at 07:04

## 2019-04-20 RX ADMIN — HYDRALAZINE HYDROCHLORIDE 10 MG: 20 INJECTION INTRAMUSCULAR; INTRAVENOUS at 10:04

## 2019-04-20 RX ADMIN — GABAPENTIN 300 MG: 300 CAPSULE ORAL at 11:04

## 2019-04-20 RX ADMIN — HYDRALAZINE HYDROCHLORIDE 10 MG: 20 INJECTION INTRAMUSCULAR; INTRAVENOUS at 07:04

## 2019-04-20 RX ADMIN — MAGNESIUM SULFATE IN WATER 2 G: 40 INJECTION, SOLUTION INTRAVENOUS at 09:04

## 2019-04-20 RX ADMIN — HYDROMORPHONE HYDROCHLORIDE 1 MG: 1 INJECTION, SOLUTION INTRAMUSCULAR; INTRAVENOUS; SUBCUTANEOUS at 11:04

## 2019-04-20 RX ADMIN — LABETALOL HCL IV SOLN PREFILLED SYRINGE 20 MG/4ML (5 MG/ML) 10 MG: 20/4 SOLUTION PREFILLED SYRINGE at 11:04

## 2019-04-20 NOTE — PROVIDER PROGRESS NOTES - EMERGENCY DEPT.
Encounter Date: 4/20/2019    ED Physician Progress Notes         EKG - STEMI Decision  Initial Reading: No STEMI present.  Response: No Action Needed.

## 2019-04-21 VITALS
HEART RATE: 88 BPM | HEIGHT: 65 IN | RESPIRATION RATE: 14 BRPM | BODY MASS INDEX: 34.58 KG/M2 | SYSTOLIC BLOOD PRESSURE: 155 MMHG | WEIGHT: 207.56 LBS | OXYGEN SATURATION: 95 % | DIASTOLIC BLOOD PRESSURE: 70 MMHG | TEMPERATURE: 99 F

## 2019-04-21 LAB
25(OH)D3+25(OH)D2 SERPL-MCNC: 11 NG/ML (ref 30–96)
ANION GAP SERPL CALC-SCNC: 9 MMOL/L (ref 8–16)
BASOPHILS # BLD AUTO: 0.02 K/UL (ref 0–0.2)
BASOPHILS NFR BLD: 0.2 % (ref 0–1.9)
BUN SERPL-MCNC: 15 MG/DL (ref 8–23)
CALCIUM SERPL-MCNC: 8.9 MG/DL (ref 8.7–10.5)
CHLORIDE SERPL-SCNC: 103 MMOL/L (ref 95–110)
CO2 SERPL-SCNC: 21 MMOL/L (ref 23–29)
CREAT SERPL-MCNC: 0.9 MG/DL (ref 0.5–1.4)
DIFFERENTIAL METHOD: ABNORMAL
EOSINOPHIL # BLD AUTO: 0 K/UL (ref 0–0.5)
EOSINOPHIL NFR BLD: 0 % (ref 0–8)
ERYTHROCYTE [DISTWIDTH] IN BLOOD BY AUTOMATED COUNT: 14.9 % (ref 11.5–14.5)
ERYTHROCYTE [SEDIMENTATION RATE] IN BLOOD BY WESTERGREN METHOD: 72 MM/HR (ref 0–36)
EST. GFR  (AFRICAN AMERICAN): >60 ML/MIN/1.73 M^2
EST. GFR  (NON AFRICAN AMERICAN): >60 ML/MIN/1.73 M^2
GLUCOSE SERPL-MCNC: 287 MG/DL (ref 70–110)
HCT VFR BLD AUTO: 45.2 % (ref 37–48.5)
HGB BLD-MCNC: 14.1 G/DL (ref 12–16)
IMM GRANULOCYTES # BLD AUTO: 0.08 K/UL (ref 0–0.04)
IMM GRANULOCYTES NFR BLD AUTO: 0.9 % (ref 0–0.5)
LYMPHOCYTES # BLD AUTO: 1.3 K/UL (ref 1–4.8)
LYMPHOCYTES NFR BLD: 14.7 % (ref 18–48)
MCH RBC QN AUTO: 27.9 PG (ref 27–31)
MCHC RBC AUTO-ENTMCNC: 31.2 G/DL (ref 32–36)
MCV RBC AUTO: 90 FL (ref 82–98)
MONOCYTES # BLD AUTO: 0.1 K/UL (ref 0.3–1)
MONOCYTES NFR BLD: 0.7 % (ref 4–15)
NEUTROPHILS # BLD AUTO: 7.6 K/UL (ref 1.8–7.7)
NEUTROPHILS NFR BLD: 83.5 % (ref 38–73)
NRBC BLD-RTO: 0 /100 WBC
PLATELET # BLD AUTO: 337 K/UL (ref 150–350)
PLATELET BLD QL SMEAR: ABNORMAL
PMV BLD AUTO: 10.5 FL (ref 9.2–12.9)
POCT GLUCOSE: 361 MG/DL (ref 70–110)
POTASSIUM SERPL-SCNC: 4.1 MMOL/L (ref 3.5–5.1)
RBC # BLD AUTO: 5.05 M/UL (ref 4–5.4)
SODIUM SERPL-SCNC: 133 MMOL/L (ref 136–145)
WBC # BLD AUTO: 9.02 K/UL (ref 3.9–12.7)

## 2019-04-21 PROCEDURE — 86790 VIRUS ANTIBODY NOS: CPT

## 2019-04-21 PROCEDURE — 99222 1ST HOSP IP/OBS MODERATE 55: CPT | Mod: GC,,, | Performed by: INTERNAL MEDICINE

## 2019-04-21 PROCEDURE — 63600175 PHARM REV CODE 636 W HCPCS: Performed by: STUDENT IN AN ORGANIZED HEALTH CARE EDUCATION/TRAINING PROGRAM

## 2019-04-21 PROCEDURE — 25500020 PHARM REV CODE 255: Performed by: HOSPITALIST

## 2019-04-21 PROCEDURE — 85025 COMPLETE CBC W/AUTO DIFF WBC: CPT

## 2019-04-21 PROCEDURE — 85652 RBC SED RATE AUTOMATED: CPT

## 2019-04-21 PROCEDURE — 99222 1ST HOSP IP/OBS MODERATE 55: CPT | Mod: GC,,, | Performed by: SURGERY

## 2019-04-21 PROCEDURE — 86592 SYPHILIS TEST NON-TREP QUAL: CPT

## 2019-04-21 PROCEDURE — 86334 IMMUNOFIX E-PHORESIS SERUM: CPT

## 2019-04-21 PROCEDURE — 87340 HEPATITIS B SURFACE AG IA: CPT

## 2019-04-21 PROCEDURE — 99223 PR INITIAL HOSPITAL CARE,LEVL III: ICD-10-PCS | Mod: ,,, | Performed by: HOSPITALIST

## 2019-04-21 PROCEDURE — 99222 PR INITIAL HOSPITAL CARE,LEVL II: ICD-10-PCS | Mod: GC,,, | Performed by: INTERNAL MEDICINE

## 2019-04-21 PROCEDURE — 99239 PR HOSPITAL DISCHARGE DAY,>30 MIN: ICD-10-PCS | Mod: GC,,, | Performed by: HOSPITALIST

## 2019-04-21 PROCEDURE — 36415 COLL VENOUS BLD VENIPUNCTURE: CPT

## 2019-04-21 PROCEDURE — 99223 1ST HOSP IP/OBS HIGH 75: CPT | Mod: ,,, | Performed by: HOSPITALIST

## 2019-04-21 PROCEDURE — 25000003 PHARM REV CODE 250: Performed by: STUDENT IN AN ORGANIZED HEALTH CARE EDUCATION/TRAINING PROGRAM

## 2019-04-21 PROCEDURE — 84165 PROTEIN E-PHORESIS SERUM: CPT

## 2019-04-21 PROCEDURE — 86703 HIV-1/HIV-2 1 RESULT ANTBDY: CPT

## 2019-04-21 PROCEDURE — 86334 IMMUNOFIX E-PHORESIS SERUM: CPT | Mod: 26,,, | Performed by: PATHOLOGY

## 2019-04-21 PROCEDURE — 86704 HEP B CORE ANTIBODY TOTAL: CPT

## 2019-04-21 PROCEDURE — 84165 PATHOLOGIST INTERPRETATION SPE: ICD-10-PCS | Mod: 26,,, | Performed by: PATHOLOGY

## 2019-04-21 PROCEDURE — A9585 GADOBUTROL INJECTION: HCPCS | Performed by: HOSPITALIST

## 2019-04-21 PROCEDURE — 86787 VARICELLA-ZOSTER ANTIBODY: CPT

## 2019-04-21 PROCEDURE — 80048 BASIC METABOLIC PNL TOTAL CA: CPT

## 2019-04-21 PROCEDURE — 86803 HEPATITIS C AB TEST: CPT

## 2019-04-21 PROCEDURE — 84165 PROTEIN E-PHORESIS SERUM: CPT | Mod: 26,,, | Performed by: PATHOLOGY

## 2019-04-21 PROCEDURE — 97165 OT EVAL LOW COMPLEX 30 MIN: CPT

## 2019-04-21 PROCEDURE — 86682 HELMINTH ANTIBODY: CPT

## 2019-04-21 PROCEDURE — 86706 HEP B SURFACE ANTIBODY: CPT

## 2019-04-21 PROCEDURE — 82306 VITAMIN D 25 HYDROXY: CPT

## 2019-04-21 PROCEDURE — 99222 PR INITIAL HOSPITAL CARE,LEVL II: ICD-10-PCS | Mod: GC,,, | Performed by: SURGERY

## 2019-04-21 PROCEDURE — 96376 TX/PRO/DX INJ SAME DRUG ADON: CPT

## 2019-04-21 PROCEDURE — 99239 HOSP IP/OBS DSCHRG MGMT >30: CPT | Mod: GC,,, | Performed by: HOSPITALIST

## 2019-04-21 PROCEDURE — 86334 PATHOLOGIST INTERPRETATION IFE: ICD-10-PCS | Mod: 26,,, | Performed by: PATHOLOGY

## 2019-04-21 RX ORDER — HYDROCHLOROTHIAZIDE 25 MG/1
50 TABLET ORAL DAILY
Status: DISCONTINUED | OUTPATIENT
Start: 2019-04-22 | End: 2019-04-21

## 2019-04-21 RX ORDER — CANDESARTAN 16 MG/1
16 TABLET ORAL DAILY
Status: DISCONTINUED | OUTPATIENT
Start: 2019-04-21 | End: 2019-04-21 | Stop reason: HOSPADM

## 2019-04-21 RX ORDER — HYDROCHLOROTHIAZIDE 25 MG/1
50 TABLET ORAL DAILY
Status: DISCONTINUED | OUTPATIENT
Start: 2019-04-21 | End: 2019-04-21 | Stop reason: HOSPADM

## 2019-04-21 RX ORDER — GADOBUTROL 604.72 MG/ML
10 INJECTION INTRAVENOUS
Status: COMPLETED | OUTPATIENT
Start: 2019-04-21 | End: 2019-04-21

## 2019-04-21 RX ORDER — CANDESARTAN 8 MG/1
16 TABLET ORAL DAILY
Qty: 90 TABLET | Refills: 3 | Status: SHIPPED | OUTPATIENT
Start: 2019-04-21 | End: 2019-04-25

## 2019-04-21 RX ORDER — PREDNISONE 20 MG/1
60 TABLET ORAL DAILY
Qty: 60 TABLET | Refills: 0 | Status: SHIPPED | OUTPATIENT
Start: 2019-04-22 | End: 2019-05-12

## 2019-04-21 RX ORDER — CANDESARTAN 16 MG/1
16 TABLET ORAL DAILY
Status: DISCONTINUED | OUTPATIENT
Start: 2019-04-22 | End: 2019-04-21

## 2019-04-21 RX ADMIN — ASPIRIN 81 MG: 81 TABLET, COATED ORAL at 08:04

## 2019-04-21 RX ADMIN — HYDROCHLOROTHIAZIDE 50 MG: 25 TABLET ORAL at 11:04

## 2019-04-21 RX ADMIN — PANTOPRAZOLE SODIUM 40 MG: 40 TABLET, DELAYED RELEASE ORAL at 08:04

## 2019-04-21 RX ADMIN — INSULIN ASPART 10 UNITS: 100 INJECTION, SOLUTION INTRAVENOUS; SUBCUTANEOUS at 11:04

## 2019-04-21 RX ADMIN — GADOBUTROL 10 ML: 604.72 INJECTION INTRAVENOUS at 01:04

## 2019-04-21 RX ADMIN — CANDESARTAN CILEXETIL 16 MG: 16 TABLET ORAL at 11:04

## 2019-04-21 RX ADMIN — HYDROCHLOROTHIAZIDE 25 MG: 25 TABLET ORAL at 08:04

## 2019-04-21 RX ADMIN — PREDNISONE 60 MG: 20 TABLET ORAL at 08:04

## 2019-04-21 RX ADMIN — ATORVASTATIN CALCIUM 80 MG: 20 TABLET, FILM COATED ORAL at 08:04

## 2019-04-21 NOTE — HPI
"Buck Ibarra is a 73 y.o. F with history of HTN, IDDM, CKD II, HLD, GERD and h/o treated HepC who [place] [facility] [place] with the "worst headache of her life".  The pain was located in her left temporal and tracked along the back of her scalp.  She also had photophobia.  Denies jaw claudication.  Her sister had suspected GCA but had a negative temporal artery biopsy per the patient.  On admission her ESR was 71 and her CRP was 3.  She was given steroids.  This AM, she states that her symptoms have improved.  Vascular surgery was consulted for temporal artery biopsy       "

## 2019-04-21 NOTE — CONSULTS
"[facility]  Vascular Surgery  Consult Note    Inpatient consult to Vascular Surgery  Consult performed by: Jared Escobedo MD  Consult ordered by: Tor Licea MD        Subjective:     Chief Complaint/Reason for Admission: Headache     History of Present Illness: Buck Ibarra is a 73 y.o. F with history of HTN, IDDM, CKD II, HLD, GERD and h/o treated HepC who presented to [facility] yesterday with the "worst headache of her life".  The pain was located in her left temporal and tracked along the back of her scalp.  She also had photophobia.  Denies jaw claudication.  Her sister had suspected GCA but had a negative temporal artery biopsy per the patient.  On admission her ESR was 71 and her CRP was 3.  She was given steroids.  This AM, she states that her symptoms have improved.  Vascular surgery was consulted for temporal artery biopsy         Medications Prior to Admission   Medication Sig Dispense Refill Last Dose    albuterol (PROVENTIL/VENTOLIN HFA) 90 mcg/actuation inhaler Inhale 1-2 puffs into the lungs every 6 (six) hours as needed for Wheezing. Rescue 18 g 0 Past Month at Unknown time    atorvastatin (LIPITOR) 80 MG tablet Take 1 tablet (80 mg total) by mouth once daily. DOSE CHANGE 90 tablet 3 4/20/2019 at Unknown time    candesartan (ATACAND) 8 MG tablet Take 1 tablet (8 mg total) by mouth once daily. 90 tablet 3 4/20/2019    esomeprazole (NEXIUM) 40 MG capsule Take one capsule by mouth daily as needed for acid reflux 30 capsule 11 Past Week at Unknown time    gabapentin (NEURONTIN) 300 MG capsule TAKE ONE CAPSULE BY MOUTH EVERY EVENING 90 capsule 3 4/20/2019 at Unknown time    hydrochlorothiazide (HYDRODIURIL) 25 MG tablet TAKE ONE TABLET BY MOUTH ONCE DAILY 90 tablet 3 4/20/2019    metFORMIN (GLUCOPHAGE-XR) 500 MG 24 hr tablet Take 1 tablet (500 mg total) by mouth 2 (two) times daily with meals. 180 tablet 2 4/20/2019    traZODone (DESYREL) 50 MG tablet Take 1 " "to 2 tablets ( mg total) by mouth every evening. 60 tablet 3 4/20/2019 at Unknown time    aspirin (ECOTRIN) 81 MG EC tablet Take 1 tablet (81 mg total) by mouth once daily. 30 tablet 3 More than a month at Unknown time    blood sugar diagnostic Strp To check BG 3 times daily, to use with insurance preferred meter 300 each 3     blood-glucose meter kit To check BG 3 times daily, to use with insurance preferred meter 1 each 0     ciclopirox (PENLAC) 8 % Soln Apply topically nightly. 6.6 mL 11 More than a month at Unknown time    clotrimazole-betamethasone 1-0.05% (LOTRISONE) cream Apply topically 2 (two) times daily. 45 g 1 More than a month at Unknown time    dulaglutide (TRULICITY) 1.5 mg/0.5 mL PnIj Inject 1.5 mg into the skin once weekly. 2 mL 6 More than a month at Unknown time    fluocinonide (LIDEX) 0.05 % external solution AAA scalp qd prn pruritus 60 mL 1 Taking    insulin degludec (TRESIBA FLEXTOUCH U-200) 200 unit/mL (3 mL) InPn INJECT 22 UNITS UNDER THE SKIN EVERY EVENING 15 mL 11     ketoconazole (NIZORAL) 2 % shampoo Wash hair with medicated shampoo at least 2x/week - let sit on scalp at least 5 minutes prior to rinsing 120 mL 5 Taking    lancets 33 gauge Misc To check BG 3 times daily, to use with insurance preferred meter 300 each 3     pen needle, diabetic (BD ULTRA-FINE MILAN PEN NEEDLES) 32 gauge x 5/32" Ndle Uses 1 time daily, on insulin injections 90 each 4 Taking       Review of patient's allergies indicates:   Allergen Reactions    Erythromycin Anaphylaxis    Erythropoietin analogues Anaphylaxis    Pegasys [peginterferon ruben-2a] Anaphylaxis    Lisinopril Hives       Past Medical History:   Diagnosis Date    Allergy     Cataract     Diabetes mellitus type II     Gastritis     with gastric ulcer    GERD (gastroesophageal reflux disease)     H. pylori infection     History of hepatitis C, SVR as of 8-2016 8/25/2015    Harvoni 12 wks completed.  SVR(12) as of 8/4/16 " SVR(24) as of 10-     Hyperlipidemia     Hypertension     Osteopenia     Type 2 diabetes mellitus with diabetic polyneuropathy      Past Surgical History:   Procedure Laterality Date    COLONOSCOPY      COLONOSCOPY N/A 2016    Performed by Emeka Ahn MD at [facility] (4TH FLR)    HYSTERECTOMY      LIVER BIOPSY      OOPHORECTOMY      UPPER GASTROINTESTINAL ENDOSCOPY       Family History     Problem Relation (Age of Onset)    Breast cancer Sister    Cancer Sister, Sister (70)    Diabetes Mother, Sister, Sister, Sister    Heart disease Mother, Father        Tobacco Use    Smoking status: Current Every Day Smoker     Packs/day: 0.25     Years: 48.00     Pack years: 12.00     Last attempt to quit: 2016     Years since quittin.6    Smokeless tobacco: Never Used   Substance and Sexual Activity    Alcohol use: No     Comment: special occasions    Drug use: No    Sexual activity: Yes     Partners: Male     Review of Systems   Constitutional: Negative for chills and fever.   Eyes: Positive for photophobia.   Respiratory: Negative for shortness of breath.    Cardiovascular: Negative for chest pain, palpitations and leg swelling.   Gastrointestinal: Negative for abdominal pain.   Genitourinary: Negative for difficulty urinating.   Musculoskeletal: Negative for myalgias.   Skin: Negative for color change and rash.   Neurological: Positive for headaches. Negative for seizures and syncope.   Psychiatric/Behavioral: Negative for agitation and confusion.     Objective:     Vital Signs (Most Recent):  Temp: 98.2 °F (36.8 °C) (19 0501)  Pulse: 86 (19 0700)  Resp: 16 (19 0700)  BP: (!) 174/78 (19 0700)  SpO2: (!) 94 % (19 0700) Vital Signs (24h Range):  Temp:  [97.9 °F (36.6 °C)-98.3 °F (36.8 °C)] 98.2 °F (36.8 °C)  Pulse:  [71-87] 86  Resp:  [16-18] 16  SpO2:  [94 %-100 %] 94 %  BP: (149-245)/() 174/78     Weight: 94.2 kg (207 lb 9 oz)  Body mass index is 34.54  kg/m².    Physical Exam   Constitutional: She is oriented to person, place, and time. She appears well-developed and well-nourished. No distress.   HENT:   No temporal tenderness    Cardiovascular: Normal rate and intact distal pulses.   Pulmonary/Chest: Effort normal. No respiratory distress.   Abdominal: Soft. She exhibits no distension. There is no tenderness.   Musculoskeletal: She exhibits no edema.   Neurological: She is alert and oriented to person, place, and time.   Psychiatric: She has a normal mood and affect.       Significant Labs:  CBC:   Recent Labs   Lab 04/21/19  0454   WBC 9.02   RBC 5.05   HGB 14.1   HCT 45.2      MCV 90   MCH 27.9   MCHC 31.2*     CMP:   Recent Labs   Lab 04/21/19  0714   *   CALCIUM 8.9   *   K 4.1   CO2 21*      BUN 15   CREATININE 0.9     ESR 71    CRP 3    Significant Diagnostics:  I have reviewed and interpreted all pertinent imaging results/findings within the past 24 hours.    Assessment/Plan:     * Acute nonintractable headache  Will set up for a temporal artery biopsy as an outpatient later this week         Thank you for your consult.    Jared Escobedo MD  Vascular Surgery  [facility]

## 2019-04-21 NOTE — CONSULTS
"[facility]  Ophthalmology  Consult Note    Patient Name: Buck Ibarra  MRN: [id number]  Admission Date: 4/20/2019  Hospital Length of Stay: 1 days  Attending Provider: Han Barndt MD   Primary Care Physician: Ericka Cortez MD  Principal Problem:Acute nonintractable headache    Inpatient consult to Ophthalmology  Consult performed by: Bing Schwarz MD  Consult ordered by: Minoo Barros MD        Subjective:patient reports resolution of HA after steroid administration     Chief Complaint:  L sided temporal HA     HPI:   73 y.o. F with POHx NVS cats OU and refractive error and PMHx HTN, IDDM, CKD II, HLD, GERD and h/o treated HepC who presented to [facility] yesterday with the "worst headache of her life".  The pain was located in her left temporal and tracked along the back of her scalp.  She also had photophobia.  Denies jaw claudication.  Her sister had suspected GCA but had a negative temporal artery biopsy per the patient.  On admission her ESR was 71 and her CRP was 3.  She was given steroids.  This AM, she states that her symptoms have improved.        No new subjective & objective note has been filed under this hospital service since the last note was generated.      Base Eye Exam     Visual Acuity       Right Left    Near cc 20/25-1 20/20-2          Tonometry (Tonopen, 1:09 PM)       Right Left    Pressure 18 19          Pupils       Pupils Dark Light Shape React APD    Right PERRL 4 2 Round Brisk None    Left PERRL 4 2 Round Brisk None          Visual Fields (Counting fingers)       Right Left     Full Full          Extraocular Movement       Right Left     Full, Ortho Full, Ortho          Neuro/Psych     Oriented x3:  Yes          Dilation     Both eyes:  1.0% Mydriacyl, 2.5% Phenylephrine @ 1:09 PM            Slit Lamp and Fundus Exam     External Exam       Right Left    External dermatochalasis, mild ptosis upper lid dermatochalasis, mild ptosis upper lid          Slit Lamp Exam       " Right Left    Lids/Lashes Dermatochalasis, mild ptosis upper lid Dermatochalasis, mild ptosis upper lid    Conjunctiva/Sclera perilimbal pigmentation, small conjunctival cyst(s) at superior limbus  perilimbal pigmentation    Cornea peripheral arcus peripheral arcus    Anterior Chamber Deep and quiet Deep and quiet    Iris normal normal    Lens Nuclear sclerosis, peripheral cortical cataract Nuclear sclerosis, peripheral cortical cataract    Vitreous Normal Normal          Fundus Exam       Right Left    Disc Normal Normal    C/D Ratio 0.05 - 0.10 stable  0.10 stable     Macula Normal Normal    Vessels Normal Normal    Periphery Flat & intact 360°, no holes, tears, RD, No Diabetic Retinopathy Flat & intact 360°, no holes, tears, RD, No Diabetic Retinopathy              Assessment and Plan:     1. L sided temporal HA  -possibly d/t GCA  -sister had GCA w/ similar presentation  -ESR 71, CRP 3.3  -CT Head wnl  -MRI brain pending  -no visual complaints or vision changes  -eye exam unchanged  -agree with plan for temporal artery biopsy  -steroid management per primary team  -return precautions given for strict f/u with any visual changes or new symptoms    2. Cataracts OU likely not VS  -continue to follow up with Dr. Wasserman as scheduled    Thank you for your consult. I will sign off. Please contact us if you have any additional questions.    Bing Schwarz MD  Ophthalmology  [facility]    I have reviewed the history and exam of the patient and agree with the resident's exam, assessment and plan.

## 2019-04-21 NOTE — EKG INTERPRETATIONS - EMERGENCY DEPT.
Independently interpreted by MD:  Rate 68, NSR, no stemi, no ectopy, LVH, flipped T's in avf, II, v4-6

## 2019-04-21 NOTE — ASSESSMENT & PLAN NOTE
Differentials include migraine VS GCA. Pt with family history of GCA (biopsy negative) and L temporal headache, but non-tender without many findings of GCA and has photophobia, potentially migraine, given resolution with reglan. ESR elevated to 71 warrants an investigation of possible GCA given ramifications of missed diagnosis. Another possible DDX is cluster headache.    PLAN  - Given dose of 250mg methylpred and 10mg IV metoclopramide in ED  - Start prednisone 60mg daily  - Repeat ESR  - Start on venti mask 100% oxygen for possible cluster headache  - Consult ophthalmology, they are aware of her, for investigation of any occular manifestations. Thought that it might be possible for GCA.  - Consult rheumatology, they are aware of her and asked to discharge patient with close follow up and 60mg predinosone. Set up appointment after the biopsy result comes out.  - Head CT normal, without signs of intracranial bleed  - MRI brain GCA protocol (per rheum)  - Temporal artery ultrasound (per rheum) normal   - Consult vasc surgery to set up biopsy (likely can happen outpatient)

## 2019-04-21 NOTE — ASSESSMENT & PLAN NOTE
Patient with significantly elevated BP into 200/100, given hydralazine and labetalol in ED with decrease to 170s/80-90s. Likely due to severe pain from headache. ECG with T wave inversions in inferolateral leads, similar to ECG in 1/2019.    PLAN  - continue home candesartan and HCTZ  - labetalol 20mg q6PRN for SBP >180 and DBP >100

## 2019-04-21 NOTE — H&P
"[facility] Hospital Medicine  History & Physical    Patient Name: Buck Ibarra  MRN: [id number]  Admission Date: 4/20/2019  Attending Physician: Han Brandt MD   Primary Care Provider: Ericka Cortez MD    [facility] Medicine Team: [facility] HOSP MED 5 Tor Licea MD     Patient information was obtained from patient and ER records.     Subjective:     Principal Problem:Acute nonintractable headache    Chief Complaint:   Chief Complaint   Patient presents with    Headache     left sided headache that began 1 hr ago/     Hypertension        HPI: Ms Ibarra is a 74 yo female with HTN, IDDM, CKD II, HLD, GERD and h/o treated HepC presenting with "the worst headache of her life". Patient reports that around noon on 4/20/2019 she had a gradual onset of an intense piercing and throbbing headache in the L temporal region. Pt reports the pain starting at a 12/10 and progressing over a few hours until it was unbearable and she presented to [facility] ED. Pt further reports L sided, posterior neck pain and photophobia. Patient denies jaw claudication, similar episodes, or history of migraines or headaches. Patient's sister has biopsy confirmed giant cell arteritis and mother has a history of migraine headaches. Patient denies any history of shoulder or hip pain, difficulty reaching above her head, or mobilizing from a seated position or up stairs. Patient denies chest pain, shortness of breath, nausea, vomiting, fever, chills, sweats, recent illness, or sick contacts.         Past Medical History:   Diagnosis Date    Allergy     Cataract     Diabetes mellitus type II     Gastritis     with gastric ulcer    GERD (gastroesophageal reflux disease)     H. pylori infection     History of hepatitis C, SVR as of 8-2016 8/25/2015    Harvoni 12 wks completed.  SVR(12) as of 8/4/16 SVR(24) as of 10-     Hyperlipidemia     Hypertension     Osteopenia     Type 2 diabetes mellitus with diabetic " polyneuropathy        Past Surgical History:   Procedure Laterality Date    COLONOSCOPY      COLONOSCOPY N/A 1/28/2016    Performed by [facility] MD at [facility] (4TH FLR)    HYSTERECTOMY      LIVER BIOPSY      OOPHORECTOMY      UPPER GASTROINTESTINAL ENDOSCOPY         Review of patient's allergies indicates:   Allergen Reactions    Erythromycin Anaphylaxis    Erythropoietin analogues Anaphylaxis    Pegasys [peginterferon ruben-2a] Anaphylaxis    Lisinopril Hives       No current facility-administered medications on file prior to encounter.      Current Outpatient Medications on File Prior to Encounter   Medication Sig    candesartan (ATACAND) 8 MG tablet Take 1 tablet (8 mg total) by mouth once daily.    hydrochlorothiazide (HYDRODIURIL) 25 MG tablet TAKE ONE TABLET BY MOUTH ONCE DAILY    metFORMIN (GLUCOPHAGE-XR) 500 MG 24 hr tablet Take 1 tablet (500 mg total) by mouth 2 (two) times daily with meals.    albuterol (PROVENTIL/VENTOLIN HFA) 90 mcg/actuation inhaler Inhale 1-2 puffs into the lungs every 6 (six) hours as needed for Wheezing. Rescue    aspirin (ECOTRIN) 81 MG EC tablet Take 1 tablet (81 mg total) by mouth once daily.    atorvastatin (LIPITOR) 80 MG tablet Take 1 tablet (80 mg total) by mouth once daily. DOSE CHANGE    blood sugar diagnostic Strp To check BG 3 times daily, to use with insurance preferred meter    blood-glucose meter kit To check BG 3 times daily, to use with insurance preferred meter    ciclopirox (PENLAC) 8 % Soln Apply topically nightly.    clotrimazole-betamethasone 1-0.05% (LOTRISONE) cream Apply topically 2 (two) times daily.    dulaglutide (TRULICITY) 1.5 mg/0.5 mL PnIj Inject 1.5 mg into the skin once weekly.    esomeprazole (NEXIUM) 40 MG capsule Take one capsule by mouth daily as needed for acid reflux    fluocinonide (LIDEX) 0.05 % external solution AAA scalp qd prn pruritus    gabapentin (NEURONTIN) 300 MG capsule TAKE ONE CAPSULE BY MOUTH EVERY  "EVENING    insulin degludec (TRESIBA FLEXTOUCH U-200) 200 unit/mL (3 mL) InPn INJECT 22 UNITS UNDER THE SKIN EVERY EVENING    ketoconazole (NIZORAL) 2 % shampoo Wash hair with medicated shampoo at least 2x/week - let sit on scalp at least 5 minutes prior to rinsing    lancets 33 gauge Misc To check BG 3 times daily, to use with insurance preferred meter    pen needle, diabetic (BD ULTRA-FINE MILAN PEN NEEDLES) 32 gauge x 5/32" Ndle Uses 1 time daily, on insulin injections    traZODone (DESYREL) 50 MG tablet Take 1 to 2 tablets ( mg total) by mouth every evening.     Family History     Problem Relation (Age of Onset)    Breast cancer Sister    Cancer Sister, Sister (70)    Diabetes Mother, Sister, Sister, Sister    Heart disease Mother, Father        Tobacco Use    Smoking status: Current Every Day Smoker     Packs/day: 0.25     Years: 48.00     Pack years: 12.00     Last attempt to quit: 2016     Years since quittin.6    Smokeless tobacco: Never Used   Substance and Sexual Activity    Alcohol use: No     Comment: special occasions    Drug use: No    Sexual activity: Yes     Partners: Male     Review of Systems   Constitutional: Negative for chills, diaphoresis, fatigue and fever.   HENT: Negative for congestion, facial swelling and sore throat.    Eyes: Positive for photophobia. Negative for pain and visual disturbance.   Respiratory: Negative for chest tightness and shortness of breath.    Cardiovascular: Negative for chest pain.   Gastrointestinal: Negative for abdominal pain, diarrhea, nausea and vomiting.   Genitourinary: Negative for dysuria and frequency.   Musculoskeletal: Positive for neck pain. Negative for back pain and neck stiffness.   Skin: Negative.    Neurological: Positive for seizures and headaches. Negative for dizziness, tremors, syncope, speech difficulty, weakness and numbness.   Psychiatric/Behavioral: Negative.      Objective:     Vital Signs (Most Recent):  Temp: 98.3 " °F (36.8 °C) (04/20/19 1832)  Pulse: 81 (04/20/19 2104)  Resp: 16 (04/20/19 2104)  BP: (!) 177/82 (04/20/19 2104)  SpO2: 100 % (04/20/19 2104) Vital Signs (24h Range):  Temp:  [98.3 °F (36.8 °C)] 98.3 °F (36.8 °C)  Pulse:  [71-85] 81  Resp:  [16] 16  SpO2:  [97 %-100 %] 100 %  BP: (177-245)/() 177/82     Weight: 86.2 kg (190 lb)  Body mass index is 31.62 kg/m².    Physical Exam   Constitutional: She is oriented to person, place, and time. She appears well-developed and well-nourished. No distress.   HENT:   Head: Normocephalic and atraumatic.   Eyes: Pupils are equal, round, and reactive to light. Conjunctivae are normal.   Cardiovascular: Normal rate, regular rhythm and normal heart sounds.   Pulmonary/Chest: Effort normal and breath sounds normal.   Abdominal: Soft. Bowel sounds are normal. There is no tenderness.   Musculoskeletal: She exhibits no tenderness.   Neurological: She is alert and oriented to person, place, and time. No cranial nerve deficit.   No temporal region tenderness or neck tenderness elicited on palpation. Negative brudzinski sign.   Skin: She is not diaphoretic.   Psychiatric: She has a normal mood and affect. Her behavior is normal. Judgment and thought content normal.   Nursing note and vitals reviewed.        CRANIAL NERVES     CN III, IV, VI   Pupils are equal, round, and reactive to light.       Significant Labs: All pertinent labs within the past 24 hours have been reviewed.    Significant Imaging: I have reviewed all pertinent imaging results/findings within the past 24 hours.    Assessment/Plan:     * Acute nonintractable headache  Differentials include migraine VS GCA. Pt with family history of GCA and L temporal headache, but non-tender without many findings of GCA and has photophobia, potentially migraine, given resolution with reglan. ESR elevated to 71 warrants an investigation of possible GCA given ramifications of missed diagnosis. Another possible DDX is cluster  headache.    PLAN  - Given dose of 250mg methylpred and 10mg IV metoclopramide in ED  - Start prednisone 60mg daily  - Repeat ESR  - Start on venti mask 100% oxygen for possible cluster headache  - Consult ophthalmology, they are aware of her, for investigation of any occular manifestations  - Consult rheumatology, they are aware of her  - Head CT normal, without signs of intracranial bleed  - MRI brain GCA protocol (per rheum)  - Temporal artery ultrasound (per rheum)  - Consult [facility] surgery to set up biopsy (likely can happen outpatient)    Hypertensive urgency  See Hypertension      Benign essential hypertension  Patient with significantly elevated BP into 200/100, given hydralazine and labetalol in ED with decrease to 170s/80-90s. Likely due to severe pain from headache. ECG with T wave inversions in inferolateral leads, similar to ECG in 1/2019.    PLAN  - continue home candesartan and HCTZ  - labetalol 20mg q6PRN for SBP >180 and DBP >100      Type 2 diabetes mellitus with hyperglycemia, with long-term current use of insulin  Pt on metformin 500mg BID, Trulicity once weekly and Tresiba (ultra long acting insult) 22U nightly.     PLAN  - diabetic diet  - 80% of home dose, 18U detimer  - Moderate dose sliding scale    Gastroesophageal reflux disease with esophagitis  Pantoprazole 40mg PO daily      CKD stage 2 due to type 2 diabetes mellitus        Hyperlipidemia LDL goal <70  Continue home atorvastatin 80mg daily        VTE Risk Mitigation (From admission, onward)        Ordered     heparin (porcine) injection 5,000 Units  Every 8 hours      04/20/19 2141     IP VTE HIGH RISK PATIENT  Once      04/20/19 2141            Tor Licea MD  Department of Hospital Medicine   [facility]

## 2019-04-21 NOTE — PT/OT/SLP EVAL
"Occupational Therapy   Evaluation and Discharge Note    Name: Buck Ibarra  MRN: [id number]  Admitting Diagnosis:  Acute nonintractable headache      Recommendations:     Discharge Recommendations: home  Discharge Equipment Recommendations:  none  Barriers to discharge:  None    Assessment:     Buck Ibarra is a 73 y.o. female with a medical diagnosis of Acute nonintractable headache. At this time, patient is functioning at their prior level of function and does not require further acute OT services.     Plan:     During this hospitalization, patient does not require further acute OT services.  Please re-consult if situation changes.    · Plan of Care Reviewed with: patient    Subjective     Chief Complaint: none  Patient/Family Comments/goals: return home today    Occupational Profile:  Living Environment: Pt lives in 2  with 3STE to enter home - B rails on both stairs - bed and bath is on first floor - lives with "handicapped" daughter who is able to care for herself with supervision/min assist  Previous level of function: independent with ADLs and mobility - no AD  Roles and Routines: mother, friend  Equipment Used at home:  none  Assistance upon Discharge: able to get assist from other daughter as needed    Pain/Comfort:  · Pain Rating 1: 0/10  · Pain Rating Post-Intervention 1: 0/10    Patients cultural, spiritual, Jain conflicts given the current situation:      Objective:     Communicated with: nurse prior to session.  Patient found HOB elevated with peripheral IV upon OT entry to room.    General Precautions: Standard, (n/a)   Orthopedic Precautions:N/A   Braces: N/A     Occupational Performance:    Bed Mobility:    · Patient completed Rolling/Turning to Right with independence  · Patient completed Scooting/Bridging with independence  · Patient completed Supine to Sit with independence  · Patient completed Sit to Supine with independence    Functional Mobility/Transfers:  · Patient completed " "Sit <> Stand Transfer with independence  with  no assistive device   · Patient completed Bed <> Chair Transfer using Stand Pivot technique with independence with no assistive device  · Patient completed Toilet Transfer Stand Pivot technique with independence with  no AD  · Patient completed  Shower Transfer Stand Pivot technique with modified independence with no AD and hand on wall as stepping over ~3" step into shower  · Functional Mobility: Pt able to walk ~150' in hallways independently without LOB or assistance    Activities of Daily Living:  · Feeding:  independence    · Grooming: independence in stand  · Upper Body Dressing: independence hospital gown  · Lower Body Dressing: independence socks - sitting EOB  · Toileting: independence      Cognitive/Visual Perceptual:  Cognitive/Psychosocial Skills:  -       Oriented to: Person, Place, Time and Situation   -       Follows Commands/attention:Follows two-step commands  -       Communication: clear/fluent  -       Memory: No Deficits noted  -       Safety awareness/insight to disability: intact   -       Mood/Affect/Coping skills/emotional control: Appropriate to situation  Visual/Perceptual:  -Intact      Physical Exam:  Balance: -       Stand/Sit - Static/Dynamic = good for all  Postural examination/scapula alignment:    -       No postural abnormalities identified  Skin integrity: Visible skin intact  Edema:  None noted  Sensation: -       Intact  Dominant hand: -       right  Upper Extremity Range of Motion:     -       Right Upper Extremity: WFL  -       Left Upper Extremity: WFL  Upper Extremity Strength:    -       Right Upper Extremity: WFL  -       Left Upper Extremity: WFL   Strength:    -       Right Upper Extremity: WFL  -       Left Upper Extremity: WFL  Fine Motor Coordination:    -       Intact  Gross motor coordination:   WFL    AMPAC 6 Click ADL:  AMPAC Total Score: 24    Treatment & Education:  · Pt completed ADLs and func mobility " activities for tx session as noted above  · Pt educated on role of OT and POC    Education:    Patient left HOB elevated with call button in reach    GOALS:   Multidisciplinary Problems     Occupational Therapy Goals     Not on file          Multidisciplinary Problems (Resolved)        Problem: Occupational Therapy Goal    Goal Priority Disciplines Outcome Interventions   Occupational Therapy Goal   (Resolved)     OT, PT/OT Outcome(s) achieved                    History:     Past Medical History:   Diagnosis Date    Allergy     Cataract     Diabetes mellitus type II     Gastritis     with gastric ulcer    GERD (gastroesophageal reflux disease)     H. pylori infection     History of hepatitis C, SVR as of 8-2016 8/25/2015    Harvoni 12 wks completed.  SVR(12) as of 8/4/16 SVR(24) as of 10-     Hyperlipidemia     Hypertension     Osteopenia     Type 2 diabetes mellitus with diabetic polyneuropathy        Past Surgical History:   Procedure Laterality Date    COLONOSCOPY      COLONOSCOPY N/A 1/28/2016    Performed by [facility] at [facility] ([facility] F[facility])    HYSTERECTOMY      LIVER BIOPSY      OOPHORECTOMY      UPPER GASTROINTESTINAL ENDOSCOPY         Time Tracking:     OT Date of Treatment: 04/21/19  OT Start Time: 0915  OT Stop Time: 0930  OT Total Time (min): 15 min    Billable Minutes:Evaluation 15    Ericka Mcdowell OT  4/21/2019

## 2019-04-21 NOTE — ASSESSMENT & PLAN NOTE
Pt on metformin 500mg BID, Trulicity once weekly and Tresiba (ultra long acting insult) 22U nightly.     PLAN  - diabetic diet  - 80% of home dose, 18U detimer  - Moderate dose sliding scale

## 2019-04-21 NOTE — ASSESSMENT & PLAN NOTE
This is a 72 yo AFF with PMH of DMII, HTN, Hep C s/p Harvoni, GERD who [place] [facility] [place] with complaints of severe left sided headache. Reports that headache started suddenly around the left temporal region and going back to the back of the head. Also had associated photophobia. Did have some tenderness to touch over the left temporal area. Initially took two ibuprofren for the pain but this did not help. Would describe the pain as a 12/10 throbbing intense pain. No jaw claudication or vision changes. No history of headaches in the past. No injury/trauma. In ED, patient received IV fluid, Reglan and Tylenol. Per patient, Reglan helped to bring down the pain from a 12/10 to a 6/10. Then she was given Solumedrol 250 mg IV which completing resolved the pain. She was also found to be hypertensive with BP at 245/107. Given Labetolol. Sed rate found to be elevated at 71. CRP normal at 3.3. Currently, headache completely resolved. No pain in the shoulders or hips. Reports that sister had a temporal biopsy 3-4 years ago which was negative for GCA and she was tapered off of steroids after one week. Denies any fevers, chills, weight changes, oral/nasal ulcers, hair loss, skin rashes, joint pain/swelling, muscle weakness/pain, eye redness, n/v. Does have a grandchild who is currently sick with a cold.     After Reglan and Solumedrol 250 mg IV, patient reports all symptoms resolved. Exam with no scalp tenderness and normal temporal pulses (slightly decreased on right). Sed rate elevated at 71, CRP normal. No fevers, chills, jaw claudication, vision changes, PMR symptoms.  MRI GCA protocol completed; pending results. TA US not done yet.     DEXA 2018: osteopenia of lumbar spine. FRAX: Major: 3%, hip 0%-->low risk.    Differentials: GCA vs migraine headaches vs tension HA      Recommend:  - ophthalmology consult to r/o GCA eye involvement. If there is eye involvement, patient will need Solumedrol pulse-dosing.   -  vascular surgery consult to schedule left temporal biopsy inpatient or outpatient asap  - continue prednisone 60 mg oral daily;continue this dose at discharge  - will need close monitoring of blood sugars while on prednisone. DMII medications may need to be adjusted  - check pre-dmards, SPEP, ERICA, vitamin D  - start on Vitamin D 1000 units daily, 62365 mg dietary calcium daily  - CTA chest as outpatient  - f/u in Rheumatology clinic in 2 weeks- will call pt with appt after biopsy results have returned.

## 2019-04-21 NOTE — SUBJECTIVE & OBJECTIVE
Past Medical History:   Diagnosis Date    Allergy     Cataract     Diabetes mellitus type II     Gastritis     with gastric ulcer    GERD (gastroesophageal reflux disease)     H. pylori infection     History of hepatitis C, SVR as of 8-2016 8/25/2015    Harvoni 12 wks completed.  SVR(12) as of 8/4/16 SVR(24) as of 10-     Hyperlipidemia     Hypertension     Osteopenia     Type 2 diabetes mellitus with diabetic polyneuropathy        Past Surgical History:   Procedure Laterality Date    COLONOSCOPY      COLONOSCOPY N/A 1/28/2016    Performed by [facility] at [facility] (4TH FLR)    HYSTERECTOMY      LIVER BIOPSY      OOPHORECTOMY      UPPER GASTROINTESTINAL ENDOSCOPY         Immunization History   Administered Date(s) Administered    Influenza - High Dose 11/04/2014, 02/04/2016, 10/10/2016, 09/27/2017, 10/11/2018, 01/24/2019    Pneumococcal Conjugate - 13 Valent 02/04/2016    Pneumococcal Polysaccharide - 23 Valent 05/26/2017    Tdap 07/22/2015    Zoster 07/14/2016       Review of patient's allergies indicates:   Allergen Reactions    Erythromycin Anaphylaxis    Erythropoietin analogues Anaphylaxis    Pegasys [peginterferon ruben-2a] Anaphylaxis    Lisinopril Hives     Current Facility-Administered Medications   Medication Frequency    albuterol inhaler 2 puff Q6H PRN    aspirin EC tablet 81 mg Daily    atorvastatin tablet 80 mg Daily    candesartan tablet 8 mg Daily    dextrose 50 % in water (D50W) injection 12.5 g PRN    dextrose 50 % in water (D50W) injection 25 g PRN    gabapentin capsule 300 mg QHS    glucagon (human recombinant) injection 1 mg PRN    glucose chewable tablet 16 g PRN    glucose chewable tablet 24 g PRN    heparin (porcine) injection 5,000 Units Q8H    hydroCHLOROthiazide tablet 25 mg Daily    insulin aspart U-100 pen 1-10 Units QID (AC + HS) PRN    insulin detemir U-100 pen 18 Units QHS    labetalol 20 mg/4 mL (5 mg/mL) IV syring Q6H PRN     ondansetron disintegrating tablet 8 mg Q8H PRN    pantoprazole EC tablet 40 mg Daily    predniSONE tablet 60 mg Daily    sodium chloride 0.9% flush 10 mL PRN    traZODone tablet 50 mg QHS     Family History     Problem Relation (Age of Onset)    Breast cancer Sister    Cancer Sister, Sister (70)    Diabetes Mother, Sister, Sister, Sister    Heart disease Mother, Father        Tobacco Use    Smoking status: Current Every Day Smoker     Packs/day: 0.25     Years: 48.00     Pack years: 12.00     Last attempt to quit: 2016     Years since quittin.6    Smokeless tobacco: Never Used   Substance and Sexual Activity    Alcohol use: No     Comment: special occasions    Drug use: No    Sexual activity: Yes     Partners: Male     Review of Systems   Constitutional: Negative for chills, fatigue and fever.   HENT: Negative for congestion, ear pain, mouth sores, sinus pressure, sinus pain and trouble swallowing.    Eyes: Negative for pain and redness.   Respiratory: Negative for apnea and shortness of breath.    Cardiovascular: Negative for chest pain and leg swelling.   Gastrointestinal: Negative for abdominal distention, diarrhea and nausea.   Endocrine: Negative for polydipsia and polyphagia.   Genitourinary: Negative for difficulty urinating.   Musculoskeletal: Negative for arthralgias and myalgias.   Skin: Negative for pallor and rash.   Neurological: Negative for dizziness, seizures and headaches.   Hematological: Negative for adenopathy.   Psychiatric/Behavioral: Negative for agitation.     Objective:     Vital Signs (Most Recent):  Temp: 98.2 °F (36.8 °C) (19 0501)  Pulse: 86 (19 0700)  Resp: 16 (19 07)  BP: (!) 174/78 (19 07)  SpO2: (!) 94 % (19 07)  O2 Device (Oxygen Therapy): room air (19 07) Vital Signs (24h Range):  Temp:  [97.9 °F (36.6 °C)-98.3 °F (36.8 °C)] 98.2 °F (36.8 °C)  Pulse:  [71-87] 86  Resp:  [16-18] 16  SpO2:  [94 %-100 %] 94 %  BP:  (149-245)/() 174/78     Weight: 94.2 kg (207 lb 9 oz) (04/21/19 0139)  Body mass index is 34.54 kg/m².  Body surface area is 2.08 meters squared.      Intake/Output Summary (Last 24 hours) at 4/21/2019 0858  Last data filed at 4/21/2019 0547  Gross per 24 hour   Intake 1030 ml   Output 300 ml   Net 730 ml       Physical Exam   Constitutional: She is oriented to person, place, and time and well-developed, well-nourished, and in no distress.   HENT:   Head: Normocephalic and atraumatic.   Eyes: EOM are normal. Pupils are equal, round, and reactive to light.   Cardiovascular: Normal rate and regular rhythm.    Pulmonary/Chest: Effort normal and breath sounds normal.   Abdominal: Soft. Bowel sounds are normal.       Right Side Rheumatological Exam     Muscle Strength (0-5 scale):  Deltoid:  5  Biceps: 5/5   Triceps:  5  : 5/5   Iliopsoas: 5  Quadriceps:  5   Distal Lower Extremity: 5    Left Side Rheumatological Exam     Muscle Strength (0-5 scale):  Deltoid:  5  Biceps: 5/5   Triceps:  5  :  5/5   Iliopsoas: 5  Quadriceps:  5   Distal Lower Extremity: 5      Neurological: She is alert and oriented to person, place, and time.   Skin: Skin is warm and dry.     Psychiatric: Affect normal.   Musculoskeletal: Normal range of motion. She exhibits no edema or tenderness.   2+ left temporal pulse  1+ right temporal pulse  No scalp tenderness  2+ radial pulses  2+ DP pulses  No carotid bruits  Normal ROM at shoulders/hips         Significant Labs:  Results for DAHLIA MALDONADO (MRN [id number]) as of 4/21/2019 08:47   Ref. Range 4/21/2019 04:54   WBC Latest Ref Range: 3.90 - 12.70 K/uL 9.02   RBC Latest Ref Range: 4.00 - 5.40 M/uL 5.05   Hemoglobin Latest Ref Range: 12.0 - 16.0 g/dL 14.1   Hematocrit Latest Ref Range: 37.0 - 48.5 % 45.2   MCV Latest Ref Range: 82 - 98 fL 90   MCH Latest Ref Range: 27.0 - 31.0 pg 27.9   MCHC Latest Ref Range: 32.0 - 36.0 g/dL 31.2 (L)   RDW Latest Ref Range: 11.5 - 14.5 % 14.9 (H)    Platelets Latest Ref Range: 150 - 350 K/uL 337   MPV Latest Ref Range: 9.2 - 12.9 fL 10.5   Gran% Latest Ref Range: 38.0 - 73.0 % 83.5 (H)   Gran # (ANC) Latest Ref Range: 1.8 - 7.7 K/uL 7.6   Lymph% Latest Ref Range: 18.0 - 48.0 % 14.7 (L)   Lymph # Latest Ref Range: 1.0 - 4.8 K/uL 1.3   Mono% Latest Ref Range: 4.0 - 15.0 % 0.7 (L)   Mono # Latest Ref Range: 0.3 - 1.0 K/uL 0.1 (L)   Eosinophil% Latest Ref Range: 0.0 - 8.0 % 0.0   Eos # Latest Ref Range: 0.0 - 0.5 K/uL 0.0   Basophil% Latest Ref Range: 0.0 - 1.9 % 0.2   Baso # Latest Ref Range: 0.00 - 0.20 K/uL 0.02   nRBC Latest Ref Range: 0 /100 WBC 0   Platelet Estimate Unknown Appears normal   Differential Method Unknown Automated   Immature Grans (Abs) Latest Ref Range: 0.00 - 0.04 K/uL 0.08 (H)   Immature Granulocytes Latest Ref Range: 0.0 - 0.5 % 0.9 (H)   Results for DAHLIA MALDONADO (MRN [id number]) as of 4/21/2019 08:47   Ref. Range 4/21/2019 07:14   Sodium Latest Ref Range: 136 - 145 mmol/L 133 (L)   Potassium Latest Ref Range: 3.5 - 5.1 mmol/L 4.1   Chloride Latest Ref Range: 95 - 110 mmol/L 103   CO2 Latest Ref Range: 23 - 29 mmol/L 21 (L)   Anion Gap Latest Ref Range: 8 - 16 mmol/L 9   BUN, Bld Latest Ref Range: 8 - 23 mg/dL 15   Creatinine Latest Ref Range: 0.5 - 1.4 mg/dL 0.9   eGFR if non African American Latest Ref Range: >60 mL/min/1.73 m^2 >60.0   eGFR if African American Latest Ref Range: >60 mL/min/1.73 m^2 >60.0   Glucose Latest Ref Range: 70 - 110 mg/dL 287 (H)   Calcium Latest Ref Range: 8.7 - 10.5 mg/dL 8.9     Significant Imaging:  CT head 4/20/19:  FINDINGS:  The overall gray-white interface and hemispherical morphology appears normal.  The ventricles and basal cisterns appear normal.  The ventricles and sulci are proportionally sized.  No acute infarct or hemorrhage or mass or fracture.    The orbits and globes appear normal.  No overlying soft tissue swelling found.  No fracture seen.  The visualized paranasal sinuses and mastoid sinuses  and middle ears appear normal.  Corpus callosum shows normal morphology.  The pituitary and sella turcica appears normal.      Impression       Normal head CT.

## 2019-04-21 NOTE — SUBJECTIVE & OBJECTIVE
Medications Prior to Admission   Medication Sig Dispense Refill Last Dose    albuterol (PROVENTIL/VENTOLIN HFA) 90 mcg/actuation inhaler Inhale 1-2 puffs into the lungs every 6 (six) hours as needed for Wheezing. Rescue 18 g 0 Past Month at Unknown time    atorvastatin (LIPITOR) 80 MG tablet Take 1 tablet (80 mg total) by mouth once daily. DOSE CHANGE 90 tablet 3 4/20/2019 at Unknown time    candesartan (ATACAND) 8 MG tablet Take 1 tablet (8 mg total) by mouth once daily. 90 tablet 3 4/20/2019    esomeprazole (NEXIUM) 40 MG capsule Take one capsule by mouth daily as needed for acid reflux 30 capsule 11 Past Week at Unknown time    gabapentin (NEURONTIN) 300 MG capsule TAKE ONE CAPSULE BY MOUTH EVERY EVENING 90 capsule 3 4/20/2019 at Unknown time    hydrochlorothiazide (HYDRODIURIL) 25 MG tablet TAKE ONE TABLET BY MOUTH ONCE DAILY 90 tablet 3 4/20/2019    metFORMIN (GLUCOPHAGE-XR) 500 MG 24 hr tablet Take 1 tablet (500 mg total) by mouth 2 (two) times daily with meals. 180 tablet 2 4/20/2019    traZODone (DESYREL) 50 MG tablet Take 1 to 2 tablets ( mg total) by mouth every evening. 60 tablet 3 4/20/2019 at Unknown time    aspirin (ECOTRIN) 81 MG EC tablet Take 1 tablet (81 mg total) by mouth once daily. 30 tablet 3 More than a month at Unknown time    blood sugar diagnostic Strp To check BG 3 times daily, to use with insurance preferred meter 300 each 3     blood-glucose meter kit To check BG 3 times daily, to use with insurance preferred meter 1 each 0     ciclopirox (PENLAC) 8 % Soln Apply topically nightly. 6.6 mL 11 More than a month at Unknown time    clotrimazole-betamethasone 1-0.05% (LOTRISONE) cream Apply topically 2 (two) times daily. 45 g 1 More than a month at Unknown time    dulaglutide (TRULICITY) 1.5 mg/0.5 mL PnIj Inject 1.5 mg into the skin once weekly. 2 mL 6 More than a month at Unknown time    fluocinonide (LIDEX) 0.05 % external solution AAA scalp qd prn pruritus 60 mL 1  "Taking    insulin degludec (TRESIBA FLEXTOUCH U-200) 200 unit/mL (3 mL) InPn INJECT 22 UNITS UNDER THE SKIN EVERY EVENING 15 mL 11     ketoconazole (NIZORAL) 2 % shampoo Wash hair with medicated shampoo at least 2x/week - let sit on scalp at least 5 minutes prior to rinsing 120 mL 5 Taking    lancets 33 gauge Misc To check BG 3 times daily, to use with insurance preferred meter 300 each 3     pen needle, diabetic (BD ULTRA-FINE MILAN PEN NEEDLES) 32 gauge x 5/32" Ndle Uses 1 time daily, on insulin injections 90 each 4 Taking       Review of patient's allergies indicates:   Allergen Reactions    Erythromycin Anaphylaxis    Erythropoietin analogues Anaphylaxis    Pegasys [peginterferon ruben-2a] Anaphylaxis    Lisinopril Hives       Past Medical History:   Diagnosis Date    Allergy     Cataract     Diabetes mellitus type II     Gastritis     with gastric ulcer    GERD (gastroesophageal reflux disease)     H. pylori infection     History of hepatitis C, SVR as of 2015    Harvoni 12 wks completed.  SVR(12) as of 16 SVR(24) as of 10-     Hyperlipidemia     Hypertension     Osteopenia     Type 2 diabetes mellitus with diabetic polyneuropathy      Past Surgical History:   Procedure Laterality Date    COLONOSCOPY      COLONOSCOPY N/A 2016    Performed by [facility] at [facility] (4TH FLR)    HYSTERECTOMY      LIVER BIOPSY      OOPHORECTOMY      UPPER GASTROINTESTINAL ENDOSCOPY       Family History     Problem Relation (Age of Onset)    Breast cancer Sister    Cancer Sister, Sister (70)    Diabetes Mother, Sister, Sister, Sister    Heart disease Mother, Father        Tobacco Use    Smoking status: Current Every Day Smoker     Packs/day: 0.25     Years: 48.00     Pack years: 12.00     Last attempt to quit: 2016     Years since quittin.6    Smokeless tobacco: Never Used   Substance and Sexual Activity    Alcohol use: No     Comment: special occasions    Drug " use: No    Sexual activity: Yes     Partners: Male     Review of Systems   Constitutional: Negative for chills and fever.   Eyes: Positive for photophobia.   Respiratory: Negative for shortness of breath.    Cardiovascular: Negative for chest pain, palpitations and leg swelling.   Gastrointestinal: Negative for abdominal pain.   Genitourinary: Negative for difficulty urinating.   Musculoskeletal: Negative for myalgias.   Skin: Negative for color change and rash.   Neurological: Positive for headaches. Negative for seizures and syncope.   Psychiatric/Behavioral: Negative for agitation and confusion.     Objective:     Vital Signs (Most Recent):  Temp: 98.2 °F (36.8 °C) (04/21/19 0501)  Pulse: 86 (04/21/19 0700)  Resp: 16 (04/21/19 0700)  BP: (!) 174/78 (04/21/19 0700)  SpO2: (!) 94 % (04/21/19 0700) Vital Signs (24h Range):  Temp:  [97.9 °F (36.6 °C)-98.3 °F (36.8 °C)] 98.2 °F (36.8 °C)  Pulse:  [71-87] 86  Resp:  [16-18] 16  SpO2:  [94 %-100 %] 94 %  BP: (149-245)/() 174/78     Weight: 94.2 kg (207 lb 9 oz)  Body mass index is 34.54 kg/m².    Physical Exam   Constitutional: She is oriented to person, place, and time. She appears well-developed and well-nourished. No distress.   HENT:   No temporal tenderness    Cardiovascular: Normal rate and intact distal pulses.   Pulmonary/Chest: Effort normal. No respiratory distress.   Abdominal: Soft. She exhibits no distension. There is no tenderness.   Musculoskeletal: She exhibits no edema.   Neurological: She is alert and oriented to person, place, and time.   Psychiatric: She has a normal mood and affect.       Significant Labs:  CBC:   Recent Labs   Lab 04/21/19  0454   WBC 9.02   RBC 5.05   HGB 14.1   HCT 45.2      MCV 90   MCH 27.9   MCHC 31.2*     CMP:   Recent Labs   Lab 04/21/19  0714   *   CALCIUM 8.9   *   K 4.1   CO2 21*      BUN 15   CREATININE 0.9     ESR 71    CRP 3    Significant Diagnostics:  I have reviewed and interpreted  all pertinent imaging results/findings within the past 24 hours.

## 2019-04-21 NOTE — HPI
"Ms Ibarra is a 72 yo female with HTN, IDDM, CKD II, HLD, GERD and h/o treated HepC presenting with "the worst headache of her life". Patient reports that around noon on 4/20/2019 she had a gradual onset of an intense piercing and throbbing headache in the L temporal region. Pt reports the pain starting at a 12/10 and progressing over a few hours until it was unbearable and she presented to [facility] [place] ED. Pt further reports L sided, posterior neck pain and photophobia. Patient denies jaw claudication, similar episodes, or history of migraines or headaches. Patient's sister has biopsy confirmed giant cell arteritis and mother has a history of migraine headaches. Patient denies any history of shoulder or hip pain, difficulty reaching above her head, or mobilizing from a seated position or up stairs. Patient denies chest pain, shortness of breath, nausea, vomiting, fever, chills, sweats, recent illness, or sick contacts.       "

## 2019-04-21 NOTE — CONSULTS
[facility]  Rheumatology  Consult Note    Patient Name: Buck Ibarra  MRN: [id number]  Admission Date: 4/20/2019  Hospital Length of Stay: 1 days  Code Status: Full Code   Attending Provider: Han Brandt MD  Primary Care Physician: Ericka Cortez MD  Principal Problem:Acute nonintractable headache    Inpatient consult to Rheumatology  Consult performed by: Samy Hanley MD  Consult ordered by: Tor Licea MD        Subjective:     HPI: This is a 72 yo AFF with PMH of DMII, HTN, Hep C s/p Harvoni, GERD who presented to [facility] ED on 4/20/19 with complaints of severe left sided headache. Reports that headache started suddenly around the left temporal region and going back to the back of the head. Also had associated photophobia. Did have some tenderness to touch over the left temporal area. Initially took two ibuprofren for the pain but this did not help. Would describe the pain as a 12/10 throbbing intense pain. No jaw claudication or vision changes. No history of headaches in the past. No injury/trauma. In ED, patient received IV fluid, Reglan and Tylenol. Per patient, Reglan helped to bring down the pain from a 12/10 to a 6/10. Then she was given Solumedrol 250 mg IV which completing resolved the pain. She was also found to be hypertensive with BP at 245/107. Given Labetolol. Sed rate found to be elevated at 71. CRP normal at 3.3. Currently, headache completely resolved. No pain in the shoulders or hips. Reports that sister had a temporal biopsy 3-4 years ago which was negative for GCA and she was tapered off of steroids after one week. Denies any fevers, chills, weight changes, oral/nasal ulcers, hair loss, skin rashes, joint pain/swelling, muscle weakness/pain, eye redness, n/v. Does have a grandchild who is currently sick with a cold.     Past Medical History:   Diagnosis Date    Allergy     Cataract     Diabetes mellitus type II     Gastritis     with gastric ulcer    GERD  (gastroesophageal reflux disease)     H. pylori infection     History of hepatitis C, SVR as of 8-2016 8/25/2015    Harvoni 12 wks completed.  SVR(12) as of 8/4/16 SVR(24) as of 10-     Hyperlipidemia     Hypertension     Osteopenia     Type 2 diabetes mellitus with diabetic polyneuropathy        Past Surgical History:   Procedure Laterality Date    COLONOSCOPY      COLONOSCOPY N/A 1/28/2016    Performed by [facility] at [facility] (4TH FLR)    HYSTERECTOMY      LIVER BIOPSY      OOPHORECTOMY      UPPER GASTROINTESTINAL ENDOSCOPY         Immunization History   Administered Date(s) Administered    Influenza - High Dose 11/04/2014, 02/04/2016, 10/10/2016, 09/27/2017, 10/11/2018, 01/24/2019    Pneumococcal Conjugate - 13 Valent 02/04/2016    Pneumococcal Polysaccharide - 23 Valent 05/26/2017    Tdap 07/22/2015    Zoster 07/14/2016       Review of patient's allergies indicates:   Allergen Reactions    Erythromycin Anaphylaxis    Erythropoietin analogues Anaphylaxis    Pegasys [peginterferon ruben-2a] Anaphylaxis    Lisinopril Hives     Current Facility-Administered Medications   Medication Frequency    albuterol inhaler 2 puff Q6H PRN    aspirin EC tablet 81 mg Daily    atorvastatin tablet 80 mg Daily    candesartan tablet 8 mg Daily    dextrose 50 % in water (D50W) injection 12.5 g PRN    dextrose 50 % in water (D50W) injection 25 g PRN    gabapentin capsule 300 mg QHS    glucagon (human recombinant) injection 1 mg PRN    glucose chewable tablet 16 g PRN    glucose chewable tablet 24 g PRN    heparin (porcine) injection 5,000 Units Q8H    hydroCHLOROthiazide tablet 25 mg Daily    insulin aspart U-100 pen 1-10 Units QID (AC + HS) PRN    insulin detemir U-100 pen 18 Units QHS    labetalol 20 mg/4 mL (5 mg/mL) IV syring Q6H PRN    ondansetron disintegrating tablet 8 mg Q8H PRN    pantoprazole EC tablet 40 mg Daily    predniSONE tablet 60 mg Daily    sodium chloride 0.9%  flush 10 mL PRN    traZODone tablet 50 mg QHS     Family History     Problem Relation (Age of Onset)    Breast cancer Sister    Cancer Sister, Sister (70)    Diabetes Mother, Sister, Sister, Sister    Heart disease Mother, Father        Tobacco Use    Smoking status: Current Every Day Smoker     Packs/day: 0.25     Years: 48.00     Pack years: 12.00     Last attempt to quit: 2016     Years since quittin.6    Smokeless tobacco: Never Used   Substance and Sexual Activity    Alcohol use: No     Comment: special occasions    Drug use: No    Sexual activity: Yes     Partners: Male     Review of Systems   Constitutional: Negative for chills, fatigue and fever.   HENT: Negative for congestion, ear pain, mouth sores, sinus pressure, sinus pain and trouble swallowing.    Eyes: Negative for pain and redness.   Respiratory: Negative for apnea and shortness of breath.    Cardiovascular: Negative for chest pain and leg swelling.   Gastrointestinal: Negative for abdominal distention, diarrhea and nausea.   Endocrine: Negative for polydipsia and polyphagia.   Genitourinary: Negative for difficulty urinating.   Musculoskeletal: Negative for arthralgias and myalgias.   Skin: Negative for pallor and rash.   Neurological: Negative for dizziness, seizures and headaches.   Hematological: Negative for adenopathy.   Psychiatric/Behavioral: Negative for agitation.     Objective:     Vital Signs (Most Recent):  Temp: 98.2 °F (36.8 °C) (19 0501)  Pulse: 86 (19 0700)  Resp: 16 (19 0700)  BP: (!) 174/78 (19 0700)  SpO2: (!) 94 % (19 0700)  O2 Device (Oxygen Therapy): room air (19 07) Vital Signs (24h Range):  Temp:  [97.9 °F (36.6 °C)-98.3 °F (36.8 °C)] 98.2 °F (36.8 °C)  Pulse:  [71-87] 86  Resp:  [16-18] 16  SpO2:  [94 %-100 %] 94 %  BP: (149-245)/() 174/78     Weight: 94.2 kg (207 lb 9 oz) (19 0139)  Body mass index is 34.54 kg/m².  Body surface area is 2.08 meters  squared.      Intake/Output Summary (Last 24 hours) at 4/21/2019 0858  Last data filed at 4/21/2019 0547  Gross per 24 hour   Intake 1030 ml   Output 300 ml   Net 730 ml       Physical Exam   Constitutional: She is oriented to person, place, and time and well-developed, well-nourished, and in no distress.   HENT:   Head: Normocephalic and atraumatic.   Eyes: EOM are normal. Pupils are equal, round, and reactive to light.   Cardiovascular: Normal rate and regular rhythm.    Pulmonary/Chest: Effort normal and breath sounds normal.   Abdominal: Soft. Bowel sounds are normal.       Right Side Rheumatological Exam     Muscle Strength (0-5 scale):  Deltoid:  5  Biceps: 5/5   Triceps:  5  : 5/5   Iliopsoas: 5  Quadriceps:  5   Distal Lower Extremity: 5    Left Side Rheumatological Exam     Muscle Strength (0-5 scale):  Deltoid:  5  Biceps: 5/5   Triceps:  5  :  5/5   Iliopsoas: 5  Quadriceps:  5   Distal Lower Extremity: 5      Neurological: She is alert and oriented to person, place, and time.   Skin: Skin is warm and dry.     Psychiatric: Affect normal.   Musculoskeletal: Normal range of motion. She exhibits no edema or tenderness.   2+ left temporal pulse  1+ right temporal pulse  No scalp tenderness  2+ radial pulses  2+ DP pulses  No carotid bruits  Normal ROM at shoulders/hips         Significant Labs:  Results for DAHLIA MALDONADO (MRN [id number]) as of 4/21/2019 08:47   Ref. Range 4/21/2019 04:54   WBC Latest Ref Range: 3.90 - 12.70 K/uL 9.02   RBC Latest Ref Range: 4.00 - 5.40 M/uL 5.05   Hemoglobin Latest Ref Range: 12.0 - 16.0 g/dL 14.1   Hematocrit Latest Ref Range: 37.0 - 48.5 % 45.2   MCV Latest Ref Range: 82 - 98 fL 90   MCH Latest Ref Range: 27.0 - 31.0 pg 27.9   MCHC Latest Ref Range: 32.0 - 36.0 g/dL 31.2 (L)   RDW Latest Ref Range: 11.5 - 14.5 % 14.9 (H)   Platelets Latest Ref Range: 150 - 350 K/uL 337   MPV Latest Ref Range: 9.2 - 12.9 fL 10.5   Gran% Latest Ref Range: 38.0 - 73.0 % 83.5 (H)   Gran #  (ANC) Latest Ref Range: 1.8 - 7.7 K/uL 7.6   Lymph% Latest Ref Range: 18.0 - 48.0 % 14.7 (L)   Lymph # Latest Ref Range: 1.0 - 4.8 K/uL 1.3   Mono% Latest Ref Range: 4.0 - 15.0 % 0.7 (L)   Mono # Latest Ref Range: 0.3 - 1.0 K/uL 0.1 (L)   Eosinophil% Latest Ref Range: 0.0 - 8.0 % 0.0   Eos # Latest Ref Range: 0.0 - 0.5 K/uL 0.0   Basophil% Latest Ref Range: 0.0 - 1.9 % 0.2   Baso # Latest Ref Range: 0.00 - 0.20 K/uL 0.02   nRBC Latest Ref Range: 0 /100 WBC 0   Platelet Estimate Unknown Appears normal   Differential Method Unknown Automated   Immature Grans (Abs) Latest Ref Range: 0.00 - 0.04 K/uL 0.08 (H)   Immature Granulocytes Latest Ref Range: 0.0 - 0.5 % 0.9 (H)   Results for DAHLIA MALDONADO (MRN [id number]) as of 4/21/2019 08:47   Ref. Range 4/21/2019 07:14   Sodium Latest Ref Range: 136 - 145 mmol/L 133 (L)   Potassium Latest Ref Range: 3.5 - 5.1 mmol/L 4.1   Chloride Latest Ref Range: 95 - 110 mmol/L 103   CO2 Latest Ref Range: 23 - 29 mmol/L 21 (L)   Anion Gap Latest Ref Range: 8 - 16 mmol/L 9   BUN, Bld Latest Ref Range: 8 - 23 mg/dL 15   Creatinine Latest Ref Range: 0.5 - 1.4 mg/dL 0.9   eGFR if non African American Latest Ref Range: >60 mL/min/1.73 m^2 >60.0   eGFR if African American Latest Ref Range: >60 mL/min/1.73 m^2 >60.0   Glucose Latest Ref Range: 70 - 110 mg/dL 287 (H)   Calcium Latest Ref Range: 8.7 - 10.5 mg/dL 8.9     Significant Imaging:  CT head 4/20/19:  FINDINGS:  The overall gray-white interface and hemispherical morphology appears normal.  The ventricles and basal cisterns appear normal.  The ventricles and sulci are proportionally sized.  No acute infarct or hemorrhage or mass or fracture.    The orbits and globes appear normal.  No overlying soft tissue swelling found.  No fracture seen.  The visualized paranasal sinuses and mastoid sinuses and middle ears appear normal.  Corpus callosum shows normal morphology.  The pituitary and sella turcica appears normal.      Impression       Normal  head CT.         Assessment/Plan:     * Acute nonintractable headache  This is a 72 yo AFF with PMH of DMII, HTN, Hep C s/p Harvoni, GERD who presented to [facility] ED on 4/20/19 with complaints of severe left sided headache. Reports that headache started suddenly around the left temporal region and going back to the back of the head. Also had associated photophobia. Did have some tenderness to touch over the left temporal area. Initially took two ibuprofren for the pain but this did not help. Would describe the pain as a 12/10 throbbing intense pain. No jaw claudication or vision changes. No history of headaches in the past. No injury/trauma. In ED, patient received IV fluid, Reglan and Tylenol. Per patient, Reglan helped to bring down the pain from a 12/10 to a 6/10. Then she was given Solumedrol 250 mg IV which completing resolved the pain. She was also found to be hypertensive with BP at 245/107. Given Labetolol. Sed rate found to be elevated at 71. CRP normal at 3.3. Currently, headache completely resolved. No pain in the shoulders or hips. Reports that sister had a temporal biopsy 3-4 years ago which was negative for GCA and she was tapered off of steroids after one week. Denies any fevers, chills, weight changes, oral/nasal ulcers, hair loss, skin rashes, joint pain/swelling, muscle weakness/pain, eye redness, n/v. Does have a grandchild who is currently sick with a cold.     After Reglan and Solumedrol 250 mg IV, patient reports all symptoms resolved. Exam with no scalp tenderness and normal temporal pulses (slightly decreased on right). Sed rate elevated at 71, CRP normal. No fevers, chills, jaw claudication, vision changes, PMR symptoms.  MRI GCA protocol completed; pending results. TA US not done yet.     DEXA 2018: osteopenia of lumbar spine. FRAX: Major: 3%, hip 0%-->low risk.    Differentials: GCA vs migraine headaches vs tension HA      Recommend:  - ophthalmology consult to r/o GCA eye involvement. If  there is eye involvement, patient will need Solumedrol pulse-dosing.   - vascular surgery consult to schedule left temporal biopsy inpatient or outpatient asap  - continue prednisone 60 mg oral daily;continue this dose at discharge  - will need close monitoring of blood sugars while on prednisone. DMII medications may need to be adjusted  - check pre-dmards, SPEP, ERICA, vitamin D  - start on Vitamin D 1000 units daily, 40010 mg dietary calcium daily  - CTA chest as outpatient  - f/u in Rheumatology clinic in 2 weeks- will call pt with appt after biopsy results have returned.            Thank you for your consult.     Samy Hanley MD  Rheumatology  [facility]        I  Have personally take the history and examined the patient and agree with fellow's note as stated above.    Severe left temporal headache, worst in life, with ESR 71 normal CRP. + photophobia but no visual symptoms, scalp tenderness, jaw pain, PMR, fever, weight loss. 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  1200mg dietary calcium, 800 units vitamin D3  daily  CTA or MRI as outpatient to look for large vessel vasculiti

## 2019-04-21 NOTE — NURSING
Discharge education completed. Pharmacy delivered medication to bedside. IV removed. All patient belongings verified to be with her. Patient updated that she will be notified for future appointment for artery biopsy. She declined the wheelchair service and ambulated off the floor.

## 2019-04-21 NOTE — PLAN OF CARE
I have seen the patient, discussed the resident's history and physical, assessment and plan. I have personally interviewed and examined the patient at bedside.  In summary:    Ms. Buck Ibarra is a 73 y.o. female with T2DM who presents to the ED for evaluation of the worst headache of her life in the temporal area, with a , and photophobia.  Labs notable for elevated ESR.  Patient reports family history of GCA.  Head CT pending.  Check temporal ultrasound.  MRI brain.  Ophtho suggests Solumedrol - plan to start Prednisone 60mg tomorrow.  Rheum consult.  Vascular Surgery consult.  Likely to need biopsy outpatient.  Unclear at this time if this is GCA vs acute migraine - but given risk of blindness for not treating GCA, will rule it out first.    Full H&P by team intern to follow.    Cammie Jewell MD  [facility]  Cell:  [phone]  Spectra:  [phone]  Pager:  [phone]

## 2019-04-21 NOTE — ED PROVIDER NOTES
Encounter Date: 4/20/2019       History     Chief Complaint   Patient presents with    Headache     left sided headache that began 1 hr ago/     Hypertension     HPI     Buck Ibarra is a 73 y.o. female w/ hx of diabetes, acid reflux, hypertension, hyperlipidemia, presented for gradual onset headache x2 hours.  Patient reports that she has headaches occasionally but usually not this bad.  About 2 hr ago she was sitting in a chair, started noticing a nagging headache, that gradually increased, located in left occipital area, and behind left eye and left temporal area, throbbing, associated with photophobia, no phonophobia, not associated with nausea, vomiting, chest pain, shortness of breath. Denies neck pain or difficulty with moving her neck. No fever, chills, URI symptoms. She reports she is compliant with her home medications and took her blood pressure medication this morning.  Denies personal history of migraine but her mother has history of frequent migraine.  No recent head trauma. No weakness, numbness, tingling anywhere.    Review of patient's allergies indicates:   Allergen Reactions    Erythromycin Anaphylaxis    Erythropoietin analogues Anaphylaxis    Pegasys [peginterferon ruben-2a] Anaphylaxis    Lisinopril Hives     Past Medical History:   Diagnosis Date    Allergy     Cataract     Diabetes mellitus type II     Gastritis     with gastric ulcer    GERD (gastroesophageal reflux disease)     H. pylori infection     History of hepatitis C, SVR as of 8-2016 8/25/2015    Harvoni 12 wks completed.  SVR(12) as of 8/4/16 SVR(24) as of 10-     Hyperlipidemia     Hypertension     Osteopenia     Type 2 diabetes mellitus with diabetic polyneuropathy      Past Surgical History:   Procedure Laterality Date    COLONOSCOPY      COLONOSCOPY N/A 1/28/2016    [facility] at [facility] ([facility])    HYSTERECTOMY      LIVER BIOPSY      OOPHORECTOMY      UPPER  GASTROINTESTINAL ENDOSCOPY       Family History   Problem Relation Age of Onset    Heart disease Mother     Diabetes Mother     Heart disease Father     Cancer Sister         breast cancer    Diabetes Sister     Cancer Sister 70        colon CA    Diabetes Sister     Breast cancer Sister     Diabetes Sister     Glaucoma Neg Hx     Melanoma Neg Hx     Cirrhosis Neg Hx     Psoriasis Neg Hx     Lupus Neg Hx      Social History     Tobacco Use    Smoking status: Current Every Day Smoker     Packs/day: 0.25     Years: 48.00     Pack years: 12.00     Last attempt to quit: 2016     Years since quittin.6    Smokeless tobacco: Never Used   Substance Use Topics    Alcohol use: No     Comment: special occasions    Drug use: No     Review of Systems   Constitutional: Negative for fever.   HENT: Negative for sore throat.    Respiratory: Negative for shortness of breath.    Cardiovascular: Negative for chest pain.   Gastrointestinal: Negative for nausea.   Genitourinary: Negative for dysuria.   Musculoskeletal: Negative for back pain.   Skin: Negative for rash.   Neurological: Positive for headaches. Negative for weakness, light-headedness and numbness.   Hematological: Does not bruise/bleed easily.   All other systems reviewed and are negative.      Physical Exam     Initial Vitals [19 1832]   BP Pulse Resp Temp SpO2   (!) 245/107 79 16 98.3 °F (36.8 °C) 100 %      MAP       --         Physical Exam    Nursing note and vitals reviewed.  Constitutional: Vital signs are normal. She appears well-developed and well-nourished. She is not diaphoretic. She is cooperative. No distress.   HENT:   Head: Normocephalic and atraumatic.   No palpable temporal artery bilaterally.   Eyes: Conjunctivae and EOM are normal. Pupils are equal, round, and reactive to light.   Has photophobia.  Cataract in left eye.  Left eye is very sensitive to light and painful.   Neck: Normal range of motion. Neck supple.   No  nuchal rigidity.  Full range motion without any difficulty.   Cardiovascular: Normal rate and normal heart sounds.   No murmur heard.  Pulmonary/Chest: Effort normal and breath sounds normal. No tachypnea. No respiratory distress.   Abdominal: Soft. There is no tenderness. There is no guarding.   Musculoskeletal: Normal range of motion. She exhibits no edema or tenderness.   Neurological: She is alert and oriented to person, place, and time. She has normal strength. No cranial nerve deficit or sensory deficit. Coordination normal.   Negative pronator drift.  Normal finger-to-nose.   Skin: Capillary refill takes less than 2 seconds.         ED Course   Procedures  Labs Reviewed   CBC W/ AUTO DIFFERENTIAL - Abnormal; Notable for the following components:       Result Value    MCHC 31.4 (*)     RDW 14.8 (*)     All other components within normal limits    Narrative:     shared lavender   SEDIMENTATION RATE - Abnormal; Notable for the following components:    Sed Rate 71 (*)     All other components within normal limits    Narrative:     shared lavender   BASIC METABOLIC PANEL    Narrative:     shared lavender   C-REACTIVE PROTEIN   C-REACTIVE PROTEIN    Narrative:     shared lavender  Add ON CRP. PER ANDERS LOZADA MD.  04/20/2019  20:49           Imaging Results    None                APC / Resident Notes:        MDM - PGY3  Buck Ibarra is a 73 y.o. female w/ hx of diabetes, acid reflux, hypertension, hyperlipidemia, presented for gradual onset headache x2 hours.  On exam, well appearing, nontoxic, appears comfortable and not in acute distress. A/VSS. Lungs CTAB. RRR. Abd s/nt/nd.  No focal neurologic deficit  Ddx includes migraine headache, tension headache, cluster headache, little abnormalities, hypertensive emergency versus urgency, temporal arthritis.  Doubt SAH as this is gradual onset, one-sided, and no focal neurologic deficit.  Do not suspect meningitis.  Will get CBC, CMP, ESR,  Will give IV fluid,  Reglan, Tylenol.    Will continue to monitor and reassess.  --- Minoo Barros MD -- PGY3 EM -- 04/20/2019 7:47 PM ---    UPDATE  Headache somewhat improved, and left eye pain also somewhat improved after treatment.  Given labetalol for hypertension.  ESR elevated to 71.  Will get CT head and consult Ophthalmology for evaluation of giant cell temporal arthritis.  Patient does reports that her sister has giant cell temporal arthritis.  Anticipated admission for observation.  --- Minoo Barros MD -- PGY3 EM -- 04/20/2019 8:30 PM ---                  Clinical Impression:       ICD-10-CM ICD-9-CM   1. Acute nonintractable headache, unspecified headache type R51 784.0   2. HTN (hypertension) I10 401.9   3. Family history of temporal arteritis Z82.49 V17.49                                Minoo Barros MD  Resident  04/20/19 [id number]

## 2019-04-21 NOTE — PLAN OF CARE
Problem: Occupational Therapy Goal  Goal: Occupational Therapy Goal  Outcome: Outcome(s) achieved Date Met: 04/21/19    Pt's PLOF was I with ADLs and func mobility.  Pt completed OT evaluation and noted to perform func mobility and ADLs with mod I to I.  Pt will have assistance/supervision as needed once discharged home.  Due to these factors, pt is discharged from OT services.  No DME or post acute OT required at this time.     Ericka Mcdowell, OT   4/21/2019

## 2019-04-21 NOTE — SUBJECTIVE & OBJECTIVE
Past Medical History:   Diagnosis Date    Allergy     Cataract     Diabetes mellitus type II     Gastritis     with gastric ulcer    GERD (gastroesophageal reflux disease)     H. pylori infection     History of hepatitis C, SVR as of 8-2016 8/25/2015    Harvoni 12 wks completed.  SVR(12) as of 8/4/16 SVR(24) as of 10-     Hyperlipidemia     Hypertension     Osteopenia     Type 2 diabetes mellitus with diabetic polyneuropathy        Past Surgical History:   Procedure Laterality Date    COLONOSCOPY      COLONOSCOPY N/A 1/28/2016    Performed by Emeka Ahn MD at [facility] (4TH FLR)    HYSTERECTOMY      LIVER BIOPSY      OOPHORECTOMY      UPPER GASTROINTESTINAL ENDOSCOPY         Review of patient's allergies indicates:   Allergen Reactions    Erythromycin Anaphylaxis    Erythropoietin analogues Anaphylaxis    Pegasys [peginterferon ruben-2a] Anaphylaxis    Lisinopril Hives       No current facility-administered medications on file prior to encounter.      Current Outpatient Medications on File Prior to Encounter   Medication Sig    candesartan (ATACAND) 8 MG tablet Take 1 tablet (8 mg total) by mouth once daily.    hydrochlorothiazide (HYDRODIURIL) 25 MG tablet TAKE ONE TABLET BY MOUTH ONCE DAILY    metFORMIN (GLUCOPHAGE-XR) 500 MG 24 hr tablet Take 1 tablet (500 mg total) by mouth 2 (two) times daily with meals.    albuterol (PROVENTIL/VENTOLIN HFA) 90 mcg/actuation inhaler Inhale 1-2 puffs into the lungs every 6 (six) hours as needed for Wheezing. Rescue    aspirin (ECOTRIN) 81 MG EC tablet Take 1 tablet (81 mg total) by mouth once daily.    atorvastatin (LIPITOR) 80 MG tablet Take 1 tablet (80 mg total) by mouth once daily. DOSE CHANGE    blood sugar diagnostic Strp To check BG 3 times daily, to use with insurance preferred meter    blood-glucose meter kit To check BG 3 times daily, to use with insurance preferred meter    ciclopirox (PENLAC) 8 % Soln Apply topically nightly.  "   clotrimazole-betamethasone 1-0.05% (LOTRISONE) cream Apply topically 2 (two) times daily.    dulaglutide (TRULICITY) 1.5 mg/0.5 mL PnIj Inject 1.5 mg into the skin once weekly.    esomeprazole (NEXIUM) 40 MG capsule Take one capsule by mouth daily as needed for acid reflux    fluocinonide (LIDEX) 0.05 % external solution AAA scalp qd prn pruritus    gabapentin (NEURONTIN) 300 MG capsule TAKE ONE CAPSULE BY MOUTH EVERY EVENING    insulin degludec (TRESIBA FLEXTOUCH U-200) 200 unit/mL (3 mL) InPn INJECT 22 UNITS UNDER THE SKIN EVERY EVENING    ketoconazole (NIZORAL) 2 % shampoo Wash hair with medicated shampoo at least 2x/week - let sit on scalp at least 5 minutes prior to rinsing    lancets 33 gauge Misc To check BG 3 times daily, to use with insurance preferred meter    pen needle, diabetic (BD ULTRA-FINE MIALN PEN NEEDLES) 32 gauge x 5/32" Ndle Uses 1 time daily, on insulin injections    traZODone (DESYREL) 50 MG tablet Take 1 to 2 tablets ( mg total) by mouth every evening.     Family History     Problem Relation (Age of Onset)    Breast cancer Sister    Cancer Sister, Sister (70)    Diabetes Mother, Sister, Sister, Sister    Heart disease Mother, Father        Tobacco Use    Smoking status: Current Every Day Smoker     Packs/day: 0.25     Years: 48.00     Pack years: 12.00     Last attempt to quit: 2016     Years since quittin.6    Smokeless tobacco: Never Used   Substance and Sexual Activity    Alcohol use: No     Comment: special occasions    Drug use: No    Sexual activity: Yes     Partners: Male     Review of Systems   Constitutional: Negative for chills, diaphoresis, fatigue and fever.   HENT: Negative for congestion, facial swelling and sore throat.    Eyes: Positive for photophobia. Negative for pain and visual disturbance.   Respiratory: Negative for chest tightness and shortness of breath.    Cardiovascular: Negative for chest pain.   Gastrointestinal: Negative for " abdominal pain, diarrhea, nausea and vomiting.   Genitourinary: Negative for dysuria and frequency.   Musculoskeletal: Positive for neck pain. Negative for back pain and neck stiffness.   Skin: Negative.    Neurological: Positive for seizures and headaches. Negative for dizziness, tremors, syncope, speech difficulty, weakness and numbness.   Psychiatric/Behavioral: Negative.      Objective:     Vital Signs (Most Recent):  Temp: 98.3 °F (36.8 °C) (04/20/19 1832)  Pulse: 81 (04/20/19 2104)  Resp: 16 (04/20/19 2104)  BP: (!) 177/82 (04/20/19 2104)  SpO2: 100 % (04/20/19 2104) Vital Signs (24h Range):  Temp:  [98.3 °F (36.8 °C)] 98.3 °F (36.8 °C)  Pulse:  [71-85] 81  Resp:  [16] 16  SpO2:  [97 %-100 %] 100 %  BP: (177-245)/() 177/82     Weight: 86.2 kg (190 lb)  Body mass index is 31.62 kg/m².    Physical Exam   Constitutional: She is oriented to person, place, and time. She appears well-developed and well-nourished. No distress.   HENT:   Head: Normocephalic and atraumatic.   Eyes: Pupils are equal, round, and reactive to light. Conjunctivae are normal.   Cardiovascular: Normal rate, regular rhythm and normal heart sounds.   Pulmonary/Chest: Effort normal and breath sounds normal.   Abdominal: Soft. Bowel sounds are normal. There is no tenderness.   Musculoskeletal: She exhibits no tenderness.   Neurological: She is alert and oriented to person, place, and time. No cranial nerve deficit.   No temporal region tenderness or neck tenderness elicited on palpation. Negative brudzinski sign.   Skin: She is not diaphoretic.   Psychiatric: She has a normal mood and affect. Her behavior is normal. Judgment and thought content normal.   Nursing note and vitals reviewed.        CRANIAL NERVES     CN III, IV, VI   Pupils are equal, round, and reactive to light.       Significant Labs: All pertinent labs within the past 24 hours have been reviewed.    Significant Imaging: I have reviewed all pertinent imaging results/findings  within the past 24 hours.

## 2019-04-21 NOTE — ASSESSMENT & PLAN NOTE
Differentials include migraine VS GCA. Pt with family history of GCA and L temporal headache, but non-tender without many findings of GCA and has photophobia, potentially migraine, given resolution with reglan. ESR elevated to 71 warrants an investigation of possible GCA given ramifications of missed diagnosis. Another possible DDX is cluster headache.    PLAN  - Given dose of 250mg methylpred and 10mg IV metoclopramide in ED  - Start prednisone 60mg daily  - Repeat ESR  - Start on venti mask 100% oxygen for possible cluster headache  - Consult ophthalmology, they are aware of her, for investigation of any occular manifestations  - Consult rheumatology, they are aware of her  - Head CT normal, without signs of intracranial bleed  - MRI brain GCA protocol (per rheum)  - Temporal artery ultrasound (per rheum)  - Consult vasc surgery to set up biopsy (likely can happen outpatient)

## 2019-04-21 NOTE — HPI
"73 y.o. F with POHx NVS cats OU and refractive error and PMHx HTN, IDDM, CKD II, HLD, GERD and h/o treated HepC who [place] [facility] [place] the "worst headache of her life".  The pain was located in her left temporal and tracked along the back of her scalp.  She also had photophobia.  Denies jaw claudication.  Her sister had suspected GCA but had a negative temporal artery biopsy per the patient.  On admission her ESR was 71 and her CRP was 3.  She was given steroids.  This AM, she states that her symptoms have improved.      "

## 2019-04-21 NOTE — ED NOTES
Patient identifiers confirmed with Buck Ibarra.     Pt c/o of left sided 10/10 HA starting hours ago with sensitivity to light. Denies fever/chills, SOB, numbness/tingling, weakness.    LOC: The patient is awake, alert and aware of environment with an appropriate affect, the patient is oriented x 3 and speaking appropriately.  APPEARANCE: Patient resting comfortably and in no acute distress, patient is clean and well groomed  SKIN: The skin is warm and dry, color consistent with ethnicity, patient has normal skin turgor and moist mucus membranes, skin intact, no breakdown or brusing noted.  MUSCULOSKELETAL: Patient moving all extremities well, no obvious swelling or deformities noted.   RESPIRATORY: Airway is open and patent; respirations are spontaneous, patient has a normal effort and rate, no accessory muscle use noted.   CARDIAC: Patient has no peripheral edema noted, capillary refill < 3 seconds.   NEUROLOGIC: PERRL, 3 mm bilaterally, eyes open spontaneously, behavior appropriate to situation, follows commands, facial expression symmetrical, bilateral hand grasp equal and even, purposeful motor response noted, normal sensation in all extremities when touched with a finger.

## 2019-04-21 NOTE — PLAN OF CARE
Problem: Adult Inpatient Plan of Care  Goal: Patient-Specific Goal (Individualization)  POC reviewed with patient at bedside. Questions and concerns addressed. BP remains elevated. BP taken manually. Continues to deny headache or change in vision. Amb to BR. Voiding yellow urine. Safety maintianed. Bed in low and locked position. Call light within reach. Side rails up x2. Frequent rounds.

## 2019-04-21 NOTE — DISCHARGE SUMMARY
"[facility] Hospital Medicine  Discharge Summary      Patient Name: Buck Ibarra  MRN: [id number]  Admission Date: 4/20/2019  Hospital Length of Stay: 1 days  Discharge Date and Time:  04/21/2019 2:14 PM  Attending Physician: Han Brandt MD   Discharging Provider: Jeanne Felder MD  Primary Care Provider: Ericka Cortez MD  Hospital Medicine Team: [facility] HOSP MED 5 CPatty Felder MD    HPI:   Ms Ibarra is a 72 yo female with HTN, IDDM, CKD II, HLD, GERD and h/o treated HepC presenting with "the worst headache of her life". Patient reports that around noon on 4/20/2019 she had a gradual onset of an intense piercing and throbbing headache in the L temporal region. Pt reports the pain starting at a 12/10 and progressing over a few hours until it was unbearable and she presented to [facility] ED. Pt further reports L sided, posterior neck pain and photophobia. Patient denies jaw claudication, similar episodes, or history of migraines or headaches. Patient's sister has biopsy confirmed giant cell arteritis and mother has a history of migraine headaches. Patient denies any history of shoulder or hip pain, difficulty reaching above her head, or mobilizing from a seated position or up stairs. Patient denies chest pain, shortness of breath, nausea, vomiting, fever, chills, sweats, recent illness, or sick contacts.       * No surgery found *      Hospital Course:   Patient started on Steroid treatment and became headache and pain free. CT finding was unremarkable. Seen by Ophthalmology, vascular surgery and Rheumatology concerning for giant cell arteritis. Vascular surgery will schedule her biopsy and patient asked to continue with 60mg prednisone.        Physical Exam   Constitutional: She is oriented to person, place, and time. She appears well-developed and well-nourished. No distress.   HENT:   Head: Normocephalic and atraumatic.   Eyes: Pupils are equal, round, and reactive to light. Conjunctivae are normal. "   Cardiovascular: Normal rate, regular rhythm and normal heart sounds.   Pulmonary/Chest: Effort normal and breath sounds normal.   Abdominal: Soft. Bowel sounds are normal. There is no tenderness.   Musculoskeletal: She exhibits no tenderness.   Neurological: She is alert and oriented to person, place, and time. No cranial nerve deficit.   No temporal region tenderness or neck tenderness elicited on palpation. Negative brudzinski sign.   Skin: She is not diaphoretic.   Psychiatric: She has a normal mood and affect. Her behavior is normal. Judgment and thought content normal.   Nursing note and vitals reviewed.      Consults:   Consults (From admission, onward)        Status Ordering Provider     Inpatient consult to Ophthalmology  Once     Provider:  (Not yet assigned)    Completed NELSON CLAIRE     Inpatient consult to Rheumatology  Once     Provider:  (Not yet assigned)    Completed JONE LEE     Inpatient consult to Vascular Surgery  Once     Provider:  (Not yet assigned)    Completed JONE LEE          * Acute nonintractable headache  Differentials include migraine VS GCA. Pt with family history of GCA (biopsy negative) and L temporal headache, but non-tender without many findings of GCA and has photophobia, potentially migraine, given resolution with reglan. ESR elevated to 71 warrants an investigation of possible GCA given ramifications of missed diagnosis. Another possible DDX is cluster headache.    PLAN  - Given dose of 250mg methylpred and 10mg IV metoclopramide in ED  - Start prednisone 60mg daily  - Repeat ESR  - Start on venti mask 100% oxygen for possible cluster headache  - Consult ophthalmology, they are aware of her, for investigation of any occular manifestations. Thought that it might be possible for GCA.  - Consult rheumatology, they are aware of her and asked to discharge patient with close follow up and 60mg predinosone. Set up appointment after the biopsy result  comes out.  - Head CT normal, without signs of intracranial bleed  - MRI brain GCA protocol (per rheum)  - Temporal artery ultrasound (per rheum) normal   - Consult vasc surgery to set up biopsy (likely can happen outpatient)    Hypertensive urgency  - Increase cardesartan to 16mg   - Continue 25mg HCTZA       Hyperlipidemia LDL goal <70  Continue home atorvastatin 80mg daily       Type 2 diabetes mellitus with hyperglycemia, with long-term current use of insulin  Pt on metformin 500mg BID, Trulicity once weekly and Tresiba (ultra long acting insult) 22U nightly.     PLAN  - diabetic diet  - 80% of home dose, 18U detimer  - Moderate dose sliding scale       Final Active Diagnoses:    Diagnosis Date Noted POA    PRINCIPAL PROBLEM:  Acute nonintractable headache [R51] 04/20/2019 Yes    GCA (giant cell arteritis) [M31.6]  Yes    Hypertensive urgency [I16.0] 04/20/2019 Yes    Obesity (BMI 30-39.9) [E66.9] 04/21/2016 Yes     Chronic    CKD stage 2 due to type 2 diabetes mellitus [E11.22, N18.2] 01/21/2016 Yes     Chronic    Gastroesophageal reflux disease with esophagitis [K21.0] 08/25/2015 Yes    Hyperlipidemia LDL goal <70 [E78.5] 08/25/2015 Yes     Chronic    Type 2 diabetes mellitus with hyperglycemia, with long-term current use of insulin [E11.65, Z79.4] 07/22/2015 Not Applicable     Chronic    Benign essential hypertension [I10] 07/22/2015 Yes      Problems Resolved During this Admission:       Discharged Condition: good    Disposition: Home or Self Care    Follow Up:    Patient Instructions:      Ambulatory Referral to Rheumatology   Referral Priority: Routine Referral Type: Consultation   Referral Reason: Specialty Services Required   Requested Specialty: Rheumatology   Number of Visits Requested: 1       Significant Diagnostic Studies: Labs:   CMP   Recent Labs   Lab 04/20/19  1930 04/21/19  0714    133*   K 3.8 4.1    103   CO2 27 21*    287*   BUN 14 15   CREATININE 0.8 0.9    CALCIUM 9.5 8.9   ANIONGAP 9 9   ESTGFRAFRICA >60.0 >60.0   EGFRNONAA >60.0 >60.0   , CBC   Recent Labs   Lab 04/20/19  1930 04/21/19  0454   WBC 8.56 9.02   HGB 13.9 14.1   HCT 44.3 45.2    337    and A1C:   Recent Labs   Lab 01/24/19  0830 04/20/19 1930   HGBA1C 8.0* 7.4*       Pending Diagnostic Studies:     Procedure Component Value Units Date/Time    HIV 1/2 Ag/Ab (4th Gen) [[id number]] Collected:  04/21/19 0952    Order Status:  Sent Lab Status:  In process Updated:  04/21/19 1005    Specimen:  Blood     Narrative:       Collection has been rescheduled by HAO at 04/21/2019 09:35 Reason:   don't draw quants*    Hepatitis A antibody, IgG [[id number]] Collected:  04/21/19 0952    Order Status:  Sent Lab Status:  In process Updated:  04/21/19 1005    Specimen:  Blood     Narrative:       Collection has been rescheduled by HAO at 04/21/2019 09:35 Reason:   don't draw quants*    Hepatitis B core antibody, total [[id number]] Collected:  04/21/19 0952    Order Status:  Sent Lab Status:  In process Updated:  04/21/19 1005    Specimen:  Blood     Narrative:       Collection has been rescheduled by HAO at 04/21/2019 09:35 Reason:   don't draw quants*    Hepatitis B surface antibody [[id number]] Collected:  04/21/19 0952    Order Status:  Sent Lab Status:  In process Updated:  04/21/19 1005    Specimen:  Blood     Narrative:       Collection has been rescheduled by HAO at 04/21/2019 09:35 Reason:   don't draw quants*    Hepatitis B surface antigen [[id number]] Collected:  04/21/19 0952    Order Status:  Sent Lab Status:  In process Updated:  04/21/19 1005    Specimen:  Blood     Narrative:       Collection has been rescheduled by HAO at 04/21/2019 09:35 Reason:   don't draw quants*    Hepatitis C antibody [[id number]] Collected:  04/21/19 0952    Order Status:  Sent Lab Status:  In process Updated:  04/21/19 1005    Specimen:  Blood     Narrative:       Collection has been rescheduled by HAO at 04/21/2019 09:35  Reason:   don't draw quants*    Immunofixation electrophoresis [[id number]] Collected:  04/21/19 0952    Order Status:  Sent Lab Status:  In process Updated:  04/21/19 1005    Specimen:  Blood     Narrative:       Collection has been rescheduled by HAO at 04/21/2019 09:35 Reason:   don't draw quants*    MRI Brain W WO Contrast [[id number]] Resulted:  04/21/19 0053    Order Status:  Sent Lab Status:  In process Updated:  04/21/19 0115    RPR [[id number]] Collected:  04/21/19 0952    Order Status:  Sent Lab Status:  In process Updated:  04/21/19 1005    Specimen:  Blood     Narrative:       Collection has been rescheduled by HAO at 04/21/2019 09:35 Reason:   don't draw quants*    Strongyloides IgG Antibodies [[id number]] Collected:  04/21/19 0952    Order Status:  Sent Lab Status:  In process Updated:  04/21/19 1005    Specimen:  Blood     Narrative:       Collection has been rescheduled by HAO at 04/21/2019 09:35 Reason:   don't draw quants*    Varicella zoster antibody, IgG [[id number]] Collected:  04/21/19 0952    Order Status:  Sent Lab Status:  In process Updated:  04/21/19 1005    Specimen:  Blood     Narrative:       Collection has been rescheduled by HAO at 04/21/2019 09:35 Reason:   don't draw quants*         Medications:  Reconciled Home Medications:      Medication List      START taking these medications    predniSONE 20 MG tablet  Commonly known as:  DELTASONE  Take 3 tablets (60 mg total) by mouth once daily. for 20 days  Start taking on:  4/22/2019        CHANGE how you take these medications    candesartan 8 MG tablet  Commonly known as:  ATACAND  Take 2 tablets (16 mg total) by mouth once daily.  What changed:  how much to take        CONTINUE taking these medications    aspirin 81 MG EC tablet  Commonly known as:  ECOTRIN  Take 1 tablet (81 mg total) by mouth once daily.     atorvastatin 80 MG tablet  Commonly known as:  LIPITOR  Take 1 tablet (80 mg total) by mouth once daily. DOSE CHANGE     blood  "sugar diagnostic Strp  To check BG 3 times daily, to use with insurance preferred meter     blood-glucose meter kit  To check BG 3 times daily, to use with insurance preferred meter     ciclopirox 8 % Soln  Commonly known as:  PENLAC  Apply topically nightly.     clotrimazole-betamethasone 1-0.05% cream  Commonly known as:  LOTRISONE  Apply topically 2 (two) times daily.     esomeprazole 40 MG capsule  Commonly known as:  NEXIUM  Take one capsule by mouth daily as needed for acid reflux     fluocinonide 0.05 % external solution  Commonly known as:  LIDEX  AAA scalp qd prn pruritus     gabapentin 300 MG capsule  Commonly known as:  NEURONTIN  TAKE ONE CAPSULE BY MOUTH EVERY EVENING     hydroCHLOROthiazide 25 MG tablet  Commonly known as:  HYDRODIURIL  TAKE ONE TABLET BY MOUTH ONCE DAILY     insulin degludec 200 unit/mL (3 mL) Inpn  Commonly known as:  TRESIBA FLEXTOUCH U-200  INJECT 22 UNITS UNDER THE SKIN EVERY EVENING     ketoconazole 2 % shampoo  Commonly known as:  NIZORAL  Wash hair with medicated shampoo at least 2x/week - let sit on scalp at least 5 minutes prior to rinsing     lancets 33 gauge Misc  To check BG 3 times daily, to use with insurance preferred meter     metFORMIN 500 MG 24 hr tablet  Commonly known as:  GLUCOPHAGE-XR  Take 1 tablet (500 mg total) by mouth 2 (two) times daily with meals.     pen needle, diabetic 32 gauge x 5/32" Ndle  Commonly known as:  BD ULTRA-FINE MILAN PEN NEEDLE  Uses 1 time daily, on insulin injections     traZODone 50 MG tablet  Commonly known as:  DESYREL  Take 1 to 2 tablets ( mg total) by mouth every evening.     TRULICITY 1.5 mg/0.5 mL Pnij  Generic drug:  dulaglutide  Inject 1.5 mg into the skin once weekly.     VENTOLIN HFA 90 mcg/actuation inhaler  Generic drug:  albuterol  Inhale 1-2 puffs into the lungs every 6 (six) hours as needed for Wheezing. Rescue            Indwelling Lines/Drains at time of discharge:   Lines/Drains/Airways          None    "       Time spent on the discharge of patient: 35 minutes  Patient was seen and examined on the date of discharge and determined to be suitable for discharge.         LIVIA Felder MD  Department of Hospital Medicine  [facility]

## 2019-04-21 NOTE — ED NOTES
Provider called about PT's BP and gave instructions to give pain medication and that he will put in order for labetalol.

## 2019-04-21 NOTE — HPI
This is a 74 yo AFF with PMH of DMII, HTN, Hep C s/p Harvoni, GERD [place] [facility] [place] with complaints of severe left sided headache. Reports that headache started suddenly around the left temporal region and going back to the back of the head. Also had associated photophobia. Did have some tenderness to touch over the left temporal area. Initially took two ibuprofren for the pain but this did not help. Would describe the pain as a 12/10 throbbing intense pain. No jaw claudication or vision changes. No history of headaches in the past. No injury/trauma. In ED, patient received IV fluid, Reglan and Tylenol. Per patient, Reglan helped to bring down the pain from a 12/10 to a 6/10. Then she was given Solumedrol 250 mg IV which completing resolved the pain. She was also found to be hypertensive with BP at 245/107. Given Labetolol. Sed rate found to be elevated at 71. CRP normal at 3.3. Currently, headache completely resolved. No pain in the shoulders or hips. Reports that sister had a temporal biopsy 3-4 years ago which was negative for GCA and she was tapered off of steroids after one week. Denies any fevers, chills, weight changes, oral/nasal ulcers, hair loss, skin rashes, joint pain/swelling, muscle weakness/pain, eye redness, n/v. Does have a grandchild who is currently sick with a cold.

## 2019-04-21 NOTE — NURSING
New admit from ED with report of severe H/A and HTN. Reported H/A resolved at this time. Also reported during episode of H/A having issue with left eye feeling as though knife sticking in eye which has also resolved. [place] to room and POC. Instructed on need to measure all intake and output. Placed specipan in toilet. Placed bed in low and locked position. Instructed to call for any needs. Call light within reach. Side rails up x2. Verbalized understanding of instructions.

## 2019-04-22 ENCOUNTER — TELEPHONE (OUTPATIENT)
Dept: VASCULAR SURGERY | Facility: CLINIC | Age: 74
End: 2019-04-22

## 2019-04-22 LAB
ALBUMIN SERPL ELPH-MCNC: 3.32 G/DL (ref 3.35–5.55)
ALPHA1 GLOB SERPL ELPH-MCNC: 0.31 G/DL (ref 0.17–0.41)
ALPHA2 GLOB SERPL ELPH-MCNC: 1.01 G/DL (ref 0.43–0.99)
B-GLOBULIN SERPL ELPH-MCNC: 0.76 G/DL (ref 0.5–1.1)
GAMMA GLOB SERPL ELPH-MCNC: 1.1 G/DL (ref 0.67–1.58)
HBV CORE AB SERPL QL IA: POSITIVE
HBV SURFACE AB SER-ACNC: POSITIVE M[IU]/ML
HBV SURFACE AG SERPL QL IA: NEGATIVE
HCV AB SERPL QL IA: POSITIVE
HEPATITIS A ANTIBODY, IGG: POSITIVE
HIV 1+2 AB+HIV1 P24 AG SERPL QL IA: NEGATIVE
INTERPRETATION SERPL IFE-IMP: NORMAL
PROT SERPL-MCNC: 6.5 G/DL (ref 6–8.4)
RPR SER QL: NORMAL

## 2019-04-22 NOTE — TELEPHONE ENCOUNTER
Contacted patient to scheduled clinic appt with Dr. Feldman. Appt scheduled, pt verified. Notified patient that Dr. Feldman would like to scheduled temporal artery biopsy on Friday 4/26/19 and that Juli will call her to discuss details. Pt verbalized understanding.----- Message from Jared Escobedo MD sent at 4/21/2019  9:33 AM CDT -----  Please set up this patient for temporal artery biopsy later this week.  Thank you

## 2019-04-23 ENCOUNTER — PATIENT OUTREACH (OUTPATIENT)
Dept: ADMINISTRATIVE | Facility: CLINIC | Age: 74
End: 2019-04-23

## 2019-04-23 LAB
PATHOLOGIST INTERPRETATION IFE: NORMAL
PATHOLOGIST INTERPRETATION SPE: NORMAL

## 2019-04-23 NOTE — PATIENT INSTRUCTIONS
"  Headache, Unspecified    A number of things can cause headaches. The cause of your headache isnt clear. But it doesnt seem to be a sign of any serious illness.  You could have a tension headache or a migraine headache.  Stress can cause a tension headache. This can happen if you tense the muscles of your shoulders, neck, and scalp without knowing it. If this stress lasts long enough, you may develop a tension headache.  It is not clear why migraines occur, but certain things called" triggers" can raise the risk of having a migraine attack. Migraine triggers may include emotional stress or depression, or by hormone changes during the menstrual cycle. Other triggers include birth control pills and other medicines, alcohol or caffeine, foods with tyramine (such as aged cheese, wine), eyestrain, weather changes, missed meals, and lack of sleep or oversleeping.  Other causes of headache include:  · Viral illness with high fever  · Head injury with concussion  · Sinus, ear, or throat infection  · Dental pain and jaw joint (TMJ) pain  More serious but less common causes of headache include stroke, brain hemorrhage, brain tumor, meningitis, and encephalitis.  Home care  Follow these tips when taking care of yourself at home:  · Dont drive yourself home if you were given pain medicine for your headache. Instead, have someone else drive you home. Try to sleep when you get home. You should feel much better when you wake up.  · Apply heat to the back of your neck to ease a neck muscle spasm. Take care of a migraine headache by putting an ice pack on your forehead or at the base of your skull.  · If you have nausea or vomiting, eat a light diet until your headache eases.  · If you have a migraine headache, use sunglasses when in the daylight or around bright indoor lighting until your symptoms get better. Bright glaring light can make this type of headache worse.  Follow-up care  Follow up with your healthcare provider, or " as advised. Talk with your provider if you have frequent headaches. He or she can help figure out a treatment plan. By knowing the earliest signs of headache, and starting treatment right away, you may be able to stop the pain yourself.  When to seek medical advice  Call your healthcare provider right away if any of these occur:  · Your head pain suddenly gets worse after sexual intercourse or strenuous activity  · Your head pain doesnt get better within 24 hours  · You arent able to keep liquids down (repeated vomiting)  · Fever of 100.4ºF (38ºC) or higher, or as directed by your healthcare provider  · Stiff neck  · Extreme drowsiness, confusion, or fainting  · Dizziness or dizziness with spinning sensation (vertigo)  · Weakness in an arm or leg or one side of your face  · You have trouble talking or seeing  Date Last Reviewed: 8/1/2016  © 2705-5613 [employer]. [address] [place] [address], [place] [address]. All rights reserved. This information is not intended as a substitute for professional medical care. Always follow your healthcare professional's instructions.

## 2019-04-24 ENCOUNTER — TELEPHONE (OUTPATIENT)
Dept: RHEUMATOLOGY | Facility: CLINIC | Age: 74
End: 2019-04-24

## 2019-04-24 ENCOUNTER — TELEPHONE (OUTPATIENT)
Dept: INTERNAL MEDICINE | Facility: CLINIC | Age: 74
End: 2019-04-24

## 2019-04-24 DIAGNOSIS — R76.8 HEPATITIS B CORE ANTIBODY POSITIVE: Primary | ICD-10-CM

## 2019-04-24 LAB
STRONGYLOIDES ANTIBODY IGG: NEGATIVE
VARICELLA INTERPRETATION: POSITIVE
VARICELLA ZOSTER IGG: 2.26 ISR (ref 0–0.9)

## 2019-04-24 NOTE — TELEPHONE ENCOUNTER
She has an appt w/ vascular tomorrow at 4/25 at 9:15 am. Does she want to see me after at 11:30? Let her know that she'll be an overbook so there may be a wait. I'd like to assess her symptoms and sugars and BP before I say go ahead and stop prednisone. She can stop prednisone till assessment w/ vascular tomorrow.

## 2019-04-24 NOTE — TELEPHONE ENCOUNTER
Pt was called regarding Hep B core total ab +, Hep B sab + and Hep C ab+., Hep A +. She has history of treated Hep C and antibodies can remain positive for life despite treatment. However will obtain Hep B DNA for further workup of Hep B core +  Discussed with pt who voiced understanding. Lab to be done 4/25/19 @ 8.30am.

## 2019-04-24 NOTE — TELEPHONE ENCOUNTER
Spoke with pt, she says she only takes metformin 500mg twice a day, trulicity 1.5 units weekly, and tresiba 22 units nightly. Pt says when she got home Sunday she had really bad diarrhea after taking the prednisone, then again on Monday and her BS was 400. She did not take prednisone yesterday and she did not have diarrhea and her sugar has returned to normal.

## 2019-04-24 NOTE — TELEPHONE ENCOUNTER
----- Message from Juli Proctor MA sent at 4/23/2019  4:06 PM CDT -----  Good Afternoon, Pt was given Prednisone from the ED. Pt states her glucose has been running 400 and she is experiencing diarrhea. She would like someone to call her back to say if she needs to stop this medication.

## 2019-04-24 NOTE — TELEPHONE ENCOUNTER
I haven't seen her since 6/2018. Please confirm she's still using the Vgo and if so, how many clicks and how many times a day? Also is she taking the metformin XR 500mg BID, trulicity 1.5 units weekly and tresiba 22 units nightly? Any other diabetes medicine she's on?

## 2019-04-24 NOTE — TELEPHONE ENCOUNTER
Spoke with pt, she says she does want to see PCP. explained that it is an OB so there may be a wait. Pt verbalized understanding     Shayla can you OB pt wilman 4/25 for 11:30am?

## 2019-04-25 ENCOUNTER — OFFICE VISIT (OUTPATIENT)
Dept: INTERNAL MEDICINE | Facility: CLINIC | Age: 74
End: 2019-04-25
Payer: MEDICARE

## 2019-04-25 ENCOUNTER — OFFICE VISIT (OUTPATIENT)
Dept: VASCULAR SURGERY | Facility: CLINIC | Age: 74
End: 2019-04-25
Attending: SURGERY
Payer: MEDICARE

## 2019-04-25 ENCOUNTER — LAB VISIT (OUTPATIENT)
Dept: LAB | Facility: HOSPITAL | Age: 74
End: 2019-04-25
Payer: MEDICARE

## 2019-04-25 VITALS
WEIGHT: 202.81 LBS | HEART RATE: 81 BPM | BODY MASS INDEX: 33.79 KG/M2 | TEMPERATURE: 98 F | HEIGHT: 65 IN | SYSTOLIC BLOOD PRESSURE: 124 MMHG | DIASTOLIC BLOOD PRESSURE: 82 MMHG

## 2019-04-25 VITALS
BODY MASS INDEX: 32.59 KG/M2 | TEMPERATURE: 98 F | HEART RATE: 74 BPM | SYSTOLIC BLOOD PRESSURE: 188 MMHG | HEIGHT: 66 IN | WEIGHT: 202.81 LBS | DIASTOLIC BLOOD PRESSURE: 82 MMHG

## 2019-04-25 DIAGNOSIS — M31.6 GCA (GIANT CELL ARTERITIS): Primary | ICD-10-CM

## 2019-04-25 DIAGNOSIS — Z09 HOSPITAL DISCHARGE FOLLOW-UP: Primary | ICD-10-CM

## 2019-04-25 DIAGNOSIS — R76.8 HEPATITIS B CORE ANTIBODY POSITIVE: ICD-10-CM

## 2019-04-25 DIAGNOSIS — I70.0 AORTIC ATHEROSCLEROSIS: ICD-10-CM

## 2019-04-25 DIAGNOSIS — M31.6 GIANT CELL ARTERITIS: ICD-10-CM

## 2019-04-25 DIAGNOSIS — E66.9 DIABETES MELLITUS TYPE 2 IN OBESE: ICD-10-CM

## 2019-04-25 DIAGNOSIS — Z12.11 COLON CANCER SCREENING: ICD-10-CM

## 2019-04-25 DIAGNOSIS — E11.59 HYPERTENSION ASSOCIATED WITH DIABETES: ICD-10-CM

## 2019-04-25 DIAGNOSIS — I15.2 HYPERTENSION ASSOCIATED WITH DIABETES: ICD-10-CM

## 2019-04-25 DIAGNOSIS — E11.69 DIABETES MELLITUS TYPE 2 IN OBESE: ICD-10-CM

## 2019-04-25 PROCEDURE — 36415 COLL VENOUS BLD VENIPUNCTURE: CPT

## 2019-04-25 PROCEDURE — 99214 PR OFFICE/OUTPT VISIT, EST, LEVL IV, 30-39 MIN: ICD-10-PCS | Mod: S$GLB,,, | Performed by: SURGERY

## 2019-04-25 PROCEDURE — 3077F SYST BP >= 140 MM HG: CPT | Mod: CPTII,S$GLB,, | Performed by: SURGERY

## 2019-04-25 PROCEDURE — 99214 OFFICE O/P EST MOD 30 MIN: CPT | Mod: S$GLB,,, | Performed by: SURGERY

## 2019-04-25 PROCEDURE — 99495 TCM SERVICES (MODERATE COMPLEXITY): ICD-10-PCS | Mod: S$GLB,,, | Performed by: INTERNAL MEDICINE

## 2019-04-25 PROCEDURE — 99999 PR PBB SHADOW E&M-EST. PATIENT-LVL III: CPT | Mod: PBBFAC,,, | Performed by: SURGERY

## 2019-04-25 PROCEDURE — 99495 TRANSJ CARE MGMT MOD F2F 14D: CPT | Mod: S$GLB,,, | Performed by: INTERNAL MEDICINE

## 2019-04-25 PROCEDURE — 99499 UNLISTED E&M SERVICE: CPT | Mod: S$GLB,,, | Performed by: INTERNAL MEDICINE

## 2019-04-25 PROCEDURE — 3077F PR MOST RECENT SYSTOLIC BLOOD PRESSURE >= 140 MM HG: ICD-10-PCS | Mod: CPTII,S$GLB,, | Performed by: SURGERY

## 2019-04-25 PROCEDURE — 1101F PT FALLS ASSESS-DOCD LE1/YR: CPT | Mod: CPTII,S$GLB,, | Performed by: SURGERY

## 2019-04-25 PROCEDURE — 3079F PR MOST RECENT DIASTOLIC BLOOD PRESSURE 80-89 MM HG: ICD-10-PCS | Mod: CPTII,S$GLB,, | Performed by: SURGERY

## 2019-04-25 PROCEDURE — 99499 RISK ADDL DX/OHS AUDIT: ICD-10-PCS | Mod: S$GLB,,, | Performed by: INTERNAL MEDICINE

## 2019-04-25 PROCEDURE — 1101F PR PT FALLS ASSESS DOC 0-1 FALLS W/OUT INJ PAST YR: ICD-10-PCS | Mod: CPTII,S$GLB,, | Performed by: SURGERY

## 2019-04-25 PROCEDURE — 99999 PR PBB SHADOW E&M-EST. PATIENT-LVL III: ICD-10-PCS | Mod: PBBFAC,,, | Performed by: SURGERY

## 2019-04-25 PROCEDURE — 87517 HEPATITIS B DNA QUANT: CPT

## 2019-04-25 PROCEDURE — 99999 PR PBB SHADOW E&M-EST. PATIENT-LVL IV: CPT | Mod: PBBFAC,,, | Performed by: INTERNAL MEDICINE

## 2019-04-25 PROCEDURE — 3079F DIAST BP 80-89 MM HG: CPT | Mod: CPTII,S$GLB,, | Performed by: SURGERY

## 2019-04-25 PROCEDURE — 99999 PR PBB SHADOW E&M-EST. PATIENT-LVL IV: ICD-10-PCS | Mod: PBBFAC,,, | Performed by: INTERNAL MEDICINE

## 2019-04-25 RX ORDER — ALPRAZOLAM 0.5 MG/1
0.5 TABLET ORAL ONCE AS NEEDED
Qty: 2 TABLET | Refills: 0 | Status: ON HOLD | OUTPATIENT
Start: 2019-04-25 | End: 2021-05-05

## 2019-04-25 RX ORDER — INSULIN DEGLUDEC 200 U/ML
INJECTION, SOLUTION SUBCUTANEOUS
Qty: 15 ML | Refills: 11 | Status: SHIPPED | OUTPATIENT
Start: 2019-04-25 | End: 2020-05-14

## 2019-04-25 RX ORDER — CANDESARTAN 16 MG/1
16 TABLET ORAL DAILY
Qty: 90 TABLET | Refills: 3 | Status: SHIPPED | OUTPATIENT
Start: 2019-04-25 | End: 2019-12-19 | Stop reason: SDUPTHER

## 2019-04-25 NOTE — PROGRESS NOTES
"Buck Ibarra  04/25/2019    HPI:  Buck Ibarra is a 73 y.o. F with history of HTN, IDDM, CKD II, HLD, GERD and h/o treated HepC who presented to [facility] yesterday with the "worst headache of her life".  The pain was located in her left temporal and tracked along the back of her scalp.  She also had photophobia.  Denies jaw claudication.  Her sister had suspected GCA but had a negative temporal artery biopsy per the patient.  On admission her ESR was 71 and her CRP was 3.  She was given steroids.  This AM, she states that her symptoms have improved.  Vascular surgery was consulted for temporal artery biopsy per rheumatology.          Past Medical History:   Diagnosis Date    Allergy     Cataract     Diabetes mellitus type II     Gastritis     with gastric ulcer    GERD (gastroesophageal reflux disease)     H. pylori infection     History of hepatitis C, SVR as of 8-2016 8/25/2015    Harvoni 12 wks completed.  SVR(12) as of 8/4/16 SVR(24) as of 10-     Hyperlipidemia     Hypertension     Osteopenia     Type 2 diabetes mellitus with diabetic polyneuropathy      Past Surgical History:   Procedure Laterality Date    COLONOSCOPY      COLONOSCOPY N/A 1/28/2016    Performed by [facility]ka Ahn MD at [facility] ([facility])    HYSTERECTOMY      LIVER BIOPSY      OOPHORECTOMY      UPPER GASTROINTESTINAL ENDOSCOPY       Family History   Problem Relation Age of Onset    Heart disease Mother     Diabetes Mother     Heart disease Father     Cancer Sister         breast cancer    Diabetes Sister     Cancer Sister 70        colon CA    Diabetes Sister     Breast cancer Sister     Diabetes Sister     Glaucoma Neg Hx     Melanoma Neg Hx     Cirrhosis Neg Hx     Psoriasis Neg Hx     Lupus Neg Hx      Social History     Socioeconomic History    Marital status: Single     Spouse name: Not on file    Number of children: Not on file    Years of education: Not on file    Highest education level: " Not on file   Occupational History    Not on file   Social Needs    Financial resource strain: Not on file    Food insecurity:     Worry: Not on file     Inability: Not on file    Transportation needs:     Medical: Not on file     Non-medical: Not on file   Tobacco Use    Smoking status: Current Every Day Smoker     Packs/day: 0.25     Years: 48.00     Pack years: 12.00     Last attempt to quit: 2016     Years since quittin.6    Smokeless tobacco: Never Used   Substance and Sexual Activity    Alcohol use: No     Comment: special occasions    Drug use: No    Sexual activity: Yes     Partners: Male   Lifestyle    Physical activity:     Days per week: Not on file     Minutes per session: Not on file    Stress: Not on file   Relationships    Social connections:     Talks on phone: Not on file     Gets together: Not on file     Attends Mandaen service: Not on file     Active member of club or organization: Not on file     Attends meetings of clubs or organizations: Not on file     Relationship status: Not on file   Other Topics Concern    Are you pregnant or think you may be? No    Breast-feeding No   Social History Narrative    Lives with daughter and grandson. No assistance with ADLs and still driving.     Current Outpatient Medications on File Prior to Visit   Medication Sig    albuterol (PROVENTIL/VENTOLIN HFA) 90 mcg/actuation inhaler Inhale 1-2 puffs into the lungs every 6 (six) hours as needed for Wheezing. Rescue    atorvastatin (LIPITOR) 80 MG tablet Take 1 tablet (80 mg total) by mouth once daily. DOSE CHANGE    blood sugar diagnostic Strp To check BG 3 times daily, to use with insurance preferred meter    blood-glucose meter kit To check BG 3 times daily, to use with insurance preferred meter    candesartan (ATACAND) 8 MG tablet Take 2 tablets (16 mg total) by mouth once daily.    ciclopirox (PENLAC) 8 % Soln Apply topically nightly.    clotrimazole-betamethasone 1-0.05%  "(LOTRISONE) cream Apply topically 2 (two) times daily.    dulaglutide (TRULICITY) 1.5 mg/0.5 mL PnIj Inject 1.5 mg into the skin once weekly.    esomeprazole (NEXIUM) 40 MG capsule Take one capsule by mouth daily as needed for acid reflux    fluocinonide (LIDEX) 0.05 % external solution AAA scalp qd prn pruritus    gabapentin (NEURONTIN) 300 MG capsule TAKE ONE CAPSULE BY MOUTH EVERY EVENING    hydrochlorothiazide (HYDRODIURIL) 25 MG tablet TAKE ONE TABLET BY MOUTH ONCE DAILY    insulin degludec (TRESIBA FLEXTOUCH U-200) 200 unit/mL (3 mL) InPn INJECT 22 UNITS UNDER THE SKIN EVERY EVENING    ketoconazole (NIZORAL) 2 % shampoo Wash hair with medicated shampoo at least 2x/week - let sit on scalp at least 5 minutes prior to rinsing    lancets 33 gauge Misc To check BG 3 times daily, to use with insurance preferred meter    metFORMIN (GLUCOPHAGE-XR) 500 MG 24 hr tablet Take 1 tablet (500 mg total) by mouth 2 (two) times daily with meals.    pen needle, diabetic (BD ULTRA-FINE MILAN PEN NEEDLES) 32 gauge x 5/32" Ndle Uses 1 time daily, on insulin injections    predniSONE (DELTASONE) 20 MG tablet Take 3 tablets (60 mg total) by mouth once daily. for 20 days    traZODone (DESYREL) 50 MG tablet Take 1 to 2 tablets ( mg total) by mouth every evening.    aspirin (ECOTRIN) 81 MG EC tablet Take 1 tablet (81 mg total) by mouth once daily.     No current facility-administered medications on file prior to visit.        REVIEW OF SYSTEMS:  General: negative; ENT: negative; Allergy and Immunology: negative; Hematological and Lymphatic: Negative; Endocrine: negative; Respiratory: no cough, shortness of breath, or wheezing; Cardiovascular: no chest pain or dyspnea on exertion; Gastrointestinal: no abdominal pain/back, change in bowel habits, or bloody stools; Genito-Urinary: no dysuria, trouble voiding, or hematuria; Musculoskeletal: negative  Neurological: no TIA or stroke symptoms    PHYSICAL EXAM:   Right Arm BP - " Sittin/89 (19 0838)  Left Arm BP - Sittin/82 (19 0838)  Pulse: 74  Temp: 97.9 °F (36.6 °C)      General appearance:  Alert, well-appearing, and in no distress.  Oriented to person, place, and time   Neurological: Normal speech, no focal findings noted; CN II - XII grossly intact           Musculoskeletal: Digits/nail without cyanosis/clubbing.  Normal muscle strength/tone.                 Neck: Supple, no significant adenopathy; thyroid is not enlarged                  No carotid bruit can be auscultated                Chest:  Clear to auscultation, no wheezes, rales or rhonchi, symmetric air entry     No use of accessory muscles             Cardiac: Normal rate and regular rhythm, S1 and S2 normal; PMI non-displaced          Abdomen: Soft, nontender, nondistended, no masses or organomegaly     No rebound tenderness noted; bowel sounds normal     Pulsatile aortic mass is not palpable.     No groin adenopathy      Extremities:   2+ femoral pulses bilaterally     2+ Popliteal pulses; 2+ pedal pulses palpable.     No pedal edema     No ulcerations    LAB RESULTS:  Lab Results   Component Value Date    K 4.1 2019    K 3.8 2019    K 3.7 2019    CREATININE 0.9 2019    CREATININE 0.8 2019    CREATININE 1.0 2019     Lab Results   Component Value Date    WBC 9.02 2019    WBC 8.56 2019    WBC 8.63 2019    HCT 45.2 2019    HCT 44.3 2019    HCT 44.3 2019     2019     2019     2019     Lab Results   Component Value Date    HGBA1C 7.4 (H) 2019    HGBA1C 8.0 (H) 2019    HGBA1C 8.0 (H) 10/04/2018     IMAGING:    IMP/PLAN:  73 y.o. female with a history as noted above - notably her headache has resolved on prednisone.  Will plan for L temporal artery biopsy .  She understands the risks and benefits and wishes to proceed.    1) Rx given for xanax  2) plan for TA bx tomorrow    Mark  MD [facility]  Vascular & Endovascular Surgery

## 2019-04-25 NOTE — LETTER
April 25, 2019      Tor Licea MD  [address]  [facility] [address]           [facility]  [address]  [facility] [address]  Phone: [phone]  Fax: [phone]          Patient: Buck Ibarra   MR Number: [id number]   YOB: 1945   Date of Visit: 4/25/2019       Dear Dr. Tor Licea:    Thank you for referring Buck Ibarra to me for evaluation. Attached you will find relevant portions of my assessment and plan of care.    If you have questions, please do not hesitate to call me. I look forward to following Buck Ibarra along with you.    Sincerely,    Mark Feldman MD    Enclosure  CC:  No Recipients    If you would like to receive this communication electronically, please contact [email or web address] or [phone] to request more information on [facility] access.    For providers and/or their staff who would like to refer a patient to [facility], please contact us through our one-stop-shop provider referral line, [facility], at [phone].    If you feel you have received this communication in error or would no longer like to receive these types of communications, please e-mail [email or web address]

## 2019-04-25 NOTE — Clinical Note
Dr. Benavides,Ms. Jane saw me today for hospital follow up. She's getting the temporal artery biopsy tomorrow. Would you be able to see her in next few weeks?Thanks, Ericka

## 2019-04-25 NOTE — PROGRESS NOTES
"Transitional Care Note  Subjective:       Patient ID: Buck Ibarra is a 73 y.o. female.  Chief Complaint: hospital follow up    Family and/or Caretaker present at visit?  No.  Diagnostic tests reviewed/disposition: No diagnosic tests pending after this hospitalization.  Disease/illness education: yes  Home health/community services discussion/referrals: Patient does not have home health established from hospital visit.  They do not need home health.  If needed, we will set up home health for the patient.   Establishment or re-establishment of referral orders for community resources: No other necessary community resources.   Discussion with other health care providers: No discussion with other health care providers necessary.     HPI   Pt was last seen by me 6/7/18  Since last visit:    Followed w/ endo. Last saw CARO Malhotra. For her DM2 - currently on tresiba 22 units qhs, metformin 500mg BID (couldn't simona 1000mg BID due to diarrhea), trulicity 1.5mg weekly. a1c 4/20/19 7.4,  1/24/19, MAC 1/24/19, FOOT - 8/9/18 w/ Dr. Terrazas, EYE 4/20/19.    Pt saw Ella Zavala NP 11/29/18. H/o hep c s/p Harvoni 8/2016. Liver mass stable. F/u in 1 yr w/ repeat US.     hosp 4/20/19 through 4/21/19 due to concerns of GCA. She had sharp throbbing HA in the L temporal region. HA resolved w/ steroid treatment.   Saw Dr. Benavides in hosp. rec ophthal consult and if eye involvement, should get solumedrol pulse dosing. Consult vasc surg for L temporal biopsy. Cont on prednisone 60mg daily. CTA outpatient.   CT head 4/20 normal.   MRI brain w/ and w/o contrast 4/21/19 - "no compelling MR evidence of vascular inflammation involving the superficial cranial arteries".  US soft tissue 4/21/19 - unremarkable US superficial temporal arteries w/o evidence for definite wall thickening to suggest GCA as clinically questioned.   ESR 4/20/19 71 and on repeat 72.  Dr. Escobedo and Dr. Feldman seen 4/21/19 and set up for outpt biopsy " "this week.  Dr. Norris 4/21/19 - eye exam unchanged.   Pt was discharged on prednisone 60mg daily and increased dosage of candesartan to 16mg daily due to HTN. DM meds were unchanged at discharge.    While inpt, her Hep B core abs was found to be positive and so quant DNA pending from today.  Vit D def.     Pt called clinic 4/23 in the afternoon as her sugars were in the 400s along w/ diarrhea while on the prednisone. Pt reports stopped her prednisone for the day and glucose normalized and diarrhea resolved. Told pt to hold her prednisone for 1 day and to follow up with me today in clinic after her vascular surgery appt.  Saw Dr. Feldman and plan for biopsy tomorrow.     CXR 1/2019 - calcific atherosclerosis of the thoracic aorta    Review of Systems   Constitutional: Negative for chills and fever.   Eyes: Negative for visual disturbance.   Respiratory: Negative for shortness of breath.    Gastrointestinal: Positive for diarrhea (when she woke up this morning. 2x/day.). Negative for abdominal pain, constipation, nausea and vomiting.   Genitourinary: Negative for dysuria, frequency and hematuria.   Neurological: Negative for dizziness, weakness and headaches.   Psychiatric/Behavioral: Negative for dysphoric mood and sleep disturbance. The patient is not nervous/anxious.          Objective:      Physical Exam    /82 (BP Location: Left arm, Patient Position: Sitting, BP Method: Large (Manual))   Pulse 81   Temp 98 °F (36.7 °C)   Ht 5' 5" (1.651 m)   Wt 92 kg (202 lb 13.2 oz)   BMI 33.75 kg/m²     Gen - A+OX4, NAD  HEENT - PERRL, OP clear. MMM. TM normal. Temporal region w/o tenderness to palpation.   Neck - no LAD.   CV - RRR, no m/r  Chest - CTAB, no wheezing/rhonchi/crackles  ABd - S/NT/ND/+BS  EXT - 2+ b radial and DP pulses. No LE edema.   Skin - no rash.   MSK - no spinal tenderness to palpation. Normal gait.     Labs and imaging reviewed as above.     Assessment/Plan       Buck was seen today " for blood pressure check.    Diagnoses and all orders for this visit:    Hospital discharge follow-up  -     Ambulatory Referral to Rheumatology    Giant cell arteritis - plan for temporal a biopsy w/ Dr. Feldman tomorrow. Restart prednisone 60. Will send message to Dr. Benavides to see if pt can follow up w/ him in the next few weeks.   -     Ambulatory Referral to Rheumatology    Diabetes mellitus type 2 in obese - inc tresiba from 22 to 28 units nightly.   -     insulin degludec (TRESIBA FLEXTOUCH U-200) 200 unit/mL (3 mL) InPn; Inject 28 units nightly.    Hypertension associated with diabetes - Stable and controlled. Continue current medications.    Colon cancer screening  -     Case request GI: COLONOSCOPY    Aortic atherosclerosis - cont atorva 80mg daily.     Other orders  -     candesartan (ATACAND) 16 MG tablet; Take 1 tablet (16 mg total) by mouth once daily.    4 week f/u w/ ISABEL Persaud as I expect her sugars and BP to improve once she's off of the high dose steroids. Once she's stopped on prednisone, may be able to go back to her baseline tresiba 22 units nightly and HCT from 50mg to 25mg and candesartan from 16mg back to baseline 8mg daily.     Ericka Cortez MD  Department of Internal Medicine - [facility]  11:35 AM

## 2019-04-26 ENCOUNTER — OFFICE VISIT (OUTPATIENT)
Dept: VASCULAR SURGERY | Facility: CLINIC | Age: 74
End: 2019-04-26
Attending: SURGERY
Payer: MEDICARE

## 2019-04-26 VITALS
DIASTOLIC BLOOD PRESSURE: 72 MMHG | HEART RATE: 69 BPM | BODY MASS INDEX: 33.65 KG/M2 | SYSTOLIC BLOOD PRESSURE: 159 MMHG | TEMPERATURE: 98 F | RESPIRATION RATE: 18 BRPM | HEIGHT: 65 IN | WEIGHT: 201.94 LBS

## 2019-04-26 DIAGNOSIS — M31.6 GCA (GIANT CELL ARTERITIS): Primary | ICD-10-CM

## 2019-04-26 DIAGNOSIS — M31.6 TEMPORAL ARTERITIS: Primary | ICD-10-CM

## 2019-04-26 LAB
HBV DNA SERPL NAA+PROBE-ACNC: <10 IU/ML
HBV DNA SERPL NAA+PROBE-LOG IU: <1 LOG (10) IU/ML
HBV DNA SERPL QL NAA+PROBE: NOT DETECTED

## 2019-04-26 PROCEDURE — 3077F SYST BP >= 140 MM HG: CPT | Mod: CPTII,S$GLB,, | Performed by: SURGERY

## 2019-04-26 PROCEDURE — 3077F PR MOST RECENT SYSTOLIC BLOOD PRESSURE >= 140 MM HG: ICD-10-PCS | Mod: CPTII,S$GLB,, | Performed by: SURGERY

## 2019-04-26 PROCEDURE — 88305 TISSUE EXAM BY PATHOLOGIST: CPT | Performed by: PATHOLOGY

## 2019-04-26 PROCEDURE — 37609 LIGATION/BX TEMPORAL ARTERY: CPT | Mod: LT,S$GLB,, | Performed by: SURGERY

## 2019-04-26 PROCEDURE — 88313 TISSUE SPECIMEN TO PATHOLOGY: ICD-10-PCS | Mod: 26,,, | Performed by: PATHOLOGY

## 2019-04-26 PROCEDURE — 3078F PR MOST RECENT DIASTOLIC BLOOD PRESSURE < 80 MM HG: ICD-10-PCS | Mod: CPTII,S$GLB,, | Performed by: SURGERY

## 2019-04-26 PROCEDURE — 99999 PR PBB SHADOW E&M-EST. PATIENT-LVL IV: CPT | Mod: PBBFAC,,, | Performed by: SURGERY

## 2019-04-26 PROCEDURE — 37609 PR TEMPORAL ARTERY LIGATN OR BX: ICD-10-PCS | Mod: LT,S$GLB,, | Performed by: SURGERY

## 2019-04-26 PROCEDURE — 88305 TISSUE EXAM BY PATHOLOGIST: CPT | Mod: 26,,, | Performed by: PATHOLOGY

## 2019-04-26 PROCEDURE — 88313 SPECIAL STAINS GROUP 2: CPT | Mod: 26,,, | Performed by: PATHOLOGY

## 2019-04-26 PROCEDURE — 1101F PT FALLS ASSESS-DOCD LE1/YR: CPT | Mod: CPTII,S$GLB,, | Performed by: SURGERY

## 2019-04-26 PROCEDURE — 99999 PR PBB SHADOW E&M-EST. PATIENT-LVL IV: ICD-10-PCS | Mod: PBBFAC,,, | Performed by: SURGERY

## 2019-04-26 PROCEDURE — 3078F DIAST BP <80 MM HG: CPT | Mod: CPTII,S$GLB,, | Performed by: SURGERY

## 2019-04-26 PROCEDURE — 1101F PR PT FALLS ASSESS DOC 0-1 FALLS W/OUT INJ PAST YR: ICD-10-PCS | Mod: CPTII,S$GLB,, | Performed by: SURGERY

## 2019-04-26 PROCEDURE — 88305 TISSUE SPECIMEN TO PATHOLOGY: ICD-10-PCS | Mod: 26,,, | Performed by: PATHOLOGY

## 2019-04-26 NOTE — PROGRESS NOTES
04/26/2019      PROCEDURE: Left Temporal Artery Biopsy    PREOPERATIVE DIAGNOSIS:  Headache, Vision Loss    POSTOPERATIVE DIAGNOSIS:  Headache, Vision Loss    SURGEON: Mark Feldman MD  Vascular & Endovascular Surgery       ASSISTANT: None    ANESTHESIA:  5 mL of 1% lidocaine w/ epi    ESTIMATED BLOOD LOSS:  Less than 5 mL.    COMPLICATIONS:  None.    SPECIMEN:  Left Temporal artery.    DISPOSITION:  Return home.    HISTORY:  Buck Ibarra is a 73 y.o. female with a history of headache. Her ESR and CRP are also elevated. She is referred for temporal artery biopsy.      DESCRIPTION OF PROCEDURE:    After prepping and draping the skin over the left face, the skin was infiltrated with lidocaine. An incision was made through the subcutaneous tissues and with a doppler probe the temporal artery was identified. A 2cm segment was ligated with fine silk suture and passed off as specimen. Hemostasis was achieved. The wound was then closed in layers with Monocryl and sterile dressing applied.    Mark Feldman MD  Vascular & Endovascular Surgery

## 2019-05-09 ENCOUNTER — OFFICE VISIT (OUTPATIENT)
Dept: RHEUMATOLOGY | Facility: CLINIC | Age: 74
End: 2019-05-09
Payer: MEDICARE

## 2019-05-09 ENCOUNTER — TELEPHONE (OUTPATIENT)
Dept: RHEUMATOLOGY | Facility: CLINIC | Age: 74
End: 2019-05-09

## 2019-05-09 ENCOUNTER — HOSPITAL ENCOUNTER (EMERGENCY)
Facility: HOSPITAL | Age: 74
Discharge: HOME | End: 2019-05-09
Attending: EMERGENCY MEDICINE
Payer: MEDICARE

## 2019-05-09 VITALS
DIASTOLIC BLOOD PRESSURE: 76 MMHG | HEIGHT: 65 IN | HEART RATE: 64 BPM | RESPIRATION RATE: 16 BRPM | OXYGEN SATURATION: 98 % | SYSTOLIC BLOOD PRESSURE: 169 MMHG | WEIGHT: 202 LBS | BODY MASS INDEX: 33.66 KG/M2 | TEMPERATURE: 98 F

## 2019-05-09 VITALS
HEART RATE: 71 BPM | WEIGHT: 206 LBS | DIASTOLIC BLOOD PRESSURE: 90 MMHG | BODY MASS INDEX: 34.28 KG/M2 | SYSTOLIC BLOOD PRESSURE: 177 MMHG

## 2019-05-09 DIAGNOSIS — E55.9 VITAMIN D INSUFFICIENCY: ICD-10-CM

## 2019-05-09 DIAGNOSIS — R73.9 HYPERGLYCEMIA: Primary | ICD-10-CM

## 2019-05-09 DIAGNOSIS — R51.9 TEMPORAL PAIN: ICD-10-CM

## 2019-05-09 DIAGNOSIS — R70.0 ELEVATED SED RATE: Primary | ICD-10-CM

## 2019-05-09 DIAGNOSIS — R73.9 BLOOD GLUCOSE ELEVATED: ICD-10-CM

## 2019-05-09 DIAGNOSIS — Z72.0 TOBACCO USE: ICD-10-CM

## 2019-05-09 LAB
B-OH-BUTYR BLD STRIP-SCNC: 0.1 MMOL/L (ref 0–0.5)
BACTERIA #/AREA URNS AUTO: ABNORMAL /HPF
BILIRUB UR QL STRIP: NEGATIVE
CLARITY UR REFRACT.AUTO: ABNORMAL
COLOR UR AUTO: YELLOW
GLUCOSE UR QL STRIP: ABNORMAL
HGB UR QL STRIP: NEGATIVE
HYALINE CASTS UR QL AUTO: 1 /LPF
KETONES UR QL STRIP: NEGATIVE
LEUKOCYTE ESTERASE UR QL STRIP: ABNORMAL
MICROSCOPIC COMMENT: ABNORMAL
NITRITE UR QL STRIP: NEGATIVE
PH UR STRIP: 5 [PH] (ref 5–8)
POCT GLUCOSE: >500 MG/DL (ref 70–110)
PROT UR QL STRIP: NEGATIVE
RBC #/AREA URNS AUTO: 2 /HPF (ref 0–4)
SP GR UR STRIP: >=1.03 (ref 1–1.03)
SQUAMOUS #/AREA URNS AUTO: 12 /HPF
URN SPEC COLLECT METH UR: ABNORMAL
WBC #/AREA URNS AUTO: 7 /HPF (ref 0–5)
YEAST UR QL AUTO: ABNORMAL

## 2019-05-09 PROCEDURE — 82010 KETONE BODYS QUAN: CPT

## 2019-05-09 PROCEDURE — 3077F PR MOST RECENT SYSTOLIC BLOOD PRESSURE >= 140 MM HG: ICD-10-PCS | Mod: CPTII,GC,S$GLB, | Performed by: INTERNAL MEDICINE

## 2019-05-09 PROCEDURE — 3078F DIAST BP <80 MM HG: CPT | Mod: CPTII,GC,S$GLB, | Performed by: INTERNAL MEDICINE

## 2019-05-09 PROCEDURE — 81001 URINALYSIS AUTO W/SCOPE: CPT

## 2019-05-09 PROCEDURE — 96361 HYDRATE IV INFUSION ADD-ON: CPT

## 2019-05-09 PROCEDURE — 99999 PR PBB SHADOW E&M-EST. PATIENT-LVL IV: CPT | Mod: PBBFAC,GC,, | Performed by: INTERNAL MEDICINE

## 2019-05-09 PROCEDURE — 99999 PR PBB SHADOW E&M-EST. PATIENT-LVL IV: ICD-10-PCS | Mod: PBBFAC,GC,, | Performed by: INTERNAL MEDICINE

## 2019-05-09 PROCEDURE — 3077F SYST BP >= 140 MM HG: CPT | Mod: CPTII,GC,S$GLB, | Performed by: INTERNAL MEDICINE

## 2019-05-09 PROCEDURE — 1101F PR PT FALLS ASSESS DOC 0-1 FALLS W/OUT INJ PAST YR: ICD-10-PCS | Mod: CPTII,GC,S$GLB, | Performed by: INTERNAL MEDICINE

## 2019-05-09 PROCEDURE — 1101F PT FALLS ASSESS-DOCD LE1/YR: CPT | Mod: CPTII,GC,S$GLB, | Performed by: INTERNAL MEDICINE

## 2019-05-09 PROCEDURE — 99214 OFFICE O/P EST MOD 30 MIN: CPT | Mod: GC,S$GLB,, | Performed by: INTERNAL MEDICINE

## 2019-05-09 PROCEDURE — 25000003 PHARM REV CODE 250: Performed by: PHYSICIAN ASSISTANT

## 2019-05-09 PROCEDURE — 63600175 PHARM REV CODE 636 W HCPCS: Performed by: PHYSICIAN ASSISTANT

## 2019-05-09 PROCEDURE — 99285 PR EMERGENCY DEPT VISIT,LEVEL V: ICD-10-PCS | Mod: ,,, | Performed by: EMERGENCY MEDICINE

## 2019-05-09 PROCEDURE — 96374 THER/PROPH/DIAG INJ IV PUSH: CPT

## 2019-05-09 PROCEDURE — 82962 GLUCOSE BLOOD TEST: CPT

## 2019-05-09 PROCEDURE — 99214 PR OFFICE/OUTPT VISIT, EST, LEVL IV, 30-39 MIN: ICD-10-PCS | Mod: GC,S$GLB,, | Performed by: INTERNAL MEDICINE

## 2019-05-09 PROCEDURE — 3078F PR MOST RECENT DIASTOLIC BLOOD PRESSURE < 80 MM HG: ICD-10-PCS | Mod: CPTII,GC,S$GLB, | Performed by: INTERNAL MEDICINE

## 2019-05-09 PROCEDURE — 99285 EMERGENCY DEPT VISIT HI MDM: CPT | Mod: ,,, | Performed by: EMERGENCY MEDICINE

## 2019-05-09 PROCEDURE — 99499 RISK ADDL DX/OHS AUDIT: ICD-10-PCS | Mod: S$GLB,,, | Performed by: INTERNAL MEDICINE

## 2019-05-09 PROCEDURE — 99499 UNLISTED E&M SERVICE: CPT | Mod: S$GLB,,, | Performed by: INTERNAL MEDICINE

## 2019-05-09 PROCEDURE — 99284 EMERGENCY DEPT VISIT MOD MDM: CPT | Mod: 25

## 2019-05-09 RX ORDER — ERGOCALCIFEROL 1.25 MG/1
50000 CAPSULE ORAL
Qty: 12 CAPSULE | Refills: 3 | Status: SHIPPED | OUTPATIENT
Start: 2019-05-09 | End: 2020-03-27 | Stop reason: SDUPTHER

## 2019-05-09 RX ORDER — METFORMIN HYDROCHLORIDE 500 MG/1
500 TABLET, EXTENDED RELEASE ORAL
Status: COMPLETED | OUTPATIENT
Start: 2019-05-09 | End: 2019-05-09

## 2019-05-09 RX ADMIN — METFORMIN HYDROCHLORIDE 500 MG: 500 TABLET, EXTENDED RELEASE ORAL at 08:05

## 2019-05-09 RX ADMIN — INSULIN HUMAN 10 UNITS: 100 INJECTION, SOLUTION PARENTERAL at 07:05

## 2019-05-09 RX ADMIN — SODIUM CHLORIDE 1000 ML: 0.9 INJECTION, SOLUTION INTRAVENOUS at 07:05

## 2019-05-09 RX ADMIN — SODIUM CHLORIDE 1000 ML: 0.9 INJECTION, SOLUTION INTRAVENOUS at 04:05

## 2019-05-09 ASSESSMENT — ROUTINE ASSESSMENT OF PATIENT INDEX DATA (RAPID3)
FATIGUE SCORE: 6
MDHAQ FUNCTION SCORE: 0
PAIN SCORE: 0
PSYCHOLOGICAL DISTRESS SCORE: 0
AM STIFFNESS SCORE: 0, NO
TOTAL RAPID3 SCORE: 2.33
PATIENT GLOBAL ASSESSMENT SCORE: 7

## 2019-05-09 NOTE — PROGRESS NOTES
Subjective:     Patient ID: Buck Ibarra is a 73 y.o. female     Chief Complaint: Disease Management       HPI  This is a 74 yo AFF with PMH of DMII, HTN, Hep C s/p Harvoni, GERD who presented to [facility] ED on 4/20/19 with complaints of severe left sided headache. Reports that headache started suddenly around the left temporal region and going back to the back of the head. Also had associated photophobia. Did have some tenderness to touch over the left temporal area. Initially took two ibuprofren for the pain but this did not help. Would describe the pain as a 12/10 throbbing intense pain. No jaw claudication or vision changes. No history of headaches in the past. No injury/trauma. In ED, patient received IV fluid, Reglan and Tylenol. Per patient, Reglan helped to bring down the pain from a 12/10 to a 6/10. Then she was given Solumedrol 250 mg IV which completing resolved the pain. She was also found to be hypertensive with BP at 245/107. Given Labetolol. Sed rate found to be elevated at 71. CRP normal at 3.3. Currently, headache completely resolved. No pain in the shoulders or hips. Reports that sister had a temporal biopsy 3-4 years ago which was negative for GCA and she was tapered off of steroids after one week. Denies any fevers, chills, weight changes, oral/nasal ulcers, hair loss, skin rashes, joint pain/swelling, muscle weakness/pain, eye redness, n/v. Does have a grandchild who is currently sick with a cold.     Interval history:  Patient presents as a follow-up after hospital discharge. Reports that she was discharged with only a 10 day supply of prednisone 60 mg daily. She completed this on 5/1/19. Reports no headaches since the one she had on initial presentation on 4/21/19. Has slight left eye pain which occurred today and about 14 days ago but no continuous eye pain. No scalp tenderness, jaw pain, vision changes. Has not really had any issues except for fatigue and low energy. No shoulder or hips  pain, SOB, fevers, chills, weight changes, oral/nasal ulcers, hair loss, skin rashes, joint pain/swelling, muscle pain/swelling, eye redness, n/v. Still smoking about 6 cigarettes/day.        Current Outpatient Medications   Medication Sig Dispense Refill    albuterol (PROVENTIL/VENTOLIN HFA) 90 mcg/actuation inhaler Inhale 1-2 puffs into the lungs every 6 (six) hours as needed for Wheezing. Rescue 18 g 0    atorvastatin (LIPITOR) 80 MG tablet Take 1 tablet (80 mg total) by mouth once daily. DOSE CHANGE 90 tablet 3    blood sugar diagnostic Strp To check BG 3 times daily, to use with insurance preferred meter 300 each 3    blood-glucose meter kit To check BG 3 times daily, to use with insurance preferred meter 1 each 0    candesartan (ATACAND) 16 MG tablet Take 1 tablet (16 mg total) by mouth once daily. 90 tablet 3    ciclopirox (PENLAC) 8 % Soln Apply topically nightly. 6.6 mL 11    clotrimazole-betamethasone 1-0.05% (LOTRISONE) cream Apply topically 2 (two) times daily. 45 g 1    dulaglutide (TRULICITY) 1.5 mg/0.5 mL PnIj Inject 1.5 mg into the skin once weekly. 2 mL 6    esomeprazole (NEXIUM) 40 MG capsule Take one capsule by mouth daily as needed for acid reflux 30 capsule 11    fluocinonide (LIDEX) 0.05 % external solution AAA scalp qd prn pruritus 60 mL 1    gabapentin (NEURONTIN) 300 MG capsule TAKE ONE CAPSULE BY MOUTH EVERY EVENING 90 capsule 3    hydrochlorothiazide (HYDRODIURIL) 25 MG tablet TAKE ONE TABLET BY MOUTH ONCE DAILY 90 tablet 3    insulin degludec (TRESIBA FLEXTOUCH U-200) 200 unit/mL (3 mL) InPn Inject 28 units nightly. 15 mL 11    ketoconazole (NIZORAL) 2 % shampoo Wash hair with medicated shampoo at least 2x/week - let sit on scalp at least 5 minutes prior to rinsing 120 mL 5    lancets 33 gauge Misc To check BG 3 times daily, to use with insurance preferred meter 300 each 3    metFORMIN (GLUCOPHAGE-XR) 500 MG 24 hr tablet Take 1 tablet (500 mg total) by mouth 2 (two) times  "daily with meals. 180 tablet 2    pen needle, diabetic (BD ULTRA-FINE MILAN PEN NEEDLES) 32 gauge x 5/32" Ndle Uses 1 time daily, on insulin injections 90 each 4    predniSONE (DELTASONE) 20 MG tablet Take 3 tablets (60 mg total) by mouth once daily. for 20 days 60 tablet 0    traZODone (DESYREL) 50 MG tablet Take 1 to 2 tablets ( mg total) by mouth every evening. 60 tablet 3    ALPRAZolam (XANAX) 0.5 MG tablet Take 1 tablet (0.5 mg total) by mouth once as needed for Anxiety. Take one 0.5 mg tablet of xanax 1h before the procedure.  You may take a second tablet right before the procedure; please check with our nurse. 2 tablet 0    aspirin (ECOTRIN) 81 MG EC tablet Take 1 tablet (81 mg total) by mouth once daily. 30 tablet 3     No current facility-administered medications for this visit.        Review of patient's allergies indicates:   Allergen Reactions    Erythromycin Anaphylaxis    Erythropoietin analogues Anaphylaxis    Pegasys [peginterferon ruben-2a] Anaphylaxis    Lisinopril Hives       Review of Systems   Constitutional: Positive for fatigue. Negative for appetite change, fever and unexpected weight change.   HENT: Negative for congestion and mouth sores.    Eyes: Negative for pain and redness.   Respiratory: Negative for apnea, cough and shortness of breath.    Cardiovascular: Negative for chest pain, palpitations and leg swelling.   Gastrointestinal: Negative for abdominal distention, anal bleeding, constipation, diarrhea and nausea.   Endocrine: Negative for polydipsia and polyphagia.   Genitourinary: Negative for difficulty urinating and frequency.   Musculoskeletal: Negative for arthralgias, joint swelling and neck pain.   Skin: Negative for color change and rash.   Neurological: Negative for dizziness.   Psychiatric/Behavioral: Negative for agitation.       Past Medical History:   Diagnosis Date    Allergy     Cataract     Diabetes mellitus type II     Gastritis     with gastric ulcer "    GERD (gastroesophageal reflux disease)     H. pylori infection     History of hepatitis C, SVR as of 2015    Harvoni 12 wks completed.  SVR(12) as of 16 SVR(24) as of 10-     Hyperlipidemia     Hypertension     Osteopenia     Type 2 diabetes mellitus with diabetic polyneuropathy        Past Surgical History:   Procedure Laterality Date    COLONOSCOPY      COLONOSCOPY N/A 2016    Performed by Emeka Ahn MD at [facility] (4TH FLR)    HYSTERECTOMY      LIVER BIOPSY      OOPHORECTOMY      UPPER GASTROINTESTINAL ENDOSCOPY         Family History   Problem Relation Age of Onset    Heart disease Mother     Diabetes Mother     Heart disease Father     Cancer Sister         breast cancer    Diabetes Sister     Cancer Sister 70        colon CA    Diabetes Sister     Breast cancer Sister     Diabetes Sister     Glaucoma Neg Hx     Melanoma Neg Hx     Cirrhosis Neg Hx     Psoriasis Neg Hx     Lupus Neg Hx        Social History     Tobacco Use    Smoking status: Current Every Day Smoker     Packs/day: 0.25     Years: 48.00     Pack years: 12.00     Last attempt to quit: 2016     Years since quittin.6    Smokeless tobacco: Never Used   Substance Use Topics    Alcohol use: No     Comment: special occasions    Drug use: No       Objective:     BP (!) 177/90   Pulse 71   Wt 93.4 kg (206 lb)   BMI 34.28 kg/m²     Physical Exam   Constitutional: She is oriented to person, place, and time and well-developed, well-nourished, and in no distress.   HENT:   Head: Normocephalic and atraumatic.   2+ temporal pulses bilaterally    Eyes: EOM are normal. Pupils are equal, round, and reactive to light.   Neck: Normal range of motion. Neck supple.   Cardiovascular: Normal rate and regular rhythm.    2 + radial pulses  No carotid bruits   Pulmonary/Chest: Effort normal and breath sounds normal.   Abdominal: Soft. Bowel sounds are normal.       Right Side Rheumatological  Exam     Muscle Strength (0-5 scale):  Deltoid:  5  Biceps: 5/5   Triceps:  5  : 5/5   Iliopsoas: 5  Quadriceps:  5   Distal Lower Extremity: 5    Left Side Rheumatological Exam     Muscle Strength (0-5 scale):  Deltoid:  5  Biceps: 5/5   Triceps:  5  :  5/5   Iliopsoas: 5  Quadriceps:  5   Distal Lower Extremity: 5      Neurological: She is alert and oriented to person, place, and time.   Skin: Skin is warm and dry.     Psychiatric: Affect normal.   Musculoskeletal: Normal range of motion. She exhibits no edema or tenderness.         Labs:  Results for DAHLIA MALDONADO (MRN [id number]) as of 5/9/2019 11:34   Ref. Range 5/9/2019 10:22   WBC Latest Ref Range: 3.90 - 12.70 K/uL 13.25 (H)   RBC Latest Ref Range: 4.00 - 5.40 M/uL 5.73 (H)   Hemoglobin Latest Ref Range: 12.0 - 16.0 g/dL 15.7   Hematocrit Latest Ref Range: 37.0 - 48.5 % 50.0 (H)   MCV Latest Ref Range: 82 - 98 fL 87   MCH Latest Ref Range: 27.0 - 31.0 pg 27.4   MCHC Latest Ref Range: 32.0 - 36.0 g/dL 31.4 (L)   RDW Latest Ref Range: 11.5 - 14.5 % 13.7   Platelets Latest Ref Range: 150 - 350 K/uL 349   MPV Latest Ref Range: 9.2 - 12.9 fL 10.6   Gran% Latest Ref Range: 38.0 - 73.0 % 57.1   Gran # (ANC) Latest Ref Range: 1.8 - 7.7 K/uL 7.6   Lymph% Latest Ref Range: 18.0 - 48.0 % 38.6   Lymph # Latest Ref Range: 1.0 - 4.8 K/uL 5.1 (H)   Mono% Latest Ref Range: 4.0 - 15.0 % 3.2 (L)   Mono # Latest Ref Range: 0.3 - 1.0 K/uL 0.4   Eosinophil% Latest Ref Range: 0.0 - 8.0 % 0.4   Eos # Latest Ref Range: 0.0 - 0.5 K/uL 0.1   Basophil% Latest Ref Range: 0.0 - 1.9 % 0.3   Baso # Latest Ref Range: 0.00 - 0.20 K/uL 0.04   nRBC Latest Ref Range: 0 /100 WBC 0   Differential Method Unknown Automated   Immature Grans (Abs) Latest Ref Range: 0.00 - 0.04 K/uL 0.05 (H)   Immature Granulocytes Latest Ref Range: 0.0 - 0.5 % 0.4   Results for DAHLIA MALDONADO (MRN [id number]) as of 5/9/2019 11:34   Ref. Range 5/9/2019 10:22   Sodium Latest Ref Range: 136 - 145 mmol/L 132 (L)    Potassium Latest Ref Range: 3.5 - 5.1 mmol/L 5.2 (H)   Chloride Latest Ref Range: 95 - 110 mmol/L 94 (L)   CO2 Latest Ref Range: 23 - 29 mmol/L 29   Anion Gap Latest Ref Range: 8 - 16 mmol/L 9   BUN, Bld Latest Ref Range: 8 - 23 mg/dL 25 (H)   Creatinine Latest Ref Range: 0.5 - 1.4 mg/dL 1.4   eGFR if non African American Latest Ref Range: >60 mL/min/1.73 m^2 37.8 (A)   eGFR if African American Latest Ref Range: >60 mL/min/1.73 m^2 43.6 (A)   Glucose Latest Ref Range: 70 - 110 mg/dL 514 (HH)   Calcium Latest Ref Range: 8.7 - 10.5 mg/dL 10.5   Alkaline Phosphatase Latest Ref Range: 55 - 135 U/L 172 (H)   PROTEIN TOTAL Latest Ref Range: 6.0 - 8.4 g/dL 7.1   Albumin Latest Ref Range: 3.5 - 5.2 g/dL 3.3 (L)   BILIRUBIN TOTAL Latest Ref Range: 0.1 - 1.0 mg/dL 0.5   AST Latest Ref Range: 10 - 40 U/L 14   ALT Latest Ref Range: 10 - 44 U/L 27   CRP Latest Ref Range: 0.0 - 8.2 mg/L 3.2   Results for DAHLIA MALDONADO (MRN [id number]) as of 5/9/2019 13:32   Ref. Range 4/20/2019 19:30 4/21/2019 04:54 5/9/2019 10:22   Sed Rate Latest Ref Range: 0 - 36 mm/Hr 71 (H) 72 (H) 49 (H)   Results for DAHLIA MALDONADO (MRN [id number]) as of 5/9/2019 13:32   Ref. Range 4/20/2019 19:30 5/9/2019 10:22   CRP Latest Ref Range: 0.0 - 8.2 mg/L 3.3 3.2     DEXA 2018:  There is a 3% risk of a major osteoporotic fracture and a 0 point% risk of hip fracture in the next 10 years (FRAX).    Unable to compare to prior study done at a different facility.      Impression       Osteopenia of the lumbar spine.  FRAX calculations do not suggest treatment.       Assessment:     1. Elevated sed rate    2. Temporal pain    3. Tobacco use    4. Blood glucose elevated      This is a 72 yo AFF with PMH of DMII, HTN, Hep C s/p Harvoni, GERD who presented to [facility] ED on 4/20/19 with complaints of severe left sided headache. Reports that headache started suddenly around the left temporal region and going back to the back of the head. Also had associated photophobia. Did have  some tenderness to touch over the left temporal area. Initially took two ibuprofren for the pain but this did not help. Would describe the pain as a 12/10 throbbing intense pain. No jaw claudication or vision changes. No history of headaches in the past. No injury/trauma. In ED, patient received IV fluid, Reglan and Tylenol. Per patient, Reglan helped to bring down the pain from a 12/10 to a 6/10. Then she was given Solumedrol 250 mg IV which completing resolved the pain. She was also found to be hypertensive with BP at 245/107. Given Labetolol. Sed rate found to be elevated at 71. CRP normal at 3.3. Discharged from hospital on prednisone 60 mg daily.     Initial exam with no scalp tenderness and normal temporal pulses (slightly decreased on right). Sed rate elevated at 71, CRP normal. No fevers, chills, jaw claudication, vision changes, PMR symptoms.  MRI GCA protocol and Temporal US b/l revealed no findings concerning for GCA. Left temporal biopsy done on 4/26/19 also negative for GCA. SPEP and ERICA normal.    Presents for follow-up today. Reports that she was only given 10 day course of prednisone 60 mg daily which she completed on 5/1/19. Has had no headaches after initial acute episode in the hospital on 4/26/19. No scalp tenderness, jaw claudication, vision changes. Was seen by Ophthalmology during hospital admission and they did see any ocular signs of GCA. Repeat labs done today reveal that sed rate has decreased but it is still elevated at 49. CRP remains normal.     Elevated blood glucose at 514. Also with ELIZABETH. Will send to ED.  Vit D: 11. Will start on replacement. DEXA 2018: Osteopenia of lumbar spine. FRAX calculations do not indicate tx.    Plan:     - will repeat sed rate and CRP weekly and monitor closely for return of headaches or vision changes. Advised patient to call right away if these symptoms develop  - hold off on starting steroids at this time   - start on Vit D2 50,000 units weekly.  - given  elevated BS on CMP, advised patient to go to ED for treatment ASAP    RTC in 4-6 weeks    CC: Ericka Cortez MD

## 2019-05-09 NOTE — ED NOTES
Patient reports elevated blood sugar after taking prednisone since April 21st.  Patient was at clinic c/o cotton mouth and they referred her to ER for BG >500.  Patient's BG normally runs .

## 2019-05-09 NOTE — ED NOTES
Patient identifiers verified and correct for Buck Ibarra.    LOC: The patient is awake, alert and aware of environment with an appropriate affect, the patient is oriented x 3 and speaking appropriately.  APPEARANCE: Patient resting comfortably and in no acute distress, patient is clean and well groomed, patient's clothing is properly fastened.  SKIN: The skin is warm and dry, color consistent with ethnicity, skin intact, no breakdown or bruising noted.  MUSCULOSKELETAL: Patient moving all extremities spontaneously, no obvious swelling or deformities noted.  RESPIRATORY: Airway is open and patent, respirations are spontaneous, patient has a normal effort and rate, no accessory muscle use noted, bilateral breath sounds clear  CARDIAC: Patient has a normal rate and regular rhythm, no peripheral edema noted, capillary refill < 3 seconds.  ABDOMEN: Soft and non tender to palpation, no distention noted, normoactive bowel sounds present in all four quadrants.  NEUROLOGIC: PERRL, 3mm bilaterally, eyes open spontaneously, behavior appropriate to situation, follows commands, facial expression symmetrical, bilateral hand grasp equal and even, purposeful motor response noted, normal sensation in all extremities when touched with a finger.

## 2019-05-09 NOTE — TELEPHONE ENCOUNTER
Lab called to inform of panic lab value -glucose of 514.  Result told to Dr. Hanley.  No orders given at this time.

## 2019-05-09 NOTE — ED PROVIDER NOTES
Encounter Date: 5/9/2019       History     Chief Complaint   Patient presents with    Hyperglycemia     greater than 500mg/dl     74 y/o AAF with history of HTN, hyperlipidemia, GERD, HepC s/p aye, DM2 presents to the ED for hyperglycemia. She had a follow up appt today - had labwork done and was instructed to present to the ED. She reports polydipsia and polyuria since yesterday. She recently completed prednisone for temporal arteritis. She denies f/c, chest pain, SOB, abdominal pain, nausea, vomiting, dysuria, diarrhea, headache.     The history is provided by the patient.     Review of patient's allergies indicates:   Allergen Reactions    Erythromycin Anaphylaxis    Erythropoietin analogues Anaphylaxis    Pegasys [peginterferon ruben-2a] Anaphylaxis    Lisinopril Hives     Past Medical History:   Diagnosis Date    Allergy     Cataract     Diabetes mellitus type II     Gastritis     with gastric ulcer    GERD (gastroesophageal reflux disease)     H. pylori infection     History of hepatitis C, SVR as of 8-2016 8/25/2015    Harvoni 12 wks completed.  SVR(12) as of 8/4/16 SVR(24) as of 10-     Hyperlipidemia     Hypertension     Osteopenia     Type 2 diabetes mellitus with diabetic polyneuropathy      Past Surgical History:   Procedure Laterality Date    COLONOSCOPY      COLONOSCOPY N/A 1/28/2016    Performed by Emeka Ahn MD at [facility] (4TH FLR)    HYSTERECTOMY      LIVER BIOPSY      OOPHORECTOMY      UPPER GASTROINTESTINAL ENDOSCOPY       Family History   Problem Relation Age of Onset    Heart disease Mother     Diabetes Mother     Heart disease Father     Cancer Sister         breast cancer    Diabetes Sister     Cancer Sister 70        colon CA    Diabetes Sister     Breast cancer Sister     Diabetes Sister     Glaucoma Neg Hx     Melanoma Neg Hx     Cirrhosis Neg Hx     Psoriasis Neg Hx     Lupus Neg Hx      Social History     Tobacco Use    Smoking status:  Current Every Day Smoker     Packs/day: 0.25     Years: 48.00     Pack years: 12.00     Last attempt to quit: 2016     Years since quittin.6    Smokeless tobacco: Never Used   Substance Use Topics    Alcohol use: No     Comment: special occasions    Drug use: No     Review of Systems   Constitutional: Negative for chills and fever.   HENT: Negative for congestion, rhinorrhea and sore throat.    Eyes: Negative for photophobia and visual disturbance.   Respiratory: Negative for shortness of breath.    Cardiovascular: Negative for chest pain.   Gastrointestinal: Negative for abdominal pain, constipation, diarrhea, nausea and vomiting.   Endocrine: Positive for polydipsia and polyuria.   Genitourinary: Positive for frequency. Negative for dysuria and hematuria.   Musculoskeletal: Negative for neck pain and neck stiffness.   Skin: Negative for rash and wound.   Neurological: Negative for light-headedness and headaches.   Psychiatric/Behavioral: Negative for confusion.       Physical Exam     Initial Vitals [19 1448]   BP Pulse Resp Temp SpO2   (!) 142/60 93 16 98 °F (36.7 °C) 96 %      MAP       --         Physical Exam    Nursing note and vitals reviewed.  Constitutional: She appears well-developed and well-nourished. She is not diaphoretic. No distress.   HENT:   Head: Normocephalic and atraumatic.   Neck: Normal range of motion. Neck supple.   Cardiovascular: Normal rate, regular rhythm and normal heart sounds. Exam reveals no gallop and no friction rub.    No murmur heard.  Pulmonary/Chest: Breath sounds normal. She has no wheezes. She has no rhonchi. She has no rales.   Abdominal: Soft. Bowel sounds are normal. There is no tenderness. There is no rebound and no guarding.   Musculoskeletal: Normal range of motion.   Neurological: She is alert and oriented to person, place, and time.   Skin: Skin is warm and dry. No rash noted. No erythema.   Psychiatric: She has a normal mood and affect.         ED  Course   Procedures  Labs Reviewed   URINALYSIS, REFLEX TO URINE CULTURE - Abnormal; Notable for the following components:       Result Value    Appearance, UA Hazy (*)     Specific Gravity, UA >=1.030 (*)     Glucose, UA 3+ (*)     Leukocytes, UA Trace (*)     All other components within normal limits    Narrative:     yellow and gray  Preferred Collection Type->Urine, Clean Catch   URINALYSIS MICROSCOPIC - Abnormal; Notable for the following components:    WBC, UA 7 (*)     Yeast, UA Rare (*)     All other components within normal limits    Narrative:     yellow and gray  Preferred Collection Type->Urine, Clean Catch   POCT GLUCOSE - Abnormal; Notable for the following components:    POCT Glucose >500 (*)     All other components within normal limits   BETA - HYDROXYBUTYRATE, SERUM   POCT GLUCOSE MONITORING CONTINUOUS   POCT GLUCOSE MONITORING CONTINUOUS          Imaging Results    None          Medical Decision Making:   History:   Old Medical Records: I decided to obtain old medical records.  Old Records Summarized: records from clinic visits.       <> Summary of Records: Seen in clinic earlier today. Had labs drawn and was called back due to hyperglycemia. Told to present to the ED for evaluation. Glucose 514, no anion gap. Cr 1.4. Leukocytosis.   Clinical Tests:   Lab Tests: Ordered and Reviewed       APC / Resident Notes:   72 y/o AAF with history of HTN, hyperlipidemia, GERD, HepC s/p harvoni, DM2 presents to the ED for hyperglycemia. VSS. Exam unremarkable. She is well appearing. Glucose >500 upon arrival to the ED. Labs from earlier today reviewed - no anion gap. DDx includes but is not limited to hyperglycemia secondary to steroid use, UTI, DKA.     Beta hydroxybutyrate normal. UA with no infection.    Given 1L IVF.     Repeat glucose after 1L IVF still >500.    Second liter of fluids, 10 units insulin, oral metformin ordered.     Repeat glucose 402. Patient remains asymptomatic. No signs of DKA. I do not  feel that she needs any further labs or imaging at this time. Stable for discharge.    She was discharged without any new prescriptions.  She will follow up with her PCP for adjustment in medications.  All of the patient's questions were answered.  I reviewed the patient's chart and labs and discussed the case with my supervising physician.                    Clinical Impression:       ICD-10-CM ICD-9-CM   1. Hyperglycemia R73.9 790.29         Disposition:   Disposition: Discharged  Condition: Stable                        Shereen Schulte PA-C  05/09/19 2033

## 2019-05-10 LAB
POCT GLUCOSE: 403 MG/DL (ref 70–110)
POCT GLUCOSE: >500 MG/DL (ref 70–110)
POCT GLUCOSE: >500 MG/DL (ref 70–110)

## 2019-05-13 RX ORDER — HYDROCHLOROTHIAZIDE 25 MG/1
25 TABLET ORAL DAILY
Qty: 90 TABLET | Refills: 3 | Status: SHIPPED | OUTPATIENT
Start: 2019-05-13 | End: 2020-05-12

## 2019-05-16 ENCOUNTER — TELEPHONE (OUTPATIENT)
Dept: INTERNAL MEDICINE | Facility: CLINIC | Age: 74
End: 2019-05-16

## 2019-05-16 NOTE — TELEPHONE ENCOUNTER
----- Message from Ruba Acosta sent at 5/16/2019  2:59 PM CDT -----  Contact: Patient [phone]  Type: Returning a call    Who left a message?'s Office    When did the practice call?Today-05/16/19    Comments: Please call back.      Thanks

## 2019-05-23 ENCOUNTER — OFFICE VISIT (OUTPATIENT)
Dept: INTERNAL MEDICINE | Facility: CLINIC | Age: 74
End: 2019-05-23
Payer: MEDICARE

## 2019-05-23 VITALS
SYSTOLIC BLOOD PRESSURE: 124 MMHG | BODY MASS INDEX: 32.73 KG/M2 | WEIGHT: 203.69 LBS | DIASTOLIC BLOOD PRESSURE: 86 MMHG | HEART RATE: 71 BPM | HEIGHT: 66 IN | OXYGEN SATURATION: 98 %

## 2019-05-23 DIAGNOSIS — E11.42 TYPE 2 DIABETES MELLITUS WITH DIABETIC POLYNEUROPATHY, WITH LONG-TERM CURRENT USE OF INSULIN: Primary | Chronic | ICD-10-CM

## 2019-05-23 DIAGNOSIS — Z79.4 TYPE 2 DIABETES MELLITUS WITH DIABETIC POLYNEUROPATHY, WITH LONG-TERM CURRENT USE OF INSULIN: Primary | Chronic | ICD-10-CM

## 2019-05-23 DIAGNOSIS — E11.65 TYPE 2 DIABETES MELLITUS WITH HYPERGLYCEMIA, WITH LONG-TERM CURRENT USE OF INSULIN: Chronic | ICD-10-CM

## 2019-05-23 DIAGNOSIS — I10 BENIGN ESSENTIAL HYPERTENSION: ICD-10-CM

## 2019-05-23 DIAGNOSIS — Z79.4 TYPE 2 DIABETES MELLITUS WITH HYPERGLYCEMIA, WITH LONG-TERM CURRENT USE OF INSULIN: Chronic | ICD-10-CM

## 2019-05-23 PROCEDURE — 99214 OFFICE O/P EST MOD 30 MIN: CPT | Mod: S$GLB,,, | Performed by: PHYSICIAN ASSISTANT

## 2019-05-23 PROCEDURE — 3074F SYST BP LT 130 MM HG: CPT | Mod: CPTII,S$GLB,, | Performed by: PHYSICIAN ASSISTANT

## 2019-05-23 PROCEDURE — 3045F PR MOST RECENT HEMOGLOBIN A1C LEVEL 7.0-9.0%: CPT | Mod: CPTII,S$GLB,, | Performed by: PHYSICIAN ASSISTANT

## 2019-05-23 PROCEDURE — 99999 PR PBB SHADOW E&M-EST. PATIENT-LVL V: CPT | Mod: PBBFAC,,, | Performed by: PHYSICIAN ASSISTANT

## 2019-05-23 PROCEDURE — 1101F PT FALLS ASSESS-DOCD LE1/YR: CPT | Mod: CPTII,S$GLB,, | Performed by: PHYSICIAN ASSISTANT

## 2019-05-23 PROCEDURE — 3079F PR MOST RECENT DIASTOLIC BLOOD PRESSURE 80-89 MM HG: ICD-10-PCS | Mod: CPTII,S$GLB,, | Performed by: PHYSICIAN ASSISTANT

## 2019-05-23 PROCEDURE — 99214 PR OFFICE/OUTPT VISIT, EST, LEVL IV, 30-39 MIN: ICD-10-PCS | Mod: S$GLB,,, | Performed by: PHYSICIAN ASSISTANT

## 2019-05-23 PROCEDURE — 3074F PR MOST RECENT SYSTOLIC BLOOD PRESSURE < 130 MM HG: ICD-10-PCS | Mod: CPTII,S$GLB,, | Performed by: PHYSICIAN ASSISTANT

## 2019-05-23 PROCEDURE — 99999 PR PBB SHADOW E&M-EST. PATIENT-LVL V: ICD-10-PCS | Mod: PBBFAC,,, | Performed by: PHYSICIAN ASSISTANT

## 2019-05-23 PROCEDURE — 3079F DIAST BP 80-89 MM HG: CPT | Mod: CPTII,S$GLB,, | Performed by: PHYSICIAN ASSISTANT

## 2019-05-23 PROCEDURE — 3045F PR MOST RECENT HEMOGLOBIN A1C LEVEL 7.0-9.0%: ICD-10-PCS | Mod: CPTII,S$GLB,, | Performed by: PHYSICIAN ASSISTANT

## 2019-05-23 PROCEDURE — 1101F PR PT FALLS ASSESS DOC 0-1 FALLS W/OUT INJ PAST YR: ICD-10-PCS | Mod: CPTII,S$GLB,, | Performed by: PHYSICIAN ASSISTANT

## 2019-05-23 NOTE — PROGRESS NOTES
Subjective:       Patient ID: Buck Ibarra is a 73 y.o. female.    Chief Complaint: Follow-up (4 wk BP and Diabetes)    HPI     Established pt of Ericka Cortez MD (new to me)    Here for 4 week f/u of HTN and DM.     Pt was recently on steroids for suspected temporal arteritis. Her tresiba was increased from 22->28units, her hctz 25->50mg, candersartan 8mg->16mg while she was on steroids.     Completed steroids about 2 weeks ago.     Pt has resumed her prior med dosing    BG around 160s at home. Denies p/p/p    BP WNL, 124/86 today in clinic. Denies cp, ha, or vision changes.     She is without acute complaints today.     Review of patient's allergies indicates:   Allergen Reactions    Erythromycin Anaphylaxis    Erythropoietin analogues Anaphylaxis    Pegasys [peginterferon ruben-2a] Anaphylaxis    Lisinopril Hives     Past Medical History:   Diagnosis Date    Allergy     Cataract     Diabetes mellitus type II     Gastritis     with gastric ulcer    GERD (gastroesophageal reflux disease)     H. pylori infection     History of hepatitis C, SVR as of 2015    Harvoni 12 wks completed.  SVR(12) as of 16 SVR(24) as of 10-     Hyperlipidemia     Hypertension     Osteopenia     Type 2 diabetes mellitus with diabetic polyneuropathy      Social History     Tobacco Use    Smoking status: Current Every Day Smoker     Packs/day: 0.25     Years: 48.00     Pack years: 12.00     Last attempt to quit: 2016     Years since quittin.7    Smokeless tobacco: Never Used   Substance Use Topics    Alcohol use: No     Comment: special occasions    Drug use: No         Review of Systems   Constitutional: Negative for chills, fever and unexpected weight change.   Respiratory: Negative for cough and shortness of breath.    Cardiovascular: Negative for chest pain and leg swelling.   Gastrointestinal: Negative for abdominal pain, nausea and vomiting.   Skin: Negative for rash.   Neurological:  "Negative for weakness and headaches.       Objective: /86   Pulse 71   Ht 5' 5.5" (1.664 m)   Wt 92.4 kg (203 lb 11.3 oz)   SpO2 98%   BMI 33.38 kg/m²         Physical Exam   Constitutional: She appears well-developed and well-nourished. No distress.   HENT:   Head: Normocephalic and atraumatic.   Mouth/Throat: Oropharynx is clear and moist.   Cardiovascular: Normal rate and regular rhythm. Exam reveals no friction rub.   No murmur heard.  Pulmonary/Chest: Effort normal and breath sounds normal. She has no wheezes. She has no rales.   Abdominal: Soft. Bowel sounds are normal. There is no tenderness.   Neurological: She is alert.   Skin: Skin is warm and dry. Capillary refill takes less than 2 seconds. No rash noted.   Psychiatric: She has a normal mood and affect.   Vitals reviewed.      Assessment:       1. Type 2 diabetes mellitus with diabetic polyneuropathy, with long-term current use of insulin    2. Type 2 diabetes mellitus with hyperglycemia, with long-term current use of insulin    3. Benign essential hypertension        Plan:         Buck was seen today for follow-up.    Diagnoses and all orders for this visit:    Type 2 diabetes mellitus with diabetic polyneuropathy, with long-term current use of insulin  -Resume prior dose of Tresiba  -Continue to monitor BG daily  -BG goals discussed    dulaglutide (TRULICITY) 1.5 mg/0.5 mL PnIj, Inject 1.5 mg into the skin once weekly., Disp: 2 mL, Rfl: 6  , TAKE ONE TABLET BY MOUTH ONCE DAILY, Disp: 90 tablet, Rfl: 3    insulin degludec (TRESIBA FLEXTOUCH U-200)- 22units    metFORMIN (GLUCOPHAGE-XR) 500 MG 24   Type 2 diabetes mellitus with hyperglycemia, with long-term current use of insulin  Lab Results   Component Value Date    HGBA1C 7.4 (H) 04/20/2019         Benign essential hypertension  Well controlled  Continue current meds.     candesartan (ATACAND) 8 MG tablet    hydrochlorothiazide (HYDRODIURIL) 25 MG tablet          Lexy Wallace, " JOSE

## 2019-05-29 NOTE — ED NOTES
LOC: Pt is awake, alert, oriented x4. Affect is appropriate. Speech clear and appropriate.      Appearance: Pt resting comfortably in no acute distress. Pt clean and well groomed.     Skin: Skin warm and dry. Color consistent with ethnicity. Skin turgor normal. Mucus membranes moist. Skin intact. No breakdown or bruising noted.     Musculoskeletal: Pt moving upper and lower extremities without difficulty. Denies weakness.     Respiratory: Airway open and patent. Respirations spontaneous, even, easy, and unlabored. Pt has normal effort and rate. No accessory muscle use noted. Denies cough. Denies shortness of breath.     Cardiovascular: No peripheral edema noted. No complaints of chest pain. S1S2 present. Rate regular. Radial pulses 2+.     Abdominal: Abdomen soft and nontender. No distention noted. Denies n/v. Bowels sounds active x 4 quadrants.     Neurological: Eyes open spontaneously. Behavior appropriate to situation. Follows commands appropriately. Facial expression symmetrical. Purposeful motor response. Sensation intact.     Pt denies any complaints at this time. Was told to come to ed per clinic today for glucose over 500 per labs drawn at clinic.    Instructions (Optional): Will verify coverage with billing and plan for PDT in the fall Introduction Text (Please End With A Colon): The following procedure was deferred: Detail Level: Zone

## 2019-06-13 ENCOUNTER — TELEPHONE (OUTPATIENT)
Dept: RHEUMATOLOGY | Facility: CLINIC | Age: 74
End: 2019-06-13

## 2019-06-13 ENCOUNTER — LAB VISIT (OUTPATIENT)
Dept: LAB | Facility: HOSPITAL | Age: 74
End: 2019-06-13
Payer: MEDICARE

## 2019-06-13 DIAGNOSIS — R70.0 ELEVATED SED RATE: ICD-10-CM

## 2019-06-13 DIAGNOSIS — R51.9 TEMPORAL PAIN: ICD-10-CM

## 2019-06-13 LAB
CRP SERPL-MCNC: 1 MG/L (ref 0–8.2)
ERYTHROCYTE [SEDIMENTATION RATE] IN BLOOD BY WESTERGREN METHOD: 18 MM/HR (ref 0–36)

## 2019-06-13 PROCEDURE — 36415 COLL VENOUS BLD VENIPUNCTURE: CPT

## 2019-06-13 PROCEDURE — 85652 RBC SED RATE AUTOMATED: CPT

## 2019-06-13 PROCEDURE — 86140 C-REACTIVE PROTEIN: CPT

## 2019-06-13 NOTE — TELEPHONE ENCOUNTER
Called patient to go over results. Patient reports that she is no longer having headaches. No vision changes, scalp tenderness. Sed rate now normal at 18 and CRP normal. Advised to continue to monitor for recurrence of headaches. If experiencing any vision changes, advised to go to emergency room right away. Patient expressed understanding.

## 2019-06-20 DIAGNOSIS — R51.9 NONINTRACTABLE HEADACHE, UNSPECIFIED CHRONICITY PATTERN, UNSPECIFIED HEADACHE TYPE: Primary | ICD-10-CM

## 2019-06-27 ENCOUNTER — LAB VISIT (OUTPATIENT)
Dept: LAB | Facility: HOSPITAL | Age: 74
End: 2019-06-27
Payer: MEDICARE

## 2019-06-27 DIAGNOSIS — R51.9 NONINTRACTABLE HEADACHE, UNSPECIFIED CHRONICITY PATTERN, UNSPECIFIED HEADACHE TYPE: ICD-10-CM

## 2019-06-27 LAB
CRP SERPL-MCNC: 1.1 MG/L (ref 0–8.2)
ERYTHROCYTE [SEDIMENTATION RATE] IN BLOOD BY WESTERGREN METHOD: 22 MM/HR (ref 0–36)

## 2019-06-27 PROCEDURE — 85652 RBC SED RATE AUTOMATED: CPT

## 2019-06-27 PROCEDURE — 86140 C-REACTIVE PROTEIN: CPT

## 2019-06-27 PROCEDURE — 36415 COLL VENOUS BLD VENIPUNCTURE: CPT

## 2019-07-11 ENCOUNTER — LAB VISIT (OUTPATIENT)
Dept: LAB | Facility: HOSPITAL | Age: 74
End: 2019-07-11
Attending: INTERNAL MEDICINE
Payer: MEDICARE

## 2019-07-11 ENCOUNTER — TELEPHONE (OUTPATIENT)
Dept: RHEUMATOLOGY | Facility: CLINIC | Age: 74
End: 2019-07-11

## 2019-07-11 ENCOUNTER — TELEPHONE (OUTPATIENT)
Dept: INTERNAL MEDICINE | Facility: CLINIC | Age: 74
End: 2019-07-11

## 2019-07-11 DIAGNOSIS — Z12.31 ENCOUNTER FOR SCREENING MAMMOGRAM FOR BREAST CANCER: Primary | ICD-10-CM

## 2019-07-11 DIAGNOSIS — R51.9 TEMPORAL PAIN: ICD-10-CM

## 2019-07-11 DIAGNOSIS — R70.0 ELEVATED SED RATE: ICD-10-CM

## 2019-07-11 DIAGNOSIS — Z12.11 SPECIAL SCREENING FOR MALIGNANT NEOPLASMS, COLON: Primary | ICD-10-CM

## 2019-07-11 DIAGNOSIS — R51.9 NONINTRACTABLE HEADACHE, UNSPECIFIED CHRONICITY PATTERN, UNSPECIFIED HEADACHE TYPE: ICD-10-CM

## 2019-07-11 DIAGNOSIS — R70.0 ELEVATED SED RATE: Primary | ICD-10-CM

## 2019-07-11 LAB
CRP SERPL-MCNC: 0.6 MG/L (ref 0–8.2)
CRP SERPL-MCNC: 0.6 MG/L (ref 0–8.2)
ERYTHROCYTE [SEDIMENTATION RATE] IN BLOOD BY WESTERGREN METHOD: 40 MM/HR (ref 0–36)
ERYTHROCYTE [SEDIMENTATION RATE] IN BLOOD BY WESTERGREN METHOD: 40 MM/HR (ref 0–36)

## 2019-07-11 PROCEDURE — 36415 COLL VENOUS BLD VENIPUNCTURE: CPT

## 2019-07-11 PROCEDURE — 85652 RBC SED RATE AUTOMATED: CPT

## 2019-07-11 PROCEDURE — 86140 C-REACTIVE PROTEIN: CPT

## 2019-07-11 RX ORDER — POLYETHYLENE GLYCOL 3350, SODIUM SULFATE ANHYDROUS, SODIUM BICARBONATE, SODIUM CHLORIDE, POTASSIUM CHLORIDE 236; 22.74; 6.74; 5.86; 2.97 G/4L; G/4L; G/4L; G/4L; G/4L
4 POWDER, FOR SOLUTION ORAL ONCE
Qty: 4000 ML | Refills: 0 | Status: SHIPPED | OUTPATIENT
Start: 2019-07-11 | End: 2019-07-19

## 2019-07-11 NOTE — TELEPHONE ENCOUNTER
Please tell pt the sed rate inflammation test is mildly elevated, higher than previous, but crp is normal. Please ask pt if any: fever, headache, jaw pain, scalp tenderness and let me know. Please also had spep, sife, and quant Igs to labs. Thanks ISAURA

## 2019-07-11 NOTE — TELEPHONE ENCOUNTER
----- Message from Melinda Wilson sent at 7/11/2019  9:19 AM CDT -----  Contact: Self  She is needing to schedule her mammogram. Please place the order in the system for this and notify the pt once this has been done.

## 2019-07-14 ENCOUNTER — NURSE TRIAGE (OUTPATIENT)
Dept: ADMINISTRATIVE | Facility: CLINIC | Age: 74
End: 2019-07-14

## 2019-07-14 NOTE — TELEPHONE ENCOUNTER
Reason for Disposition   [1] Mild-moderate nosebleed AND [2] bleeding stopped now    Protocols used: NOSEBLEED-A-AH

## 2019-07-18 ENCOUNTER — LAB VISIT (OUTPATIENT)
Dept: LAB | Facility: HOSPITAL | Age: 74
End: 2019-07-18
Attending: INTERNAL MEDICINE
Payer: MEDICARE

## 2019-07-18 ENCOUNTER — TELEPHONE (OUTPATIENT)
Dept: RHEUMATOLOGY | Facility: CLINIC | Age: 74
End: 2019-07-18

## 2019-07-18 DIAGNOSIS — R70.0 ELEVATED SED RATE: ICD-10-CM

## 2019-07-18 LAB
IGA SERPL-MCNC: 276 MG/DL (ref 40–350)
IGG SERPL-MCNC: 951 MG/DL (ref 650–1600)
IGM SERPL-MCNC: 47 MG/DL (ref 50–300)

## 2019-07-18 PROCEDURE — 84165 PATHOLOGIST INTERPRETATION SPE: ICD-10-PCS | Mod: 26,,, | Performed by: PATHOLOGY

## 2019-07-18 PROCEDURE — 36415 COLL VENOUS BLD VENIPUNCTURE: CPT

## 2019-07-18 PROCEDURE — 84165 PROTEIN E-PHORESIS SERUM: CPT

## 2019-07-18 PROCEDURE — 84165 PROTEIN E-PHORESIS SERUM: CPT | Mod: 26,,, | Performed by: PATHOLOGY

## 2019-07-18 PROCEDURE — 86334 IMMUNOFIX E-PHORESIS SERUM: CPT

## 2019-07-18 PROCEDURE — 86334 PATHOLOGIST INTERPRETATION IFE: ICD-10-PCS | Mod: 26,,, | Performed by: PATHOLOGY

## 2019-07-18 PROCEDURE — 82784 ASSAY IGA/IGD/IGG/IGM EACH: CPT | Mod: 59

## 2019-07-18 PROCEDURE — 86334 IMMUNOFIX E-PHORESIS SERUM: CPT | Mod: 26,,, | Performed by: PATHOLOGY

## 2019-07-18 NOTE — TELEPHONE ENCOUNTER
7/11/19: Please tell pt the sed rate inflammation test is mildly elevated, higher than previous, but crp is normal. Please ask pt if any: fever, headache, jaw pain, scalp tenderness and let me know. Please also had spep, sife, and quant Igs to labs. Thanks ISAURA    Mary did not hear back about her symptoms. Let me know. She did get labs. Thanks. ISAURA

## 2019-07-19 LAB
ALBUMIN SERPL ELPH-MCNC: 3.21 G/DL (ref 3.35–5.55)
ALPHA1 GLOB SERPL ELPH-MCNC: 0.35 G/DL (ref 0.17–0.41)
ALPHA2 GLOB SERPL ELPH-MCNC: 1.14 G/DL (ref 0.43–0.99)
B-GLOBULIN SERPL ELPH-MCNC: 0.77 G/DL (ref 0.5–1.1)
GAMMA GLOB SERPL ELPH-MCNC: 0.93 G/DL (ref 0.67–1.58)
INTERPRETATION SERPL IFE-IMP: NORMAL
PATHOLOGIST INTERPRETATION IFE: NORMAL
PATHOLOGIST INTERPRETATION SPE: NORMAL
PROT SERPL-MCNC: 6.4 G/DL (ref 6–8.4)

## 2019-07-22 ENCOUNTER — TELEPHONE (OUTPATIENT)
Dept: RHEUMATOLOGY | Facility: CLINIC | Age: 74
End: 2019-07-22

## 2019-08-08 ENCOUNTER — HOSPITAL ENCOUNTER (OUTPATIENT)
Facility: HOSPITAL | Age: 74
Discharge: HOME | End: 2019-08-08
Attending: COLON & RECTAL SURGERY | Admitting: COLON & RECTAL SURGERY
Payer: MEDICARE

## 2019-08-08 ENCOUNTER — TELEPHONE (OUTPATIENT)
Dept: SURGERY | Facility: CLINIC | Age: 74
End: 2019-08-08

## 2019-08-08 ENCOUNTER — ANESTHESIA (OUTPATIENT)
Dept: ENDOSCOPY | Facility: HOSPITAL | Age: 74
End: 2019-08-08
Payer: MEDICARE

## 2019-08-08 ENCOUNTER — ANESTHESIA EVENT (OUTPATIENT)
Dept: ENDOSCOPY | Facility: HOSPITAL | Age: 74
End: 2019-08-08
Payer: MEDICARE

## 2019-08-08 VITALS
TEMPERATURE: 98 F | WEIGHT: 202 LBS | DIASTOLIC BLOOD PRESSURE: 61 MMHG | BODY MASS INDEX: 32.47 KG/M2 | RESPIRATION RATE: 15 BRPM | SYSTOLIC BLOOD PRESSURE: 131 MMHG | HEART RATE: 63 BPM | OXYGEN SATURATION: 99 % | HEIGHT: 66 IN

## 2019-08-08 DIAGNOSIS — Z12.11 SCREENING FOR MALIGNANT NEOPLASM OF COLON: Primary | ICD-10-CM

## 2019-08-08 LAB
GLUCOSE SERPL-MCNC: 292 MG/DL (ref 70–110)
POCT GLUCOSE: 292 MG/DL (ref 70–110)

## 2019-08-08 PROCEDURE — 37000009 HC ANESTHESIA EA ADD 15 MINS: Performed by: COLON & RECTAL SURGERY

## 2019-08-08 PROCEDURE — 45380 COLONOSCOPY AND BIOPSY: CPT | Performed by: COLON & RECTAL SURGERY

## 2019-08-08 PROCEDURE — 27201012 HC FORCEPS, HOT/COLD, DISP: Performed by: COLON & RECTAL SURGERY

## 2019-08-08 PROCEDURE — E9220 PRA ENDO ANESTHESIA: HCPCS | Mod: PT,,, | Performed by: NURSE ANESTHETIST, CERTIFIED REGISTERED

## 2019-08-08 PROCEDURE — 88305 TISSUE EXAM BY PATHOLOGIST: CPT | Performed by: PATHOLOGY

## 2019-08-08 PROCEDURE — 88305 TISSUE SPECIMEN TO PATHOLOGY - SURGERY: ICD-10-PCS | Mod: 26,,, | Performed by: PATHOLOGY

## 2019-08-08 PROCEDURE — 63600175 PHARM REV CODE 636 W HCPCS: Performed by: NURSE ANESTHETIST, CERTIFIED REGISTERED

## 2019-08-08 PROCEDURE — 37000008 HC ANESTHESIA 1ST 15 MINUTES: Performed by: COLON & RECTAL SURGERY

## 2019-08-08 PROCEDURE — 63600175 PHARM REV CODE 636 W HCPCS: Performed by: COLON & RECTAL SURGERY

## 2019-08-08 PROCEDURE — 45380 PR COLONOSCOPY,BIOPSY: ICD-10-PCS | Mod: PT,,, | Performed by: COLON & RECTAL SURGERY

## 2019-08-08 PROCEDURE — 25000003 PHARM REV CODE 250: Performed by: ANESTHESIOLOGY

## 2019-08-08 PROCEDURE — 45380 COLONOSCOPY AND BIOPSY: CPT | Mod: PT,,, | Performed by: COLON & RECTAL SURGERY

## 2019-08-08 PROCEDURE — E9220 PRA ENDO ANESTHESIA: ICD-10-PCS | Mod: PT,,, | Performed by: NURSE ANESTHETIST, CERTIFIED REGISTERED

## 2019-08-08 PROCEDURE — 63600175 PHARM REV CODE 636 W HCPCS: Performed by: ANESTHESIOLOGY

## 2019-08-08 RX ORDER — PROPOFOL 10 MG/ML
VIAL (ML) INTRAVENOUS
Status: DISCONTINUED | OUTPATIENT
Start: 2019-08-08 | End: 2019-08-08

## 2019-08-08 RX ORDER — SODIUM CHLORIDE 9 MG/ML
INJECTION, SOLUTION INTRAVENOUS CONTINUOUS
Status: DISCONTINUED | OUTPATIENT
Start: 2019-08-08 | End: 2019-08-08 | Stop reason: HOSPADM

## 2019-08-08 RX ORDER — HYDRALAZINE HYDROCHLORIDE 20 MG/ML
10 INJECTION INTRAMUSCULAR; INTRAVENOUS ONCE
Status: COMPLETED | OUTPATIENT
Start: 2019-08-08 | End: 2019-08-08

## 2019-08-08 RX ORDER — PROPOFOL 10 MG/ML
VIAL (ML) INTRAVENOUS CONTINUOUS PRN
Status: DISCONTINUED | OUTPATIENT
Start: 2019-08-08 | End: 2019-08-08

## 2019-08-08 RX ORDER — CANDESARTAN 8 MG/1
8 TABLET ORAL ONCE
Status: COMPLETED | OUTPATIENT
Start: 2019-08-08 | End: 2019-08-08

## 2019-08-08 RX ORDER — LABETALOL HYDROCHLORIDE 5 MG/ML
20 INJECTION, SOLUTION INTRAVENOUS EVERY 10 MIN PRN
Status: DISCONTINUED | OUTPATIENT
Start: 2019-08-08 | End: 2019-08-08 | Stop reason: HOSPADM

## 2019-08-08 RX ADMIN — LABETALOL HYDROCHLORIDE 20 MG: 5 INJECTION INTRAVENOUS at 08:08

## 2019-08-08 RX ADMIN — SODIUM CHLORIDE: 0.9 INJECTION, SOLUTION INTRAVENOUS at 09:08

## 2019-08-08 RX ADMIN — HYDRALAZINE HYDROCHLORIDE 10 MG: 20 INJECTION INTRAMUSCULAR; INTRAVENOUS at 08:08

## 2019-08-08 RX ADMIN — PROPOFOL 150 MCG/KG/MIN: 10 INJECTION, EMULSION INTRAVENOUS at 09:08

## 2019-08-08 RX ADMIN — PROPOFOL 70 MG: 10 INJECTION, EMULSION INTRAVENOUS at 09:08

## 2019-08-08 RX ADMIN — CANDESARTAN CILEXETIL 8 MG: 8 TABLET ORAL at 10:08

## 2019-08-08 NOTE — ANESTHESIA POSTPROCEDURE EVALUATION
Anesthesia Post Evaluation    Patient: Buck Ibarra    Procedure(s) Performed: Procedure(s) (LRB):  COLONOSCOPY (N/A)    Final Anesthesia Type: general  Patient location during evaluation: PACU  Patient participation: Yes- Able to Participate  Level of consciousness: awake and alert and oriented  Post-procedure vital signs: reviewed and stable  Pain management: adequate  Airway patency: patent  PONV status at discharge: No PONV  Anesthetic complications: no      Cardiovascular status: stable  Respiratory status: unassisted, spontaneous ventilation and room air  Hydration status: euvolemic  Follow-up not needed.          Vitals Value Taken Time   /61 8/8/2019 10:50 AM   Temp 36.7 °C (98 °F) 8/8/2019 10:30 AM   Pulse 63 8/8/2019 10:50 AM   Resp 15 8/8/2019 10:50 AM   SpO2 99 % 8/8/2019 10:50 AM         Event Time     Out of Recovery 10:51:45          Pain/Sung Score: Sung Score: 9 (8/8/2019 10:50 AM)

## 2019-08-08 NOTE — TELEPHONE ENCOUNTER
----- Message from Eva Griffin sent at 8/8/2019  3:34 PM CDT -----  Contact: pt   Needs Advice    Reason for call: pt called stated she had a colonoscopy today, she want to know if she could continue taking her regular medication for her sugar        Communication Preference: [phone]    Additional Information:

## 2019-08-08 NOTE — PLAN OF CARE
Patient's blood pressure 240/105. Anesthesia notified. Will move patient back to admit to be treated. Anesthesia monitoring.

## 2019-08-08 NOTE — [employer] PATIENT INSTRUCTIONS
Discharge Summary/Instructions after an Endoscopic Procedure  Patient Name: Buck Ibarra  Patient MRN: [id number]  Patient YOB: 1945 Thursday, August 08, 2019  Susan Dia MD  RESTRICTIONS:  During your procedure today, you received medications for sedation.  These   medications may affect your judgment, balance and coordination.  Therefore,   for 24 hours, you have the following restrictions:   - DO NOT drive a car, operate machinery, make legal/financial decisions,   sign important papers or drink alcohol.    ACTIVITY:  Today: no heavy lifting, straining or running due to procedural   sedation/anesthesia.  The following day: return to full activity including work.  DIET:  Eat and drink normally unless instructed otherwise.     TREATMENT FOR COMMON SIDE EFFECTS:  - Mild abdominal pain, nausea, belching, bloating or excessive gas:  rest,   eat lightly and use a heating pad.  - Sore Throat: treat with throat lozenges and/or gargle with warm salt   water.  - Because air was used during the procedure, expelling large amounts of air   from your rectum or belching is normal.  - If a bowel prep was taken, you may not have a bowel movement for 1-3 days.    This is normal.  SYMPTOMS TO WATCH FOR AND REPORT TO YOUR PHYSICIAN:  1. Abdominal pain or bloating, other than gas cramps.  2. Chest pain.  3. Back pain.  4. Signs of infection such as: chills or fever occurring within 24 hours   after the procedure.  5. Rectal bleeding, which would show as bright red, maroon, or black stools.   (A tablespoon of blood from the rectum is not serious, especially if   hemorrhoids are present.)  6. Vomiting.  7. Weakness or dizziness.  GO DIRECTLY TO THE NEAREST EMERGENCY ROOM IF YOU HAVE ANY OF THE FOLLOWING:      Difficulty breathing              Chills and/or fever over 101 F   Persistent vomiting and/or vomiting blood   Severe abdominal pain   Severe chest pain   Black, tarry stools   Bleeding- more than one  tablespoon   Any other symptom or condition that you feel may need urgent attention  Your doctor recommends these additional instructions:  If any biopsies were taken, your doctors clinic will contact you in 1 to 2   weeks with any results.  - Discharge patient to home.   - Resume previous diet.   - Continue present medications.   - Await pathology results.   - Repeat colonoscopy in 5 years for surveillance.   - Return to referring physician.  For questions, problems or results please call your physician - Susan Dia MD at Work:  [phone].  [facility], EMERGENCY ROOM PHONE NUMBER: [phone]  IF A COMPLICATION OR EMERGENCY SITUATION ARISES AND YOU ARE UNABLE TO REACH   YOUR PHYSICIAN - GO DIRECTLY TO THE EMERGENCY ROOM.  Susan Dia MD  8/8/2019 10:31:58 AM  This report has been verified and signed electronically.  [employer]

## 2019-08-08 NOTE — TRANSFER OF CARE
"Anesthesia Transfer of Care Note    Patient: Buck Ibarra    Procedure(s) Performed: Procedure(s) (LRB):  COLONOSCOPY (N/A)    Patient location: PACU    Anesthesia Type: general    Transport from OR: Transported from OR on 2-3 L/min O2 by NC with adequate spontaneous ventilation    Post pain: adequate analgesia    Post assessment: no apparent anesthetic complications and tolerated procedure well    Post vital signs: stable    Level of consciousness: awake    Nausea/Vomiting: no nausea/vomiting    Complications: none    Transfer of care protocol was followed      Last vitals:   Visit Vitals  BP (!) 110/53 (BP Location: Left arm, Patient Position: Lying)   Pulse 66   Temp 36.7 °C (98 °F) (Temporal)   Resp 16   Ht 5' 6" (1.676 m)   Wt 91.6 kg (202 lb)   SpO2 98%   Breastfeeding? No   BMI 32.60 kg/m²     "

## 2019-08-08 NOTE — TELEPHONE ENCOUNTER
Spoke with patient. Informed her per D/C instructions -resume normal diet and regular medications.

## 2019-08-08 NOTE — DISCHARGE INSTRUCTIONS
Colonoscopy     A camera attached to a flexible tube with a viewing lens is used to take video pictures.     Colonoscopy is a test to view the inside of your lower digestive tract (colon and rectum). Sometimes it can show the last part of the small intestine (ileum). During the test, small pieces of tissue may be removed for testing. This is called a biopsy. Small growths, such as polyps, may also be removed.   Why is colonoscopy done?  The test is done to help look for colon cancer. And it can help find the source of abdominal pain, bleeding, and changes in bowel habits. It may be needed once a year, depending on factors such as your:  · Age  · Health history  · Family health history  · Symptoms  · Results from any prior colonoscopy  Risks and possible complications  These include:  · Bleeding               · A puncture or tear in the colon   · Risks of anesthesia  · A cancer lesion not being seen  Getting ready   To prepare for the test:  · Talk with your healthcare provider about the risks of the test (see below). Also ask your healthcare provider about alternatives to the test.  · Tell your healthcare provider about any medicines you take. Also tell him or her about any health conditions you may have.  · Make sure your rectum and colon are empty for the test. Follow the diet and bowel prep instructions exactly. If you dont, the test may need to be rescheduled.  · Plan for a friend or family member to drive you home after the test.     Colonoscopy provides an inside view of the entire colon.     You may discuss the results with your doctor right away or at a future visit.  During the test   The test is usually done in the hospital on an outpatient basis. This means you go home the same day. The procedure takes about 30 minutes. During that time:  · You are given relaxing (sedating) medicine through an IV line. You may be drowsy, or fully asleep.  · The healthcare provider will first give you a physical exam to  check for anal and rectal problems.  · Then the anus is lubricated and the scope inserted.  · If you are awake, you may have a feeling similar to needing to have a bowel movement. You may also feel pressure as air is pumped into the colon. Its OK to pass gas during the procedure.  · Biopsy, polyp removal, or other treatments may be done during the test.  After the test   You may have gas right after the test. It can help to try to pass it to help prevent later bloating. Your healthcare provider may discuss the results with you right away. Or you may need to schedule a follow-up visit to talk about the results. After the test, you can go back to your normal eating and other activities. You may be tired from the sedation and need to rest for a few hours.  Date Last Reviewed: 11/1/2016 © 2000-2017 [employer]. [address] [place] [address], [place] [address]. All rights reserved. This information is not intended as a substitute for professional medical care. Always follow your healthcare professional's instructions.

## 2019-08-08 NOTE — ANESTHESIA PREPROCEDURE EVALUATION
08/08/2019  Buck Ibarra is a 73 y.o., female.      Pre-operative evaluation for Procedure(s) (LRB):  COLONOSCOPY (N/A)    Encounter Diagnosis   Name Primary?    Screening for malignant neoplasm of colon Yes       Review of patient's allergies indicates:   Allergen Reactions    Erythromycin Anaphylaxis    Erythropoietin analogues Anaphylaxis    Pegasys [peginterferon ruben-2a] Anaphylaxis    Lisinopril Hives       No current facility-administered medications on file prior to encounter.      Current Outpatient Medications on File Prior to Encounter   Medication Sig Dispense Refill    aspirin (ECOTRIN) 81 MG EC tablet Take 1 tablet (81 mg total) by mouth once daily. 30 tablet 3    atorvastatin (LIPITOR) 80 MG tablet Take 1 tablet (80 mg total) by mouth once daily. DOSE CHANGE 90 tablet 3    candesartan (ATACAND) 16 MG tablet Take 1 tablet (16 mg total) by mouth once daily. 90 tablet 3    gabapentin (NEURONTIN) 300 MG capsule TAKE ONE CAPSULE BY MOUTH EVERY EVENING 90 capsule 3    hydrochlorothiazide (HYDRODIURIL) 25 MG tablet TAKE ONE TABLET BY MOUTH ONCE DAILY 90 tablet 3    metFORMIN (GLUCOPHAGE-XR) 500 MG 24 hr tablet Take 1 tablet (500 mg total) by mouth 2 (two) times daily with meals. 180 tablet 2    albuterol (PROVENTIL/VENTOLIN HFA) 90 mcg/actuation inhaler Inhale 1-2 puffs into the lungs every 6 (six) hours as needed for Wheezing. Rescue 18 g 0    ALPRAZolam (XANAX) 0.5 MG tablet Take 1 tablet (0.5 mg total) by mouth once as needed for Anxiety. Take one 0.5 mg tablet of xanax 1h before the procedure.  You may take a second tablet right before the procedure; please check with our nurse. 2 tablet 0    blood sugar diagnostic Strp To check BG 3 times daily, to use with insurance preferred meter 300 each 3    blood-glucose meter kit To check BG 3 times daily, to use with insurance  "preferred meter 1 each 0    ciclopirox (PENLAC) 8 % Soln Apply topically nightly. 6.6 mL 11    clotrimazole-betamethasone 1-0.05% (LOTRISONE) cream Apply topically 2 (two) times daily. 45 g 1    esomeprazole (NEXIUM) 40 MG capsule Take one capsule by mouth daily as needed for acid reflux 30 capsule 11    fluocinonide (LIDEX) 0.05 % external solution AAA scalp qd prn pruritus 60 mL 1    insulin degludec (TRESIBA FLEXTOUCH U-200) 200 unit/mL (3 mL) InPn Inject 28 units nightly. 15 mL 11    ketoconazole (NIZORAL) 2 % shampoo Wash hair with medicated shampoo at least 2x/week - let sit on scalp at least 5 minutes prior to rinsing 120 mL 5    lancets 33 gauge Misc To check BG 3 times daily, to use with insurance preferred meter 300 each 3    pen needle, diabetic (BD ULTRA-FINE MILAN PEN NEEDLES) 32 gauge x 5/32" Ndle Uses 1 time daily, on insulin injections 90 each 4    traZODone (DESYREL) 50 MG tablet Take 1 to 2 tablets ( mg total) by mouth every evening. 60 tablet 3       Social History     Tobacco Use   Smoking Status Current Every Day Smoker    Packs/day: 0.25    Years: 48.00    Pack years: 12.00    Last attempt to quit: 2016    Years since quittin.9   Smokeless Tobacco Never Used       Social History     Substance and Sexual Activity   Alcohol Use No    Comment: special occasions       Patient Active Problem List   Diagnosis    Tobacco use    Insomnia    Benign essential hypertension    Type 2 diabetes mellitus with hyperglycemia, with long-term current use of insulin    Osteopenia    Gastroesophageal reflux disease with esophagitis    History of hepatitis C, SVR as of      Bilateral carotid artery disease    Hyperlipidemia LDL goal <70    Multinodular goiter    CKD stage 2 due to type 2 diabetes mellitus    Type 2 diabetes mellitus with diabetic polyneuropathy, with long-term current use of insulin    Obesity (BMI 30-39.9)    Aortic atherosclerosis    Chest wall " tenderness    Acute nonintractable headache    Hypertensive urgency    GCA (giant cell arteritis)    Blood glucose elevated    Screening for malignant neoplasm of colon       Past Surgical History:   Procedure Laterality Date    COLONOSCOPY      COLONOSCOPY N/A 1/28/2016    [facility]    HYSTERECTOMY      LIVER BIOPSY      OOPHORECTOMY      UPPER GASTROINTESTINAL ENDOSCOPY             No results for input(s): HCT in the last 72 hours.  No results for input(s): PLT in the last 72 hours.  No results for input(s): K in the last 72 hours.  No results for input(s): CREATININE in the last 72 hours.  No results for input(s): GLU in the last 72 hours.  No results for input(s): PT in the last 72 hours.                    Past Medical History:   Diagnosis Date    Allergy     Cataract     Diabetes mellitus type II     Gastritis     with gastric ulcer    GERD (gastroesophageal reflux disease)     H. pylori infection     History of hepatitis C, SVR as of 8-2016 8/25/2015    Harvoni 12 wks completed.  SVR(12) as of 8/4/16 SVR(24) as of 10-     Hyperlipidemia     Hypertension     Osteopenia     Type 2 diabetes mellitus with diabetic polyneuropathy          Anesthesia Evaluation    I have reviewed the Patient Summary Reports.     I have reviewed the Medications.     Review of Systems  Anesthesia Hx:  Family Hx of Anesthesia complications:  Personal Hx of Anesthesia complications   Cardiovascular:   Hypertension, well controlled    Renal/:   Chronic Renal Disease    Hepatic/GI:   GERD, well controlled    Neurological:   Neuromuscular Disease, Headaches    Endocrine:   Diabetes, well controlled        Physical Exam  General:  Well nourished    Airway/Jaw/Neck:  Airway Findings: Mouth Opening: Normal Tongue: Normal  General Airway Assessment: Adult  Mallampati: II  TM Distance: Normal, at least 6 cm      Dental:  Dental Findings: Upper Dentures, Lower Dentures    Chest/Lungs:  Chest/Lungs Findings: Normal Respiratory Rate     Heart/Vascular:  Heart Findings: Rate: Normal  Sounds: Normal     Abdomen:  Abdomen Findings:  Normal     Musculoskeletal:  Musculoskeletal Findings:    Skin:  Skin Findings:     Mental Status:  Mental Status Findings:  Cooperative, Alert and Oriented         Anesthesia Plan  Type of Anesthesia, risks & benefits discussed:  Anesthesia Type:  general  Patient's Preference: general  Intra-op Monitoring Plan:   Intra-op Monitoring Plan Comments:   Post Op Pain Control Plan:   Post Op Pain Control Plan Comments:   Induction:   IV  Beta Blocker:  Patient is not currently on a Beta-Blocker (No further documentation required).       Informed Consent: Patient understands risks and agrees with Anesthesia plan.  Questions answered. Anesthesia consent signed with patient.  ASA Score: 2     Day of Surgery Review of History & Physical: I have interviewed and examined the patient. I have reviewed the patient's H&P dated:  There are no significant changes.      Anesthesia Plan Notes: She has not taken her blood pressure medicines for 2 days, the duration of the bowel prep.  Her systolic today is 240.  We will treat her to get her BP lower, then proceed if we can get it well controlled.         Ready For Surgery From Anesthesia Perspective.

## 2019-08-08 NOTE — H&P
COLONOSCOPY HISTORY AND PHYSICAL EXAM    Procedure : Colonoscopy      INDICATIONS: family history of colon cancer (sister, 70s) and personal history of colon polyps    Family Hx of CRC: Yes, sister in 70s    Last Colonoscopy:    Findings: Polyps       Past Medical History:   Diagnosis Date    Allergy     Cataract     Diabetes mellitus type II     Gastritis     with gastric ulcer    GERD (gastroesophageal reflux disease)     H. pylori infection     History of hepatitis C, SVR as of 2015    Harvoni 12 wks completed.  SVR(12) as of 16 SVR(24) as of 10-     Hyperlipidemia     Hypertension     Osteopenia     Type 2 diabetes mellitus with diabetic polyneuropathy      Sedation Problems: NO  Family History   Problem Relation Age of Onset    Heart disease Mother     Diabetes Mother     Heart disease Father     Cancer Sister         breast cancer    Diabetes Sister     Cancer Sister 70        colon CA    Diabetes Sister     Breast cancer Sister     Diabetes Sister     Glaucoma Neg Hx     Melanoma Neg Hx     Cirrhosis Neg Hx     Psoriasis Neg Hx     Lupus Neg Hx      Fam Hx of Sedation Problems: NO  Social History     Socioeconomic History    Marital status: Single     Spouse name: Not on file    Number of children: Not on file    Years of education: Not on file    Highest education level: Not on file   Occupational History    Not on file   Social Needs    Financial resource strain: Not on file    Food insecurity:     Worry: Not on file     Inability: Not on file    Transportation needs:     Medical: Not on file     Non-medical: Not on file   Tobacco Use    Smoking status: Current Every Day Smoker     Packs/day: 0.25     Years: 48.00     Pack years: 12.00     Last attempt to quit: 2016     Years since quittin.9    Smokeless tobacco: Never Used   Substance and Sexual Activity    Alcohol use: No     Comment: special occasions    Drug use: No    Sexual  "activity: Yes     Partners: Male   Lifestyle    Physical activity:     Days per week: Not on file     Minutes per session: Not on file    Stress: Not on file   Relationships    Social connections:     Talks on phone: Not on file     Gets together: Not on file     Attends Rastafarian service: Not on file     Active member of club or organization: Not on file     Attends meetings of clubs or organizations: Not on file     Relationship status: Not on file   Other Topics Concern    Are you pregnant or think you may be? No    Breast-feeding No   Social History Narrative    Lives with daughter and grandson. No assistance with ADLs and still driving.       Review of Systems - Negative except   Respiratory ROS: no dyspnea  Cardiovascular ROS: no exertional chest pain  Gastrointestinal ROS: NO abdominal discomfort,  NO rectal bleeding  Musculoskeletal ROS: no muscular pain  Neurological ROS: no recent stroke    Physical Exam:  Pulse 68   Temp 97.3 °F (36.3 °C) (Temporal)   Resp 17   Ht 5' 6" (1.676 m)   Wt 91.6 kg (202 lb)   SpO2 100%   Breastfeeding? No   BMI 32.60 kg/m²   General: no distress  Head: normocephalic  Mallampati Score   Neck: supple, symmetrical, trachea midline  Lungs:  clear to auscultation bilaterally and normal respiratory effort  Heart: regular rate and rhythm and no murmur  Abdomen: soft, non-tender non-distented; bowel sounds normal; no masses,  no organomegaly  Extremities: no cyanosis or edema, or clubbing    ASA:  II    PLAN  COLONOSCOPY.    SedationPlan :MAC    The details of the procedure, the possible need for biopsy or polypectomy and the potential risks including bleeding, perforation, missed polyps were discussed in detail.    ** ADDENDUM**  Found to be hypertensive with systolic  in endoscopy  Discussed options with patient and anesthesia, not safe to proceed with those vitals  Will treat and see if we can get to safe range for procedure  If not, will need to " neo Dia

## 2019-08-15 ENCOUNTER — TELEPHONE (OUTPATIENT)
Dept: ENDOSCOPY | Facility: HOSPITAL | Age: 74
End: 2019-08-15

## 2019-08-15 ENCOUNTER — HOSPITAL ENCOUNTER (OUTPATIENT)
Dept: RADIOLOGY | Facility: HOSPITAL | Age: 74
Discharge: HOME | End: 2019-08-15
Attending: INTERNAL MEDICINE
Payer: MEDICARE

## 2019-08-15 DIAGNOSIS — Z12.31 ENCOUNTER FOR SCREENING MAMMOGRAM FOR BREAST CANCER: ICD-10-CM

## 2019-08-15 PROCEDURE — 77063 MAMMO DIGITAL SCREENING BILAT WITH TOMOSYNTHESIS_CAD: ICD-10-PCS | Mod: 26,,, | Performed by: RADIOLOGY

## 2019-08-15 PROCEDURE — 77063 BREAST TOMOSYNTHESIS BI: CPT | Mod: 26,,, | Performed by: RADIOLOGY

## 2019-08-15 PROCEDURE — 77067 MAMMO DIGITAL SCREENING BILAT WITH TOMOSYNTHESIS_CAD: ICD-10-PCS | Mod: 26,,, | Performed by: RADIOLOGY

## 2019-08-15 PROCEDURE — 77067 SCR MAMMO BI INCL CAD: CPT | Mod: 26,,, | Performed by: RADIOLOGY

## 2019-08-15 PROCEDURE — 77067 SCR MAMMO BI INCL CAD: CPT | Mod: TC

## 2019-08-16 ENCOUNTER — TELEPHONE (OUTPATIENT)
Dept: INTERNAL MEDICINE | Facility: CLINIC | Age: 74
End: 2019-08-16

## 2019-08-16 NOTE — TELEPHONE ENCOUNTER
Called and spoke to pt and let her know that her ramsey was normal  Pt verbalized understanding of this.

## 2019-08-29 ENCOUNTER — HOSPITAL ENCOUNTER (EMERGENCY)
Facility: HOSPITAL | Age: 74
Discharge: HOME | End: 2019-08-29
Attending: EMERGENCY MEDICINE
Payer: MEDICARE

## 2019-08-29 VITALS
BODY MASS INDEX: 34.07 KG/M2 | OXYGEN SATURATION: 99 % | RESPIRATION RATE: 16 BRPM | TEMPERATURE: 98 F | WEIGHT: 212 LBS | HEART RATE: 78 BPM | SYSTOLIC BLOOD PRESSURE: 140 MMHG | HEIGHT: 66 IN | DIASTOLIC BLOOD PRESSURE: 65 MMHG

## 2019-08-29 DIAGNOSIS — S63.502A SPRAIN OF LEFT WRIST, INITIAL ENCOUNTER: Primary | ICD-10-CM

## 2019-08-29 LAB
BUN SERPL-MCNC: 22 MG/DL (ref 6–30)
CHLORIDE SERPL-SCNC: 102 MMOL/L (ref 95–110)
CREAT SERPL-MCNC: 1.1 MG/DL (ref 0.5–1.4)
GLUCOSE SERPL-MCNC: 186 MG/DL (ref 70–110)
HCT VFR BLD CALC: 44 %PCV (ref 36–54)
POC IONIZED CALCIUM: 1.16 MMOL/L (ref 1.06–1.42)
POC TCO2 (MEASURED): 23 MMOL/L (ref 23–29)
POTASSIUM BLD-SCNC: 4.3 MMOL/L (ref 3.5–5.1)
SAMPLE: ABNORMAL
SODIUM BLD-SCNC: 139 MMOL/L (ref 136–145)

## 2019-08-29 PROCEDURE — 99283 EMERGENCY DEPT VISIT LOW MDM: CPT | Mod: 25

## 2019-08-29 PROCEDURE — 99284 EMERGENCY DEPT VISIT MOD MDM: CPT | Mod: ,,, | Performed by: EMERGENCY MEDICINE

## 2019-08-29 PROCEDURE — 99284 PR EMERGENCY DEPT VISIT,LEVEL IV: ICD-10-PCS | Mod: ,,, | Performed by: EMERGENCY MEDICINE

## 2019-08-29 PROCEDURE — 29125 APPL SHORT ARM SPLINT STATIC: CPT | Mod: LT

## 2019-08-29 RX ORDER — NAPROXEN SODIUM 275 MG/1
275 TABLET ORAL EVERY 12 HOURS PRN
Qty: 20 TABLET | Refills: 0 | Status: ON HOLD | OUTPATIENT
Start: 2019-08-29 | End: 2021-05-06

## 2019-08-29 NOTE — ED PROVIDER NOTES
Encounter Date: 8/29/2019    SCRIBE #1 NOTE: I, Meagan Etienne, am scribing for, and in the presence of,  Dr. White. I have scribed the entire note.       History     Chief Complaint   Patient presents with    Hand Pain     right. denies specific injury.      The patient is a 73 y.o. female presents to the ED with a chief complaint of right hand pain since yesterday. Patient reports she did picked up something heavy that may have sprained her finger. Pain worsens with touch. She denies any recent injuries.         The history is provided by the patient and medical records.     Review of patient's allergies indicates:   Allergen Reactions    Erythromycin Anaphylaxis    Erythropoietin analogues Anaphylaxis    Pegasys [peginterferon ruben-2a] Anaphylaxis    Lisinopril Hives     Past Medical History:   Diagnosis Date    Allergy     Cataract     Diabetes mellitus type II     Gastritis     with gastric ulcer    GERD (gastroesophageal reflux disease)     H. pylori infection     History of hepatitis C, SVR as of 8-2016 8/25/2015    Harvoni 12 wks completed.  SVR(12) as of 8/4/16 SVR(24) as of 10-     Hyperlipidemia     Hypertension     Osteopenia     Type 2 diabetes mellitus with diabetic polyneuropathy      Past Surgical History:   Procedure Laterality Date    COLONOSCOPY      COLONOSCOPY N/A 8/8/2019    Performed by Susan Dia MD at [facility] ([facility])    COLONOSCOPY N/A 1/28/2016    Performed by Emeka Ahn MD at [facility] (4TH FLR)    HYSTERECTOMY      LIVER BIOPSY      OOPHORECTOMY      UPPER GASTROINTESTINAL ENDOSCOPY       Family History   Problem Relation Age of Onset    Heart disease Mother     Diabetes Mother     Heart disease Father     Cancer Sister         breast cancer    Diabetes Sister     Cancer Sister 70        colon CA    Diabetes Sister     Breast cancer Sister     Diabetes Sister     Glaucoma Neg Hx     Melanoma Neg Hx     Cirrhosis Neg Hx      Psoriasis Neg Hx     Lupus Neg Hx      Social History     Tobacco Use    Smoking status: Current Every Day Smoker     Packs/day: 0.25     Years: 48.00     Pack years: 12.00     Last attempt to quit: 2016     Years since quittin.9    Smokeless tobacco: Never Used   Substance Use Topics    Alcohol use: No     Comment: special occasions    Drug use: No     Review of Systems  General: No fever.  No chills.  Eyes: No visual changes.  Head: No headache.    Integument: No rashes or lesions.  Chest: No shortness of breath.  Cardiovascular: No chest pain.  Abdomen: No abdominal pain.  No nausea or vomiting.  Urinary: No abnormal urination.  Neurologic: No focal weakness.  No numbness.  Hematologic: No easy bruising.  Endocrine: No excessive thirst or urination.  Musc: +Right hand pain  Physical Exam     Initial Vitals [19 0937]   BP Pulse Resp Temp SpO2   (!) 140/65 78 16 97.5 °F (36.4 °C) 99 %      MAP       --         Physical Exam    Nursing note and vitals reviewed.    Appearance: No acute distress.  Skin: No rashes seen.  Good turgor.  No abrasions.  No ecchymoses.  Eyes: No conjunctival injection.  ENT: Oropharynx clear.    Chest: Clear to auscultation bilaterally.  Good air movement.  No wheezes.  No rhonchi.  Cardiovascular: Regular rate and rhythm.  No murmurs. No gallops. No rubs.  Abdomen: Soft.  Not distended.  Nontender.  No guarding.  No rebound.  Musculoskeletal: Good range of motion all joints.  No deformities.  Neck supple.  No meningismus.  Neurologic: Motor intact.  Sensation intact.  Cerebellar intact.  Cranial nerves intact.  Mental Status:  Alert and oriented x 3.  Appropriate, conversant.      ED Course   Procedures  Labs Reviewed   ISTAT PROCEDURE - Abnormal; Notable for the following components:       Result Value    POC Glucose 186 (*)     All other components within normal limits   ISTAT CHEM8          Imaging Results    None          Medical Decision Making:   History:   Old  Medical Records: I decided to obtain old medical records.  Initial Assessment:   Right wrist pain on radial aspect, hurts with extension of the thumb.  No trauma/fall.  Tender over the snuffbox but no pain with axial loading of the thumb.  No signs of infection.  I think this is a wrist sprain.  No indication for xray as there is no trauma and exam is not consistent with fracture.      Creatinine is 1.1, will discharge with naproxen and have her follow up with PCP.  Wrist splint for comfort and affected tendon rest.      Clinical Tests:   Lab Tests: Ordered and Reviewed            Scribe Attestation:   Scribe #1: I performed the above scribed service and the documentation accurately describes the services I performed. I attest to the accuracy of the note.               Clinical Impression:       ICD-10-CM ICD-9-CM   1. Sprain of left wrist, initial encounter S63.502A 842.00         Disposition:   Disposition: Discharged  Condition: Stable                        Wayne White MD  08/29/19 [id number]

## 2019-08-29 NOTE — ED NOTES
Patient identifiers verified and correct for Buck Ibarra  LOC: The patient is awake, alert and aware of environment with an appropriate affect, the patient is oriented x 3 and speaking appropriately.   APPEARANCE: Patient appears comfortable and in no acute distress, patient is clean and well groomed.  SKIN: The skin is warm and dry, color consistent with ethnicity, patient has normal skin turgor and moist mucus membranes, skin intact, no breakdown or bruising noted.   MUSCULOSKELETAL: Patient moving all extremities spontaneously, swelling and pain noted to the right hand x1 week.   RESPIRATORY: Airway is open and patent, respirations are spontaneous, patient has a normal effort and rate, no accessory muscle use noted, pt placed on continuous pulse ox with O2 sats noted at 97% on room air.  CARDIAC: Pt placed on cardiac monitor. Patient has a normal rate and regular rhythm, no edema noted, capillary refill < 3 seconds.   GASTRO: Soft and non tender to palpation, no distention noted, normoactive bowel sounds present in all four quadrants. Pt states bowel movements have been regular.  : Pt denies any pain or frequency with urination.  NEURO: Pt opens eyes spontaneously, behavior appropriate to situation, follows commands, facial expression symmetrical, bilateral hand grasp equal and even, purposeful motor response noted, normal sensation in all extremities when touched with a finger.

## 2019-11-05 ENCOUNTER — IMMUNIZATION (OUTPATIENT)
Dept: PHARMACY | Facility: CLINIC | Age: 74
End: 2019-11-05
Payer: MEDICARE

## 2019-11-14 ENCOUNTER — HOSPITAL ENCOUNTER (OUTPATIENT)
Dept: RADIOLOGY | Facility: HOSPITAL | Age: 74
Discharge: HOME | End: 2019-11-14
Attending: NURSE PRACTITIONER
Payer: MEDICARE

## 2019-11-14 DIAGNOSIS — R16.0 LIVER MASS: ICD-10-CM

## 2019-11-14 PROCEDURE — 76705 ECHO EXAM OF ABDOMEN: CPT | Mod: 26,,, | Performed by: RADIOLOGY

## 2019-11-14 PROCEDURE — 76705 ECHO EXAM OF ABDOMEN: CPT | Mod: TC

## 2019-11-14 PROCEDURE — 76705 US ABDOMEN LIMITED: ICD-10-PCS | Mod: 26,,, | Performed by: RADIOLOGY

## 2019-11-15 ENCOUNTER — TELEPHONE (OUTPATIENT)
Dept: HEPATOLOGY | Facility: CLINIC | Age: 74
End: 2019-11-15

## 2019-11-15 DIAGNOSIS — K76.9 LIVER LESION: Primary | ICD-10-CM

## 2019-11-15 NOTE — TELEPHONE ENCOUNTER
MA scheduled the pt f/u with MRI and labs. 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 I also mailed the reminder

## 2019-11-15 NOTE — TELEPHONE ENCOUNTER
Please notify pt Us noted that liver lesions remain and still indeterminate. Would recommend MRI to better characterize the lesion and see if it can determine what it is better so can determine if can stop imaging in the future    Please schedule MRI, then f/u visit with me 2 weeks after MRI done   Also please schedule labs (creatinine and hepatic function panel) a couple of days before MRI (need kidney function lab before MRI)

## 2019-12-05 ENCOUNTER — LAB VISIT (OUTPATIENT)
Dept: LAB | Facility: HOSPITAL | Age: 74
End: 2019-12-05
Payer: MEDICARE

## 2019-12-05 DIAGNOSIS — K76.9 LIVER LESION: ICD-10-CM

## 2019-12-05 LAB
ALBUMIN SERPL BCP-MCNC: 3.3 G/DL (ref 3.5–5.2)
ALP SERPL-CCNC: 259 U/L (ref 55–135)
ALT SERPL W/O P-5'-P-CCNC: 30 U/L (ref 10–44)
AST SERPL-CCNC: 17 U/L (ref 10–40)
BILIRUB DIRECT SERPL-MCNC: 0.1 MG/DL (ref 0.1–0.3)
BILIRUB SERPL-MCNC: 0.2 MG/DL (ref 0.1–1)
CREAT SERPL-MCNC: 1.2 MG/DL (ref 0.5–1.4)
EST. GFR  (AFRICAN AMERICAN): 52 ML/MIN/1.73 M^2
EST. GFR  (NON AFRICAN AMERICAN): 45 ML/MIN/1.73 M^2
PROT SERPL-MCNC: 6.7 G/DL (ref 6–8.4)

## 2019-12-05 PROCEDURE — 80076 HEPATIC FUNCTION PANEL: CPT

## 2019-12-05 PROCEDURE — 36415 COLL VENOUS BLD VENIPUNCTURE: CPT

## 2019-12-05 PROCEDURE — 82565 ASSAY OF CREATININE: CPT

## 2019-12-12 ENCOUNTER — HOSPITAL ENCOUNTER (OUTPATIENT)
Dept: RADIOLOGY | Facility: HOSPITAL | Age: 74
Discharge: HOME | End: 2019-12-12
Attending: NURSE PRACTITIONER
Payer: MEDICARE

## 2019-12-12 DIAGNOSIS — K76.9 LIVER LESION: ICD-10-CM

## 2019-12-12 PROCEDURE — 74183 MRI ABD W/O CNTR FLWD CNTR: CPT | Mod: TC

## 2019-12-12 PROCEDURE — 74183 MRI ABDOMEN W WO CONTRAST: ICD-10-PCS | Mod: 26,,, | Performed by: RADIOLOGY

## 2019-12-12 PROCEDURE — 74183 MRI ABD W/O CNTR FLWD CNTR: CPT | Mod: 26,,, | Performed by: RADIOLOGY

## 2019-12-12 PROCEDURE — A9585 GADOBUTROL INJECTION: HCPCS | Performed by: NURSE PRACTITIONER

## 2019-12-12 PROCEDURE — 25500020 PHARM REV CODE 255: Performed by: NURSE PRACTITIONER

## 2019-12-12 RX ORDER — GADOBUTROL 604.72 MG/ML
10 INJECTION INTRAVENOUS
Status: COMPLETED | OUTPATIENT
Start: 2019-12-12 | End: 2019-12-12

## 2019-12-12 RX ADMIN — GADOBUTROL 10 ML: 604.72 INJECTION INTRAVENOUS at 07:12

## 2019-12-13 ENCOUNTER — TELEPHONE (OUTPATIENT)
Dept: HEPATOLOGY | Facility: CLINIC | Age: 74
End: 2019-12-13

## 2019-12-14 NOTE — TELEPHONE ENCOUNTER
Patient: Buck bIarra       MRN: [id number]      : 1945     Age: 74 y.o.  [address] [facility] [address]    Provider: ERNIE - Ella Zavala NP (prevoius pt of Ella's, has not established with other provider yet)    Urgency of review: non-urgent    Patient Transplant Status: Other hepatology pt     Reason for presentation: Reassessment    Clinical Summary: 74 year old female, followed in hepatology clinic for liver lesion    H/o Hep C, treatment with cure in 2016. Fibroscan in  without fibrosis    MRI done 2019 notes Simple cyst and Additional subcentimeter lesion in the right lobe demonstrates no enhancement and based upon sonographic appearance may represent complex cyst or sclerosed hemangioma.     Last AFP was in 2018 and was WNL [id number]      Imaging to be reviewed: MRI 2019, previous US 2018    HCC Treatment History: n/a    ABO:     Platelets:   Lab Results   Component Value Date/Time     2019 10:22 AM     Creatinine:   Lab Results   Component Value Date/Time    CREATININE 1.2 2019 07:08 AM     Bilirubin:   Lab Results   Component Value Date/Time    BILITOT 0.2 2019 07:08 AM     AFP Last 3 each if available:   Lab Results   Component Value Date/Time    AFP 1.7 2018 08:15 AM    AFP 1.7 03/15/2018 10:13 AM    AFP 1.9 2017 07:00 AM       MELD: Computed MELD-Na score unavailable. Necessary lab results were not found in the last year.  Computed MELD score unavailable. Necessary lab results were not found in the last year.    Plan:     Follow-up Provider:   ADRIANA Catherine NP

## 2019-12-19 ENCOUNTER — OFFICE VISIT (OUTPATIENT)
Dept: INTERNAL MEDICINE | Facility: CLINIC | Age: 74
End: 2019-12-19
Payer: MEDICARE

## 2019-12-19 ENCOUNTER — LAB VISIT (OUTPATIENT)
Dept: LAB | Facility: HOSPITAL | Age: 74
End: 2019-12-19
Attending: INTERNAL MEDICINE
Payer: MEDICARE

## 2019-12-19 VITALS
HEIGHT: 66 IN | SYSTOLIC BLOOD PRESSURE: 165 MMHG | BODY MASS INDEX: 31.89 KG/M2 | DIASTOLIC BLOOD PRESSURE: 85 MMHG | WEIGHT: 198.44 LBS

## 2019-12-19 DIAGNOSIS — R42 DIZZINESS: ICD-10-CM

## 2019-12-19 DIAGNOSIS — R53.83 FATIGUE, UNSPECIFIED TYPE: ICD-10-CM

## 2019-12-19 DIAGNOSIS — E11.65 TYPE 2 DIABETES MELLITUS WITH HYPERGLYCEMIA, WITH LONG-TERM CURRENT USE OF INSULIN: ICD-10-CM

## 2019-12-19 DIAGNOSIS — E55.9 VITAMIN D DEFICIENCY: ICD-10-CM

## 2019-12-19 DIAGNOSIS — Z79.4 TYPE 2 DIABETES MELLITUS WITH HYPERGLYCEMIA, WITH LONG-TERM CURRENT USE OF INSULIN: ICD-10-CM

## 2019-12-19 DIAGNOSIS — I10 BENIGN ESSENTIAL HYPERTENSION: ICD-10-CM

## 2019-12-19 DIAGNOSIS — R53.83 FATIGUE, UNSPECIFIED TYPE: Primary | ICD-10-CM

## 2019-12-19 LAB
25(OH)D3+25(OH)D2 SERPL-MCNC: 29 NG/ML (ref 30–96)
BASOPHILS # BLD AUTO: 0.05 K/UL (ref 0–0.2)
BASOPHILS NFR BLD: 0.5 % (ref 0–1.9)
DIFFERENTIAL METHOD: ABNORMAL
EOSINOPHIL # BLD AUTO: 0.1 K/UL (ref 0–0.5)
EOSINOPHIL NFR BLD: 0.9 % (ref 0–8)
ERYTHROCYTE [DISTWIDTH] IN BLOOD BY AUTOMATED COUNT: 14.6 % (ref 11.5–14.5)
HCT VFR BLD AUTO: 44.3 % (ref 37–48.5)
HGB BLD-MCNC: 14.2 G/DL (ref 12–16)
IRON SERPL-MCNC: 64 UG/DL (ref 30–160)
LYMPHOCYTES # BLD AUTO: 3.5 K/UL (ref 1–4.8)
LYMPHOCYTES NFR BLD: 38.4 % (ref 18–48)
MCH RBC QN AUTO: 28.1 PG (ref 27–31)
MCHC RBC AUTO-ENTMCNC: 32.1 G/DL (ref 32–36)
MCV RBC AUTO: 88 FL (ref 82–98)
MONOCYTES # BLD AUTO: 0.4 K/UL (ref 0.3–1)
MONOCYTES NFR BLD: 4.1 % (ref 4–15)
NEUTROPHILS # BLD AUTO: 5.1 K/UL (ref 1.8–7.7)
NEUTROPHILS NFR BLD: 56.1 % (ref 38–73)
PLATELET # BLD AUTO: 339 K/UL (ref 150–350)
PMV BLD AUTO: 10.8 FL (ref 9.2–12.9)
RBC # BLD AUTO: 5.05 M/UL (ref 4–5.4)
SATURATED IRON: 19 % (ref 20–50)
T3FREE SERPL-MCNC: 2.9 PG/ML (ref 2.3–4.2)
T4 FREE SERPL-MCNC: 1.23 NG/DL (ref 0.71–1.51)
TOTAL IRON BINDING CAPACITY: 343 UG/DL (ref 250–450)
TRANSFERRIN SERPL-MCNC: 232 MG/DL (ref 200–375)
TSH SERPL DL<=0.005 MIU/L-ACNC: 1.62 UIU/ML (ref 0.4–4)
WBC # BLD AUTO: 9.16 K/UL (ref 3.9–12.7)

## 2019-12-19 PROCEDURE — 85025 COMPLETE CBC W/AUTO DIFF WBC: CPT

## 2019-12-19 PROCEDURE — 84481 FREE ASSAY (FT-3): CPT

## 2019-12-19 PROCEDURE — 99499 UNLISTED E&M SERVICE: CPT | Mod: S$GLB,,, | Performed by: NURSE PRACTITIONER

## 2019-12-19 PROCEDURE — 36415 COLL VENOUS BLD VENIPUNCTURE: CPT

## 2019-12-19 PROCEDURE — 99999 PR PBB SHADOW E&M-EST. PATIENT-LVL IV: ICD-10-PCS | Mod: PBBFAC,,, | Performed by: NURSE PRACTITIONER

## 2019-12-19 PROCEDURE — 1159F PR MEDICATION LIST DOCUMENTED IN MEDICAL RECORD: ICD-10-PCS | Mod: S$GLB,,, | Performed by: NURSE PRACTITIONER

## 2019-12-19 PROCEDURE — 82306 VITAMIN D 25 HYDROXY: CPT

## 2019-12-19 PROCEDURE — 3079F PR MOST RECENT DIASTOLIC BLOOD PRESSURE 80-89 MM HG: ICD-10-PCS | Mod: CPTII,S$GLB,, | Performed by: NURSE PRACTITIONER

## 2019-12-19 PROCEDURE — 82607 VITAMIN B-12: CPT

## 2019-12-19 PROCEDURE — 1159F MED LIST DOCD IN RCRD: CPT | Mod: S$GLB,,, | Performed by: NURSE PRACTITIONER

## 2019-12-19 PROCEDURE — 99214 PR OFFICE/OUTPT VISIT, EST, LEVL IV, 30-39 MIN: ICD-10-PCS | Mod: S$GLB,,, | Performed by: NURSE PRACTITIONER

## 2019-12-19 PROCEDURE — 84443 ASSAY THYROID STIM HORMONE: CPT

## 2019-12-19 PROCEDURE — 3079F DIAST BP 80-89 MM HG: CPT | Mod: CPTII,S$GLB,, | Performed by: NURSE PRACTITIONER

## 2019-12-19 PROCEDURE — 3077F SYST BP >= 140 MM HG: CPT | Mod: CPTII,S$GLB,, | Performed by: NURSE PRACTITIONER

## 2019-12-19 PROCEDURE — 1101F PR PT FALLS ASSESS DOC 0-1 FALLS W/OUT INJ PAST YR: ICD-10-PCS | Mod: CPTII,S$GLB,, | Performed by: NURSE PRACTITIONER

## 2019-12-19 PROCEDURE — 99499 RISK ADDL DX/OHS AUDIT: ICD-10-PCS | Mod: S$GLB,,, | Performed by: NURSE PRACTITIONER

## 2019-12-19 PROCEDURE — 84439 ASSAY OF FREE THYROXINE: CPT

## 2019-12-19 PROCEDURE — 3077F PR MOST RECENT SYSTOLIC BLOOD PRESSURE >= 140 MM HG: ICD-10-PCS | Mod: CPTII,S$GLB,, | Performed by: NURSE PRACTITIONER

## 2019-12-19 PROCEDURE — 83540 ASSAY OF IRON: CPT

## 2019-12-19 PROCEDURE — 99214 OFFICE O/P EST MOD 30 MIN: CPT | Mod: S$GLB,,, | Performed by: NURSE PRACTITIONER

## 2019-12-19 PROCEDURE — 1126F AMNT PAIN NOTED NONE PRSNT: CPT | Mod: S$GLB,,, | Performed by: NURSE PRACTITIONER

## 2019-12-19 PROCEDURE — 99999 PR PBB SHADOW E&M-EST. PATIENT-LVL IV: CPT | Mod: PBBFAC,,, | Performed by: NURSE PRACTITIONER

## 2019-12-19 PROCEDURE — 1126F PR PAIN SEVERITY QUANTIFIED, NO PAIN PRESENT: ICD-10-PCS | Mod: S$GLB,,, | Performed by: NURSE PRACTITIONER

## 2019-12-19 PROCEDURE — 1101F PT FALLS ASSESS-DOCD LE1/YR: CPT | Mod: CPTII,S$GLB,, | Performed by: NURSE PRACTITIONER

## 2019-12-19 RX ORDER — CANDESARTAN 32 MG/1
32 TABLET ORAL DAILY
Qty: 90 TABLET | Refills: 3 | Status: SHIPPED | OUTPATIENT
Start: 2019-12-19 | End: 2021-10-04 | Stop reason: SDUPTHER

## 2019-12-19 NOTE — PROGRESS NOTES
INTERNAL MEDICINE URGENT CARE NOTE    CHIEF COMPLAINT     Chief Complaint   Patient presents with    Fatigue    Dizziness    Generalized Body Aches       HPI     Buck Ibarra is a 74 y.o. female with headaches, HLD, HTN, Carotid artery disease, hep C, GCA, DM2, tobacco use and insomnia who presents for an urgent visit today.    Here with c/o dizziness and fatigue x 2 weeks. States daily intermittent feelings of being off balanced and dizziness in head. Feels like the room is spinning. No pre-syncope   HTN- taking candsartan 16 and HCTZ 25mg.   Not checking BP at home   Elevated today     1 month ago flu symptoms - denies coughing wheezing or sob.     Past Medical History:  Past Medical History:   Diagnosis Date    Allergy     Cataract     Diabetes mellitus type II     Gastritis     with gastric ulcer    GERD (gastroesophageal reflux disease)     H. pylori infection     History of hepatitis C, SVR as of 8-2016 8/25/2015    Harvoni 12 wks completed.  SVR(12) as of 8/4/16 SVR(24) as of 10-     Hyperlipidemia     Hypertension     Osteopenia     Type 2 diabetes mellitus with diabetic polyneuropathy        Home Medications:  Prior to Admission medications    Medication Sig Start Date End Date Taking? Authorizing Provider   albuterol (PROVENTIL/VENTOLIN HFA) 90 mcg/actuation inhaler Inhale 1-2 puffs into the lungs every 6 (six) hours as needed for Wheezing. Rescue 1/1/19   Sebastian Connors MD   ALPRAZolam (XANAX) 0.5 MG tablet Take 1 tablet (0.5 mg total) by mouth once as needed for Anxiety. Take one 0.5 mg tablet of xanax 1h before the procedure.  You may take a second tablet right before the procedure; please check with our nurse. 4/25/19 4/27/19  Mark Feldman MD   aspirin (ECOTRIN) 81 MG EC tablet Take 1 tablet (81 mg total) by mouth once daily. 2/3/17 8/8/19  Ericka Cortez MD   atorvastatin (LIPITOR) 80 MG tablet Take 1 tablet (80 mg total) by mouth once daily. DOSE CHANGE 1/24/19    Ericka Cortez MD   blood sugar diagnostic Strp To check BG 3 times daily, to use with insurance preferred meter 4/4/19   Ericka Cortez MD   blood-glucose meter kit To check BG 3 times daily, to use with insurance preferred meter 1/31/19   ARNOLD Sawyer, FNP   candesartan (ATACAND) 16 MG tablet Take 1 tablet (16 mg total) by mouth once daily. 4/25/19 4/24/20  Ericka Cortez MD   ciclopirox (PENLAC) 8 % Soln Apply topically nightly. 8/9/18   Kyle Terrazas DPM   clotrimazole-betamethasone 1-0.05% (LOTRISONE) cream Apply topically 2 (two) times daily. 2/3/17   Ericka Cortez MD   dulaglutide (TRULICITY) 1.5 mg/0.5 mL PnIj Inject 1.5 mg into the skin once weekly. 5/13/19   ARNOLD Sawyer, FNP   ergocalciferol (VITAMIN D2) 50,000 unit Cap Take 1 capsule (50,000 Units total) by mouth every 7 days. 5/9/19   Samy Hanley MD   esomeprazole (NEXIUM) 40 MG capsule Take one capsule by mouth daily as needed for acid reflux 3/28/19 3/27/20  Ericka Cortez MD   fluocinonide (LIDEX) 0.05 % external solution AAA scalp qd prn pruritus 2/3/17   Ericka Cortez MD   gabapentin (NEURONTIN) 300 MG capsule TAKE ONE CAPSULE BY MOUTH EVERY EVENING 4/13/18   Ericka Cortez MD   hydrochlorothiazide (HYDRODIURIL) 25 MG tablet TAKE ONE TABLET BY MOUTH ONCE DAILY 8/3/17   Ericka Cortez MD   hydroCHLOROthiazide (HYDRODIURIL) 25 MG tablet TAKE ONE TABLET BY MOUTH EVERY DAY 5/13/19 5/12/20  Ericka Cortez MD   insulin degludec (TRESIBA FLEXTOUCH U-200) 200 unit/mL (3 mL) InPn Inject 28 units nightly. 4/25/19   Ericka Cortez MD   ketoconazole (NIZORAL) 2 % shampoo Wash hair with medicated shampoo at least 2x/week - let sit on scalp at least 5 minutes prior to rinsing 11/10/16   Shasta Medley NP   lancets 33 gauge Misc To check BG 3 times daily, to use with insurance preferred meter 4/18/19   Ericka Cortez MD   metFORMIN (GLUCOPHAGE-XR) 500 MG 24 hr tablet Take 1 tablet (500 mg total) by mouth 2 (two) times daily with meals. 12/20/18   ARNOLD Sawyer, FNP   naproxen sodium  "(ANAPROX) 275 MG tablet Take 1 tablet (275 mg total) by mouth every 12 (twelve) hours as needed (pain). 8/29/19   Wayne White MD   pen needle, diabetic (BD ULTRA-FINE MILAN PEN NEEDLES) 32 gauge x 5/32" Ndle Uses 1 time daily, on insulin injections 12/7/17   Treva Catherine, KEYLA   traZODone (DESYREL) 50 MG tablet Take 1 to 2 tablets ( mg total) by mouth every evening. 2/8/19 2/8/20  Ericka Cortez MD       Review of Systems:  Review of Systems   HENT: Positive for postnasal drip. Negative for congestion, rhinorrhea, sinus pressure, sinus pain, sneezing and sore throat.    Eyes: Negative for visual disturbance.   Respiratory: Negative for cough, shortness of breath and wheezing.    Cardiovascular: Negative for chest pain, palpitations and leg swelling.   Gastrointestinal: Positive for diarrhea (1 episode this week ). Negative for abdominal pain, constipation, nausea and vomiting.   Genitourinary: Negative.    Skin: Negative for rash.   Neurological: Positive for dizziness and headaches (intermitent across the frontal region ). Negative for syncope and light-headedness.       Health Maintainence:   Immunizations:  Health Maintenance       Date Due Completion Date    Shingles Vaccine (2 of 3) 09/08/2016 7/14/2016    Foot Exam 08/09/2019 8/9/2018 (Done)    Override on 8/9/2018: Done    Override on 3/15/2018: Done    Override on 1/21/2016: Done    Hemoglobin A1c 08/23/2019 5/23/2019    Lipid Panel 01/24/2020 1/24/2019    Eye Exam 04/20/2020 4/20/2019    Urine Microalbumin 05/09/2020 5/9/2019    High Dose Statin 08/08/2020 8/8/2019    DEXA SCAN 06/21/2021 6/21/2018    Mammogram 08/15/2021 8/15/2019    Colonoscopy 08/08/2022 8/8/2019    TETANUS VACCINE 07/22/2025 7/22/2015           PHYSICAL EXAM     BP (!) 165/85   Ht 5' 6" (1.676 m)   Wt 90 kg (198 lb 6.6 oz)   BMI 32.02 kg/m²     Physical Exam   Constitutional: She is oriented to person, place, and time. She appears well-developed and well-nourished.   ANALILIA: "   Head: Normocephalic.   Right Ear: External ear normal.   Left Ear: External ear normal.   Nose: Nose normal.   Mouth/Throat: Oropharynx is clear and moist. No oropharyngeal exudate.   Eyes: Pupils are equal, round, and reactive to light.   Neck: Neck supple. No JVD present. No tracheal deviation present. No thyromegaly present.   Cardiovascular: Normal rate, regular rhythm, normal heart sounds and intact distal pulses. Exam reveals no gallop and no friction rub.   No murmur heard.  Pulmonary/Chest: Effort normal and breath sounds normal. No respiratory distress. She has no wheezes. She has no rales.   Abdominal: Soft. Bowel sounds are normal. She exhibits no distension. There is no tenderness.   Musculoskeletal: Normal range of motion. She exhibits no edema or tenderness.   Lymphadenopathy:     She has no cervical adenopathy.   Neurological: She is alert and oriented to person, place, and time. She displays normal reflexes. No cranial nerve deficit (III-XII grossl y intact ) or sensory deficit. She exhibits normal muscle tone.   Skin: Skin is warm and dry. No rash noted.   Psychiatric: She has a normal mood and affect. Her behavior is normal.   Vitals reviewed.      LABS     Lab Results   Component Value Date    HGBA1C 7.4 (H) 04/20/2019     CMP  Sodium   Date Value Ref Range Status   05/09/2019 132 (L) 136 - 145 mmol/L Final     Potassium   Date Value Ref Range Status   05/09/2019 5.2 (H) 3.5 - 5.1 mmol/L Final     Chloride   Date Value Ref Range Status   05/09/2019 94 (L) 95 - 110 mmol/L Final     CO2   Date Value Ref Range Status   05/09/2019 29 23 - 29 mmol/L Final     Glucose   Date Value Ref Range Status   05/09/2019 514 (HH) 70 - 110 mg/dL Final     Comment:     *Critical value -   Results called to and read back by:PINA MOORE RN       BUN, Bld   Date Value Ref Range Status   05/09/2019 25 (H) 8 - 23 mg/dL Final     Creatinine   Date Value Ref Range Status   12/05/2019 1.2 0.5 - 1.4 mg/dL Final      Calcium   Date Value Ref Range Status   05/09/2019 10.5 8.7 - 10.5 mg/dL Final     Total Protein   Date Value Ref Range Status   12/05/2019 6.7 6.0 - 8.4 g/dL Final     Albumin   Date Value Ref Range Status   12/05/2019 3.3 (L) 3.5 - 5.2 g/dL Final     Total Bilirubin   Date Value Ref Range Status   12/05/2019 0.2 0.1 - 1.0 mg/dL Final     Comment:     For infants and newborns, interpretation of results should be based  on gestational age, weight and in agreement with clinical  observations.  Premature Infant recommended reference ranges:  Up to 24 hours.............<8.0 mg/dL  Up to 48 hours............<12.0 mg/dL  3-5 days..................<15.0 mg/dL  6-29 days.................<15.0 mg/dL       Alkaline Phosphatase   Date Value Ref Range Status   12/05/2019 259 (H) 55 - 135 U/L Final     AST   Date Value Ref Range Status   12/05/2019 17 10 - 40 U/L Final     ALT   Date Value Ref Range Status   12/05/2019 30 10 - 44 U/L Final     Anion Gap   Date Value Ref Range Status   05/09/2019 9 8 - 16 mmol/L Final     eGFR if    Date Value Ref Range Status   12/05/2019 52 (A) >60 mL/min/1.73 m^2 Final     eGFR if non    Date Value Ref Range Status   12/05/2019 45 (A) >60 mL/min/1.73 m^2 Final     Comment:     Calculation used to obtain the estimated glomerular filtration  rate (eGFR) is the CKD-EPI equation.        Lab Results   Component Value Date    WBC 9.16 12/19/2019    HGB 14.2 12/19/2019    HCT 44.3 12/19/2019    MCV 88 12/19/2019     12/19/2019     Lab Results   Component Value Date    CHOL 180 01/24/2019    CHOL 118 (L) 03/15/2018    CHOL 149 12/04/2017     Lab Results   Component Value Date    HDL 48 01/24/2019    HDL 35 (L) 03/15/2018    HDL 40 12/04/2017     Lab Results   Component Value Date    LDLCALC 110.2 01/24/2019    LDLCALC 65.4 03/15/2018    LDLCALC 88.4 12/04/2017     Lab Results   Component Value Date    TRIG 109 01/24/2019    TRIG 88 03/15/2018    TRIG 103  12/04/2017     Lab Results   Component Value Date    CHOLHDL 26.7 01/24/2019    CHOLHDL 29.7 03/15/2018    CHOLHDL 26.8 12/04/2017     Lab Results   Component Value Date    TSH 1.620 12/19/2019       ASSESSMENT/PLAN     Buck Ibarra is a 74 y.o. female with  Past Medical History:   Diagnosis Date    Allergy     Cataract     Diabetes mellitus type II     Gastritis     with gastric ulcer    GERD (gastroesophageal reflux disease)     H. pylori infection     History of hepatitis C, SVR as of 8-2016 8/25/2015    Harvoni 12 wks completed.  SVR(12) as of 8/4/16 SVR(24) as of 10-     Hyperlipidemia     Hypertension     Osteopenia     Type 2 diabetes mellitus with diabetic polyneuropathy      Fatigue, unspecified type- labs today. Likely r/t poorly controlled BP see below   -     CBC auto differential; Future; Expected date: 12/19/2019  -     TSH; Future; Expected date: 12/19/2019  -     T4, free; Future; Expected date: 12/19/2019  -     T3, free; Future; Expected date: 12/19/2019  -     Iron and TIBC; Future; Expected date: 12/19/2019  -     Vitamin B12 Deficiency Panel; Future; Expected date: 12/19/2019    Dizziness- physical exam benign likely r/t elevated BP see below   -     CBC auto differential; Future; Expected date: 12/19/2019  -     TSH; Future; Expected date: 12/19/2019  -     T4, free; Future; Expected date: 12/19/2019  -     T3, free; Future; Expected date: 12/19/2019  -     Iron and TIBC; Future; Expected date: 12/19/2019  -     Vitamin B12 Deficiency Panel; Future; Expected date: 12/19/2019    Benign essential hypertension- poorly controlled. Will cont HCTZ and increase atacand to 32mg. RTC in 2 weeks for BP check   -     candesartan (ATACAND) 32 MG tablet; Take 1 tablet (32 mg total) by mouth once daily.  Dispense: 90 tablet; Refill: 3    Vitamin D deficiency  -     Vitamin D; Future; Expected date: 12/19/2019    Type 2 diabetes mellitus with hyperglycemia, with long-term current use  of insulin  -     Iron and TIBC; Future; Expected date: 12/19/2019          Follow up in 2 weeks for BP check     Patient education provided from [facility]. Patient was counseled on when and how to seek emergent care.       Rosemary FLORES, ARNOLD, FNP-c   Department of Internal Medicine - [facility]  7:42 AM

## 2019-12-20 LAB — VIT B12 SERPL-MCNC: 565 NG/L (ref 180–914)

## 2019-12-24 ENCOUNTER — CONFERENCE (OUTPATIENT)
Dept: TRANSPLANT | Facility: CLINIC | Age: 74
End: 2019-12-24

## 2019-12-24 ENCOUNTER — TELEPHONE (OUTPATIENT)
Dept: HEPATOLOGY | Facility: CLINIC | Age: 74
End: 2019-12-24

## 2019-12-24 DIAGNOSIS — K76.9 LIVER LESION: ICD-10-CM

## 2019-12-24 DIAGNOSIS — Z86.19 HISTORY OF HEPATITIS C: Primary | ICD-10-CM

## 2019-12-24 NOTE — TELEPHONE ENCOUNTER
Patient: Buck Ibarra       MRN: [id number]      : 1945     Age: 74 y.o.  [address] [facility] [address]    Provider: ERNIE - Ella Zavala NP (prevoius pt of Kristen, has not established with other provider yet)    Urgency of review: non-urgent    Patient Transplant Status: Other hepatology pt     Reason for presentation: Reassessment    Clinical Summary: 74 year old female, followed in hepatology clinic for liver lesion    H/o Hep C, treatment with cure in 2016. Fibroscan in  without fibrosis    MRI done 2019 notes Simple cyst and Additional subcentimeter lesion in the right lobe demonstrates no enhancement and based upon sonographic appearance may represent complex cyst or sclerosed hemangioma.     Last AFP was in 2018 and was WNL [id number]      Imaging to be reviewed: MRI 2019, previous US 2018    HCC Treatment History: n/a    ABO:     Platelets:   Lab Results   Component Value Date/Time     2019 08:22 AM     Creatinine:   Lab Results   Component Value Date/Time    CREATININE 1.2 2019 07:08 AM     Bilirubin:   Lab Results   Component Value Date/Time    BILITOT 0.2 2019 07:08 AM     AFP Last 3 each if available:   Lab Results   Component Value Date/Time    AFP 1.7 2018 08:15 AM    AFP 1.7 03/15/2018 10:13 AM    AFP 1.9 2017 07:00 AM       MELD: Computed MELD-Na score unavailable. Necessary lab results were not found in the last year.  Computed MELD score unavailable. Necessary lab results were not found in the last year.    Plan: MRI  with left hepatic lobe cyst and complex cyst/hemangioma in right. No enhnacing lesions or washout.     COMMITTEE DISCUSSION: Liver lesions consistent with hepatic cyst (left) and hemangioma (right).  Rec: repeat MR in 6 months to re-evaluate stability of the hemangioma.    Note forwarded to Treva Catherine Staff to notify patient and schedule follow-up.     Follow-up Provider:   ADRIANA Catherine NP

## 2019-12-24 NOTE — TELEPHONE ENCOUNTER
Called pt to discuss MRI results and IR conference recs noted below. Pt aware of IR recs and verbalized understanding, cancelled Jan f/u appt (pt aware) and will f/u in July instead with imaging and labs before    Plan: MRI 12/19 with left hepatic lobe cyst and complex cyst/hemangioma in right. No enhnacing lesions or washout.      COMMITTEE DISCUSSION: Liver lesions consistent with hepatic cyst (left) and hemangioma (right).  Rec: repeat MR in 6 months to re-evaluate stability of the hemangioma.        Pt aware of IR recs above. Please contact pt to schedule MRI in 6 months and labs same day (CMP, Hep C RNA) and then f/u with me 2 weeks after MRI and labs done

## 2019-12-27 ENCOUNTER — TELEPHONE (OUTPATIENT)
Dept: HEPATOLOGY | Facility: CLINIC | Age: 74
End: 2019-12-27

## 2019-12-27 NOTE — TELEPHONE ENCOUNTER
Attempted to contact patient to schedule labs, MRI, and follow up in 6 months but patient did not answer. Left patient a voice mail stating the purpose for the call and inform her that I will schedule the appointment and send reminder in the mail and If that time or date do not work with her she can contact the department back and we can get her scheduled.

## 2020-01-02 ENCOUNTER — OFFICE VISIT (OUTPATIENT)
Dept: INTERNAL MEDICINE | Facility: CLINIC | Age: 75
End: 2020-01-02
Payer: MEDICARE

## 2020-01-02 VITALS
SYSTOLIC BLOOD PRESSURE: 150 MMHG | DIASTOLIC BLOOD PRESSURE: 90 MMHG | HEIGHT: 66 IN | OXYGEN SATURATION: 99 % | WEIGHT: 203.5 LBS | HEART RATE: 74 BPM | BODY MASS INDEX: 32.71 KG/M2

## 2020-01-02 DIAGNOSIS — D75.839 THROMBOCYTOSIS: ICD-10-CM

## 2020-01-02 DIAGNOSIS — I10 BENIGN ESSENTIAL HYPERTENSION: Primary | ICD-10-CM

## 2020-01-02 DIAGNOSIS — E11.65 TYPE 2 DIABETES MELLITUS WITH HYPERGLYCEMIA, WITH LONG-TERM CURRENT USE OF INSULIN: ICD-10-CM

## 2020-01-02 DIAGNOSIS — Z79.4 TYPE 2 DIABETES MELLITUS WITH HYPERGLYCEMIA, WITH LONG-TERM CURRENT USE OF INSULIN: ICD-10-CM

## 2020-01-02 DIAGNOSIS — I70.0 AORTIC ATHEROSCLEROSIS: ICD-10-CM

## 2020-01-02 DIAGNOSIS — B18.2 CHRONIC HEPATITIS C WITHOUT HEPATIC COMA: ICD-10-CM

## 2020-01-02 DIAGNOSIS — M31.6 GCA (GIANT CELL ARTERITIS): ICD-10-CM

## 2020-01-02 DIAGNOSIS — I16.0 HYPERTENSIVE URGENCY: ICD-10-CM

## 2020-01-02 PROCEDURE — 99999 PR PBB SHADOW E&M-EST. PATIENT-LVL V: ICD-10-PCS | Mod: PBBFAC,,, | Performed by: NURSE PRACTITIONER

## 2020-01-02 PROCEDURE — 3080F PR MOST RECENT DIASTOLIC BLOOD PRESSURE >= 90 MM HG: ICD-10-PCS | Mod: CPTII,S$GLB,, | Performed by: NURSE PRACTITIONER

## 2020-01-02 PROCEDURE — 1126F AMNT PAIN NOTED NONE PRSNT: CPT | Mod: S$GLB,,, | Performed by: NURSE PRACTITIONER

## 2020-01-02 PROCEDURE — 1159F PR MEDICATION LIST DOCUMENTED IN MEDICAL RECORD: ICD-10-PCS | Mod: S$GLB,,, | Performed by: NURSE PRACTITIONER

## 2020-01-02 PROCEDURE — 3077F PR MOST RECENT SYSTOLIC BLOOD PRESSURE >= 140 MM HG: ICD-10-PCS | Mod: CPTII,S$GLB,, | Performed by: NURSE PRACTITIONER

## 2020-01-02 PROCEDURE — 1126F PR PAIN SEVERITY QUANTIFIED, NO PAIN PRESENT: ICD-10-PCS | Mod: S$GLB,,, | Performed by: NURSE PRACTITIONER

## 2020-01-02 PROCEDURE — 99999 PR PBB SHADOW E&M-EST. PATIENT-LVL V: CPT | Mod: PBBFAC,,, | Performed by: NURSE PRACTITIONER

## 2020-01-02 PROCEDURE — 99499 RISK ADDL DX/OHS AUDIT: ICD-10-PCS | Mod: S$GLB,,, | Performed by: NURSE PRACTITIONER

## 2020-01-02 PROCEDURE — 99214 OFFICE O/P EST MOD 30 MIN: CPT | Mod: S$GLB,,, | Performed by: NURSE PRACTITIONER

## 2020-01-02 PROCEDURE — 1101F PT FALLS ASSESS-DOCD LE1/YR: CPT | Mod: CPTII,S$GLB,, | Performed by: NURSE PRACTITIONER

## 2020-01-02 PROCEDURE — 99499 UNLISTED E&M SERVICE: CPT | Mod: S$GLB,,, | Performed by: NURSE PRACTITIONER

## 2020-01-02 PROCEDURE — 99214 PR OFFICE/OUTPT VISIT, EST, LEVL IV, 30-39 MIN: ICD-10-PCS | Mod: S$GLB,,, | Performed by: NURSE PRACTITIONER

## 2020-01-02 PROCEDURE — 3080F DIAST BP >= 90 MM HG: CPT | Mod: CPTII,S$GLB,, | Performed by: NURSE PRACTITIONER

## 2020-01-02 PROCEDURE — 1101F PR PT FALLS ASSESS DOC 0-1 FALLS W/OUT INJ PAST YR: ICD-10-PCS | Mod: CPTII,S$GLB,, | Performed by: NURSE PRACTITIONER

## 2020-01-02 PROCEDURE — 1159F MED LIST DOCD IN RCRD: CPT | Mod: S$GLB,,, | Performed by: NURSE PRACTITIONER

## 2020-01-02 PROCEDURE — 3077F SYST BP >= 140 MM HG: CPT | Mod: CPTII,S$GLB,, | Performed by: NURSE PRACTITIONER

## 2020-01-02 RX ORDER — AMLODIPINE BESYLATE 5 MG/1
5 TABLET ORAL DAILY
Qty: 30 TABLET | Refills: 11 | Status: SHIPPED | OUTPATIENT
Start: 2020-01-02 | End: 2020-01-30

## 2020-01-02 NOTE — PROGRESS NOTES
INTERNAL MEDICINE PROGRESS/URGENT CARE NOTE    CHIEF COMPLAINT     Chief Complaint   Patient presents with    Blood Pressure Check       HPI     Buck Ibarra is a 74 y.o. female with headaches, HLD, HTN, Carotid artery disease, hep C, GCA, DM2, tobacco use and insomnia who presents for a follow up visit today.    She is an established pt of Dr Cortez     Here for BP check. Taking candesartan at increased dose 32mg and hctz at 25mg.             Past Medical History:  Past Medical History:   Diagnosis Date    Allergy     Cataract     Diabetes mellitus type II     Gastritis     with gastric ulcer    GERD (gastroesophageal reflux disease)     H. pylori infection     History of hepatitis C, SVR as of 8-2016 8/25/2015    Harvoni 12 wks completed.  SVR(12) as of 8/4/16 SVR(24) as of 10-     Hyperlipidemia     Hypertension     Osteopenia     Type 2 diabetes mellitus with diabetic polyneuropathy        Home Medications:  Prior to Admission medications    Medication Sig Start Date End Date Taking? Authorizing Provider   albuterol (PROVENTIL/VENTOLIN HFA) 90 mcg/actuation inhaler Inhale 1-2 puffs into the lungs every 6 (six) hours as needed for Wheezing. Rescue 1/1/19  Yes Sebastian Connors MD   atorvastatin (LIPITOR) 80 MG tablet Take 1 tablet (80 mg total) by mouth once daily. DOSE CHANGE 1/24/19  Yes Ericka Cortez MD   blood sugar diagnostic Strp To check BG 3 times daily, to use with insurance preferred meter 4/4/19  Yes Ericka Cortez MD   blood-glucose meter kit To check BG 3 times daily, to use with insurance preferred meter 1/31/19  Yes Graeme Rivera, APRN, FNP   candesartan (ATACAND) 32 MG tablet Take 1 tablet (32 mg total) by mouth once daily. 12/19/19 12/18/20 Yes Rosemary Patel, NP   ciclopirox (PENLAC) 8 % Soln Apply topically nightly. 8/9/18  Yes Kyle Terrazas DPM   clotrimazole-betamethasone 1-0.05% (LOTRISONE) cream Apply topically 2 (two) times daily. 2/3/17  Yes Ericka Cortez MD  "  dulaglutide (TRULICITY) 1.5 mg/0.5 mL PnIj Inject 1.5 mg into the skin once weekly. 5/13/19  Yes ARNOLD Sawyer, SENAITP   ergocalciferol (VITAMIN D2) 50,000 unit Cap Take 1 capsule (50,000 Units total) by mouth every 7 days. 5/9/19  Yes Samy Hanley MD   esomeprazole (NEXIUM) 40 MG capsule Take one capsule by mouth daily as needed for acid reflux 3/28/19 3/27/20 Yes Ericka Cortez MD   fluocinonide (LIDEX) 0.05 % external solution AAA scalp qd prn pruritus 2/3/17  Yes Ericka Cortez MD   gabapentin (NEURONTIN) 300 MG capsule TAKE ONE CAPSULE BY MOUTH EVERY EVENING 4/13/18  Yes Ericka Cortez MD   hydrochlorothiazide (HYDRODIURIL) 25 MG tablet TAKE ONE TABLET BY MOUTH ONCE DAILY 8/3/17  Yes Ericka Cortez MD   hydroCHLOROthiazide (HYDRODIURIL) 25 MG tablet TAKE ONE TABLET BY MOUTH EVERY DAY 5/13/19 5/12/20 Yes Ericka Cortez MD   insulin degludec (TRESIBA FLEXTOUCH U-200) 200 unit/mL (3 mL) InPn Inject 28 units nightly. 4/25/19  Yes Ericka Cortez MD   ketoconazole (NIZORAL) 2 % shampoo Wash hair with medicated shampoo at least 2x/week - let sit on scalp at least 5 minutes prior to rinsing 11/10/16  Yes Shasta Medley, NP   lancets 33 gauge Misc To check BG 3 times daily, to use with insurance preferred meter 4/18/19  Yes Ericka Cortez MD   metFORMIN (GLUCOPHAGE-XR) 500 MG 24 hr tablet Take 1 tablet (500 mg total) by mouth 2 (two) times daily with meals. 12/20/18  Yes ARNOLD Sawyer, SENAITP   naproxen sodium (ANAPROX) 275 MG tablet Take 1 tablet (275 mg total) by mouth every 12 (twelve) hours as needed (pain). 8/29/19  Yes Wayne White MD   pen needle, diabetic (BD ULTRA-FINE MILAN PEN NEEDLES) 32 gauge x 5/32" Ndle Uses 1 time daily, on insulin injections 12/7/17  Yes Treva Catherine NP   traZODone (DESYREL) 50 MG tablet Take 1 to 2 tablets ( mg total) by mouth every evening. 2/8/19 2/8/20 Yes Ericka Cortez MD   ALPRAZolam (XANAX) 0.5 MG tablet Take 1 tablet (0.5 mg total) by mouth once as needed for Anxiety. Take one 0.5 mg tablet " "of xanax 1h before the procedure.  You may take a second tablet right before the procedure; please check with our nurse. 4/25/19 12/19/19  Mark Feldman MD   aspirin (ECOTRIN) 81 MG EC tablet Take 1 tablet (81 mg total) by mouth once daily. 2/3/17 12/19/19  Ericka Cortez MD       Review of Systems:  Review of Systems   Constitutional: Negative for chills and fever.   HENT: Negative for congestion, rhinorrhea, sinus pressure and sore throat.    Eyes: Negative for visual disturbance.   Respiratory: Negative for cough and shortness of breath.    Cardiovascular: Negative for chest pain, palpitations and leg swelling.   Gastrointestinal: Negative for abdominal pain, constipation, diarrhea, nausea and vomiting.   Genitourinary: Negative for dysuria, frequency and urgency.   Musculoskeletal: Negative for joint swelling and myalgias.   Neurological: Negative for dizziness, light-headedness and headaches.       Health Maintainence:   Immunizations:  Health Maintenance       Date Due Completion Date    Shingles Vaccine (2 of 3) 09/08/2016 7/14/2016    Foot Exam 08/09/2019 8/9/2018 (Done)    Override on 8/9/2018: Done    Override on 3/15/2018: Done    Override on 1/21/2016: Done    Hemoglobin A1c 08/23/2019 5/23/2019    Lipid Panel 01/24/2020 1/24/2019    Eye Exam 04/20/2020 4/20/2019    Urine Microalbumin 05/09/2020 5/9/2019    High Dose Statin 12/19/2020 12/19/2019    DEXA SCAN 06/21/2021 6/21/2018    Mammogram 08/15/2021 8/15/2019    Colonoscopy 08/08/2022 8/8/2019    TETANUS VACCINE 07/22/2025 7/22/2015           PHYSICAL EXAM     BP (!) 152/98 (BP Location: Right arm, Patient Position: Sitting, BP Method: Large (Manual))   Pulse 74   Ht 5' 6" (1.676 m)   Wt 92.3 kg (203 lb 7.8 oz)   SpO2 99%   BMI 32.84 kg/m²     Physical Exam   Constitutional: She is oriented to person, place, and time. She appears well-developed and well-nourished.   HENT:   Head: Normocephalic and atraumatic.   Eyes: Pupils are equal, round, and " reactive to light.   Cardiovascular: Normal rate and regular rhythm.   Pulmonary/Chest: Effort normal.   Neurological: She is alert and oriented to person, place, and time.   Psychiatric: She has a normal mood and affect.       LABS     Lab Results   Component Value Date    HGBA1C 7.4 (H) 04/20/2019     CMP  Sodium   Date Value Ref Range Status   05/09/2019 132 (L) 136 - 145 mmol/L Final     Potassium   Date Value Ref Range Status   05/09/2019 5.2 (H) 3.5 - 5.1 mmol/L Final     Chloride   Date Value Ref Range Status   05/09/2019 94 (L) 95 - 110 mmol/L Final     CO2   Date Value Ref Range Status   05/09/2019 29 23 - 29 mmol/L Final     Glucose   Date Value Ref Range Status   05/09/2019 514 (HH) 70 - 110 mg/dL Final     Comment:     *Critical value -   Results called to and read back by:PINA MOORE RN       BUN, Bld   Date Value Ref Range Status   05/09/2019 25 (H) 8 - 23 mg/dL Final     Creatinine   Date Value Ref Range Status   12/05/2019 1.2 0.5 - 1.4 mg/dL Final     Calcium   Date Value Ref Range Status   05/09/2019 10.5 8.7 - 10.5 mg/dL Final     Total Protein   Date Value Ref Range Status   12/05/2019 6.7 6.0 - 8.4 g/dL Final     Albumin   Date Value Ref Range Status   12/05/2019 3.3 (L) 3.5 - 5.2 g/dL Final     Total Bilirubin   Date Value Ref Range Status   12/05/2019 0.2 0.1 - 1.0 mg/dL Final     Comment:     For infants and newborns, interpretation of results should be based  on gestational age, weight and in agreement with clinical  observations.  Premature Infant recommended reference ranges:  Up to 24 hours.............<8.0 mg/dL  Up to 48 hours............<12.0 mg/dL  3-5 days..................<15.0 mg/dL  6-29 days.................<15.0 mg/dL       Alkaline Phosphatase   Date Value Ref Range Status   12/05/2019 259 (H) 55 - 135 U/L Final     AST   Date Value Ref Range Status   12/05/2019 17 10 - 40 U/L Final     ALT   Date Value Ref Range Status   12/05/2019 30 10 - 44 U/L Final     Anion Gap    Date Value Ref Range Status   05/09/2019 9 8 - 16 mmol/L Final     eGFR if    Date Value Ref Range Status   12/05/2019 52 (A) >60 mL/min/1.73 m^2 Final     eGFR if non    Date Value Ref Range Status   12/05/2019 45 (A) >60 mL/min/1.73 m^2 Final     Comment:     Calculation used to obtain the estimated glomerular filtration  rate (eGFR) is the CKD-EPI equation.        Lab Results   Component Value Date    WBC 9.16 12/19/2019    HGB 14.2 12/19/2019    HCT 44.3 12/19/2019    MCV 88 12/19/2019     12/19/2019     Lab Results   Component Value Date    CHOL 180 01/24/2019    CHOL 118 (L) 03/15/2018    CHOL 149 12/04/2017     Lab Results   Component Value Date    HDL 48 01/24/2019    HDL 35 (L) 03/15/2018    HDL 40 12/04/2017     Lab Results   Component Value Date    LDLCALC 110.2 01/24/2019    LDLCALC 65.4 03/15/2018    LDLCALC 88.4 12/04/2017     Lab Results   Component Value Date    TRIG 109 01/24/2019    TRIG 88 03/15/2018    TRIG 103 12/04/2017     Lab Results   Component Value Date    CHOLHDL 26.7 01/24/2019    CHOLHDL 29.7 03/15/2018    CHOLHDL 26.8 12/04/2017     Lab Results   Component Value Date    TSH 1.620 12/19/2019       ASSESSMENT/PLAN     Buck Ibarra is a 74 y.o. female with  Past Medical History:   Diagnosis Date    Allergy     Cataract     Diabetes mellitus type II     Gastritis     with gastric ulcer    GERD (gastroesophageal reflux disease)     H. pylori infection     History of hepatitis C, SVR as of 8-2016 8/25/2015    Harvoni 12 wks completed.  SVR(12) as of 8/4/16 SVR(24) as of 10-     Hyperlipidemia     Hypertension     Osteopenia     Type 2 diabetes mellitus with diabetic polyneuropathy      Benign essential hypertension- poorly controlled. Will cont current meds and start amlodipine   -     amLODIPine (NORVASC) 5 MG tablet; Take 1 tablet (5 mg total) by mouth once daily.  Dispense: 30 tablet; Refill: 11    Hypertensive  urgency    Type 2 diabetes mellitus with hyperglycemia, with long-term current use of insulin- stable. Cont current meds.     Chronic hepatitis C without hepatic coma- followed by hepatology. Completed treatment for Hep C.     GCA (giant cell arteritis)- stable. Cont current meds.     Thrombocytosis- resolved on last CBC     Aortic atherosclerosis- on lipitor and ASA.     Follow up with PCP or me in 4 weeks for bp check.     Patient education provided from [facility]. Patient was counseled on when and how to seek emergent care.       Rosemary FLORES, ARNOLD, FNP-c   Department of Internal Medicine - [facility]  8:23 AM

## 2020-01-03 ENCOUNTER — TELEPHONE (OUTPATIENT)
Dept: INTERNAL MEDICINE | Facility: CLINIC | Age: 75
End: 2020-01-03

## 2020-01-27 DIAGNOSIS — E11.9 TYPE 2 DIABETES MELLITUS WITHOUT COMPLICATION: ICD-10-CM

## 2020-01-30 ENCOUNTER — OFFICE VISIT (OUTPATIENT)
Dept: INTERNAL MEDICINE | Facility: CLINIC | Age: 75
End: 2020-01-30
Payer: MEDICARE

## 2020-01-30 VITALS
SYSTOLIC BLOOD PRESSURE: 126 MMHG | OXYGEN SATURATION: 97 % | HEART RATE: 64 BPM | BODY MASS INDEX: 32.45 KG/M2 | DIASTOLIC BLOOD PRESSURE: 82 MMHG | WEIGHT: 201.94 LBS | HEIGHT: 66 IN

## 2020-01-30 DIAGNOSIS — I10 BENIGN ESSENTIAL HYPERTENSION: Primary | ICD-10-CM

## 2020-01-30 PROCEDURE — 1159F MED LIST DOCD IN RCRD: CPT | Mod: S$GLB,,, | Performed by: NURSE PRACTITIONER

## 2020-01-30 PROCEDURE — 99999 PR PBB SHADOW E&M-EST. PATIENT-LVL IV: CPT | Mod: PBBFAC,,, | Performed by: NURSE PRACTITIONER

## 2020-01-30 PROCEDURE — 1101F PT FALLS ASSESS-DOCD LE1/YR: CPT | Mod: CPTII,S$GLB,, | Performed by: NURSE PRACTITIONER

## 2020-01-30 PROCEDURE — 99212 PR OFFICE/OUTPT VISIT, EST, LEVL II, 10-19 MIN: ICD-10-PCS | Mod: S$GLB,,, | Performed by: NURSE PRACTITIONER

## 2020-01-30 PROCEDURE — 99999 PR PBB SHADOW E&M-EST. PATIENT-LVL IV: ICD-10-PCS | Mod: PBBFAC,,, | Performed by: NURSE PRACTITIONER

## 2020-01-30 PROCEDURE — 1159F PR MEDICATION LIST DOCUMENTED IN MEDICAL RECORD: ICD-10-PCS | Mod: S$GLB,,, | Performed by: NURSE PRACTITIONER

## 2020-01-30 PROCEDURE — 99212 OFFICE O/P EST SF 10 MIN: CPT | Mod: S$GLB,,, | Performed by: NURSE PRACTITIONER

## 2020-01-30 PROCEDURE — 1126F AMNT PAIN NOTED NONE PRSNT: CPT | Mod: S$GLB,,, | Performed by: NURSE PRACTITIONER

## 2020-01-30 PROCEDURE — 1101F PR PT FALLS ASSESS DOC 0-1 FALLS W/OUT INJ PAST YR: ICD-10-PCS | Mod: CPTII,S$GLB,, | Performed by: NURSE PRACTITIONER

## 2020-01-30 PROCEDURE — 3074F SYST BP LT 130 MM HG: CPT | Mod: CPTII,S$GLB,, | Performed by: NURSE PRACTITIONER

## 2020-01-30 PROCEDURE — 3074F PR MOST RECENT SYSTOLIC BLOOD PRESSURE < 130 MM HG: ICD-10-PCS | Mod: CPTII,S$GLB,, | Performed by: NURSE PRACTITIONER

## 2020-01-30 PROCEDURE — 3079F PR MOST RECENT DIASTOLIC BLOOD PRESSURE 80-89 MM HG: ICD-10-PCS | Mod: CPTII,S$GLB,, | Performed by: NURSE PRACTITIONER

## 2020-01-30 PROCEDURE — 99499 UNLISTED E&M SERVICE: CPT | Mod: S$GLB,,, | Performed by: NURSE PRACTITIONER

## 2020-01-30 PROCEDURE — 1126F PR PAIN SEVERITY QUANTIFIED, NO PAIN PRESENT: ICD-10-PCS | Mod: S$GLB,,, | Performed by: NURSE PRACTITIONER

## 2020-01-30 PROCEDURE — 3079F DIAST BP 80-89 MM HG: CPT | Mod: CPTII,S$GLB,, | Performed by: NURSE PRACTITIONER

## 2020-01-30 PROCEDURE — 99499 RISK ADDL DX/OHS AUDIT: ICD-10-PCS | Mod: S$GLB,,, | Performed by: NURSE PRACTITIONER

## 2020-01-30 NOTE — PROGRESS NOTES
INTERNAL MEDICINE PROGRESS NOTE    CHIEF COMPLAINT     No chief complaint on file.      HPI     Buck Ibarra is a 74 y.o. female with headaches, HLD, HTN, Carotid artery disease, hep C, GCA, DM2, tobacco use and insomnia who presents for a follow up visit today.  She is an established pt of Dr Cortez     Here for BP check. Taking candesartan and HCTZ. Added amlodipine 5mg at the last visit. Stopped amlodipine 2/2 facial swelling and oral lesions   BP log reviewed today 130-140/80-90      Past Medical History:  Past Medical History:   Diagnosis Date    Allergy     Cataract     Diabetes mellitus type II     Gastritis     with gastric ulcer    GERD (gastroesophageal reflux disease)     H. pylori infection     History of hepatitis C, SVR as of 8-2016 8/25/2015    Harvoni 12 wks completed.  SVR(12) as of 8/4/16 SVR(24) as of 10-     Hyperlipidemia     Hypertension     Osteopenia     Type 2 diabetes mellitus with diabetic polyneuropathy        Home Medications:  Prior to Admission medications    Medication Sig Start Date End Date Taking? Authorizing Provider   albuterol (PROVENTIL/VENTOLIN HFA) 90 mcg/actuation inhaler Inhale 1-2 puffs into the lungs every 6 (six) hours as needed for Wheezing. Rescue 1/1/19   Sebastian Connors MD   ALPRAZolam (XANAX) 0.5 MG tablet Take 1 tablet (0.5 mg total) by mouth once as needed for Anxiety. Take one 0.5 mg tablet of xanax 1h before the procedure.  You may take a second tablet right before the procedure; please check with our nurse. 4/25/19 12/19/19  Mark Feldman MD   amLODIPine (NORVASC) 5 MG tablet Take 1 tablet (5 mg total) by mouth once daily. 1/2/20 1/1/21  Rosemary Patel NP   aspirin (ECOTRIN) 81 MG EC tablet Take 1 tablet (81 mg total) by mouth once daily. 2/3/17 12/19/19  Ericka Cortez MD   atorvastatin (LIPITOR) 80 MG tablet Take 1 tablet (80 mg total) by mouth once daily. DOSE CHANGE 1/24/19   Ericka Cortez MD   blood sugar diagnostic Strp To  check BG 3 times daily, to use with insurance preferred meter 4/4/19   Ericka Cortez MD   blood-glucose meter kit To check BG 3 times daily, to use with insurance preferred meter 1/31/19   ARNOLD Sawyer, SENAITP   candesartan (ATACAND) 32 MG tablet Take 1 tablet (32 mg total) by mouth once daily. 12/19/19 12/18/20  Rosemary Patel, NP   ciclopirox (PENLAC) 8 % Soln Apply topically nightly. 8/9/18   Kyle Terrazas DPM   clotrimazole-betamethasone 1-0.05% (LOTRISONE) cream Apply topically 2 (two) times daily. 2/3/17   Ericka Cortez MD   dulaglutide (TRULICITY) 1.5 mg/0.5 mL PnIj Inject 1.5 mg into the skin once weekly. 5/13/19   ARNOLD Sawyer, FNP   ergocalciferol (VITAMIN D2) 50,000 unit Cap Take 1 capsule (50,000 Units total) by mouth every 7 days. 5/9/19   Samy Hanley MD   esomeprazole (NEXIUM) 40 MG capsule Take one capsule by mouth daily as needed for acid reflux 3/28/19 3/27/20  Ericka Cortez MD   fluocinonide (LIDEX) 0.05 % external solution AAA scalp qd prn pruritus 2/3/17   Ericka Cortez MD   gabapentin (NEURONTIN) 300 MG capsule TAKE ONE CAPSULE BY MOUTH EVERY EVENING 4/13/18   Ericka Cortez MD   hydrochlorothiazide (HYDRODIURIL) 25 MG tablet TAKE ONE TABLET BY MOUTH ONCE DAILY 8/3/17   Ericka Cortez MD   hydroCHLOROthiazide (HYDRODIURIL) 25 MG tablet TAKE ONE TABLET BY MOUTH EVERY DAY 5/13/19 5/12/20  Ericka Cortez MD   insulin degludec (TRESIBA FLEXTOUCH U-200) 200 unit/mL (3 mL) InPn Inject 28 units nightly. 4/25/19   Ericka Cortez MD   ketoconazole (NIZORAL) 2 % shampoo Wash hair with medicated shampoo at least 2x/week - let sit on scalp at least 5 minutes prior to rinsing 11/10/16   Shasta Medley, NP   lancets 33 gauge Misc To check BG 3 times daily, to use with insurance preferred meter 4/18/19   Ericka Cortez MD   metFORMIN (GLUCOPHAGE-XR) 500 MG 24 hr tablet Take 1 tablet (500 mg total) by mouth 2 (two) times daily with meals. 12/20/18   ARNOLD Sawyer, FNP   naproxen sodium (ANAPROX) 275 MG tablet Take 1  "tablet (275 mg total) by mouth every 12 (twelve) hours as needed (pain). 8/29/19   Wayne White MD   pen needle, diabetic (BD ULTRA-FINE MILAN PEN NEEDLES) 32 gauge x 5/32" Ndle Uses 1 time daily, on insulin injections 12/7/17   Treva Catherine NP   traZODone (DESYREL) 50 MG tablet Take 1 to 2 tablets ( mg total) by mouth every evening. 2/8/19 2/8/20  Ericka Cortez MD       Review of Systems:  Review of Systems    Health Maintainence:   Immunizations:  Health Maintenance       Date Due Completion Date    Shingles Vaccine (2 of 3) 09/08/2016 7/14/2016    Foot Exam 08/09/2019 8/9/2018 (Done)    Override on 8/9/2018: Done    Override on 3/15/2018: Done    Override on 1/21/2016: Done    Hemoglobin A1c 08/23/2019 5/23/2019    Lipid Panel 01/24/2020 1/24/2019    Eye Exam 04/20/2020 4/20/2019    Urine Microalbumin 05/09/2020 5/9/2019    High Dose Statin 01/02/2021 1/2/2020    DEXA SCAN 06/21/2021 6/21/2018    Mammogram 08/15/2021 8/15/2019    Colonoscopy 08/08/2022 8/8/2019    TETANUS VACCINE 07/22/2025 7/22/2015           PHYSICAL EXAM     /82 (BP Location: Left arm, Patient Position: Sitting, BP Method: Large (Manual))   Pulse 64   Ht 5' 6" (1.676 m)   Wt 91.6 kg (201 lb 15.1 oz)   SpO2 97%   BMI 32.59 kg/m²     Physical Exam    LABS     Lab Results   Component Value Date    HGBA1C 7.4 (H) 04/20/2019     CMP  Sodium   Date Value Ref Range Status   05/09/2019 132 (L) 136 - 145 mmol/L Final     Potassium   Date Value Ref Range Status   05/09/2019 5.2 (H) 3.5 - 5.1 mmol/L Final     Chloride   Date Value Ref Range Status   05/09/2019 94 (L) 95 - 110 mmol/L Final     CO2   Date Value Ref Range Status   05/09/2019 29 23 - 29 mmol/L Final     Glucose   Date Value Ref Range Status   05/09/2019 514 (HH) 70 - 110 mg/dL Final     Comment:     *Critical value -   Results called to and read back by:PINA MOORE RN       BUN, Bld   Date Value Ref Range Status   05/09/2019 25 (H) 8 - 23 mg/dL Final     Creatinine "   Date Value Ref Range Status   12/05/2019 1.2 0.5 - 1.4 mg/dL Final     Calcium   Date Value Ref Range Status   05/09/2019 10.5 8.7 - 10.5 mg/dL Final     Total Protein   Date Value Ref Range Status   12/05/2019 6.7 6.0 - 8.4 g/dL Final     Albumin   Date Value Ref Range Status   12/05/2019 3.3 (L) 3.5 - 5.2 g/dL Final     Total Bilirubin   Date Value Ref Range Status   12/05/2019 0.2 0.1 - 1.0 mg/dL Final     Comment:     For infants and newborns, interpretation of results should be based  on gestational age, weight and in agreement with clinical  observations.  Premature Infant recommended reference ranges:  Up to 24 hours.............<8.0 mg/dL  Up to 48 hours............<12.0 mg/dL  3-5 days..................<15.0 mg/dL  6-29 days.................<15.0 mg/dL       Alkaline Phosphatase   Date Value Ref Range Status   12/05/2019 259 (H) 55 - 135 U/L Final     AST   Date Value Ref Range Status   12/05/2019 17 10 - 40 U/L Final     ALT   Date Value Ref Range Status   12/05/2019 30 10 - 44 U/L Final     Anion Gap   Date Value Ref Range Status   05/09/2019 9 8 - 16 mmol/L Final     eGFR if    Date Value Ref Range Status   12/05/2019 52 (A) >60 mL/min/1.73 m^2 Final     eGFR if non    Date Value Ref Range Status   12/05/2019 45 (A) >60 mL/min/1.73 m^2 Final     Comment:     Calculation used to obtain the estimated glomerular filtration  rate (eGFR) is the CKD-EPI equation.        Lab Results   Component Value Date    WBC 9.16 12/19/2019    HGB 14.2 12/19/2019    HCT 44.3 12/19/2019    MCV 88 12/19/2019     12/19/2019     Lab Results   Component Value Date    CHOL 180 01/24/2019    CHOL 118 (L) 03/15/2018    CHOL 149 12/04/2017     Lab Results   Component Value Date    HDL 48 01/24/2019    HDL 35 (L) 03/15/2018    HDL 40 12/04/2017     Lab Results   Component Value Date    LDLCALC 110.2 01/24/2019    LDLCALC 65.4 03/15/2018    LDLCALC 88.4 12/04/2017     Lab Results   Component  Value Date    TRIG 109 01/24/2019    TRIG 88 03/15/2018    TRIG 103 12/04/2017     Lab Results   Component Value Date    CHOLHDL 26.7 01/24/2019    CHOLHDL 29.7 03/15/2018    CHOLHDL 26.8 12/04/2017     Lab Results   Component Value Date    TSH 1.620 12/19/2019       ASSESSMENT/PLAN     Buck Ibarra is a 74 y.o. female           Follow up with PCP in *** for ***    Patient education provided from [facility]. Patient was counseled on when and how to seek emergent care.       Rosemary FLORES, APRN, FNP-c   Department of Internal Medicine - [facility]  8:33 AM

## 2020-01-30 NOTE — PROGRESS NOTES
INTERNAL MEDICINE PROGRESS NOTE    CHIEF COMPLAINT     Chief Complaint   Patient presents with    Follow-up       HPI     Buck Ibarra is a 74 y.o. female with headaches, HLD, HTN, Carotid artery disease, hep C, GCA, DM2, tobacco use and insomnia who presents for a follow up visit today.  She is an established pt of Dr Cortez.     Here for BP check. Taking candesartan and HCTZ. Added amlodipine 5mg at the last visit. Stopped amlodipine 1/15 - states took med ~5 days. States started having facial swelling, oral lesions. Denies difficulty breathing/swallowing. States took po benadryl at home for oral swelling and gargled po peroxide for oral lesions. States s/s relieved 2-3 days and did not visit the hospital. After d/c amlodipine, denies any difficulties since.    BP log reviewed today 130-140/80-90 - denies headache, dizziness, blurred vision. Denies dietary changes.      Past Medical History:  Past Medical History:   Diagnosis Date    Allergy     Cataract     Diabetes mellitus type II     Gastritis     with gastric ulcer    GERD (gastroesophageal reflux disease)     H. pylori infection     History of hepatitis C, SVR as of 8-2016 8/25/2015    Harvoni 12 wks completed.  SVR(12) as of 8/4/16 SVR(24) as of 10-     Hyperlipidemia     Hypertension     Osteopenia     Type 2 diabetes mellitus with diabetic polyneuropathy        Home Medications:  Prior to Admission medications    Medication Sig Start Date End Date Taking? Authorizing Provider   albuterol (PROVENTIL/VENTOLIN HFA) 90 mcg/actuation inhaler Inhale 1-2 puffs into the lungs every 6 (six) hours as needed for Wheezing. Rescue 1/1/19   Sebastian Connors MD   ALPRAZolam (XANAX) 0.5 MG tablet Take 1 tablet (0.5 mg total) by mouth once as needed for Anxiety. Take one 0.5 mg tablet of xanax 1h before the procedure.  You may take a second tablet right before the procedure; please check with our nurse. 4/25/19 12/19/19  Mark Feldman MD    amLODIPine (NORVASC) 5 MG tablet Take 1 tablet (5 mg total) by mouth once daily. 1/2/20 1/1/21  Rosemary Patel NP   aspirin (ECOTRIN) 81 MG EC tablet Take 1 tablet (81 mg total) by mouth once daily. 2/3/17 12/19/19  Ericka Cortez MD   atorvastatin (LIPITOR) 80 MG tablet Take 1 tablet (80 mg total) by mouth once daily. DOSE CHANGE 1/24/19   Ericka Cortez MD   blood sugar diagnostic Strp To check BG 3 times daily, to use with insurance preferred meter 4/4/19   Ericka Cortez MD   blood-glucose meter kit To check BG 3 times daily, to use with insurance preferred meter 1/31/19   ARNOLD Sawyer, SENAITP   candesartan (ATACAND) 32 MG tablet Take 1 tablet (32 mg total) by mouth once daily. 12/19/19 12/18/20  Rosemary Patel NP   ciclopirox (PENLAC) 8 % Soln Apply topically nightly. 8/9/18   Kyle Terrazas DPM   clotrimazole-betamethasone 1-0.05% (LOTRISONE) cream Apply topically 2 (two) times daily. 2/3/17   Ericka Cortez MD   dulaglutide (TRULICITY) 1.5 mg/0.5 mL PnIj Inject 1.5 mg into the skin once weekly. 5/13/19   ARNOLD Sawyer, FNP   ergocalciferol (VITAMIN D2) 50,000 unit Cap Take 1 capsule (50,000 Units total) by mouth every 7 days. 5/9/19   Samy Hanley MD   esomeprazole (NEXIUM) 40 MG capsule Take one capsule by mouth daily as needed for acid reflux 3/28/19 3/27/20  Ericka Cortez MD   fluocinonide (LIDEX) 0.05 % external solution AAA scalp qd prn pruritus 2/3/17   Ericka Cortez MD   gabapentin (NEURONTIN) 300 MG capsule TAKE ONE CAPSULE BY MOUTH EVERY EVENING 4/13/18   Ericka Cortez MD   hydrochlorothiazide (HYDRODIURIL) 25 MG tablet TAKE ONE TABLET BY MOUTH ONCE DAILY 8/3/17   Ericka Cortez MD   hydroCHLOROthiazide (HYDRODIURIL) 25 MG tablet TAKE ONE TABLET BY MOUTH EVERY DAY 5/13/19 5/12/20  Ericka Cortez MD   insulin degludec (TRESIBA FLEXTOUCH U-200) 200 unit/mL (3 mL) InPn Inject 28 units nightly. 4/25/19   Ericka Cortez MD   ketoconazole (NIZORAL) 2 % shampoo Wash hair with medicated shampoo at least 2x/week - let sit on  "scalp at least 5 minutes prior to rinsing 11/10/16   Shasta Medley, NP   lancets 33 gauge Misc To check BG 3 times daily, to use with insurance preferred meter 4/18/19   Ericka Cortez MD   metFORMIN (GLUCOPHAGE-XR) 500 MG 24 hr tablet Take 1 tablet (500 mg total) by mouth 2 (two) times daily with meals. 12/20/18   Graeme Rivera, APRN, FNP   naproxen sodium (ANAPROX) 275 MG tablet Take 1 tablet (275 mg total) by mouth every 12 (twelve) hours as needed (pain). 8/29/19   Wayne White MD   pen needle, diabetic (BD ULTRA-FINE MILAN PEN NEEDLES) 32 gauge x 5/32" Ndle Uses 1 time daily, on insulin injections 12/7/17   Treva Catherine NP   traZODone (DESYREL) 50 MG tablet Take 1 to 2 tablets ( mg total) by mouth every evening. 2/8/19 2/8/20  Ericka Cortez MD       Review of Systems:  Review of Systems   Respiratory: Negative for chest tightness and shortness of breath.    Cardiovascular: Negative for chest pain and leg swelling.   Neurological: Negative for dizziness, weakness, light-headedness, numbness and headaches.       Health Maintainence:   Immunizations:  Health Maintenance       Date Due Completion Date    Shingles Vaccine (2 of 3) 09/08/2016 7/14/2016    Foot Exam 08/09/2019 8/9/2018 (Done)    Override on 8/9/2018: Done    Override on 3/15/2018: Done    Override on 1/21/2016: Done    Hemoglobin A1c 08/23/2019 5/23/2019    Lipid Panel 01/24/2020 1/24/2019    Eye Exam 04/20/2020 4/20/2019    Urine Microalbumin 05/09/2020 5/9/2019    High Dose Statin 01/02/2021 1/2/2020    DEXA SCAN 06/21/2021 6/21/2018    Mammogram 08/15/2021 8/15/2019    Colonoscopy 08/08/2022 8/8/2019    TETANUS VACCINE 07/22/2025 7/22/2015           PHYSICAL EXAM     /82 (BP Location: Left arm, Patient Position: Sitting, BP Method: Large (Manual))   Pulse 64   Ht 5' 6" (1.676 m)   Wt 91.6 kg (201 lb 15.1 oz)   SpO2 97%   BMI 32.59 kg/m²     Physical Exam   Constitutional: She is oriented to person, place, and time. She appears " well-developed and well-nourished.   Neck: Normal range of motion.   Cardiovascular: Normal rate and regular rhythm.   Pulmonary/Chest: Effort normal and breath sounds normal.   Neurological: She is alert and oriented to person, place, and time.   Skin: Skin is warm and dry.   Psychiatric: She has a normal mood and affect.       LABS     Lab Results   Component Value Date    HGBA1C 7.4 (H) 04/20/2019     CMP  Sodium   Date Value Ref Range Status   05/09/2019 132 (L) 136 - 145 mmol/L Final     Potassium   Date Value Ref Range Status   05/09/2019 5.2 (H) 3.5 - 5.1 mmol/L Final     Chloride   Date Value Ref Range Status   05/09/2019 94 (L) 95 - 110 mmol/L Final     CO2   Date Value Ref Range Status   05/09/2019 29 23 - 29 mmol/L Final     Glucose   Date Value Ref Range Status   05/09/2019 514 (HH) 70 - 110 mg/dL Final     Comment:     *Critical value -   Results called to and read back by:PINA MOORE RN       BUN, Bld   Date Value Ref Range Status   05/09/2019 25 (H) 8 - 23 mg/dL Final     Creatinine   Date Value Ref Range Status   12/05/2019 1.2 0.5 - 1.4 mg/dL Final     Calcium   Date Value Ref Range Status   05/09/2019 10.5 8.7 - 10.5 mg/dL Final     Total Protein   Date Value Ref Range Status   12/05/2019 6.7 6.0 - 8.4 g/dL Final     Albumin   Date Value Ref Range Status   12/05/2019 3.3 (L) 3.5 - 5.2 g/dL Final     Total Bilirubin   Date Value Ref Range Status   12/05/2019 0.2 0.1 - 1.0 mg/dL Final     Comment:     For infants and newborns, interpretation of results should be based  on gestational age, weight and in agreement with clinical  observations.  Premature Infant recommended reference ranges:  Up to 24 hours.............<8.0 mg/dL  Up to 48 hours............<12.0 mg/dL  3-5 days..................<15.0 mg/dL  6-29 days.................<15.0 mg/dL       Alkaline Phosphatase   Date Value Ref Range Status   12/05/2019 259 (H) 55 - 135 U/L Final     AST   Date Value Ref Range Status   12/05/2019 17 10 -  40 U/L Final     ALT   Date Value Ref Range Status   12/05/2019 30 10 - 44 U/L Final     Anion Gap   Date Value Ref Range Status   05/09/2019 9 8 - 16 mmol/L Final     eGFR if    Date Value Ref Range Status   12/05/2019 52 (A) >60 mL/min/1.73 m^2 Final     eGFR if non    Date Value Ref Range Status   12/05/2019 45 (A) >60 mL/min/1.73 m^2 Final     Comment:     Calculation used to obtain the estimated glomerular filtration  rate (eGFR) is the CKD-EPI equation.        Lab Results   Component Value Date    WBC 9.16 12/19/2019    HGB 14.2 12/19/2019    HCT 44.3 12/19/2019    MCV 88 12/19/2019     12/19/2019     Lab Results   Component Value Date    CHOL 180 01/24/2019    CHOL 118 (L) 03/15/2018    CHOL 149 12/04/2017     Lab Results   Component Value Date    HDL 48 01/24/2019    HDL 35 (L) 03/15/2018    HDL 40 12/04/2017     Lab Results   Component Value Date    LDLCALC 110.2 01/24/2019    LDLCALC 65.4 03/15/2018    LDLCALC 88.4 12/04/2017     Lab Results   Component Value Date    TRIG 109 01/24/2019    TRIG 88 03/15/2018    TRIG 103 12/04/2017     Lab Results   Component Value Date    CHOLHDL 26.7 01/24/2019    CHOLHDL 29.7 03/15/2018    CHOLHDL 26.8 12/04/2017     Lab Results   Component Value Date    TSH 1.620 12/19/2019       ASSESSMENT/PLAN   Hypertension - established, stable  Amlodipine added to pt allergy list.  Pt to continue candesartan and hctz as rx and will continue to eval BP at home.  Pt to follow up with Dr. Cortez (pt to schedule appt) and call clinic if begins to to experience headache, dizziness, blurred vision, etc.  Discussed BMI and dietary changes to help with BP control - pt amenable to increasing physical activity and making some dietary changes.      Follow up with PCP.    Patient education provided from [facility]. Patient was counseled on when and how to seek emergent care.     I hereby acknowledge that I am relying upon documentation authored by a NP student  working under my supervision and further I hereby attest that I have verified the student documentation or findings by personally re-performing the physical exam and medical decision making activities of the Evaluation and Management service to be billed.  Rosemary FLORES, ARNOLD, FNP-c   Department of Internal Medicine - [facility]  8:33 AM

## 2020-02-12 ENCOUNTER — TELEPHONE (OUTPATIENT)
Dept: INTERNAL MEDICINE | Facility: CLINIC | Age: 75
End: 2020-02-12

## 2020-02-12 NOTE — TELEPHONE ENCOUNTER
----- Message from Abby Nguyen sent at 2/12/2020 10:32 AM CST -----  Contact: self   Patient would like to get medical advice.  Symptoms (please be specific):  Boil in between the crease of the left leg  How long has patient had these symptoms:  2 days  Pharmacy name and phone # (copy/paste from chart):  [facility] [phone] (Phone)  [phone] (Fax)  Any drug allergies (copy/paste from chart):    See chart  Would the patient rather a call back or a response via [facility]?:  Call back  Comments:

## 2020-02-13 ENCOUNTER — OFFICE VISIT (OUTPATIENT)
Dept: INTERNAL MEDICINE | Facility: CLINIC | Age: 75
End: 2020-02-13
Payer: MEDICARE

## 2020-02-13 VITALS
SYSTOLIC BLOOD PRESSURE: 122 MMHG | BODY MASS INDEX: 32.24 KG/M2 | HEART RATE: 83 BPM | DIASTOLIC BLOOD PRESSURE: 78 MMHG | WEIGHT: 200.63 LBS | HEIGHT: 66 IN | OXYGEN SATURATION: 99 %

## 2020-02-13 DIAGNOSIS — L02.91 ABSCESS: Primary | ICD-10-CM

## 2020-02-13 PROCEDURE — 99212 OFFICE O/P EST SF 10 MIN: CPT | Mod: GC,S$GLB,, | Performed by: STUDENT IN AN ORGANIZED HEALTH CARE EDUCATION/TRAINING PROGRAM

## 2020-02-13 PROCEDURE — 99999 PR PBB SHADOW E&M-EST. PATIENT-LVL V: ICD-10-PCS | Mod: PBBFAC,GC,, | Performed by: STUDENT IN AN ORGANIZED HEALTH CARE EDUCATION/TRAINING PROGRAM

## 2020-02-13 PROCEDURE — 99999 PR PBB SHADOW E&M-EST. PATIENT-LVL V: CPT | Mod: PBBFAC,GC,, | Performed by: STUDENT IN AN ORGANIZED HEALTH CARE EDUCATION/TRAINING PROGRAM

## 2020-02-13 PROCEDURE — 99212 PR OFFICE/OUTPT VISIT, EST, LEVL II, 10-19 MIN: ICD-10-PCS | Mod: GC,S$GLB,, | Performed by: STUDENT IN AN ORGANIZED HEALTH CARE EDUCATION/TRAINING PROGRAM

## 2020-02-13 NOTE — PROGRESS NOTES
"INTERNAL MEDICINE RESIDENT CLINIC  CLINIC NOTE    Patient Name: Buck Ibarra  YOB: 1945    PRESENTING HISTORY       History of Present Illness:  Ms. Buck Ibarra is a 74 y.o. female w/ an active medical problem list including headaches, HLD, HTN, Carotid artery disease, hep C, GCA, DM2, tobacco use and insomnia present today for urgent care visit c/o "boil" on her perineum area in . Patient started to experience this about two days ago. She was wiping herself the other day and abscess popped and leaked pus and little bit of blood. Patient thought it will get better but started to get worse. Patient denied fever, chill, dizziness, and diaphoresis. It is uncomfortable for her to have bowel movement, urinary movement, walking around and sitting hurts the most.     Seen patient with medical assistant and determine that abscess is close to patient's  area so will need to refer patient to gyn clinic.     Review of Systems   Constitutional: Negative for chills, diaphoresis, fever and malaise/fatigue.   Cardiovascular: Negative for palpitations.   Skin: Negative for itching.   Neurological: Negative for dizziness and headaches.       PAST HISTORY:     Past Medical History:   Diagnosis Date    Allergy     Cataract     Diabetes mellitus type II     Gastritis     with gastric ulcer    GERD (gastroesophageal reflux disease)     H. pylori infection     History of hepatitis C, SVR as of 8-2016 8/25/2015    Harvoni 12 wks completed.  SVR(12) as of 8/4/16 SVR(24) as of 10-     Hyperlipidemia     Hypertension     Osteopenia     Type 2 diabetes mellitus with diabetic polyneuropathy        Past Surgical History:   Procedure Laterality Date    COLONOSCOPY      COLONOSCOPY N/A 1/28/2016    Procedure: COLONOSCOPY;  Surgeon: Emeka Ahn MD;  Location: [address];  Service: Endoscopy;  Laterality: N/A;    COLONOSCOPY N/A 8/8/2019    Procedure: COLONOSCOPY;  Surgeon: " Susan Dia MD;  Location: [facility] ([address] [place] [address]);  Service: Endoscopy;  Laterality: N/A;  appt confirmed-rb    HYSTERECTOMY      LIVER BIOPSY      OOPHORECTOMY      UPPER GASTROINTESTINAL ENDOSCOPY         Family History   Problem Relation Age of Onset    Heart disease Mother     Diabetes Mother     Heart disease Father     Cancer Sister         breast cancer    Diabetes Sister     Cancer Sister 70        colon CA    Diabetes Sister     Breast cancer Sister     Diabetes Sister     Glaucoma Neg Hx     Melanoma Neg Hx     Cirrhosis Neg Hx     Psoriasis Neg Hx     Lupus Neg Hx        Social History     Socioeconomic History    Marital status: Single     Spouse name: Not on file    Number of children: Not on file    Years of education: Not on file    Highest education level: Not on file   Occupational History    Not on file   Social Needs    Financial resource strain: Not on file    Food insecurity:     Worry: Not on file     Inability: Not on file    Transportation needs:     Medical: Not on file     Non-medical: Not on file   Tobacco Use    Smoking status: Current Every Day Smoker     Packs/day: 0.25     Years: 48.00     Pack years: 12.00     Last attempt to quit: 9/4/2016     Years since quitting: 3.4    Smokeless tobacco: Never Used   Substance and Sexual Activity    Alcohol use: No     Comment: special occasions    Drug use: No    Sexual activity: Yes     Partners: Male   Lifestyle    Physical activity:     Days per week: Not on file     Minutes per session: Not on file    Stress: Not on file   Relationships    Social connections:     Talks on phone: Not on file     Gets together: Not on file     Attends Nondenominational service: Not on file     Active member of club or organization: Not on file     Attends meetings of clubs or organizations: Not on file     Relationship status: Not on file   Other Topics Concern    Are you pregnant or think you may be? No    Breast-feeding No    Social History Narrative    Lives with daughter and grandson. No assistance with ADLs and still driving.       MEDICATIONS & ALLERGIES:     Current Outpatient Medications on File Prior to Visit   Medication Sig    albuterol (PROVENTIL/VENTOLIN HFA) 90 mcg/actuation inhaler Inhale 1-2 puffs into the lungs every 6 (six) hours as needed for Wheezing. Rescue    ALPRAZolam (XANAX) 0.5 MG tablet Take 1 tablet (0.5 mg total) by mouth once as needed for Anxiety. Take one 0.5 mg tablet of xanax 1h before the procedure.  You may take a second tablet right before the procedure; please check with our nurse.    aspirin (ECOTRIN) 81 MG EC tablet Take 1 tablet (81 mg total) by mouth once daily.    atorvastatin (LIPITOR) 80 MG tablet Take 1 tablet (80 mg total) by mouth once daily. DOSE CHANGE    blood sugar diagnostic Strp To check BG 3 times daily, to use with insurance preferred meter    blood-glucose meter kit To check BG 3 times daily, to use with insurance preferred meter    candesartan (ATACAND) 32 MG tablet Take 1 tablet (32 mg total) by mouth once daily.    ciclopirox (PENLAC) 8 % Soln Apply topically nightly.    clotrimazole-betamethasone 1-0.05% (LOTRISONE) cream Apply topically 2 (two) times daily.    dulaglutide (TRULICITY) 1.5 mg/0.5 mL PnIj Inject 1.5 mg into the skin once weekly.    ergocalciferol (VITAMIN D2) 50,000 unit Cap Take 1 capsule (50,000 Units total) by mouth every 7 days.    esomeprazole (NEXIUM) 40 MG capsule Take one capsule by mouth daily as needed for acid reflux    fluocinonide (LIDEX) 0.05 % external solution AAA scalp qd prn pruritus    gabapentin (NEURONTIN) 300 MG capsule TAKE ONE CAPSULE BY MOUTH EVERY EVENING    hydrochlorothiazide (HYDRODIURIL) 25 MG tablet TAKE ONE TABLET BY MOUTH ONCE DAILY    hydroCHLOROthiazide (HYDRODIURIL) 25 MG tablet TAKE ONE TABLET BY MOUTH EVERY DAY    insulin degludec (TRESIBA FLEXTOUCH U-200) 200 unit/mL (3 mL) InPn Inject 28 units nightly.     "ketoconazole (NIZORAL) 2 % shampoo Wash hair with medicated shampoo at least 2x/week - let sit on scalp at least 5 minutes prior to rinsing    lancets 33 gauge Misc To check BG 3 times daily, to use with insurance preferred meter    metFORMIN (GLUCOPHAGE-XR) 500 MG 24 hr tablet Take 1 tablet (500 mg total) by mouth 2 (two) times daily with meals.    naproxen sodium (ANAPROX) 275 MG tablet Take 1 tablet (275 mg total) by mouth every 12 (twelve) hours as needed (pain).    pen needle, diabetic (BD ULTRA-FINE MILAN PEN NEEDLES) 32 gauge x 5/32" Ndle Uses 1 time daily, on insulin injections    traZODone (DESYREL) 50 MG tablet Take 1 to 2 tablets ( mg total) by mouth every evening.     No current facility-administered medications on file prior to visit.        Review of patient's allergies indicates:   Allergen Reactions    Amlodipine Swelling    Erythromycin Anaphylaxis    Erythropoietin analogues Anaphylaxis    Pegasys [peginterferon ruben-2a] Anaphylaxis    Lisinopril Hives       OBJECTIVE:   Vital Signs:  Vitals:    02/13/20 1411   BP: 122/78   Pulse: 83   SpO2: 99%   Weight: 91 kg (200 lb 9.9 oz)   Height: 5' 6" (1.676 m)       No results found for this or any previous visit (from the past 24 hour(s)).      Physical Exam   Constitutional: She is well-developed, well-nourished, and in no distress.   HENT:   Head: Normocephalic and atraumatic.   Neck: Normal range of motion. Neck supple.   Cardiovascular: Normal rate, regular rhythm and normal heart sounds.   Genitourinary: Vulva exhibits lesion and tenderness.   Genitourinary Comments: Two 3cm abscess formation patient's perineum close to her vulva area. Tender to palpation.         Laboratory  Lab Results   Component Value Date    WBC 9.16 12/19/2019    HGB 14.2 12/19/2019    HCT 44.3 12/19/2019    MCV 88 12/19/2019     12/19/2019     @[id number](GLU,NA,K,Cl,CO2,BUN,Creatinine,Calcium,MG)@  Lab Results   Component Value Date    INR 0.9 " 11/29/2018    INR 0.9 10/09/2015     Lab Results   Component Value Date    HGBA1C 7.4 (H) 04/20/2019     No results for input(s): POCTGLUCOSE in the last 72 hours.    Diagnostic Results:  Labs: Reviewed    ASSESSMENT & PLAN:     Buck was seen today for recurrent skin infections. Patient does not appear to be septic denied of fever chill dizziness. Vital stable during the visit. Patient will not get charge for the visit and will get refer to gyn clinic.     Diagnoses and all orders for this visit:    Abscess  -     Ambulatory referral/consult to Gynecology; Future  -     Patient instructed to apply warm compress to the area and sitz bath.       Discussed with Dr. Garber - staff attestation to follow    LIVIA Felder MD  Internal Medicine PGY-2

## 2020-02-13 NOTE — PATIENT INSTRUCTIONS
Taking a Sitz Bath  A sitz bath is a type of therapy done by sitting in warm, shallow water. It can help soothe pain, itching, and other symptoms in the anal and genital areas. It can also help keep these areas clean if you cant take a bath or shower. Sitz is from the Indonesian word sitzen, which means to sit.  Why a sitz bath is done  A sitz bath helps clean and treat certain problems in the anal area, genital area, and the perineum. The perineum is the area between the anus and the vulva in women. In men, it is between the anus and the scrotum. A sitz bath helps increase blood flow to these areas and relax the muscles.  A sitz bath may be done to:  · Help ease pain and itching from hemorrhoids  · Help ease pain from an anal fissure  · Bathe and soothe the perineum after childbirth  · Clean and soothe the anal area or perineum after surgery  · Ease prostate pain during prostatitis or after a procedure  · Help ease period cramps  · Clean the anal and genital areas if you cant take a bath or shower  How a sitz bath is done  A sitz bath can be done in 2 ways: in a bathtub or using a sitz bath bowl.  In a bathtub  To take a sitz bath in a tub:  · Make sure your bathtub is clean. Fill a clean bathtub with 3 to 4 inches of warm water. In some cases, your healthcare provider may tell you to use cold water instead.  · Add salt or medicine to the water if advised by your healthcare provider.  · Gently lower yourself down into the bathtub and sit on the bottom of the tub. Dont get into the bath unless the water temperature is comfortable.  · Hold on to a railing. Or ask for help from a family member, friend, or caregiver if needed.  If you have a wound, the water may cause pain at first. But the pain should ease. Make sure the area that needs treating is under the water. You can bend your knees up to help expose the area that needs contact with the water.  Using a sitz bath bowl  A sitz bath bowl is a special plastic  container thats placed on a toilet. You can buy this in many drugstores and medical supply stores. To take a sitz bath this way:  · Lift the toilet lid and seat. Place a cleaned plastic sitz bath bowl on the rim of your toilet. Make sure the bowl is firmly in place and wont move around.  · Fill the sitz bath bowl with warm water from a pitcher or other container. The water should cover your perineum. Make sure the water temperature is comfortable.  · Add salt or medicine to the water if advised by your healthcare provider. Or follow the package instructions about how to fill the bowl. Some kits come with a plastic bag and tubing. This lets you stream water into the bowl and at the area of your body that needs treatment. The bowl may have a slot or hole in the back. This lets water flow out so it doesnt overflow onto the floor. If there is no hole, be careful not to fill the bowl too full.  · Gently sit down on the sitz bath bowl. Hold on to a railing. Or ask for help from a family member, friend, or caregiver if needed.  If you have a wound, the water may cause pain at first, but the pain should ease. Make sure the area that needs treating is under the water.  For any type of sitz bath:  1. Sit in the water for 10 to 20 minutes.  2. Add more warm water as needed to keep the water comfortable.  3. Get up slowly from the tub or toilet. You may feel lightheaded or dizzy. Hold on to a railing. Or ask for help from a family member, friend, or caregiver if needed.  4. Gently pat your anal area, perineum, and genitals dry with a clean towel. Dont rub the area.  5. Wash your hands. Put any ointment or cream on the area, if advised.  6. Wash the bathtub or sitz bath bowl with soap and water after each use.  7. Use a sitz bath 2 to 3 times a day, or as often as your healthcare provider advises.  When to call your healthcare provider  Call your healthcare provider right away if you have any of these:  · Fever of 100.4°F  (38°C) or higher, or as directed  · Redness, swelling, or fluid leaking from a cut (incision) that gets worse  · Pain that gets worse  · Symptoms that dont get better, or get worse  · New symptoms   Date Last Reviewed: 5/1/2016  © 0521-6637 [employer]. [address] [place] [address], [place] [address]. All rights reserved. This information is not intended as a substitute for professional medical care. Always follow your healthcare professional's instructions.

## 2020-02-14 ENCOUNTER — OFFICE VISIT (OUTPATIENT)
Dept: OBSTETRICS AND GYNECOLOGY | Facility: CLINIC | Age: 75
End: 2020-02-14
Payer: MEDICARE

## 2020-02-14 ENCOUNTER — TELEPHONE (OUTPATIENT)
Dept: OBSTETRICS AND GYNECOLOGY | Facility: CLINIC | Age: 75
End: 2020-02-14

## 2020-02-14 VITALS
SYSTOLIC BLOOD PRESSURE: 130 MMHG | BODY MASS INDEX: 32.13 KG/M2 | WEIGHT: 199.94 LBS | HEIGHT: 66 IN | DIASTOLIC BLOOD PRESSURE: 88 MMHG

## 2020-02-14 DIAGNOSIS — L02.91 ABSCESS: ICD-10-CM

## 2020-02-14 PROCEDURE — 3075F SYST BP GE 130 - 139MM HG: CPT | Mod: CPTII,S$GLB,, | Performed by: NURSE PRACTITIONER

## 2020-02-14 PROCEDURE — 99999 PR PBB SHADOW E&M-EST. PATIENT-LVL IV: CPT | Mod: PBBFAC,,, | Performed by: NURSE PRACTITIONER

## 2020-02-14 PROCEDURE — 1101F PT FALLS ASSESS-DOCD LE1/YR: CPT | Mod: CPTII,S$GLB,, | Performed by: NURSE PRACTITIONER

## 2020-02-14 PROCEDURE — 1159F PR MEDICATION LIST DOCUMENTED IN MEDICAL RECORD: ICD-10-PCS | Mod: S$GLB,,, | Performed by: NURSE PRACTITIONER

## 2020-02-14 PROCEDURE — 99204 PR OFFICE/OUTPT VISIT, NEW, LEVL IV, 45-59 MIN: ICD-10-PCS | Mod: S$GLB,,, | Performed by: NURSE PRACTITIONER

## 2020-02-14 PROCEDURE — 99204 OFFICE O/P NEW MOD 45 MIN: CPT | Mod: S$GLB,,, | Performed by: NURSE PRACTITIONER

## 2020-02-14 PROCEDURE — 1159F MED LIST DOCD IN RCRD: CPT | Mod: S$GLB,,, | Performed by: NURSE PRACTITIONER

## 2020-02-14 PROCEDURE — 3079F DIAST BP 80-89 MM HG: CPT | Mod: CPTII,S$GLB,, | Performed by: NURSE PRACTITIONER

## 2020-02-14 PROCEDURE — 1126F PR PAIN SEVERITY QUANTIFIED, NO PAIN PRESENT: ICD-10-PCS | Mod: S$GLB,,, | Performed by: NURSE PRACTITIONER

## 2020-02-14 PROCEDURE — 99999 PR PBB SHADOW E&M-EST. PATIENT-LVL IV: ICD-10-PCS | Mod: PBBFAC,,, | Performed by: NURSE PRACTITIONER

## 2020-02-14 PROCEDURE — 1126F AMNT PAIN NOTED NONE PRSNT: CPT | Mod: S$GLB,,, | Performed by: NURSE PRACTITIONER

## 2020-02-14 PROCEDURE — 3079F PR MOST RECENT DIASTOLIC BLOOD PRESSURE 80-89 MM HG: ICD-10-PCS | Mod: CPTII,S$GLB,, | Performed by: NURSE PRACTITIONER

## 2020-02-14 PROCEDURE — 1101F PR PT FALLS ASSESS DOC 0-1 FALLS W/OUT INJ PAST YR: ICD-10-PCS | Mod: CPTII,S$GLB,, | Performed by: NURSE PRACTITIONER

## 2020-02-14 PROCEDURE — 3075F PR MOST RECENT SYSTOLIC BLOOD PRESS GE 130-139MM HG: ICD-10-PCS | Mod: CPTII,S$GLB,, | Performed by: NURSE PRACTITIONER

## 2020-02-14 RX ORDER — SULFAMETHOXAZOLE AND TRIMETHOPRIM 800; 160 MG/1; MG/1
1 TABLET ORAL DAILY
Qty: 5 TABLET | Refills: 0 | Status: SHIPPED | OUTPATIENT
Start: 2020-02-14 | End: 2020-02-22

## 2020-02-14 NOTE — PROGRESS NOTES
History & Physical  Gynecology      SUBJECTIVE:     Chief Complaint: Vaginal Boil       History of Present Illness: Patient presents to the [facility] for evaluation of vaginal abscess that began 3 days ago. Reports region of erythema, swelling, and tenderness at left perineal area that gradually worsened over 3 days. She noted purulent drainage and blood with wiping 2 days ago at the area. She was seen by internist yesterday who diagnosed the patient with an abscess with referral to GYN for possible I&D due to site. She denies body aches, fever/chills, nausea/vomiting, purulent drainage from the area, or tenderness to site. History of CKD II, headaches, HLD, HTN, Carotid artery disease, hep C, GCA, DM2, tobacco use and insomnia.        Review of patient's allergies indicates:   Allergen Reactions    Amlodipine Swelling    Erythromycin Anaphylaxis    Erythropoietin analogues Anaphylaxis    Pegasys [peginterferon ruben-2a] Anaphylaxis    Lisinopril Hives       Past Medical History:   Diagnosis Date    Allergy     Cataract     Diabetes mellitus type II     Gastritis     with gastric ulcer    GERD (gastroesophageal reflux disease)     H. pylori infection     History of hepatitis C, SVR as of 8-2016 8/25/2015    [facility] 12 wks completed.  SVR(12) as of 8/4/16 SVR(24) as of 10-     Hyperlipidemia     Hypertension     Osteopenia     Type 2 diabetes mellitus with diabetic polyneuropathy      Past Surgical History:   Procedure Laterality Date    COLONOSCOPY      COLONOSCOPY N/A 1/28/2016    Procedure: COLONOSCOPY;  Surgeon: Emeka Ahn MD;  Location: [address]);  Service: Endoscopy;  Laterality: N/A;    COLONOSCOPY N/A 8/8/2019    Procedure: COLONOSCOPY;  Surgeon: Susan Dia MD;  Location: [facility] ([address] [place] [address]);  Service: Endoscopy;  Laterality: N/A;  appt confirmed-rb    HYSTERECTOMY      LIVER BIOPSY      OOPHORECTOMY      UPPER GASTROINTESTINAL ENDOSCOPY       OB History         5    Para   5    Term   5            AB        Living           SAB        TAB        Ectopic        Multiple        Live Births                   Family History   Problem Relation Age of Onset    Heart disease Mother     Diabetes Mother     Heart disease Father     Cancer Sister         breast cancer    Diabetes Sister     Cancer Sister 70        colon CA    Diabetes Sister     Breast cancer Sister     Diabetes Sister     Glaucoma Neg Hx     Melanoma Neg Hx     Cirrhosis Neg Hx     Psoriasis Neg Hx     Lupus Neg Hx      Social History     Tobacco Use    Smoking status: Current Every Day Smoker     Packs/day: 0.25     Years: 48.00     Pack years: 12.00     Last attempt to quit: 2016     Years since quitting: 3.4    Smokeless tobacco: Never Used   Substance Use Topics    Alcohol use: No     Comment: special occasions    Drug use: No       Current Outpatient Medications   Medication Sig    albuterol (PROVENTIL/VENTOLIN HFA) 90 mcg/actuation inhaler Inhale 1-2 puffs into the lungs every 6 (six) hours as needed for Wheezing. Rescue    ALPRAZolam (XANAX) 0.5 MG tablet Take 1 tablet (0.5 mg total) by mouth once as needed for Anxiety. Take one 0.5 mg tablet of xanax 1h before the procedure.  You may take a second tablet right before the procedure; please check with our nurse.    aspirin (ECOTRIN) 81 MG EC tablet Take 1 tablet (81 mg total) by mouth once daily.    atorvastatin (LIPITOR) 80 MG tablet Take 1 tablet (80 mg total) by mouth once daily. DOSE CHANGE    blood sugar diagnostic Strp To check BG 3 times daily, to use with insurance preferred meter    blood-glucose meter kit To check BG 3 times daily, to use with insurance preferred meter    candesartan (ATACAND) 32 MG tablet Take 1 tablet (32 mg total) by mouth once daily.    ciclopirox (PENLAC) 8 % Soln Apply topically nightly.    clotrimazole-betamethasone 1-0.05% (LOTRISONE) cream Apply topically 2 (two) times daily.  "   dulaglutide (TRULICITY) 1.5 mg/0.5 mL PnIj Inject 1.5 mg into the skin once weekly.    ergocalciferol (VITAMIN D2) 50,000 unit Cap Take 1 capsule (50,000 Units total) by mouth every 7 days.    esomeprazole (NEXIUM) 40 MG capsule Take one capsule by mouth daily as needed for acid reflux    fluocinonide (LIDEX) 0.05 % external solution AAA scalp qd prn pruritus    gabapentin (NEURONTIN) 300 MG capsule TAKE ONE CAPSULE BY MOUTH EVERY EVENING    hydrochlorothiazide (HYDRODIURIL) 25 MG tablet TAKE ONE TABLET BY MOUTH ONCE DAILY    hydroCHLOROthiazide (HYDRODIURIL) 25 MG tablet TAKE ONE TABLET BY MOUTH EVERY DAY    insulin degludec (TRESIBA FLEXTOUCH U-200) 200 unit/mL (3 mL) InPn Inject 28 units nightly.    ketoconazole (NIZORAL) 2 % shampoo Wash hair with medicated shampoo at least 2x/week - let sit on scalp at least 5 minutes prior to rinsing    lancets 33 gauge Misc To check BG 3 times daily, to use with insurance preferred meter    metFORMIN (GLUCOPHAGE-XR) 500 MG 24 hr tablet Take 1 tablet (500 mg total) by mouth 2 (two) times daily with meals.    naproxen sodium (ANAPROX) 275 MG tablet Take 1 tablet (275 mg total) by mouth every 12 (twelve) hours as needed (pain).    pen needle, diabetic (BD ULTRA-FINE MILAN PEN NEEDLES) 32 gauge x 5/32" Ndle Uses 1 time daily, on insulin injections    sulfamethoxazole-trimethoprim 800-160mg (BACTRIM DS) 800-160 mg Tab Take 1 tablet by mouth once daily. for 5 days    traZODone (DESYREL) 50 MG tablet Take 1 to 2 tablets ( mg total) by mouth every evening.     No current facility-administered medications for this visit.          Review of Systems:  Review of Systems   Constitutional: Negative for activity change, appetite change, chills, fatigue, fever and unexpected weight change.   HENT: Negative for nasal congestion and mouth sores.    Eyes: Negative for discharge and visual disturbance.   Respiratory: Negative for shortness of breath and wheezing.  "   Cardiovascular: Negative for chest pain, palpitations and leg swelling.   Gastrointestinal: Negative for abdominal pain, constipation, diarrhea, nausea, vomiting and reflux.   Endocrine: Negative for diabetes, hair loss, hot flashes, hyperthyroidism and hypothyroidism.   Genitourinary: Negative for bladder incontinence, decreased libido, dysmenorrhea, dyspareunia, dysuria, flank pain, frequency, genital sores, hematuria, hot flashes, menorrhagia, menstrual problem, pelvic pain, urgency, vaginal bleeding, vaginal discharge, vaginal pain, urinary incontinence, postcoital bleeding, postmenopausal bleeding, vaginal dryness and vaginal odor.   Musculoskeletal: Negative for arthralgias, back pain, joint swelling, leg pain and myalgias.   Integumentary:  Negative for rash, acne, hair changes, mole/lesion, breast mass, nipple discharge, breast skin changes and breast tenderness.        Abscess   Neurological: Negative for vertigo, seizures, syncope, numbness and headaches.   Hematological: Negative for adenopathy. Does not bruise/bleed easily.   Psychiatric/Behavioral: Negative for depression and sleep disturbance. The patient is not nervous/anxious.    Breast: Negative for asymmetry, breast self exam, lump, mass, mastodynia, nipple discharge, skin changes and tenderness       OBJECTIVE:     Physical Exam:  Physical Exam   Constitutional: She is oriented to person, place, and time. She appears well-developed and well-nourished. No distress.   HENT:   Head: Normocephalic and atraumatic.   Nose: Nose normal.   Mouth/Throat: Oropharynx is clear and moist.   Eyes: Pupils are equal, round, and reactive to light. Conjunctivae and EOM are normal.   Neck: Normal range of motion. Neck supple. No JVD present. No tracheal deviation present. No thyromegaly present.   Cardiovascular: Normal rate, regular rhythm, normal heart sounds and intact distal pulses. Exam reveals no gallop and no friction rub.   No murmur  heard.  Pulmonary/Chest: Effort normal and breath sounds normal. No stridor. No respiratory distress. She has no wheezes. She has no rales. She exhibits no tenderness.   Abdominal: Soft. Bowel sounds are normal. She exhibits no distension and no mass. There is no tenderness. There is no rebound and no guarding. No hernia.   Genitourinary:       There is no rash, tenderness, lesion or injury on the right labia. There is lesion on the left labia. There is no rash, tenderness or injury on the left labia.   Musculoskeletal: Normal range of motion. She exhibits no edema or tenderness.   Lymphadenopathy:     She has no cervical adenopathy.   Neurological: She is alert and oriented to person, place, and time. She displays normal reflexes. No sensory deficit. She exhibits normal muscle tone. Coordination normal.   Skin: Skin is warm and dry. Capillary refill takes less than 2 seconds. No rash noted. She is not diaphoretic. No erythema. No pallor.   Psychiatric: She has a normal mood and affect. Her behavior is normal. Judgment and thought content normal.   Nursing note and vitals reviewed.        ASSESSMENT:       ICD-10-CM ICD-9-CM    1. Abscess L02.91 682.9 Ambulatory referral/consult to Gynecology      sulfamethoxazole-trimethoprim 800-160mg (BACTRIM DS) 800-160 mg Tab          Plan:      Non purulent abscess without fluctuance noted to left inferior labia majora without tenderness, surrounding erythema/swelling, or exudate/drainage. No I&D procedure performed, based on HPI, abscess lanced spontaneously PTA. Due to Erythromycin allergy, will place on Bactrim DS 1 tablet x 5 days, most recent GFR >30, no reported sulfa allergy, or use of ARB or ACE inhibitor. Encouraged to place warm compresses to site, sitz baths. ED precautions given, will FU on 2/19/20 for wound recheck and WWE.  Counseling time: 15 minutes      SWETA Rosario

## 2020-02-14 NOTE — TELEPHONE ENCOUNTER
----- Message from Ami Gracia NP sent at 2/14/2020  9:29 AM CST -----  Regarding: Wound Recheck and Annual Exam  Please schedule patient for wound recheck and annual exam on 2/19/20.    Thanks,  Ami

## 2020-02-14 NOTE — LETTER
February 14, 2020      Jeanne Felder MD  [address]  [facility] [address]           [facility] [address]  [address]  [facility] [address]  Phone: [phone]  Fax: [phone]          Patient: Buck Ibarra   MR Number: [id number]   YOB: 1945   Date of Visit: 2/14/2020       Dear Dr. Jeanne Felder:    Thank you for referring Buck Ibarra to me for evaluation. Attached you will find relevant portions of my assessment and plan of care.    If you have questions, please do not hesitate to call me. I look forward to following Buck Ibarra along with you.    Sincerely,    Ami Gracia, KEYLA    Enclosure  CC:  No Recipients    If you would like to receive this communication electronically, please contact [email or web address] or [phone] to request more information on "[employer] Link access.    For providers and/or their staff who would like to refer a patient to [facility], please contact us through our one-stop-shop provider referral line, [facility], at [phone].    If you feel you have received this communication in error or would no longer like to receive these types of communications, please e-mail [email or web address]

## 2020-02-17 NOTE — PROGRESS NOTES
I have reviewed and concur with the resident's history, physical, assessment, and plan.  I have personally interviewed and examined the patient at bedside.  See below addendum for my evaluation and additional findings.  Pt. With type 2 DM with abscess on left labia agree to refer to Gynecology for possible I&D.

## 2020-02-18 ENCOUNTER — PATIENT OUTREACH (OUTPATIENT)
Dept: ADMINISTRATIVE | Facility: OTHER | Age: 75
End: 2020-02-18

## 2020-02-19 ENCOUNTER — OFFICE VISIT (OUTPATIENT)
Dept: OBSTETRICS AND GYNECOLOGY | Facility: CLINIC | Age: 75
End: 2020-02-19
Payer: MEDICARE

## 2020-02-19 VITALS
DIASTOLIC BLOOD PRESSURE: 80 MMHG | SYSTOLIC BLOOD PRESSURE: 130 MMHG | WEIGHT: 200.63 LBS | HEIGHT: 66 IN | BODY MASS INDEX: 32.24 KG/M2

## 2020-02-19 DIAGNOSIS — N94.89 OTHER SPECIFIED CONDITIONS ASSOCIATED WITH FEMALE GENITAL ORGANS AND MENSTRUAL CYCLE: ICD-10-CM

## 2020-02-19 DIAGNOSIS — Z12.39 BREAST CANCER SCREENING: ICD-10-CM

## 2020-02-19 DIAGNOSIS — Z12.31 ENCOUNTER FOR SCREENING MAMMOGRAM FOR MALIGNANT NEOPLASM OF BREAST: ICD-10-CM

## 2020-02-19 DIAGNOSIS — Z13.820 OSTEOPOROSIS SCREENING: ICD-10-CM

## 2020-02-19 DIAGNOSIS — Z78.0 ASYMPTOMATIC MENOPAUSAL STATE: ICD-10-CM

## 2020-02-19 DIAGNOSIS — Z01.419 WOMEN'S ANNUAL ROUTINE GYNECOLOGICAL EXAMINATION: Primary | ICD-10-CM

## 2020-02-19 LAB
CANDIDA RRNA VAG QL PROBE: NEGATIVE
G VAGINALIS RRNA GENITAL QL PROBE: NEGATIVE
T VAGINALIS RRNA GENITAL QL PROBE: NEGATIVE

## 2020-02-19 PROCEDURE — 87510 GARDNER VAG DNA DIR PROBE: CPT

## 2020-02-19 PROCEDURE — 87624 HPV HI-RISK TYP POOLED RSLT: CPT

## 2020-02-19 PROCEDURE — 99999 PR PBB SHADOW E&M-EST. PATIENT-LVL IV: CPT | Mod: PBBFAC,,, | Performed by: NURSE PRACTITIONER

## 2020-02-19 PROCEDURE — 99999 PR PBB SHADOW E&M-EST. PATIENT-LVL IV: ICD-10-PCS | Mod: PBBFAC,,, | Performed by: NURSE PRACTITIONER

## 2020-02-19 PROCEDURE — 87491 CHLMYD TRACH DNA AMP PROBE: CPT

## 2020-02-19 PROCEDURE — 87480 CANDIDA DNA DIR PROBE: CPT

## 2020-02-19 PROCEDURE — 88175 CYTOPATH C/V AUTO FLUID REDO: CPT

## 2020-02-19 NOTE — PROGRESS NOTES
CC: Annual  HPI: Pt is a 74 y.o.  female who presents for routine annual exam. She uses nothing for contraception, hysterectomy 30 years ago- reports performed due to cervical dysplasia. She does want STD screening.  Denies any GYN complaints.  The patient participates in regular exercise: No.  The patient does smoke.  The patient wears seatbelts.   Pt denies any domestic violence.    History of HTN, CKD II, GERD, vitamin D deficiency. Hysterectomy 30 years ago, believes due to cervical dysplasia, last pap smear unknown. Mammogram in 2019 with normal result. Bone density scan in 2018 with normal result. She is currently taking vitamin D supplements weekly. Scheduled for FU with her PCP in March.     Recently seen for left vulvar abscess, compliant with prescribed Bactrim. Reports significant improvement of the site.      FH:  Breast cancer: none  Colon cancer: none  Ovarian cancer: none  Endometrial cancer: none    ROS:  GENERAL: Feeling well overall. Denies fever or chills.   SKIN: Denies rash or lesions.   HEAD: Denies head injury or headache.   NODES: Denies enlarged lymph nodes.   CHEST: Denies chest pain or shortness of breath.   CARDIOVASCULAR: Denies palpitations or left sided chest pain.   ABDOMEN: No abdominal pain, constipation, diarrhea, nausea, vomiting or rectal bleeding.   URINARY: No dysuria, hematuria, or burning on urination.  REPRODUCTIVE: See HPI.   BREASTS: Denies pain, lumps, or nipple discharge.   HEMATOLOGIC: No easy bruisability or excessive bleeding.   MUSCULOSKELETAL: Denies joint pain or swelling.   NEUROLOGIC: Denies syncope or weakness.   PSYCHIATRIC: Denies depression, anxiety or mood swings.    PE:   APPEARANCE: Well nourished, well developed, Black or  female in no acute distress.  NODES: no cervical, supraclavicular, or inguinal lymphadenopathy  BREASTS: Symmetrical, no skin changes or visible lesions. No palpable masses, nipple discharge or adenopathy  bilaterally.  ABDOMEN: Soft. No tenderness or masses. No distention. No hernias palpated. No CVA tenderness.  VULVA: No lesions. Normal external female genitalia. No fluctuance/induration, swelling, or erythema noted. Moderate atrophy.  URETHRAL MEATUS: Normal size and location, no lesions, no prolapse.  URETHRA: No masses, tenderness, or prolapse.  VAGINA: Moist. No lesions or lacerations noted. No abnormal discharge present. No odor present. Moderate atrophy.  CERVIX: Surgically absent.  UTERUS: Surgically absent  ADNEXA: No tenderness. No fullness or masses palpated in the adnexal regions.   ANUS PERINEUM: Normal.      Diagnosis:  1. Women's annual routine gynecological examination    2. Breast cancer screening    3. Osteoporosis screening    4. Other specified conditions associated with female genital organs and menstrual cycle     5. Encounter for screening mammogram for malignant neoplasm of breast     6. Asymptomatic menopausal state         Plan:     Orders Placed This Encounter    HPV High Risk Genotypes, PCR    C. trachomatis/N. gonorrhoeae by AMP DNA [facility]; Cervicovaginal    Vaginosis Screen by DNA Probe    Mammo Digital Screening Bilat w/ Girish    DXA Bone Density Spine And Hip    Liquid-Based Pap Smear, Screening     HPV/Pap  GCCT/Affirm    Mammo/bone density scan ordered and scheduled.    Instructed to continue her vitamin D supplementation and educated regarding dietary sources of calcium.    Patient was counseled today on the new ACS guidelines for cervical cytology screening as well as the current recommendations for breast cancer screening. She was counseled to follow up with her PCP for other routine health maintenance. Counseling session lasted approximately 10 minutes, and all her questions were answered.    Follow-up with me in 1 year for routine exam      SWETA Kohler

## 2020-02-20 ENCOUNTER — OFFICE VISIT (OUTPATIENT)
Dept: OPTOMETRY | Facility: CLINIC | Age: 75
End: 2020-02-20
Payer: MEDICARE

## 2020-02-20 ENCOUNTER — TELEPHONE (OUTPATIENT)
Dept: OBSTETRICS AND GYNECOLOGY | Facility: CLINIC | Age: 75
End: 2020-02-20

## 2020-02-20 DIAGNOSIS — H02.413 MECHANICAL PTOSIS OF EYELID OF BOTH EYES: ICD-10-CM

## 2020-02-20 DIAGNOSIS — H52.4 PRESBYOPIA OF BOTH EYES: ICD-10-CM

## 2020-02-20 DIAGNOSIS — H25.13 NUCLEAR SCLEROSIS, BILATERAL: ICD-10-CM

## 2020-02-20 DIAGNOSIS — Z01.00 DIABETIC EYE EXAM: Primary | ICD-10-CM

## 2020-02-20 DIAGNOSIS — E11.9 TYPE 2 DIABETES MELLITUS WITHOUT RETINOPATHY: ICD-10-CM

## 2020-02-20 DIAGNOSIS — H26.9 CORTICAL CATARACT OF BOTH EYES: ICD-10-CM

## 2020-02-20 DIAGNOSIS — E11.9 DIABETIC EYE EXAM: Primary | ICD-10-CM

## 2020-02-20 DIAGNOSIS — H52.03 HYPERMETROPIA OF BOTH EYES: ICD-10-CM

## 2020-02-20 LAB
C TRACH DNA SPEC QL NAA+PROBE: NOT DETECTED
N GONORRHOEA DNA SPEC QL NAA+PROBE: NOT DETECTED

## 2020-02-20 PROCEDURE — 99999 PR PBB SHADOW E&M-EST. PATIENT-LVL III: ICD-10-PCS | Mod: PBBFAC,,, | Performed by: OPTOMETRIST

## 2020-02-20 PROCEDURE — 92015 DETERMINE REFRACTIVE STATE: CPT | Mod: S$GLB,,, | Performed by: OPTOMETRIST

## 2020-02-20 PROCEDURE — 92015 PR REFRACTION: ICD-10-PCS | Mod: S$GLB,,, | Performed by: OPTOMETRIST

## 2020-02-20 PROCEDURE — 92014 PR EYE EXAM, EST PATIENT,COMPREHESV: ICD-10-PCS | Mod: S$GLB,,, | Performed by: OPTOMETRIST

## 2020-02-20 PROCEDURE — 92014 COMPRE OPH EXAM EST PT 1/>: CPT | Mod: S$GLB,,, | Performed by: OPTOMETRIST

## 2020-02-20 PROCEDURE — 99999 PR PBB SHADOW E&M-EST. PATIENT-LVL III: CPT | Mod: PBBFAC,,, | Performed by: OPTOMETRIST

## 2020-02-20 NOTE — PROGRESS NOTES
HPI     Concerns About Ocular Health      Additional comments: Notes decreased VA - thinks she needs cataract   surgery.              Comments     Patient states returns for diabetic eye exam. Pt complains of night   driving patient would like a cataracts removed. Patient sts sometimes her   vision feels like she may have a film over her eyes.      Patient's age: 74 y.o. AA female  Approximate date of last eye examination:  04/2019  Name of last eye doctor seen: Dr. Wasserman  Wears glasses? OTC reading glasses      If yes, wears  Full-time or part-time?  Part-time +2.50  Present glasses are: Bifocal, SV Distance, SV Reading?  SV  Got new glasses following last exam, or subsequently?:  no   Any problem with VA with glasses?  No   Wears CLs?:  no  Headaches?  no  Eye pain/discomfort?  No                                                                                   Flashes?  No   Floaters?  Occasionally    Diplopia/Double vision?  no  Patient's Ocular History:         Any eye surgeries? no         Any eye injury?  No          Any treatment for eye disease?  No   Family history of eye disease?  Mother: detached retina and cataracts  Significant patient medical history:         1. Diabetes?  Yes.  X 8years       If yes, IDDM or NIDDM? IDDM.    2. HBP?  Yes, controlled               3. Other (describe):  High cholesteral.  Takes meds.  States   well controlled   ! OTC eyedrops currently using:  no   ! Prescription eye meds currently using:  no   ! Any history of allergy/adverse reaction to any eye meds used   previously?  no    ! Any history of allergy/adverse reaction to eyedrops used during prior   eye exam(s)? no    ! Any history of allergy/adverse reaction to Novacaine or similar meds?   no   ! Any history of allergy/adverse reaction to Epinephrine or similar meds?   no    ! Patient okay with use of anesthetic eyedrops to check eye pressure?    yes        ! Patient okay with use of eyedrops to dilate pupils  "today?  yes   !  Allergies/Medications/Medical History/Family History reviewed today?    yes      PD =   68/64  Desired reading distance =  15.5"                                                                       Last edited by Manjinder Wasserman, OD on 2/20/2020  8:16 AM. (History)            Assessment /Plan     For exam results, see Encounter Report.    1. Diabetic eye exam     2. Type 2 diabetes mellitus without retinopathy     3. Nuclear sclerosis, bilateral     4. Cortical cataract of both eyes     5. 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 Hypermetropia of both eyes     7. Presbyopia of both eyes                  Early nuclear sclerosis of lens of each eye, and early peripheral cortical cataract in both eyes.  Still no need for cataract surgery in either eye.    Otherwise, good ocular health in each eye.   No evidence of diabetic retinopathy.     Hyperopia in each eye, greater in the right eye than in the left eye,  with satisfactory best-correctable VA in each eye.  Presbyopia consistent with age.     Spectacle lens Rx issued for use as desired.   Ms. Ibarra previously noted "droopy" eyelids bilaterally.  Slight restriction of superior visual field on confrontation visual field, but no compelling need for ptosis repair surgery.  Advised to defer ptosis eyelid surgery until after cataract surgery done (when done)      Recheck in one year, or prior if any problems in the interim.       "

## 2020-02-20 NOTE — PATIENT INSTRUCTIONS
"Early nuclear sclerosis of lens of each eye, and early peripheral cortical cataract in both eyes.  Still no need for cataract surgery in either eye.    Otherwise, good ocular health in each eye.   No evidence of diabetic retinopathy.     Hyperopia in each eye, greater in the right eye than in the left eye,  with satisfactory best-correctable VA in each eye.  Presbyopia consistent with age.     Spectacle lens Rx issued for use as desired.   Ms. Ibarra previously noted "droopy" eyelids bilaterally.  Slight restriction of superior visual field on confrontation visual field, but no compelling need for ptosis repair surgery.  Advised to defer ptosis eyelid surgery until after cataract surgery done (when done)      Recheck in one year, or prior if any problems in the interim.   "

## 2020-02-26 LAB
HPV HR 12 DNA SPEC QL NAA+PROBE: NEGATIVE
HPV16 AG SPEC QL: NEGATIVE
HPV18 DNA SPEC QL NAA+PROBE: NEGATIVE

## 2020-03-04 DIAGNOSIS — E11.65 TYPE 2 DIABETES MELLITUS WITH HYPERGLYCEMIA, WITH LONG-TERM CURRENT USE OF INSULIN: Chronic | ICD-10-CM

## 2020-03-04 DIAGNOSIS — Z79.4 TYPE 2 DIABETES MELLITUS WITH HYPERGLYCEMIA, WITH LONG-TERM CURRENT USE OF INSULIN: Chronic | ICD-10-CM

## 2020-03-04 RX ORDER — METFORMIN HYDROCHLORIDE 500 MG/1
500 TABLET, EXTENDED RELEASE ORAL 2 TIMES DAILY WITH MEALS
Qty: 180 TABLET | Refills: 2 | Status: SHIPPED | OUTPATIENT
Start: 2020-03-04 | End: 2020-11-17 | Stop reason: SDUPTHER

## 2020-03-04 RX ORDER — GABAPENTIN 300 MG/1
CAPSULE ORAL
Qty: 90 CAPSULE | Refills: 3 | Status: SHIPPED | OUTPATIENT
Start: 2020-03-04 | End: 2021-03-19 | Stop reason: SDUPTHER

## 2020-03-22 LAB
FINAL PATHOLOGIC DIAGNOSIS: NORMAL
Lab: NORMAL

## 2020-03-23 ENCOUNTER — TELEPHONE (OUTPATIENT)
Dept: EMERGENCY MEDICINE | Facility: OTHER | Age: 75
End: 2020-03-23

## 2020-03-23 NOTE — TELEPHONE ENCOUNTER
Please call and inform pt that her pap smear was normal and her HPV screening was negative.     Thanks

## 2020-03-27 DIAGNOSIS — E55.9 VITAMIN D INSUFFICIENCY: ICD-10-CM

## 2020-03-27 NOTE — TELEPHONE ENCOUNTER
----- Message from Abby Nguyen sent at 3/27/2020  3:25 PM CDT -----  Contact: self   Pt states she would like Dr Cortez to prescribe a 2000 mg vitamin C to [facility] [phone] (Phone)  [phone] (Fax)     Please call and advise.

## 2020-03-29 RX ORDER — ERGOCALCIFEROL 1.25 MG/1
50000 CAPSULE ORAL
Qty: 12 CAPSULE | Refills: 3 | Status: SHIPPED | OUTPATIENT
Start: 2020-03-29 | End: 2021-05-17 | Stop reason: SDUPTHER

## 2020-05-04 ENCOUNTER — TELEPHONE (OUTPATIENT)
Dept: INTERNAL MEDICINE | Facility: CLINIC | Age: 75
End: 2020-05-04

## 2020-05-04 NOTE — TELEPHONE ENCOUNTER
----- Message from Neal Saavedra sent at 5/4/2020 11:02 AM CDT -----  Contact: pt   Pt would like a call back regarding scheduling audio call appointment   Pt can be reached at [phone]

## 2020-05-14 DIAGNOSIS — E11.69 DIABETES MELLITUS TYPE 2 IN OBESE: ICD-10-CM

## 2020-05-14 DIAGNOSIS — E66.9 DIABETES MELLITUS TYPE 2 IN OBESE: ICD-10-CM

## 2020-05-14 RX ORDER — ESOMEPRAZOLE MAGNESIUM 40 MG/1
CAPSULE, DELAYED RELEASE ORAL
Qty: 90 CAPSULE | Refills: 0 | Status: SHIPPED | OUTPATIENT
Start: 2020-05-14 | End: 2020-08-10 | Stop reason: SDUPTHER

## 2020-05-14 RX ORDER — INSULIN DEGLUDEC 200 U/ML
INJECTION, SOLUTION SUBCUTANEOUS
Qty: 15 ML | Refills: 2 | Status: SHIPPED | OUTPATIENT
Start: 2020-05-14 | End: 2020-06-11

## 2020-05-14 NOTE — TELEPHONE ENCOUNTER
Please call to let her know she needs an annual appt - can fit into July open slot. Also needs repeat a1c, lipid and CMP. Due for foot exam and urine.

## 2020-06-11 ENCOUNTER — OFFICE VISIT (OUTPATIENT)
Dept: INTERNAL MEDICINE | Facility: CLINIC | Age: 75
End: 2020-06-11
Payer: MEDICARE

## 2020-06-11 ENCOUNTER — TELEPHONE (OUTPATIENT)
Dept: INTERNAL MEDICINE | Facility: CLINIC | Age: 75
End: 2020-06-11

## 2020-06-11 ENCOUNTER — HOSPITAL ENCOUNTER (OUTPATIENT)
Dept: RADIOLOGY | Facility: HOSPITAL | Age: 75
Discharge: HOME | End: 2020-06-11
Attending: NURSE PRACTITIONER
Payer: MEDICARE

## 2020-06-11 ENCOUNTER — TELEPHONE (OUTPATIENT)
Dept: HEPATOLOGY | Facility: CLINIC | Age: 75
End: 2020-06-11

## 2020-06-11 DIAGNOSIS — Z86.19 HISTORY OF HEPATITIS C: ICD-10-CM

## 2020-06-11 DIAGNOSIS — E11.65 TYPE 2 DIABETES MELLITUS WITH HYPERGLYCEMIA, WITH LONG-TERM CURRENT USE OF INSULIN: Primary | Chronic | ICD-10-CM

## 2020-06-11 DIAGNOSIS — E66.9 DIABETES MELLITUS TYPE 2 IN OBESE: ICD-10-CM

## 2020-06-11 DIAGNOSIS — I10 BENIGN ESSENTIAL HYPERTENSION: ICD-10-CM

## 2020-06-11 DIAGNOSIS — K76.9 LIVER LESION: ICD-10-CM

## 2020-06-11 DIAGNOSIS — E78.5 HYPERLIPIDEMIA LDL GOAL <70: Chronic | ICD-10-CM

## 2020-06-11 DIAGNOSIS — E11.69 DIABETES MELLITUS TYPE 2 IN OBESE: ICD-10-CM

## 2020-06-11 DIAGNOSIS — E66.9 OBESITY (BMI 30-39.9): Chronic | ICD-10-CM

## 2020-06-11 DIAGNOSIS — Z79.4 TYPE 2 DIABETES MELLITUS WITH HYPERGLYCEMIA, WITH LONG-TERM CURRENT USE OF INSULIN: Primary | Chronic | ICD-10-CM

## 2020-06-11 LAB
CREAT SERPL-MCNC: 1 MG/DL (ref 0.5–1.4)
SAMPLE: NORMAL

## 2020-06-11 PROCEDURE — 74183 MRI ABD W/O CNTR FLWD CNTR: CPT | Mod: 26,,, | Performed by: RADIOLOGY

## 2020-06-11 PROCEDURE — A9585 GADOBUTROL INJECTION: HCPCS | Performed by: NURSE PRACTITIONER

## 2020-06-11 PROCEDURE — 99442 PR PHYSICIAN TELEPHONE EVALUATION 11-20 MIN: ICD-10-PCS | Mod: 95,,, | Performed by: NURSE PRACTITIONER

## 2020-06-11 PROCEDURE — 74183 MRI ABD W/O CNTR FLWD CNTR: CPT | Mod: TC

## 2020-06-11 PROCEDURE — 99442 PR PHYSICIAN TELEPHONE EVALUATION 11-20 MIN: CPT | Mod: 95,,, | Performed by: NURSE PRACTITIONER

## 2020-06-11 PROCEDURE — 74183 MRI ABDOMEN W WO CONTRAST: ICD-10-PCS | Mod: 26,,, | Performed by: RADIOLOGY

## 2020-06-11 PROCEDURE — 25500020 PHARM REV CODE 255: Performed by: NURSE PRACTITIONER

## 2020-06-11 RX ORDER — INSULIN DEGLUDEC 200 U/ML
INJECTION, SOLUTION SUBCUTANEOUS
Qty: 15 ML | Refills: 2
Start: 2020-06-11 | End: 2020-09-18 | Stop reason: SDUPTHER

## 2020-06-11 RX ORDER — INSULIN ASPART 100 [IU]/ML
INJECTION, SOLUTION INTRAVENOUS; SUBCUTANEOUS
Qty: 30 ML | Refills: 1 | Status: SHIPPED | OUTPATIENT
Start: 2020-06-11 | End: 2020-09-18

## 2020-06-11 RX ORDER — GADOBUTROL 604.72 MG/ML
10 INJECTION INTRAVENOUS
Status: COMPLETED | OUTPATIENT
Start: 2020-06-11 | End: 2020-06-11

## 2020-06-11 RX ORDER — PEN NEEDLE, DIABETIC 30 GX3/16"
NEEDLE, DISPOSABLE MISCELLANEOUS
Qty: 300 EACH | Refills: 3 | Status: SHIPPED | OUTPATIENT
Start: 2020-06-11 | End: 2022-03-02 | Stop reason: SDUPTHER

## 2020-06-11 RX ADMIN — GADOBUTROL 10 ML: 604.72 INJECTION INTRAVENOUS at 08:06

## 2020-06-11 NOTE — PATIENT INSTRUCTIONS
Snacks can be an important part of a balanced, healthy meal plan. They allow you to eat more frequently, feeling full and satisfied throughout the day. Also, they allow you to spread carbohydrates evenly, which may stabilize blood sugars.  Plus, snacks are enjoyable!     The amount of carbohydrate needed at snacks varies. Generally, about 15-30 grams of carbohydrate per snack is recommended.  Below you will find some tasty treats.       0-5 gm carb   Crystal Light   Vitamin Water Zero   Herbal tea, unsweetened   2 tsp peanut butter on celery   1./2 cup sugar-free jell-o   1 sugar-free popsicle   ¼ cup blueberries   8oz Blue Viola unsweetened almond milk   5 baby carrots & celery sticks, cucumbers, bell peppers dipped in ¼ cup salsa, 2Tbsp light ranch dressing or 2Tbsp plain Greek yogurt   10 Goldfish crackers   ½ oz low-fat cheese or string cheese   1 closed handful of nuts, unsalted   1 Tbsp of sunflower seeds, unsalted   1 cup Smart Pop popcorn   1 whole grain brown rice cake        15 gm carb   1 small piece of fruit or ½ banana or 1/2 cup lite canned fruit   3 octavia cracker squares   3 cups Smart Pop popcorn, top spray butter, Dodson lite salt or cinnamon and Truvia   5 Vanilla Wafers   ½ cup low fat, no added sugar ice cream or frozen yogurt (Blue bell, Blue Bunny, Weight Watchers, Skinny Cow)   ½ turkey, ham, or chicken sandwich   ½ c fruit with ½ c Cottage cheese   4-6 unsalted wheat crackers with 1 oz low fat cheese or 1 tbsp peanut butter    30-45 goldfish crackers (depending on flavor)    7-8 Mandaen mini brown rice cakes (caramel, apple cinnamon, chocolate)    12 Mandaen mini brown rice cakes (cheddar, bbq, ranch)    1/3 cup hummus dip with raw veg   1/2 whole wheat rose, 1Tbsp hummus   Mini Pizza (1/2 whole wheat English muffin, low-fat  cheese, tomato sauce)   100 calorie snack pack (Oreo, Chips Ahoy, Ritz Mix, Baked Cheetos)   4-6 oz. light or Greek Style yogurt  (Dago, Gaurang, [facility], [facility])   ½ cup sugar-free pudding     6 in. wheat tortilla or rose oven toasted chips (topped with spray butter flavoring, cinnamon, Truvia OR spray butter, garlic powder, chili powder)    18 BBQ Popchips (available at [employer], [employer], Fresh Market)

## 2020-06-11 NOTE — TELEPHONE ENCOUNTER
Called pt, pt asking about insulin(meal) when to take the insulin. Explained process for insulin. Pt verbalized understanding. Pt had no further questions.     ----- Message from Divina Avila sent at 6/11/2020  3:15 PM CDT -----  Contact: Patient [phone]  Patient needs instruction on how to take medication

## 2020-06-11 NOTE — TELEPHONE ENCOUNTER
Received call from Migdalia with [facility] with a critical lab value. Glucose 569. Read Back and Verified.    Provider Treva CALLAHANC notified.  Contact patient to see how she is feeling? Is she having weakness, dizziness, excessive thirst or urination. She needs to go to the ER and contact your Diabetic Dr (Endocrinologist) this afternoon.    Patient called. Glucose results given. Denies any of the symptoms. I feel fine.   Patient asked if she Fasted before getting the Labs done? Yes, I did because I did not want to have any High numbers.  Have you been taking your Diabetic medicines as prescribed? Yes, I have.  I cannot go to the ER because I do not have a ride.    Message relayed to Treva Catherine NP. She will reach out to her Endocrinologist.

## 2020-06-11 NOTE — PROGRESS NOTES
"Established Patient - Audio Only Telehealth Visit     The patient location is: home  Seen by hepatology today  Reports stress with pandemic  Sometimes misses tresiba  Currently on tresiba 20 units at night, trulicity 1.5 mg weekly, metformin xr 500 mg bid   BG on labs this am 569 mg/dl asymptomatic  Bg have been higher lately  Discussed onset/peak/moa for rapid analog/meal insulin    The chief complaint leading to consultation is: type 2 dm   Visit type: Virtual visit with audio only (telephone)  Total time spent with patient: 12 mins      The reason for the audio only service rather than synchronous audio and video virtual visit was related to technical difficulties or patient preference/necessity.     Each patient to whom I provide medical services by telemedicine is:  (1) informed of the relationship between the physician and patient and the respective role of any other health care provider with respect to management of the patient; and (2) notified that they may decline to receive medical services by telemedicine and may withdraw from such care at any time. Patient verbally consented to receive this service via voice-only telephone call.       HPI: Type 2 DM  Noncompliance at times    Past Medical History:   Diagnosis Date    Allergy     Cataract     Diabetes mellitus type II     Gastritis     with gastric ulcer    GERD (gastroesophageal reflux disease)     H. pylori infection     History of hepatitis C, SVR as of 8-2016 8/25/2015    Harvoni 12 wks completed.  SVR(12) as of 8/4/16 SVR(24) as of 10-     Hyperlipidemia     Hypertension     Osteopenia     Type 2 diabetes mellitus with diabetic polyneuropathy        Assessment and plan:    1. Type 2 diabetes mellitus with hyperglycemia, with long-term current use of insulin  pen needle, diabetic (BD ULTRA-FINE MILAN PEN NEEDLE) 32 gauge x 5/32" Ndle    Hemoglobin A1C    Basic metabolic panel   2. Diabetes mellitus type 2 in obese  insulin degludec " (TRESIBA FLEXTOUCH U-200) 200 unit/mL (3 mL) InPn   3. Obesity (BMI 30-39.9)     4. History of hepatitis C, SVR as of 8-2016      5. Benign essential hypertension     6. Hyperlipidemia LDL goal <70  Lipid Panel        1.-2. F/u in 3-4 mos   a1c next time   Add novolog 8 units w/ largest meal  Scale 180-230+2, 231-280+4, etc  Pen needles sent  Using [employer] voucher  Will have to change to humalog next visit per formulary   Increase tresiba to 25 units from 20 units   Continue metformin xr 500 mg bid  and max trulicity 1.5 mg weekly  Check sugar levels more often 3x a day  a1c goal less than 7.5%  3. Encourage weight loss, watch carb allowance daily   Encourage exercise  Watch coping mechanisms  4. F/u with hepatology  Had appt today 6/11/20  5. Continue med(s)  6.   Lab Results   Component Value Date    LDLCALC 110.2 01/24/2019     Above goal lipid next time                      This service was not originating from a related E/M service provided within the previous 7 days nor will  to an E/M service or procedure within the next 24 hours or my soonest available appointment.  Prevailing standard of care was able to be met in this audio-only visit.

## 2020-06-25 ENCOUNTER — HOSPITAL ENCOUNTER (OUTPATIENT)
Dept: RADIOLOGY | Facility: CLINIC | Age: 75
Discharge: HOME | End: 2020-06-25
Attending: NURSE PRACTITIONER
Payer: MEDICARE

## 2020-06-25 DIAGNOSIS — Z78.0 ASYMPTOMATIC MENOPAUSAL STATE: ICD-10-CM

## 2020-06-25 DIAGNOSIS — Z13.820 OSTEOPOROSIS SCREENING: ICD-10-CM

## 2020-06-25 PROCEDURE — 77080 DXA BONE DENSITY AXIAL: CPT | Mod: 26,,, | Performed by: INTERNAL MEDICINE

## 2020-06-25 PROCEDURE — 77080 DEXA BONE DENSITY SPINE HIP: ICD-10-PCS | Mod: 26,,, | Performed by: INTERNAL MEDICINE

## 2020-06-25 PROCEDURE — 77080 DXA BONE DENSITY AXIAL: CPT | Mod: TC

## 2020-07-07 ENCOUNTER — TELEPHONE (OUTPATIENT)
Dept: OBSTETRICS AND GYNECOLOGY | Facility: CLINIC | Age: 75
End: 2020-07-07

## 2020-07-07 NOTE — TELEPHONE ENCOUNTER
----- Message from Ami Gracia NP sent at 7/6/2020  8:38 AM CDT -----  Please call the patient and notify that her recent bone density scan showed improvement of the osteopenia of her spine and that the osteopenia of her hip is stable. She needs to continue the vitamin D supplementation and we will repeat her dexa scan in 2 years.    Thanks,  Ami

## 2020-07-08 ENCOUNTER — TELEPHONE (OUTPATIENT)
Dept: HEPATOLOGY | Facility: CLINIC | Age: 75
End: 2020-07-08

## 2020-07-08 NOTE — TELEPHONE ENCOUNTER
----- Message from Maribell Cortes sent at 7/8/2020 11:01 AM CDT -----  Regarding: Ramirez  Contact: Buck okvacs code emailed to her for virtual appt.      [phone]      Email address: [email or web address]     Labs are normal or stable and at goal except for a very low vitamin D level. Take high dosed Vitamin D weekly for 12 weeks along with taking additional 1000 units per day long term.  
Continue your other current medications

## 2020-08-10 RX ORDER — ESOMEPRAZOLE MAGNESIUM 40 MG/1
CAPSULE, DELAYED RELEASE ORAL
Qty: 90 CAPSULE | Refills: 3 | Status: SHIPPED | OUTPATIENT
Start: 2020-08-10 | End: 2022-08-18 | Stop reason: SDUPTHER

## 2020-09-10 ENCOUNTER — HOSPITAL ENCOUNTER (OUTPATIENT)
Dept: RADIOLOGY | Facility: HOSPITAL | Age: 75
Discharge: HOME | End: 2020-09-10
Attending: NURSE PRACTITIONER
Payer: MEDICARE

## 2020-09-10 VITALS — WEIGHT: 200.63 LBS | BODY MASS INDEX: 32.4 KG/M2

## 2020-09-10 DIAGNOSIS — Z12.39 BREAST CANCER SCREENING: ICD-10-CM

## 2020-09-10 DIAGNOSIS — Z12.31 ENCOUNTER FOR SCREENING MAMMOGRAM FOR MALIGNANT NEOPLASM OF BREAST: ICD-10-CM

## 2020-09-10 PROCEDURE — 77067 SCR MAMMO BI INCL CAD: CPT | Mod: 26,,, | Performed by: RADIOLOGY

## 2020-09-10 PROCEDURE — 77063 MAMMO DIGITAL SCREENING BILAT WITH TOMOSYNTHESIS_CAD: ICD-10-PCS | Mod: 26,,, | Performed by: RADIOLOGY

## 2020-09-10 PROCEDURE — 77067 SCR MAMMO BI INCL CAD: CPT | Mod: TC

## 2020-09-10 PROCEDURE — 77063 BREAST TOMOSYNTHESIS BI: CPT | Mod: 26,,, | Performed by: RADIOLOGY

## 2020-09-10 PROCEDURE — 77067 MAMMO DIGITAL SCREENING BILAT WITH TOMOSYNTHESIS_CAD: ICD-10-PCS | Mod: 26,,, | Performed by: RADIOLOGY

## 2020-09-11 ENCOUNTER — LAB VISIT (OUTPATIENT)
Dept: LAB | Facility: HOSPITAL | Age: 75
End: 2020-09-11
Attending: INTERNAL MEDICINE
Payer: MEDICARE

## 2020-09-11 DIAGNOSIS — E78.5 HYPERLIPIDEMIA LDL GOAL <70: Chronic | ICD-10-CM

## 2020-09-11 DIAGNOSIS — Z79.4 TYPE 2 DIABETES MELLITUS WITH HYPERGLYCEMIA, WITH LONG-TERM CURRENT USE OF INSULIN: Chronic | ICD-10-CM

## 2020-09-11 DIAGNOSIS — E11.65 TYPE 2 DIABETES MELLITUS WITH HYPERGLYCEMIA, WITH LONG-TERM CURRENT USE OF INSULIN: Chronic | ICD-10-CM

## 2020-09-11 LAB
ANION GAP SERPL CALC-SCNC: 8 MMOL/L (ref 8–16)
BUN SERPL-MCNC: 28 MG/DL (ref 8–23)
CALCIUM SERPL-MCNC: 9 MG/DL (ref 8.7–10.5)
CHLORIDE SERPL-SCNC: 100 MMOL/L (ref 95–110)
CHOLEST SERPL-MCNC: 139 MG/DL (ref 120–199)
CHOLEST/HDLC SERPL: 3.2 {RATIO} (ref 2–5)
CO2 SERPL-SCNC: 28 MMOL/L (ref 23–29)
CREAT SERPL-MCNC: 1 MG/DL (ref 0.5–1.4)
EST. GFR  (AFRICAN AMERICAN): >60 ML/MIN/1.73 M^2
EST. GFR  (NON AFRICAN AMERICAN): 56.4 ML/MIN/1.73 M^2
ESTIMATED AVG GLUCOSE: 258 MG/DL (ref 68–131)
GLUCOSE SERPL-MCNC: 250 MG/DL (ref 70–110)
HBA1C MFR BLD HPLC: 10.6 % (ref 4–5.6)
HDLC SERPL-MCNC: 43 MG/DL (ref 40–75)
HDLC SERPL: 30.9 % (ref 20–50)
LDLC SERPL CALC-MCNC: 84.2 MG/DL (ref 63–159)
NONHDLC SERPL-MCNC: 96 MG/DL
POTASSIUM SERPL-SCNC: 4.1 MMOL/L (ref 3.5–5.1)
SODIUM SERPL-SCNC: 136 MMOL/L (ref 136–145)
TRIGL SERPL-MCNC: 59 MG/DL (ref 30–150)

## 2020-09-11 PROCEDURE — 36415 COLL VENOUS BLD VENIPUNCTURE: CPT

## 2020-09-11 PROCEDURE — 83036 HEMOGLOBIN GLYCOSYLATED A1C: CPT

## 2020-09-11 PROCEDURE — 80048 BASIC METABOLIC PNL TOTAL CA: CPT

## 2020-09-11 PROCEDURE — 80061 LIPID PANEL: CPT

## 2020-09-16 DIAGNOSIS — Z79.4 TYPE 2 DIABETES MELLITUS WITH HYPERGLYCEMIA, WITH LONG-TERM CURRENT USE OF INSULIN: Chronic | ICD-10-CM

## 2020-09-16 DIAGNOSIS — E11.65 TYPE 2 DIABETES MELLITUS WITH HYPERGLYCEMIA, WITH LONG-TERM CURRENT USE OF INSULIN: Chronic | ICD-10-CM

## 2020-09-16 RX ORDER — ATORVASTATIN CALCIUM 80 MG/1
80 TABLET, FILM COATED ORAL DAILY
Qty: 90 TABLET | Refills: 3 | Status: SHIPPED | OUTPATIENT
Start: 2020-09-16 | End: 2020-09-17 | Stop reason: SDUPTHER

## 2020-09-17 ENCOUNTER — TELEPHONE (OUTPATIENT)
Dept: INTERNAL MEDICINE | Facility: CLINIC | Age: 75
End: 2020-09-17

## 2020-09-17 ENCOUNTER — OFFICE VISIT (OUTPATIENT)
Dept: INTERNAL MEDICINE | Facility: CLINIC | Age: 75
End: 2020-09-17
Payer: MEDICARE

## 2020-09-17 VITALS
TEMPERATURE: 99 F | SYSTOLIC BLOOD PRESSURE: 126 MMHG | OXYGEN SATURATION: 98 % | BODY MASS INDEX: 32.85 KG/M2 | HEART RATE: 62 BPM | DIASTOLIC BLOOD PRESSURE: 88 MMHG | WEIGHT: 204.38 LBS | HEIGHT: 66 IN

## 2020-09-17 DIAGNOSIS — Z79.4 TYPE 2 DIABETES MELLITUS WITH HYPERGLYCEMIA, WITH LONG-TERM CURRENT USE OF INSULIN: Chronic | ICD-10-CM

## 2020-09-17 DIAGNOSIS — E11.59 HYPERTENSION ASSOCIATED WITH DIABETES: ICD-10-CM

## 2020-09-17 DIAGNOSIS — I15.2 HYPERTENSION ASSOCIATED WITH DIABETES: ICD-10-CM

## 2020-09-17 DIAGNOSIS — E66.9 DIABETES MELLITUS TYPE 2 IN OBESE: Primary | ICD-10-CM

## 2020-09-17 DIAGNOSIS — E11.65 TYPE 2 DIABETES MELLITUS WITH HYPERGLYCEMIA, WITH LONG-TERM CURRENT USE OF INSULIN: Chronic | ICD-10-CM

## 2020-09-17 DIAGNOSIS — E11.69 HYPERLIPIDEMIA ASSOCIATED WITH TYPE 2 DIABETES MELLITUS: ICD-10-CM

## 2020-09-17 DIAGNOSIS — E11.69 DIABETES MELLITUS TYPE 2 IN OBESE: Primary | ICD-10-CM

## 2020-09-17 DIAGNOSIS — E78.5 HYPERLIPIDEMIA ASSOCIATED WITH TYPE 2 DIABETES MELLITUS: ICD-10-CM

## 2020-09-17 PROCEDURE — 3074F SYST BP LT 130 MM HG: CPT | Mod: CPTII,S$GLB,, | Performed by: INTERNAL MEDICINE

## 2020-09-17 PROCEDURE — 99214 OFFICE O/P EST MOD 30 MIN: CPT | Mod: S$GLB,,, | Performed by: INTERNAL MEDICINE

## 2020-09-17 PROCEDURE — 1126F AMNT PAIN NOTED NONE PRSNT: CPT | Mod: S$GLB,,, | Performed by: INTERNAL MEDICINE

## 2020-09-17 PROCEDURE — 99499 UNLISTED E&M SERVICE: CPT | Mod: S$GLB,,, | Performed by: INTERNAL MEDICINE

## 2020-09-17 PROCEDURE — 3074F PR MOST RECENT SYSTOLIC BLOOD PRESSURE < 130 MM HG: ICD-10-PCS | Mod: CPTII,S$GLB,, | Performed by: INTERNAL MEDICINE

## 2020-09-17 PROCEDURE — 1101F PT FALLS ASSESS-DOCD LE1/YR: CPT | Mod: CPTII,S$GLB,, | Performed by: INTERNAL MEDICINE

## 2020-09-17 PROCEDURE — 99999 PR PBB SHADOW E&M-EST. PATIENT-LVL V: ICD-10-PCS | Mod: PBBFAC,,, | Performed by: INTERNAL MEDICINE

## 2020-09-17 PROCEDURE — 1101F PR PT FALLS ASSESS DOC 0-1 FALLS W/OUT INJ PAST YR: ICD-10-PCS | Mod: CPTII,S$GLB,, | Performed by: INTERNAL MEDICINE

## 2020-09-17 PROCEDURE — 3079F DIAST BP 80-89 MM HG: CPT | Mod: CPTII,S$GLB,, | Performed by: INTERNAL MEDICINE

## 2020-09-17 PROCEDURE — 99214 PR OFFICE/OUTPT VISIT, EST, LEVL IV, 30-39 MIN: ICD-10-PCS | Mod: S$GLB,,, | Performed by: INTERNAL MEDICINE

## 2020-09-17 PROCEDURE — 3079F PR MOST RECENT DIASTOLIC BLOOD PRESSURE 80-89 MM HG: ICD-10-PCS | Mod: CPTII,S$GLB,, | Performed by: INTERNAL MEDICINE

## 2020-09-17 PROCEDURE — 3046F HEMOGLOBIN A1C LEVEL >9.0%: CPT | Mod: CPTII,S$GLB,, | Performed by: INTERNAL MEDICINE

## 2020-09-17 PROCEDURE — 3008F BODY MASS INDEX DOCD: CPT | Mod: CPTII,S$GLB,, | Performed by: INTERNAL MEDICINE

## 2020-09-17 PROCEDURE — 1126F PR PAIN SEVERITY QUANTIFIED, NO PAIN PRESENT: ICD-10-PCS | Mod: S$GLB,,, | Performed by: INTERNAL MEDICINE

## 2020-09-17 PROCEDURE — 3008F PR BODY MASS INDEX (BMI) DOCUMENTED: ICD-10-PCS | Mod: CPTII,S$GLB,, | Performed by: INTERNAL MEDICINE

## 2020-09-17 PROCEDURE — 1159F PR MEDICATION LIST DOCUMENTED IN MEDICAL RECORD: ICD-10-PCS | Mod: S$GLB,,, | Performed by: INTERNAL MEDICINE

## 2020-09-17 PROCEDURE — 3046F PR MOST RECENT HEMOGLOBIN A1C LEVEL > 9.0%: ICD-10-PCS | Mod: CPTII,S$GLB,, | Performed by: INTERNAL MEDICINE

## 2020-09-17 PROCEDURE — 1159F MED LIST DOCD IN RCRD: CPT | Mod: S$GLB,,, | Performed by: INTERNAL MEDICINE

## 2020-09-17 PROCEDURE — 99999 PR PBB SHADOW E&M-EST. PATIENT-LVL V: CPT | Mod: PBBFAC,,, | Performed by: INTERNAL MEDICINE

## 2020-09-17 PROCEDURE — 99499 RISK ADDL DX/OHS AUDIT: ICD-10-PCS | Mod: S$GLB,,, | Performed by: INTERNAL MEDICINE

## 2020-09-17 RX ORDER — ATORVASTATIN CALCIUM 80 MG/1
80 TABLET, FILM COATED ORAL DAILY
Qty: 90 TABLET | Refills: 3 | Status: SHIPPED | OUTPATIENT
Start: 2020-09-17 | End: 2021-05-20 | Stop reason: SDUPTHER

## 2020-09-17 NOTE — TELEPHONE ENCOUNTER
(Harvoni)     Patient is wanting to know when she was taking this medication and how long it has been since she has been taking the medication.

## 2020-09-17 NOTE — PROGRESS NOTES
"Subjective:       Patient ID: Buck Ibarra is a 74 y.o. female.    Chief Complaint: DM f/u     HPI   Has blood work done about a month ago. Told that her sugars were very high.   DM2 - tresiba 25u nightly, novolog 8-8-8 plus SSI, metformin xr 500mg BID, trulicity 1.5mg weekly.   Repeat a1c 9/11/20 10.6 (up from 7.4 4/2019)  Has appt w/ Graeme Rievra NP tomorrow at 9:30am.   Checking 3x/day. Since adding the novolog, pt's sugars are improved. Last night before bed time, sugar was 210. Did have an hypoglycemic ep of 46 last week (shakes and dizzy).   Decreased appetite overall but when she does eat, there are some carbs.   LDL 84.2 9/11/20. Atorvastatin 80mg daily.     HTN - candesartan 32mg daily, HCTZ 25mg daily.   Checks BP at home and about 120s/80s.  Amlodipine-->blisters in the mouth.    Review of Systems   Constitutional: Negative for chills and fever.   HENT: Negative for congestion, postnasal drip, rhinorrhea and sore throat.    Respiratory: Positive for cough (w/ the rhinorrhea. dry cough.). Negative for shortness of breath and wheezing.    Cardiovascular: Negative for chest pain, palpitations and leg swelling.   Gastrointestinal: Positive for diarrhea (had diarrhea for a day here and there.). Negative for abdominal pain, constipation, nausea and vomiting.   Endocrine: Negative for polydipsia (not even when her sugars were very high) and polyuria.   Genitourinary: Negative for dysuria, frequency and hematuria.   Musculoskeletal: Negative for arthralgias.   Skin: Negative for rash.   Neurological: Negative for dizziness, light-headedness and headaches.   Psychiatric/Behavioral: Negative for dysphoric mood. The patient is not nervous/anxious.          Objective:      Physical Exam    /88 (BP Location: Right arm, Patient Position: Sitting, BP Method: Large (Manual))   Pulse 62   Temp 98.5 °F (36.9 °C) (Oral)   Ht 5' 6" (1.676 m)   Wt 92.7 kg (204 lb 5.9 oz)   SpO2 98%   BMI 32.99 kg/m² "     GEN - A+OX4, NAD   HEENT - PERRL, EOMI, OP clear. MMM.   Neck - Thyroid nodule.   CV - RRR, no m/r   Chest - CTAB, no wheezing or rhonchi  Abd - S/NT/ND/+BS.   Ext - 2+BDP and radial pulses. No C/C/E.  Feet - no open lesions. Normal sensation to monofilament.   Neuro - 5/5 BUE and BLE strength. Normal gait.  Skin - No rash.    Labs reviewed.     Assessment/Plan     Buck was seen today for annual exam.    Diagnoses and all orders for this visit:    Diabetes mellitus type 2 in obese - cont current meds. F/u w/ Irielle tomorrow.   -     Microalbumin/creatinine urine ratio; Future    Hypertension associated with diabetes - Stable and controlled. Continue current medications.    Hyperlipidemia associated with type 2 diabetes mellitus  -     atorvastatin (LIPITOR) 80 MG tablet; Take 1 tablet (80 mg total) by mouth once daily. DOSE CHANGE    Type 2 diabetes mellitus with hyperglycemia, with long-term current use of insulin  -     atorvastatin (LIPITOR) 80 MG tablet; Take 1 tablet (80 mg total) by mouth once daily. DOSE CHANGE    Flu vaccine.    Follow up in about 3 months (around 12/17/2020).      Ericka Cortez MD  Department of Internal Medicine - [facility]  11:39 AM

## 2020-09-18 ENCOUNTER — IMMUNIZATION (OUTPATIENT)
Dept: INTERNAL MEDICINE | Facility: CLINIC | Age: 75
End: 2020-09-18
Payer: MEDICARE

## 2020-09-18 ENCOUNTER — OFFICE VISIT (OUTPATIENT)
Dept: INTERNAL MEDICINE | Facility: CLINIC | Age: 75
End: 2020-09-18
Payer: MEDICARE

## 2020-09-18 VITALS
SYSTOLIC BLOOD PRESSURE: 134 MMHG | OXYGEN SATURATION: 99 % | HEIGHT: 66 IN | BODY MASS INDEX: 32.71 KG/M2 | DIASTOLIC BLOOD PRESSURE: 80 MMHG | WEIGHT: 203.5 LBS | HEART RATE: 69 BPM

## 2020-09-18 DIAGNOSIS — N18.2 CKD STAGE 2 DUE TO TYPE 2 DIABETES MELLITUS: Chronic | ICD-10-CM

## 2020-09-18 DIAGNOSIS — E66.9 OBESITY (BMI 30-39.9): Chronic | ICD-10-CM

## 2020-09-18 DIAGNOSIS — I10 BENIGN ESSENTIAL HYPERTENSION: ICD-10-CM

## 2020-09-18 DIAGNOSIS — Z79.4 TYPE 2 DIABETES MELLITUS WITH DIABETIC POLYNEUROPATHY, WITH LONG-TERM CURRENT USE OF INSULIN: Primary | Chronic | ICD-10-CM

## 2020-09-18 DIAGNOSIS — E11.22 CKD STAGE 2 DUE TO TYPE 2 DIABETES MELLITUS: Chronic | ICD-10-CM

## 2020-09-18 DIAGNOSIS — E11.69 DIABETES MELLITUS TYPE 2 IN OBESE: ICD-10-CM

## 2020-09-18 DIAGNOSIS — Z72.0 TOBACCO USE: ICD-10-CM

## 2020-09-18 DIAGNOSIS — E66.9 DIABETES MELLITUS TYPE 2 IN OBESE: ICD-10-CM

## 2020-09-18 DIAGNOSIS — E78.5 HYPERLIPIDEMIA LDL GOAL <70: Chronic | ICD-10-CM

## 2020-09-18 DIAGNOSIS — E11.65 TYPE 2 DIABETES MELLITUS WITH HYPERGLYCEMIA, WITH LONG-TERM CURRENT USE OF INSULIN: Chronic | ICD-10-CM

## 2020-09-18 DIAGNOSIS — E11.42 TYPE 2 DIABETES MELLITUS WITH DIABETIC POLYNEUROPATHY, WITH LONG-TERM CURRENT USE OF INSULIN: Primary | Chronic | ICD-10-CM

## 2020-09-18 DIAGNOSIS — Z79.4 TYPE 2 DIABETES MELLITUS WITH HYPERGLYCEMIA, WITH LONG-TERM CURRENT USE OF INSULIN: Chronic | ICD-10-CM

## 2020-09-18 LAB — GLUCOSE SERPL-MCNC: 134 MG/DL (ref 70–110)

## 2020-09-18 PROCEDURE — 99214 PR OFFICE/OUTPT VISIT, EST, LEVL IV, 30-39 MIN: ICD-10-PCS | Mod: S$GLB,,, | Performed by: NURSE PRACTITIONER

## 2020-09-18 PROCEDURE — 1101F PR PT FALLS ASSESS DOC 0-1 FALLS W/OUT INJ PAST YR: ICD-10-PCS | Mod: CPTII,S$GLB,, | Performed by: NURSE PRACTITIONER

## 2020-09-18 PROCEDURE — 1126F PR PAIN SEVERITY QUANTIFIED, NO PAIN PRESENT: ICD-10-PCS | Mod: S$GLB,,, | Performed by: NURSE PRACTITIONER

## 2020-09-18 PROCEDURE — 82962 POCT GLUCOSE, HAND-HELD DEVICE: ICD-10-PCS | Mod: S$GLB,,, | Performed by: NURSE PRACTITIONER

## 2020-09-18 PROCEDURE — 3079F DIAST BP 80-89 MM HG: CPT | Mod: CPTII,S$GLB,, | Performed by: NURSE PRACTITIONER

## 2020-09-18 PROCEDURE — 99214 OFFICE O/P EST MOD 30 MIN: CPT | Mod: S$GLB,,, | Performed by: NURSE PRACTITIONER

## 2020-09-18 PROCEDURE — 3079F PR MOST RECENT DIASTOLIC BLOOD PRESSURE 80-89 MM HG: ICD-10-PCS | Mod: CPTII,S$GLB,, | Performed by: NURSE PRACTITIONER

## 2020-09-18 PROCEDURE — G0008 PR ADMIN INFLUENZA VIRUS VAC: ICD-10-PCS | Mod: S$GLB,,, | Performed by: INTERNAL MEDICINE

## 2020-09-18 PROCEDURE — 82962 GLUCOSE BLOOD TEST: CPT | Mod: S$GLB,,, | Performed by: NURSE PRACTITIONER

## 2020-09-18 PROCEDURE — 3075F PR MOST RECENT SYSTOLIC BLOOD PRESS GE 130-139MM HG: ICD-10-PCS | Mod: CPTII,S$GLB,, | Performed by: NURSE PRACTITIONER

## 2020-09-18 PROCEDURE — 99999 PR PBB SHADOW E&M-EST. PATIENT-LVL V: ICD-10-PCS | Mod: PBBFAC,,, | Performed by: NURSE PRACTITIONER

## 2020-09-18 PROCEDURE — 1159F PR MEDICATION LIST DOCUMENTED IN MEDICAL RECORD: ICD-10-PCS | Mod: S$GLB,,, | Performed by: NURSE PRACTITIONER

## 2020-09-18 PROCEDURE — 3075F SYST BP GE 130 - 139MM HG: CPT | Mod: CPTII,S$GLB,, | Performed by: NURSE PRACTITIONER

## 2020-09-18 PROCEDURE — 3008F BODY MASS INDEX DOCD: CPT | Mod: CPTII,S$GLB,, | Performed by: NURSE PRACTITIONER

## 2020-09-18 PROCEDURE — 3046F HEMOGLOBIN A1C LEVEL >9.0%: CPT | Mod: CPTII,S$GLB,, | Performed by: NURSE PRACTITIONER

## 2020-09-18 PROCEDURE — 3008F PR BODY MASS INDEX (BMI) DOCUMENTED: ICD-10-PCS | Mod: CPTII,S$GLB,, | Performed by: NURSE PRACTITIONER

## 2020-09-18 PROCEDURE — 1101F PT FALLS ASSESS-DOCD LE1/YR: CPT | Mod: CPTII,S$GLB,, | Performed by: NURSE PRACTITIONER

## 2020-09-18 PROCEDURE — 1126F AMNT PAIN NOTED NONE PRSNT: CPT | Mod: S$GLB,,, | Performed by: NURSE PRACTITIONER

## 2020-09-18 PROCEDURE — 3046F PR MOST RECENT HEMOGLOBIN A1C LEVEL > 9.0%: ICD-10-PCS | Mod: CPTII,S$GLB,, | Performed by: NURSE PRACTITIONER

## 2020-09-18 PROCEDURE — 99999 PR PBB SHADOW E&M-EST. PATIENT-LVL V: CPT | Mod: PBBFAC,,, | Performed by: NURSE PRACTITIONER

## 2020-09-18 PROCEDURE — 90694 FLU VACCINE - QUADRIVALENT - ADJUVANTED: ICD-10-PCS | Mod: S$GLB,,, | Performed by: INTERNAL MEDICINE

## 2020-09-18 PROCEDURE — 90694 VACC AIIV4 NO PRSRV 0.5ML IM: CPT | Mod: S$GLB,,, | Performed by: INTERNAL MEDICINE

## 2020-09-18 PROCEDURE — 1159F MED LIST DOCD IN RCRD: CPT | Mod: S$GLB,,, | Performed by: NURSE PRACTITIONER

## 2020-09-18 PROCEDURE — G0008 ADMIN INFLUENZA VIRUS VAC: HCPCS | Mod: S$GLB,,, | Performed by: INTERNAL MEDICINE

## 2020-09-18 RX ORDER — DULAGLUTIDE 1.5 MG/.5ML
INJECTION, SOLUTION SUBCUTANEOUS
Qty: 6 ML | Refills: 3 | Status: SHIPPED | OUTPATIENT
Start: 2020-09-18 | End: 2021-05-20

## 2020-09-18 RX ORDER — INSULIN ASPART 100 [IU]/ML
INJECTION, SOLUTION INTRAVENOUS; SUBCUTANEOUS
Qty: 30 ML | Refills: 1
Start: 2020-09-18 | End: 2021-02-27 | Stop reason: SDUPTHER

## 2020-09-18 RX ORDER — INSULIN DEGLUDEC 200 U/ML
INJECTION, SOLUTION SUBCUTANEOUS
Qty: 18 ML | Refills: 3 | Status: SHIPPED | OUTPATIENT
Start: 2020-09-18 | End: 2021-01-15

## 2020-09-18 NOTE — PROGRESS NOTES
CHIEF COMPLAINT: Type 2 Diabetes     HPI: Mrs. Buck Ibarra is a 74 y.o. female who was diagnosed with Type 2 DM x 6 years.  Past Medical History:   Diagnosis Date    Allergy     Cataract     Diabetes mellitus type II     Gastritis     with gastric ulcer    GERD (gastroesophageal reflux disease)     H. pylori infection     History of hepatitis C, SVR as of 8-2016 8/25/2015    Harvoni 12 wks completed.  SVR(12) as of 8/4/16 SVR(24) as of 10-     Hyperlipidemia     Hypertension     Osteopenia     Type 2 diabetes mellitus with diabetic polyneuropathy      Last seen by me in 2019  Audio visit spring 2020.   a1c too high  Stressors with pandemic, friend recently passed away     +PN, nephropathy    Lab Results   Component Value Date    HGBA1C 10.6 (H) 09/11/2020   Pt checks bg 4 times a day-max.  On MDI injections 4 x a day   Testing 4 x a day    Lowest 96 lately  Highest 569  Patient is willing and able to use the device  Demonstrated an understanding of the technology and is motivated to use CGM  Patient expected to adhere to a comprehensive diabetes treatment plan and patient has adequate medical supervision  Patient experiences multiple impaired awareness of hypoglycemia (hypoglycemia unawareness)    Retired . Has children (5), 16 grandchildren, babysits grandchildren---age  2, 8, 11, has 10 great grandchildren.  Helping with virtual school       PREVIOUS DIABETES MEDICATIONS TRIED  novolog  tresiba  Trulicity  vgo--did not feel right on it.  victoza   invokana-SEs /dehydration episode in 2017    CURRENT DIABETIC MEDS: trulicity 1.5 mg weekly, tresiba 26 units qhs, metformin 500 mg bid, novolog 8-10 units w/ meals  Plus scale--- self adjustment per scale   180-230+2, etc    Diabetes Management Status    Statin: Taking  ACE/ARB: Taking    Screening or Prevention Patient's value Goal Complete/Controlled?   HgA1C Testing and Control   Lab Results   Component Value Date    HGBA1C 10.6 (H)  "09/11/2020      Annually/Less than 8% No   Lipid profile : 09/11/2020 Annually Yes   LDL control Lab Results   Component Value Date    LDLCALC 84.2 09/11/2020    Annually/Less than 100 mg/dl  Yes   Nephropathy screening Lab Results   Component Value Date    LABMICR 47.0 01/24/2019     Lab Results   Component Value Date    PROTEINUA Negative 05/09/2019    Annually No   Blood pressure BP Readings from Last 1 Encounters:   09/18/20 134/80    Less than 140/90 Yes   Dilated retinal exam : 02/20/2020 Annually Yes   Foot exam   : 09/17/2020 Annually Yes       REVIEW OF SYSTEMS  General: no weakness, fatigue, +weight stable, fluctuations 1-4#   Eyes: no double or blurred vision, eye pain, or redness   Cardiovascular: no chest pain, palpitations, edema, or murmurs.   Respiratory: no cough or dyspnea.   GI: no heartburn, nausea, +diarrhea w/ metformin-ok on lower dose; good appetite.   Skin: no rashes, dryness, itching, or reactions at insulin injection sites.  Neuro: + numbness, tingling, tremors, or vertigo.   Endocrine: no polyuria, polydipsia, polyphagia, heat or cold intolerance.     Vital Signs  /80 (BP Location: Left arm, Patient Position: Sitting, BP Method: Large (Manual))   Pulse 69   Ht 5' 6" (1.676 m)   Wt 92.3 kg (203 lb 7.8 oz)   SpO2 99%   BMI 32.84 kg/m²     Hemoglobin A1C   Date Value Ref Range Status   09/11/2020 10.6 (H) 4.0 - 5.6 % Final     Comment:     ADA Screening Guidelines:  5.7-6.4%  Consistent with prediabetes  >or=6.5%  Consistent with diabetes  High levels of fetal hemoglobin interfere with the HbA1C  assay. Heterozygous hemoglobin variants (HbS, HgC, etc)do  not significantly interfere with this assay.   However, presence of multiple variants may affect accuracy.     04/20/2019 7.4 (H) 4.0 - 5.6 % Final     Comment:     ADA Screening Guidelines:  5.7-6.4%  Consistent with prediabetes  >or=6.5%  Consistent with diabetes  High levels of fetal hemoglobin interfere with the HbA1C  assay. " Heterozygous hemoglobin variants (HbS, HgC, etc)do  not significantly interfere with this assay.   However, presence of multiple variants may affect accuracy.     01/24/2019 8.0 (H) 4.0 - 5.6 % Final     Comment:     ADA Screening Guidelines:  5.7-6.4%  Consistent with prediabetes  >or=6.5%  Consistent with diabetes  High levels of fetal hemoglobin interfere with the HbA1C  assay. Heterozygous hemoglobin variants (HbS, HgC, etc)do  not significantly interfere with this assay.   However, presence of multiple variants may affect accuracy.          Chemistry        Component Value Date/Time     09/11/2020 0911    K 4.1 09/11/2020 0911     09/11/2020 0911    CO2 28 09/11/2020 0911    BUN 28 (H) 09/11/2020 0911    CREATININE 1.0 09/11/2020 0911     (H) 09/11/2020 0911        Component Value Date/Time    CALCIUM 9.0 09/11/2020 0911    ALKPHOS 268 (H) 06/11/2020 0841    AST 12 06/11/2020 0841    ALT 18 06/11/2020 0841    BILITOT 0.2 06/11/2020 0841    ESTGFRAFRICA >60.0 09/11/2020 0911    EGFRNONAA 56.4 (A) 09/11/2020 0911           Lab Results   Component Value Date    TSH 1.620 12/19/2019      Lab Results   Component Value Date    CHOL 139 09/11/2020    CHOL 180 01/24/2019    CHOL 118 (L) 03/15/2018     Lab Results   Component Value Date    HDL 43 09/11/2020    HDL 48 01/24/2019    HDL 35 (L) 03/15/2018     Lab Results   Component Value Date    LDLCALC 84.2 09/11/2020    LDLCALC 110.2 01/24/2019    LDLCALC 65.4 03/15/2018     Lab Results   Component Value Date    TRIG 59 09/11/2020    TRIG 109 01/24/2019    TRIG 88 03/15/2018     Lab Results   Component Value Date    CHOLHDL 30.9 09/11/2020    CHOLHDL 26.7 01/24/2019    CHOLHDL 29.7 03/15/2018       PHYSICAL EXAMINATION  Constitutional: Appears well, no distress  Neck: Supple, trachea midline.   Respiratory: no wheezes, even and unlabored.  Cardiovascular: RRR; no carotid bruits or murmurs; (+) DP pulses; no edema.   Lymph: no lymphadenopathy  palpated  Skin: warm and dry; no injection site reactions, + acanthosis nigracans observed.  Neuro:patient alert and cooperative, normal affect; steady gait.    Diabetes Foot Exam:   deferred      Assessment/Plan  1. Type 2 diabetes mellitus with diabetic polyneuropathy, with long-term current use of insulin     2. Hyperlipidemia LDL goal <70     3. Obesity (BMI 30-39.9)     4. CKD stage 2 due to type 2 diabetes mellitus     5. Type 2 diabetes mellitus with hyperglycemia, with long-term current use of insulin     6. Benign essential hypertension     7. Tobacco use     8. Diabetes mellitus type 2 in obese  insulin degludec (TRESIBA FLEXTOUCH U-200) 200 unit/mL (3 mL) InPn     1., 5., 8. F/u in 3 mos w/ me   a1c prior  DE same day in 3 mos  Info on dexcom g6- [facility]   Change tresiba to 32 units at night  Change novolog 10-10-10 units ac w/ scale 180-230+2, etc   Continue trulicity max and metformin 500 mg bid   Send rx to [facility] [place], [place] primary  a1c goal less than 7.5%  2.   Lab Results   Component Value Date    LDLCALC 84.2 09/11/2020     At goal   3. Body mass index is 32.84 kg/m².  May increase insulin resistance  4. Avoid hypoglycemia  6. Continue med(s)  Controlled  7. Discussed cessation in past    FOLLOW UP  No follow-ups on file.

## 2020-10-01 ENCOUNTER — CLINICAL SUPPORT (OUTPATIENT)
Dept: DIABETES | Facility: CLINIC | Age: 75
End: 2020-10-01
Payer: MEDICARE

## 2020-10-01 VITALS — WEIGHT: 203 LBS | BODY MASS INDEX: 32.77 KG/M2

## 2020-10-01 DIAGNOSIS — Z79.4 TYPE 2 DIABETES MELLITUS WITH DIABETIC POLYNEUROPATHY, WITH LONG-TERM CURRENT USE OF INSULIN: Chronic | ICD-10-CM

## 2020-10-01 DIAGNOSIS — E11.42 TYPE 2 DIABETES MELLITUS WITH DIABETIC POLYNEUROPATHY, WITH LONG-TERM CURRENT USE OF INSULIN: Chronic | ICD-10-CM

## 2020-10-01 PROCEDURE — 99999 PR PBB SHADOW E&M-EST. PATIENT-LVL II: ICD-10-PCS | Mod: PBBFAC,,,

## 2020-10-01 PROCEDURE — G0108 DIAB MANAGE TRN  PER INDIV: HCPCS | Mod: S$GLB,,, | Performed by: INTERNAL MEDICINE

## 2020-10-01 PROCEDURE — G0108 PR DIAB MANAGE TRN  PER INDIV: ICD-10-PCS | Mod: S$GLB,,, | Performed by: INTERNAL MEDICINE

## 2020-10-01 PROCEDURE — 99999 PR PBB SHADOW E&M-EST. PATIENT-LVL II: CPT | Mod: PBBFAC,,,

## 2020-10-01 RX ORDER — BLOOD-GLUCOSE,RECEIVER,CONT
EACH MISCELLANEOUS
Qty: 1 EACH | Refills: 0 | Status: SHIPPED | OUTPATIENT
Start: 2020-10-01 | End: 2021-01-19 | Stop reason: SDUPTHER

## 2020-10-01 RX ORDER — BLOOD-GLUCOSE SENSOR
EACH MISCELLANEOUS
Qty: 9 DEVICE | Refills: 3 | Status: SHIPPED | OUTPATIENT
Start: 2020-10-01 | End: 2021-01-19 | Stop reason: SDUPTHER

## 2020-10-01 RX ORDER — BLOOD-GLUCOSE TRANSMITTER
EACH MISCELLANEOUS
Qty: 1 DEVICE | Refills: 3 | Status: SHIPPED | OUTPATIENT
Start: 2020-10-01 | End: 2021-01-19 | Stop reason: SDUPTHER

## 2020-10-01 NOTE — PROGRESS NOTES
Diabetes Education  Author: Rodo Hilliard RN  Date: 10/1/2020  Diabetes Care Management Summary  Diabetes Education Record Assessment/Progress: Initial  Current Diabetes Risk Level: High   Last A1c:   Lab Results   Component Value Date    HGBA1C 10.6 (H) 09/11/2020     Last Visit with Diabetes Educator: : 10/01/2020  Last OPCM Referral: : Not Found   Enrolled in OPCM: No  Diabetes Type  Diabetes Type : Type II  Diabetes History  Diabetes Diagnosis: >10 years  Current Treatment: Oral Medication, Diet, Injectable, Exercise, Insulin  Health Maintenance was reviewed today with patient. Discussed with patient importance of routine eye exams, foot exams/foot care, blood work (i.e.: A1c, microalbumin, and lipid), dental visits, yearly flu vaccine, and pneumonia vaccine as indicated by PCP. Patient verbalized understanding.     Health Maintenance Topics with due status: Not Due       Topic Last Completion Date    TETANUS VACCINE 07/22/2015    Colorectal Cancer Screening 08/08/2019    Eye Exam 02/20/2020    DEXA SCAN 06/25/2020    Mammogram 09/10/2020    Lipid Panel 09/11/2020    Hemoglobin A1c 09/11/2020    Foot Exam 09/17/2020    High Dose Statin 09/18/2020    Urine Microalbumin 09/18/2020     Health Maintenance Due   Topic Date Due    Shingles Vaccine (2 of 3) 09/08/2016   Nutrition  Meal Planning: skipping meals, water, diet drinks, artificial sweeteners  What type of sweetener do you use?: Sweet N Low  What type of beverages do you drink?: diet soda/tea, water  Meal Plan 24 Hour Recall - Breakfast: luncheon meat sandwich  Meal Plan 24 Hour Recall - Lunch: pasta, meat sauce  Meal Plan 24 Hour Recall - Dinner: pasta, meat sauce  Exercise   Exercise Type: none  Current Diabetes Treatment   Current Treatment: Oral Medication, Diet, Injectable, Exercise, Insulin  Social History  Preferred Learning Method: Face to Face, Demonstration, Reading Materials  Primary Support: Self  Educational Level: High School  Occupation:  retired  Smoking Status: Current Smoker  Alcohol Use: Never  DDS-2 Score  ( > 3 = SIGNIFICANT DISTRESS): 2   Barriers to Change  Barriers to Change: None  Learning Challenges : None  Readiness to Learn   Readiness to Learn : Acceptance  Cultural Influences  Cultural Influences: None  Diabetes Education Assessment/Progress  Diabetes Disease Process (diabetes disease process and treatment options): Written Materials Provided, Discussion, Individual Session, Demonstrates Understanding/Competency(verbalizes/demonstrates), Instructed  Nutrition (Incorporating nutritional management into one's lifestyle): Demonstration, Written Materials Provided, Discussion, Individual Session, Demonstrates Understanding/Competency (verbalizes/demonstrates), Instructed  Physical Activity (incorporating physical activity into one's lifestyle): Written Materials Provided, Discussion, Individual Session, Demonstrates Understanding/Competency (verbalizes/demonstrates), Instructed  Medications (states correct name, dose, onset, peak, duration, side effects & timing of meds): Written Materials Provided, Discussion, Individual Session, Demonstrates Understanding/Competency(verbalizes/demonstrates), Instructed  Monitoring (monitoring blood glucose/other parameters & using results): Written Materials Provided, Discussion, Individual Session, Demonstrates Understanding/Competency (verbalizes/demonstrates), Instructed  Acute Complications (preventing, detecting, and treating acute complications): Written Materials Provided, Discussion, Individual Session, Demonstrates Understanding/Competency (verbalizes/demonstrates), Instructed  Chronic Complications (preventing, detecting, and treating chronic complications): Discussion, Individual Session, Demonstrates Understanding/Competency (verbalizes/demonstrates)  Clinical (diabetes, other pertinent medical history, and relevant comorbidities reviewed during visit): Discussion, Individual Session,  Demonstrates Understanding/Competency (verbalizes/demonstrates)  Cognitive (knowledge of self-management skills, functional health literacy): Discussion, Individual Session, Demonstrates Understanding/Competency (verbalizes/demonstrates)  Psychosocial (emotional response to diabetes): Discussion, Individual Session, Demonstrates Understanding/Competency (verbalizes/demonstrates)  Diabetes Distress and Support Systems: Discussion, Individual Session, Demonstrates Understanding/Competency (verbalizes/demonstrates)  Behavioral (readiness for change, lifestyle practices, self-care behaviors): Discussion, Individual Session, Demonstrates Understanding/Competency (verbalizes/demonstrates)  Goals  Patient has selected/evaluated goals during today's session: Yes, selected  Healthy Eating: Set  Start Date: 10/01/20  Target Date: 12/01/20(eat 3 meals per day)  Medications: Set  Start Date: 10/01/20  Target Date: 12/01/20(complaince with taking insulin)  Diabetes Meal Plan  Restrictions: Restricted Carbohydrate  Calories: 1800  Carbohydrate Per Meal: 30-45g  Carbohydrate Per Snack : 15-20g  Instructed pt on the food groups, how to read labels and count carbs. Pt was given sample menus and meal plans as examples. Pt usually skips breakfast and her morning insulin. Pt states that she skips insulin and Tresiba for a few days a month because of the horrible belching that she gets then she starts back on it.Pt reports hypoglycemia about 2 times a week. Discussed with pt the importance of eating balanced and portioned. Discussed with pt how to put her meals together. Discussed with pt foods that she likes that she could include in her meal plan. Discussed with pt the importance of keeping the timing of her meals and insulin on a consistent schedule. 24 hour meal recall was done and discussed with pt what needs to be adjusted to make her meals more balanced. Pt will try to work on adjustments. Pt was advised to call for any problems  or questions. Pt will fu in 1 month. Pt is interested in the Dexcom meter.  Today's Self-Management Care Plan was developed with the patient's input and is based on barriers identified during today's assessment.    The long and short-term goals in the care plan were written with the patient/caregiver's input. The patient has agreed to work toward these goals to improve her overall diabetes control.      The patient received a copy of today's self-management plan and verbalized understanding of the care plan, goals, and all of today's instructions.      The patient was encouraged to communicate with her physician and care team regarding her condition(s) and treatment.  I provided the patient with my contact information today and encouraged her to contact me via phone or patient portal as needed.     Education Units of Time   Time Spent: 60 min

## 2020-10-02 ENCOUNTER — TELEPHONE (OUTPATIENT)
Dept: HEPATOLOGY | Facility: CLINIC | Age: 75
End: 2020-10-02

## 2020-10-02 NOTE — TELEPHONE ENCOUNTER
Attempted to contact patient back and inquire about medication but patient didn't answer. Left patient a voicemail stating the purpose for the call. Provided patient with my contact information. Awaiting a call back.     Instructions:    KEYLA Medley MA   Caller: Unspecified (Today,  1:16 PM)             I have never actually seen her in clinic so not sure. Definitely nothing that I removed. I don't recognize that med name    I recommend that she contacts her PCP to inquire    Previous Messages    ----- Message -----   From: Vipin Almeida MA   Sent: 10/2/2020   1:40 PM CDT   To: Treva Catherine NP     Hey do you by chance know what medication this patient is referring to? Me and jeaneth can't find it in her history chart.   ----- Message -----   From: Annia Cevallos   Sent: 10/2/2020   1:16 PM CDT   To: Dorene Tilley Staff     Pt is calling because she stated she has a insurance policy and it stated she was on a medication called lam and it has been removed her medication list and pt needs information to be faxed to       Kevin Francois  [phone]       Pt contact [phone]

## 2020-10-02 NOTE — TELEPHONE ENCOUNTER
Patient contacted me back, stating that the medication name is harvoni. I consulted with Mrs. Pina regarding patient medication because its a hep c medication and she printed a note stating patient was cleared from medication. Patient provided me with fax information for her insurance company [employer] which is  [phone]. Faxed information to company on today informing them of patient no longer needing medication.

## 2020-11-05 ENCOUNTER — CLINICAL SUPPORT (OUTPATIENT)
Dept: DIABETES | Facility: CLINIC | Age: 75
End: 2020-11-05
Payer: MEDICARE

## 2020-11-05 ENCOUNTER — TELEPHONE (OUTPATIENT)
Dept: INTERNAL MEDICINE | Facility: CLINIC | Age: 75
End: 2020-11-05

## 2020-11-05 VITALS — BODY MASS INDEX: 32.77 KG/M2 | WEIGHT: 203 LBS

## 2020-11-05 DIAGNOSIS — Z79.4 TYPE 2 DIABETES MELLITUS WITH DIABETIC POLYNEUROPATHY, WITH LONG-TERM CURRENT USE OF INSULIN: Chronic | ICD-10-CM

## 2020-11-05 DIAGNOSIS — E11.42 TYPE 2 DIABETES MELLITUS WITH DIABETIC POLYNEUROPATHY, WITH LONG-TERM CURRENT USE OF INSULIN: Chronic | ICD-10-CM

## 2020-11-05 PROCEDURE — 99999 PR PBB SHADOW E&M-EST. PATIENT-LVL II: ICD-10-PCS | Mod: PBBFAC,,,

## 2020-11-05 PROCEDURE — G0108 PR DIAB MANAGE TRN  PER INDIV: ICD-10-PCS | Mod: S$GLB,,, | Performed by: INTERNAL MEDICINE

## 2020-11-05 PROCEDURE — 99999 PR PBB SHADOW E&M-EST. PATIENT-LVL II: CPT | Mod: PBBFAC,,,

## 2020-11-05 PROCEDURE — G0108 DIAB MANAGE TRN  PER INDIV: HCPCS | Mod: S$GLB,,, | Performed by: INTERNAL MEDICINE

## 2020-11-05 NOTE — TELEPHONE ENCOUNTER
----- Message from Nathaly Damon sent at 11/5/2020 12:46 PM CST -----  Regarding: advise  Contact: pt  Pt calling    Just had the Dexcom put on today       It's been 2 hours like she was told       Now pt  needs to know what to do to from this point      Please call  [phone]

## 2020-11-05 NOTE — PROGRESS NOTES
"  DIABETES EDUCATION  DEXCOM G6 CGM TRAINING     Patient referred to clinic today for Dexcom G6 continuous glucose sensor system training.  Patient arrived with  fully charged. Education was provided using "Quick Start Guide" per Dexcom protocol.     Pt will be using her dex com  as the primary .    § Overview:  5min Glucose Reading Updates, Trending Arrows, BG Graph Screens, Battery Life Indicator, Blue Tooth Symbol.  § Menus: Trend Graph, Start Sensor, Enter BG, Events, Alerts, Settings, Shutdown, Stop Sensor  §  Settings:                          * Urgent Low: 55 mg/dL                          * Urgent Low Soon: On 20 min                          * Low alert: 70 mg/dl 15 min                          * High alert: 250 mg/dl 15 min                          * Rise rate: 3 mg/dl                          * Fall Rate: 3 mg/dl                          * Signal Loss: 20 min                          * No Readin min                          * Always sound:                           * Alert Schedule: (instructed on how to set up alert schedule if desired)       · Reviewed additional setting options with patient, including Graph Height and Transmitter ID. Transmitter was paired with .    · Instructed patient on need to remove sensor if having MRI, CT Scan, or a diathermy treatment.  · Sensor ID: [id number]  · Transmitter SN: [id number]  · Reviewed where to find sensor insertion time and sensor expiration date.   · Discussed no need to calibrate sensor during the entirety of the 10 day wear. Discussed that pt can calibrate sensor if desired, but at that time she will need to continue calibrating every 12 hours for sensor to remain accurate. Reviewed appropriate calibration techniques.  · Reviewed sensor site selection. Site selected and prepped using aseptic technique Inserted to left side. Transmitter placed in pod and secured.  · Practiced sensor pod/transmitter removal from site, and " removal of transmitter from sensor pod.  · Patient able to demonstrate without difficulty.  Encouraged to review manual prior to starting another sensor.   · Reviewed problem solving aspects of sensor transmission/ variables that can disrupt RF transmission.  range 20 feet, but the first 3 hrs keep within 3 feet of transmitter.  · Pt instructed on lag time of interstitial fluid from CBG and was advised to tx hypoglycemia and dose insulin based on SMBG values.  · Dexcom technical support contact number given and examples of when to contact them discussed. Pt will download software at home for Dexcom download.      Time spent with patient: 60 minutes

## 2020-11-12 ENCOUNTER — TELEPHONE (OUTPATIENT)
Dept: INTERNAL MEDICINE | Facility: CLINIC | Age: 75
End: 2020-11-12

## 2020-11-12 ENCOUNTER — TELEPHONE (OUTPATIENT)
Dept: DIABETES | Facility: CLINIC | Age: 75
End: 2020-11-12

## 2020-11-12 NOTE — TELEPHONE ENCOUNTER
----- Message from Kenya Cornelius sent at 11/12/2020  8:40 AM CST -----  Contact: Patient Called [phone]  Need to speak with Graeme Rivera in regards to Dexcom.  States that it is making her stomach upset.  States that upper part of stomach feels sore.

## 2020-11-17 DIAGNOSIS — Z79.4 TYPE 2 DIABETES MELLITUS WITH HYPERGLYCEMIA, WITH LONG-TERM CURRENT USE OF INSULIN: Chronic | ICD-10-CM

## 2020-11-17 DIAGNOSIS — E11.65 TYPE 2 DIABETES MELLITUS WITH HYPERGLYCEMIA, WITH LONG-TERM CURRENT USE OF INSULIN: Chronic | ICD-10-CM

## 2020-11-17 RX ORDER — METFORMIN HYDROCHLORIDE 500 MG/1
500 TABLET, EXTENDED RELEASE ORAL 2 TIMES DAILY WITH MEALS
Qty: 180 TABLET | Refills: 2 | Status: ON HOLD | OUTPATIENT
Start: 2020-11-17 | End: 2021-06-21 | Stop reason: SDUPTHER

## 2020-12-08 ENCOUNTER — PATIENT OUTREACH (OUTPATIENT)
Dept: ADMINISTRATIVE | Facility: HOSPITAL | Age: 75
End: 2020-12-08

## 2020-12-11 ENCOUNTER — PATIENT MESSAGE (OUTPATIENT)
Dept: OTHER | Facility: OTHER | Age: 75
End: 2020-12-11

## 2020-12-17 ENCOUNTER — LAB VISIT (OUTPATIENT)
Dept: LAB | Facility: HOSPITAL | Age: 75
End: 2020-12-17
Attending: NURSE PRACTITIONER
Payer: MEDICARE

## 2020-12-17 ENCOUNTER — CLINICAL SUPPORT (OUTPATIENT)
Dept: DIABETES | Facility: CLINIC | Age: 75
End: 2020-12-17
Payer: MEDICARE

## 2020-12-17 VITALS — WEIGHT: 203 LBS | BODY MASS INDEX: 32.77 KG/M2

## 2020-12-17 DIAGNOSIS — E11.42 TYPE 2 DIABETES MELLITUS WITH DIABETIC POLYNEUROPATHY, WITH LONG-TERM CURRENT USE OF INSULIN: Chronic | ICD-10-CM

## 2020-12-17 DIAGNOSIS — Z79.4 TYPE 2 DIABETES MELLITUS WITH DIABETIC POLYNEUROPATHY, WITH LONG-TERM CURRENT USE OF INSULIN: Chronic | ICD-10-CM

## 2020-12-17 LAB
ESTIMATED AVG GLUCOSE: 243 MG/DL (ref 68–131)
HBA1C MFR BLD HPLC: 10.1 % (ref 4–5.6)

## 2020-12-17 PROCEDURE — G0108 DIAB MANAGE TRN  PER INDIV: HCPCS | Mod: S$GLB,,, | Performed by: INTERNAL MEDICINE

## 2020-12-17 PROCEDURE — G0108 PR DIAB MANAGE TRN  PER INDIV: ICD-10-PCS | Mod: S$GLB,,, | Performed by: INTERNAL MEDICINE

## 2020-12-17 PROCEDURE — 36415 COLL VENOUS BLD VENIPUNCTURE: CPT

## 2020-12-17 PROCEDURE — 83036 HEMOGLOBIN GLYCOSYLATED A1C: CPT

## 2020-12-17 RX ORDER — INSULIN LISPRO 100 [IU]/ML
INJECTION, SOLUTION INTRAVENOUS; SUBCUTANEOUS
Qty: 15 ML | Refills: 5 | Status: SHIPPED | OUTPATIENT
Start: 2020-12-17 | End: 2021-01-15

## 2020-12-17 NOTE — PROGRESS NOTES
Pt came to clinic for fu visit. Pt has a dex com and said that she is doing well with and has no issues except it hurts when she puts it on her right side so she puts it up higher and it is better. Pt was instructed on last visit to change her Novolog dose per RISHI Blair due to the fact that she was having some episodes of hypoglycemia. Pt stated that she understood the instructions but out of habit she did not lower the dose. Pt has continued to have about 2 episodes of hypoglycemia per week. RISHI Blair was notified and orders were obtained. Pt will return to clinic tomorrow when she picks up her insulin for instructions on insulin dose and sliding scale. Pt reports that her bs was 113 this am. Pt sometimes still skips breakfast. Pt is trying to eat more balanced. She drinks only water. 24 hour meal recall was done and discussed with pt what needs to be added to meals to make more balanced. Pt will continue to work on meals. Pt reports that she skips Trulicity about 2 days per week due to the fact that she has terrible belching with the medication. She states she is compliant with other meds. Discussed holiday eating challenges with pt. Pt will continue to work on meal plan. Pt will fu in one month.

## 2021-01-15 ENCOUNTER — OFFICE VISIT (OUTPATIENT)
Dept: INTERNAL MEDICINE | Facility: CLINIC | Age: 76
End: 2021-01-15
Payer: MEDICARE

## 2021-01-15 ENCOUNTER — LAB VISIT (OUTPATIENT)
Dept: LAB | Facility: HOSPITAL | Age: 76
End: 2021-01-15
Attending: INTERNAL MEDICINE
Payer: MEDICARE

## 2021-01-15 VITALS
DIASTOLIC BLOOD PRESSURE: 86 MMHG | BODY MASS INDEX: 33.38 KG/M2 | HEIGHT: 66 IN | HEART RATE: 79 BPM | SYSTOLIC BLOOD PRESSURE: 135 MMHG | OXYGEN SATURATION: 96 % | WEIGHT: 207.69 LBS

## 2021-01-15 DIAGNOSIS — I70.0 AORTIC ATHEROSCLEROSIS: ICD-10-CM

## 2021-01-15 DIAGNOSIS — Z86.19 HISTORY OF HEPATITIS C: ICD-10-CM

## 2021-01-15 DIAGNOSIS — E66.9 DIABETES MELLITUS TYPE 2 IN OBESE: ICD-10-CM

## 2021-01-15 DIAGNOSIS — E04.1 THYROID NODULE: ICD-10-CM

## 2021-01-15 DIAGNOSIS — E11.69 HYPERLIPIDEMIA ASSOCIATED WITH TYPE 2 DIABETES MELLITUS: ICD-10-CM

## 2021-01-15 DIAGNOSIS — R07.89 CHEST TIGHTNESS: ICD-10-CM

## 2021-01-15 DIAGNOSIS — E78.5 HYPERLIPIDEMIA ASSOCIATED WITH TYPE 2 DIABETES MELLITUS: ICD-10-CM

## 2021-01-15 DIAGNOSIS — R80.9 MICROALBUMINURIA DUE TO TYPE 2 DIABETES MELLITUS: ICD-10-CM

## 2021-01-15 DIAGNOSIS — E11.29 MICROALBUMINURIA DUE TO TYPE 2 DIABETES MELLITUS: ICD-10-CM

## 2021-01-15 DIAGNOSIS — E11.22 CKD STAGE 3 DUE TO TYPE 2 DIABETES MELLITUS: ICD-10-CM

## 2021-01-15 DIAGNOSIS — N18.30 CKD STAGE 3 DUE TO TYPE 2 DIABETES MELLITUS: ICD-10-CM

## 2021-01-15 DIAGNOSIS — E11.69 DIABETES MELLITUS TYPE 2 IN OBESE: ICD-10-CM

## 2021-01-15 PROBLEM — D75.839 THROMBOCYTOSIS: Status: RESOLVED | Noted: 2020-01-02 | Resolved: 2021-01-15

## 2021-01-15 LAB
BASOPHILS # BLD AUTO: 0.05 K/UL (ref 0–0.2)
BASOPHILS NFR BLD: 0.7 % (ref 0–1.9)
DIFFERENTIAL METHOD: ABNORMAL
EOSINOPHIL # BLD AUTO: 0.1 K/UL (ref 0–0.5)
EOSINOPHIL NFR BLD: 0.9 % (ref 0–8)
ERYTHROCYTE [DISTWIDTH] IN BLOOD BY AUTOMATED COUNT: 14.7 % (ref 11.5–14.5)
GLUCOSE SERPL-MCNC: 145 MG/DL (ref 70–110)
HCT VFR BLD AUTO: 48.4 % (ref 37–48.5)
HGB BLD-MCNC: 14.2 G/DL (ref 12–16)
IMM GRANULOCYTES # BLD AUTO: 0.02 K/UL (ref 0–0.04)
IMM GRANULOCYTES NFR BLD AUTO: 0.3 % (ref 0–0.5)
LYMPHOCYTES # BLD AUTO: 3.6 K/UL (ref 1–4.8)
LYMPHOCYTES NFR BLD: 49 % (ref 18–48)
MCH RBC QN AUTO: 27.7 PG (ref 27–31)
MCHC RBC AUTO-ENTMCNC: 29.3 G/DL (ref 32–36)
MCV RBC AUTO: 95 FL (ref 82–98)
MONOCYTES # BLD AUTO: 0.3 K/UL (ref 0.3–1)
MONOCYTES NFR BLD: 4.5 % (ref 4–15)
NEUTROPHILS # BLD AUTO: 3.3 K/UL (ref 1.8–7.7)
NEUTROPHILS NFR BLD: 44.6 % (ref 38–73)
NRBC BLD-RTO: 0 /100 WBC
PLATELET # BLD AUTO: 322 K/UL (ref 150–350)
PMV BLD AUTO: 11.6 FL (ref 9.2–12.9)
RBC # BLD AUTO: 5.12 M/UL (ref 4–5.4)
WBC # BLD AUTO: 7.41 K/UL (ref 3.9–12.7)

## 2021-01-15 PROCEDURE — 80061 LIPID PANEL: CPT

## 2021-01-15 PROCEDURE — 3288F PR FALLS RISK ASSESSMENT DOCUMENTED: ICD-10-PCS | Mod: CPTII,S$GLB,, | Performed by: INTERNAL MEDICINE

## 2021-01-15 PROCEDURE — 99499 UNLISTED E&M SERVICE: CPT | Mod: S$GLB,,, | Performed by: INTERNAL MEDICINE

## 2021-01-15 PROCEDURE — 1125F PR PAIN SEVERITY QUANTIFIED, PAIN PRESENT: ICD-10-PCS | Mod: S$GLB,,, | Performed by: INTERNAL MEDICINE

## 2021-01-15 PROCEDURE — 85025 COMPLETE CBC W/AUTO DIFF WBC: CPT

## 2021-01-15 PROCEDURE — 1101F PR PT FALLS ASSESS DOC 0-1 FALLS W/OUT INJ PAST YR: ICD-10-PCS | Mod: CPTII,S$GLB,, | Performed by: INTERNAL MEDICINE

## 2021-01-15 PROCEDURE — 82962 POCT GLUCOSE, HAND-HELD DEVICE: ICD-10-PCS | Mod: S$GLB,,, | Performed by: INTERNAL MEDICINE

## 2021-01-15 PROCEDURE — 99499 RISK ADDL DX/OHS AUDIT: ICD-10-PCS | Mod: S$GLB,,, | Performed by: INTERNAL MEDICINE

## 2021-01-15 PROCEDURE — 99999 PR PBB SHADOW E&M-EST. PATIENT-LVL V: ICD-10-PCS | Mod: PBBFAC,,, | Performed by: INTERNAL MEDICINE

## 2021-01-15 PROCEDURE — 80053 COMPREHEN METABOLIC PANEL: CPT

## 2021-01-15 PROCEDURE — 1101F PT FALLS ASSESS-DOCD LE1/YR: CPT | Mod: CPTII,S$GLB,, | Performed by: INTERNAL MEDICINE

## 2021-01-15 PROCEDURE — 99214 OFFICE O/P EST MOD 30 MIN: CPT | Mod: S$GLB,,, | Performed by: INTERNAL MEDICINE

## 2021-01-15 PROCEDURE — 3079F PR MOST RECENT DIASTOLIC BLOOD PRESSURE 80-89 MM HG: ICD-10-PCS | Mod: CPTII,S$GLB,, | Performed by: INTERNAL MEDICINE

## 2021-01-15 PROCEDURE — 3075F PR MOST RECENT SYSTOLIC BLOOD PRESS GE 130-139MM HG: ICD-10-PCS | Mod: CPTII,S$GLB,, | Performed by: INTERNAL MEDICINE

## 2021-01-15 PROCEDURE — 1125F AMNT PAIN NOTED PAIN PRSNT: CPT | Mod: S$GLB,,, | Performed by: INTERNAL MEDICINE

## 2021-01-15 PROCEDURE — 99999 PR PBB SHADOW E&M-EST. PATIENT-LVL V: CPT | Mod: PBBFAC,,, | Performed by: INTERNAL MEDICINE

## 2021-01-15 PROCEDURE — 1159F MED LIST DOCD IN RCRD: CPT | Mod: S$GLB,,, | Performed by: INTERNAL MEDICINE

## 2021-01-15 PROCEDURE — 3075F SYST BP GE 130 - 139MM HG: CPT | Mod: CPTII,S$GLB,, | Performed by: INTERNAL MEDICINE

## 2021-01-15 PROCEDURE — 3072F LOW RISK FOR RETINOPATHY: CPT | Mod: S$GLB,,, | Performed by: INTERNAL MEDICINE

## 2021-01-15 PROCEDURE — 99214 PR OFFICE/OUTPT VISIT, EST, LEVL IV, 30-39 MIN: ICD-10-PCS | Mod: S$GLB,,, | Performed by: INTERNAL MEDICINE

## 2021-01-15 PROCEDURE — 82962 GLUCOSE BLOOD TEST: CPT | Mod: S$GLB,,, | Performed by: INTERNAL MEDICINE

## 2021-01-15 PROCEDURE — 3046F PR MOST RECENT HEMOGLOBIN A1C LEVEL > 9.0%: ICD-10-PCS | Mod: CPTII,S$GLB,, | Performed by: INTERNAL MEDICINE

## 2021-01-15 PROCEDURE — 3046F HEMOGLOBIN A1C LEVEL >9.0%: CPT | Mod: CPTII,S$GLB,, | Performed by: INTERNAL MEDICINE

## 2021-01-15 PROCEDURE — 1159F PR MEDICATION LIST DOCUMENTED IN MEDICAL RECORD: ICD-10-PCS | Mod: S$GLB,,, | Performed by: INTERNAL MEDICINE

## 2021-01-15 PROCEDURE — 3072F PR LOW RISK FOR RETINOPATHY: ICD-10-PCS | Mod: S$GLB,,, | Performed by: INTERNAL MEDICINE

## 2021-01-15 PROCEDURE — 36415 COLL VENOUS BLD VENIPUNCTURE: CPT

## 2021-01-15 PROCEDURE — 3288F FALL RISK ASSESSMENT DOCD: CPT | Mod: CPTII,S$GLB,, | Performed by: INTERNAL MEDICINE

## 2021-01-15 PROCEDURE — 3008F PR BODY MASS INDEX (BMI) DOCUMENTED: ICD-10-PCS | Mod: CPTII,S$GLB,, | Performed by: INTERNAL MEDICINE

## 2021-01-15 PROCEDURE — 3079F DIAST BP 80-89 MM HG: CPT | Mod: CPTII,S$GLB,, | Performed by: INTERNAL MEDICINE

## 2021-01-15 PROCEDURE — 3008F BODY MASS INDEX DOCD: CPT | Mod: CPTII,S$GLB,, | Performed by: INTERNAL MEDICINE

## 2021-01-15 RX ORDER — INSULIN LISPRO 100 [IU]/ML
INJECTION, SOLUTION INTRAVENOUS; SUBCUTANEOUS
Qty: 15 ML | Refills: 5
Start: 2021-01-15 | End: 2021-07-29

## 2021-01-15 RX ORDER — INSULIN DEGLUDEC 200 U/ML
INJECTION, SOLUTION SUBCUTANEOUS
Qty: 18 ML | Refills: 3
Start: 2021-01-15 | End: 2021-05-20

## 2021-01-16 LAB
ALBUMIN SERPL BCP-MCNC: 3.2 G/DL (ref 3.5–5.2)
ALP SERPL-CCNC: 107 U/L (ref 55–135)
ALT SERPL W/O P-5'-P-CCNC: 14 U/L (ref 10–44)
ANION GAP SERPL CALC-SCNC: 12 MMOL/L (ref 8–16)
AST SERPL-CCNC: 17 U/L (ref 10–40)
BILIRUB SERPL-MCNC: 0.3 MG/DL (ref 0.1–1)
BUN SERPL-MCNC: 14 MG/DL (ref 8–23)
CALCIUM SERPL-MCNC: 8.9 MG/DL (ref 8.7–10.5)
CHLORIDE SERPL-SCNC: 107 MMOL/L (ref 95–110)
CHOLEST SERPL-MCNC: 234 MG/DL (ref 120–199)
CHOLEST/HDLC SERPL: 5 {RATIO} (ref 2–5)
CO2 SERPL-SCNC: 24 MMOL/L (ref 23–29)
CREAT SERPL-MCNC: 0.9 MG/DL (ref 0.5–1.4)
EST. GFR  (AFRICAN AMERICAN): >60 ML/MIN/1.73 M^2
EST. GFR  (NON AFRICAN AMERICAN): >60 ML/MIN/1.73 M^2
GLUCOSE SERPL-MCNC: 136 MG/DL (ref 70–110)
HDLC SERPL-MCNC: 47 MG/DL (ref 40–75)
HDLC SERPL: 20.1 % (ref 20–50)
LDLC SERPL CALC-MCNC: 162.2 MG/DL (ref 63–159)
NONHDLC SERPL-MCNC: 187 MG/DL
POTASSIUM SERPL-SCNC: 4.2 MMOL/L (ref 3.5–5.1)
PROT SERPL-MCNC: 6.8 G/DL (ref 6–8.4)
SODIUM SERPL-SCNC: 143 MMOL/L (ref 136–145)
TRIGL SERPL-MCNC: 124 MG/DL (ref 30–150)

## 2021-01-19 ENCOUNTER — TELEPHONE (OUTPATIENT)
Dept: INTERNAL MEDICINE | Facility: CLINIC | Age: 76
End: 2021-01-19

## 2021-01-19 RX ORDER — BLOOD-GLUCOSE SENSOR
EACH MISCELLANEOUS
Qty: 9 DEVICE | Refills: 3 | Status: SHIPPED | OUTPATIENT
Start: 2021-01-19 | End: 2022-09-08 | Stop reason: SDUPTHER

## 2021-01-19 RX ORDER — BLOOD-GLUCOSE,RECEIVER,CONT
EACH MISCELLANEOUS
Qty: 1 EACH | Refills: 0 | Status: SHIPPED | OUTPATIENT
Start: 2021-01-19

## 2021-01-19 RX ORDER — BLOOD-GLUCOSE TRANSMITTER
EACH MISCELLANEOUS
Qty: 1 DEVICE | Refills: 3 | Status: SHIPPED | OUTPATIENT
Start: 2021-01-19 | End: 2022-09-08 | Stop reason: SDUPTHER

## 2021-01-21 ENCOUNTER — TELEPHONE (OUTPATIENT)
Dept: INTERNAL MEDICINE | Facility: CLINIC | Age: 76
End: 2021-01-21

## 2021-01-21 ENCOUNTER — CLINICAL SUPPORT (OUTPATIENT)
Dept: DIABETES | Facility: CLINIC | Age: 76
End: 2021-01-21
Payer: MEDICARE

## 2021-01-21 VITALS — WEIGHT: 207 LBS | BODY MASS INDEX: 33.41 KG/M2

## 2021-01-21 DIAGNOSIS — Z79.4 TYPE 2 DIABETES MELLITUS WITH HYPERGLYCEMIA, WITH LONG-TERM CURRENT USE OF INSULIN: Chronic | ICD-10-CM

## 2021-01-21 DIAGNOSIS — E11.65 TYPE 2 DIABETES MELLITUS WITH HYPERGLYCEMIA, WITH LONG-TERM CURRENT USE OF INSULIN: Chronic | ICD-10-CM

## 2021-01-21 PROCEDURE — G0108 PR DIAB MANAGE TRN  PER INDIV: ICD-10-PCS | Mod: S$GLB,,, | Performed by: INTERNAL MEDICINE

## 2021-01-21 PROCEDURE — G0108 DIAB MANAGE TRN  PER INDIV: HCPCS | Mod: S$GLB,,, | Performed by: INTERNAL MEDICINE

## 2021-01-28 ENCOUNTER — HOSPITAL ENCOUNTER (OUTPATIENT)
Dept: CARDIOLOGY | Facility: HOSPITAL | Age: 76
Discharge: HOME | End: 2021-01-28
Attending: INTERNAL MEDICINE
Payer: MEDICARE

## 2021-01-28 ENCOUNTER — HOSPITAL ENCOUNTER (OUTPATIENT)
Dept: CARDIOLOGY | Facility: CLINIC | Age: 76
Discharge: HOME | End: 2021-01-28
Payer: MEDICARE

## 2021-01-28 VITALS
WEIGHT: 207 LBS | SYSTOLIC BLOOD PRESSURE: 200 MMHG | BODY MASS INDEX: 33.27 KG/M2 | HEIGHT: 66 IN | DIASTOLIC BLOOD PRESSURE: 118 MMHG | HEART RATE: 74 BPM

## 2021-01-28 DIAGNOSIS — R07.89 CHEST TIGHTNESS: ICD-10-CM

## 2021-01-28 DIAGNOSIS — R07.89 TIGHTNESS IN CHEST: ICD-10-CM

## 2021-01-28 DIAGNOSIS — R07.89 TIGHTNESS IN CHEST: Primary | ICD-10-CM

## 2021-01-28 DIAGNOSIS — I70.0 AORTIC ATHEROSCLEROSIS: ICD-10-CM

## 2021-01-28 DIAGNOSIS — R07.89 OTHER CHEST PAIN: ICD-10-CM

## 2021-01-28 DIAGNOSIS — R07.89 CHEST TIGHTNESS: Primary | ICD-10-CM

## 2021-01-28 LAB
ASCENDING AORTA: 2.76 CM
ASCENDING AORTA: 2.76 CM
BSA FOR ECHO PROCEDURE: 2.09 M2
BSA FOR ECHO PROCEDURE: 2.09 M2
CV ECHO LV RWT: 0.4 CM
CV ECHO LV RWT: 0.46 CM
DOP CALC LVOT AREA: 2.8 CM2
DOP CALC LVOT AREA: 2.8 CM2
DOP CALC LVOT DIAMETER: 1.89 CM
DOP CALC LVOT DIAMETER: 1.89 CM
DOP CALC LVOT PEAK VEL: 0.75 M/S
DOP CALC LVOT PEAK VEL: 0.75 M/S
DOP CALC LVOT STROKE VOLUME: 46.21 CM3
DOP CALC LVOT STROKE VOLUME: 46.21 CM3
DOP CALCLVOT PEAK VEL VTI: 16.48 CM
DOP CALCLVOT PEAK VEL VTI: 16.48 CM
E WAVE DECELERATION TIME: 148.47 MSEC
E WAVE DECELERATION TIME: 148.47 MSEC
E/A RATIO: 0.61
E/A RATIO: 0.61
E/E' RATIO: 12.36 M/S
E/E' RATIO: 12.36 M/S
ECHO LV POSTERIOR WALL: 0.9 CM (ref 0.6–1.1)
ECHO LV POSTERIOR WALL: 1.04 CM (ref 0.6–1.1)
FRACTIONAL SHORTENING: 29 % (ref 28–44)
FRACTIONAL SHORTENING: 29 % (ref 28–44)
INTERVENTRICULAR SEPTUM: 1.06 CM (ref 0.6–1.1)
INTERVENTRICULAR SEPTUM: 1.06 CM (ref 0.6–1.1)
LA MAJOR: 4.3 CM
LA MAJOR: 4.3 CM
LA MINOR: 4.24 CM
LA MINOR: 4.24 CM
LA WIDTH: 3.79 CM
LA WIDTH: 3.79 CM
LEFT ATRIUM SIZE: 3.75 CM
LEFT ATRIUM SIZE: 3.75 CM
LEFT ATRIUM VOLUME INDEX MOD: 34 ML/M2
LEFT ATRIUM VOLUME INDEX: 25.4 ML/M2
LEFT ATRIUM VOLUME MOD: 48.01 CM3
LEFT ATRIUM VOLUME MOD: 69 CM3
LEFT ATRIUM VOLUME: 51.58 CM3
LEFT ATRIUM VOLUME: 51.58 CM3
LEFT INTERNAL DIMENSION IN SYSTOLE: 3.21 CM (ref 2.1–4)
LEFT INTERNAL DIMENSION IN SYSTOLE: 3.21 CM (ref 2.1–4)
LEFT VENTRICLE DIASTOLIC VOLUME INDEX: 45.67 ML/M2
LEFT VENTRICLE DIASTOLIC VOLUME: 92.71 ML
LEFT VENTRICLE DIASTOLIC VOLUME: 92.71 ML
LEFT VENTRICLE MASS INDEX: 74 G/M2
LEFT VENTRICLE SYSTOLIC VOLUME INDEX: 20.4 ML/M2
LEFT VENTRICLE SYSTOLIC VOLUME: 41.42 ML
LEFT VENTRICLE SYSTOLIC VOLUME: 41.42 ML
LEFT VENTRICULAR INTERNAL DIMENSION IN DIASTOLE: 4.51 CM (ref 3.5–6)
LEFT VENTRICULAR INTERNAL DIMENSION IN DIASTOLE: 4.51 CM (ref 3.5–6)
LEFT VENTRICULAR MASS: 149.62 G
LEFT VENTRICULAR MASS: 164.56 G
LV LATERAL E/E' RATIO: 11.33 M/S
LV LATERAL E/E' RATIO: 11.33 M/S
LV SEPTAL E/E' RATIO: 13.6 M/S
LV SEPTAL E/E' RATIO: 13.6 M/S
MV A" WAVE DURATION": 11.13 MSEC
MV A" WAVE DURATION": 11.13 MSEC
MV PEAK A VEL: 1.11 M/S
MV PEAK A VEL: 1.11 M/S
MV PEAK E VEL: 0.68 M/S
MV PEAK E VEL: 0.68 M/S
PULM VEIN S/D RATIO: 2.08
PULM VEIN S/D RATIO: 2.08
PV PEAK D VEL: 0.25 M/S
PV PEAK D VEL: 0.25 M/S
PV PEAK S VEL: 0.52 M/S
PV PEAK S VEL: 0.52 M/S
RA MAJOR: 3.64 CM
RA MAJOR: 3.64 CM
RA PRESSURE: 3 MMHG
RA WIDTH: 2.93 CM
RA WIDTH: 2.93 CM
RIGHT VENTRICULAR END-DIASTOLIC DIMENSION: 2.23 CM
RIGHT VENTRICULAR END-DIASTOLIC DIMENSION: 2.23 CM
RV TISSUE DOPPLER FREE WALL SYSTOLIC VELOCITY 1 (APICAL 4 CHAMBER VIEW): 8.61 CM/S
RV TISSUE DOPPLER FREE WALL SYSTOLIC VELOCITY 1 (APICAL 4 CHAMBER VIEW): 8.61 CM/S
SINUS: 2.53 CM
SINUS: 2.53 CM
STJ: 2.41 CM
STJ: 2.41 CM
TDI LATERAL: 0.06 M/S
TDI LATERAL: 0.06 M/S
TDI SEPTAL: 0.05 M/S
TDI SEPTAL: 0.05 M/S
TDI: 0.06 M/S
TDI: 0.06 M/S
TRICUSPID ANNULAR PLANE SYSTOLIC EXCURSION: 2.17 CM
TRICUSPID ANNULAR PLANE SYSTOLIC EXCURSION: 2.17 CM

## 2021-01-28 PROCEDURE — 93010 ELECTROCARDIOGRAM REPORT: CPT | Mod: S$GLB,,, | Performed by: INTERNAL MEDICINE

## 2021-01-28 PROCEDURE — 93308 ECHO (CUPID ONLY): ICD-10-PCS | Mod: 26,,, | Performed by: INTERNAL MEDICINE

## 2021-01-28 PROCEDURE — 93325 ECHO (CUPID ONLY): ICD-10-PCS | Mod: 26,,, | Performed by: INTERNAL MEDICINE

## 2021-01-28 PROCEDURE — 93010 EKG 12-LEAD: ICD-10-PCS | Mod: S$GLB,,, | Performed by: INTERNAL MEDICINE

## 2021-01-28 PROCEDURE — 93308 TTE F-UP OR LMTD: CPT

## 2021-01-28 PROCEDURE — 93308 TTE F-UP OR LMTD: CPT | Mod: 26,,, | Performed by: INTERNAL MEDICINE

## 2021-01-28 PROCEDURE — 93321 ECHO (CUPID ONLY): ICD-10-PCS | Mod: 26,,, | Performed by: INTERNAL MEDICINE

## 2021-01-28 PROCEDURE — 93005 ELECTROCARDIOGRAM TRACING: CPT | Mod: S$GLB,,, | Performed by: INTERNAL MEDICINE

## 2021-01-28 PROCEDURE — 93325 DOPPLER ECHO COLOR FLOW MAPG: CPT | Mod: 26,,, | Performed by: INTERNAL MEDICINE

## 2021-01-28 PROCEDURE — 93005 EKG 12-LEAD: ICD-10-PCS | Mod: S$GLB,,, | Performed by: INTERNAL MEDICINE

## 2021-01-28 PROCEDURE — 93321 DOPPLER ECHO F-UP/LMTD STD: CPT | Mod: 26,,, | Performed by: INTERNAL MEDICINE

## 2021-01-29 ENCOUNTER — TELEPHONE (OUTPATIENT)
Dept: INTERNAL MEDICINE | Facility: CLINIC | Age: 76
End: 2021-01-29

## 2021-01-29 DIAGNOSIS — R07.89 CHEST TIGHTNESS: ICD-10-CM

## 2021-01-29 DIAGNOSIS — R93.1 DECREASED CARDIAC EJECTION FRACTION: Primary | ICD-10-CM

## 2021-02-08 ENCOUNTER — TELEPHONE (OUTPATIENT)
Dept: INTERNAL MEDICINE | Facility: CLINIC | Age: 76
End: 2021-02-08

## 2021-02-08 DIAGNOSIS — I10 BENIGN ESSENTIAL HYPERTENSION: Primary | ICD-10-CM

## 2021-02-09 ENCOUNTER — TELEPHONE (OUTPATIENT)
Dept: INTERNAL MEDICINE | Facility: CLINIC | Age: 76
End: 2021-02-09

## 2021-02-09 RX ORDER — CHLORTHALIDONE 50 MG/1
50 TABLET ORAL DAILY
Qty: 30 TABLET | Refills: 11 | Status: SHIPPED | OUTPATIENT
Start: 2021-02-09 | End: 2021-05-20 | Stop reason: SDUPTHER

## 2021-02-11 ENCOUNTER — CLINICAL SUPPORT (OUTPATIENT)
Dept: INTERNAL MEDICINE | Facility: CLINIC | Age: 76
End: 2021-02-11
Payer: MEDICARE

## 2021-02-11 ENCOUNTER — HOSPITAL ENCOUNTER (OUTPATIENT)
Dept: CARDIOLOGY | Facility: HOSPITAL | Age: 76
Discharge: HOME | End: 2021-02-11
Attending: INTERNAL MEDICINE
Payer: MEDICARE

## 2021-02-11 VITALS
RESPIRATION RATE: 18 BRPM | HEIGHT: 66 IN | WEIGHT: 207 LBS | HEART RATE: 67 BPM | BODY MASS INDEX: 33.27 KG/M2 | DIASTOLIC BLOOD PRESSURE: 70 MMHG | SYSTOLIC BLOOD PRESSURE: 142 MMHG

## 2021-02-11 VITALS — DIASTOLIC BLOOD PRESSURE: 80 MMHG | HEART RATE: 80 BPM | SYSTOLIC BLOOD PRESSURE: 136 MMHG

## 2021-02-11 DIAGNOSIS — I70.0 AORTIC ATHEROSCLEROSIS: ICD-10-CM

## 2021-02-11 DIAGNOSIS — R07.89 CHEST TIGHTNESS: ICD-10-CM

## 2021-02-11 LAB
ASCENDING AORTA: 2.71 CM
BSA FOR ECHO PROCEDURE: 2.09 M2
CV ECHO LV RWT: 0.44 CM
CV STRESS BASE HR: 67 BPM
DIASTOLIC BLOOD PRESSURE: 67 MMHG
DOP CALC LVOT AREA: 2.6 CM2
DOP CALC LVOT DIAMETER: 1.81 CM
DOP CALC LVOT PEAK VEL: 0.86 M/S
DOP CALC LVOT STROKE VOLUME: 42.07 CM3
DOP CALCLVOT PEAK VEL VTI: 16.36 CM
E WAVE DECELERATION TIME: 463.14 MSEC
E/A RATIO: 0.45
E/E' RATIO: 7.4 M/S
ECHO LV POSTERIOR WALL: 0.76 CM (ref 0.6–1.1)
FRACTIONAL SHORTENING: 26 % (ref 28–44)
INTERVENTRICULAR SEPTUM: 0.86 CM (ref 0.6–1.1)
IVRT: 191.25 MSEC
LA MAJOR: 5.49 CM
LA MINOR: 5.36 CM
LA WIDTH: 3.72 CM
LEFT ATRIUM SIZE: 3.23 CM
LEFT ATRIUM VOLUME INDEX MOD: 24.6 ML/M2
LEFT ATRIUM VOLUME INDEX: 27.3 ML/M2
LEFT ATRIUM VOLUME MOD: 50 CM3
LEFT ATRIUM VOLUME: 55.4 CM3
LEFT INTERNAL DIMENSION IN SYSTOLE: 2.58 CM (ref 2.1–4)
LEFT VENTRICLE DIASTOLIC VOLUME INDEX: 24.89 ML/M2
LEFT VENTRICLE DIASTOLIC VOLUME: 50.53 ML
LEFT VENTRICLE MASS INDEX: 38 G/M2
LEFT VENTRICLE SYSTOLIC VOLUME INDEX: 11.9 ML/M2
LEFT VENTRICLE SYSTOLIC VOLUME: 24.19 ML
LEFT VENTRICULAR INTERNAL DIMENSION IN DIASTOLE: 3.49 CM (ref 3.5–6)
LEFT VENTRICULAR MASS: 76.25 G
LV LATERAL E/E' RATIO: 6.17 M/S
LV SEPTAL E/E' RATIO: 9.25 M/S
MV A" WAVE DURATION": 14.84 MSEC
MV PEAK A VEL: 0.82 M/S
MV PEAK E VEL: 0.37 M/S
OHS CV CPX 1 MINUTE RECOVERY HEART RATE: 137 BPM
OHS CV CPX 85 PERCENT MAX PREDICTED HEART RATE MALE: 120
OHS CV CPX MAX PREDICTED HEART RATE: 142
OHS CV CPX PATIENT IS FEMALE: 1
OHS CV CPX PATIENT IS MALE: 0
OHS CV CPX PEAK DIASTOLIC BLOOD PRESSURE: 76 MMHG
OHS CV CPX PEAK HEAR RATE: 141 BPM
OHS CV CPX PEAK RATE PRESSURE PRODUCT: ABNORMAL
OHS CV CPX PEAK SYSTOLIC BLOOD PRESSURE: 159 MMHG
OHS CV CPX PERCENT MAX PREDICTED HEART RATE ACHIEVED: 99
OHS CV CPX RATE PRESSURE PRODUCT PRESENTING: 9313
PULM VEIN S/D RATIO: 2.14
PV PEAK D VEL: 0.22 M/S
PV PEAK S VEL: 0.47 M/S
RA MAJOR: 4.43 CM
RA PRESSURE: 3 MMHG
RA WIDTH: 3.52 CM
RIGHT VENTRICULAR END-DIASTOLIC DIMENSION: 2.85 CM
RV TISSUE DOPPLER FREE WALL SYSTOLIC VELOCITY 1 (APICAL 4 CHAMBER VIEW): 8.19 CM/S
SINUS: 2.6 CM
STJ: 2.36 CM
SYSTOLIC BLOOD PRESSURE: 139 MMHG
TDI LATERAL: 0.06 M/S
TDI SEPTAL: 0.04 M/S
TDI: 0.05 M/S
TRICUSPID ANNULAR PLANE SYSTOLIC EXCURSION: 1.52 CM

## 2021-02-11 PROCEDURE — 99999 PR PBB SHADOW E&M-EST. PATIENT-LVL I: CPT | Mod: PBBFAC,,,

## 2021-02-11 PROCEDURE — 63600175 PHARM REV CODE 636 W HCPCS: Performed by: INTERNAL MEDICINE

## 2021-02-11 PROCEDURE — 99999 PR PBB SHADOW E&M-EST. PATIENT-LVL I: ICD-10-PCS | Mod: PBBFAC,,,

## 2021-02-11 PROCEDURE — 93351 STRESS TTE COMPLETE: CPT

## 2021-02-11 PROCEDURE — 93351 STRESS ECHO (CUPID ONLY): ICD-10-PCS | Mod: 26,,, | Performed by: INTERNAL MEDICINE

## 2021-02-11 PROCEDURE — 93351 STRESS TTE COMPLETE: CPT | Mod: 26,,, | Performed by: INTERNAL MEDICINE

## 2021-02-11 RX ORDER — DOBUTAMINE HYDROCHLORIDE 400 MG/100ML
30 INJECTION INTRAVENOUS
Status: COMPLETED | OUTPATIENT
Start: 2021-02-11 | End: 2021-02-11

## 2021-02-11 RX ORDER — ATROPINE SULFATE 0.1 MG/ML
0.25 INJECTION INTRAVENOUS
Status: COMPLETED | OUTPATIENT
Start: 2021-02-11 | End: 2021-02-11

## 2021-02-11 RX ADMIN — ATROPINE SULFATE 0.25 MG: 0.1 INJECTION PARENTERAL at 09:02

## 2021-02-11 RX ADMIN — DOBUTAMINE HYDROCHLORIDE 30 MCG/KG/MIN: 400 INJECTION INTRAVENOUS at 09:02

## 2021-02-23 ENCOUNTER — PATIENT OUTREACH (OUTPATIENT)
Dept: ADMINISTRATIVE | Facility: OTHER | Age: 76
End: 2021-02-23

## 2021-02-25 ENCOUNTER — OFFICE VISIT (OUTPATIENT)
Dept: OPTOMETRY | Facility: CLINIC | Age: 76
End: 2021-02-25
Payer: MEDICARE

## 2021-02-25 ENCOUNTER — TELEPHONE (OUTPATIENT)
Dept: INTERNAL MEDICINE | Facility: CLINIC | Age: 76
End: 2021-02-25

## 2021-02-25 DIAGNOSIS — H52.4 PRESBYOPIA OF BOTH EYES: ICD-10-CM

## 2021-02-25 DIAGNOSIS — H02.413 MECHANICAL PTOSIS OF EYELID OF BOTH EYES: ICD-10-CM

## 2021-02-25 DIAGNOSIS — E11.9 TYPE 2 DIABETES MELLITUS WITHOUT RETINOPATHY: ICD-10-CM

## 2021-02-25 DIAGNOSIS — H52.03 HYPERMETROPIA OF BOTH EYES: ICD-10-CM

## 2021-02-25 DIAGNOSIS — H26.9 CORTICAL CATARACT OF BOTH EYES: ICD-10-CM

## 2021-02-25 DIAGNOSIS — Z01.00 DIABETIC EYE EXAM: Primary | ICD-10-CM

## 2021-02-25 DIAGNOSIS — E11.9 DIABETIC EYE EXAM: Primary | ICD-10-CM

## 2021-02-25 DIAGNOSIS — H25.13 NUCLEAR SCLEROSIS, BILATERAL: ICD-10-CM

## 2021-02-25 PROCEDURE — 2023F PR DILATED RETINAL EXAM W/O EVID OF RETINOPATHY: ICD-10-PCS | Mod: S$GLB,,, | Performed by: OPTOMETRIST

## 2021-02-25 PROCEDURE — 1101F PT FALLS ASSESS-DOCD LE1/YR: CPT | Mod: CPTII,S$GLB,, | Performed by: OPTOMETRIST

## 2021-02-25 PROCEDURE — 1125F AMNT PAIN NOTED PAIN PRSNT: CPT | Mod: S$GLB,,, | Performed by: OPTOMETRIST

## 2021-02-25 PROCEDURE — 3288F FALL RISK ASSESSMENT DOCD: CPT | Mod: CPTII,S$GLB,, | Performed by: OPTOMETRIST

## 2021-02-25 PROCEDURE — 92015 DETERMINE REFRACTIVE STATE: CPT | Mod: S$GLB,,, | Performed by: OPTOMETRIST

## 2021-02-25 PROCEDURE — 2023F DILAT RTA XM W/O RTNOPTHY: CPT | Mod: S$GLB,,, | Performed by: OPTOMETRIST

## 2021-02-25 PROCEDURE — 99999 PR PBB SHADOW E&M-EST. PATIENT-LVL IV: ICD-10-PCS | Mod: PBBFAC,,, | Performed by: OPTOMETRIST

## 2021-02-25 PROCEDURE — 1101F PR PT FALLS ASSESS DOC 0-1 FALLS W/OUT INJ PAST YR: ICD-10-PCS | Mod: CPTII,S$GLB,, | Performed by: OPTOMETRIST

## 2021-02-25 PROCEDURE — 1125F PR PAIN SEVERITY QUANTIFIED, PAIN PRESENT: ICD-10-PCS | Mod: S$GLB,,, | Performed by: OPTOMETRIST

## 2021-02-25 PROCEDURE — 3288F PR FALLS RISK ASSESSMENT DOCUMENTED: ICD-10-PCS | Mod: CPTII,S$GLB,, | Performed by: OPTOMETRIST

## 2021-02-25 PROCEDURE — 99999 PR PBB SHADOW E&M-EST. PATIENT-LVL IV: CPT | Mod: PBBFAC,,, | Performed by: OPTOMETRIST

## 2021-02-25 PROCEDURE — 92014 PR EYE EXAM, EST PATIENT,COMPREHESV: ICD-10-PCS | Mod: S$GLB,,, | Performed by: OPTOMETRIST

## 2021-02-25 PROCEDURE — 92015 PR REFRACTION: ICD-10-PCS | Mod: S$GLB,,, | Performed by: OPTOMETRIST

## 2021-02-25 PROCEDURE — 92014 COMPRE OPH EXAM EST PT 1/>: CPT | Mod: S$GLB,,, | Performed by: OPTOMETRIST

## 2021-02-27 ENCOUNTER — OFFICE VISIT (OUTPATIENT)
Dept: INTERNAL MEDICINE | Facility: CLINIC | Age: 76
End: 2021-02-27
Payer: MEDICARE

## 2021-02-27 ENCOUNTER — NURSE TRIAGE (OUTPATIENT)
Dept: ADMINISTRATIVE | Facility: CLINIC | Age: 76
End: 2021-02-27

## 2021-02-27 VITALS
OXYGEN SATURATION: 99 % | SYSTOLIC BLOOD PRESSURE: 182 MMHG | HEIGHT: 66 IN | DIASTOLIC BLOOD PRESSURE: 72 MMHG | WEIGHT: 206.56 LBS | BODY MASS INDEX: 33.2 KG/M2 | HEART RATE: 79 BPM

## 2021-02-27 DIAGNOSIS — M79.604 PAIN IN BOTH LOWER EXTREMITIES: Primary | ICD-10-CM

## 2021-02-27 DIAGNOSIS — M79.605 PAIN IN BOTH LOWER EXTREMITIES: Primary | ICD-10-CM

## 2021-02-27 PROCEDURE — 1101F PR PT FALLS ASSESS DOC 0-1 FALLS W/OUT INJ PAST YR: ICD-10-PCS | Mod: CPTII,S$GLB,, | Performed by: INTERNAL MEDICINE

## 2021-02-27 PROCEDURE — 3008F PR BODY MASS INDEX (BMI) DOCUMENTED: ICD-10-PCS | Mod: CPTII,S$GLB,, | Performed by: INTERNAL MEDICINE

## 2021-02-27 PROCEDURE — 99213 OFFICE O/P EST LOW 20 MIN: CPT | Mod: S$GLB,,, | Performed by: INTERNAL MEDICINE

## 2021-02-27 PROCEDURE — 1125F AMNT PAIN NOTED PAIN PRSNT: CPT | Mod: S$GLB,,, | Performed by: INTERNAL MEDICINE

## 2021-02-27 PROCEDURE — 3078F PR MOST RECENT DIASTOLIC BLOOD PRESSURE < 80 MM HG: ICD-10-PCS | Mod: CPTII,S$GLB,, | Performed by: INTERNAL MEDICINE

## 2021-02-27 PROCEDURE — 3288F PR FALLS RISK ASSESSMENT DOCUMENTED: ICD-10-PCS | Mod: CPTII,S$GLB,, | Performed by: INTERNAL MEDICINE

## 2021-02-27 PROCEDURE — 1125F PR PAIN SEVERITY QUANTIFIED, PAIN PRESENT: ICD-10-PCS | Mod: S$GLB,,, | Performed by: INTERNAL MEDICINE

## 2021-02-27 PROCEDURE — 1159F PR MEDICATION LIST DOCUMENTED IN MEDICAL RECORD: ICD-10-PCS | Mod: S$GLB,,, | Performed by: INTERNAL MEDICINE

## 2021-02-27 PROCEDURE — 1159F MED LIST DOCD IN RCRD: CPT | Mod: S$GLB,,, | Performed by: INTERNAL MEDICINE

## 2021-02-27 PROCEDURE — 99213 PR OFFICE/OUTPT VISIT, EST, LEVL III, 20-29 MIN: ICD-10-PCS | Mod: S$GLB,,, | Performed by: INTERNAL MEDICINE

## 2021-02-27 PROCEDURE — 99999 PR PBB SHADOW E&M-EST. PATIENT-LVL V: ICD-10-PCS | Mod: PBBFAC,,, | Performed by: INTERNAL MEDICINE

## 2021-02-27 PROCEDURE — 3077F SYST BP >= 140 MM HG: CPT | Mod: CPTII,S$GLB,, | Performed by: INTERNAL MEDICINE

## 2021-02-27 PROCEDURE — 3072F PR LOW RISK FOR RETINOPATHY: ICD-10-PCS | Mod: S$GLB,,, | Performed by: INTERNAL MEDICINE

## 2021-02-27 PROCEDURE — 1101F PT FALLS ASSESS-DOCD LE1/YR: CPT | Mod: CPTII,S$GLB,, | Performed by: INTERNAL MEDICINE

## 2021-02-27 PROCEDURE — 3078F DIAST BP <80 MM HG: CPT | Mod: CPTII,S$GLB,, | Performed by: INTERNAL MEDICINE

## 2021-02-27 PROCEDURE — 3008F BODY MASS INDEX DOCD: CPT | Mod: CPTII,S$GLB,, | Performed by: INTERNAL MEDICINE

## 2021-02-27 PROCEDURE — 3288F FALL RISK ASSESSMENT DOCD: CPT | Mod: CPTII,S$GLB,, | Performed by: INTERNAL MEDICINE

## 2021-02-27 PROCEDURE — 3077F PR MOST RECENT SYSTOLIC BLOOD PRESSURE >= 140 MM HG: ICD-10-PCS | Mod: CPTII,S$GLB,, | Performed by: INTERNAL MEDICINE

## 2021-02-27 PROCEDURE — 3072F LOW RISK FOR RETINOPATHY: CPT | Mod: S$GLB,,, | Performed by: INTERNAL MEDICINE

## 2021-02-27 PROCEDURE — 99999 PR PBB SHADOW E&M-EST. PATIENT-LVL V: CPT | Mod: PBBFAC,,, | Performed by: INTERNAL MEDICINE

## 2021-02-27 RX ORDER — TIZANIDINE 2 MG/1
2 TABLET ORAL EVERY 8 HOURS PRN
Qty: 30 TABLET | Refills: 1 | Status: SHIPPED | OUTPATIENT
Start: 2021-02-27 | End: 2021-04-03

## 2021-03-04 ENCOUNTER — OFFICE VISIT (OUTPATIENT)
Dept: CARDIOLOGY | Facility: CLINIC | Age: 76
End: 2021-03-04
Payer: MEDICARE

## 2021-03-04 ENCOUNTER — OFFICE VISIT (OUTPATIENT)
Dept: OBSTETRICS AND GYNECOLOGY | Facility: CLINIC | Age: 76
End: 2021-03-04
Payer: MEDICARE

## 2021-03-04 VITALS
BODY MASS INDEX: 35.34 KG/M2 | DIASTOLIC BLOOD PRESSURE: 82 MMHG | SYSTOLIC BLOOD PRESSURE: 138 MMHG | HEIGHT: 64 IN | WEIGHT: 207 LBS

## 2021-03-04 VITALS
WEIGHT: 209 LBS | SYSTOLIC BLOOD PRESSURE: 179 MMHG | HEIGHT: 66 IN | DIASTOLIC BLOOD PRESSURE: 78 MMHG | BODY MASS INDEX: 33.59 KG/M2 | HEART RATE: 92 BPM

## 2021-03-04 DIAGNOSIS — I42.5 OTHER RESTRICTIVE CARDIOMYOPATHY: ICD-10-CM

## 2021-03-04 DIAGNOSIS — E11.65 TYPE 2 DIABETES MELLITUS WITH HYPERGLYCEMIA, WITH LONG-TERM CURRENT USE OF INSULIN: Chronic | ICD-10-CM

## 2021-03-04 DIAGNOSIS — N95.2 ATROPHY OF VAGINA: ICD-10-CM

## 2021-03-04 DIAGNOSIS — E78.5 HYPERLIPIDEMIA LDL GOAL <70: Chronic | ICD-10-CM

## 2021-03-04 DIAGNOSIS — I65.23 BILATERAL CAROTID ARTERY STENOSIS: ICD-10-CM

## 2021-03-04 DIAGNOSIS — R07.89 CHEST TIGHTNESS: ICD-10-CM

## 2021-03-04 DIAGNOSIS — Z01.419 WOMEN'S ANNUAL ROUTINE GYNECOLOGICAL EXAMINATION: Primary | ICD-10-CM

## 2021-03-04 DIAGNOSIS — I70.0 AORTIC ATHEROSCLEROSIS: ICD-10-CM

## 2021-03-04 DIAGNOSIS — N18.2 CKD STAGE 2 DUE TO TYPE 2 DIABETES MELLITUS: Chronic | ICD-10-CM

## 2021-03-04 DIAGNOSIS — R93.1 DECREASED CARDIAC EJECTION FRACTION: ICD-10-CM

## 2021-03-04 DIAGNOSIS — I73.9 CLAUDICATION: Primary | ICD-10-CM

## 2021-03-04 DIAGNOSIS — Z79.4 TYPE 2 DIABETES MELLITUS WITH HYPERGLYCEMIA, WITH LONG-TERM CURRENT USE OF INSULIN: Chronic | ICD-10-CM

## 2021-03-04 DIAGNOSIS — E11.22 CKD STAGE 2 DUE TO TYPE 2 DIABETES MELLITUS: Chronic | ICD-10-CM

## 2021-03-04 DIAGNOSIS — Z72.0 TOBACCO USE: ICD-10-CM

## 2021-03-04 PROBLEM — I16.0 HYPERTENSIVE URGENCY: Status: RESOLVED | Noted: 2019-04-20 | Resolved: 2021-03-04

## 2021-03-04 PROCEDURE — 1101F PT FALLS ASSESS-DOCD LE1/YR: CPT | Mod: CPTII,S$GLB,, | Performed by: NURSE PRACTITIONER

## 2021-03-04 PROCEDURE — 1125F AMNT PAIN NOTED PAIN PRSNT: CPT | Mod: S$GLB,,, | Performed by: INTERNAL MEDICINE

## 2021-03-04 PROCEDURE — 3074F SYST BP LT 130 MM HG: CPT | Mod: CPTII,S$GLB,, | Performed by: INTERNAL MEDICINE

## 2021-03-04 PROCEDURE — 3288F PR FALLS RISK ASSESSMENT DOCUMENTED: ICD-10-PCS | Mod: CPTII,S$GLB,, | Performed by: NURSE PRACTITIONER

## 2021-03-04 PROCEDURE — 99999 PR PBB SHADOW E&M-EST. PATIENT-LVL II: ICD-10-PCS | Mod: PBBFAC,,, | Performed by: NURSE PRACTITIONER

## 2021-03-04 PROCEDURE — 1159F MED LIST DOCD IN RCRD: CPT | Mod: S$GLB,,, | Performed by: INTERNAL MEDICINE

## 2021-03-04 PROCEDURE — 3072F LOW RISK FOR RETINOPATHY: CPT | Mod: S$GLB,,, | Performed by: INTERNAL MEDICINE

## 2021-03-04 PROCEDURE — 99999 PR PBB SHADOW E&M-EST. PATIENT-LVL III: CPT | Mod: PBBFAC,,, | Performed by: INTERNAL MEDICINE

## 2021-03-04 PROCEDURE — 3072F PR LOW RISK FOR RETINOPATHY: ICD-10-PCS | Mod: S$GLB,,, | Performed by: INTERNAL MEDICINE

## 2021-03-04 PROCEDURE — 1125F PR PAIN SEVERITY QUANTIFIED, PAIN PRESENT: ICD-10-PCS | Mod: S$GLB,,, | Performed by: INTERNAL MEDICINE

## 2021-03-04 PROCEDURE — 3008F PR BODY MASS INDEX (BMI) DOCUMENTED: ICD-10-PCS | Mod: CPTII,S$GLB,, | Performed by: NURSE PRACTITIONER

## 2021-03-04 PROCEDURE — 3008F BODY MASS INDEX DOCD: CPT | Mod: CPTII,S$GLB,, | Performed by: NURSE PRACTITIONER

## 2021-03-04 PROCEDURE — 99204 PR OFFICE/OUTPT VISIT, NEW, LEVL IV, 45-59 MIN: ICD-10-PCS | Mod: S$GLB,,, | Performed by: INTERNAL MEDICINE

## 2021-03-04 PROCEDURE — 3074F PR MOST RECENT SYSTOLIC BLOOD PRESSURE < 130 MM HG: ICD-10-PCS | Mod: CPTII,S$GLB,, | Performed by: INTERNAL MEDICINE

## 2021-03-04 PROCEDURE — 3078F PR MOST RECENT DIASTOLIC BLOOD PRESSURE < 80 MM HG: ICD-10-PCS | Mod: CPTII,S$GLB,, | Performed by: INTERNAL MEDICINE

## 2021-03-04 PROCEDURE — 99204 OFFICE O/P NEW MOD 45 MIN: CPT | Mod: S$GLB,,, | Performed by: INTERNAL MEDICINE

## 2021-03-04 PROCEDURE — 3288F FALL RISK ASSESSMENT DOCD: CPT | Mod: CPTII,S$GLB,, | Performed by: NURSE PRACTITIONER

## 2021-03-04 PROCEDURE — 3072F PR LOW RISK FOR RETINOPATHY: ICD-10-PCS | Mod: S$GLB,,, | Performed by: NURSE PRACTITIONER

## 2021-03-04 PROCEDURE — 3008F BODY MASS INDEX DOCD: CPT | Mod: CPTII,S$GLB,, | Performed by: INTERNAL MEDICINE

## 2021-03-04 PROCEDURE — 1159F PR MEDICATION LIST DOCUMENTED IN MEDICAL RECORD: ICD-10-PCS | Mod: S$GLB,,, | Performed by: INTERNAL MEDICINE

## 2021-03-04 PROCEDURE — 99999 PR PBB SHADOW E&M-EST. PATIENT-LVL II: CPT | Mod: PBBFAC,,, | Performed by: NURSE PRACTITIONER

## 2021-03-04 PROCEDURE — G0101 PR CA SCREEN;PELVIC/BREAST EXAM: ICD-10-PCS | Mod: S$GLB,,, | Performed by: NURSE PRACTITIONER

## 2021-03-04 PROCEDURE — 3046F HEMOGLOBIN A1C LEVEL >9.0%: CPT | Mod: CPTII,S$GLB,, | Performed by: INTERNAL MEDICINE

## 2021-03-04 PROCEDURE — 3078F DIAST BP <80 MM HG: CPT | Mod: CPTII,S$GLB,, | Performed by: INTERNAL MEDICINE

## 2021-03-04 PROCEDURE — G0101 CA SCREEN;PELVIC/BREAST EXAM: HCPCS | Mod: S$GLB,,, | Performed by: NURSE PRACTITIONER

## 2021-03-04 PROCEDURE — 3072F LOW RISK FOR RETINOPATHY: CPT | Mod: S$GLB,,, | Performed by: NURSE PRACTITIONER

## 2021-03-04 PROCEDURE — 3046F PR MOST RECENT HEMOGLOBIN A1C LEVEL > 9.0%: ICD-10-PCS | Mod: CPTII,S$GLB,, | Performed by: INTERNAL MEDICINE

## 2021-03-04 PROCEDURE — 1126F PR PAIN SEVERITY QUANTIFIED, NO PAIN PRESENT: ICD-10-PCS | Mod: S$GLB,,, | Performed by: NURSE PRACTITIONER

## 2021-03-04 PROCEDURE — 3008F PR BODY MASS INDEX (BMI) DOCUMENTED: ICD-10-PCS | Mod: CPTII,S$GLB,, | Performed by: INTERNAL MEDICINE

## 2021-03-04 PROCEDURE — 99999 PR PBB SHADOW E&M-EST. PATIENT-LVL III: ICD-10-PCS | Mod: PBBFAC,,, | Performed by: INTERNAL MEDICINE

## 2021-03-04 PROCEDURE — 99499 UNLISTED E&M SERVICE: CPT | Mod: S$GLB,,, | Performed by: INTERNAL MEDICINE

## 2021-03-04 PROCEDURE — 1101F PR PT FALLS ASSESS DOC 0-1 FALLS W/OUT INJ PAST YR: ICD-10-PCS | Mod: CPTII,S$GLB,, | Performed by: NURSE PRACTITIONER

## 2021-03-04 PROCEDURE — 1126F AMNT PAIN NOTED NONE PRSNT: CPT | Mod: S$GLB,,, | Performed by: NURSE PRACTITIONER

## 2021-03-04 PROCEDURE — 99499 RISK ADDL DX/OHS AUDIT: ICD-10-PCS | Mod: S$GLB,,, | Performed by: INTERNAL MEDICINE

## 2021-03-04 RX ORDER — CARVEDILOL 6.25 MG/1
6.25 TABLET ORAL 2 TIMES DAILY WITH MEALS
Qty: 60 TABLET | Refills: 11 | Status: SHIPPED | OUTPATIENT
Start: 2021-03-04 | End: 2022-08-18 | Stop reason: SDUPTHER

## 2021-03-16 ENCOUNTER — TELEPHONE (OUTPATIENT)
Dept: CARDIOLOGY | Facility: CLINIC | Age: 76
End: 2021-03-16

## 2021-03-18 ENCOUNTER — HOSPITAL ENCOUNTER (OUTPATIENT)
Dept: CARDIOLOGY | Facility: HOSPITAL | Age: 76
Discharge: HOME | End: 2021-03-18
Attending: INTERNAL MEDICINE
Payer: MEDICARE

## 2021-03-18 ENCOUNTER — TELEPHONE (OUTPATIENT)
Dept: CARDIOLOGY | Facility: CLINIC | Age: 76
End: 2021-03-18

## 2021-03-18 VITALS — BODY MASS INDEX: 33.43 KG/M2 | HEIGHT: 66 IN | WEIGHT: 208 LBS

## 2021-03-18 DIAGNOSIS — R07.89 CHEST TIGHTNESS: ICD-10-CM

## 2021-03-18 DIAGNOSIS — R93.1 DECREASED CARDIAC EJECTION FRACTION: ICD-10-CM

## 2021-03-18 DIAGNOSIS — I73.9 CLAUDICATION: ICD-10-CM

## 2021-03-18 DIAGNOSIS — I73.9 PAD (PERIPHERAL ARTERY DISEASE): Primary | ICD-10-CM

## 2021-03-18 LAB
CV PHARM DOSE: 0.4 MG
CV STRESS BASE HR: 62 BPM
DIASTOLIC BLOOD PRESSURE: 80 MMHG
END DIASTOLIC INDEX-HIGH: 170 ML/M2
END SYSTOLIC INDEX-HIGH: 70 ML/M2
LEFT ABI: 0.3
LEFT ARM BP: 180 MMHG
LEFT DORSALIS PEDIS: 56 MMHG
NUC REST DIASTOLIC VOLUME INDEX: 98
NUC REST EJECTION FRACTION: 68
NUC REST SYSTOLIC VOLUME INDEX: 31
NUC STRESS DIASTOLIC VOLUME INDEX: 105
NUC STRESS EJECTION FRACTION: 65 %
NUC STRESS SYSTOLIC VOLUME INDEX: 36
OHS CV CPX 85 PERCENT MAX PREDICTED HEART RATE MALE: 120
OHS CV CPX MAX PREDICTED HEART RATE: 142
OHS CV CPX PATIENT IS FEMALE: 1
OHS CV CPX PATIENT IS MALE: 0
OHS CV CPX PEAK DIASTOLIC BLOOD PRESSURE: 69 MMHG
OHS CV CPX PEAK HEAR RATE: 66 BPM
OHS CV CPX PEAK RATE PRESSURE PRODUCT: NORMAL
OHS CV CPX PEAK SYSTOLIC BLOOD PRESSURE: 203 MMHG
OHS CV CPX PERCENT MAX PREDICTED HEART RATE ACHIEVED: 47
OHS CV CPX RATE PRESSURE PRODUCT PRESENTING: NORMAL
RETIRED EF AND QEF - SEE NOTES: 51 %
RIGHT ABI: 0.26
RIGHT ARM BP: 187 MMHG
RIGHT DORSALIS PEDIS: 49 MMHG
RIGHT POSTERIOR TIBIAL: 37 MMHG
SYSTOLIC BLOOD PRESSURE: 211 MMHG

## 2021-03-18 PROCEDURE — 93016 CV STRESS TEST SUPVJ ONLY: CPT | Mod: ,,, | Performed by: INTERNAL MEDICINE

## 2021-03-18 PROCEDURE — 78452 STRESS TEST WITH MYOCARDIAL PERFUSION (CUPID ONLY): ICD-10-PCS | Mod: 26,,, | Performed by: INTERNAL MEDICINE

## 2021-03-18 PROCEDURE — 93922 UPR/L XTREMITY ART 2 LEVELS: CPT

## 2021-03-18 PROCEDURE — 78452 HT MUSCLE IMAGE SPECT MULT: CPT | Mod: 26,,, | Performed by: INTERNAL MEDICINE

## 2021-03-18 PROCEDURE — 93018 STRESS TEST WITH MYOCARDIAL PERFUSION (CUPID ONLY): ICD-10-PCS | Mod: ,,, | Performed by: INTERNAL MEDICINE

## 2021-03-18 PROCEDURE — 63600175 PHARM REV CODE 636 W HCPCS: Performed by: INTERNAL MEDICINE

## 2021-03-18 PROCEDURE — 93018 CV STRESS TEST I&R ONLY: CPT | Mod: ,,, | Performed by: INTERNAL MEDICINE

## 2021-03-18 PROCEDURE — 93922 UPR/L XTREMITY ART 2 LEVELS: CPT | Mod: 26,,, | Performed by: INTERNAL MEDICINE

## 2021-03-18 PROCEDURE — 93016 STRESS TEST WITH MYOCARDIAL PERFUSION (CUPID ONLY): ICD-10-PCS | Mod: ,,, | Performed by: INTERNAL MEDICINE

## 2021-03-18 PROCEDURE — 78452 HT MUSCLE IMAGE SPECT MULT: CPT

## 2021-03-18 PROCEDURE — A9502 TC99M TETROFOSMIN: HCPCS

## 2021-03-18 PROCEDURE — 93922 ANKLE BRACHIAL INDICES (ABI): ICD-10-PCS | Mod: 26,,, | Performed by: INTERNAL MEDICINE

## 2021-03-18 RX ORDER — REGADENOSON 0.08 MG/ML
0.4 INJECTION, SOLUTION INTRAVENOUS
Status: COMPLETED | OUTPATIENT
Start: 2021-03-18 | End: 2021-03-18

## 2021-03-18 RX ADMIN — REGADENOSON 0.4 MG: 0.08 INJECTION, SOLUTION INTRAVENOUS at 08:03

## 2021-03-19 DIAGNOSIS — E11.65 TYPE 2 DIABETES MELLITUS WITH HYPERGLYCEMIA, WITH LONG-TERM CURRENT USE OF INSULIN: Chronic | ICD-10-CM

## 2021-03-19 DIAGNOSIS — Z79.4 TYPE 2 DIABETES MELLITUS WITH HYPERGLYCEMIA, WITH LONG-TERM CURRENT USE OF INSULIN: Chronic | ICD-10-CM

## 2021-03-19 RX ORDER — GABAPENTIN 300 MG/1
CAPSULE ORAL
Qty: 90 CAPSULE | Refills: 0 | Status: SHIPPED | OUTPATIENT
Start: 2021-03-19 | End: 2022-03-21 | Stop reason: SDUPTHER

## 2021-03-24 DIAGNOSIS — E66.9 DIABETES MELLITUS TYPE 2 IN OBESE: ICD-10-CM

## 2021-03-24 DIAGNOSIS — E11.69 DIABETES MELLITUS TYPE 2 IN OBESE: ICD-10-CM

## 2021-03-25 ENCOUNTER — CLINICAL SUPPORT (OUTPATIENT)
Dept: DIABETES | Facility: CLINIC | Age: 76
End: 2021-03-25
Payer: MEDICARE

## 2021-03-25 ENCOUNTER — OFFICE VISIT (OUTPATIENT)
Dept: CARDIOLOGY | Facility: CLINIC | Age: 76
End: 2021-03-25
Payer: MEDICARE

## 2021-03-25 VITALS
OXYGEN SATURATION: 97 % | HEIGHT: 66 IN | BODY MASS INDEX: 33.24 KG/M2 | HEART RATE: 71 BPM | SYSTOLIC BLOOD PRESSURE: 159 MMHG | WEIGHT: 206.81 LBS | DIASTOLIC BLOOD PRESSURE: 74 MMHG

## 2021-03-25 VITALS — BODY MASS INDEX: 33.57 KG/M2 | WEIGHT: 208 LBS

## 2021-03-25 DIAGNOSIS — E11.42 TYPE 2 DIABETES MELLITUS WITH DIABETIC POLYNEUROPATHY, WITH LONG-TERM CURRENT USE OF INSULIN: Chronic | ICD-10-CM

## 2021-03-25 DIAGNOSIS — I73.9 PERIPHERAL VASCULAR DISEASE, UNSPECIFIED: ICD-10-CM

## 2021-03-25 DIAGNOSIS — N18.31 STAGE 3A CHRONIC KIDNEY DISEASE: ICD-10-CM

## 2021-03-25 DIAGNOSIS — I73.9 PAD (PERIPHERAL ARTERY DISEASE): ICD-10-CM

## 2021-03-25 DIAGNOSIS — Z79.4 TYPE 2 DIABETES MELLITUS WITH DIABETIC POLYNEUROPATHY, WITH LONG-TERM CURRENT USE OF INSULIN: Chronic | ICD-10-CM

## 2021-03-25 DIAGNOSIS — Z72.0 TOBACCO USE: ICD-10-CM

## 2021-03-25 DIAGNOSIS — E78.5 HYPERLIPIDEMIA LDL GOAL <70: Chronic | ICD-10-CM

## 2021-03-25 DIAGNOSIS — I10 ESSENTIAL HYPERTENSION: ICD-10-CM

## 2021-03-25 DIAGNOSIS — I73.9 CLAUDICATION: Primary | ICD-10-CM

## 2021-03-25 PROCEDURE — G0108 DIAB MANAGE TRN  PER INDIV: HCPCS | Mod: S$GLB,,, | Performed by: REGISTERED NURSE

## 2021-03-25 PROCEDURE — 3077F SYST BP >= 140 MM HG: CPT | Mod: CPTII,S$GLB,, | Performed by: INTERNAL MEDICINE

## 2021-03-25 PROCEDURE — 3008F PR BODY MASS INDEX (BMI) DOCUMENTED: ICD-10-PCS | Mod: CPTII,S$GLB,, | Performed by: INTERNAL MEDICINE

## 2021-03-25 PROCEDURE — 3078F DIAST BP <80 MM HG: CPT | Mod: CPTII,S$GLB,, | Performed by: INTERNAL MEDICINE

## 2021-03-25 PROCEDURE — 3078F PR MOST RECENT DIASTOLIC BLOOD PRESSURE < 80 MM HG: ICD-10-PCS | Mod: CPTII,S$GLB,, | Performed by: INTERNAL MEDICINE

## 2021-03-25 PROCEDURE — 99204 PR OFFICE/OUTPT VISIT, NEW, LEVL IV, 45-59 MIN: ICD-10-PCS | Mod: S$GLB,,, | Performed by: INTERNAL MEDICINE

## 2021-03-25 PROCEDURE — 3077F PR MOST RECENT SYSTOLIC BLOOD PRESSURE >= 140 MM HG: ICD-10-PCS | Mod: CPTII,S$GLB,, | Performed by: INTERNAL MEDICINE

## 2021-03-25 PROCEDURE — 3072F LOW RISK FOR RETINOPATHY: CPT | Mod: S$GLB,,, | Performed by: INTERNAL MEDICINE

## 2021-03-25 PROCEDURE — 1159F MED LIST DOCD IN RCRD: CPT | Mod: S$GLB,,, | Performed by: INTERNAL MEDICINE

## 2021-03-25 PROCEDURE — 99999 PR PBB SHADOW E&M-EST. PATIENT-LVL V: CPT | Mod: PBBFAC,,, | Performed by: INTERNAL MEDICINE

## 2021-03-25 PROCEDURE — 3046F PR MOST RECENT HEMOGLOBIN A1C LEVEL > 9.0%: ICD-10-PCS | Mod: CPTII,S$GLB,, | Performed by: INTERNAL MEDICINE

## 2021-03-25 PROCEDURE — 3008F BODY MASS INDEX DOCD: CPT | Mod: CPTII,S$GLB,, | Performed by: INTERNAL MEDICINE

## 2021-03-25 PROCEDURE — 1125F PR PAIN SEVERITY QUANTIFIED, PAIN PRESENT: ICD-10-PCS | Mod: S$GLB,,, | Performed by: INTERNAL MEDICINE

## 2021-03-25 PROCEDURE — G0108 PR DIAB MANAGE TRN  PER INDIV: ICD-10-PCS | Mod: S$GLB,,, | Performed by: REGISTERED NURSE

## 2021-03-25 PROCEDURE — 3072F PR LOW RISK FOR RETINOPATHY: ICD-10-PCS | Mod: S$GLB,,, | Performed by: INTERNAL MEDICINE

## 2021-03-25 PROCEDURE — 3046F HEMOGLOBIN A1C LEVEL >9.0%: CPT | Mod: CPTII,S$GLB,, | Performed by: INTERNAL MEDICINE

## 2021-03-25 PROCEDURE — 99999 PR PBB SHADOW E&M-EST. PATIENT-LVL V: ICD-10-PCS | Mod: PBBFAC,,, | Performed by: INTERNAL MEDICINE

## 2021-03-25 PROCEDURE — 99204 OFFICE O/P NEW MOD 45 MIN: CPT | Mod: S$GLB,,, | Performed by: INTERNAL MEDICINE

## 2021-03-25 PROCEDURE — 1159F PR MEDICATION LIST DOCUMENTED IN MEDICAL RECORD: ICD-10-PCS | Mod: S$GLB,,, | Performed by: INTERNAL MEDICINE

## 2021-03-25 PROCEDURE — 1125F AMNT PAIN NOTED PAIN PRSNT: CPT | Mod: S$GLB,,, | Performed by: INTERNAL MEDICINE

## 2021-03-25 RX ORDER — INSULIN DEGLUDEC 200 U/ML
INJECTION, SOLUTION SUBCUTANEOUS
Qty: 18 ML | Refills: 3 | Status: SHIPPED | OUTPATIENT
Start: 2021-03-25 | End: 2021-07-29

## 2021-03-25 RX ORDER — DIPHENHYDRAMINE HCL 50 MG
50 CAPSULE ORAL ONCE
Status: CANCELLED | OUTPATIENT
Start: 2021-03-25 | End: 2021-03-25

## 2021-03-25 RX ORDER — INSULIN LISPRO 100 [IU]/ML
INJECTION, SOLUTION INTRAVENOUS; SUBCUTANEOUS
Qty: 15 ML | Refills: 5 | Status: SHIPPED | OUTPATIENT
Start: 2021-03-25 | End: 2021-12-01

## 2021-03-25 RX ORDER — SODIUM CHLORIDE 9 MG/ML
INJECTION, SOLUTION INTRAVENOUS CONTINUOUS
Status: CANCELLED | OUTPATIENT
Start: 2021-03-25 | End: 2021-03-25

## 2021-03-25 RX ORDER — VARENICLINE TARTRATE 0.5 (11)-1
KIT ORAL
Qty: 53 TABLET | Refills: 0 | Status: SHIPPED | OUTPATIENT
Start: 2021-03-25 | End: 2021-05-20

## 2021-03-30 ENCOUNTER — HOSPITAL ENCOUNTER (OUTPATIENT)
Dept: RADIOLOGY | Facility: HOSPITAL | Age: 76
Discharge: HOME | End: 2021-03-30
Attending: INTERNAL MEDICINE
Payer: MEDICARE

## 2021-03-30 DIAGNOSIS — I73.9 PERIPHERAL VASCULAR DISEASE, UNSPECIFIED: ICD-10-CM

## 2021-03-30 DIAGNOSIS — I73.9 CLAUDICATION: ICD-10-CM

## 2021-03-30 DIAGNOSIS — I73.9 PAD (PERIPHERAL ARTERY DISEASE): ICD-10-CM

## 2021-03-30 PROCEDURE — 75635 CT ANGIO ABDOMINAL ARTERIES: CPT | Mod: TC

## 2021-03-30 PROCEDURE — 75635 CTA RUNOFF ABD PEL BILAT LOWER EXT: ICD-10-PCS | Mod: 26,,, | Performed by: RADIOLOGY

## 2021-03-30 PROCEDURE — 25500020 PHARM REV CODE 255: Performed by: INTERNAL MEDICINE

## 2021-03-30 PROCEDURE — 75635 CT ANGIO ABDOMINAL ARTERIES: CPT | Mod: 26,,, | Performed by: RADIOLOGY

## 2021-03-30 RX ADMIN — IOHEXOL 125 ML: 350 INJECTION, SOLUTION INTRAVENOUS at 08:03

## 2021-04-05 ENCOUNTER — PATIENT MESSAGE (OUTPATIENT)
Dept: ADMINISTRATIVE | Facility: HOSPITAL | Age: 76
End: 2021-04-05

## 2021-04-05 LAB
CREAT SERPL-MCNC: 1 MG/DL (ref 0.5–1.4)
SAMPLE: NORMAL

## 2021-04-06 ENCOUNTER — PATIENT OUTREACH (OUTPATIENT)
Dept: ADMINISTRATIVE | Facility: OTHER | Age: 76
End: 2021-04-06

## 2021-04-07 DIAGNOSIS — I70.0 AORTIC ATHEROSCLEROSIS: Primary | ICD-10-CM

## 2021-04-07 DIAGNOSIS — I70.213 ATHEROSCLEROSIS OF NATIVE ARTERY OF BOTH LOWER EXTREMITIES WITH INTERMITTENT CLAUDICATION: ICD-10-CM

## 2021-04-08 ENCOUNTER — TELEPHONE (OUTPATIENT)
Dept: CARDIOLOGY | Facility: CLINIC | Age: 76
End: 2021-04-08

## 2021-04-08 ENCOUNTER — DOCUMENTATION ONLY (OUTPATIENT)
Dept: CARDIOLOGY | Facility: CLINIC | Age: 76
End: 2021-04-08

## 2021-04-08 ENCOUNTER — LAB VISIT (OUTPATIENT)
Dept: LAB | Facility: HOSPITAL | Age: 76
End: 2021-04-08
Attending: INTERNAL MEDICINE
Payer: MEDICARE

## 2021-04-08 ENCOUNTER — OFFICE VISIT (OUTPATIENT)
Dept: CARDIOLOGY | Facility: CLINIC | Age: 76
End: 2021-04-08
Payer: MEDICARE

## 2021-04-08 VITALS
HEART RATE: 71 BPM | DIASTOLIC BLOOD PRESSURE: 66 MMHG | WEIGHT: 197.06 LBS | SYSTOLIC BLOOD PRESSURE: 107 MMHG | BODY MASS INDEX: 31.67 KG/M2 | HEIGHT: 66 IN | OXYGEN SATURATION: 98 %

## 2021-04-08 DIAGNOSIS — I73.9 PAD (PERIPHERAL ARTERY DISEASE): ICD-10-CM

## 2021-04-08 DIAGNOSIS — I10 ESSENTIAL HYPERTENSION: ICD-10-CM

## 2021-04-08 DIAGNOSIS — N18.31 STAGE 3A CHRONIC KIDNEY DISEASE: ICD-10-CM

## 2021-04-08 DIAGNOSIS — I73.9 CLAUDICATION: Primary | ICD-10-CM

## 2021-04-08 DIAGNOSIS — E11.42 TYPE 2 DIABETES MELLITUS WITH DIABETIC POLYNEUROPATHY, WITH LONG-TERM CURRENT USE OF INSULIN: ICD-10-CM

## 2021-04-08 DIAGNOSIS — I70.213 ATHEROSCLEROSIS OF NATIVE ARTERY OF BOTH LOWER EXTREMITIES WITH INTERMITTENT CLAUDICATION: ICD-10-CM

## 2021-04-08 DIAGNOSIS — Z79.4 TYPE 2 DIABETES MELLITUS WITH DIABETIC POLYNEUROPATHY, WITH LONG-TERM CURRENT USE OF INSULIN: ICD-10-CM

## 2021-04-08 DIAGNOSIS — E78.5 HYPERLIPIDEMIA LDL GOAL <70: ICD-10-CM

## 2021-04-08 DIAGNOSIS — I70.0 AORTIC ATHEROSCLEROSIS: ICD-10-CM

## 2021-04-08 LAB
ANION GAP SERPL CALC-SCNC: 11 MMOL/L (ref 8–16)
BASOPHILS # BLD AUTO: 0.05 K/UL (ref 0–0.2)
BASOPHILS NFR BLD: 0.5 % (ref 0–1.9)
BUN SERPL-MCNC: 21 MG/DL (ref 8–23)
CALCIUM SERPL-MCNC: 9.7 MG/DL (ref 8.7–10.5)
CHLORIDE SERPL-SCNC: 84 MMOL/L (ref 95–110)
CO2 SERPL-SCNC: 28 MMOL/L (ref 23–29)
CREAT SERPL-MCNC: 1.9 MG/DL (ref 0.5–1.4)
DIFFERENTIAL METHOD: ABNORMAL
EOSINOPHIL # BLD AUTO: 0.1 K/UL (ref 0–0.5)
EOSINOPHIL NFR BLD: 0.5 % (ref 0–8)
ERYTHROCYTE [DISTWIDTH] IN BLOOD BY AUTOMATED COUNT: 13.4 % (ref 11.5–14.5)
EST. GFR  (AFRICAN AMERICAN): 29.7 ML/MIN/1.73 M^2
EST. GFR  (NON AFRICAN AMERICAN): 25.8 ML/MIN/1.73 M^2
GLUCOSE SERPL-MCNC: 868 MG/DL (ref 70–110)
HCT VFR BLD AUTO: 45.3 % (ref 37–48.5)
HGB BLD-MCNC: 14.4 G/DL (ref 12–16)
IMM GRANULOCYTES # BLD AUTO: 0.07 K/UL (ref 0–0.04)
IMM GRANULOCYTES NFR BLD AUTO: 0.7 % (ref 0–0.5)
LYMPHOCYTES # BLD AUTO: 2.6 K/UL (ref 1–4.8)
LYMPHOCYTES NFR BLD: 26.5 % (ref 18–48)
MCH RBC QN AUTO: 27.4 PG (ref 27–31)
MCHC RBC AUTO-ENTMCNC: 31.8 G/DL (ref 32–36)
MCV RBC AUTO: 86 FL (ref 82–98)
MONOCYTES # BLD AUTO: 0.4 K/UL (ref 0.3–1)
MONOCYTES NFR BLD: 4.3 % (ref 4–15)
NEUTROPHILS # BLD AUTO: 6.7 K/UL (ref 1.8–7.7)
NEUTROPHILS NFR BLD: 67.5 % (ref 38–73)
NRBC BLD-RTO: 0 /100 WBC
PLATELET # BLD AUTO: 312 K/UL (ref 150–450)
PMV BLD AUTO: 12.7 FL (ref 9.2–12.9)
POTASSIUM SERPL-SCNC: 4.9 MMOL/L (ref 3.5–5.1)
RBC # BLD AUTO: 5.26 M/UL (ref 4–5.4)
SODIUM SERPL-SCNC: 123 MMOL/L (ref 136–145)
WBC # BLD AUTO: 9.95 K/UL (ref 3.9–12.7)

## 2021-04-08 PROCEDURE — 1159F MED LIST DOCD IN RCRD: CPT | Mod: S$GLB,,, | Performed by: INTERNAL MEDICINE

## 2021-04-08 PROCEDURE — 3072F LOW RISK FOR RETINOPATHY: CPT | Mod: S$GLB,,, | Performed by: INTERNAL MEDICINE

## 2021-04-08 PROCEDURE — 3072F PR LOW RISK FOR RETINOPATHY: ICD-10-PCS | Mod: S$GLB,,, | Performed by: INTERNAL MEDICINE

## 2021-04-08 PROCEDURE — 1125F AMNT PAIN NOTED PAIN PRSNT: CPT | Mod: S$GLB,,, | Performed by: INTERNAL MEDICINE

## 2021-04-08 PROCEDURE — 1125F PR PAIN SEVERITY QUANTIFIED, PAIN PRESENT: ICD-10-PCS | Mod: S$GLB,,, | Performed by: INTERNAL MEDICINE

## 2021-04-08 PROCEDURE — 99999 PR PBB SHADOW E&M-EST. PATIENT-LVL V: ICD-10-PCS | Mod: PBBFAC,,, | Performed by: INTERNAL MEDICINE

## 2021-04-08 PROCEDURE — 99213 OFFICE O/P EST LOW 20 MIN: CPT | Mod: S$GLB,,, | Performed by: INTERNAL MEDICINE

## 2021-04-08 PROCEDURE — 3008F PR BODY MASS INDEX (BMI) DOCUMENTED: ICD-10-PCS | Mod: CPTII,S$GLB,, | Performed by: INTERNAL MEDICINE

## 2021-04-08 PROCEDURE — 99213 PR OFFICE/OUTPT VISIT, EST, LEVL III, 20-29 MIN: ICD-10-PCS | Mod: S$GLB,,, | Performed by: INTERNAL MEDICINE

## 2021-04-08 PROCEDURE — 1159F PR MEDICATION LIST DOCUMENTED IN MEDICAL RECORD: ICD-10-PCS | Mod: S$GLB,,, | Performed by: INTERNAL MEDICINE

## 2021-04-08 PROCEDURE — 80048 BASIC METABOLIC PNL TOTAL CA: CPT | Performed by: INTERNAL MEDICINE

## 2021-04-08 PROCEDURE — 3008F BODY MASS INDEX DOCD: CPT | Mod: CPTII,S$GLB,, | Performed by: INTERNAL MEDICINE

## 2021-04-08 PROCEDURE — 99999 PR PBB SHADOW E&M-EST. PATIENT-LVL V: CPT | Mod: PBBFAC,,, | Performed by: INTERNAL MEDICINE

## 2021-04-08 PROCEDURE — 36415 COLL VENOUS BLD VENIPUNCTURE: CPT | Performed by: INTERNAL MEDICINE

## 2021-04-08 PROCEDURE — 85025 COMPLETE CBC W/AUTO DIFF WBC: CPT | Performed by: INTERNAL MEDICINE

## 2021-04-08 PROCEDURE — 99499 RISK ADDL DX/OHS AUDIT: ICD-10-PCS | Mod: S$GLB,,, | Performed by: INTERNAL MEDICINE

## 2021-04-08 PROCEDURE — 99499 UNLISTED E&M SERVICE: CPT | Mod: S$GLB,,, | Performed by: INTERNAL MEDICINE

## 2021-04-08 RX ORDER — DIPHENHYDRAMINE HCL 50 MG
50 CAPSULE ORAL ONCE
Status: CANCELLED | OUTPATIENT
Start: 2021-04-08 | End: 2021-04-08

## 2021-04-08 RX ORDER — CLOPIDOGREL BISULFATE 75 MG/1
75 TABLET ORAL DAILY
Qty: 90 TABLET | Refills: 3 | Status: SHIPPED | OUTPATIENT
Start: 2021-04-08 | End: 2022-08-18 | Stop reason: SDUPTHER

## 2021-04-08 RX ORDER — SODIUM CHLORIDE 9 MG/ML
INJECTION, SOLUTION INTRAVENOUS CONTINUOUS
Status: CANCELLED | OUTPATIENT
Start: 2021-04-08 | End: 2021-04-08

## 2021-04-09 ENCOUNTER — TELEPHONE (OUTPATIENT)
Dept: CARDIOLOGY | Facility: CLINIC | Age: 76
End: 2021-04-09

## 2021-04-09 ENCOUNTER — HOSPITAL ENCOUNTER (OUTPATIENT)
Facility: HOSPITAL | Age: 76
Discharge: HOME | End: 2021-04-10
Attending: EMERGENCY MEDICINE | Admitting: EMERGENCY MEDICINE
Payer: MEDICARE

## 2021-04-09 DIAGNOSIS — E11.00 TYPE 2 DIABETES MELLITUS WITH HYPEROSMOLAR HYPERGLYCEMIC STATE (HHS): Primary | ICD-10-CM

## 2021-04-09 LAB
ALBUMIN SERPL BCP-MCNC: 3.4 G/DL (ref 3.5–5.2)
ALLENS TEST: ABNORMAL
ALP SERPL-CCNC: 200 U/L (ref 55–135)
ALT SERPL W/O P-5'-P-CCNC: 26 U/L (ref 10–44)
ANION GAP SERPL CALC-SCNC: 12 MMOL/L (ref 8–16)
ANION GAP SERPL CALC-SCNC: 4 MMOL/L (ref 8–16)
AST SERPL-CCNC: 12 U/L (ref 10–40)
B-OH-BUTYR BLD STRIP-SCNC: 0.2 MMOL/L (ref 0–0.5)
BACTERIA #/AREA URNS AUTO: ABNORMAL /HPF
BASOPHILS # BLD AUTO: 0.05 K/UL (ref 0–0.2)
BASOPHILS NFR BLD: 0.5 % (ref 0–1.9)
BILIRUB SERPL-MCNC: 0.6 MG/DL (ref 0.1–1)
BILIRUB UR QL STRIP: NEGATIVE
BUN SERPL-MCNC: 21 MG/DL (ref 8–23)
BUN SERPL-MCNC: 23 MG/DL (ref 8–23)
CALCIUM SERPL-MCNC: 8.5 MG/DL (ref 8.7–10.5)
CALCIUM SERPL-MCNC: 9.6 MG/DL (ref 8.7–10.5)
CHLORIDE SERPL-SCNC: 100 MMOL/L (ref 95–110)
CHLORIDE SERPL-SCNC: 90 MMOL/L (ref 95–110)
CLARITY UR REFRACT.AUTO: ABNORMAL
CO2 SERPL-SCNC: 29 MMOL/L (ref 23–29)
CO2 SERPL-SCNC: 31 MMOL/L (ref 23–29)
COLOR UR AUTO: YELLOW
CREAT SERPL-MCNC: 1.2 MG/DL (ref 0.5–1.4)
CREAT SERPL-MCNC: 1.5 MG/DL (ref 0.5–1.4)
CTP QC/QA: YES
DIFFERENTIAL METHOD: ABNORMAL
EOSINOPHIL # BLD AUTO: 0.1 K/UL (ref 0–0.5)
EOSINOPHIL NFR BLD: 0.6 % (ref 0–8)
ERYTHROCYTE [DISTWIDTH] IN BLOOD BY AUTOMATED COUNT: 13.3 % (ref 11.5–14.5)
EST. GFR  (AFRICAN AMERICAN): 39.6 ML/MIN/1.73 M^2
EST. GFR  (AFRICAN AMERICAN): 51.8 ML/MIN/1.73 M^2
EST. GFR  (NON AFRICAN AMERICAN): 34.3 ML/MIN/1.73 M^2
EST. GFR  (NON AFRICAN AMERICAN): 44.9 ML/MIN/1.73 M^2
ESTIMATED AVG GLUCOSE: 349 MG/DL (ref 68–131)
GLUCOSE SERPL-MCNC: 503 MG/DL (ref 70–110)
GLUCOSE SERPL-MCNC: 98 MG/DL (ref 70–110)
GLUCOSE UR QL STRIP: ABNORMAL
HBA1C MFR BLD: 13.8 % (ref 4–5.6)
HCO3 UR-SCNC: 32 MMOL/L (ref 24–28)
HCT VFR BLD AUTO: 45.9 % (ref 37–48.5)
HGB BLD-MCNC: 15.1 G/DL (ref 12–16)
HGB UR QL STRIP: NEGATIVE
HIV 1+2 AB+HIV1 P24 AG SERPL QL IA: NEGATIVE
HYALINE CASTS UR QL AUTO: 4 /LPF
IMM GRANULOCYTES # BLD AUTO: 0.05 K/UL (ref 0–0.04)
IMM GRANULOCYTES NFR BLD AUTO: 0.5 % (ref 0–0.5)
KETONES UR QL STRIP: NEGATIVE
LEUKOCYTE ESTERASE UR QL STRIP: ABNORMAL
LYMPHOCYTES # BLD AUTO: 3.6 K/UL (ref 1–4.8)
LYMPHOCYTES NFR BLD: 35.8 % (ref 18–48)
MAGNESIUM SERPL-MCNC: 2 MG/DL (ref 1.6–2.6)
MCH RBC QN AUTO: 27.5 PG (ref 27–31)
MCHC RBC AUTO-ENTMCNC: 32.9 G/DL (ref 32–36)
MCV RBC AUTO: 84 FL (ref 82–98)
MICROSCOPIC COMMENT: ABNORMAL
MONOCYTES # BLD AUTO: 0.3 K/UL (ref 0.3–1)
MONOCYTES NFR BLD: 2.9 % (ref 4–15)
NEUTROPHILS # BLD AUTO: 6.1 K/UL (ref 1.8–7.7)
NEUTROPHILS NFR BLD: 59.7 % (ref 38–73)
NITRITE UR QL STRIP: NEGATIVE
NRBC BLD-RTO: 0 /100 WBC
OSMOLALITY SERPL: 289 MOSM/KG (ref 275–295)
OSMOLALITY UR: 638 MOSM/KG (ref 50–1200)
PCO2 BLDA: 56.7 MMHG (ref 35–45)
PH SMN: 7.36 [PH] (ref 7.35–7.45)
PH UR STRIP: 5 [PH] (ref 5–8)
PLATELET # BLD AUTO: 339 K/UL (ref 150–450)
PMV BLD AUTO: 12.2 FL (ref 9.2–12.9)
PO2 BLDA: 13 MMHG (ref 40–60)
POC BE: 7 MMOL/L
POC SATURATED O2: 13 % (ref 95–100)
POC TCO2: 34 MMOL/L (ref 24–29)
POCT GLUCOSE: 114 MG/DL (ref 70–110)
POCT GLUCOSE: 123 MG/DL (ref 70–110)
POCT GLUCOSE: 125 MG/DL (ref 70–110)
POCT GLUCOSE: 195 MG/DL (ref 70–110)
POCT GLUCOSE: 285 MG/DL (ref 70–110)
POCT GLUCOSE: 365 MG/DL (ref 70–110)
POCT GLUCOSE: 468 MG/DL (ref 70–110)
POCT GLUCOSE: >500 MG/DL (ref 70–110)
POTASSIUM SERPL-SCNC: 3.4 MMOL/L (ref 3.5–5.1)
POTASSIUM SERPL-SCNC: 4.2 MMOL/L (ref 3.5–5.1)
PROT SERPL-MCNC: 7.5 G/DL (ref 6–8.4)
PROT UR QL STRIP: ABNORMAL
RBC # BLD AUTO: 5.5 M/UL (ref 4–5.4)
RBC #/AREA URNS AUTO: 17 /HPF (ref 0–4)
SAMPLE: ABNORMAL
SARS-COV-2 RDRP RESP QL NAA+PROBE: NEGATIVE
SITE: ABNORMAL
SODIUM SERPL-SCNC: 131 MMOL/L (ref 136–145)
SODIUM SERPL-SCNC: 135 MMOL/L (ref 136–145)
SP GR UR STRIP: 1.02 (ref 1–1.03)
SQUAMOUS #/AREA URNS AUTO: 7 /HPF
URN SPEC COLLECT METH UR: ABNORMAL
WBC # BLD AUTO: 10.12 K/UL (ref 3.9–12.7)
WBC #/AREA URNS AUTO: 21 /HPF (ref 0–5)
YEAST UR QL AUTO: ABNORMAL

## 2021-04-09 PROCEDURE — G0378 HOSPITAL OBSERVATION PER HR: HCPCS

## 2021-04-09 PROCEDURE — 25000003 PHARM REV CODE 250: Performed by: INTERNAL MEDICINE

## 2021-04-09 PROCEDURE — 96361 HYDRATE IV INFUSION ADD-ON: CPT

## 2021-04-09 PROCEDURE — 99900035 HC TECH TIME PER 15 MIN (STAT)

## 2021-04-09 PROCEDURE — 82803 BLOOD GASES ANY COMBINATION: CPT

## 2021-04-09 PROCEDURE — 81001 URINALYSIS AUTO W/SCOPE: CPT | Performed by: EMERGENCY MEDICINE

## 2021-04-09 PROCEDURE — 87086 URINE CULTURE/COLONY COUNT: CPT | Performed by: EMERGENCY MEDICINE

## 2021-04-09 PROCEDURE — 83930 ASSAY OF BLOOD OSMOLALITY: CPT | Performed by: INTERNAL MEDICINE

## 2021-04-09 PROCEDURE — 96366 THER/PROPH/DIAG IV INF ADDON: CPT

## 2021-04-09 PROCEDURE — 96372 THER/PROPH/DIAG INJ SC/IM: CPT | Mod: 59

## 2021-04-09 PROCEDURE — 83036 HEMOGLOBIN GLYCOSYLATED A1C: CPT | Performed by: INTERNAL MEDICINE

## 2021-04-09 PROCEDURE — 99285 EMERGENCY DEPT VISIT HI MDM: CPT | Mod: 25

## 2021-04-09 PROCEDURE — 82010 KETONE BODYS QUAN: CPT | Performed by: EMERGENCY MEDICINE

## 2021-04-09 PROCEDURE — 99285 PR EMERGENCY DEPT VISIT,LEVEL V: ICD-10-PCS | Mod: ,,, | Performed by: EMERGENCY MEDICINE

## 2021-04-09 PROCEDURE — 83935 ASSAY OF URINE OSMOLALITY: CPT | Performed by: EMERGENCY MEDICINE

## 2021-04-09 PROCEDURE — 80053 COMPREHEN METABOLIC PANEL: CPT | Performed by: EMERGENCY MEDICINE

## 2021-04-09 PROCEDURE — U0002 COVID-19 LAB TEST NON-CDC: HCPCS | Performed by: EMERGENCY MEDICINE

## 2021-04-09 PROCEDURE — 99220 PR INITIAL OBSERVATION CARE,LEVL III: ICD-10-PCS | Mod: ,,, | Performed by: INTERNAL MEDICINE

## 2021-04-09 PROCEDURE — 99220 PR INITIAL OBSERVATION CARE,LEVL III: CPT | Mod: ,,, | Performed by: INTERNAL MEDICINE

## 2021-04-09 PROCEDURE — 99285 EMERGENCY DEPT VISIT HI MDM: CPT | Mod: ,,, | Performed by: EMERGENCY MEDICINE

## 2021-04-09 PROCEDURE — 85025 COMPLETE CBC W/AUTO DIFF WBC: CPT | Performed by: EMERGENCY MEDICINE

## 2021-04-09 PROCEDURE — 96365 THER/PROPH/DIAG IV INF INIT: CPT

## 2021-04-09 PROCEDURE — 83735 ASSAY OF MAGNESIUM: CPT | Performed by: EMERGENCY MEDICINE

## 2021-04-09 PROCEDURE — 63600175 PHARM REV CODE 636 W HCPCS: Performed by: INTERNAL MEDICINE

## 2021-04-09 PROCEDURE — 63600175 PHARM REV CODE 636 W HCPCS: Performed by: EMERGENCY MEDICINE

## 2021-04-09 PROCEDURE — 82962 GLUCOSE BLOOD TEST: CPT | Mod: 91

## 2021-04-09 PROCEDURE — 80048 BASIC METABOLIC PNL TOTAL CA: CPT | Performed by: INTERNAL MEDICINE

## 2021-04-09 PROCEDURE — 36415 COLL VENOUS BLD VENIPUNCTURE: CPT | Performed by: INTERNAL MEDICINE

## 2021-04-09 PROCEDURE — 86703 HIV-1/HIV-2 1 RESULT ANTBDY: CPT | Performed by: EMERGENCY MEDICINE

## 2021-04-09 PROCEDURE — 25000003 PHARM REV CODE 250: Performed by: EMERGENCY MEDICINE

## 2021-04-09 RX ORDER — CLOPIDOGREL BISULFATE 75 MG/1
75 TABLET ORAL DAILY
Status: DISCONTINUED | OUTPATIENT
Start: 2021-04-10 | End: 2021-04-09

## 2021-04-09 RX ORDER — ONDANSETRON 8 MG/1
8 TABLET, ORALLY DISINTEGRATING ORAL EVERY 8 HOURS PRN
Status: DISCONTINUED | OUTPATIENT
Start: 2021-04-09 | End: 2021-04-10 | Stop reason: HOSPADM

## 2021-04-09 RX ORDER — ENOXAPARIN SODIUM 100 MG/ML
40 INJECTION SUBCUTANEOUS EVERY 24 HOURS
Status: DISCONTINUED | OUTPATIENT
Start: 2021-04-09 | End: 2021-04-10 | Stop reason: HOSPADM

## 2021-04-09 RX ORDER — CLOPIDOGREL BISULFATE 75 MG/1
75 TABLET ORAL DAILY
Status: DISCONTINUED | OUTPATIENT
Start: 2021-04-10 | End: 2021-04-10 | Stop reason: HOSPADM

## 2021-04-09 RX ORDER — CLOPIDOGREL 300 MG/1
300 TABLET, FILM COATED ORAL ONCE
Status: COMPLETED | OUTPATIENT
Start: 2021-04-09 | End: 2021-04-09

## 2021-04-09 RX ORDER — ATORVASTATIN CALCIUM 40 MG/1
80 TABLET, FILM COATED ORAL DAILY
Status: DISCONTINUED | OUTPATIENT
Start: 2021-04-10 | End: 2021-04-10 | Stop reason: HOSPADM

## 2021-04-09 RX ORDER — VARENICLINE TARTRATE 1 MG/1
1 TABLET, FILM COATED ORAL 2 TIMES DAILY
Status: DISCONTINUED | OUTPATIENT
Start: 2021-04-10 | End: 2021-04-10 | Stop reason: HOSPADM

## 2021-04-09 RX ORDER — ASPIRIN 81 MG/1
81 TABLET ORAL DAILY
Status: DISCONTINUED | OUTPATIENT
Start: 2021-04-10 | End: 2021-04-10 | Stop reason: HOSPADM

## 2021-04-09 RX ORDER — SODIUM CHLORIDE 0.9 % (FLUSH) 0.9 %
10 SYRINGE (ML) INJECTION
Status: CANCELLED | OUTPATIENT
Start: 2021-04-09

## 2021-04-09 RX ORDER — GABAPENTIN 300 MG/1
300 CAPSULE ORAL NIGHTLY
Status: DISCONTINUED | OUTPATIENT
Start: 2021-04-09 | End: 2021-04-10 | Stop reason: HOSPADM

## 2021-04-09 RX ORDER — ACETAMINOPHEN 325 MG/1
650 TABLET ORAL EVERY 6 HOURS PRN
Status: DISCONTINUED | OUTPATIENT
Start: 2021-04-09 | End: 2021-04-10 | Stop reason: HOSPADM

## 2021-04-09 RX ORDER — SODIUM CHLORIDE 0.9 % (FLUSH) 0.9 %
10 SYRINGE (ML) INJECTION
Status: DISCONTINUED | OUTPATIENT
Start: 2021-04-09 | End: 2021-04-10 | Stop reason: HOSPADM

## 2021-04-09 RX ORDER — GLUCAGON 1 MG
1 KIT INJECTION
Status: DISCONTINUED | OUTPATIENT
Start: 2021-04-09 | End: 2021-04-10

## 2021-04-09 RX ORDER — ALBUTEROL SULFATE 90 UG/1
2 AEROSOL, METERED RESPIRATORY (INHALATION) EVERY 6 HOURS PRN
Status: DISCONTINUED | OUTPATIENT
Start: 2021-04-09 | End: 2021-04-10 | Stop reason: HOSPADM

## 2021-04-09 RX ORDER — CARVEDILOL 6.25 MG/1
6.25 TABLET ORAL 2 TIMES DAILY WITH MEALS
Status: DISCONTINUED | OUTPATIENT
Start: 2021-04-09 | End: 2021-04-10 | Stop reason: HOSPADM

## 2021-04-09 RX ADMIN — CARVEDILOL 6.25 MG: 6.25 TABLET, FILM COATED ORAL at 06:04

## 2021-04-09 RX ADMIN — ENOXAPARIN SODIUM 40 MG: 40 INJECTION SUBCUTANEOUS at 06:04

## 2021-04-09 RX ADMIN — SODIUM CHLORIDE 1000 ML: 0.9 INJECTION, SOLUTION INTRAVENOUS at 03:04

## 2021-04-09 RX ADMIN — CLOPIDOGREL BISULFATE 300 MG: 300 TABLET, FILM COATED ORAL at 08:04

## 2021-04-09 RX ADMIN — SODIUM CHLORIDE 1000 ML: 0.9 INJECTION, SOLUTION INTRAVENOUS at 02:04

## 2021-04-09 RX ADMIN — SODIUM CHLORIDE 8 UNITS/HR: 9 INJECTION, SOLUTION INTRAVENOUS at 02:04

## 2021-04-09 RX ADMIN — ACETAMINOPHEN 650 MG: 325 TABLET ORAL at 11:04

## 2021-04-10 VITALS
TEMPERATURE: 99 F | SYSTOLIC BLOOD PRESSURE: 126 MMHG | OXYGEN SATURATION: 100 % | RESPIRATION RATE: 19 BRPM | WEIGHT: 180.75 LBS | HEIGHT: 66 IN | BODY MASS INDEX: 29.05 KG/M2 | HEART RATE: 73 BPM | DIASTOLIC BLOOD PRESSURE: 89 MMHG

## 2021-04-10 LAB
ANION GAP SERPL CALC-SCNC: 11 MMOL/L (ref 8–16)
ANION GAP SERPL CALC-SCNC: 8 MMOL/L (ref 8–16)
ANION GAP SERPL CALC-SCNC: 8 MMOL/L (ref 8–16)
BASOPHILS # BLD AUTO: 0.05 K/UL (ref 0–0.2)
BASOPHILS NFR BLD: 0.5 % (ref 0–1.9)
BUN SERPL-MCNC: 21 MG/DL (ref 8–23)
BUN SERPL-MCNC: 22 MG/DL (ref 8–23)
BUN SERPL-MCNC: 23 MG/DL (ref 8–23)
CALCIUM SERPL-MCNC: 8.3 MG/DL (ref 8.7–10.5)
CALCIUM SERPL-MCNC: 8.5 MG/DL (ref 8.7–10.5)
CALCIUM SERPL-MCNC: 8.5 MG/DL (ref 8.7–10.5)
CHLORIDE SERPL-SCNC: 101 MMOL/L (ref 95–110)
CHLORIDE SERPL-SCNC: 98 MMOL/L (ref 95–110)
CHLORIDE SERPL-SCNC: 99 MMOL/L (ref 95–110)
CO2 SERPL-SCNC: 23 MMOL/L (ref 23–29)
CO2 SERPL-SCNC: 25 MMOL/L (ref 23–29)
CO2 SERPL-SCNC: 26 MMOL/L (ref 23–29)
CREAT SERPL-MCNC: 1 MG/DL (ref 0.5–1.4)
CREAT SERPL-MCNC: 1.1 MG/DL (ref 0.5–1.4)
CREAT SERPL-MCNC: 1.1 MG/DL (ref 0.5–1.4)
DIFFERENTIAL METHOD: ABNORMAL
EOSINOPHIL # BLD AUTO: 0.1 K/UL (ref 0–0.5)
EOSINOPHIL NFR BLD: 0.7 % (ref 0–8)
ERYTHROCYTE [DISTWIDTH] IN BLOOD BY AUTOMATED COUNT: 13.2 % (ref 11.5–14.5)
EST. GFR  (AFRICAN AMERICAN): 57.6 ML/MIN/1.73 M^2
EST. GFR  (AFRICAN AMERICAN): 57.6 ML/MIN/1.73 M^2
EST. GFR  (AFRICAN AMERICAN): >60 ML/MIN/1.73 M^2
EST. GFR  (NON AFRICAN AMERICAN): 49.9 ML/MIN/1.73 M^2
EST. GFR  (NON AFRICAN AMERICAN): 49.9 ML/MIN/1.73 M^2
EST. GFR  (NON AFRICAN AMERICAN): 56 ML/MIN/1.73 M^2
GLUCOSE SERPL-MCNC: 133 MG/DL (ref 70–110)
GLUCOSE SERPL-MCNC: 181 MG/DL (ref 70–110)
GLUCOSE SERPL-MCNC: 343 MG/DL (ref 70–110)
HCT VFR BLD AUTO: 43.4 % (ref 37–48.5)
HGB BLD-MCNC: 14.1 G/DL (ref 12–16)
IMM GRANULOCYTES # BLD AUTO: 0.03 K/UL (ref 0–0.04)
IMM GRANULOCYTES NFR BLD AUTO: 0.3 % (ref 0–0.5)
LYMPHOCYTES # BLD AUTO: 5.2 K/UL (ref 1–4.8)
LYMPHOCYTES NFR BLD: 50.2 % (ref 18–48)
MAGNESIUM SERPL-MCNC: 1.7 MG/DL (ref 1.6–2.6)
MCH RBC QN AUTO: 27.7 PG (ref 27–31)
MCHC RBC AUTO-ENTMCNC: 32.5 G/DL (ref 32–36)
MCV RBC AUTO: 85 FL (ref 82–98)
MONOCYTES # BLD AUTO: 0.4 K/UL (ref 0.3–1)
MONOCYTES NFR BLD: 3.9 % (ref 4–15)
NEUTROPHILS # BLD AUTO: 4.6 K/UL (ref 1.8–7.7)
NEUTROPHILS NFR BLD: 44.4 % (ref 38–73)
NRBC BLD-RTO: 0 /100 WBC
PLATELET # BLD AUTO: 274 K/UL (ref 150–450)
PMV BLD AUTO: 12.2 FL (ref 9.2–12.9)
POCT GLUCOSE: 172 MG/DL (ref 70–110)
POCT GLUCOSE: 224 MG/DL (ref 70–110)
POCT GLUCOSE: 320 MG/DL (ref 70–110)
POTASSIUM SERPL-SCNC: 3.3 MMOL/L (ref 3.5–5.1)
POTASSIUM SERPL-SCNC: 3.5 MMOL/L (ref 3.5–5.1)
POTASSIUM SERPL-SCNC: 3.8 MMOL/L (ref 3.5–5.1)
RBC # BLD AUTO: 5.09 M/UL (ref 4–5.4)
SODIUM SERPL-SCNC: 131 MMOL/L (ref 136–145)
SODIUM SERPL-SCNC: 133 MMOL/L (ref 136–145)
SODIUM SERPL-SCNC: 135 MMOL/L (ref 136–145)
WBC # BLD AUTO: 10.25 K/UL (ref 3.9–12.7)

## 2021-04-10 PROCEDURE — G0378 HOSPITAL OBSERVATION PER HR: HCPCS

## 2021-04-10 PROCEDURE — 25000003 PHARM REV CODE 250: Performed by: INTERNAL MEDICINE

## 2021-04-10 PROCEDURE — 36415 COLL VENOUS BLD VENIPUNCTURE: CPT | Performed by: INTERNAL MEDICINE

## 2021-04-10 PROCEDURE — C9399 UNCLASSIFIED DRUGS OR BIOLOG: HCPCS | Performed by: STUDENT IN AN ORGANIZED HEALTH CARE EDUCATION/TRAINING PROGRAM

## 2021-04-10 PROCEDURE — 25000003 PHARM REV CODE 250: Performed by: STUDENT IN AN ORGANIZED HEALTH CARE EDUCATION/TRAINING PROGRAM

## 2021-04-10 PROCEDURE — 99217 PR OBSERVATION CARE DISCHARGE: CPT | Mod: ,,, | Performed by: STUDENT IN AN ORGANIZED HEALTH CARE EDUCATION/TRAINING PROGRAM

## 2021-04-10 PROCEDURE — 99214 PR OFFICE/OUTPT VISIT, EST, LEVL IV, 30-39 MIN: ICD-10-PCS | Mod: GC,,, | Performed by: INTERNAL MEDICINE

## 2021-04-10 PROCEDURE — 99214 OFFICE O/P EST MOD 30 MIN: CPT | Mod: GC,,, | Performed by: INTERNAL MEDICINE

## 2021-04-10 PROCEDURE — 83735 ASSAY OF MAGNESIUM: CPT | Performed by: INTERNAL MEDICINE

## 2021-04-10 PROCEDURE — 80048 BASIC METABOLIC PNL TOTAL CA: CPT | Performed by: INTERNAL MEDICINE

## 2021-04-10 PROCEDURE — 99217 PR OBSERVATION CARE DISCHARGE: ICD-10-PCS | Mod: ,,, | Performed by: STUDENT IN AN ORGANIZED HEALTH CARE EDUCATION/TRAINING PROGRAM

## 2021-04-10 PROCEDURE — 63600175 PHARM REV CODE 636 W HCPCS: Performed by: STUDENT IN AN ORGANIZED HEALTH CARE EDUCATION/TRAINING PROGRAM

## 2021-04-10 PROCEDURE — 85025 COMPLETE CBC W/AUTO DIFF WBC: CPT | Performed by: INTERNAL MEDICINE

## 2021-04-10 PROCEDURE — 96372 THER/PROPH/DIAG INJ SC/IM: CPT

## 2021-04-10 RX ORDER — GLUCAGON 1 MG
1 KIT INJECTION
Status: DISCONTINUED | OUTPATIENT
Start: 2021-04-10 | End: 2021-04-10 | Stop reason: HOSPADM

## 2021-04-10 RX ORDER — IBUPROFEN 200 MG
16 TABLET ORAL
Status: DISCONTINUED | OUTPATIENT
Start: 2021-04-10 | End: 2021-04-10 | Stop reason: HOSPADM

## 2021-04-10 RX ORDER — IBUPROFEN 200 MG
24 TABLET ORAL
Status: DISCONTINUED | OUTPATIENT
Start: 2021-04-10 | End: 2021-04-10 | Stop reason: HOSPADM

## 2021-04-10 RX ORDER — HYDRALAZINE HYDROCHLORIDE 50 MG/1
50 TABLET, FILM COATED ORAL EVERY 8 HOURS PRN
Status: DISCONTINUED | OUTPATIENT
Start: 2021-04-10 | End: 2021-04-10 | Stop reason: HOSPADM

## 2021-04-10 RX ORDER — POTASSIUM CHLORIDE 750 MG/1
40 CAPSULE, EXTENDED RELEASE ORAL ONCE
Status: COMPLETED | OUTPATIENT
Start: 2021-04-10 | End: 2021-04-10

## 2021-04-10 RX ORDER — INSULIN ASPART 100 [IU]/ML
5 INJECTION, SOLUTION INTRAVENOUS; SUBCUTANEOUS
Status: DISCONTINUED | OUTPATIENT
Start: 2021-04-10 | End: 2021-04-10

## 2021-04-10 RX ORDER — INSULIN ASPART 100 [IU]/ML
0-5 INJECTION, SOLUTION INTRAVENOUS; SUBCUTANEOUS
Status: DISCONTINUED | OUTPATIENT
Start: 2021-04-10 | End: 2021-04-10 | Stop reason: HOSPADM

## 2021-04-10 RX ORDER — INSULIN ASPART 100 [IU]/ML
4 INJECTION, SOLUTION INTRAVENOUS; SUBCUTANEOUS
Status: DISCONTINUED | OUTPATIENT
Start: 2021-04-10 | End: 2021-04-10 | Stop reason: HOSPADM

## 2021-04-10 RX ADMIN — POTASSIUM CHLORIDE 40 MEQ: 10 CAPSULE, COATED, EXTENDED RELEASE ORAL at 09:04

## 2021-04-10 RX ADMIN — CARVEDILOL 6.25 MG: 6.25 TABLET, FILM COATED ORAL at 08:04

## 2021-04-10 RX ADMIN — INSULIN ASPART 4 UNITS: 100 INJECTION, SOLUTION INTRAVENOUS; SUBCUTANEOUS at 09:04

## 2021-04-10 RX ADMIN — INSULIN ASPART 4 UNITS: 100 INJECTION, SOLUTION INTRAVENOUS; SUBCUTANEOUS at 12:04

## 2021-04-10 RX ADMIN — ASPIRIN 81 MG: 81 TABLET, COATED ORAL at 09:04

## 2021-04-10 RX ADMIN — CLOPIDOGREL 75 MG: 75 TABLET, FILM COATED ORAL at 09:04

## 2021-04-10 RX ADMIN — ATORVASTATIN CALCIUM 80 MG: 40 TABLET, FILM COATED ORAL at 09:04

## 2021-04-10 RX ADMIN — INSULIN DETEMIR 12 UNITS: 100 INJECTION, SOLUTION SUBCUTANEOUS at 09:04

## 2021-04-11 LAB
BACTERIA UR CULT: NORMAL
BACTERIA UR CULT: NORMAL

## 2021-04-12 ENCOUNTER — TELEPHONE (OUTPATIENT)
Dept: INTERNAL MEDICINE | Facility: CLINIC | Age: 76
End: 2021-04-12

## 2021-04-15 ENCOUNTER — HOSPITAL ENCOUNTER (EMERGENCY)
Facility: OTHER | Age: 76
Discharge: HOME | End: 2021-04-15
Attending: EMERGENCY MEDICINE
Payer: MEDICARE

## 2021-04-15 VITALS
HEIGHT: 66 IN | DIASTOLIC BLOOD PRESSURE: 65 MMHG | OXYGEN SATURATION: 96 % | HEART RATE: 69 BPM | RESPIRATION RATE: 17 BRPM | WEIGHT: 202 LBS | SYSTOLIC BLOOD PRESSURE: 134 MMHG | TEMPERATURE: 98 F | BODY MASS INDEX: 32.47 KG/M2

## 2021-04-15 DIAGNOSIS — R07.9 CHEST PAIN: ICD-10-CM

## 2021-04-15 DIAGNOSIS — E87.1 CHRONIC HYPONATREMIA: ICD-10-CM

## 2021-04-15 DIAGNOSIS — E11.65 HYPERGLYCEMIA DUE TO DIABETES MELLITUS: Primary | ICD-10-CM

## 2021-04-15 LAB
ALBUMIN SERPL BCP-MCNC: 3.1 G/DL (ref 3.5–5.2)
ALP SERPL-CCNC: 114 U/L (ref 55–135)
ALT SERPL W/O P-5'-P-CCNC: 23 U/L (ref 10–44)
ANION GAP SERPL CALC-SCNC: 11 MMOL/L (ref 8–16)
AST SERPL-CCNC: 16 U/L (ref 10–40)
B-OH-BUTYR BLD STRIP-SCNC: 0.1 MMOL/L (ref 0–0.5)
BASOPHILS # BLD AUTO: 0.05 K/UL (ref 0–0.2)
BASOPHILS NFR BLD: 0.5 % (ref 0–1.9)
BILIRUB SERPL-MCNC: 0.5 MG/DL (ref 0.1–1)
BILIRUB UR QL STRIP: NEGATIVE
BNP SERPL-MCNC: <10 PG/ML (ref 0–99)
BUN SERPL-MCNC: 16 MG/DL (ref 8–23)
CALCIUM SERPL-MCNC: 9.1 MG/DL (ref 8.7–10.5)
CHLORIDE SERPL-SCNC: 95 MMOL/L (ref 95–110)
CLARITY UR: CLEAR
CO2 SERPL-SCNC: 24 MMOL/L (ref 23–29)
COLOR UR: YELLOW
CREAT SERPL-MCNC: 1.1 MG/DL (ref 0.5–1.4)
DIFFERENTIAL METHOD: ABNORMAL
EOSINOPHIL # BLD AUTO: 0.1 K/UL (ref 0–0.5)
EOSINOPHIL NFR BLD: 0.8 % (ref 0–8)
ERYTHROCYTE [DISTWIDTH] IN BLOOD BY AUTOMATED COUNT: 13.8 % (ref 11.5–14.5)
EST. GFR  (AFRICAN AMERICAN): 58 ML/MIN/1.73 M^2
EST. GFR  (NON AFRICAN AMERICAN): 50 ML/MIN/1.73 M^2
EXTRA BLUE TOP RAINBOW: NORMAL
EXTRA GREEN TOP RAINBOW: NORMAL
EXTRA LAVENDER TOP RAINBOW: NORMAL
EXTRA RED TOP RAINBOW: NORMAL
GLUCOSE SERPL-MCNC: 269 MG/DL (ref 70–110)
GLUCOSE UR QL STRIP: ABNORMAL
HCT VFR BLD AUTO: 42.9 % (ref 37–48.5)
HGB BLD-MCNC: 13.7 G/DL (ref 12–16)
HGB UR QL STRIP: NEGATIVE
IMM GRANULOCYTES # BLD AUTO: 0.05 K/UL (ref 0–0.04)
IMM GRANULOCYTES NFR BLD AUTO: 0.5 % (ref 0–0.5)
KETONES UR QL STRIP: NEGATIVE
LEUKOCYTE ESTERASE UR QL STRIP: NEGATIVE
LYMPHOCYTES # BLD AUTO: 4 K/UL (ref 1–4.8)
LYMPHOCYTES NFR BLD: 39.7 % (ref 18–48)
MAGNESIUM SERPL-MCNC: 1.7 MG/DL (ref 1.6–2.6)
MCH RBC QN AUTO: 27.2 PG (ref 27–31)
MCHC RBC AUTO-ENTMCNC: 31.9 G/DL (ref 32–36)
MCV RBC AUTO: 85 FL (ref 82–98)
MONOCYTES # BLD AUTO: 0.5 K/UL (ref 0.3–1)
MONOCYTES NFR BLD: 4.8 % (ref 4–15)
NEUTROPHILS # BLD AUTO: 5.5 K/UL (ref 1.8–7.7)
NEUTROPHILS NFR BLD: 53.7 % (ref 38–73)
NITRITE UR QL STRIP: NEGATIVE
NRBC BLD-RTO: 0 /100 WBC
PH UR STRIP: 6 [PH] (ref 5–8)
PHOSPHATE SERPL-MCNC: 3.2 MG/DL (ref 2.7–4.5)
PLATELET # BLD AUTO: 290 K/UL (ref 150–450)
PMV BLD AUTO: 12 FL (ref 9.2–12.9)
POCT GLUCOSE: 212 MG/DL (ref 70–110)
POCT GLUCOSE: 277 MG/DL (ref 70–110)
POTASSIUM SERPL-SCNC: 4.7 MMOL/L (ref 3.5–5.1)
PROT SERPL-MCNC: 7.2 G/DL (ref 6–8.4)
PROT UR QL STRIP: NEGATIVE
RBC # BLD AUTO: 5.04 M/UL (ref 4–5.4)
SODIUM SERPL-SCNC: 130 MMOL/L (ref 136–145)
SP GR UR STRIP: 1.02 (ref 1–1.03)
TROPONIN I SERPL DL<=0.01 NG/ML-MCNC: <0.006 NG/ML (ref 0–0.03)
TROPONIN I SERPL DL<=0.01 NG/ML-MCNC: <0.006 NG/ML (ref 0–0.03)
TSH SERPL DL<=0.005 MIU/L-ACNC: 1.17 UIU/ML (ref 0.4–4)
URN SPEC COLLECT METH UR: ABNORMAL
UROBILINOGEN UR STRIP-ACNC: 1 EU/DL
WBC # BLD AUTO: 10.18 K/UL (ref 3.9–12.7)

## 2021-04-15 PROCEDURE — 85025 COMPLETE CBC W/AUTO DIFF WBC: CPT | Performed by: EMERGENCY MEDICINE

## 2021-04-15 PROCEDURE — 81003 URINALYSIS AUTO W/O SCOPE: CPT | Performed by: EMERGENCY MEDICINE

## 2021-04-15 PROCEDURE — 82962 GLUCOSE BLOOD TEST: CPT

## 2021-04-15 PROCEDURE — 80053 COMPREHEN METABOLIC PANEL: CPT | Performed by: EMERGENCY MEDICINE

## 2021-04-15 PROCEDURE — 99285 EMERGENCY DEPT VISIT HI MDM: CPT | Mod: 25

## 2021-04-15 PROCEDURE — 93010 ELECTROCARDIOGRAM REPORT: CPT | Mod: ,,, | Performed by: INTERNAL MEDICINE

## 2021-04-15 PROCEDURE — 63600175 PHARM REV CODE 636 W HCPCS: Performed by: EMERGENCY MEDICINE

## 2021-04-15 PROCEDURE — 83735 ASSAY OF MAGNESIUM: CPT | Performed by: EMERGENCY MEDICINE

## 2021-04-15 PROCEDURE — 96374 THER/PROPH/DIAG INJ IV PUSH: CPT

## 2021-04-15 PROCEDURE — 36415 COLL VENOUS BLD VENIPUNCTURE: CPT | Performed by: EMERGENCY MEDICINE

## 2021-04-15 PROCEDURE — 84443 ASSAY THYROID STIM HORMONE: CPT | Performed by: EMERGENCY MEDICINE

## 2021-04-15 PROCEDURE — 96375 TX/PRO/DX INJ NEW DRUG ADDON: CPT

## 2021-04-15 PROCEDURE — 93010 EKG 12-LEAD: ICD-10-PCS | Mod: ,,, | Performed by: INTERNAL MEDICINE

## 2021-04-15 PROCEDURE — 93005 ELECTROCARDIOGRAM TRACING: CPT

## 2021-04-15 PROCEDURE — 84484 ASSAY OF TROPONIN QUANT: CPT | Mod: 91 | Performed by: EMERGENCY MEDICINE

## 2021-04-15 PROCEDURE — 84100 ASSAY OF PHOSPHORUS: CPT | Performed by: EMERGENCY MEDICINE

## 2021-04-15 PROCEDURE — 83880 ASSAY OF NATRIURETIC PEPTIDE: CPT | Performed by: EMERGENCY MEDICINE

## 2021-04-15 PROCEDURE — 82010 KETONE BODYS QUAN: CPT | Performed by: EMERGENCY MEDICINE

## 2021-04-15 RX ORDER — ONDANSETRON 2 MG/ML
4 INJECTION INTRAMUSCULAR; INTRAVENOUS
Status: COMPLETED | OUTPATIENT
Start: 2021-04-15 | End: 2021-04-15

## 2021-04-15 RX ADMIN — ONDANSETRON 4 MG: 2 INJECTION INTRAMUSCULAR; INTRAVENOUS at 07:04

## 2021-04-15 RX ADMIN — INSULIN HUMAN 2 UNITS: 100 INJECTION, SOLUTION PARENTERAL at 06:04

## 2021-04-27 ENCOUNTER — OFFICE VISIT (OUTPATIENT)
Dept: INTERNAL MEDICINE | Facility: CLINIC | Age: 76
End: 2021-04-27
Payer: MEDICARE

## 2021-04-27 ENCOUNTER — TELEPHONE (OUTPATIENT)
Dept: INTERNAL MEDICINE | Facility: CLINIC | Age: 76
End: 2021-04-27

## 2021-04-27 DIAGNOSIS — Z79.4 TYPE 2 DIABETES MELLITUS WITH HYPERGLYCEMIA, WITH LONG-TERM CURRENT USE OF INSULIN: Chronic | ICD-10-CM

## 2021-04-27 DIAGNOSIS — Z79.4 TYPE 2 DIABETES MELLITUS WITH DIABETIC POLYNEUROPATHY, WITH LONG-TERM CURRENT USE OF INSULIN: Primary | Chronic | ICD-10-CM

## 2021-04-27 DIAGNOSIS — E11.65 TYPE 2 DIABETES MELLITUS WITH HYPERGLYCEMIA, WITH LONG-TERM CURRENT USE OF INSULIN: Chronic | ICD-10-CM

## 2021-04-27 DIAGNOSIS — E66.9 OBESITY (BMI 30-39.9): Chronic | ICD-10-CM

## 2021-04-27 DIAGNOSIS — I10 ESSENTIAL HYPERTENSION: ICD-10-CM

## 2021-04-27 DIAGNOSIS — E11.42 TYPE 2 DIABETES MELLITUS WITH DIABETIC POLYNEUROPATHY, WITH LONG-TERM CURRENT USE OF INSULIN: Primary | Chronic | ICD-10-CM

## 2021-04-27 DIAGNOSIS — N18.31 STAGE 3A CHRONIC KIDNEY DISEASE: ICD-10-CM

## 2021-04-27 DIAGNOSIS — I42.5 OTHER RESTRICTIVE CARDIOMYOPATHY: ICD-10-CM

## 2021-04-27 DIAGNOSIS — E78.5 HYPERLIPIDEMIA LDL GOAL <70: Chronic | ICD-10-CM

## 2021-04-27 PROCEDURE — 3072F PR LOW RISK FOR RETINOPATHY: ICD-10-PCS | Mod: 95,,, | Performed by: NURSE PRACTITIONER

## 2021-04-27 PROCEDURE — 3072F LOW RISK FOR RETINOPATHY: CPT | Mod: 95,,, | Performed by: NURSE PRACTITIONER

## 2021-04-27 PROCEDURE — 99442 PR PHYSICIAN TELEPHONE EVALUATION 11-20 MIN: CPT | Mod: 95,,, | Performed by: NURSE PRACTITIONER

## 2021-04-27 PROCEDURE — 3046F HEMOGLOBIN A1C LEVEL >9.0%: CPT | Mod: CPTII,95,, | Performed by: NURSE PRACTITIONER

## 2021-04-27 PROCEDURE — 3046F PR MOST RECENT HEMOGLOBIN A1C LEVEL > 9.0%: ICD-10-PCS | Mod: CPTII,95,, | Performed by: NURSE PRACTITIONER

## 2021-04-27 PROCEDURE — 99442 PR PHYSICIAN TELEPHONE EVALUATION 11-20 MIN: ICD-10-PCS | Mod: 95,,, | Performed by: NURSE PRACTITIONER

## 2021-05-03 ENCOUNTER — LAB VISIT (OUTPATIENT)
Dept: INTERNAL MEDICINE | Facility: CLINIC | Age: 76
End: 2021-05-03
Payer: MEDICARE

## 2021-05-03 DIAGNOSIS — I70.0 AORTIC ATHEROSCLEROSIS: ICD-10-CM

## 2021-05-03 PROCEDURE — U0003 INFECTIOUS AGENT DETECTION BY NUCLEIC ACID (DNA OR RNA); SEVERE ACUTE RESPIRATORY SYNDROME CORONAVIRUS 2 (SARS-COV-2) (CORONAVIRUS DISEASE [COVID-19]), AMPLIFIED PROBE TECHNIQUE, MAKING USE OF HIGH THROUGHPUT TECHNOLOGIES AS DESCRIBED BY CMS-2020-01-R: HCPCS | Performed by: INTERNAL MEDICINE

## 2021-05-03 PROCEDURE — U0005 INFEC AGEN DETEC AMPLI PROBE: HCPCS | Performed by: INTERNAL MEDICINE

## 2021-05-04 LAB — SARS-COV-2 RNA RESP QL NAA+PROBE: NOT DETECTED

## 2021-05-05 ENCOUNTER — HOSPITAL ENCOUNTER (OUTPATIENT)
Facility: HOSPITAL | Age: 76
Discharge: HOME | End: 2021-05-06
Attending: INTERNAL MEDICINE | Admitting: INTERNAL MEDICINE
Payer: MEDICARE

## 2021-05-05 DIAGNOSIS — I73.9 CLAUDICATION: ICD-10-CM

## 2021-05-05 DIAGNOSIS — I42.5 OTHER RESTRICTIVE CARDIOMYOPATHY: ICD-10-CM

## 2021-05-05 DIAGNOSIS — I73.9 PAD (PERIPHERAL ARTERY DISEASE): Primary | ICD-10-CM

## 2021-05-05 DIAGNOSIS — K21.00 GASTROESOPHAGEAL REFLUX DISEASE WITH ESOPHAGITIS: ICD-10-CM

## 2021-05-05 LAB
ABO + RH BLD: NORMAL
BLD GP AB SCN CELLS X3 SERPL QL: NORMAL
POCT GLUCOSE: 124 MG/DL (ref 70–110)
POCT GLUCOSE: 133 MG/DL (ref 70–110)

## 2021-05-05 PROCEDURE — 37226 PR FEM/POPL REVASC W/STENT: CPT | Mod: LT,,, | Performed by: INTERNAL MEDICINE

## 2021-05-05 PROCEDURE — 25000003 PHARM REV CODE 250: Performed by: STUDENT IN AN ORGANIZED HEALTH CARE EDUCATION/TRAINING PROGRAM

## 2021-05-05 PROCEDURE — 25000003 PHARM REV CODE 250: Performed by: INTERNAL MEDICINE

## 2021-05-05 PROCEDURE — 36415 COLL VENOUS BLD VENIPUNCTURE: CPT | Performed by: STUDENT IN AN ORGANIZED HEALTH CARE EDUCATION/TRAINING PROGRAM

## 2021-05-05 PROCEDURE — 37226 HC FEM/POPL REVASC W/STENT: CPT | Mod: LT | Performed by: INTERNAL MEDICINE

## 2021-05-05 PROCEDURE — 99152 PR MOD CONSCIOUS SEDATION, SAME PHYS, 5+ YRS, FIRST 15 MIN: ICD-10-PCS | Mod: ,,, | Performed by: INTERNAL MEDICINE

## 2021-05-05 PROCEDURE — 37226 PR FEM/POPL REVASC W/STENT: ICD-10-PCS | Mod: LT,,, | Performed by: INTERNAL MEDICINE

## 2021-05-05 PROCEDURE — C1894 INTRO/SHEATH, NON-LASER: HCPCS | Performed by: INTERNAL MEDICINE

## 2021-05-05 PROCEDURE — 63600175 PHARM REV CODE 636 W HCPCS: Performed by: STUDENT IN AN ORGANIZED HEALTH CARE EDUCATION/TRAINING PROGRAM

## 2021-05-05 PROCEDURE — 99152 MOD SED SAME PHYS/QHP 5/>YRS: CPT | Mod: ,,, | Performed by: INTERNAL MEDICINE

## 2021-05-05 PROCEDURE — 99152 MOD SED SAME PHYS/QHP 5/>YRS: CPT | Performed by: INTERNAL MEDICINE

## 2021-05-05 PROCEDURE — C1887 CATHETER, GUIDING: HCPCS | Performed by: INTERNAL MEDICINE

## 2021-05-05 PROCEDURE — 85025 COMPLETE CBC W/AUTO DIFF WBC: CPT | Performed by: STUDENT IN AN ORGANIZED HEALTH CARE EDUCATION/TRAINING PROGRAM

## 2021-05-05 PROCEDURE — 63600175 PHARM REV CODE 636 W HCPCS: Performed by: INTERNAL MEDICINE

## 2021-05-05 PROCEDURE — 27201423 OPTIME MED/SURG SUP & DEVICES STERILE SUPPLY: Performed by: INTERNAL MEDICINE

## 2021-05-05 PROCEDURE — 86900 BLOOD TYPING SEROLOGIC ABO: CPT | Performed by: INTERNAL MEDICINE

## 2021-05-05 PROCEDURE — 25500020 PHARM REV CODE 255: Performed by: INTERNAL MEDICINE

## 2021-05-05 PROCEDURE — 36415 COLL VENOUS BLD VENIPUNCTURE: CPT | Performed by: INTERNAL MEDICINE

## 2021-05-05 PROCEDURE — 82962 GLUCOSE BLOOD TEST: CPT | Performed by: INTERNAL MEDICINE

## 2021-05-05 PROCEDURE — C2623 CATH, TRANSLUMIN, DRUG-COAT: HCPCS | Performed by: INTERNAL MEDICINE

## 2021-05-05 PROCEDURE — C1760 CLOSURE DEV, VASC: HCPCS | Performed by: INTERNAL MEDICINE

## 2021-05-05 PROCEDURE — C1725 CATH, TRANSLUMIN NON-LASER: HCPCS | Performed by: INTERNAL MEDICINE

## 2021-05-05 PROCEDURE — 85347 COAGULATION TIME ACTIVATED: CPT | Performed by: INTERNAL MEDICINE

## 2021-05-05 PROCEDURE — C1769 GUIDE WIRE: HCPCS | Performed by: INTERNAL MEDICINE

## 2021-05-05 PROCEDURE — C1876 STENT, NON-COA/NON-COV W/DEL: HCPCS | Performed by: INTERNAL MEDICINE

## 2021-05-05 PROCEDURE — 99153 MOD SED SAME PHYS/QHP EA: CPT | Performed by: INTERNAL MEDICINE

## 2021-05-05 DEVICE — LIFESTENT® 5F VASCULAR STENT SYSTEM, 6 MM X 100 MM (135 CM DELIVERY CATHETER)
Type: IMPLANTABLE DEVICE | Site: LEG | Status: FUNCTIONAL
Brand: LIFESTENT®

## 2021-05-05 RX ORDER — HYDRALAZINE HYDROCHLORIDE 20 MG/ML
10 INJECTION INTRAMUSCULAR; INTRAVENOUS EVERY 4 HOURS PRN
Status: DISCONTINUED | OUTPATIENT
Start: 2021-05-05 | End: 2021-05-06 | Stop reason: HOSPADM

## 2021-05-05 RX ORDER — NAPROXEN SODIUM 220 MG/1
81 TABLET, FILM COATED ORAL DAILY
Status: DISCONTINUED | OUTPATIENT
Start: 2021-05-05 | End: 2021-05-06

## 2021-05-05 RX ORDER — SODIUM CHLORIDE 9 MG/ML
INJECTION, SOLUTION INTRAVENOUS CONTINUOUS
Status: ACTIVE | OUTPATIENT
Start: 2021-05-05 | End: 2021-05-05

## 2021-05-05 RX ORDER — HEPARIN SOD,PORCINE/0.9 % NACL 1000/500ML
INTRAVENOUS SOLUTION INTRAVENOUS
Status: DISCONTINUED | OUTPATIENT
Start: 2021-05-05 | End: 2021-05-05 | Stop reason: HOSPADM

## 2021-05-05 RX ORDER — MIDAZOLAM HYDROCHLORIDE 1 MG/ML
INJECTION, SOLUTION INTRAMUSCULAR; INTRAVENOUS
Status: DISCONTINUED | OUTPATIENT
Start: 2021-05-05 | End: 2021-05-05 | Stop reason: HOSPADM

## 2021-05-05 RX ORDER — ATROPINE SULFATE 0.1 MG/ML
0.5 INJECTION INTRAVENOUS ONCE
Status: COMPLETED | OUTPATIENT
Start: 2021-05-05 | End: 2021-05-05

## 2021-05-05 RX ORDER — CEFAZOLIN SODIUM 1 G/3ML
INJECTION, POWDER, FOR SOLUTION INTRAMUSCULAR; INTRAVENOUS
Status: DISCONTINUED | OUTPATIENT
Start: 2021-05-05 | End: 2021-05-05 | Stop reason: HOSPADM

## 2021-05-05 RX ORDER — FENTANYL CITRATE 50 UG/ML
INJECTION, SOLUTION INTRAMUSCULAR; INTRAVENOUS
Status: DISCONTINUED | OUTPATIENT
Start: 2021-05-05 | End: 2021-05-05 | Stop reason: HOSPADM

## 2021-05-05 RX ORDER — HEPARIN SODIUM 1000 [USP'U]/ML
INJECTION, SOLUTION INTRAVENOUS; SUBCUTANEOUS
Status: DISCONTINUED | OUTPATIENT
Start: 2021-05-05 | End: 2021-05-05 | Stop reason: HOSPADM

## 2021-05-05 RX ORDER — HYDRALAZINE HYDROCHLORIDE 20 MG/ML
10 INJECTION INTRAMUSCULAR; INTRAVENOUS EVERY 4 HOURS PRN
Status: DISCONTINUED | OUTPATIENT
Start: 2021-05-05 | End: 2021-05-05

## 2021-05-05 RX ORDER — ACETAMINOPHEN 325 MG/1
650 TABLET ORAL EVERY 4 HOURS PRN
Status: DISCONTINUED | OUTPATIENT
Start: 2021-05-05 | End: 2021-05-06 | Stop reason: HOSPADM

## 2021-05-05 RX ORDER — NITROGLYCERIN 5 MG/ML
INJECTION, SOLUTION INTRAVENOUS
Status: DISCONTINUED | OUTPATIENT
Start: 2021-05-05 | End: 2021-05-05 | Stop reason: HOSPADM

## 2021-05-05 RX ORDER — LIDOCAINE HYDROCHLORIDE 20 MG/ML
INJECTION, SOLUTION EPIDURAL; INFILTRATION; INTRACAUDAL; PERINEURAL
Status: DISCONTINUED | OUTPATIENT
Start: 2021-05-05 | End: 2021-05-05 | Stop reason: HOSPADM

## 2021-05-05 RX ORDER — NAPROXEN SODIUM 220 MG/1
81 TABLET, FILM COATED ORAL DAILY
Status: DISCONTINUED | OUTPATIENT
Start: 2021-05-05 | End: 2021-05-05

## 2021-05-05 RX ORDER — MORPHINE SULFATE 2 MG/ML
0.5 INJECTION, SOLUTION INTRAMUSCULAR; INTRAVENOUS ONCE
Status: COMPLETED | OUTPATIENT
Start: 2021-05-05 | End: 2021-05-05

## 2021-05-05 RX ORDER — DIPHENHYDRAMINE HCL 50 MG
50 CAPSULE ORAL ONCE
Status: COMPLETED | OUTPATIENT
Start: 2021-05-05 | End: 2021-05-05

## 2021-05-05 RX ADMIN — MORPHINE SULFATE 0.5 MG: 2 INJECTION, SOLUTION INTRAMUSCULAR; INTRAVENOUS at 09:05

## 2021-05-05 RX ADMIN — ASPIRIN 81 MG CHEWABLE TABLET 81 MG: 81 TABLET CHEWABLE at 07:05

## 2021-05-05 RX ADMIN — ACETAMINOPHEN 650 MG: 325 TABLET ORAL at 12:05

## 2021-05-05 RX ADMIN — SODIUM CHLORIDE: 0.9 INJECTION, SOLUTION INTRAVENOUS at 07:05

## 2021-05-05 RX ADMIN — DIPHENHYDRAMINE HYDROCHLORIDE 50 MG: 50 CAPSULE ORAL at 07:05

## 2021-05-05 RX ADMIN — SODIUM CHLORIDE: 0.9 INJECTION, SOLUTION INTRAVENOUS at 11:05

## 2021-05-05 RX ADMIN — ATROPINE SULFATE 0.5 MG: 0.1 INJECTION, SOLUTION ENDOTRACHEAL; INTRAMUSCULAR; INTRAVENOUS; SUBCUTANEOUS at 11:05

## 2021-05-05 RX ADMIN — SODIUM CHLORIDE: 0.9 INJECTION, SOLUTION INTRAVENOUS at 12:05

## 2021-05-05 RX ADMIN — HYDRALAZINE HYDROCHLORIDE 10 MG: 20 INJECTION INTRAMUSCULAR; INTRAVENOUS at 07:05

## 2021-05-05 RX ADMIN — ACETAMINOPHEN 650 MG: 325 TABLET ORAL at 08:05

## 2021-05-06 VITALS
HEART RATE: 87 BPM | OXYGEN SATURATION: 99 % | DIASTOLIC BLOOD PRESSURE: 73 MMHG | BODY MASS INDEX: 34.9 KG/M2 | RESPIRATION RATE: 18 BRPM | SYSTOLIC BLOOD PRESSURE: 168 MMHG | HEIGHT: 66 IN | WEIGHT: 217.13 LBS | TEMPERATURE: 98 F

## 2021-05-06 LAB
ANION GAP SERPL CALC-SCNC: 10 MMOL/L (ref 8–16)
BASOPHILS # BLD AUTO: 0.03 K/UL (ref 0–0.2)
BASOPHILS NFR BLD: 0.3 % (ref 0–1.9)
BUN SERPL-MCNC: 23 MG/DL (ref 8–23)
CALCIUM SERPL-MCNC: 8.8 MG/DL (ref 8.7–10.5)
CHLORIDE SERPL-SCNC: 107 MMOL/L (ref 95–110)
CO2 SERPL-SCNC: 22 MMOL/L (ref 23–29)
CREAT SERPL-MCNC: 1.1 MG/DL (ref 0.5–1.4)
DIFFERENTIAL METHOD: ABNORMAL
EOSINOPHIL # BLD AUTO: 0.1 K/UL (ref 0–0.5)
EOSINOPHIL NFR BLD: 1.4 % (ref 0–8)
ERYTHROCYTE [DISTWIDTH] IN BLOOD BY AUTOMATED COUNT: 15.8 % (ref 11.5–14.5)
ERYTHROCYTE [DISTWIDTH] IN BLOOD BY AUTOMATED COUNT: 15.9 % (ref 11.5–14.5)
EST. GFR  (AFRICAN AMERICAN): 57.6 ML/MIN/1.73 M^2
EST. GFR  (NON AFRICAN AMERICAN): 49.9 ML/MIN/1.73 M^2
GLUCOSE SERPL-MCNC: 266 MG/DL (ref 70–110)
HCT VFR BLD AUTO: 29.7 % (ref 37–48.5)
HCT VFR BLD AUTO: 29.9 % (ref 37–48.5)
HGB BLD-MCNC: 9.2 G/DL (ref 12–16)
HGB BLD-MCNC: 9.3 G/DL (ref 12–16)
IMM GRANULOCYTES # BLD AUTO: 0.07 K/UL (ref 0–0.04)
IMM GRANULOCYTES NFR BLD AUTO: 0.8 % (ref 0–0.5)
LYMPHOCYTES # BLD AUTO: 2.4 K/UL (ref 1–4.8)
LYMPHOCYTES NFR BLD: 26.6 % (ref 18–48)
MCH RBC QN AUTO: 26.8 PG (ref 27–31)
MCH RBC QN AUTO: 28.1 PG (ref 27–31)
MCHC RBC AUTO-ENTMCNC: 30.8 G/DL (ref 32–36)
MCHC RBC AUTO-ENTMCNC: 31.3 G/DL (ref 32–36)
MCV RBC AUTO: 87 FL (ref 82–98)
MCV RBC AUTO: 90 FL (ref 82–98)
MONOCYTES # BLD AUTO: 0.6 K/UL (ref 0.3–1)
MONOCYTES NFR BLD: 6.2 % (ref 4–15)
NEUTROPHILS # BLD AUTO: 5.7 K/UL (ref 1.8–7.7)
NEUTROPHILS NFR BLD: 64.7 % (ref 38–73)
NRBC BLD-RTO: 0 /100 WBC
PLATELET # BLD AUTO: 332 K/UL (ref 150–450)
PLATELET # BLD AUTO: 337 K/UL (ref 150–450)
PMV BLD AUTO: 10.2 FL (ref 9.2–12.9)
PMV BLD AUTO: 10.4 FL (ref 9.2–12.9)
POC ACTIVATED CLOTTING TIME K: 235 SEC (ref 74–137)
POC ACTIVATED CLOTTING TIME K: 263 SEC (ref 74–137)
POCT GLUCOSE: 252 MG/DL (ref 70–110)
POTASSIUM SERPL-SCNC: 4.2 MMOL/L (ref 3.5–5.1)
RBC # BLD AUTO: 3.31 M/UL (ref 4–5.4)
RBC # BLD AUTO: 3.43 M/UL (ref 4–5.4)
SAMPLE: ABNORMAL
SAMPLE: ABNORMAL
SODIUM SERPL-SCNC: 139 MMOL/L (ref 136–145)
WBC # BLD AUTO: 8.58 K/UL (ref 3.9–12.7)
WBC # BLD AUTO: 8.83 K/UL (ref 3.9–12.7)

## 2021-05-06 PROCEDURE — 25000003 PHARM REV CODE 250: Performed by: STUDENT IN AN ORGANIZED HEALTH CARE EDUCATION/TRAINING PROGRAM

## 2021-05-06 PROCEDURE — C9399 UNCLASSIFIED DRUGS OR BIOLOG: HCPCS | Performed by: STUDENT IN AN ORGANIZED HEALTH CARE EDUCATION/TRAINING PROGRAM

## 2021-05-06 PROCEDURE — 36415 COLL VENOUS BLD VENIPUNCTURE: CPT | Performed by: STUDENT IN AN ORGANIZED HEALTH CARE EDUCATION/TRAINING PROGRAM

## 2021-05-06 PROCEDURE — 85027 COMPLETE CBC AUTOMATED: CPT | Performed by: STUDENT IN AN ORGANIZED HEALTH CARE EDUCATION/TRAINING PROGRAM

## 2021-05-06 PROCEDURE — 80048 BASIC METABOLIC PNL TOTAL CA: CPT | Performed by: STUDENT IN AN ORGANIZED HEALTH CARE EDUCATION/TRAINING PROGRAM

## 2021-05-06 RX ORDER — ASPIRIN 81 MG/1
81 TABLET ORAL DAILY
Status: DISCONTINUED | OUTPATIENT
Start: 2021-05-06 | End: 2021-05-06 | Stop reason: HOSPADM

## 2021-05-06 RX ORDER — CARVEDILOL 6.25 MG/1
6.25 TABLET ORAL 2 TIMES DAILY WITH MEALS
Status: DISCONTINUED | OUTPATIENT
Start: 2021-05-06 | End: 2021-05-06 | Stop reason: HOSPADM

## 2021-05-06 RX ORDER — CHLORTHALIDONE 25 MG/1
50 TABLET ORAL DAILY
Status: DISCONTINUED | OUTPATIENT
Start: 2021-05-06 | End: 2021-05-06 | Stop reason: HOSPADM

## 2021-05-06 RX ORDER — ATORVASTATIN CALCIUM 20 MG/1
80 TABLET, FILM COATED ORAL DAILY
Status: DISCONTINUED | OUTPATIENT
Start: 2021-05-06 | End: 2021-05-06 | Stop reason: HOSPADM

## 2021-05-06 RX ORDER — CLOPIDOGREL BISULFATE 75 MG/1
75 TABLET ORAL DAILY
Status: DISCONTINUED | OUTPATIENT
Start: 2021-05-06 | End: 2021-05-06 | Stop reason: HOSPADM

## 2021-05-06 RX ORDER — PANTOPRAZOLE SODIUM 40 MG/1
40 TABLET, DELAYED RELEASE ORAL DAILY
Refills: 3 | Status: DISCONTINUED | OUTPATIENT
Start: 2021-05-06 | End: 2021-05-06 | Stop reason: HOSPADM

## 2021-05-06 RX ORDER — LOSARTAN POTASSIUM 25 MG/1
25 TABLET ORAL DAILY
Refills: 3 | Status: DISCONTINUED | OUTPATIENT
Start: 2021-05-06 | End: 2021-05-06 | Stop reason: HOSPADM

## 2021-05-06 RX ADMIN — LOSARTAN POTASSIUM 25 MG: 25 TABLET, FILM COATED ORAL at 08:05

## 2021-05-06 RX ADMIN — CLOPIDOGREL 75 MG: 75 TABLET, FILM COATED ORAL at 08:05

## 2021-05-06 RX ADMIN — CHLORTHALIDONE 50 MG: 25 TABLET ORAL at 08:05

## 2021-05-06 RX ADMIN — PANTOPRAZOLE SODIUM 40 MG: 40 TABLET, DELAYED RELEASE ORAL at 08:05

## 2021-05-06 RX ADMIN — CARVEDILOL 6.25 MG: 6.25 TABLET, FILM COATED ORAL at 08:05

## 2021-05-06 RX ADMIN — ASPIRIN 81 MG: 81 TABLET, COATED ORAL at 08:05

## 2021-05-06 RX ADMIN — INSULIN DETEMIR 20 UNITS: 100 INJECTION, SOLUTION SUBCUTANEOUS at 08:05

## 2021-05-06 RX ADMIN — ATORVASTATIN CALCIUM 80 MG: 20 TABLET, FILM COATED ORAL at 08:05

## 2021-05-09 ENCOUNTER — HOSPITAL ENCOUNTER (EMERGENCY)
Facility: HOSPITAL | Age: 76
Discharge: HOME | End: 2021-05-09
Attending: EMERGENCY MEDICINE
Payer: MEDICARE

## 2021-05-09 ENCOUNTER — NURSE TRIAGE (OUTPATIENT)
Dept: ADMINISTRATIVE | Facility: CLINIC | Age: 76
End: 2021-05-09

## 2021-05-09 VITALS
BODY MASS INDEX: 32.47 KG/M2 | HEIGHT: 66 IN | HEART RATE: 77 BPM | OXYGEN SATURATION: 99 % | RESPIRATION RATE: 16 BRPM | WEIGHT: 202 LBS | TEMPERATURE: 99 F | SYSTOLIC BLOOD PRESSURE: 137 MMHG | DIASTOLIC BLOOD PRESSURE: 82 MMHG

## 2021-05-09 DIAGNOSIS — R58 POSTOPERATIVE ECCHYMOSIS: Primary | ICD-10-CM

## 2021-05-09 LAB
ALBUMIN SERPL BCP-MCNC: 2.6 G/DL (ref 3.5–5.2)
ALP SERPL-CCNC: 98 U/L (ref 55–135)
ALT SERPL W/O P-5'-P-CCNC: 20 U/L (ref 10–44)
ANION GAP SERPL CALC-SCNC: 10 MMOL/L (ref 8–16)
AST SERPL-CCNC: 14 U/L (ref 10–40)
BASOPHILS # BLD AUTO: 0.03 K/UL (ref 0–0.2)
BASOPHILS NFR BLD: 0.3 % (ref 0–1.9)
BILIRUB SERPL-MCNC: 0.6 MG/DL (ref 0.1–1)
BUN SERPL-MCNC: 17 MG/DL (ref 8–23)
CALCIUM SERPL-MCNC: 9.1 MG/DL (ref 8.7–10.5)
CHLORIDE SERPL-SCNC: 104 MMOL/L (ref 95–110)
CO2 SERPL-SCNC: 29 MMOL/L (ref 23–29)
CREAT SERPL-MCNC: 0.9 MG/DL (ref 0.5–1.4)
DIFFERENTIAL METHOD: ABNORMAL
EOSINOPHIL # BLD AUTO: 0.1 K/UL (ref 0–0.5)
EOSINOPHIL NFR BLD: 0.9 % (ref 0–8)
ERYTHROCYTE [DISTWIDTH] IN BLOOD BY AUTOMATED COUNT: 15.8 % (ref 11.5–14.5)
EST. GFR  (AFRICAN AMERICAN): >60 ML/MIN/1.73 M^2
EST. GFR  (NON AFRICAN AMERICAN): >60 ML/MIN/1.73 M^2
GLUCOSE SERPL-MCNC: 110 MG/DL (ref 70–110)
HCT VFR BLD AUTO: 32.1 % (ref 37–48.5)
HGB BLD-MCNC: 9.9 G/DL (ref 12–16)
IMM GRANULOCYTES # BLD AUTO: 0.11 K/UL (ref 0–0.04)
IMM GRANULOCYTES NFR BLD AUTO: 1.1 % (ref 0–0.5)
LYMPHOCYTES # BLD AUTO: 3 K/UL (ref 1–4.8)
LYMPHOCYTES NFR BLD: 28.9 % (ref 18–48)
MCH RBC QN AUTO: 28 PG (ref 27–31)
MCHC RBC AUTO-ENTMCNC: 30.8 G/DL (ref 32–36)
MCV RBC AUTO: 91 FL (ref 82–98)
MONOCYTES # BLD AUTO: 0.7 K/UL (ref 0.3–1)
MONOCYTES NFR BLD: 6.4 % (ref 4–15)
NEUTROPHILS # BLD AUTO: 6.5 K/UL (ref 1.8–7.7)
NEUTROPHILS NFR BLD: 62.4 % (ref 38–73)
NRBC BLD-RTO: 0 /100 WBC
PLATELET # BLD AUTO: 449 K/UL (ref 150–450)
PMV BLD AUTO: 10.1 FL (ref 9.2–12.9)
POTASSIUM SERPL-SCNC: 3.7 MMOL/L (ref 3.5–5.1)
PROT SERPL-MCNC: 6.7 G/DL (ref 6–8.4)
RBC # BLD AUTO: 3.53 M/UL (ref 4–5.4)
SODIUM SERPL-SCNC: 143 MMOL/L (ref 136–145)
WBC # BLD AUTO: 10.43 K/UL (ref 3.9–12.7)

## 2021-05-09 PROCEDURE — 80053 COMPREHEN METABOLIC PANEL: CPT | Performed by: EMERGENCY MEDICINE

## 2021-05-09 PROCEDURE — 85025 COMPLETE CBC W/AUTO DIFF WBC: CPT | Performed by: EMERGENCY MEDICINE

## 2021-05-09 PROCEDURE — 99285 EMERGENCY DEPT VISIT HI MDM: CPT | Mod: 25

## 2021-05-09 PROCEDURE — 99284 EMERGENCY DEPT VISIT MOD MDM: CPT | Mod: ,,, | Performed by: EMERGENCY MEDICINE

## 2021-05-09 PROCEDURE — 25500020 PHARM REV CODE 255: Performed by: EMERGENCY MEDICINE

## 2021-05-09 PROCEDURE — 99284 PR EMERGENCY DEPT VISIT,LEVEL IV: ICD-10-PCS | Mod: ,,, | Performed by: EMERGENCY MEDICINE

## 2021-05-09 RX ADMIN — IOHEXOL 100 ML: 350 INJECTION, SOLUTION INTRAVENOUS at 01:05

## 2021-05-10 ENCOUNTER — TELEPHONE (OUTPATIENT)
Dept: EMERGENCY MEDICINE | Facility: HOSPITAL | Age: 76
End: 2021-05-10

## 2021-05-11 ENCOUNTER — TELEPHONE (OUTPATIENT)
Dept: CARDIOLOGY | Facility: CLINIC | Age: 76
End: 2021-05-11

## 2021-05-17 DIAGNOSIS — E55.9 VITAMIN D INSUFFICIENCY: ICD-10-CM

## 2021-05-17 RX ORDER — ERGOCALCIFEROL 1.25 MG/1
50000 CAPSULE ORAL
Qty: 12 CAPSULE | Refills: 3 | Status: SHIPPED | OUTPATIENT
Start: 2021-05-17 | End: 2022-05-12

## 2021-05-19 ENCOUNTER — PATIENT OUTREACH (OUTPATIENT)
Dept: ADMINISTRATIVE | Facility: HOSPITAL | Age: 76
End: 2021-05-19

## 2021-05-19 DIAGNOSIS — I73.9 CLAUDICATION: Primary | ICD-10-CM

## 2021-05-20 ENCOUNTER — LAB VISIT (OUTPATIENT)
Dept: LAB | Facility: HOSPITAL | Age: 76
End: 2021-05-20
Attending: INTERNAL MEDICINE
Payer: MEDICARE

## 2021-05-20 ENCOUNTER — IMMUNIZATION (OUTPATIENT)
Dept: INTERNAL MEDICINE | Facility: CLINIC | Age: 76
End: 2021-05-20
Payer: MEDICARE

## 2021-05-20 ENCOUNTER — TELEPHONE (OUTPATIENT)
Dept: INTERNAL MEDICINE | Facility: CLINIC | Age: 76
End: 2021-05-20

## 2021-05-20 ENCOUNTER — OFFICE VISIT (OUTPATIENT)
Dept: INTERNAL MEDICINE | Facility: CLINIC | Age: 76
End: 2021-05-20
Payer: MEDICARE

## 2021-05-20 VITALS
RESPIRATION RATE: 16 BRPM | SYSTOLIC BLOOD PRESSURE: 122 MMHG | TEMPERATURE: 98 F | HEART RATE: 62 BPM | BODY MASS INDEX: 34.75 KG/M2 | DIASTOLIC BLOOD PRESSURE: 65 MMHG | WEIGHT: 216.25 LBS | HEIGHT: 66 IN

## 2021-05-20 DIAGNOSIS — R74.01 TRANSAMINITIS: Primary | ICD-10-CM

## 2021-05-20 DIAGNOSIS — E11.65 TYPE 2 DIABETES MELLITUS WITH HYPERGLYCEMIA, WITH LONG-TERM CURRENT USE OF INSULIN: Chronic | ICD-10-CM

## 2021-05-20 DIAGNOSIS — E78.5 HYPERLIPIDEMIA ASSOCIATED WITH TYPE 2 DIABETES MELLITUS: ICD-10-CM

## 2021-05-20 DIAGNOSIS — E11.69 HYPERLIPIDEMIA ASSOCIATED WITH TYPE 2 DIABETES MELLITUS: ICD-10-CM

## 2021-05-20 DIAGNOSIS — I73.9 PAD (PERIPHERAL ARTERY DISEASE): ICD-10-CM

## 2021-05-20 DIAGNOSIS — I10 BENIGN ESSENTIAL HYPERTENSION: ICD-10-CM

## 2021-05-20 DIAGNOSIS — D64.9 NORMOCYTIC ANEMIA: ICD-10-CM

## 2021-05-20 DIAGNOSIS — Z79.4 TYPE 2 DIABETES MELLITUS WITH HYPERGLYCEMIA, WITH LONG-TERM CURRENT USE OF INSULIN: Chronic | ICD-10-CM

## 2021-05-20 DIAGNOSIS — Z23 NEED FOR VACCINATION: Primary | ICD-10-CM

## 2021-05-20 DIAGNOSIS — Z09 HOSPITAL DISCHARGE FOLLOW-UP: Primary | ICD-10-CM

## 2021-05-20 DIAGNOSIS — I25.10 CORONARY ARTERY DISEASE INVOLVING NATIVE CORONARY ARTERY OF NATIVE HEART WITHOUT ANGINA PECTORIS: ICD-10-CM

## 2021-05-20 DIAGNOSIS — Z72.0 TOBACCO USE: ICD-10-CM

## 2021-05-20 PROBLEM — B18.2 CHRONIC HEPATITIS C WITHOUT HEPATIC COMA: Status: RESOLVED | Noted: 2020-01-02 | Resolved: 2021-05-20

## 2021-05-20 LAB
ALBUMIN SERPL BCP-MCNC: 2.7 G/DL (ref 3.5–5.2)
ALP SERPL-CCNC: 167 U/L (ref 55–135)
ALT SERPL W/O P-5'-P-CCNC: 59 U/L (ref 10–44)
ANION GAP SERPL CALC-SCNC: 10 MMOL/L (ref 8–16)
AST SERPL-CCNC: 49 U/L (ref 10–40)
BASOPHILS # BLD AUTO: 0.04 K/UL (ref 0–0.2)
BASOPHILS NFR BLD: 0.4 % (ref 0–1.9)
BILIRUB SERPL-MCNC: 0.3 MG/DL (ref 0.1–1)
BUN SERPL-MCNC: 21 MG/DL (ref 8–23)
CALCIUM SERPL-MCNC: 8.9 MG/DL (ref 8.7–10.5)
CHLORIDE SERPL-SCNC: 103 MMOL/L (ref 95–110)
CHOLEST SERPL-MCNC: 131 MG/DL (ref 120–199)
CHOLEST/HDLC SERPL: 3.2 {RATIO} (ref 2–5)
CO2 SERPL-SCNC: 27 MMOL/L (ref 23–29)
CREAT SERPL-MCNC: 1.1 MG/DL (ref 0.5–1.4)
DIFFERENTIAL METHOD: ABNORMAL
EOSINOPHIL # BLD AUTO: 0.1 K/UL (ref 0–0.5)
EOSINOPHIL NFR BLD: 1.3 % (ref 0–8)
ERYTHROCYTE [DISTWIDTH] IN BLOOD BY AUTOMATED COUNT: 17.7 % (ref 11.5–14.5)
EST. GFR  (AFRICAN AMERICAN): 57.6 ML/MIN/1.73 M^2
EST. GFR  (NON AFRICAN AMERICAN): 49.9 ML/MIN/1.73 M^2
GLUCOSE SERPL-MCNC: 205 MG/DL (ref 70–110)
HCT VFR BLD AUTO: 34.1 % (ref 37–48.5)
HDLC SERPL-MCNC: 41 MG/DL (ref 40–75)
HDLC SERPL: 31.3 % (ref 20–50)
HGB BLD-MCNC: 10.4 G/DL (ref 12–16)
IMM GRANULOCYTES # BLD AUTO: 0.09 K/UL (ref 0–0.04)
IMM GRANULOCYTES NFR BLD AUTO: 0.9 % (ref 0–0.5)
LDLC SERPL CALC-MCNC: 75 MG/DL (ref 63–159)
LYMPHOCYTES # BLD AUTO: 3.9 K/UL (ref 1–4.8)
LYMPHOCYTES NFR BLD: 39.2 % (ref 18–48)
MCH RBC QN AUTO: 28 PG (ref 27–31)
MCHC RBC AUTO-ENTMCNC: 30.5 G/DL (ref 32–36)
MCV RBC AUTO: 92 FL (ref 82–98)
MONOCYTES # BLD AUTO: 0.5 K/UL (ref 0.3–1)
MONOCYTES NFR BLD: 4.7 % (ref 4–15)
NEUTROPHILS # BLD AUTO: 5.4 K/UL (ref 1.8–7.7)
NEUTROPHILS NFR BLD: 53.5 % (ref 38–73)
NONHDLC SERPL-MCNC: 90 MG/DL
NRBC BLD-RTO: 0 /100 WBC
PLATELET # BLD AUTO: 639 K/UL (ref 150–450)
PMV BLD AUTO: 9.5 FL (ref 9.2–12.9)
POTASSIUM SERPL-SCNC: 4.2 MMOL/L (ref 3.5–5.1)
PROT SERPL-MCNC: 6.4 G/DL (ref 6–8.4)
RBC # BLD AUTO: 3.71 M/UL (ref 4–5.4)
SODIUM SERPL-SCNC: 140 MMOL/L (ref 136–145)
TRIGL SERPL-MCNC: 75 MG/DL (ref 30–150)
WBC # BLD AUTO: 10 K/UL (ref 3.9–12.7)

## 2021-05-20 PROCEDURE — 91300 COVID-19, MRNA, LNP-S, PF, 30 MCG/0.3 ML DOSE VACCINE: CPT | Mod: PBBFAC | Performed by: INTERNAL MEDICINE

## 2021-05-20 PROCEDURE — 99999 PR PBB SHADOW E&M-EST. PATIENT-LVL III: CPT | Mod: PBBFAC,,, | Performed by: INTERNAL MEDICINE

## 2021-05-20 PROCEDURE — 3046F PR MOST RECENT HEMOGLOBIN A1C LEVEL > 9.0%: ICD-10-PCS | Mod: CPTII,S$GLB,, | Performed by: INTERNAL MEDICINE

## 2021-05-20 PROCEDURE — 85025 COMPLETE CBC W/AUTO DIFF WBC: CPT | Performed by: INTERNAL MEDICINE

## 2021-05-20 PROCEDURE — 3008F PR BODY MASS INDEX (BMI) DOCUMENTED: ICD-10-PCS | Mod: CPTII,S$GLB,, | Performed by: INTERNAL MEDICINE

## 2021-05-20 PROCEDURE — 36415 COLL VENOUS BLD VENIPUNCTURE: CPT | Performed by: INTERNAL MEDICINE

## 2021-05-20 PROCEDURE — 1159F MED LIST DOCD IN RCRD: CPT | Mod: S$GLB,,, | Performed by: INTERNAL MEDICINE

## 2021-05-20 PROCEDURE — 3008F BODY MASS INDEX DOCD: CPT | Mod: CPTII,S$GLB,, | Performed by: INTERNAL MEDICINE

## 2021-05-20 PROCEDURE — 80061 LIPID PANEL: CPT | Performed by: INTERNAL MEDICINE

## 2021-05-20 PROCEDURE — 1159F PR MEDICATION LIST DOCUMENTED IN MEDICAL RECORD: ICD-10-PCS | Mod: S$GLB,,, | Performed by: INTERNAL MEDICINE

## 2021-05-20 PROCEDURE — 99214 OFFICE O/P EST MOD 30 MIN: CPT | Mod: S$GLB,,, | Performed by: INTERNAL MEDICINE

## 2021-05-20 PROCEDURE — 3072F PR LOW RISK FOR RETINOPATHY: ICD-10-PCS | Mod: S$GLB,,, | Performed by: INTERNAL MEDICINE

## 2021-05-20 PROCEDURE — 99999 PR PBB SHADOW E&M-EST. PATIENT-LVL III: ICD-10-PCS | Mod: PBBFAC,,, | Performed by: INTERNAL MEDICINE

## 2021-05-20 PROCEDURE — 1126F PR PAIN SEVERITY QUANTIFIED, NO PAIN PRESENT: ICD-10-PCS | Mod: S$GLB,,, | Performed by: INTERNAL MEDICINE

## 2021-05-20 PROCEDURE — 99214 PR OFFICE/OUTPT VISIT, EST, LEVL IV, 30-39 MIN: ICD-10-PCS | Mod: S$GLB,,, | Performed by: INTERNAL MEDICINE

## 2021-05-20 PROCEDURE — 3072F LOW RISK FOR RETINOPATHY: CPT | Mod: S$GLB,,, | Performed by: INTERNAL MEDICINE

## 2021-05-20 PROCEDURE — 1126F AMNT PAIN NOTED NONE PRSNT: CPT | Mod: S$GLB,,, | Performed by: INTERNAL MEDICINE

## 2021-05-20 PROCEDURE — 80053 COMPREHEN METABOLIC PANEL: CPT | Performed by: INTERNAL MEDICINE

## 2021-05-20 PROCEDURE — 3046F HEMOGLOBIN A1C LEVEL >9.0%: CPT | Mod: CPTII,S$GLB,, | Performed by: INTERNAL MEDICINE

## 2021-05-20 RX ORDER — CHLORTHALIDONE 50 MG/1
50 TABLET ORAL DAILY
Qty: 90 TABLET | Refills: 3 | Status: SHIPPED | OUTPATIENT
Start: 2021-05-20 | End: 2022-06-15 | Stop reason: SDUPTHER

## 2021-05-20 RX ORDER — ATORVASTATIN CALCIUM 80 MG/1
80 TABLET, FILM COATED ORAL DAILY
Qty: 90 TABLET | Refills: 3 | Status: SHIPPED | OUTPATIENT
Start: 2021-05-20 | End: 2022-06-15 | Stop reason: SDUPTHER

## 2021-05-20 RX ORDER — VARENICLINE TARTRATE 1 MG/1
1 TABLET, FILM COATED ORAL 2 TIMES DAILY
Qty: 30 TABLET | Refills: 1 | Status: SHIPPED | OUTPATIENT
Start: 2021-05-20 | End: 2021-11-04

## 2021-05-27 ENCOUNTER — LAB VISIT (OUTPATIENT)
Dept: LAB | Facility: HOSPITAL | Age: 76
End: 2021-05-27
Attending: INTERNAL MEDICINE
Payer: MEDICARE

## 2021-05-27 DIAGNOSIS — R74.01 TRANSAMINITIS: ICD-10-CM

## 2021-05-27 LAB
ALBUMIN SERPL BCP-MCNC: 3 G/DL (ref 3.5–5.2)
ALP SERPL-CCNC: 143 U/L (ref 55–135)
ALT SERPL W/O P-5'-P-CCNC: 16 U/L (ref 10–44)
AST SERPL-CCNC: 13 U/L (ref 10–40)
BILIRUB DIRECT SERPL-MCNC: 0.2 MG/DL (ref 0.1–0.3)
BILIRUB SERPL-MCNC: 0.3 MG/DL (ref 0.1–1)
PROT SERPL-MCNC: 7.1 G/DL (ref 6–8.4)

## 2021-05-27 PROCEDURE — 80076 HEPATIC FUNCTION PANEL: CPT | Performed by: INTERNAL MEDICINE

## 2021-05-27 PROCEDURE — 36415 COLL VENOUS BLD VENIPUNCTURE: CPT | Performed by: INTERNAL MEDICINE

## 2021-06-03 ENCOUNTER — OFFICE VISIT (OUTPATIENT)
Dept: CARDIOLOGY | Facility: CLINIC | Age: 76
End: 2021-06-03
Payer: MEDICARE

## 2021-06-03 ENCOUNTER — HOSPITAL ENCOUNTER (OUTPATIENT)
Dept: CARDIOLOGY | Facility: HOSPITAL | Age: 76
Discharge: HOME | End: 2021-06-03
Attending: INTERNAL MEDICINE
Payer: MEDICARE

## 2021-06-03 ENCOUNTER — DOCUMENTATION ONLY (OUTPATIENT)
Dept: CARDIOLOGY | Facility: CLINIC | Age: 76
End: 2021-06-03

## 2021-06-03 VITALS
HEART RATE: 62 BPM | BODY MASS INDEX: 31.57 KG/M2 | WEIGHT: 196.44 LBS | SYSTOLIC BLOOD PRESSURE: 140 MMHG | OXYGEN SATURATION: 100 % | DIASTOLIC BLOOD PRESSURE: 80 MMHG | HEIGHT: 66 IN

## 2021-06-03 DIAGNOSIS — I65.23 BILATERAL CAROTID ARTERY STENOSIS: ICD-10-CM

## 2021-06-03 DIAGNOSIS — E11.42 TYPE 2 DIABETES MELLITUS WITH DIABETIC POLYNEUROPATHY, WITH LONG-TERM CURRENT USE OF INSULIN: Chronic | ICD-10-CM

## 2021-06-03 DIAGNOSIS — I73.9 CLAUDICATION, INTERMITTENT: Primary | ICD-10-CM

## 2021-06-03 DIAGNOSIS — I42.5 OTHER RESTRICTIVE CARDIOMYOPATHY: ICD-10-CM

## 2021-06-03 DIAGNOSIS — I73.9 CLAUDICATION: ICD-10-CM

## 2021-06-03 DIAGNOSIS — E78.5 HYPERLIPIDEMIA LDL GOAL <70: Chronic | ICD-10-CM

## 2021-06-03 DIAGNOSIS — N18.31 STAGE 3A CHRONIC KIDNEY DISEASE: ICD-10-CM

## 2021-06-03 DIAGNOSIS — I10 ESSENTIAL HYPERTENSION: ICD-10-CM

## 2021-06-03 DIAGNOSIS — D64.9 ANEMIA, UNSPECIFIED TYPE: ICD-10-CM

## 2021-06-03 DIAGNOSIS — Z79.4 TYPE 2 DIABETES MELLITUS WITH DIABETIC POLYNEUROPATHY, WITH LONG-TERM CURRENT USE OF INSULIN: Chronic | ICD-10-CM

## 2021-06-03 DIAGNOSIS — Z86.19 HISTORY OF HEPATITIS C: ICD-10-CM

## 2021-06-03 LAB
LEFT ANT TIBIAL SYS PSV: 48 CM/S
LEFT CFA PSV: 189 CM/S
LEFT EXTERNAL ILIAC PSV: 118 CM/S
LEFT PERONEAL SYS PSV: 45 CM/S
LEFT POPLITEAL PSV: 72 CM/S
LEFT POST TIBIAL SYS PSV: 42 CM/S
LEFT PROFUNDA SYS PSV: 102 CM/S
LEFT SUPER FEMORAL DIST SYS PSV: 104 CM/S
LEFT SUPER FEMORAL OSTIAL SYS PSV: 191 CM/S
LEFT SUPER FEMORAL PROX SYS PSV: 125 CM/S
LEFT TIB/PER TRUNK SYS PSV: 62 CM/S
OHS CV LEFT LOWER EXTREMITY ABI (NO CALC): 0.8
OHS CV LOWER EXTREMITY STENT MEASUREMENTS - LEFT - MSFA S1 - DIST: 56
OHS CV LOWER EXTREMITY STENT MEASUREMENTS - LEFT - MSFA S1 - MID: 53
OHS CV LOWER EXTREMITY STENT MEASUREMENTS - LEFT - MSFA S1 - OST: 136
OHS CV LOWER EXTREMITY STENT MEASUREMENTS - LEFT - MSFA S1 - PROX: 66

## 2021-06-03 PROCEDURE — 93926 LOWER EXTREMITY STUDY: CPT | Mod: 26,LT,, | Performed by: INTERNAL MEDICINE

## 2021-06-03 PROCEDURE — 99999 PR PBB SHADOW E&M-EST. PATIENT-LVL III: CPT | Mod: PBBFAC,,, | Performed by: INTERNAL MEDICINE

## 2021-06-03 PROCEDURE — 3072F LOW RISK FOR RETINOPATHY: CPT | Mod: S$GLB,,, | Performed by: INTERNAL MEDICINE

## 2021-06-03 PROCEDURE — 3072F PR LOW RISK FOR RETINOPATHY: ICD-10-PCS | Mod: S$GLB,,, | Performed by: INTERNAL MEDICINE

## 2021-06-03 PROCEDURE — 93926 LOWER EXTREMITY STUDY: CPT | Mod: LT

## 2021-06-03 PROCEDURE — 99999 PR PBB SHADOW E&M-EST. PATIENT-LVL III: ICD-10-PCS | Mod: PBBFAC,,, | Performed by: INTERNAL MEDICINE

## 2021-06-03 PROCEDURE — 99213 OFFICE O/P EST LOW 20 MIN: CPT | Mod: S$GLB,,, | Performed by: INTERNAL MEDICINE

## 2021-06-03 PROCEDURE — 99213 PR OFFICE/OUTPT VISIT, EST, LEVL III, 20-29 MIN: ICD-10-PCS | Mod: S$GLB,,, | Performed by: INTERNAL MEDICINE

## 2021-06-03 PROCEDURE — 1126F AMNT PAIN NOTED NONE PRSNT: CPT | Mod: S$GLB,,, | Performed by: INTERNAL MEDICINE

## 2021-06-03 PROCEDURE — 1159F MED LIST DOCD IN RCRD: CPT | Mod: S$GLB,,, | Performed by: INTERNAL MEDICINE

## 2021-06-03 PROCEDURE — 93926 CV US DOPPLER ARTERIAL LEG LEFT (CUPID ONLY): ICD-10-PCS | Mod: 26,LT,, | Performed by: INTERNAL MEDICINE

## 2021-06-03 PROCEDURE — 1159F PR MEDICATION LIST DOCUMENTED IN MEDICAL RECORD: ICD-10-PCS | Mod: S$GLB,,, | Performed by: INTERNAL MEDICINE

## 2021-06-03 PROCEDURE — 3008F BODY MASS INDEX DOCD: CPT | Mod: CPTII,S$GLB,, | Performed by: INTERNAL MEDICINE

## 2021-06-03 PROCEDURE — 1126F PR PAIN SEVERITY QUANTIFIED, NO PAIN PRESENT: ICD-10-PCS | Mod: S$GLB,,, | Performed by: INTERNAL MEDICINE

## 2021-06-03 PROCEDURE — 3008F PR BODY MASS INDEX (BMI) DOCUMENTED: ICD-10-PCS | Mod: CPTII,S$GLB,, | Performed by: INTERNAL MEDICINE

## 2021-06-03 RX ORDER — SODIUM CHLORIDE 9 MG/ML
INJECTION, SOLUTION INTRAVENOUS CONTINUOUS
Status: CANCELLED | OUTPATIENT
Start: 2021-06-03 | End: 2021-06-03

## 2021-06-03 RX ORDER — DIPHENHYDRAMINE HCL 50 MG
50 CAPSULE ORAL ONCE
Status: CANCELLED | OUTPATIENT
Start: 2021-06-03 | End: 2021-06-03

## 2021-06-10 ENCOUNTER — IMMUNIZATION (OUTPATIENT)
Dept: INTERNAL MEDICINE | Facility: CLINIC | Age: 76
End: 2021-06-10
Payer: MEDICARE

## 2021-06-10 DIAGNOSIS — Z23 NEED FOR VACCINATION: Primary | ICD-10-CM

## 2021-06-10 PROCEDURE — 0002A COVID-19, MRNA, LNP-S, PF, 30 MCG/0.3 ML DOSE VACCINE: CPT | Mod: PBBFAC | Performed by: INTERNAL MEDICINE

## 2021-06-10 PROCEDURE — 91300 COVID-19, MRNA, LNP-S, PF, 30 MCG/0.3 ML DOSE VACCINE: CPT | Mod: PBBFAC | Performed by: INTERNAL MEDICINE

## 2021-06-11 ENCOUNTER — TELEPHONE (OUTPATIENT)
Dept: INTERNAL MEDICINE | Facility: CLINIC | Age: 76
End: 2021-06-11

## 2021-06-14 ENCOUNTER — OFFICE VISIT (OUTPATIENT)
Dept: CARDIOLOGY | Facility: CLINIC | Age: 76
End: 2021-06-14
Payer: MEDICARE

## 2021-06-14 VITALS
WEIGHT: 213.19 LBS | HEART RATE: 72 BPM | BODY MASS INDEX: 34.26 KG/M2 | SYSTOLIC BLOOD PRESSURE: 132 MMHG | OXYGEN SATURATION: 99 % | HEIGHT: 66 IN | DIASTOLIC BLOOD PRESSURE: 70 MMHG

## 2021-06-14 DIAGNOSIS — I65.23 BILATERAL CAROTID ARTERY STENOSIS: ICD-10-CM

## 2021-06-14 DIAGNOSIS — I42.8 NICM (NONISCHEMIC CARDIOMYOPATHY): ICD-10-CM

## 2021-06-14 DIAGNOSIS — I73.9 CLAUDICATION: Primary | ICD-10-CM

## 2021-06-14 DIAGNOSIS — E78.5 HYPERLIPIDEMIA LDL GOAL <70: Chronic | ICD-10-CM

## 2021-06-14 DIAGNOSIS — I73.9 PAD (PERIPHERAL ARTERY DISEASE): ICD-10-CM

## 2021-06-14 DIAGNOSIS — I10 ESSENTIAL HYPERTENSION: ICD-10-CM

## 2021-06-14 DIAGNOSIS — Z79.4 TYPE 2 DIABETES MELLITUS WITH DIABETIC POLYNEUROPATHY, WITH LONG-TERM CURRENT USE OF INSULIN: Chronic | ICD-10-CM

## 2021-06-14 DIAGNOSIS — N18.31 STAGE 3A CHRONIC KIDNEY DISEASE: ICD-10-CM

## 2021-06-14 DIAGNOSIS — E11.42 TYPE 2 DIABETES MELLITUS WITH DIABETIC POLYNEUROPATHY, WITH LONG-TERM CURRENT USE OF INSULIN: Chronic | ICD-10-CM

## 2021-06-14 DIAGNOSIS — I70.0 AORTIC ATHEROSCLEROSIS: ICD-10-CM

## 2021-06-14 PROCEDURE — 99999 PR PBB SHADOW E&M-EST. PATIENT-LVL III: ICD-10-PCS | Mod: PBBFAC,,, | Performed by: INTERNAL MEDICINE

## 2021-06-14 PROCEDURE — 99214 OFFICE O/P EST MOD 30 MIN: CPT | Mod: S$GLB,,, | Performed by: INTERNAL MEDICINE

## 2021-06-14 PROCEDURE — 3046F PR MOST RECENT HEMOGLOBIN A1C LEVEL > 9.0%: ICD-10-PCS | Mod: CPTII,S$GLB,, | Performed by: INTERNAL MEDICINE

## 2021-06-14 PROCEDURE — 1159F PR MEDICATION LIST DOCUMENTED IN MEDICAL RECORD: ICD-10-PCS | Mod: S$GLB,,, | Performed by: INTERNAL MEDICINE

## 2021-06-14 PROCEDURE — 3046F HEMOGLOBIN A1C LEVEL >9.0%: CPT | Mod: CPTII,S$GLB,, | Performed by: INTERNAL MEDICINE

## 2021-06-14 PROCEDURE — 3072F LOW RISK FOR RETINOPATHY: CPT | Mod: S$GLB,,, | Performed by: INTERNAL MEDICINE

## 2021-06-14 PROCEDURE — 1159F MED LIST DOCD IN RCRD: CPT | Mod: S$GLB,,, | Performed by: INTERNAL MEDICINE

## 2021-06-14 PROCEDURE — 3072F PR LOW RISK FOR RETINOPATHY: ICD-10-PCS | Mod: S$GLB,,, | Performed by: INTERNAL MEDICINE

## 2021-06-14 PROCEDURE — 3008F BODY MASS INDEX DOCD: CPT | Mod: CPTII,S$GLB,, | Performed by: INTERNAL MEDICINE

## 2021-06-14 PROCEDURE — 99214 PR OFFICE/OUTPT VISIT, EST, LEVL IV, 30-39 MIN: ICD-10-PCS | Mod: S$GLB,,, | Performed by: INTERNAL MEDICINE

## 2021-06-14 PROCEDURE — 99999 PR PBB SHADOW E&M-EST. PATIENT-LVL III: CPT | Mod: PBBFAC,,, | Performed by: INTERNAL MEDICINE

## 2021-06-14 PROCEDURE — 3008F PR BODY MASS INDEX (BMI) DOCUMENTED: ICD-10-PCS | Mod: CPTII,S$GLB,, | Performed by: INTERNAL MEDICINE

## 2021-06-21 ENCOUNTER — ANESTHESIA (OUTPATIENT)
Dept: MEDSURG UNIT | Facility: HOSPITAL | Age: 76
End: 2021-06-21
Payer: MEDICARE

## 2021-06-21 ENCOUNTER — HOSPITAL ENCOUNTER (OUTPATIENT)
Facility: HOSPITAL | Age: 76
Discharge: HOME | End: 2021-06-21
Attending: INTERNAL MEDICINE | Admitting: INTERNAL MEDICINE
Payer: MEDICARE

## 2021-06-21 ENCOUNTER — ANESTHESIA EVENT (OUTPATIENT)
Dept: MEDSURG UNIT | Facility: HOSPITAL | Age: 76
End: 2021-06-21
Payer: MEDICARE

## 2021-06-21 VITALS
WEIGHT: 196.19 LBS | BODY MASS INDEX: 31.53 KG/M2 | SYSTOLIC BLOOD PRESSURE: 171 MMHG | HEIGHT: 66 IN | DIASTOLIC BLOOD PRESSURE: 79 MMHG | RESPIRATION RATE: 16 BRPM | HEART RATE: 54 BPM | OXYGEN SATURATION: 99 % | TEMPERATURE: 97 F

## 2021-06-21 DIAGNOSIS — D64.9 ANEMIA, UNSPECIFIED TYPE: ICD-10-CM

## 2021-06-21 DIAGNOSIS — Z79.4 TYPE 2 DIABETES MELLITUS WITH HYPERGLYCEMIA, WITH LONG-TERM CURRENT USE OF INSULIN: Chronic | ICD-10-CM

## 2021-06-21 DIAGNOSIS — E11.65 TYPE 2 DIABETES MELLITUS WITH HYPERGLYCEMIA, WITH LONG-TERM CURRENT USE OF INSULIN: Chronic | ICD-10-CM

## 2021-06-21 DIAGNOSIS — I73.9 PAD (PERIPHERAL ARTERY DISEASE): Primary | ICD-10-CM

## 2021-06-21 DIAGNOSIS — I73.9 CLAUDICATION, INTERMITTENT: ICD-10-CM

## 2021-06-21 LAB
ANION GAP SERPL CALC-SCNC: 9 MMOL/L (ref 8–16)
BASOPHILS # BLD AUTO: 0.03 K/UL (ref 0–0.2)
BASOPHILS NFR BLD: 0.4 % (ref 0–1.9)
BUN SERPL-MCNC: 30 MG/DL (ref 8–23)
CALCIUM SERPL-MCNC: 9.1 MG/DL (ref 8.7–10.5)
CHLORIDE SERPL-SCNC: 105 MMOL/L (ref 95–110)
CO2 SERPL-SCNC: 25 MMOL/L (ref 23–29)
CREAT SERPL-MCNC: 1.2 MG/DL (ref 0.5–1.4)
DIFFERENTIAL METHOD: ABNORMAL
EOSINOPHIL # BLD AUTO: 0.1 K/UL (ref 0–0.5)
EOSINOPHIL NFR BLD: 1.7 % (ref 0–8)
ERYTHROCYTE [DISTWIDTH] IN BLOOD BY AUTOMATED COUNT: 15.5 % (ref 11.5–14.5)
EST. GFR  (AFRICAN AMERICAN): 51.8 ML/MIN/1.73 M^2
EST. GFR  (NON AFRICAN AMERICAN): 44.9 ML/MIN/1.73 M^2
GLUCOSE SERPL-MCNC: 279 MG/DL (ref 70–110)
HCT VFR BLD AUTO: 33.7 % (ref 37–48.5)
HGB BLD-MCNC: 10.1 G/DL (ref 12–16)
IMM GRANULOCYTES # BLD AUTO: 0.04 K/UL (ref 0–0.04)
IMM GRANULOCYTES NFR BLD AUTO: 0.5 % (ref 0–0.5)
LYMPHOCYTES # BLD AUTO: 2.8 K/UL (ref 1–4.8)
LYMPHOCYTES NFR BLD: 35.8 % (ref 18–48)
MCH RBC QN AUTO: 28.5 PG (ref 27–31)
MCHC RBC AUTO-ENTMCNC: 30 G/DL (ref 32–36)
MCV RBC AUTO: 95 FL (ref 82–98)
MONOCYTES # BLD AUTO: 0.5 K/UL (ref 0.3–1)
MONOCYTES NFR BLD: 5.9 % (ref 4–15)
NEUTROPHILS # BLD AUTO: 4.4 K/UL (ref 1.8–7.7)
NEUTROPHILS NFR BLD: 55.7 % (ref 38–73)
NRBC BLD-RTO: 0 /100 WBC
PLATELET # BLD AUTO: 399 K/UL (ref 150–450)
PMV BLD AUTO: 10.3 FL (ref 9.2–12.9)
POCT GLUCOSE: 191 MG/DL (ref 70–110)
POCT GLUCOSE: 206 MG/DL (ref 70–110)
POCT GLUCOSE: 288 MG/DL (ref 70–110)
POTASSIUM SERPL-SCNC: 4.3 MMOL/L (ref 3.5–5.1)
RBC # BLD AUTO: 3.55 M/UL (ref 4–5.4)
SODIUM SERPL-SCNC: 139 MMOL/L (ref 136–145)
WBC # BLD AUTO: 7.8 K/UL (ref 3.9–12.7)

## 2021-06-21 PROCEDURE — 25000003 PHARM REV CODE 250: Performed by: INTERNAL MEDICINE

## 2021-06-21 PROCEDURE — C1874 STENT, COATED/COV W/DEL SYS: HCPCS | Performed by: INTERNAL MEDICINE

## 2021-06-21 PROCEDURE — 37000008 HC ANESTHESIA 1ST 15 MINUTES: Performed by: INTERNAL MEDICINE

## 2021-06-21 PROCEDURE — 63600175 PHARM REV CODE 636 W HCPCS: Performed by: NURSE ANESTHETIST, CERTIFIED REGISTERED

## 2021-06-21 PROCEDURE — C1769 GUIDE WIRE: HCPCS | Performed by: INTERNAL MEDICINE

## 2021-06-21 PROCEDURE — 27201423 OPTIME MED/SURG SUP & DEVICES STERILE SUPPLY: Performed by: INTERNAL MEDICINE

## 2021-06-21 PROCEDURE — 37226 PR FEM/POPL REVASC W/STENT: ICD-10-PCS | Mod: LT,,, | Performed by: INTERNAL MEDICINE

## 2021-06-21 PROCEDURE — 37226 PR FEM/POPL REVASC W/STENT: CPT | Mod: LT,,, | Performed by: INTERNAL MEDICINE

## 2021-06-21 PROCEDURE — D9220A PRA ANESTHESIA: ICD-10-PCS | Mod: CRNA,,, | Performed by: NURSE ANESTHETIST, CERTIFIED REGISTERED

## 2021-06-21 PROCEDURE — 37000009 HC ANESTHESIA EA ADD 15 MINS: Performed by: INTERNAL MEDICINE

## 2021-06-21 PROCEDURE — 25000003 PHARM REV CODE 250: Performed by: NURSE ANESTHETIST, CERTIFIED REGISTERED

## 2021-06-21 PROCEDURE — C1760 CLOSURE DEV, VASC: HCPCS | Performed by: INTERNAL MEDICINE

## 2021-06-21 PROCEDURE — 63600175 PHARM REV CODE 636 W HCPCS: Performed by: INTERNAL MEDICINE

## 2021-06-21 PROCEDURE — C1887 CATHETER, GUIDING: HCPCS | Performed by: INTERNAL MEDICINE

## 2021-06-21 PROCEDURE — C1725 CATH, TRANSLUMIN NON-LASER: HCPCS | Performed by: INTERNAL MEDICINE

## 2021-06-21 PROCEDURE — C1894 INTRO/SHEATH, NON-LASER: HCPCS | Performed by: INTERNAL MEDICINE

## 2021-06-21 PROCEDURE — 85025 COMPLETE CBC W/AUTO DIFF WBC: CPT | Performed by: INTERNAL MEDICINE

## 2021-06-21 PROCEDURE — 82962 GLUCOSE BLOOD TEST: CPT | Mod: 91 | Performed by: INTERNAL MEDICINE

## 2021-06-21 PROCEDURE — 36415 COLL VENOUS BLD VENIPUNCTURE: CPT | Performed by: INTERNAL MEDICINE

## 2021-06-21 PROCEDURE — 37226 HC FEM/POPL REVASC W/STENT: CPT | Mod: LT | Performed by: INTERNAL MEDICINE

## 2021-06-21 PROCEDURE — 99152 MOD SED SAME PHYS/QHP 5/>YRS: CPT | Mod: ,,, | Performed by: INTERNAL MEDICINE

## 2021-06-21 PROCEDURE — D9220A PRA ANESTHESIA: Mod: CRNA,,, | Performed by: NURSE ANESTHETIST, CERTIFIED REGISTERED

## 2021-06-21 PROCEDURE — 86900 BLOOD TYPING SEROLOGIC ABO: CPT | Performed by: INTERNAL MEDICINE

## 2021-06-21 PROCEDURE — 80048 BASIC METABOLIC PNL TOTAL CA: CPT | Performed by: INTERNAL MEDICINE

## 2021-06-21 PROCEDURE — D9220A PRA ANESTHESIA: Mod: ANES,,, | Performed by: ANESTHESIOLOGY

## 2021-06-21 PROCEDURE — 82962 GLUCOSE BLOOD TEST: CPT | Performed by: INTERNAL MEDICINE

## 2021-06-21 PROCEDURE — 25500020 PHARM REV CODE 255: Performed by: INTERNAL MEDICINE

## 2021-06-21 PROCEDURE — 99152 PR MOD CONSCIOUS SEDATION, SAME PHYS, 5+ YRS, FIRST 15 MIN: ICD-10-PCS | Mod: ,,, | Performed by: INTERNAL MEDICINE

## 2021-06-21 PROCEDURE — D9220A PRA ANESTHESIA: ICD-10-PCS | Mod: ANES,,, | Performed by: ANESTHESIOLOGY

## 2021-06-21 DEVICE — DRUG-ELUTING VASCULAR STENT SYSTEM
Type: IMPLANTABLE DEVICE | Site: LEG | Status: FUNCTIONAL
Brand: ELUVIA™

## 2021-06-21 RX ORDER — HEPARIN SODIUM 1000 [USP'U]/ML
INJECTION, SOLUTION INTRAVENOUS; SUBCUTANEOUS
Status: DISCONTINUED | OUTPATIENT
Start: 2021-06-21 | End: 2021-06-21

## 2021-06-21 RX ORDER — IBUPROFEN 200 MG
16 TABLET ORAL
Status: DISCONTINUED | OUTPATIENT
Start: 2021-06-21 | End: 2021-06-21 | Stop reason: HOSPADM

## 2021-06-21 RX ORDER — ACETAMINOPHEN 325 MG/1
650 TABLET ORAL EVERY 4 HOURS PRN
Status: DISCONTINUED | OUTPATIENT
Start: 2021-06-21 | End: 2021-06-21 | Stop reason: HOSPADM

## 2021-06-21 RX ORDER — LIDOCAINE HYDROCHLORIDE 20 MG/ML
INJECTION, SOLUTION EPIDURAL; INFILTRATION; INTRACAUDAL; PERINEURAL
Status: DISCONTINUED | OUTPATIENT
Start: 2021-06-21 | End: 2021-06-21

## 2021-06-21 RX ORDER — HEPARIN SOD,PORCINE/0.9 % NACL 1000/500ML
INTRAVENOUS SOLUTION INTRAVENOUS
Status: DISCONTINUED | OUTPATIENT
Start: 2021-06-21 | End: 2021-06-21 | Stop reason: HOSPADM

## 2021-06-21 RX ORDER — NITROGLYCERIN 5 MG/ML
INJECTION, SOLUTION INTRAVENOUS
Status: DISCONTINUED | OUTPATIENT
Start: 2021-06-21 | End: 2021-06-21 | Stop reason: HOSPADM

## 2021-06-21 RX ORDER — IBUPROFEN 200 MG
24 TABLET ORAL
Status: DISCONTINUED | OUTPATIENT
Start: 2021-06-21 | End: 2021-06-21 | Stop reason: HOSPADM

## 2021-06-21 RX ORDER — ONDANSETRON 2 MG/ML
4 INJECTION INTRAMUSCULAR; INTRAVENOUS DAILY PRN
Status: DISCONTINUED | OUTPATIENT
Start: 2021-06-21 | End: 2021-06-21 | Stop reason: HOSPADM

## 2021-06-21 RX ORDER — DIPHENHYDRAMINE HCL 50 MG
50 CAPSULE ORAL ONCE
Status: COMPLETED | OUTPATIENT
Start: 2021-06-21 | End: 2021-06-21

## 2021-06-21 RX ORDER — INSULIN ASPART 100 [IU]/ML
0-5 INJECTION, SOLUTION INTRAVENOUS; SUBCUTANEOUS
Status: DISCONTINUED | OUTPATIENT
Start: 2021-06-21 | End: 2021-06-21 | Stop reason: HOSPADM

## 2021-06-21 RX ORDER — GLUCAGON 1 MG
1 KIT INJECTION
Status: DISCONTINUED | OUTPATIENT
Start: 2021-06-21 | End: 2021-06-21 | Stop reason: HOSPADM

## 2021-06-21 RX ORDER — IODIXANOL 320 MG/ML
INJECTION, SOLUTION INTRAVASCULAR
Status: DISCONTINUED | OUTPATIENT
Start: 2021-06-21 | End: 2021-06-21 | Stop reason: HOSPADM

## 2021-06-21 RX ORDER — SODIUM CHLORIDE 9 MG/ML
INJECTION, SOLUTION INTRAVENOUS CONTINUOUS
Status: ACTIVE | OUTPATIENT
Start: 2021-06-21 | End: 2021-06-21

## 2021-06-21 RX ORDER — ONDANSETRON 8 MG/1
8 TABLET, ORALLY DISINTEGRATING ORAL EVERY 8 HOURS PRN
Status: DISCONTINUED | OUTPATIENT
Start: 2021-06-21 | End: 2021-06-21 | Stop reason: HOSPADM

## 2021-06-21 RX ORDER — CEFAZOLIN SODIUM 1 G/3ML
INJECTION, POWDER, FOR SOLUTION INTRAMUSCULAR; INTRAVENOUS
Status: DISCONTINUED | OUTPATIENT
Start: 2021-06-21 | End: 2021-06-21

## 2021-06-21 RX ORDER — LIDOCAINE HYDROCHLORIDE 20 MG/ML
INJECTION, SOLUTION EPIDURAL; INFILTRATION; INTRACAUDAL; PERINEURAL
Status: DISCONTINUED | OUTPATIENT
Start: 2021-06-21 | End: 2021-06-21 | Stop reason: HOSPADM

## 2021-06-21 RX ORDER — SODIUM CHLORIDE 0.9 % (FLUSH) 0.9 %
3 SYRINGE (ML) INJECTION
Status: DISCONTINUED | OUTPATIENT
Start: 2021-06-21 | End: 2021-06-21 | Stop reason: HOSPADM

## 2021-06-21 RX ORDER — HYDROMORPHONE HYDROCHLORIDE 1 MG/ML
0.2 INJECTION, SOLUTION INTRAMUSCULAR; INTRAVENOUS; SUBCUTANEOUS EVERY 5 MIN PRN
Status: DISCONTINUED | OUTPATIENT
Start: 2021-06-21 | End: 2021-06-21 | Stop reason: HOSPADM

## 2021-06-21 RX ORDER — MEPERIDINE HYDROCHLORIDE 50 MG/ML
12.5 INJECTION INTRAMUSCULAR; INTRAVENOUS; SUBCUTANEOUS ONCE AS NEEDED
Status: DISCONTINUED | OUTPATIENT
Start: 2021-06-21 | End: 2021-06-21 | Stop reason: HOSPADM

## 2021-06-21 RX ORDER — METFORMIN HYDROCHLORIDE 500 MG/1
500 TABLET, EXTENDED RELEASE ORAL 2 TIMES DAILY WITH MEALS
Qty: 180 TABLET | Refills: 2 | Status: SHIPPED | OUTPATIENT
Start: 2021-06-24 | End: 2021-07-29

## 2021-06-21 RX ORDER — PROPOFOL 10 MG/ML
VIAL (ML) INTRAVENOUS CONTINUOUS PRN
Status: DISCONTINUED | OUTPATIENT
Start: 2021-06-21 | End: 2021-06-21

## 2021-06-21 RX ORDER — PROPOFOL 10 MG/ML
VIAL (ML) INTRAVENOUS
Status: DISCONTINUED | OUTPATIENT
Start: 2021-06-21 | End: 2021-06-21

## 2021-06-21 RX ORDER — SODIUM CHLORIDE 9 MG/ML
INJECTION, SOLUTION INTRAVENOUS CONTINUOUS
Status: DISCONTINUED | OUTPATIENT
Start: 2021-06-21 | End: 2021-06-21 | Stop reason: HOSPADM

## 2021-06-21 RX ORDER — MIDAZOLAM HYDROCHLORIDE 1 MG/ML
INJECTION, SOLUTION INTRAMUSCULAR; INTRAVENOUS
Status: DISCONTINUED | OUTPATIENT
Start: 2021-06-21 | End: 2021-06-21

## 2021-06-21 RX ORDER — PHENYLEPHRINE HCL IN 0.9% NACL 1 MG/10 ML
SYRINGE (ML) INTRAVENOUS
Status: DISCONTINUED | OUTPATIENT
Start: 2021-06-21 | End: 2021-06-21

## 2021-06-21 RX ADMIN — HEPARIN SODIUM 8000 UNITS: 1000 INJECTION, SOLUTION INTRAVENOUS; SUBCUTANEOUS at 02:06

## 2021-06-21 RX ADMIN — CEFAZOLIN 2 G: 330 INJECTION, POWDER, FOR SOLUTION INTRAMUSCULAR; INTRAVENOUS at 02:06

## 2021-06-21 RX ADMIN — Medication 300 MCG: at 02:06

## 2021-06-21 RX ADMIN — Medication 200 MCG: at 02:06

## 2021-06-21 RX ADMIN — SODIUM CHLORIDE: 0.9 INJECTION, SOLUTION INTRAVENOUS at 10:06

## 2021-06-21 RX ADMIN — Medication 50 MCG: at 03:06

## 2021-06-21 RX ADMIN — LIDOCAINE HYDROCHLORIDE 40 MG: 20 INJECTION, SOLUTION EPIDURAL; INFILTRATION; INTRACAUDAL at 01:06

## 2021-06-21 RX ADMIN — INSULIN ASPART 2 UNITS: 100 INJECTION, SOLUTION INTRAVENOUS; SUBCUTANEOUS at 06:06

## 2021-06-21 RX ADMIN — DIPHENHYDRAMINE HYDROCHLORIDE 50 MG: 50 CAPSULE ORAL at 10:06

## 2021-06-21 RX ADMIN — MIDAZOLAM 2 MG: 1 INJECTION INTRAMUSCULAR; INTRAVENOUS at 01:06

## 2021-06-21 RX ADMIN — SODIUM CHLORIDE: 0.9 INJECTION, SOLUTION INTRAVENOUS at 04:06

## 2021-06-21 RX ADMIN — Medication 200 MCG/KG/MIN: at 01:06

## 2021-06-21 RX ADMIN — PROPOFOL 30 MG: 10 INJECTION, EMULSION INTRAVENOUS at 01:06

## 2021-06-21 RX ADMIN — Medication 50 MCG: at 02:06

## 2021-06-21 RX ADMIN — SODIUM CHLORIDE: 9 INJECTION, SOLUTION INTRAVENOUS at 12:06

## 2021-06-21 RX ADMIN — Medication 100 MCG: at 02:06

## 2021-06-22 LAB
POC ACTIVATED CLOTTING TIME K: 213 SEC (ref 74–137)
POC ACTIVATED CLOTTING TIME K: 230 SEC (ref 74–137)
SAMPLE: ABNORMAL
SAMPLE: ABNORMAL

## 2021-06-23 LAB
ABO + RH BLD: NORMAL
BLD GP AB SCN CELLS X3 SERPL QL: NORMAL

## 2021-06-24 ENCOUNTER — NURSE TRIAGE (OUTPATIENT)
Dept: ADMINISTRATIVE | Facility: CLINIC | Age: 76
End: 2021-06-24

## 2021-06-24 ENCOUNTER — HOSPITAL ENCOUNTER (EMERGENCY)
Facility: HOSPITAL | Age: 76
Discharge: HOME | End: 2021-06-25
Attending: EMERGENCY MEDICINE
Payer: MEDICARE

## 2021-06-24 DIAGNOSIS — R73.9 HYPERGLYCEMIA: Primary | ICD-10-CM

## 2021-06-24 DIAGNOSIS — R52 PAIN: ICD-10-CM

## 2021-06-24 LAB
ALBUMIN SERPL BCP-MCNC: 2.8 G/DL (ref 3.5–5.2)
ALP SERPL-CCNC: 117 U/L (ref 55–135)
ALT SERPL W/O P-5'-P-CCNC: 18 U/L (ref 10–44)
ANION GAP SERPL CALC-SCNC: 12 MMOL/L (ref 8–16)
AST SERPL-CCNC: 8 U/L (ref 10–40)
BASOPHILS # BLD AUTO: 0.04 K/UL (ref 0–0.2)
BASOPHILS NFR BLD: 0.4 % (ref 0–1.9)
BILIRUB SERPL-MCNC: 0.4 MG/DL (ref 0.1–1)
BUN SERPL-MCNC: 26 MG/DL (ref 6–30)
BUN SERPL-MCNC: 26 MG/DL (ref 8–23)
CALCIUM SERPL-MCNC: 9.3 MG/DL (ref 8.7–10.5)
CHLORIDE SERPL-SCNC: 100 MMOL/L (ref 95–110)
CHLORIDE SERPL-SCNC: 101 MMOL/L (ref 95–110)
CO2 SERPL-SCNC: 24 MMOL/L (ref 23–29)
CREAT SERPL-MCNC: 1.3 MG/DL (ref 0.5–1.4)
CREAT SERPL-MCNC: 1.4 MG/DL (ref 0.5–1.4)
DIFFERENTIAL METHOD: ABNORMAL
EOSINOPHIL # BLD AUTO: 0.1 K/UL (ref 0–0.5)
EOSINOPHIL NFR BLD: 0.9 % (ref 0–8)
ERYTHROCYTE [DISTWIDTH] IN BLOOD BY AUTOMATED COUNT: 14.8 % (ref 11.5–14.5)
EST. GFR  (AFRICAN AMERICAN): 43 ML/MIN/1.73 M^2
EST. GFR  (NON AFRICAN AMERICAN): 37.3 ML/MIN/1.73 M^2
GLUCOSE SERPL-MCNC: 394 MG/DL (ref 70–110)
GLUCOSE SERPL-MCNC: 395 MG/DL (ref 70–110)
HCT VFR BLD AUTO: 36.9 % (ref 37–48.5)
HCT VFR BLD CALC: 37 %PCV (ref 36–54)
HGB BLD-MCNC: 11.2 G/DL (ref 12–16)
IMM GRANULOCYTES # BLD AUTO: 0.08 K/UL (ref 0–0.04)
IMM GRANULOCYTES NFR BLD AUTO: 0.8 % (ref 0–0.5)
INR PPP: 0.9 (ref 0.8–1.2)
LYMPHOCYTES # BLD AUTO: 2.7 K/UL (ref 1–4.8)
LYMPHOCYTES NFR BLD: 26.9 % (ref 18–48)
MCH RBC QN AUTO: 28.2 PG (ref 27–31)
MCHC RBC AUTO-ENTMCNC: 30.4 G/DL (ref 32–36)
MCV RBC AUTO: 93 FL (ref 82–98)
MONOCYTES # BLD AUTO: 0.7 K/UL (ref 0.3–1)
MONOCYTES NFR BLD: 7 % (ref 4–15)
NEUTROPHILS # BLD AUTO: 6.3 K/UL (ref 1.8–7.7)
NEUTROPHILS NFR BLD: 64 % (ref 38–73)
NRBC BLD-RTO: 0 /100 WBC
PLATELET # BLD AUTO: 422 K/UL (ref 150–450)
PMV BLD AUTO: 10.9 FL (ref 9.2–12.9)
POC IONIZED CALCIUM: 1.12 MMOL/L (ref 1.06–1.42)
POC TCO2 (MEASURED): 27 MMOL/L (ref 23–29)
POTASSIUM BLD-SCNC: 4 MMOL/L (ref 3.5–5.1)
POTASSIUM SERPL-SCNC: 4.2 MMOL/L (ref 3.5–5.1)
PROT SERPL-MCNC: 7.5 G/DL (ref 6–8.4)
PROTHROMBIN TIME: 10.3 SEC (ref 9–12.5)
RBC # BLD AUTO: 3.97 M/UL (ref 4–5.4)
SAMPLE: ABNORMAL
SODIUM BLD-SCNC: 138 MMOL/L (ref 136–145)
SODIUM SERPL-SCNC: 137 MMOL/L (ref 136–145)
WBC # BLD AUTO: 9.91 K/UL (ref 3.9–12.7)

## 2021-06-24 PROCEDURE — 85025 COMPLETE CBC W/AUTO DIFF WBC: CPT | Performed by: STUDENT IN AN ORGANIZED HEALTH CARE EDUCATION/TRAINING PROGRAM

## 2021-06-24 PROCEDURE — 84484 ASSAY OF TROPONIN QUANT: CPT | Performed by: STUDENT IN AN ORGANIZED HEALTH CARE EDUCATION/TRAINING PROGRAM

## 2021-06-24 PROCEDURE — 85610 PROTHROMBIN TIME: CPT | Performed by: STUDENT IN AN ORGANIZED HEALTH CARE EDUCATION/TRAINING PROGRAM

## 2021-06-24 PROCEDURE — 82330 ASSAY OF CALCIUM: CPT

## 2021-06-24 PROCEDURE — 80053 COMPREHEN METABOLIC PANEL: CPT | Performed by: STUDENT IN AN ORGANIZED HEALTH CARE EDUCATION/TRAINING PROGRAM

## 2021-06-24 PROCEDURE — 99285 EMERGENCY DEPT VISIT HI MDM: CPT | Mod: ,,, | Performed by: EMERGENCY MEDICINE

## 2021-06-24 PROCEDURE — 99285 PR EMERGENCY DEPT VISIT,LEVEL V: ICD-10-PCS | Mod: ,,, | Performed by: EMERGENCY MEDICINE

## 2021-06-24 PROCEDURE — 99285 EMERGENCY DEPT VISIT HI MDM: CPT | Mod: 25

## 2021-06-24 PROCEDURE — 83880 ASSAY OF NATRIURETIC PEPTIDE: CPT | Performed by: STUDENT IN AN ORGANIZED HEALTH CARE EDUCATION/TRAINING PROGRAM

## 2021-06-24 PROCEDURE — 80047 BASIC METABLC PNL IONIZED CA: CPT

## 2021-06-25 ENCOUNTER — PATIENT MESSAGE (OUTPATIENT)
Dept: CARDIOLOGY | Facility: CLINIC | Age: 76
End: 2021-06-25

## 2021-06-25 VITALS
OXYGEN SATURATION: 99 % | WEIGHT: 196.19 LBS | HEART RATE: 85 BPM | BODY MASS INDEX: 31.53 KG/M2 | TEMPERATURE: 99 F | SYSTOLIC BLOOD PRESSURE: 167 MMHG | HEIGHT: 66 IN | RESPIRATION RATE: 18 BRPM | DIASTOLIC BLOOD PRESSURE: 68 MMHG

## 2021-06-25 LAB
BNP SERPL-MCNC: 19 PG/ML (ref 0–99)
TROPONIN I SERPL DL<=0.01 NG/ML-MCNC: <0.006 NG/ML (ref 0–0.03)

## 2021-06-26 ENCOUNTER — TELEPHONE (OUTPATIENT)
Dept: CARDIAC CATH/INVASIVE PROCEDURES | Facility: HOSPITAL | Age: 76
End: 2021-06-26

## 2021-06-28 ENCOUNTER — TELEPHONE (OUTPATIENT)
Dept: CARDIOLOGY | Facility: CLINIC | Age: 76
End: 2021-06-28

## 2021-06-28 ENCOUNTER — HOSPITAL ENCOUNTER (OUTPATIENT)
Dept: CARDIOLOGY | Facility: HOSPITAL | Age: 76
Discharge: HOME | End: 2021-06-28
Attending: INTERNAL MEDICINE
Payer: MEDICARE

## 2021-06-28 DIAGNOSIS — I73.9 PAD (PERIPHERAL ARTERY DISEASE): ICD-10-CM

## 2021-06-28 LAB
OHS CV LOWER EXTREMITY STENT MEASUREMENTS - RIGHT - MSFA S1 - DIST: 80
OHS CV LOWER EXTREMITY STENT MEASUREMENTS - RIGHT - MSFA S1 - MID: 82
OHS CV LOWER EXTREMITY STENT MEASUREMENTS - RIGHT - MSFA S1 - OST: 85
OHS CV LOWER EXTREMITY STENT MEASUREMENTS - RIGHT - MSFA S1 - PROX: 73
OHS CV LOWER EXTREMITY STENT MEASUREMENTS - RIGHT - PSFA S1 - DIST: 98
OHS CV LOWER EXTREMITY STENT MEASUREMENTS - RIGHT - PSFA S1 - MID: 110
OHS CV LOWER EXTREMITY STENT MEASUREMENTS - RIGHT - PSFA S1 - OST: 132
OHS CV LOWER EXTREMITY STENT MEASUREMENTS - RIGHT - PSFA S1 - PROX: 114
OHS CV RIGHT ABI LOWER EXTREMITY (NO CALC): 1.1
RIGHT ANT TIBIAL SYS PSV: 51 CM/S
RIGHT CFA PSV: 171 CM/S
RIGHT EXTERNAL ILLIAC PSV: 134 CM/S
RIGHT PERONEAL SYS PSV: 65 CM/S
RIGHT POPLITEAL PSV: 86 CM/S
RIGHT POST TIBIAL SYS PSV: 71 CM/S
RIGHT PROFUNDA SYS PSV: 82 CM/S
RIGHT SUPER FEMORAL DIST SYS PSV: 106 CM/S
RIGHT SUPER FEMORAL OSTIAL SYS PSV: 164 CM/S
RIGHT TIB/PER TRUNK SYS PSV: 79 CM/S

## 2021-06-28 PROCEDURE — 93926 LOWER EXTREMITY STUDY: CPT | Mod: RT

## 2021-06-28 PROCEDURE — 93926 LOWER EXTREMITY STUDY: CPT | Mod: 26,RT,, | Performed by: INTERNAL MEDICINE

## 2021-06-28 PROCEDURE — 93926 CV US DOPPLER ARTERIAL LEG RIGHT (CUPID ONLY): ICD-10-PCS | Mod: 26,RT,, | Performed by: INTERNAL MEDICINE

## 2021-07-06 ENCOUNTER — PATIENT OUTREACH (OUTPATIENT)
Dept: ADMINISTRATIVE | Facility: OTHER | Age: 76
End: 2021-07-06

## 2021-07-08 ENCOUNTER — OFFICE VISIT (OUTPATIENT)
Dept: CARDIOLOGY | Facility: CLINIC | Age: 76
End: 2021-07-08
Payer: MEDICARE

## 2021-07-08 VITALS
WEIGHT: 214.5 LBS | BODY MASS INDEX: 34.47 KG/M2 | HEART RATE: 65 BPM | OXYGEN SATURATION: 100 % | SYSTOLIC BLOOD PRESSURE: 120 MMHG | HEIGHT: 66 IN | DIASTOLIC BLOOD PRESSURE: 80 MMHG

## 2021-07-08 DIAGNOSIS — E78.5 HYPERLIPIDEMIA LDL GOAL <70: Chronic | ICD-10-CM

## 2021-07-08 DIAGNOSIS — E11.42 TYPE 2 DIABETES MELLITUS WITH DIABETIC POLYNEUROPATHY, WITH LONG-TERM CURRENT USE OF INSULIN: Chronic | ICD-10-CM

## 2021-07-08 DIAGNOSIS — I10 ESSENTIAL HYPERTENSION: ICD-10-CM

## 2021-07-08 DIAGNOSIS — N18.31 STAGE 3A CHRONIC KIDNEY DISEASE: ICD-10-CM

## 2021-07-08 DIAGNOSIS — I73.9 PAD (PERIPHERAL ARTERY DISEASE): ICD-10-CM

## 2021-07-08 DIAGNOSIS — Z79.4 TYPE 2 DIABETES MELLITUS WITH DIABETIC POLYNEUROPATHY, WITH LONG-TERM CURRENT USE OF INSULIN: Chronic | ICD-10-CM

## 2021-07-08 DIAGNOSIS — I65.23 BILATERAL CAROTID ARTERY STENOSIS: Primary | ICD-10-CM

## 2021-07-08 PROCEDURE — 99499 RISK ADDL DX/OHS AUDIT: ICD-10-PCS | Mod: S$GLB,,, | Performed by: INTERNAL MEDICINE

## 2021-07-08 PROCEDURE — 99999 PR PBB SHADOW E&M-EST. PATIENT-LVL III: CPT | Mod: PBBFAC,,, | Performed by: INTERNAL MEDICINE

## 2021-07-08 PROCEDURE — 3008F PR BODY MASS INDEX (BMI) DOCUMENTED: ICD-10-PCS | Mod: CPTII,S$GLB,, | Performed by: INTERNAL MEDICINE

## 2021-07-08 PROCEDURE — 99213 PR OFFICE/OUTPT VISIT, EST, LEVL III, 20-29 MIN: ICD-10-PCS | Mod: S$GLB,,, | Performed by: INTERNAL MEDICINE

## 2021-07-08 PROCEDURE — 3072F PR LOW RISK FOR RETINOPATHY: ICD-10-PCS | Mod: S$GLB,,, | Performed by: INTERNAL MEDICINE

## 2021-07-08 PROCEDURE — 1126F PR PAIN SEVERITY QUANTIFIED, NO PAIN PRESENT: ICD-10-PCS | Mod: S$GLB,,, | Performed by: INTERNAL MEDICINE

## 2021-07-08 PROCEDURE — 99999 PR PBB SHADOW E&M-EST. PATIENT-LVL III: ICD-10-PCS | Mod: PBBFAC,,, | Performed by: INTERNAL MEDICINE

## 2021-07-08 PROCEDURE — 1159F MED LIST DOCD IN RCRD: CPT | Mod: S$GLB,,, | Performed by: INTERNAL MEDICINE

## 2021-07-08 PROCEDURE — 1159F PR MEDICATION LIST DOCUMENTED IN MEDICAL RECORD: ICD-10-PCS | Mod: S$GLB,,, | Performed by: INTERNAL MEDICINE

## 2021-07-08 PROCEDURE — 3072F LOW RISK FOR RETINOPATHY: CPT | Mod: S$GLB,,, | Performed by: INTERNAL MEDICINE

## 2021-07-08 PROCEDURE — 99499 UNLISTED E&M SERVICE: CPT | Mod: S$GLB,,, | Performed by: INTERNAL MEDICINE

## 2021-07-08 PROCEDURE — 99213 OFFICE O/P EST LOW 20 MIN: CPT | Mod: S$GLB,,, | Performed by: INTERNAL MEDICINE

## 2021-07-08 PROCEDURE — 3008F BODY MASS INDEX DOCD: CPT | Mod: CPTII,S$GLB,, | Performed by: INTERNAL MEDICINE

## 2021-07-08 PROCEDURE — 1126F AMNT PAIN NOTED NONE PRSNT: CPT | Mod: S$GLB,,, | Performed by: INTERNAL MEDICINE

## 2021-07-09 ENCOUNTER — PATIENT MESSAGE (OUTPATIENT)
Dept: CARDIOLOGY | Facility: CLINIC | Age: 76
End: 2021-07-09

## 2021-07-09 PROBLEM — I73.9 CLAUDICATION, INTERMITTENT: Status: RESOLVED | Noted: 2021-06-21 | Resolved: 2021-07-09

## 2021-07-12 DIAGNOSIS — I73.9 PAD (PERIPHERAL ARTERY DISEASE): Primary | ICD-10-CM

## 2021-07-22 ENCOUNTER — HOSPITAL ENCOUNTER (OUTPATIENT)
Dept: CARDIOLOGY | Facility: HOSPITAL | Age: 76
Discharge: HOME | End: 2021-07-22
Attending: INTERNAL MEDICINE
Payer: MEDICARE

## 2021-07-22 DIAGNOSIS — I73.9 PAD (PERIPHERAL ARTERY DISEASE): ICD-10-CM

## 2021-07-22 LAB
LEFT ANT TIBIAL SYS PSV: 28 CM/S
LEFT CFA PSV: 128 CM/S
LEFT EXTERNAL ILIAC PSV: 165 CM/S
LEFT PERONEAL SYS PSV: 35 CM/S
LEFT POPLITEAL PSV: 107 CM/S
LEFT POST TIBIAL SYS PSV: 30 CM/S
LEFT PROFUNDA SYS PSV: 112 CM/S
LEFT SUPER FEMORAL DIST SYS PSV: 67 CM/S
LEFT SUPER FEMORAL OSTIAL SYS PSV: 144 CM/S
LEFT SUPER FEMORAL PROX SYS PSV: 313 CM/S
LEFT TIB/PER TRUNK SYS PSV: 60 CM/S
OHS CV LEFT LOWER EXTREMITY ABI (NO CALC): 1.16
OHS CV LOWER EXTREMITY STENT MEASUREMENTS - LEFT - MSFA S1 - DIST: 43
OHS CV LOWER EXTREMITY STENT MEASUREMENTS - LEFT - MSFA S1 - MID: 40
OHS CV LOWER EXTREMITY STENT MEASUREMENTS - LEFT - MSFA S1 - OST: 70
OHS CV LOWER EXTREMITY STENT MEASUREMENTS - LEFT - MSFA S1 - PROX: 46
OHS CV LOWER EXTREMITY STENT MEASUREMENTS - RIGHT - MSFA S1 - DIST: 70
OHS CV LOWER EXTREMITY STENT MEASUREMENTS - RIGHT - MSFA S1 - MID: 60
OHS CV LOWER EXTREMITY STENT MEASUREMENTS - RIGHT - MSFA S1 - OST: 74
OHS CV LOWER EXTREMITY STENT MEASUREMENTS - RIGHT - MSFA S1 - PROX: 56
OHS CV LOWER EXTREMITY STENT MEASUREMENTS - RIGHT - PSFA S1 - DIST: 99
OHS CV LOWER EXTREMITY STENT MEASUREMENTS - RIGHT - PSFA S1 - MID: 72
OHS CV LOWER EXTREMITY STENT MEASUREMENTS - RIGHT - PSFA S1 - OST: 106
OHS CV LOWER EXTREMITY STENT MEASUREMENTS - RIGHT - PSFA S1 - PROX: 87
OHS CV RIGHT ABI LOWER EXTREMITY (NO CALC): 1.09
RIGHT ANT TIBIAL SYS PSV: 38 CM/S
RIGHT CFA PSV: 130 CM/S
RIGHT EXTERNAL ILLIAC PSV: 119 CM/S
RIGHT PERONEAL SYS PSV: 30 CM/S
RIGHT POPLITEAL PSV: 47 CM/S
RIGHT POST TIBIAL SYS PSV: 46 CM/S
RIGHT PROFUNDA SYS PSV: 133 CM/S
RIGHT SUPER FEMORAL DIST SYS PSV: 45 CM/S
RIGHT SUPER FEMORAL OSTIAL SYS PSV: 157 CM/S
RIGHT TIB/PER TRUNK SYS PSV: 105 CM/S

## 2021-07-22 PROCEDURE — 93925 CV US DOPPLER ARTERIAL LEGS BILATERAL (CUPID ONLY): ICD-10-PCS | Mod: 26,,, | Performed by: INTERNAL MEDICINE

## 2021-07-22 PROCEDURE — 93925 LOWER EXTREMITY STUDY: CPT | Mod: 26,,, | Performed by: INTERNAL MEDICINE

## 2021-07-22 PROCEDURE — 93925 LOWER EXTREMITY STUDY: CPT

## 2021-07-27 ENCOUNTER — TELEPHONE (OUTPATIENT)
Dept: INTERNAL MEDICINE | Facility: CLINIC | Age: 76
End: 2021-07-27

## 2021-07-29 ENCOUNTER — TELEPHONE (OUTPATIENT)
Dept: PHARMACY | Facility: CLINIC | Age: 76
End: 2021-07-29

## 2021-07-29 ENCOUNTER — OFFICE VISIT (OUTPATIENT)
Dept: INTERNAL MEDICINE | Facility: CLINIC | Age: 76
End: 2021-07-29
Payer: MEDICARE

## 2021-07-29 VITALS
SYSTOLIC BLOOD PRESSURE: 130 MMHG | WEIGHT: 213.38 LBS | BODY MASS INDEX: 34.29 KG/M2 | HEIGHT: 66 IN | DIASTOLIC BLOOD PRESSURE: 76 MMHG

## 2021-07-29 DIAGNOSIS — Z12.31 ENCOUNTER FOR SCREENING MAMMOGRAM FOR MALIGNANT NEOPLASM OF BREAST: ICD-10-CM

## 2021-07-29 DIAGNOSIS — E66.9 OBESITY (BMI 30-39.9): Chronic | ICD-10-CM

## 2021-07-29 DIAGNOSIS — N18.31 STAGE 3A CHRONIC KIDNEY DISEASE: ICD-10-CM

## 2021-07-29 DIAGNOSIS — E11.42 TYPE 2 DIABETES MELLITUS WITH DIABETIC POLYNEUROPATHY, WITH LONG-TERM CURRENT USE OF INSULIN: Primary | Chronic | ICD-10-CM

## 2021-07-29 DIAGNOSIS — I10 ESSENTIAL HYPERTENSION: ICD-10-CM

## 2021-07-29 DIAGNOSIS — I73.9 PAD (PERIPHERAL ARTERY DISEASE): ICD-10-CM

## 2021-07-29 DIAGNOSIS — E66.9 DIABETES MELLITUS TYPE 2 IN OBESE: ICD-10-CM

## 2021-07-29 DIAGNOSIS — E78.5 HYPERLIPIDEMIA LDL GOAL <70: Chronic | ICD-10-CM

## 2021-07-29 DIAGNOSIS — Z79.4 TYPE 2 DIABETES MELLITUS WITH DIABETIC POLYNEUROPATHY, WITH LONG-TERM CURRENT USE OF INSULIN: Primary | Chronic | ICD-10-CM

## 2021-07-29 DIAGNOSIS — E11.65 TYPE 2 DIABETES MELLITUS WITH HYPERGLYCEMIA, WITH LONG-TERM CURRENT USE OF INSULIN: Chronic | ICD-10-CM

## 2021-07-29 DIAGNOSIS — E11.69 DIABETES MELLITUS TYPE 2 IN OBESE: ICD-10-CM

## 2021-07-29 DIAGNOSIS — I42.8 NICM (NONISCHEMIC CARDIOMYOPATHY): ICD-10-CM

## 2021-07-29 DIAGNOSIS — Z79.4 TYPE 2 DIABETES MELLITUS WITH HYPERGLYCEMIA, WITH LONG-TERM CURRENT USE OF INSULIN: Chronic | ICD-10-CM

## 2021-07-29 PROCEDURE — 99214 OFFICE O/P EST MOD 30 MIN: CPT | Mod: S$GLB,,, | Performed by: NURSE PRACTITIONER

## 2021-07-29 PROCEDURE — 3078F PR MOST RECENT DIASTOLIC BLOOD PRESSURE < 80 MM HG: ICD-10-PCS | Mod: CPTII,S$GLB,, | Performed by: NURSE PRACTITIONER

## 2021-07-29 PROCEDURE — 3008F BODY MASS INDEX DOCD: CPT | Mod: CPTII,S$GLB,, | Performed by: NURSE PRACTITIONER

## 2021-07-29 PROCEDURE — 1126F AMNT PAIN NOTED NONE PRSNT: CPT | Mod: CPTII,S$GLB,, | Performed by: NURSE PRACTITIONER

## 2021-07-29 PROCEDURE — 99999 PR PBB SHADOW E&M-EST. PATIENT-LVL V: CPT | Mod: PBBFAC,,, | Performed by: NURSE PRACTITIONER

## 2021-07-29 PROCEDURE — 3288F PR FALLS RISK ASSESSMENT DOCUMENTED: ICD-10-PCS | Mod: CPTII,S$GLB,, | Performed by: NURSE PRACTITIONER

## 2021-07-29 PROCEDURE — 99499 RISK ADDL DX/OHS AUDIT: ICD-10-PCS | Mod: S$GLB,,, | Performed by: NURSE PRACTITIONER

## 2021-07-29 PROCEDURE — 3075F PR MOST RECENT SYSTOLIC BLOOD PRESS GE 130-139MM HG: ICD-10-PCS | Mod: CPTII,S$GLB,, | Performed by: NURSE PRACTITIONER

## 2021-07-29 PROCEDURE — 1160F RVW MEDS BY RX/DR IN RCRD: CPT | Mod: CPTII,S$GLB,, | Performed by: NURSE PRACTITIONER

## 2021-07-29 PROCEDURE — 99499 UNLISTED E&M SERVICE: CPT | Mod: S$GLB,,, | Performed by: NURSE PRACTITIONER

## 2021-07-29 PROCEDURE — 3078F DIAST BP <80 MM HG: CPT | Mod: CPTII,S$GLB,, | Performed by: NURSE PRACTITIONER

## 2021-07-29 PROCEDURE — 3052F HG A1C>EQUAL 8.0%<EQUAL 9.0%: CPT | Mod: CPTII,S$GLB,, | Performed by: NURSE PRACTITIONER

## 2021-07-29 PROCEDURE — 99214 PR OFFICE/OUTPT VISIT, EST, LEVL IV, 30-39 MIN: ICD-10-PCS | Mod: S$GLB,,, | Performed by: NURSE PRACTITIONER

## 2021-07-29 PROCEDURE — 1159F MED LIST DOCD IN RCRD: CPT | Mod: CPTII,S$GLB,, | Performed by: NURSE PRACTITIONER

## 2021-07-29 PROCEDURE — 3075F SYST BP GE 130 - 139MM HG: CPT | Mod: CPTII,S$GLB,, | Performed by: NURSE PRACTITIONER

## 2021-07-29 PROCEDURE — 99999 PR PBB SHADOW E&M-EST. PATIENT-LVL V: ICD-10-PCS | Mod: PBBFAC,,, | Performed by: NURSE PRACTITIONER

## 2021-07-29 PROCEDURE — 3052F PR MOST RECENT HEMOGLOBIN A1C LEVEL 8.0 - < 9.0%: ICD-10-PCS | Mod: CPTII,S$GLB,, | Performed by: NURSE PRACTITIONER

## 2021-07-29 PROCEDURE — 3288F FALL RISK ASSESSMENT DOCD: CPT | Mod: CPTII,S$GLB,, | Performed by: NURSE PRACTITIONER

## 2021-07-29 PROCEDURE — 1101F PR PT FALLS ASSESS DOC 0-1 FALLS W/OUT INJ PAST YR: ICD-10-PCS | Mod: CPTII,S$GLB,, | Performed by: NURSE PRACTITIONER

## 2021-07-29 PROCEDURE — 1159F PR MEDICATION LIST DOCUMENTED IN MEDICAL RECORD: ICD-10-PCS | Mod: CPTII,S$GLB,, | Performed by: NURSE PRACTITIONER

## 2021-07-29 PROCEDURE — 3072F LOW RISK FOR RETINOPATHY: CPT | Mod: CPTII,S$GLB,, | Performed by: NURSE PRACTITIONER

## 2021-07-29 PROCEDURE — 1160F PR REVIEW ALL MEDS BY PRESCRIBER/CLIN PHARMACIST DOCUMENTED: ICD-10-PCS | Mod: CPTII,S$GLB,, | Performed by: NURSE PRACTITIONER

## 2021-07-29 PROCEDURE — 3072F PR LOW RISK FOR RETINOPATHY: ICD-10-PCS | Mod: CPTII,S$GLB,, | Performed by: NURSE PRACTITIONER

## 2021-07-29 PROCEDURE — 3008F PR BODY MASS INDEX (BMI) DOCUMENTED: ICD-10-PCS | Mod: CPTII,S$GLB,, | Performed by: NURSE PRACTITIONER

## 2021-07-29 PROCEDURE — 1101F PT FALLS ASSESS-DOCD LE1/YR: CPT | Mod: CPTII,S$GLB,, | Performed by: NURSE PRACTITIONER

## 2021-07-29 PROCEDURE — 1126F PR PAIN SEVERITY QUANTIFIED, NO PAIN PRESENT: ICD-10-PCS | Mod: CPTII,S$GLB,, | Performed by: NURSE PRACTITIONER

## 2021-07-29 RX ORDER — INSULIN LISPRO 100 [IU]/ML
INJECTION, SOLUTION INTRAVENOUS; SUBCUTANEOUS
Qty: 15 ML | Refills: 5
Start: 2021-07-29 | End: 2022-04-01

## 2021-07-29 RX ORDER — DASIGLUCAGON 0.6 MG/.6ML
1 INJECTION, SOLUTION SUBCUTANEOUS
Qty: 1.2 ML | Refills: 1 | Status: SHIPPED | OUTPATIENT
Start: 2021-07-29 | End: 2021-12-01 | Stop reason: SDUPTHER

## 2021-07-29 RX ORDER — INSULIN DEGLUDEC 200 U/ML
INJECTION, SOLUTION SUBCUTANEOUS
Qty: 3 PEN | Refills: 6
Start: 2021-07-29 | End: 2021-12-01

## 2021-08-02 ENCOUNTER — TELEPHONE (OUTPATIENT)
Dept: DIABETES | Facility: CLINIC | Age: 76
End: 2021-08-02

## 2021-09-17 ENCOUNTER — TELEPHONE (OUTPATIENT)
Dept: INTERNAL MEDICINE | Facility: CLINIC | Age: 76
End: 2021-09-17
Payer: MEDICARE

## 2021-09-23 DIAGNOSIS — E66.9 DIABETES MELLITUS TYPE 2 IN OBESE: ICD-10-CM

## 2021-09-23 DIAGNOSIS — E11.69 DIABETES MELLITUS TYPE 2 IN OBESE: ICD-10-CM

## 2021-09-23 RX ORDER — INSULIN DEGLUDEC 200 U/ML
INJECTION, SOLUTION SUBCUTANEOUS
Qty: 15 PEN | Refills: 2 | Status: SHIPPED | OUTPATIENT
Start: 2021-09-23 | End: 2022-04-01

## 2021-09-29 ENCOUNTER — HOSPITAL ENCOUNTER (OUTPATIENT)
Dept: CARDIOLOGY | Facility: HOSPITAL | Age: 76
Discharge: HOME | End: 2021-09-29
Attending: INTERNAL MEDICINE
Payer: MEDICARE

## 2021-09-29 DIAGNOSIS — I73.9 PAD (PERIPHERAL ARTERY DISEASE): ICD-10-CM

## 2021-09-29 LAB
LEFT ANT TIBIAL SYS PSV: 53 CM/S
LEFT CFA PSV: 153 CM/S
LEFT DORSALIS PEDIS PSV: 46 CM/S
LEFT EXTERNAL ILIAC PSV: 234 CM/S
LEFT PERONEAL SYS PSV: 20 CM/S
LEFT POPLITEAL PSV: 41 CM/S
LEFT POST TIBIAL SYS PSV: 25 CM/S
LEFT PROFUNDA SYS PSV: 140 CM/S
LEFT SUPER FEMORAL DIST SYS PSV: 199 CM/S
LEFT SUPER FEMORAL OSTIAL SYS PSV: 138 CM/S
LEFT SUPER FEMORAL PROX SYS PSV: 387 CM/S
LEFT TIB/PER TRUNK SYS PSV: 67 CM/S
OHS CV LEFT LOWER EXTREMITY ABI (NO CALC): 0.78
OHS CV LOWER EXTREMITY STENT MEASUREMENTS - LEFT - MSFA S1 - DIST: 45
OHS CV LOWER EXTREMITY STENT MEASUREMENTS - LEFT - MSFA S1 - MID: 57
OHS CV LOWER EXTREMITY STENT MEASUREMENTS - LEFT - MSFA S1 - OST: 83
OHS CV LOWER EXTREMITY STENT MEASUREMENTS - LEFT - MSFA S1 - PROX: 49
OHS CV LOWER EXTREMITY STENT MEASUREMENTS - RIGHT - MSFA S1 - DIST: 76
OHS CV LOWER EXTREMITY STENT MEASUREMENTS - RIGHT - MSFA S1 - MID: 72
OHS CV LOWER EXTREMITY STENT MEASUREMENTS - RIGHT - PSFA S1 - OST: 120
OHS CV LOWER EXTREMITY STENT MEASUREMENTS - RIGHT - PSFA S1 - PROX: 72
OHS CV RIGHT ABI LOWER EXTREMITY (NO CALC): 0.99
RIGHT ANT TIBIAL SYS PSV: 87 CM/S
RIGHT CFA PSV: 202 CM/S
RIGHT DORSALIS PEDIS PSV: 59 CM/S
RIGHT EXTERNAL ILLIAC PSV: 206 CM/S
RIGHT PERONEAL SYS PSV: 33 CM/S
RIGHT POPLITEAL PSV: 81 CM/S
RIGHT POST TIBIAL SYS PSV: 0 CM/S
RIGHT PROFUNDA SYS PSV: 137 CM/S
RIGHT SUPER FEMORAL DIST SYS PSV: 95 CM/S
RIGHT SUPER FEMORAL OSTIAL SYS PSV: 178 CM/S
RIGHT TIB/PER TRUNK SYS PSV: 160 CM/S

## 2021-09-29 PROCEDURE — 93925 LOWER EXTREMITY STUDY: CPT | Mod: 26,,, | Performed by: INTERNAL MEDICINE

## 2021-09-29 PROCEDURE — 93925 CV US DOPPLER ARTERIAL LEGS BILATERAL (CUPID ONLY): ICD-10-PCS | Mod: 26,,, | Performed by: INTERNAL MEDICINE

## 2021-09-29 PROCEDURE — 93925 LOWER EXTREMITY STUDY: CPT

## 2021-09-30 ENCOUNTER — HOSPITAL ENCOUNTER (OUTPATIENT)
Dept: RADIOLOGY | Facility: HOSPITAL | Age: 76
Discharge: HOME | End: 2021-09-30
Attending: NURSE PRACTITIONER
Payer: MEDICARE

## 2021-09-30 VITALS — HEIGHT: 66 IN | WEIGHT: 220 LBS | BODY MASS INDEX: 35.36 KG/M2

## 2021-09-30 DIAGNOSIS — Z12.31 ENCOUNTER FOR SCREENING MAMMOGRAM FOR MALIGNANT NEOPLASM OF BREAST: ICD-10-CM

## 2021-09-30 PROCEDURE — 77067 SCR MAMMO BI INCL CAD: CPT | Mod: TC

## 2021-09-30 PROCEDURE — 77067 MAMMO DIGITAL SCREENING BILAT WITH TOMO: ICD-10-PCS | Mod: 26,,, | Performed by: RADIOLOGY

## 2021-09-30 PROCEDURE — 77063 BREAST TOMOSYNTHESIS BI: CPT | Mod: 26,,, | Performed by: RADIOLOGY

## 2021-09-30 PROCEDURE — 77067 SCR MAMMO BI INCL CAD: CPT | Mod: 26,,, | Performed by: RADIOLOGY

## 2021-09-30 PROCEDURE — 77063 MAMMO DIGITAL SCREENING BILAT WITH TOMO: ICD-10-PCS | Mod: 26,,, | Performed by: RADIOLOGY

## 2021-10-04 DIAGNOSIS — I10 BENIGN ESSENTIAL HYPERTENSION: ICD-10-CM

## 2021-10-04 RX ORDER — CANDESARTAN 32 MG/1
32 TABLET ORAL DAILY
Qty: 90 TABLET | Refills: 3 | Status: CANCELLED | OUTPATIENT
Start: 2021-10-04 | End: 2022-10-04

## 2021-10-05 DIAGNOSIS — I10 BENIGN ESSENTIAL HYPERTENSION: ICD-10-CM

## 2021-10-05 RX ORDER — CANDESARTAN 32 MG/1
32 TABLET ORAL DAILY
Qty: 90 TABLET | Refills: 3 | Status: SHIPPED | OUTPATIENT
Start: 2021-10-05 | End: 2022-12-16 | Stop reason: SDUPTHER

## 2021-10-11 ENCOUNTER — PATIENT OUTREACH (OUTPATIENT)
Dept: ADMINISTRATIVE | Facility: OTHER | Age: 76
End: 2021-10-11

## 2021-10-11 ENCOUNTER — OFFICE VISIT (OUTPATIENT)
Dept: INTERNAL MEDICINE | Facility: CLINIC | Age: 76
End: 2021-10-11
Payer: MEDICARE

## 2021-10-11 ENCOUNTER — NURSE TRIAGE (OUTPATIENT)
Dept: ADMINISTRATIVE | Facility: CLINIC | Age: 76
End: 2021-10-11

## 2021-10-11 VITALS
DIASTOLIC BLOOD PRESSURE: 86 MMHG | SYSTOLIC BLOOD PRESSURE: 132 MMHG | WEIGHT: 217.81 LBS | HEIGHT: 66 IN | HEART RATE: 76 BPM | BODY MASS INDEX: 35.01 KG/M2 | OXYGEN SATURATION: 98 %

## 2021-10-11 DIAGNOSIS — M79.604 LEG PAIN, RIGHT: Primary | ICD-10-CM

## 2021-10-11 PROCEDURE — 99213 OFFICE O/P EST LOW 20 MIN: CPT | Mod: S$GLB,,, | Performed by: FAMILY MEDICINE

## 2021-10-11 PROCEDURE — 4010F ACE/ARB THERAPY RXD/TAKEN: CPT | Mod: CPTII,S$GLB,, | Performed by: FAMILY MEDICINE

## 2021-10-11 PROCEDURE — 3072F LOW RISK FOR RETINOPATHY: CPT | Mod: CPTII,S$GLB,, | Performed by: FAMILY MEDICINE

## 2021-10-11 PROCEDURE — 1126F AMNT PAIN NOTED NONE PRSNT: CPT | Mod: CPTII,S$GLB,, | Performed by: FAMILY MEDICINE

## 2021-10-11 PROCEDURE — 3008F PR BODY MASS INDEX (BMI) DOCUMENTED: ICD-10-PCS | Mod: CPTII,S$GLB,, | Performed by: FAMILY MEDICINE

## 2021-10-11 PROCEDURE — 99213 PR OFFICE/OUTPT VISIT, EST, LEVL III, 20-29 MIN: ICD-10-PCS | Mod: S$GLB,,, | Performed by: FAMILY MEDICINE

## 2021-10-11 PROCEDURE — 99999 PR PBB SHADOW E&M-EST. PATIENT-LVL V: CPT | Mod: PBBFAC,,, | Performed by: FAMILY MEDICINE

## 2021-10-11 PROCEDURE — 3052F PR MOST RECENT HEMOGLOBIN A1C LEVEL 8.0 - < 9.0%: ICD-10-PCS | Mod: CPTII,S$GLB,, | Performed by: FAMILY MEDICINE

## 2021-10-11 PROCEDURE — 3079F PR MOST RECENT DIASTOLIC BLOOD PRESSURE 80-89 MM HG: ICD-10-PCS | Mod: CPTII,S$GLB,, | Performed by: FAMILY MEDICINE

## 2021-10-11 PROCEDURE — 1126F PR PAIN SEVERITY QUANTIFIED, NO PAIN PRESENT: ICD-10-PCS | Mod: CPTII,S$GLB,, | Performed by: FAMILY MEDICINE

## 2021-10-11 PROCEDURE — 3072F PR LOW RISK FOR RETINOPATHY: ICD-10-PCS | Mod: CPTII,S$GLB,, | Performed by: FAMILY MEDICINE

## 2021-10-11 PROCEDURE — 99999 PR PBB SHADOW E&M-EST. PATIENT-LVL V: ICD-10-PCS | Mod: PBBFAC,,, | Performed by: FAMILY MEDICINE

## 2021-10-11 PROCEDURE — 1101F PT FALLS ASSESS-DOCD LE1/YR: CPT | Mod: CPTII,S$GLB,, | Performed by: FAMILY MEDICINE

## 2021-10-11 PROCEDURE — 3075F SYST BP GE 130 - 139MM HG: CPT | Mod: CPTII,S$GLB,, | Performed by: FAMILY MEDICINE

## 2021-10-11 PROCEDURE — 1159F PR MEDICATION LIST DOCUMENTED IN MEDICAL RECORD: ICD-10-PCS | Mod: CPTII,S$GLB,, | Performed by: FAMILY MEDICINE

## 2021-10-11 PROCEDURE — 3052F HG A1C>EQUAL 8.0%<EQUAL 9.0%: CPT | Mod: CPTII,S$GLB,, | Performed by: FAMILY MEDICINE

## 2021-10-11 PROCEDURE — 3079F DIAST BP 80-89 MM HG: CPT | Mod: CPTII,S$GLB,, | Performed by: FAMILY MEDICINE

## 2021-10-11 PROCEDURE — 1159F MED LIST DOCD IN RCRD: CPT | Mod: CPTII,S$GLB,, | Performed by: FAMILY MEDICINE

## 2021-10-11 PROCEDURE — 3288F PR FALLS RISK ASSESSMENT DOCUMENTED: ICD-10-PCS | Mod: CPTII,S$GLB,, | Performed by: FAMILY MEDICINE

## 2021-10-11 PROCEDURE — 3075F PR MOST RECENT SYSTOLIC BLOOD PRESS GE 130-139MM HG: ICD-10-PCS | Mod: CPTII,S$GLB,, | Performed by: FAMILY MEDICINE

## 2021-10-11 PROCEDURE — 3288F FALL RISK ASSESSMENT DOCD: CPT | Mod: CPTII,S$GLB,, | Performed by: FAMILY MEDICINE

## 2021-10-11 PROCEDURE — 1101F PR PT FALLS ASSESS DOC 0-1 FALLS W/OUT INJ PAST YR: ICD-10-PCS | Mod: CPTII,S$GLB,, | Performed by: FAMILY MEDICINE

## 2021-10-11 PROCEDURE — 3008F BODY MASS INDEX DOCD: CPT | Mod: CPTII,S$GLB,, | Performed by: FAMILY MEDICINE

## 2021-10-11 PROCEDURE — 4010F PR ACE/ARB THEARPY RXD/TAKEN: ICD-10-PCS | Mod: CPTII,S$GLB,, | Performed by: FAMILY MEDICINE

## 2021-10-12 ENCOUNTER — HOSPITAL ENCOUNTER (OUTPATIENT)
Dept: VASCULAR SURGERY | Facility: CLINIC | Age: 76
Discharge: HOME | End: 2021-10-12
Attending: FAMILY MEDICINE
Payer: MEDICARE

## 2021-10-12 DIAGNOSIS — M79.604 LEG PAIN, RIGHT: ICD-10-CM

## 2021-10-12 PROCEDURE — 93971 PR US DUPLEX, UPPER OR LOWER EXT VENOUS,UNILAT OR LTD: ICD-10-PCS | Mod: S$GLB,,, | Performed by: SURGERY

## 2021-10-12 PROCEDURE — 93971 EXTREMITY STUDY: CPT | Mod: S$GLB,,, | Performed by: SURGERY

## 2021-11-04 ENCOUNTER — OFFICE VISIT (OUTPATIENT)
Dept: INTERNAL MEDICINE | Facility: CLINIC | Age: 76
End: 2021-11-04
Payer: MEDICARE

## 2021-11-04 ENCOUNTER — LAB VISIT (OUTPATIENT)
Dept: LAB | Facility: HOSPITAL | Age: 76
End: 2021-11-04
Attending: INTERNAL MEDICINE
Payer: MEDICARE

## 2021-11-04 VITALS
TEMPERATURE: 98 F | SYSTOLIC BLOOD PRESSURE: 128 MMHG | BODY MASS INDEX: 35.08 KG/M2 | HEIGHT: 66 IN | WEIGHT: 218.25 LBS | HEART RATE: 90 BPM | DIASTOLIC BLOOD PRESSURE: 82 MMHG | OXYGEN SATURATION: 99 %

## 2021-11-04 DIAGNOSIS — R10.84 DIFFUSE ABDOMINAL PAIN: Primary | ICD-10-CM

## 2021-11-04 DIAGNOSIS — E11.59 HYPERTENSION ASSOCIATED WITH DIABETES: ICD-10-CM

## 2021-11-04 DIAGNOSIS — I73.9 PAD (PERIPHERAL ARTERY DISEASE): ICD-10-CM

## 2021-11-04 DIAGNOSIS — D64.9 NORMOCYTIC ANEMIA: ICD-10-CM

## 2021-11-04 DIAGNOSIS — Z79.4 TYPE 2 DIABETES MELLITUS WITH HYPOGLYCEMIA WITHOUT COMA, WITH LONG-TERM CURRENT USE OF INSULIN: ICD-10-CM

## 2021-11-04 DIAGNOSIS — N18.31 STAGE 3A CHRONIC KIDNEY DISEASE: ICD-10-CM

## 2021-11-04 DIAGNOSIS — Z86.19 HISTORY OF HELICOBACTER PYLORI INFECTION: ICD-10-CM

## 2021-11-04 DIAGNOSIS — E04.1 THYROID NODULE: ICD-10-CM

## 2021-11-04 DIAGNOSIS — I15.2 HYPERTENSION ASSOCIATED WITH DIABETES: ICD-10-CM

## 2021-11-04 DIAGNOSIS — E11.649 TYPE 2 DIABETES MELLITUS WITH HYPOGLYCEMIA WITHOUT COMA, WITH LONG-TERM CURRENT USE OF INSULIN: ICD-10-CM

## 2021-11-04 DIAGNOSIS — R10.84 DIFFUSE ABDOMINAL PAIN: ICD-10-CM

## 2021-11-04 LAB
ALBUMIN SERPL BCP-MCNC: 3.3 G/DL (ref 3.5–5.2)
ALBUMIN/CREAT UR: 95.8 UG/MG (ref 0–30)
ALP SERPL-CCNC: 112 U/L (ref 55–135)
ALT SERPL W/O P-5'-P-CCNC: 15 U/L (ref 10–44)
ANION GAP SERPL CALC-SCNC: 13 MMOL/L (ref 8–16)
AST SERPL-CCNC: 12 U/L (ref 10–40)
BASOPHILS # BLD AUTO: 0.05 K/UL (ref 0–0.2)
BASOPHILS NFR BLD: 0.7 % (ref 0–1.9)
BILIRUB SERPL-MCNC: 0.4 MG/DL (ref 0.1–1)
BUN SERPL-MCNC: 23 MG/DL (ref 8–23)
CALCIUM SERPL-MCNC: 10.1 MG/DL (ref 8.7–10.5)
CHLORIDE SERPL-SCNC: 101 MMOL/L (ref 95–110)
CHOLEST SERPL-MCNC: 186 MG/DL (ref 120–199)
CHOLEST/HDLC SERPL: 5 {RATIO} (ref 2–5)
CO2 SERPL-SCNC: 24 MMOL/L (ref 23–29)
CREAT SERPL-MCNC: 1.2 MG/DL (ref 0.5–1.4)
CREAT UR-MCNC: 142 MG/DL (ref 15–325)
DIFFERENTIAL METHOD: ABNORMAL
EOSINOPHIL # BLD AUTO: 0.1 K/UL (ref 0–0.5)
EOSINOPHIL NFR BLD: 1.2 % (ref 0–8)
ERYTHROCYTE [DISTWIDTH] IN BLOOD BY AUTOMATED COUNT: 16.4 % (ref 11.5–14.5)
EST. GFR  (AFRICAN AMERICAN): 51.8 ML/MIN/1.73 M^2
EST. GFR  (NON AFRICAN AMERICAN): 44.9 ML/MIN/1.73 M^2
ESTIMATED AVG GLUCOSE: 258 MG/DL (ref 68–131)
FERRITIN SERPL-MCNC: 92 NG/ML (ref 20–300)
GLUCOSE SERPL-MCNC: 231 MG/DL (ref 70–110)
HBA1C MFR BLD: 10.6 % (ref 4–5.6)
HCT VFR BLD AUTO: 46.3 % (ref 37–48.5)
HDLC SERPL-MCNC: 37 MG/DL (ref 40–75)
HDLC SERPL: 19.9 % (ref 20–50)
HGB BLD-MCNC: 14 G/DL (ref 12–16)
IMM GRANULOCYTES # BLD AUTO: 0.08 K/UL (ref 0–0.04)
IMM GRANULOCYTES NFR BLD AUTO: 1.2 % (ref 0–0.5)
IRON SERPL-MCNC: 87 UG/DL (ref 30–160)
LDLC SERPL CALC-MCNC: 127.2 MG/DL (ref 63–159)
LIPASE SERPL-CCNC: 97 U/L (ref 4–60)
LYMPHOCYTES # BLD AUTO: 2 K/UL (ref 1–4.8)
LYMPHOCYTES NFR BLD: 29.4 % (ref 18–48)
MCH RBC QN AUTO: 26.6 PG (ref 27–31)
MCHC RBC AUTO-ENTMCNC: 30.2 G/DL (ref 32–36)
MCV RBC AUTO: 88 FL (ref 82–98)
MICROALBUMIN UR DL<=1MG/L-MCNC: 136 UG/ML
MONOCYTES # BLD AUTO: 0.4 K/UL (ref 0.3–1)
MONOCYTES NFR BLD: 5.3 % (ref 4–15)
NEUTROPHILS # BLD AUTO: 4.3 K/UL (ref 1.8–7.7)
NEUTROPHILS NFR BLD: 62.2 % (ref 38–73)
NONHDLC SERPL-MCNC: 149 MG/DL
NRBC BLD-RTO: 0 /100 WBC
PLATELET # BLD AUTO: 375 K/UL (ref 150–450)
PMV BLD AUTO: 11.3 FL (ref 9.2–12.9)
POTASSIUM SERPL-SCNC: 4.8 MMOL/L (ref 3.5–5.1)
PROT SERPL-MCNC: 7.3 G/DL (ref 6–8.4)
RBC # BLD AUTO: 5.27 M/UL (ref 4–5.4)
SATURATED IRON: 24 % (ref 20–50)
SODIUM SERPL-SCNC: 138 MMOL/L (ref 136–145)
TOTAL IRON BINDING CAPACITY: 369 UG/DL (ref 250–450)
TRANSFERRIN SERPL-MCNC: 249 MG/DL (ref 200–375)
TRIGL SERPL-MCNC: 109 MG/DL (ref 30–150)
WBC # BLD AUTO: 6.94 K/UL (ref 3.9–12.7)

## 2021-11-04 PROCEDURE — 1101F PT FALLS ASSESS-DOCD LE1/YR: CPT | Mod: CPTII,S$GLB,, | Performed by: INTERNAL MEDICINE

## 2021-11-04 PROCEDURE — 36415 COLL VENOUS BLD VENIPUNCTURE: CPT | Performed by: INTERNAL MEDICINE

## 2021-11-04 PROCEDURE — 4010F ACE/ARB THERAPY RXD/TAKEN: CPT | Mod: CPTII,S$GLB,, | Performed by: INTERNAL MEDICINE

## 2021-11-04 PROCEDURE — 3008F PR BODY MASS INDEX (BMI) DOCUMENTED: ICD-10-PCS | Mod: CPTII,S$GLB,, | Performed by: INTERNAL MEDICINE

## 2021-11-04 PROCEDURE — 84466 ASSAY OF TRANSFERRIN: CPT | Performed by: INTERNAL MEDICINE

## 2021-11-04 PROCEDURE — 3288F PR FALLS RISK ASSESSMENT DOCUMENTED: ICD-10-PCS | Mod: CPTII,S$GLB,, | Performed by: INTERNAL MEDICINE

## 2021-11-04 PROCEDURE — 3288F FALL RISK ASSESSMENT DOCD: CPT | Mod: CPTII,S$GLB,, | Performed by: INTERNAL MEDICINE

## 2021-11-04 PROCEDURE — 85025 COMPLETE CBC W/AUTO DIFF WBC: CPT | Performed by: INTERNAL MEDICINE

## 2021-11-04 PROCEDURE — 1159F PR MEDICATION LIST DOCUMENTED IN MEDICAL RECORD: ICD-10-PCS | Mod: CPTII,S$GLB,, | Performed by: INTERNAL MEDICINE

## 2021-11-04 PROCEDURE — 3052F PR MOST RECENT HEMOGLOBIN A1C LEVEL 8.0 - < 9.0%: ICD-10-PCS | Mod: CPTII,S$GLB,, | Performed by: INTERNAL MEDICINE

## 2021-11-04 PROCEDURE — 3074F PR MOST RECENT SYSTOLIC BLOOD PRESSURE < 130 MM HG: ICD-10-PCS | Mod: CPTII,S$GLB,, | Performed by: INTERNAL MEDICINE

## 2021-11-04 PROCEDURE — 82728 ASSAY OF FERRITIN: CPT | Performed by: INTERNAL MEDICINE

## 2021-11-04 PROCEDURE — 1159F MED LIST DOCD IN RCRD: CPT | Mod: CPTII,S$GLB,, | Performed by: INTERNAL MEDICINE

## 2021-11-04 PROCEDURE — 80053 COMPREHEN METABOLIC PANEL: CPT | Performed by: INTERNAL MEDICINE

## 2021-11-04 PROCEDURE — 3072F LOW RISK FOR RETINOPATHY: CPT | Mod: CPTII,S$GLB,, | Performed by: INTERNAL MEDICINE

## 2021-11-04 PROCEDURE — 3079F PR MOST RECENT DIASTOLIC BLOOD PRESSURE 80-89 MM HG: ICD-10-PCS | Mod: CPTII,S$GLB,, | Performed by: INTERNAL MEDICINE

## 2021-11-04 PROCEDURE — 1101F PR PT FALLS ASSESS DOC 0-1 FALLS W/OUT INJ PAST YR: ICD-10-PCS | Mod: CPTII,S$GLB,, | Performed by: INTERNAL MEDICINE

## 2021-11-04 PROCEDURE — 3074F SYST BP LT 130 MM HG: CPT | Mod: CPTII,S$GLB,, | Performed by: INTERNAL MEDICINE

## 2021-11-04 PROCEDURE — 83036 HEMOGLOBIN GLYCOSYLATED A1C: CPT | Performed by: INTERNAL MEDICINE

## 2021-11-04 PROCEDURE — 1160F PR REVIEW ALL MEDS BY PRESCRIBER/CLIN PHARMACIST DOCUMENTED: ICD-10-PCS | Mod: CPTII,S$GLB,, | Performed by: INTERNAL MEDICINE

## 2021-11-04 PROCEDURE — 3008F BODY MASS INDEX DOCD: CPT | Mod: CPTII,S$GLB,, | Performed by: INTERNAL MEDICINE

## 2021-11-04 PROCEDURE — 3072F PR LOW RISK FOR RETINOPATHY: ICD-10-PCS | Mod: CPTII,S$GLB,, | Performed by: INTERNAL MEDICINE

## 2021-11-04 PROCEDURE — 83690 ASSAY OF LIPASE: CPT | Performed by: INTERNAL MEDICINE

## 2021-11-04 PROCEDURE — 82570 ASSAY OF URINE CREATININE: CPT | Performed by: INTERNAL MEDICINE

## 2021-11-04 PROCEDURE — 3079F DIAST BP 80-89 MM HG: CPT | Mod: CPTII,S$GLB,, | Performed by: INTERNAL MEDICINE

## 2021-11-04 PROCEDURE — 99214 OFFICE O/P EST MOD 30 MIN: CPT | Mod: S$GLB,,, | Performed by: INTERNAL MEDICINE

## 2021-11-04 PROCEDURE — 99999 PR PBB SHADOW E&M-EST. PATIENT-LVL V: ICD-10-PCS | Mod: PBBFAC,,, | Performed by: INTERNAL MEDICINE

## 2021-11-04 PROCEDURE — 99999 PR PBB SHADOW E&M-EST. PATIENT-LVL V: CPT | Mod: PBBFAC,,, | Performed by: INTERNAL MEDICINE

## 2021-11-04 PROCEDURE — 1160F RVW MEDS BY RX/DR IN RCRD: CPT | Mod: CPTII,S$GLB,, | Performed by: INTERNAL MEDICINE

## 2021-11-04 PROCEDURE — 99214 PR OFFICE/OUTPT VISIT, EST, LEVL IV, 30-39 MIN: ICD-10-PCS | Mod: S$GLB,,, | Performed by: INTERNAL MEDICINE

## 2021-11-04 PROCEDURE — 80061 LIPID PANEL: CPT | Performed by: INTERNAL MEDICINE

## 2021-11-04 PROCEDURE — 1126F AMNT PAIN NOTED NONE PRSNT: CPT | Mod: CPTII,S$GLB,, | Performed by: INTERNAL MEDICINE

## 2021-11-04 PROCEDURE — 4010F PR ACE/ARB THEARPY RXD/TAKEN: ICD-10-PCS | Mod: CPTII,S$GLB,, | Performed by: INTERNAL MEDICINE

## 2021-11-04 PROCEDURE — 3052F HG A1C>EQUAL 8.0%<EQUAL 9.0%: CPT | Mod: CPTII,S$GLB,, | Performed by: INTERNAL MEDICINE

## 2021-11-04 PROCEDURE — 1126F PR PAIN SEVERITY QUANTIFIED, NO PAIN PRESENT: ICD-10-PCS | Mod: CPTII,S$GLB,, | Performed by: INTERNAL MEDICINE

## 2021-11-07 ENCOUNTER — TELEPHONE (OUTPATIENT)
Dept: INTERNAL MEDICINE | Facility: CLINIC | Age: 76
End: 2021-11-07
Payer: MEDICARE

## 2021-11-09 ENCOUNTER — TELEPHONE (OUTPATIENT)
Dept: INTERNAL MEDICINE | Facility: CLINIC | Age: 76
End: 2021-11-09
Payer: MEDICARE

## 2021-11-11 ENCOUNTER — HOSPITAL ENCOUNTER (OUTPATIENT)
Dept: RADIOLOGY | Facility: OTHER | Age: 76
Discharge: HOME | End: 2021-11-11
Attending: INTERNAL MEDICINE
Payer: MEDICARE

## 2021-11-11 DIAGNOSIS — R10.84 DIFFUSE ABDOMINAL PAIN: ICD-10-CM

## 2021-11-11 PROCEDURE — 76700 US EXAM ABDOM COMPLETE: CPT | Mod: 26,,, | Performed by: RADIOLOGY

## 2021-11-11 PROCEDURE — 76700 US ABDOMEN COMPLETE: ICD-10-PCS | Mod: 26,,, | Performed by: RADIOLOGY

## 2021-11-11 PROCEDURE — 76700 US EXAM ABDOM COMPLETE: CPT | Mod: TC

## 2021-11-15 ENCOUNTER — TELEPHONE (OUTPATIENT)
Dept: INTERNAL MEDICINE | Facility: CLINIC | Age: 76
End: 2021-11-15
Payer: MEDICARE

## 2021-11-19 ENCOUNTER — OFFICE VISIT (OUTPATIENT)
Dept: INTERNAL MEDICINE | Facility: CLINIC | Age: 76
End: 2021-11-19
Payer: MEDICARE

## 2021-11-19 VITALS
HEART RATE: 77 BPM | HEIGHT: 66 IN | OXYGEN SATURATION: 99 % | SYSTOLIC BLOOD PRESSURE: 126 MMHG | WEIGHT: 218.06 LBS | BODY MASS INDEX: 35.04 KG/M2 | DIASTOLIC BLOOD PRESSURE: 78 MMHG

## 2021-11-19 DIAGNOSIS — I10 ESSENTIAL HYPERTENSION: ICD-10-CM

## 2021-11-19 DIAGNOSIS — R79.9 ABNORMAL FINDING OF BLOOD CHEMISTRY, UNSPECIFIED: ICD-10-CM

## 2021-11-19 DIAGNOSIS — R00.0 RACING HEART BEAT: ICD-10-CM

## 2021-11-19 DIAGNOSIS — R68.89 OTHER GENERAL SYMPTOMS AND SIGNS: ICD-10-CM

## 2021-11-19 DIAGNOSIS — R42 DIZZINESS: Primary | ICD-10-CM

## 2021-11-19 LAB
BILIRUB UR QL STRIP: NEGATIVE
CLARITY UR REFRACT.AUTO: ABNORMAL
COLOR UR AUTO: YELLOW
GLUCOSE SERPL-MCNC: 260 MG/DL (ref 70–110)
GLUCOSE UR QL STRIP: ABNORMAL
HGB UR QL STRIP: NEGATIVE
KETONES UR QL STRIP: NEGATIVE
LEUKOCYTE ESTERASE UR QL STRIP: ABNORMAL
MICROSCOPIC COMMENT: NORMAL
NITRITE UR QL STRIP: NEGATIVE
PH UR STRIP: 5 [PH] (ref 5–8)
PROT UR QL STRIP: NEGATIVE
RBC #/AREA URNS AUTO: 2 /HPF (ref 0–4)
SP GR UR STRIP: 1.02 (ref 1–1.03)
SQUAMOUS #/AREA URNS AUTO: 4 /HPF
URN SPEC COLLECT METH UR: ABNORMAL
WBC #/AREA URNS AUTO: 2 /HPF (ref 0–5)

## 2021-11-19 PROCEDURE — 3074F PR MOST RECENT SYSTOLIC BLOOD PRESSURE < 130 MM HG: ICD-10-PCS | Mod: CPTII,S$GLB,, | Performed by: NURSE PRACTITIONER

## 2021-11-19 PROCEDURE — 1101F PR PT FALLS ASSESS DOC 0-1 FALLS W/OUT INJ PAST YR: ICD-10-PCS | Mod: CPTII,S$GLB,, | Performed by: NURSE PRACTITIONER

## 2021-11-19 PROCEDURE — 99999 PR PBB SHADOW E&M-EST. PATIENT-LVL V: ICD-10-PCS | Mod: PBBFAC,,, | Performed by: NURSE PRACTITIONER

## 2021-11-19 PROCEDURE — 4010F PR ACE/ARB THEARPY RXD/TAKEN: ICD-10-PCS | Mod: CPTII,S$GLB,, | Performed by: NURSE PRACTITIONER

## 2021-11-19 PROCEDURE — 93005 ELECTROCARDIOGRAM TRACING: CPT | Mod: S$GLB,,, | Performed by: NURSE PRACTITIONER

## 2021-11-19 PROCEDURE — 1159F PR MEDICATION LIST DOCUMENTED IN MEDICAL RECORD: ICD-10-PCS | Mod: CPTII,S$GLB,, | Performed by: NURSE PRACTITIONER

## 2021-11-19 PROCEDURE — 3078F DIAST BP <80 MM HG: CPT | Mod: CPTII,S$GLB,, | Performed by: NURSE PRACTITIONER

## 2021-11-19 PROCEDURE — 3066F NEPHROPATHY DOC TX: CPT | Mod: CPTII,S$GLB,, | Performed by: NURSE PRACTITIONER

## 2021-11-19 PROCEDURE — 93005 EKG 12-LEAD: ICD-10-PCS | Mod: S$GLB,,, | Performed by: NURSE PRACTITIONER

## 2021-11-19 PROCEDURE — 1126F PR PAIN SEVERITY QUANTIFIED, NO PAIN PRESENT: ICD-10-PCS | Mod: CPTII,S$GLB,, | Performed by: NURSE PRACTITIONER

## 2021-11-19 PROCEDURE — 1126F AMNT PAIN NOTED NONE PRSNT: CPT | Mod: CPTII,S$GLB,, | Performed by: NURSE PRACTITIONER

## 2021-11-19 PROCEDURE — 3046F PR MOST RECENT HEMOGLOBIN A1C LEVEL > 9.0%: ICD-10-PCS | Mod: CPTII,S$GLB,, | Performed by: NURSE PRACTITIONER

## 2021-11-19 PROCEDURE — 99214 PR OFFICE/OUTPT VISIT, EST, LEVL IV, 30-39 MIN: ICD-10-PCS | Mod: S$GLB,,, | Performed by: NURSE PRACTITIONER

## 2021-11-19 PROCEDURE — 3008F PR BODY MASS INDEX (BMI) DOCUMENTED: ICD-10-PCS | Mod: CPTII,S$GLB,, | Performed by: NURSE PRACTITIONER

## 2021-11-19 PROCEDURE — 3072F PR LOW RISK FOR RETINOPATHY: ICD-10-PCS | Mod: CPTII,S$GLB,, | Performed by: NURSE PRACTITIONER

## 2021-11-19 PROCEDURE — 3078F PR MOST RECENT DIASTOLIC BLOOD PRESSURE < 80 MM HG: ICD-10-PCS | Mod: CPTII,S$GLB,, | Performed by: NURSE PRACTITIONER

## 2021-11-19 PROCEDURE — 82962 POCT GLUCOSE, HAND-HELD DEVICE: ICD-10-PCS | Mod: S$GLB,,, | Performed by: NURSE PRACTITIONER

## 2021-11-19 PROCEDURE — 3060F POS MICROALBUMINURIA REV: CPT | Mod: CPTII,S$GLB,, | Performed by: NURSE PRACTITIONER

## 2021-11-19 PROCEDURE — 4010F ACE/ARB THERAPY RXD/TAKEN: CPT | Mod: CPTII,S$GLB,, | Performed by: NURSE PRACTITIONER

## 2021-11-19 PROCEDURE — 3288F PR FALLS RISK ASSESSMENT DOCUMENTED: ICD-10-PCS | Mod: CPTII,S$GLB,, | Performed by: NURSE PRACTITIONER

## 2021-11-19 PROCEDURE — 1159F MED LIST DOCD IN RCRD: CPT | Mod: CPTII,S$GLB,, | Performed by: NURSE PRACTITIONER

## 2021-11-19 PROCEDURE — 1101F PT FALLS ASSESS-DOCD LE1/YR: CPT | Mod: CPTII,S$GLB,, | Performed by: NURSE PRACTITIONER

## 2021-11-19 PROCEDURE — 3060F PR POS MICROALBUMINURIA RESULT DOCUMENTED/REVIEW: ICD-10-PCS | Mod: CPTII,S$GLB,, | Performed by: NURSE PRACTITIONER

## 2021-11-19 PROCEDURE — 93010 EKG 12-LEAD: ICD-10-PCS | Mod: S$GLB,,, | Performed by: INTERNAL MEDICINE

## 2021-11-19 PROCEDURE — 3288F FALL RISK ASSESSMENT DOCD: CPT | Mod: CPTII,S$GLB,, | Performed by: NURSE PRACTITIONER

## 2021-11-19 PROCEDURE — 1160F PR REVIEW ALL MEDS BY PRESCRIBER/CLIN PHARMACIST DOCUMENTED: ICD-10-PCS | Mod: CPTII,S$GLB,, | Performed by: NURSE PRACTITIONER

## 2021-11-19 PROCEDURE — 3046F HEMOGLOBIN A1C LEVEL >9.0%: CPT | Mod: CPTII,S$GLB,, | Performed by: NURSE PRACTITIONER

## 2021-11-19 PROCEDURE — 82962 GLUCOSE BLOOD TEST: CPT | Mod: S$GLB,,, | Performed by: NURSE PRACTITIONER

## 2021-11-19 PROCEDURE — 1160F RVW MEDS BY RX/DR IN RCRD: CPT | Mod: CPTII,S$GLB,, | Performed by: NURSE PRACTITIONER

## 2021-11-19 PROCEDURE — 87086 URINE CULTURE/COLONY COUNT: CPT | Performed by: NURSE PRACTITIONER

## 2021-11-19 PROCEDURE — 81001 URINALYSIS AUTO W/SCOPE: CPT | Performed by: NURSE PRACTITIONER

## 2021-11-19 PROCEDURE — 99214 OFFICE O/P EST MOD 30 MIN: CPT | Mod: S$GLB,,, | Performed by: NURSE PRACTITIONER

## 2021-11-19 PROCEDURE — 3072F LOW RISK FOR RETINOPATHY: CPT | Mod: CPTII,S$GLB,, | Performed by: NURSE PRACTITIONER

## 2021-11-19 PROCEDURE — 93010 ELECTROCARDIOGRAM REPORT: CPT | Mod: S$GLB,,, | Performed by: INTERNAL MEDICINE

## 2021-11-19 PROCEDURE — 3066F PR DOCUMENTATION OF TREATMENT FOR NEPHROPATHY: ICD-10-PCS | Mod: CPTII,S$GLB,, | Performed by: NURSE PRACTITIONER

## 2021-11-19 PROCEDURE — 3008F BODY MASS INDEX DOCD: CPT | Mod: CPTII,S$GLB,, | Performed by: NURSE PRACTITIONER

## 2021-11-19 PROCEDURE — 3074F SYST BP LT 130 MM HG: CPT | Mod: CPTII,S$GLB,, | Performed by: NURSE PRACTITIONER

## 2021-11-19 PROCEDURE — 99999 PR PBB SHADOW E&M-EST. PATIENT-LVL V: CPT | Mod: PBBFAC,,, | Performed by: NURSE PRACTITIONER

## 2021-11-21 LAB
BACTERIA UR CULT: NORMAL
BACTERIA UR CULT: NORMAL

## 2021-11-22 ENCOUNTER — LAB VISIT (OUTPATIENT)
Dept: LAB | Facility: HOSPITAL | Age: 76
End: 2021-11-22
Attending: INTERNAL MEDICINE
Payer: MEDICARE

## 2021-11-22 ENCOUNTER — TELEPHONE (OUTPATIENT)
Dept: INTERNAL MEDICINE | Facility: CLINIC | Age: 76
End: 2021-11-22
Payer: MEDICARE

## 2021-11-22 DIAGNOSIS — E11.42 TYPE 2 DIABETES MELLITUS WITH DIABETIC POLYNEUROPATHY, WITH LONG-TERM CURRENT USE OF INSULIN: Chronic | ICD-10-CM

## 2021-11-22 DIAGNOSIS — Z79.4 TYPE 2 DIABETES MELLITUS WITH DIABETIC POLYNEUROPATHY, WITH LONG-TERM CURRENT USE OF INSULIN: Chronic | ICD-10-CM

## 2021-11-22 DIAGNOSIS — E11.65 TYPE 2 DIABETES MELLITUS WITH HYPERGLYCEMIA, WITH LONG-TERM CURRENT USE OF INSULIN: Chronic | ICD-10-CM

## 2021-11-22 DIAGNOSIS — Z79.4 TYPE 2 DIABETES MELLITUS WITH HYPERGLYCEMIA, WITH LONG-TERM CURRENT USE OF INSULIN: Chronic | ICD-10-CM

## 2021-11-22 LAB
ALBUMIN/CREAT UR: 25.6 UG/MG (ref 0–30)
CREAT UR-MCNC: 82 MG/DL (ref 15–325)
MICROALBUMIN UR DL<=1MG/L-MCNC: 21 UG/ML

## 2021-11-22 PROCEDURE — 82570 ASSAY OF URINE CREATININE: CPT | Performed by: NURSE PRACTITIONER

## 2021-11-24 DIAGNOSIS — I73.9 PAD (PERIPHERAL ARTERY DISEASE): Primary | ICD-10-CM

## 2021-12-01 ENCOUNTER — OFFICE VISIT (OUTPATIENT)
Dept: INTERNAL MEDICINE | Facility: CLINIC | Age: 76
End: 2021-12-01
Payer: MEDICARE

## 2021-12-01 ENCOUNTER — TELEPHONE (OUTPATIENT)
Dept: PHARMACY | Facility: CLINIC | Age: 76
End: 2021-12-01
Payer: MEDICARE

## 2021-12-01 VITALS
SYSTOLIC BLOOD PRESSURE: 138 MMHG | HEIGHT: 66 IN | HEART RATE: 73 BPM | OXYGEN SATURATION: 100 % | DIASTOLIC BLOOD PRESSURE: 82 MMHG | BODY MASS INDEX: 35.29 KG/M2 | WEIGHT: 219.56 LBS

## 2021-12-01 DIAGNOSIS — E66.9 DIABETES MELLITUS TYPE 2 IN OBESE: ICD-10-CM

## 2021-12-01 DIAGNOSIS — E04.2 MULTINODULAR GOITER: ICD-10-CM

## 2021-12-01 DIAGNOSIS — Z79.4 TYPE 2 DIABETES MELLITUS WITH HYPERGLYCEMIA, WITH LONG-TERM CURRENT USE OF INSULIN: Chronic | ICD-10-CM

## 2021-12-01 DIAGNOSIS — I73.9 PAD (PERIPHERAL ARTERY DISEASE): ICD-10-CM

## 2021-12-01 DIAGNOSIS — E11.69 DIABETES MELLITUS TYPE 2 IN OBESE: ICD-10-CM

## 2021-12-01 DIAGNOSIS — E78.5 HYPERLIPIDEMIA LDL GOAL <70: Chronic | ICD-10-CM

## 2021-12-01 DIAGNOSIS — E11.42 TYPE 2 DIABETES MELLITUS WITH DIABETIC POLYNEUROPATHY, WITH LONG-TERM CURRENT USE OF INSULIN: Primary | Chronic | ICD-10-CM

## 2021-12-01 DIAGNOSIS — E66.9 OBESITY (BMI 30-39.9): Chronic | ICD-10-CM

## 2021-12-01 DIAGNOSIS — Z79.4 TYPE 2 DIABETES MELLITUS WITH DIABETIC POLYNEUROPATHY, WITH LONG-TERM CURRENT USE OF INSULIN: Primary | Chronic | ICD-10-CM

## 2021-12-01 DIAGNOSIS — N18.31 STAGE 3A CHRONIC KIDNEY DISEASE: ICD-10-CM

## 2021-12-01 DIAGNOSIS — E11.65 TYPE 2 DIABETES MELLITUS WITH HYPERGLYCEMIA, WITH LONG-TERM CURRENT USE OF INSULIN: Chronic | ICD-10-CM

## 2021-12-01 DIAGNOSIS — I42.8 NICM (NONISCHEMIC CARDIOMYOPATHY): ICD-10-CM

## 2021-12-01 PROCEDURE — 95251 CONT GLUC MNTR ANALYSIS I&R: CPT | Mod: S$GLB,,, | Performed by: NURSE PRACTITIONER

## 2021-12-01 PROCEDURE — 3061F PR NEG MICROALBUMINURIA RESULT DOCUMENTED/REVIEW: ICD-10-PCS | Mod: CPTII,S$GLB,, | Performed by: NURSE PRACTITIONER

## 2021-12-01 PROCEDURE — 99999 PR PBB SHADOW E&M-EST. PATIENT-LVL IV: CPT | Mod: PBBFAC,,, | Performed by: NURSE PRACTITIONER

## 2021-12-01 PROCEDURE — 99999 PR PBB SHADOW E&M-EST. PATIENT-LVL IV: ICD-10-PCS | Mod: PBBFAC,,, | Performed by: NURSE PRACTITIONER

## 2021-12-01 PROCEDURE — 99214 OFFICE O/P EST MOD 30 MIN: CPT | Mod: S$GLB,,, | Performed by: NURSE PRACTITIONER

## 2021-12-01 PROCEDURE — 3066F PR DOCUMENTATION OF TREATMENT FOR NEPHROPATHY: ICD-10-PCS | Mod: CPTII,S$GLB,, | Performed by: NURSE PRACTITIONER

## 2021-12-01 PROCEDURE — 3072F LOW RISK FOR RETINOPATHY: CPT | Mod: CPTII,S$GLB,, | Performed by: NURSE PRACTITIONER

## 2021-12-01 PROCEDURE — 3072F PR LOW RISK FOR RETINOPATHY: ICD-10-PCS | Mod: CPTII,S$GLB,, | Performed by: NURSE PRACTITIONER

## 2021-12-01 PROCEDURE — 99499 UNLISTED E&M SERVICE: CPT | Mod: S$GLB,,, | Performed by: NURSE PRACTITIONER

## 2021-12-01 PROCEDURE — 4010F ACE/ARB THERAPY RXD/TAKEN: CPT | Mod: CPTII,S$GLB,, | Performed by: NURSE PRACTITIONER

## 2021-12-01 PROCEDURE — 3066F NEPHROPATHY DOC TX: CPT | Mod: CPTII,S$GLB,, | Performed by: NURSE PRACTITIONER

## 2021-12-01 PROCEDURE — 95251 PR GLUCOSE MONITOR, 72 HOUR, PHYS INTERP: ICD-10-PCS | Mod: S$GLB,,, | Performed by: NURSE PRACTITIONER

## 2021-12-01 PROCEDURE — 3061F NEG MICROALBUMINURIA REV: CPT | Mod: CPTII,S$GLB,, | Performed by: NURSE PRACTITIONER

## 2021-12-01 PROCEDURE — 99499 RISK ADDL DX/OHS AUDIT: ICD-10-PCS | Mod: S$GLB,,, | Performed by: NURSE PRACTITIONER

## 2021-12-01 PROCEDURE — 99214 PR OFFICE/OUTPT VISIT, EST, LEVL IV, 30-39 MIN: ICD-10-PCS | Mod: S$GLB,,, | Performed by: NURSE PRACTITIONER

## 2021-12-01 PROCEDURE — 4010F PR ACE/ARB THEARPY RXD/TAKEN: ICD-10-PCS | Mod: CPTII,S$GLB,, | Performed by: NURSE PRACTITIONER

## 2021-12-01 RX ORDER — INSULIN DEGLUDEC 200 U/ML
INJECTION, SOLUTION SUBCUTANEOUS
Qty: 3 PEN | Refills: 6 | Status: SHIPPED | OUTPATIENT
Start: 2021-12-01 | End: 2022-05-13 | Stop reason: SDUPTHER

## 2021-12-01 RX ORDER — KETOCONAZOLE 20 MG/ML
SHAMPOO, SUSPENSION TOPICAL
Qty: 120 ML | Refills: 2 | Status: SHIPPED | OUTPATIENT
Start: 2021-12-02 | End: 2022-04-14 | Stop reason: ALTCHOICE

## 2021-12-01 RX ORDER — DASIGLUCAGON 0.6 MG/.6ML
1 INJECTION, SOLUTION SUBCUTANEOUS
Qty: 1.2 ML | Refills: 1 | Status: SHIPPED | OUTPATIENT
Start: 2021-12-01 | End: 2022-08-04

## 2021-12-01 RX ORDER — INSULIN LISPRO 100 [IU]/ML
INJECTION, SOLUTION INTRAVENOUS; SUBCUTANEOUS
Qty: 15 ML | Refills: 5
Start: 2021-12-01 | End: 2021-12-01 | Stop reason: SDUPTHER

## 2021-12-01 RX ORDER — INSULIN LISPRO 100 [IU]/ML
INJECTION, SOLUTION INTRAVENOUS; SUBCUTANEOUS
Qty: 15 ML | Refills: 6 | Status: SHIPPED | OUTPATIENT
Start: 2021-12-01 | End: 2022-04-01

## 2021-12-02 ENCOUNTER — OFFICE VISIT (OUTPATIENT)
Dept: CARDIOLOGY | Facility: CLINIC | Age: 76
End: 2021-12-02
Payer: MEDICARE

## 2021-12-02 VITALS
SYSTOLIC BLOOD PRESSURE: 132 MMHG | DIASTOLIC BLOOD PRESSURE: 80 MMHG | HEART RATE: 77 BPM | OXYGEN SATURATION: 100 % | BODY MASS INDEX: 33.62 KG/M2 | HEIGHT: 66 IN | WEIGHT: 209.19 LBS

## 2021-12-02 DIAGNOSIS — Z79.4 TYPE 2 DIABETES MELLITUS WITH DIABETIC POLYNEUROPATHY, WITH LONG-TERM CURRENT USE OF INSULIN: Primary | Chronic | ICD-10-CM

## 2021-12-02 DIAGNOSIS — I10 ESSENTIAL HYPERTENSION: ICD-10-CM

## 2021-12-02 DIAGNOSIS — I73.9 PAD (PERIPHERAL ARTERY DISEASE): ICD-10-CM

## 2021-12-02 DIAGNOSIS — I65.23 BILATERAL CAROTID ARTERY STENOSIS: ICD-10-CM

## 2021-12-02 DIAGNOSIS — E11.42 TYPE 2 DIABETES MELLITUS WITH DIABETIC POLYNEUROPATHY, WITH LONG-TERM CURRENT USE OF INSULIN: Primary | Chronic | ICD-10-CM

## 2021-12-02 DIAGNOSIS — E78.5 HYPERLIPIDEMIA LDL GOAL <70: Chronic | ICD-10-CM

## 2021-12-02 PROCEDURE — 3061F NEG MICROALBUMINURIA REV: CPT | Mod: CPTII,S$GLB,, | Performed by: INTERNAL MEDICINE

## 2021-12-02 PROCEDURE — 3066F NEPHROPATHY DOC TX: CPT | Mod: CPTII,S$GLB,, | Performed by: INTERNAL MEDICINE

## 2021-12-02 PROCEDURE — 4010F ACE/ARB THERAPY RXD/TAKEN: CPT | Mod: CPTII,S$GLB,, | Performed by: INTERNAL MEDICINE

## 2021-12-02 PROCEDURE — 3061F PR NEG MICROALBUMINURIA RESULT DOCUMENTED/REVIEW: ICD-10-PCS | Mod: CPTII,S$GLB,, | Performed by: INTERNAL MEDICINE

## 2021-12-02 PROCEDURE — 3072F LOW RISK FOR RETINOPATHY: CPT | Mod: CPTII,S$GLB,, | Performed by: INTERNAL MEDICINE

## 2021-12-02 PROCEDURE — 99999 PR PBB SHADOW E&M-EST. PATIENT-LVL V: ICD-10-PCS | Mod: PBBFAC,,, | Performed by: INTERNAL MEDICINE

## 2021-12-02 PROCEDURE — 99999 PR PBB SHADOW E&M-EST. PATIENT-LVL V: CPT | Mod: PBBFAC,,, | Performed by: INTERNAL MEDICINE

## 2021-12-02 PROCEDURE — 4010F PR ACE/ARB THEARPY RXD/TAKEN: ICD-10-PCS | Mod: CPTII,S$GLB,, | Performed by: INTERNAL MEDICINE

## 2021-12-02 PROCEDURE — 99214 PR OFFICE/OUTPT VISIT, EST, LEVL IV, 30-39 MIN: ICD-10-PCS | Mod: S$GLB,,, | Performed by: INTERNAL MEDICINE

## 2021-12-02 PROCEDURE — 99214 OFFICE O/P EST MOD 30 MIN: CPT | Mod: S$GLB,,, | Performed by: INTERNAL MEDICINE

## 2021-12-02 PROCEDURE — 3066F PR DOCUMENTATION OF TREATMENT FOR NEPHROPATHY: ICD-10-PCS | Mod: CPTII,S$GLB,, | Performed by: INTERNAL MEDICINE

## 2021-12-02 PROCEDURE — 3072F PR LOW RISK FOR RETINOPATHY: ICD-10-PCS | Mod: CPTII,S$GLB,, | Performed by: INTERNAL MEDICINE

## 2021-12-08 ENCOUNTER — HOSPITAL ENCOUNTER (OUTPATIENT)
Dept: RADIOLOGY | Facility: HOSPITAL | Age: 76
Discharge: HOME | End: 2021-12-08
Attending: INTERNAL MEDICINE
Payer: MEDICARE

## 2021-12-08 DIAGNOSIS — E04.1 THYROID NODULE: ICD-10-CM

## 2021-12-08 PROCEDURE — 76536 US SOFT TISSUE HEAD NECK THYROID: ICD-10-PCS | Mod: 26,,, | Performed by: INTERNAL MEDICINE

## 2021-12-08 PROCEDURE — 76536 US EXAM OF HEAD AND NECK: CPT | Mod: 26,,, | Performed by: INTERNAL MEDICINE

## 2021-12-08 PROCEDURE — 76536 US EXAM OF HEAD AND NECK: CPT | Mod: TC

## 2021-12-09 ENCOUNTER — TELEPHONE (OUTPATIENT)
Dept: INTERNAL MEDICINE | Facility: CLINIC | Age: 76
End: 2021-12-09
Payer: MEDICARE

## 2021-12-10 ENCOUNTER — PATIENT MESSAGE (OUTPATIENT)
Dept: INTERNAL MEDICINE | Facility: CLINIC | Age: 76
End: 2021-12-10

## 2021-12-10 ENCOUNTER — PATIENT OUTREACH (OUTPATIENT)
Dept: ADMINISTRATIVE | Facility: HOSPITAL | Age: 76
End: 2021-12-10
Payer: MEDICARE

## 2021-12-10 ENCOUNTER — IMMUNIZATION (OUTPATIENT)
Dept: INTERNAL MEDICINE | Facility: CLINIC | Age: 76
End: 2021-12-10
Payer: MEDICARE

## 2021-12-10 ENCOUNTER — HOSPITAL ENCOUNTER (OUTPATIENT)
Dept: CARDIOLOGY | Facility: HOSPITAL | Age: 76
Discharge: HOME | End: 2021-12-10
Attending: INTERNAL MEDICINE
Payer: MEDICARE

## 2021-12-10 ENCOUNTER — TELEPHONE (OUTPATIENT)
Dept: INTERNAL MEDICINE | Facility: CLINIC | Age: 76
End: 2021-12-10
Payer: MEDICARE

## 2021-12-10 DIAGNOSIS — Z23 NEED FOR VACCINATION: Primary | ICD-10-CM

## 2021-12-10 DIAGNOSIS — I73.9 PAD (PERIPHERAL ARTERY DISEASE): ICD-10-CM

## 2021-12-10 LAB
LEFT ANT TIBIAL SYS PSV: 47 CM/S
LEFT CFA PSV: 147 CM/S
LEFT EXTERNAL ILIAC PSV: 201 CM/S
LEFT PERONEAL SYS PSV: 21 CM/S
LEFT POPLITEAL PSV: 47 CM/S
LEFT POST TIBIAL SYS PSV: 23 CM/S
LEFT PROFUNDA SYS PSV: 154 CM/S
LEFT SUPER FEMORAL DIST SYS PSV: 153 CM/S
LEFT SUPER FEMORAL OSTIAL SYS PSV: 159 CM/S
LEFT SUPER FEMORAL PROX SYS PSV: 481 CM/S
LEFT TIB/PER TRUNK SYS PSV: 57 CM/S
OHS CV LEFT LOWER EXTREMITY ABI (NO CALC): 0.95
OHS CV LOWER EXTREMITY STENT MEASUREMENTS - LEFT - MSFA S1 - DIST: 63
OHS CV LOWER EXTREMITY STENT MEASUREMENTS - LEFT - MSFA S1 - MID: 76
OHS CV LOWER EXTREMITY STENT MEASUREMENTS - LEFT - MSFA S1 - OST: 187
OHS CV LOWER EXTREMITY STENT MEASUREMENTS - LEFT - MSFA S1 - PROX: 72
OHS CV LOWER EXTREMITY STENT MEASUREMENTS - RIGHT - PSFA S1 - DIST: 81
OHS CV LOWER EXTREMITY STENT MEASUREMENTS - RIGHT - PSFA S1 - MID: 92
OHS CV LOWER EXTREMITY STENT MEASUREMENTS - RIGHT - PSFA S1 - OST: 145
OHS CV LOWER EXTREMITY STENT MEASUREMENTS - RIGHT - PSFA S1 - PROX: 103
OHS CV RIGHT ABI LOWER EXTREMITY (NO CALC): 1.17
RIGHT ANT TIBIAL SYS PSV: 113 CM/S
RIGHT CFA PSV: 150 CM/S
RIGHT EXTERNAL ILLIAC PSV: 207 CM/S
RIGHT PERONEAL SYS PSV: 41 CM/S
RIGHT POPLITEAL PSV: 85 CM/S
RIGHT POST TIBIAL SYS PSV: 0 CM/S
RIGHT PROFUNDA SYS PSV: 213 CM/S
RIGHT SUPER FEMORAL DIST SYS PSV: 94 CM/S
RIGHT SUPER FEMORAL OSTIAL SYS PSV: 169 CM/S
RIGHT TIB/PER TRUNK SYS PSV: 138 CM/S

## 2021-12-10 PROCEDURE — 93925 CV US DOPPLER ARTERIAL LEGS BILATERAL (CUPID ONLY): ICD-10-PCS | Mod: 26,,, | Performed by: INTERNAL MEDICINE

## 2021-12-10 PROCEDURE — 93925 LOWER EXTREMITY STUDY: CPT | Mod: 26,,, | Performed by: INTERNAL MEDICINE

## 2021-12-10 PROCEDURE — 93925 LOWER EXTREMITY STUDY: CPT

## 2021-12-10 PROCEDURE — 0064A COVID-19, MRNA, LNP-S, PF, 100 MCG/0.25 ML DOSE VACCINE (MODERNA BOOSTER): CPT | Mod: CV19,PBBFAC | Performed by: INTERNAL MEDICINE

## 2021-12-11 ENCOUNTER — NURSE TRIAGE (OUTPATIENT)
Dept: ADMINISTRATIVE | Facility: CLINIC | Age: 76
End: 2021-12-11
Payer: MEDICARE

## 2021-12-13 ENCOUNTER — TELEPHONE (OUTPATIENT)
Dept: INTERNAL MEDICINE | Facility: CLINIC | Age: 76
End: 2021-12-13
Payer: MEDICARE

## 2022-03-02 DIAGNOSIS — Z79.4 TYPE 2 DIABETES MELLITUS WITH HYPERGLYCEMIA, WITH LONG-TERM CURRENT USE OF INSULIN: Chronic | ICD-10-CM

## 2022-03-02 DIAGNOSIS — E11.65 TYPE 2 DIABETES MELLITUS WITH HYPERGLYCEMIA, WITH LONG-TERM CURRENT USE OF INSULIN: Chronic | ICD-10-CM

## 2022-03-02 RX ORDER — PEN NEEDLE, DIABETIC 30 GX3/16"
NEEDLE, DISPOSABLE MISCELLANEOUS
Qty: 300 EACH | Refills: 3 | Status: SHIPPED | OUTPATIENT
Start: 2022-03-02 | End: 2023-01-01 | Stop reason: SDUPTHER

## 2022-03-10 ENCOUNTER — OFFICE VISIT (OUTPATIENT)
Dept: OPTOMETRY | Facility: CLINIC | Age: 77
End: 2022-03-10
Payer: MEDICARE

## 2022-03-10 DIAGNOSIS — H25.13 NUCLEAR SCLEROSIS, BILATERAL: Primary | ICD-10-CM

## 2022-03-10 DIAGNOSIS — H52.01 HYPEROPIA OF RIGHT EYE WITH ASTIGMATISM: ICD-10-CM

## 2022-03-10 DIAGNOSIS — H52.201 HYPEROPIA OF RIGHT EYE WITH ASTIGMATISM: ICD-10-CM

## 2022-03-10 DIAGNOSIS — H26.9 CORTICAL CATARACT OF BOTH EYES: ICD-10-CM

## 2022-03-10 DIAGNOSIS — H52.02 HYPEROPIA OF LEFT EYE: ICD-10-CM

## 2022-03-10 DIAGNOSIS — H02.413 MECHANICAL PTOSIS OF EYELID OF BOTH EYES: ICD-10-CM

## 2022-03-10 DIAGNOSIS — H52.4 PRESBYOPIA OF BOTH EYES: ICD-10-CM

## 2022-03-10 PROCEDURE — 1101F PT FALLS ASSESS-DOCD LE1/YR: CPT | Mod: CPTII,S$GLB,, | Performed by: OPTOMETRIST

## 2022-03-10 PROCEDURE — 99999 PR PBB SHADOW E&M-EST. PATIENT-LVL IV: ICD-10-PCS | Mod: PBBFAC,,, | Performed by: OPTOMETRIST

## 2022-03-10 PROCEDURE — 4010F PR ACE/ARB THEARPY RXD/TAKEN: ICD-10-PCS | Mod: CPTII,S$GLB,, | Performed by: OPTOMETRIST

## 2022-03-10 PROCEDURE — 92015 DETERMINE REFRACTIVE STATE: CPT | Mod: S$GLB,,, | Performed by: OPTOMETRIST

## 2022-03-10 PROCEDURE — 3288F PR FALLS RISK ASSESSMENT DOCUMENTED: ICD-10-PCS | Mod: CPTII,S$GLB,, | Performed by: OPTOMETRIST

## 2022-03-10 PROCEDURE — 1126F AMNT PAIN NOTED NONE PRSNT: CPT | Mod: CPTII,S$GLB,, | Performed by: OPTOMETRIST

## 2022-03-10 PROCEDURE — 1101F PR PT FALLS ASSESS DOC 0-1 FALLS W/OUT INJ PAST YR: ICD-10-PCS | Mod: CPTII,S$GLB,, | Performed by: OPTOMETRIST

## 2022-03-10 PROCEDURE — 92012 PR EYE EXAM, EST PATIENT,INTERMED: ICD-10-PCS | Mod: S$GLB,,, | Performed by: OPTOMETRIST

## 2022-03-10 PROCEDURE — 92012 INTRM OPH EXAM EST PATIENT: CPT | Mod: S$GLB,,, | Performed by: OPTOMETRIST

## 2022-03-10 PROCEDURE — 3288F FALL RISK ASSESSMENT DOCD: CPT | Mod: CPTII,S$GLB,, | Performed by: OPTOMETRIST

## 2022-03-10 PROCEDURE — 92015 PR REFRACTION: ICD-10-PCS | Mod: S$GLB,,, | Performed by: OPTOMETRIST

## 2022-03-10 PROCEDURE — 4010F ACE/ARB THERAPY RXD/TAKEN: CPT | Mod: CPTII,S$GLB,, | Performed by: OPTOMETRIST

## 2022-03-10 PROCEDURE — 1159F PR MEDICATION LIST DOCUMENTED IN MEDICAL RECORD: ICD-10-PCS | Mod: CPTII,S$GLB,, | Performed by: OPTOMETRIST

## 2022-03-10 PROCEDURE — 1159F MED LIST DOCD IN RCRD: CPT | Mod: CPTII,S$GLB,, | Performed by: OPTOMETRIST

## 2022-03-10 PROCEDURE — 1126F PR PAIN SEVERITY QUANTIFIED, NO PAIN PRESENT: ICD-10-PCS | Mod: CPTII,S$GLB,, | Performed by: OPTOMETRIST

## 2022-03-10 PROCEDURE — 99999 PR PBB SHADOW E&M-EST. PATIENT-LVL IV: CPT | Mod: PBBFAC,,, | Performed by: OPTOMETRIST

## 2022-03-10 NOTE — PATIENT INSTRUCTIONS
Bilateral nuclear sclerosis of lens, and bilateral cortical cataract.  No need for cataract surgery in either eye.  Hyperopia with astigmatism in the right eye, and hyperopia in the left eye.  Presbyopia consistent withage.      Ms. Ibarra eft the clinic following pupil dilation, but prior to dilated fundus exam - could not wait any longer.    Will mail copy of spectacle lens Rx to Ms. Ibarra, and will attach note to return when convenient for completion of the dilated fundus examination (no charge visit).    Otherwise, recheck refraction in one year

## 2022-03-10 NOTE — PROGRESS NOTES
"HPI     Diabetic Eye Exam      Additional comments: Diabetic eye examination.  IDDM.  Initially diagnosed 8 - 9 years ago.  Wears glasses for reading.  But does report blurry distance vision without   correction               Comments     Patient's age: 76 y.o. AA female  Approximate date of last eye examination:  02/25/2021  Name of last eye doctor seen: Dr. Wasserman  Wears glasses? OTC reading glasses      If yes, wears  Full-time or part-time?  Part-time +2.50  Present glasses are: Bifocal, SV Distance, SV Reading?  SV OTC  Got new glasses following last exam, or subsequently?:  no    Wears CLs?:  no  Headaches?  no  Eye pain/discomfort?  No                                                                                    Flashes?  No   Floaters?  Occasionally - not bothersome     Diplopia/Double vision?  no  Patient's Ocular History:         Any eye surgeries? no         Any eye injury?  No          Any treatment for eye disease?  No   Family history of eye disease?  Mother: detached retina and cataracts  Significant patient medical history:         1. Diabetes?  Yes x 8 -9 years        If yes, IDDM or NIDDM? IDDM.    2. HBP?  Yes, controlled with medication               3. Other (describe):  High cholesterol    ! OTC eyedrops currently using:  no   ! Prescription eye meds currently using:  no   ! Any history of allergy/adverse reaction to any eye meds used   previously?  no    ! Any history of allergy/adverse reaction to eyedrops used during prior   eye exam(s)? no    ! Any history of allergy/adverse reaction to Novacaine or similar meds?   no   ! Any history of allergy/adverse reaction to Epinephrine or similar meds?   no    ! Patient okay with use of anesthetic eyedrops to check eye pressure?    yes        ! Patient okay with use of eyedrops to dilate pupils today?  yes   !  Allergies/Medications/Medical History/Family History reviewed today?    yes      PD =   68/64  Desired reading distance =  15.5"      "                                                                   Last edited by Manjinder Wasserman, OD on 3/10/2022  9:01 AM. (History)            Assessment /Plan     For exam results, see Encounter Report.    1. Nuclear sclerosis, bilateral     2. Cortical cataract of both eyes     3. 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 Hyperopia of right eye with astigmatism     5. Hyperopia of left eye     6. Presbyopia of both eyes                      Bilateral nuclear sclerosis of lens, and bilateral cortical cataract.  No need for cataract surgery in either eye.  Hyperopia with astigmatism in the right eye, and hyperopia in the left eye.  Presbyopia consistent withage.      Ms. Ibarra eft the clinic following pupil dilation, but prior to dilated fundus exam - could not wait any longer.    Will mail copy of spectacle lens Rx to Ms. Ibarra, and will attach note to return when convenient for completion of the dilated fundus examination (no charge visit).    Otherwise, recheck refraction in one year

## 2022-03-21 DIAGNOSIS — Z79.4 TYPE 2 DIABETES MELLITUS WITH HYPERGLYCEMIA, WITH LONG-TERM CURRENT USE OF INSULIN: Chronic | ICD-10-CM

## 2022-03-21 DIAGNOSIS — E11.65 TYPE 2 DIABETES MELLITUS WITH HYPERGLYCEMIA, WITH LONG-TERM CURRENT USE OF INSULIN: Chronic | ICD-10-CM

## 2022-03-21 RX ORDER — GABAPENTIN 300 MG/1
CAPSULE ORAL
Qty: 90 CAPSULE | Refills: 0 | Status: SHIPPED | OUTPATIENT
Start: 2022-03-21 | End: 2022-06-15 | Stop reason: SDUPTHER

## 2022-03-22 NOTE — TELEPHONE ENCOUNTER
Leslie PAIGE [facility] Staff  Caller: Pt [phone] (Today, 12:04 PM)  Patient is returning a phone call.   Who left a message for the patient: Catherine Starks   Does patient know what this is regarding:  Not Sure   Would you like a call back, or a response through your [facility] portal?:   Call Back   Comments:

## 2022-03-28 ENCOUNTER — LAB VISIT (OUTPATIENT)
Dept: LAB | Facility: HOSPITAL | Age: 77
End: 2022-03-28
Payer: MEDICARE

## 2022-03-28 DIAGNOSIS — Z79.4 TYPE 2 DIABETES MELLITUS WITH DIABETIC POLYNEUROPATHY, WITH LONG-TERM CURRENT USE OF INSULIN: Chronic | ICD-10-CM

## 2022-03-28 DIAGNOSIS — Z79.4 TYPE 2 DIABETES MELLITUS WITH HYPERGLYCEMIA, WITH LONG-TERM CURRENT USE OF INSULIN: Chronic | ICD-10-CM

## 2022-03-28 DIAGNOSIS — E11.42 TYPE 2 DIABETES MELLITUS WITH DIABETIC POLYNEUROPATHY, WITH LONG-TERM CURRENT USE OF INSULIN: Chronic | ICD-10-CM

## 2022-03-28 DIAGNOSIS — E11.65 TYPE 2 DIABETES MELLITUS WITH HYPERGLYCEMIA, WITH LONG-TERM CURRENT USE OF INSULIN: Chronic | ICD-10-CM

## 2022-03-28 LAB
ESTIMATED AVG GLUCOSE: 214 MG/DL (ref 68–131)
HBA1C MFR BLD: 9.1 % (ref 4–5.6)

## 2022-03-28 PROCEDURE — 36415 COLL VENOUS BLD VENIPUNCTURE: CPT | Performed by: NURSE PRACTITIONER

## 2022-03-28 PROCEDURE — 83036 HEMOGLOBIN GLYCOSYLATED A1C: CPT | Performed by: NURSE PRACTITIONER

## 2022-03-30 ENCOUNTER — PATIENT MESSAGE (OUTPATIENT)
Dept: INTERNAL MEDICINE | Facility: CLINIC | Age: 77
End: 2022-03-30
Payer: MEDICARE

## 2022-03-30 NOTE — PROGRESS NOTES
CHIEF COMPLAINT: Type 2 Diabetes     HPI: Mrs. Buck Ibarra is a 76 y.o. female who was diagnosed with Type 2 DM x 6 years.  Past Medical History:   Diagnosis Date    Allergy     Cataract     Diabetes mellitus type II     Gastritis     with gastric ulcer    GERD (gastroesophageal reflux disease)     H. pylori infection     History of hepatitis C, SVR as of 8-2016 8/25/2015    Harvoni 12 wks completed.  SVR(12) as of 8/4/16 SVR(24) as of 10-     Hypertension     Osteopenia     Type 2 diabetes mellitus with diabetic polyneuropathy      Last seen by me in fall 2021  a1c improved from 9.9% to 9.1%   Lab Results   Component Value Date    HGBA1C 9.1 (H) 03/28/2022     TIR 59%   Hypoglycemia 3-7p   avg 182 mg/dl  sd 56 mg/dl     Dietary habits:  3 meals  11a, 2p, 7-8p    Uses dexcom g6  Highest 250 mg/dl  Lowest 70 mg/dl   H/o severe hypoglycemia 40 mg/dl    Not getting proper sleep, insomnia   Stressors with pandemic, take care of a disabled child  +PN, nephropathy  [place] : supplier for dexcom g6     Medical necessity for cgm    On MDI injections 4 x a day  Testing 4 x a day  Patient is willing and able to use the device  Demonstrated an understanding of the technology and is motivated to use CGM  Patient expected to adhere to a comprehensive diabetes treatment plan and patient has adequate medical supervision  Patient experiences multiple impaired awareness of hypoglycemia (hypoglycemia unawareness)    Patient is willing and able to use the device  Demonstrated an understanding of the technology and is motivated to use CGM  Patient expected to adhere to a comprehensive diabetes treatment plan and patient has adequate medical supervision  Patient experiences multiple impaired awareness of hypoglycemia (hypoglycemia unawareness)    Retired . Has children (5), 16 grandchildren, babysits grandchildren---age  2, 8, 11, has 10 great grandchildren.  Helping with [employer]     PREVIOUS DIABETES  "MEDICATIONS TRIED  novolog  tresiba  Trulicity  vgo--did not feel right on it.  victoza   invokana-SEs /dehydration episode in 2017  Metformin- gi    CURRENT DIABETIC MEDS: trulicity 3 mg weekly, tresiba 32 units qhs, humalog 6-8-8 units ac w/ scale   Self adjustment per scale   180-230+2, etc    Diabetes Management Status    Statin: Taking  ACE/ARB: Taking    Screening or Prevention Patient's value Goal Complete/Controlled?   HgA1C Testing and Control   Lab Results   Component Value Date    HGBA1C 9.1 (H) 03/28/2022      Annually/Less than 8% No   Lipid profile : 11/04/2021 Annually Yes   LDL control Lab Results   Component Value Date    LDLCALC 127.2 11/04/2021    Annually/Less than 100 mg/dl  Yes   Nephropathy screening Lab Results   Component Value Date    LABMICR 21.0 11/22/2021     Lab Results   Component Value Date    PROTEINUA Negative 11/19/2021    Annually No   Blood pressure BP Readings from Last 1 Encounters:   12/02/21 132/80    Less than 140/90 Yes   Dilated retinal exam : 03/10/2022 Annually Yes   Foot exam   : 07/29/2021 Annually Yes       REVIEW OF SYSTEMS  General: no weakness, fatigue, +weight fluctuations   Eyes: no double or blurred vision, eye pain, or redness   Cardiovascular: no chest pain, palpitations, edema, or murmurs.   Respiratory: no cough or dyspnea.   GI: no heartburn, nausea, +diarrhea w/ metformin-ok on lower dose; good appetite.   Skin: no rashes, dryness, itching, or reactions at insulin injection sites.  Neuro: + numbness, tingling, tremors, or vertigo.   Endocrine: no polyuria, polydipsia, polyphagia, heat or cold intolerance. +hair loss    Vital Signs  BP (!) 152/82 (BP Location: Left arm, Patient Position: Sitting, BP Method: Medium (Manual))   Pulse 80   Ht 5' 6" (1.676 m)   Wt 99.2 kg (218 lb 11.1 oz)   SpO2 100%   BMI 35.30 kg/m²     Hemoglobin A1C   Date Value Ref Range Status   03/28/2022 9.1 (H) 4.0 - 5.6 % Final     Comment:     ADA Screening Guidelines:  5.7-6.4% "  Consistent with prediabetes  >or=6.5%  Consistent with diabetes    High levels of fetal hemoglobin interfere with the HbA1C  assay. Heterozygous hemoglobin variants (HbS, HgC, etc)do  not significantly interfere with this assay.   However, presence of multiple variants may affect accuracy.     11/22/2021 9.9 (H) 4.0 - 5.6 % Final     Comment:     ADA Screening Guidelines:  5.7-6.4%  Consistent with prediabetes  >or=6.5%  Consistent with diabetes    High levels of fetal hemoglobin interfere with the HbA1C  assay. Heterozygous hemoglobin variants (HbS, HgC, etc)do  not significantly interfere with this assay.   However, presence of multiple variants may affect accuracy.     11/04/2021 10.6 (H) 4.0 - 5.6 % Final     Comment:     ADA Screening Guidelines:  5.7-6.4%  Consistent with prediabetes  >or=6.5%  Consistent with diabetes    High levels of fetal hemoglobin interfere with the HbA1C  assay. Heterozygous hemoglobin variants (HbS, HgC, etc)do  not significantly interfere with this assay.   However, presence of multiple variants may affect accuracy.          Chemistry        Component Value Date/Time     11/04/2021 1236    K 4.8 11/04/2021 1236     11/04/2021 1236    CO2 24 11/04/2021 1236    BUN 23 11/04/2021 1236    CREATININE 1.2 11/04/2021 1236     (H) 11/04/2021 1236        Component Value Date/Time    CALCIUM 10.1 11/04/2021 1236    ALKPHOS 112 11/04/2021 1236    AST 12 11/04/2021 1236    ALT 15 11/04/2021 1236    BILITOT 0.4 11/04/2021 1236    ESTGFRAFRICA 51.8 (A) 11/04/2021 1236    EGFRNONAA 44.9 (A) 11/04/2021 1236           Lab Results   Component Value Date    TSH 1.173 04/15/2021      Lab Results   Component Value Date    CHOL 186 11/04/2021    CHOL 131 05/20/2021    CHOL 234 (H) 01/15/2021     Lab Results   Component Value Date    HDL 37 (L) 11/04/2021    HDL 41 05/20/2021    HDL 47 01/15/2021     Lab Results   Component Value Date    LDLCALC 127.2 11/04/2021    LDLCALC 75.0  05/20/2021    LDLCALC 162.2 (H) 01/15/2021     Lab Results   Component Value Date    TRIG 109 11/04/2021    TRIG 75 05/20/2021    TRIG 124 01/15/2021     Lab Results   Component Value Date    CHOLHDL 19.9 (L) 11/04/2021    CHOLHDL 31.3 05/20/2021    CHOLHDL 20.1 01/15/2021       PHYSICAL EXAMINATION  Constitutional: Appears well, no distress  Neck: Supple, trachea midline.   Respiratory: no wheezes, even and unlabored.  Cardiovascular: RRR; (+) DP pulses; no edema.   Lymph: deferred   Skin: warm and dry; no injection site reactions, + acanthosis nigracans observed.  Neuro:patient alert and cooperative, normal affect; steady gait.    Diabetes Foot Exam:  Deferred     Assessment/Plan  1. Type 2 diabetes mellitus with diabetic polyneuropathy, with long-term current use of insulin  Hemoglobin A1C    Ambulatory referral/consult to Podiatry   2. Stage 3a chronic kidney disease     3. Hyperlipidemia LDL goal <70     4. Type 2 diabetes mellitus with hyperglycemia, with long-term current use of insulin     5. Obesity (BMI 30-39.9)     6. PAD (peripheral artery disease)     1., 2. F/u in 4 mos w/ me   humalog 7-7-7 units before meals 180-230+2, etc  Continue tresiba 32 units at night   D/c trulicity   Add ozempic 0.5 mg weekly  Continue use of dexcom  a1c goal less than 8%   Avoid hypoglycemia  3.   Lab Results   Component Value Date    LDLCALC 127.2 11/04/2021     Above goal   4. See above  5. Body mass index is 35.3 kg/m².  May increase insulin resistance  6. F/u with vascular, podiatry    FOLLOW UP  Follow up in about 4 months (around 8/1/2022).

## 2022-04-01 ENCOUNTER — OFFICE VISIT (OUTPATIENT)
Dept: INTERNAL MEDICINE | Facility: CLINIC | Age: 77
End: 2022-04-01
Payer: MEDICARE

## 2022-04-01 VITALS
HEART RATE: 80 BPM | WEIGHT: 218.69 LBS | SYSTOLIC BLOOD PRESSURE: 152 MMHG | DIASTOLIC BLOOD PRESSURE: 82 MMHG | HEIGHT: 66 IN | OXYGEN SATURATION: 100 % | BODY MASS INDEX: 35.15 KG/M2

## 2022-04-01 DIAGNOSIS — Z79.4 TYPE 2 DIABETES MELLITUS WITH DIABETIC POLYNEUROPATHY, WITH LONG-TERM CURRENT USE OF INSULIN: Primary | Chronic | ICD-10-CM

## 2022-04-01 DIAGNOSIS — E11.42 TYPE 2 DIABETES MELLITUS WITH DIABETIC POLYNEUROPATHY, WITH LONG-TERM CURRENT USE OF INSULIN: Primary | Chronic | ICD-10-CM

## 2022-04-01 DIAGNOSIS — Z79.4 TYPE 2 DIABETES MELLITUS WITH HYPERGLYCEMIA, WITH LONG-TERM CURRENT USE OF INSULIN: Chronic | ICD-10-CM

## 2022-04-01 DIAGNOSIS — I73.9 PAD (PERIPHERAL ARTERY DISEASE): ICD-10-CM

## 2022-04-01 DIAGNOSIS — N18.31 STAGE 3A CHRONIC KIDNEY DISEASE: ICD-10-CM

## 2022-04-01 DIAGNOSIS — E78.5 HYPERLIPIDEMIA LDL GOAL <70: Chronic | ICD-10-CM

## 2022-04-01 DIAGNOSIS — E11.65 TYPE 2 DIABETES MELLITUS WITH HYPERGLYCEMIA, WITH LONG-TERM CURRENT USE OF INSULIN: Chronic | ICD-10-CM

## 2022-04-01 DIAGNOSIS — E66.9 OBESITY (BMI 30-39.9): Chronic | ICD-10-CM

## 2022-04-01 PROCEDURE — 3288F PR FALLS RISK ASSESSMENT DOCUMENTED: ICD-10-PCS | Mod: CPTII,S$GLB,, | Performed by: NURSE PRACTITIONER

## 2022-04-01 PROCEDURE — 1101F PR PT FALLS ASSESS DOC 0-1 FALLS W/OUT INJ PAST YR: ICD-10-PCS | Mod: CPTII,S$GLB,, | Performed by: NURSE PRACTITIONER

## 2022-04-01 PROCEDURE — 1126F PR PAIN SEVERITY QUANTIFIED, NO PAIN PRESENT: ICD-10-PCS | Mod: CPTII,S$GLB,, | Performed by: NURSE PRACTITIONER

## 2022-04-01 PROCEDURE — 1160F PR REVIEW ALL MEDS BY PRESCRIBER/CLIN PHARMACIST DOCUMENTED: ICD-10-PCS | Mod: CPTII,S$GLB,, | Performed by: NURSE PRACTITIONER

## 2022-04-01 PROCEDURE — 3072F LOW RISK FOR RETINOPATHY: CPT | Mod: CPTII,S$GLB,, | Performed by: NURSE PRACTITIONER

## 2022-04-01 PROCEDURE — 3079F DIAST BP 80-89 MM HG: CPT | Mod: CPTII,S$GLB,, | Performed by: NURSE PRACTITIONER

## 2022-04-01 PROCEDURE — 99214 OFFICE O/P EST MOD 30 MIN: CPT | Mod: S$GLB,,, | Performed by: NURSE PRACTITIONER

## 2022-04-01 PROCEDURE — 4010F PR ACE/ARB THEARPY RXD/TAKEN: ICD-10-PCS | Mod: CPTII,S$GLB,, | Performed by: NURSE PRACTITIONER

## 2022-04-01 PROCEDURE — 3077F SYST BP >= 140 MM HG: CPT | Mod: CPTII,S$GLB,, | Performed by: NURSE PRACTITIONER

## 2022-04-01 PROCEDURE — 1159F MED LIST DOCD IN RCRD: CPT | Mod: CPTII,S$GLB,, | Performed by: NURSE PRACTITIONER

## 2022-04-01 PROCEDURE — 1126F AMNT PAIN NOTED NONE PRSNT: CPT | Mod: CPTII,S$GLB,, | Performed by: NURSE PRACTITIONER

## 2022-04-01 PROCEDURE — 3079F PR MOST RECENT DIASTOLIC BLOOD PRESSURE 80-89 MM HG: ICD-10-PCS | Mod: CPTII,S$GLB,, | Performed by: NURSE PRACTITIONER

## 2022-04-01 PROCEDURE — 99999 PR PBB SHADOW E&M-EST. PATIENT-LVL V: ICD-10-PCS | Mod: PBBFAC,,, | Performed by: NURSE PRACTITIONER

## 2022-04-01 PROCEDURE — 1101F PT FALLS ASSESS-DOCD LE1/YR: CPT | Mod: CPTII,S$GLB,, | Performed by: NURSE PRACTITIONER

## 2022-04-01 PROCEDURE — 3077F PR MOST RECENT SYSTOLIC BLOOD PRESSURE >= 140 MM HG: ICD-10-PCS | Mod: CPTII,S$GLB,, | Performed by: NURSE PRACTITIONER

## 2022-04-01 PROCEDURE — 3008F PR BODY MASS INDEX (BMI) DOCUMENTED: ICD-10-PCS | Mod: CPTII,S$GLB,, | Performed by: NURSE PRACTITIONER

## 2022-04-01 PROCEDURE — 1160F RVW MEDS BY RX/DR IN RCRD: CPT | Mod: CPTII,S$GLB,, | Performed by: NURSE PRACTITIONER

## 2022-04-01 PROCEDURE — 3072F PR LOW RISK FOR RETINOPATHY: ICD-10-PCS | Mod: CPTII,S$GLB,, | Performed by: NURSE PRACTITIONER

## 2022-04-01 PROCEDURE — 3046F HEMOGLOBIN A1C LEVEL >9.0%: CPT | Mod: CPTII,S$GLB,, | Performed by: NURSE PRACTITIONER

## 2022-04-01 PROCEDURE — 3046F PR MOST RECENT HEMOGLOBIN A1C LEVEL > 9.0%: ICD-10-PCS | Mod: CPTII,S$GLB,, | Performed by: NURSE PRACTITIONER

## 2022-04-01 PROCEDURE — 3288F FALL RISK ASSESSMENT DOCD: CPT | Mod: CPTII,S$GLB,, | Performed by: NURSE PRACTITIONER

## 2022-04-01 PROCEDURE — 3008F BODY MASS INDEX DOCD: CPT | Mod: CPTII,S$GLB,, | Performed by: NURSE PRACTITIONER

## 2022-04-01 PROCEDURE — 1159F PR MEDICATION LIST DOCUMENTED IN MEDICAL RECORD: ICD-10-PCS | Mod: CPTII,S$GLB,, | Performed by: NURSE PRACTITIONER

## 2022-04-01 PROCEDURE — 99214 PR OFFICE/OUTPT VISIT, EST, LEVL IV, 30-39 MIN: ICD-10-PCS | Mod: S$GLB,,, | Performed by: NURSE PRACTITIONER

## 2022-04-01 PROCEDURE — 99999 PR PBB SHADOW E&M-EST. PATIENT-LVL V: CPT | Mod: PBBFAC,,, | Performed by: NURSE PRACTITIONER

## 2022-04-01 PROCEDURE — 4010F ACE/ARB THERAPY RXD/TAKEN: CPT | Mod: CPTII,S$GLB,, | Performed by: NURSE PRACTITIONER

## 2022-04-01 RX ORDER — SEMAGLUTIDE 1.34 MG/ML
INJECTION, SOLUTION SUBCUTANEOUS
Qty: 3 PEN | Refills: 3 | Status: SHIPPED | OUTPATIENT
Start: 2022-04-01 | End: 2022-05-12 | Stop reason: SDUPTHER

## 2022-04-01 RX ORDER — INSULIN LISPRO 100 [IU]/ML
INJECTION, SOLUTION INTRAVENOUS; SUBCUTANEOUS
Qty: 15 ML | Refills: 6 | Status: SHIPPED | OUTPATIENT
Start: 2022-04-01 | End: 2022-08-04

## 2022-04-01 NOTE — PATIENT INSTRUCTIONS
Follow up in 4 months w/Irielle   A1c prior (1-7 days before appt)  Podiatry 2022     Ozempic 0.5 mg weekly   Stop trulicity   Tresiba 32 units at night   Humalog 7-7-7 units before meals scale 180-230+2, etc.   Cut to 4 units if sugar is less than 100     Lab Results   Component Value Date    HGBA1C 9.1 (H) 03/28/2022     Goal less than 8%   Goal  no higher than 180     [email or web address]  Eat fit clarita  [employer] Systemspal clarita  [email or web address]

## 2022-04-04 ENCOUNTER — TELEPHONE (OUTPATIENT)
Dept: INTERNAL MEDICINE | Facility: CLINIC | Age: 77
End: 2022-04-04
Payer: MEDICARE

## 2022-04-04 RX ORDER — FLUOCINONIDE 0.5 MG/G
OINTMENT TOPICAL 2 TIMES DAILY
Qty: 60 G | Refills: 2 | Status: SHIPPED | OUTPATIENT
Start: 2022-04-04 | End: 2023-01-01

## 2022-04-04 NOTE — TELEPHONE ENCOUNTER
----- Message from Sonia Knight sent at 4/4/2022  2:23 PM CDT -----  Contact: [phone]  Calling back to let you know name of topical solution pt uses is Fluocinonide. Pt requesting script be called in.      [facility]  [address] [place] [id number]  Phone: [phone] Fax: [phone]

## 2022-04-07 ENCOUNTER — PES CALL (OUTPATIENT)
Dept: ADMINISTRATIVE | Facility: CLINIC | Age: 77
End: 2022-04-07
Payer: MEDICARE

## 2022-04-12 ENCOUNTER — PATIENT OUTREACH (OUTPATIENT)
Dept: ADMINISTRATIVE | Facility: OTHER | Age: 77
End: 2022-04-12
Payer: MEDICARE

## 2022-04-12 NOTE — PROGRESS NOTES
LINKS immunization registry updated  Care Everywhere updated  Health Maintenance updated  Chart reviewed for overdue [facility] Encounters (RONALD) health maintenance testing (CRS, Breast Ca, Diabetic Eye Exam)   Orders entered:N/A

## 2022-04-14 ENCOUNTER — OFFICE VISIT (OUTPATIENT)
Dept: INTERNAL MEDICINE | Facility: CLINIC | Age: 77
End: 2022-04-14
Payer: MEDICARE

## 2022-04-14 ENCOUNTER — OFFICE VISIT (OUTPATIENT)
Dept: PODIATRY | Facility: CLINIC | Age: 77
End: 2022-04-14
Payer: MEDICARE

## 2022-04-14 VITALS
HEART RATE: 73 BPM | BODY MASS INDEX: 35.26 KG/M2 | WEIGHT: 218.5 LBS | SYSTOLIC BLOOD PRESSURE: 138 MMHG | DIASTOLIC BLOOD PRESSURE: 80 MMHG

## 2022-04-14 VITALS
SYSTOLIC BLOOD PRESSURE: 138 MMHG | WEIGHT: 218.5 LBS | BODY MASS INDEX: 35.12 KG/M2 | HEIGHT: 66 IN | HEART RATE: 73 BPM | DIASTOLIC BLOOD PRESSURE: 80 MMHG | OXYGEN SATURATION: 99 %

## 2022-04-14 DIAGNOSIS — I25.118 ATHEROSCLEROTIC HEART DISEASE OF NATIVE CORONARY ARTERY WITH OTHER FORMS OF ANGINA PECTORIS: ICD-10-CM

## 2022-04-14 DIAGNOSIS — K21.00 GASTROESOPHAGEAL REFLUX DISEASE WITH ESOPHAGITIS, UNSPECIFIED WHETHER HEMORRHAGE: ICD-10-CM

## 2022-04-14 DIAGNOSIS — N18.31 STAGE 3A CHRONIC KIDNEY DISEASE: ICD-10-CM

## 2022-04-14 DIAGNOSIS — L84 CORN OR CALLUS: ICD-10-CM

## 2022-04-14 DIAGNOSIS — I70.0 AORTIC ATHEROSCLEROSIS: ICD-10-CM

## 2022-04-14 DIAGNOSIS — E11.42 TYPE 2 DIABETES MELLITUS WITH DIABETIC POLYNEUROPATHY, WITH LONG-TERM CURRENT USE OF INSULIN: Chronic | ICD-10-CM

## 2022-04-14 DIAGNOSIS — I10 ESSENTIAL HYPERTENSION: ICD-10-CM

## 2022-04-14 DIAGNOSIS — I42.8 NICM (NONISCHEMIC CARDIOMYOPATHY): ICD-10-CM

## 2022-04-14 DIAGNOSIS — E66.01 SEVERE OBESITY (BMI 35.0-39.9) WITH COMORBIDITY: ICD-10-CM

## 2022-04-14 DIAGNOSIS — E78.5 HYPERLIPIDEMIA LDL GOAL <70: Chronic | ICD-10-CM

## 2022-04-14 DIAGNOSIS — E66.9 OBESITY (BMI 30-39.9): Chronic | ICD-10-CM

## 2022-04-14 DIAGNOSIS — Z79.4 TYPE 2 DIABETES MELLITUS WITH DIABETIC POLYNEUROPATHY, WITH LONG-TERM CURRENT USE OF INSULIN: Chronic | ICD-10-CM

## 2022-04-14 DIAGNOSIS — I73.9 PAD (PERIPHERAL ARTERY DISEASE): ICD-10-CM

## 2022-04-14 DIAGNOSIS — E11.65 TYPE 2 DIABETES MELLITUS WITH HYPERGLYCEMIA, WITH LONG-TERM CURRENT USE OF INSULIN: Chronic | ICD-10-CM

## 2022-04-14 DIAGNOSIS — J41.0 SIMPLE CHRONIC BRONCHITIS: ICD-10-CM

## 2022-04-14 DIAGNOSIS — Z86.19 HISTORY OF HEPATITIS C: ICD-10-CM

## 2022-04-14 DIAGNOSIS — B35.1 ONYCHOMYCOSIS DUE TO DERMATOPHYTE: Primary | ICD-10-CM

## 2022-04-14 DIAGNOSIS — D47.3 ESSENTIAL (HEMORRHAGIC) THROMBOCYTHEMIA: ICD-10-CM

## 2022-04-14 DIAGNOSIS — Z00.00 ENCOUNTER FOR PREVENTIVE HEALTH EXAMINATION: Primary | ICD-10-CM

## 2022-04-14 DIAGNOSIS — I77.9 BILATERAL CAROTID ARTERY DISEASE, UNSPECIFIED TYPE: ICD-10-CM

## 2022-04-14 DIAGNOSIS — Z79.4 TYPE 2 DIABETES MELLITUS WITH HYPERGLYCEMIA, WITH LONG-TERM CURRENT USE OF INSULIN: Chronic | ICD-10-CM

## 2022-04-14 DIAGNOSIS — E04.2 MULTINODULAR GOITER: ICD-10-CM

## 2022-04-14 DIAGNOSIS — Z92.29 HX OF LONG TERM USE OF BLOOD THINNERS: ICD-10-CM

## 2022-04-14 PROCEDURE — 3072F LOW RISK FOR RETINOPATHY: CPT | Mod: CPTII,S$GLB,, | Performed by: PHYSICIAN ASSISTANT

## 2022-04-14 PROCEDURE — 3288F PR FALLS RISK ASSESSMENT DOCUMENTED: ICD-10-PCS | Mod: CPTII,S$GLB,, | Performed by: PHYSICIAN ASSISTANT

## 2022-04-14 PROCEDURE — 4010F ACE/ARB THERAPY RXD/TAKEN: CPT | Mod: CPTII,S$GLB,, | Performed by: PODIATRIST

## 2022-04-14 PROCEDURE — 3008F BODY MASS INDEX DOCD: CPT | Mod: CPTII,S$GLB,, | Performed by: PODIATRIST

## 2022-04-14 PROCEDURE — 1126F AMNT PAIN NOTED NONE PRSNT: CPT | Mod: CPTII,S$GLB,, | Performed by: PHYSICIAN ASSISTANT

## 2022-04-14 PROCEDURE — 3079F DIAST BP 80-89 MM HG: CPT | Mod: CPTII,S$GLB,, | Performed by: PHYSICIAN ASSISTANT

## 2022-04-14 PROCEDURE — 1126F PR PAIN SEVERITY QUANTIFIED, NO PAIN PRESENT: ICD-10-PCS | Mod: CPTII,S$GLB,, | Performed by: PHYSICIAN ASSISTANT

## 2022-04-14 PROCEDURE — G0439 PR MEDICARE ANNUAL WELLNESS SUBSEQUENT VISIT: ICD-10-PCS | Mod: S$GLB,,, | Performed by: PHYSICIAN ASSISTANT

## 2022-04-14 PROCEDURE — 3046F PR MOST RECENT HEMOGLOBIN A1C LEVEL > 9.0%: ICD-10-PCS | Mod: CPTII,S$GLB,, | Performed by: PHYSICIAN ASSISTANT

## 2022-04-14 PROCEDURE — 99999 PR PBB SHADOW E&M-EST. PATIENT-LVL III: CPT | Mod: PBBFAC,,, | Performed by: PHYSICIAN ASSISTANT

## 2022-04-14 PROCEDURE — 99999 PR PBB SHADOW E&M-EST. PATIENT-LVL III: ICD-10-PCS | Mod: PBBFAC,,, | Performed by: PHYSICIAN ASSISTANT

## 2022-04-14 PROCEDURE — 1101F PR PT FALLS ASSESS DOC 0-1 FALLS W/OUT INJ PAST YR: ICD-10-PCS | Mod: CPTII,S$GLB,, | Performed by: PHYSICIAN ASSISTANT

## 2022-04-14 PROCEDURE — 3075F SYST BP GE 130 - 139MM HG: CPT | Mod: CPTII,S$GLB,, | Performed by: PHYSICIAN ASSISTANT

## 2022-04-14 PROCEDURE — 3072F PR LOW RISK FOR RETINOPATHY: ICD-10-PCS | Mod: CPTII,S$GLB,, | Performed by: PODIATRIST

## 2022-04-14 PROCEDURE — 1126F PR PAIN SEVERITY QUANTIFIED, NO PAIN PRESENT: ICD-10-PCS | Mod: CPTII,S$GLB,, | Performed by: PODIATRIST

## 2022-04-14 PROCEDURE — 99203 PR OFFICE/OUTPT VISIT, NEW, LEVL III, 30-44 MIN: ICD-10-PCS | Mod: 25,S$GLB,, | Performed by: PODIATRIST

## 2022-04-14 PROCEDURE — 1160F PR REVIEW ALL MEDS BY PRESCRIBER/CLIN PHARMACIST DOCUMENTED: ICD-10-PCS | Mod: CPTII,S$GLB,, | Performed by: PODIATRIST

## 2022-04-14 PROCEDURE — 11721 DEBRIDE NAIL 6 OR MORE: CPT | Mod: 59,Q8,S$GLB, | Performed by: PODIATRIST

## 2022-04-14 PROCEDURE — 1170F PR FUNCTIONAL STATUS ASSESSED: ICD-10-PCS | Mod: CPTII,S$GLB,, | Performed by: PHYSICIAN ASSISTANT

## 2022-04-14 PROCEDURE — 4010F PR ACE/ARB THEARPY RXD/TAKEN: ICD-10-PCS | Mod: CPTII,S$GLB,, | Performed by: PODIATRIST

## 2022-04-14 PROCEDURE — 3046F PR MOST RECENT HEMOGLOBIN A1C LEVEL > 9.0%: ICD-10-PCS | Mod: CPTII,S$GLB,, | Performed by: PODIATRIST

## 2022-04-14 PROCEDURE — 99999 PR PBB SHADOW E&M-EST. PATIENT-LVL IV: ICD-10-PCS | Mod: PBBFAC,,, | Performed by: PODIATRIST

## 2022-04-14 PROCEDURE — 3046F HEMOGLOBIN A1C LEVEL >9.0%: CPT | Mod: CPTII,S$GLB,, | Performed by: PODIATRIST

## 2022-04-14 PROCEDURE — 4010F PR ACE/ARB THEARPY RXD/TAKEN: ICD-10-PCS | Mod: CPTII,S$GLB,, | Performed by: PHYSICIAN ASSISTANT

## 2022-04-14 PROCEDURE — 1159F PR MEDICATION LIST DOCUMENTED IN MEDICAL RECORD: ICD-10-PCS | Mod: CPTII,S$GLB,, | Performed by: PODIATRIST

## 2022-04-14 PROCEDURE — 11057 PR TRIM BENIGN HYPERKERATOTIC SKIN LESION,>4: ICD-10-PCS | Mod: Q8,S$GLB,, | Performed by: PODIATRIST

## 2022-04-14 PROCEDURE — 3008F BODY MASS INDEX DOCD: CPT | Mod: CPTII,S$GLB,, | Performed by: PHYSICIAN ASSISTANT

## 2022-04-14 PROCEDURE — 3008F PR BODY MASS INDEX (BMI) DOCUMENTED: ICD-10-PCS | Mod: CPTII,S$GLB,, | Performed by: PHYSICIAN ASSISTANT

## 2022-04-14 PROCEDURE — 3079F PR MOST RECENT DIASTOLIC BLOOD PRESSURE 80-89 MM HG: ICD-10-PCS | Mod: CPTII,S$GLB,, | Performed by: PHYSICIAN ASSISTANT

## 2022-04-14 PROCEDURE — G0439 PPPS, SUBSEQ VISIT: HCPCS | Mod: S$GLB,,, | Performed by: PHYSICIAN ASSISTANT

## 2022-04-14 PROCEDURE — 3079F DIAST BP 80-89 MM HG: CPT | Mod: CPTII,S$GLB,, | Performed by: PODIATRIST

## 2022-04-14 PROCEDURE — 99999 PR PBB SHADOW E&M-EST. PATIENT-LVL IV: CPT | Mod: PBBFAC,,, | Performed by: PODIATRIST

## 2022-04-14 PROCEDURE — 99499 RISK ADDL DX/OHS AUDIT: ICD-10-PCS | Mod: S$GLB,,, | Performed by: PHYSICIAN ASSISTANT

## 2022-04-14 PROCEDURE — 1159F MED LIST DOCD IN RCRD: CPT | Mod: CPTII,S$GLB,, | Performed by: PODIATRIST

## 2022-04-14 PROCEDURE — 3075F PR MOST RECENT SYSTOLIC BLOOD PRESS GE 130-139MM HG: ICD-10-PCS | Mod: CPTII,S$GLB,, | Performed by: PODIATRIST

## 2022-04-14 PROCEDURE — 3072F LOW RISK FOR RETINOPATHY: CPT | Mod: CPTII,S$GLB,, | Performed by: PODIATRIST

## 2022-04-14 PROCEDURE — 3046F HEMOGLOBIN A1C LEVEL >9.0%: CPT | Mod: CPTII,S$GLB,, | Performed by: PHYSICIAN ASSISTANT

## 2022-04-14 PROCEDURE — 1170F FXNL STATUS ASSESSED: CPT | Mod: CPTII,S$GLB,, | Performed by: PHYSICIAN ASSISTANT

## 2022-04-14 PROCEDURE — 11057 PARNG/CUTG B9 HYPRKR LES >4: CPT | Mod: Q8,S$GLB,, | Performed by: PODIATRIST

## 2022-04-14 PROCEDURE — 1101F PT FALLS ASSESS-DOCD LE1/YR: CPT | Mod: CPTII,S$GLB,, | Performed by: PHYSICIAN ASSISTANT

## 2022-04-14 PROCEDURE — 3075F PR MOST RECENT SYSTOLIC BLOOD PRESS GE 130-139MM HG: ICD-10-PCS | Mod: CPTII,S$GLB,, | Performed by: PHYSICIAN ASSISTANT

## 2022-04-14 PROCEDURE — 3288F FALL RISK ASSESSMENT DOCD: CPT | Mod: CPTII,S$GLB,, | Performed by: PHYSICIAN ASSISTANT

## 2022-04-14 PROCEDURE — 99203 OFFICE O/P NEW LOW 30 MIN: CPT | Mod: 25,S$GLB,, | Performed by: PODIATRIST

## 2022-04-14 PROCEDURE — 4010F ACE/ARB THERAPY RXD/TAKEN: CPT | Mod: CPTII,S$GLB,, | Performed by: PHYSICIAN ASSISTANT

## 2022-04-14 PROCEDURE — 99499 UNLISTED E&M SERVICE: CPT | Mod: S$GLB,,, | Performed by: PHYSICIAN ASSISTANT

## 2022-04-14 PROCEDURE — 1160F RVW MEDS BY RX/DR IN RCRD: CPT | Mod: CPTII,S$GLB,, | Performed by: PODIATRIST

## 2022-04-14 PROCEDURE — 3008F PR BODY MASS INDEX (BMI) DOCUMENTED: ICD-10-PCS | Mod: CPTII,S$GLB,, | Performed by: PODIATRIST

## 2022-04-14 PROCEDURE — 11721 PR DEBRIDEMENT OF NAILS, 6 OR MORE: ICD-10-PCS | Mod: 59,Q8,S$GLB, | Performed by: PODIATRIST

## 2022-04-14 PROCEDURE — 1126F AMNT PAIN NOTED NONE PRSNT: CPT | Mod: CPTII,S$GLB,, | Performed by: PODIATRIST

## 2022-04-14 PROCEDURE — 3079F PR MOST RECENT DIASTOLIC BLOOD PRESSURE 80-89 MM HG: ICD-10-PCS | Mod: CPTII,S$GLB,, | Performed by: PODIATRIST

## 2022-04-14 PROCEDURE — 3072F PR LOW RISK FOR RETINOPATHY: ICD-10-PCS | Mod: CPTII,S$GLB,, | Performed by: PHYSICIAN ASSISTANT

## 2022-04-14 PROCEDURE — 3075F SYST BP GE 130 - 139MM HG: CPT | Mod: CPTII,S$GLB,, | Performed by: PODIATRIST

## 2022-04-14 RX ORDER — KETOCONAZOLE 20 MG/G
CREAM TOPICAL DAILY
Qty: 60 G | Refills: 6 | Status: SHIPPED | OUTPATIENT
Start: 2022-04-14 | End: 2023-01-01

## 2022-04-14 NOTE — PROGRESS NOTES
"  Buck Ibarra presented for a  Medicare AWV and comprehensive Health Risk Assessment today. The following components were reviewed and updated:    · Medical history  · Family History  · Social history  · Allergies and Current Medications  · Health Risk Assessment  · Health Maintenance  · Care Team             ** See Completed Assessments for Annual Wellness Visit within the encounter summary.**         The following assessments were completed:  · Living Situation  · CAGE  · Depression Screening  · Timed Get Up and Go  · Whisper Test  · Cognitive Function Screening  · Nutrition Screening  · ADL Screening  · PAQ Screening        Vitals:    04/14/22 0746   BP: 138/80   Pulse: 73   SpO2: 99%   Weight: 99.1 kg (218 lb 7.6 oz)   Height: 5' 6" (1.676 m)     Body mass index is 35.26 kg/m².  Physical Exam  Vitals and nursing note reviewed.   Constitutional:       Appearance: Normal appearance. She is well-developed.   HENT:      Head: Normocephalic.      Right Ear: External ear normal.      Left Ear: External ear normal.   Eyes:      Pupils: Pupils are equal, round, and reactive to light.   Cardiovascular:      Rate and Rhythm: Normal rate and regular rhythm.      Heart sounds: Normal heart sounds. No murmur heard.    No friction rub. No gallop.   Pulmonary:      Effort: Pulmonary effort is normal. No respiratory distress.      Breath sounds: Normal breath sounds.   Abdominal:      Palpations: Abdomen is soft.      Tenderness: There is no abdominal tenderness.   Skin:     General: Skin is warm and dry.   Neurological:      Mental Status: She is alert.               Diagnoses and health risks identified today and associated recommendations/orders:    1. Encounter for preventive health examination  Assessments completed.  Preventative health recommendations reviewed.      2. Severe obesity (BMI 35.0-39.9) with comorbidity  Stable, followed by PCP.      3. Atherosclerotic heart disease of native coronary artery with other " forms of angina pectoris  Stable, controlled on current medical therapy, followed by PCP.      4. Simple chronic bronchitis  Stable, controlled on current medical therapy, followed by PCP.      5. NICM (nonischemic cardiomyopathy)  Stable, controlled on current medical therapy, followed by PCP.      6. Essential (hemorrhagic) thrombocythemia  Stable, followed by PCP.      7. Hyperlipidemia LDL goal <70  Stable, controlled on current medical therapy, followed by PCP.      8. Essential hypertension  Stable, controlled on current medical therapy, followed by PCP.      9. Bilateral carotid artery disease, unspecified type  Stable, controlled on current medical therapy, followed by PCP.      10. Aortic atherosclerosis  Stable, controlled on current medical therapy, followed by PCP.      11. PAD (peripheral artery disease)  Stable, controlled on current medical therapy, followed by PCP.  S/p multiple PTAs.  On plavix 75mg daily, asa 81mg daily, atorvastatin 80mg daily    12. Stage 3a chronic kidney disease  Stable, controlled on current medical therapy, followed by PCP.      13. Type 2 diabetes mellitus with hyperglycemia, with long-term current use of insulin  Stable, controlled on current medical therapy, followed by PCP and Graeme Rivera NP  On humalog 7-7-7 units before meals 180-230+2, etc, tresiba 32 units at night, ozempic 0.5 mg weekly  Using dexcom  a1c goal less than 8%   Avoid hypoglycemia      14. Type 2 diabetes mellitus with diabetic polyneuropathy, with long-term current use of insulin  Stable, controlled on current medical therapy, followed by PCP and Graeme Rivera NP.      15. Obesity (BMI 30-39.9)  Stable, followed by PCP.      16. Multinodular goiter  Stable, followed by PCP.      17. Gastroesophageal reflux disease with esophagitis, unspecified whether hemorrhage  Stable, controlled on current medical therapy, followed by PCP.      18. History of hepatitis C, SVR as of 8-2016   Stable, followed by  PCP.     Provided Buck with a 5-10 year written screening schedule and personal prevention plan. Recommendations were developed using the USPSTF age appropriate recommendations. Education, counseling, and referrals were provided as needed. After Visit Summary printed and given to patient which includes a list of additional screenings\tests needed.    Follow up if symptoms worsen or fail to improve.    Luana Maravilla PA-C     I offered to discuss advanced care planning, including how to pick a person who would make decisions for you if you were unable to make them for yourself, called a health care power of , and what kind of decisions you might make such as use of life sustaining treatments such as ventilators and tube feeding when faced with a life limiting illness recorded on a living will that they will need to know. (How you want to be cared for as you near the end of your natural life)     X Patient is interested in learning more about how to make advanced directives.  I provided them paperwork and offered to discuss this with them.

## 2022-04-14 NOTE — PROGRESS NOTES
Subjective:      Patient ID: Buck Ibarra is a 76 y.o. female.    Chief Complaint: Diabetes Mellitus (PCP 4/14/2022) and Diabetic Foot Exam (Right foot pain at night time )    Buck is a 76 y.o. female who presents to the clinic upon referral from Dr. Rivera  for evaluation and treatment of diabetic feet. Buck has a past medical history of Allergy, Cataract, Diabetes mellitus type II, Gastritis, GERD (gastroesophageal reflux disease), H. pylori infection, History of hepatitis C, SVR as of 8-2016  (8/25/2015), Hypertension, Osteopenia, and Type 2 diabetes mellitus with diabetic polyneuropathy. Patient relates no major problem with feet. Only complaints today consist of Diabetes and need for comprehensive annual DM foot exam. She is at increased risk of diabetic complications and requires evaluation/management/optomization of foot care per recommendations of PCP. Has difficulty with toenails that are thick, discolored. Inquiring about treatment options. She also has hx of PAD, lower extremity stenting and is on long term Plavix.     PCP: Ericka Cortez MD    Date Last Seen by PCP:   Chief Complaint   Patient presents with    Diabetes Mellitus     PCP 4/14/2022    Diabetic Foot Exam     Right foot pain at night time         Current shoe gear: Casual shoes    Hemoglobin A1C   Date Value Ref Range Status   03/28/2022 9.1 (H) 4.0 - 5.6 % Final     Comment:     ADA Screening Guidelines:  5.7-6.4%  Consistent with prediabetes  >or=6.5%  Consistent with diabetes    High levels of fetal hemoglobin interfere with the HbA1C  assay. Heterozygous hemoglobin variants (HbS, HgC, etc)do  not significantly interfere with this assay.   However, presence of multiple variants may affect accuracy.     11/22/2021 9.9 (H) 4.0 - 5.6 % Final     Comment:     ADA Screening Guidelines:  5.7-6.4%  Consistent with prediabetes  >or=6.5%  Consistent with diabetes    High levels of fetal hemoglobin interfere with the HbA1C  assay.  Heterozygous hemoglobin variants (HbS, HgC, etc)do  not significantly interfere with this assay.   However, presence of multiple variants may affect accuracy.     2021 10.6 (H) 4.0 - 5.6 % Final     Comment:     ADA Screening Guidelines:  5.7-6.4%  Consistent with prediabetes  >or=6.5%  Consistent with diabetes    High levels of fetal hemoglobin interfere with the HbA1C  assay. Heterozygous hemoglobin variants (HbS, HgC, etc)do  not significantly interfere with this assay.   However, presence of multiple variants may affect accuracy.             Review of Systems   Constitutional: Negative for chills, diaphoresis, fever, malaise/fatigue and night sweats.   Cardiovascular: Negative for claudication, cyanosis, leg swelling and syncope.   Skin: Positive for nail changes. Negative for color change, dry skin, rash, suspicious lesions and unusual hair distribution.   Musculoskeletal: Negative for falls, joint pain, joint swelling, muscle cramps, muscle weakness and stiffness.   Gastrointestinal: Negative for constipation, diarrhea, nausea and vomiting.   Neurological: Positive for numbness and sensory change. Negative for brief paralysis, disturbances in coordination, focal weakness, paresthesias and tremors.           Objective:      Physical Exam  Vitals reviewed.   Constitutional:       Appearance: She is well-developed.   HENT:      Head: Normocephalic.   Cardiovascular:      Pulses:           Dorsalis pedis pulses are 1+ on the right side and 1+ on the left side.      Comments: PT pulses diminished b/l. CRT < 3 sec to tips of toes.  No vericosities noted to b/l LEs.     Pulmonary:      Effort: No respiratory distress.   Musculoskeletal:      Right lower le+ Edema present.      Left lower le+ Edema present.      Comments: Rectus foot and toe position bilateral with no major deformities noted. Mild equinus noted b/l ankles with < 10 deg DF noted. MMT 5/5 in DF/PF/Inv/Ev resistance with no reproduction of  pain in any direction. Passive range of motion of ankle and pedal joints is painless b/l.     Skin:     General: Skin is warm and dry.      Findings: No erythema.      Comments: No open lesions, lacerations or wounds noted. Nails are thickened, elongated, discolored yellow/brown with subungual debris and brittleness to R 1-5 and L 1-5. Interdigital spaces clean, dry and intact b/l. No erythema noted to b/l foot. Skin texture thin, atrophic, dry. Mild hyperpigmentation to skin of both ankles and/or feet, consistent with hemosiderin deposits. Pedal hair diminished. Toes cool to touch b/l. Hyperkeratotic lesion noted to distal tips of b/l hallux, 2nd and 3rd toes. Skin lines present to lesion/s. No signs of deep tissue injury.     Neurological:      Mental Status: She is alert and oriented to person, place, and time.      Sensory: Sensory deficit present.      Comments: Light touch, proprioception, and sharp/dull sensation are all intact bilaterally. Protective threshold with the Posen-Wienstein monofilament is intact bilaterally. Subjective paresthesias with no clearly identifiable source or trigger.    Psychiatric:         Behavior: Behavior normal.         Thought Content: Thought content normal.         Judgment: Judgment normal.               Assessment:       Encounter Diagnoses   Name Primary?    Type 2 diabetes mellitus with diabetic polyneuropathy, with long-term current use of insulin     Onychomycosis due to dermatophyte Yes    Corn or callus     PAD (peripheral artery disease)     Hx of long term use of blood thinners          Plan:       Buck was seen today for diabetes mellitus and diabetic foot exam.    Diagnoses and all orders for this visit:    Onychomycosis due to dermatophyte    Type 2 diabetes mellitus with diabetic polyneuropathy, with long-term current use of insulin  -     Ambulatory referral/consult to Podiatry    [place] or callus    PAD (peripheral artery disease)    Hx of long term  use of blood thinners    Other orders  -     ketoconazole (NIZORAL) 2 % cream; Apply topically once daily. Apply to affected toenails daily for 6-12 months      I counseled the patient on her conditions, their implications and medical management.    - Shoe inspection. Diabetic Foot Education. Patient reminded of the importance of good nutrition and blood sugar control to help prevent podiatric complications of diabetes. Patient instructed on proper foot hygeine. We discussed wearing proper shoe gear, daily foot inspections, never walking without protective shoe gear, caution putting sharp instruments to feet     - Discussed DM foot care:  Wear comfortable, proper fitting shoes. Wash feet daily. Dry well. After drying, apply moisturizer to feet (no lotion to webspaces). Inspect feet daily for skin breaks, blisters, swelling, or redness. Wear cotton socks (preferably white)  Change socks every day. Do NOT walk barefoot. Do NOT use heating pads or warm/hot water soaks     With patient's permission, nails were aggressively reduced and debrided 1,2,3,4, 5 R and 1,2, 3,4,5 L and filed to their soft tissue attachment mechanically and with electric , removing all offending nail and debris. Patient tolerated this well and no blood was drawn. Patient reports relief following the procedure.     The affected area was cleansed with an alcohol prep pad. Next, utilizing a 5mm curette, the hyperkeratotic tissues were trimmed from distal tips of toes 1-3 b/l, down to appropriate level of skin. Care was taken to remove any nucleated core from the center of the lesion. No pinpoint bleeding was encountered. The patient tolerated relief following this procedure.     Rx Ketoconazole cream to be applied to affected toenails for up to 1 year.    RTC 3 months, sooner PRN

## 2022-04-14 NOTE — PATIENT INSTRUCTIONS
Counseling and Referral of Other Preventative  (Italic type indicates deductible and co-insurance are waived)    Patient Name: Buck Ibarra  Today's Date: 4/14/2022    Health Maintenance       Date Due Completion Date    Shingles Vaccine (3 of 3) 12/30/2021 11/4/2021    DEXA Scan 06/25/2022 6/25/2020    Hemoglobin A1c 06/28/2022 3/28/2022    Foot Exam 07/29/2022 7/29/2021    Override on 9/17/2020: Done    Override on 8/9/2018: Done    Override on 3/15/2018: Done    Override on 1/21/2016: Done    Mammogram 09/30/2022 9/30/2021    Lipid Panel 11/04/2022 11/4/2021    Diabetes Urine Screening 11/22/2022 11/22/2021    Eye Exam 03/10/2023 3/10/2022    High Dose Statin 04/01/2023 4/1/2022    Colorectal Cancer Screening 08/08/2024 8/8/2019    TETANUS VACCINE 07/22/2025 7/22/2015        No orders of the defined types were placed in this encounter.    The following information is provided to all patients.  This information is to help you find resources for any of the problems found today that may be affecting your health:                Living healthy guide: www.[facility].[place].gov      Understanding Diabetes: www.diabetes.org      Eating healthy: www.cdc.gov/healthyweight      CDC home safety checklist: www.cdc.gov/steadi/patient.html      Agency on Aging: www.goea.[place].gov      Alcoholics anonymous (AA): www.aa.org      Physical Activity: www.oren.nih.gov/dt2lrlc      Tobacco use: www.quitwithusla.org

## 2022-04-19 ENCOUNTER — TELEPHONE (OUTPATIENT)
Dept: CARDIOLOGY | Facility: CLINIC | Age: 77
End: 2022-04-19
Payer: MEDICARE

## 2022-05-12 ENCOUNTER — OFFICE VISIT (OUTPATIENT)
Dept: PRIMARY CARE CLINIC | Facility: CLINIC | Age: 77
End: 2022-05-12
Payer: MEDICARE

## 2022-05-12 ENCOUNTER — LAB VISIT (OUTPATIENT)
Dept: LAB | Facility: HOSPITAL | Age: 77
End: 2022-05-12
Attending: INTERNAL MEDICINE
Payer: MEDICARE

## 2022-05-12 VITALS
HEIGHT: 66 IN | SYSTOLIC BLOOD PRESSURE: 158 MMHG | OXYGEN SATURATION: 99 % | DIASTOLIC BLOOD PRESSURE: 88 MMHG | WEIGHT: 214.75 LBS | TEMPERATURE: 98 F | HEART RATE: 81 BPM | BODY MASS INDEX: 34.51 KG/M2

## 2022-05-12 DIAGNOSIS — G47.00 INSOMNIA, UNSPECIFIED TYPE: ICD-10-CM

## 2022-05-12 DIAGNOSIS — E04.1 THYROID NODULE: ICD-10-CM

## 2022-05-12 DIAGNOSIS — I10 BENIGN ESSENTIAL HYPERTENSION: ICD-10-CM

## 2022-05-12 DIAGNOSIS — E11.69 HYPERLIPIDEMIA ASSOCIATED WITH TYPE 2 DIABETES MELLITUS: ICD-10-CM

## 2022-05-12 DIAGNOSIS — E11.22 CKD STAGE 3 DUE TO TYPE 2 DIABETES MELLITUS: ICD-10-CM

## 2022-05-12 DIAGNOSIS — E11.69 HYPERLIPIDEMIA ASSOCIATED WITH TYPE 2 DIABETES MELLITUS: Primary | ICD-10-CM

## 2022-05-12 DIAGNOSIS — E55.9 VITAMIN D DEFICIENCY: ICD-10-CM

## 2022-05-12 DIAGNOSIS — N18.30 CKD STAGE 3 DUE TO TYPE 2 DIABETES MELLITUS: ICD-10-CM

## 2022-05-12 DIAGNOSIS — E78.5 HYPERLIPIDEMIA ASSOCIATED WITH TYPE 2 DIABETES MELLITUS: Primary | ICD-10-CM

## 2022-05-12 DIAGNOSIS — E11.649 DIABETIC HYPOGLYCEMIA: ICD-10-CM

## 2022-05-12 DIAGNOSIS — E78.5 HYPERLIPIDEMIA ASSOCIATED WITH TYPE 2 DIABETES MELLITUS: ICD-10-CM

## 2022-05-12 LAB
25(OH)D3+25(OH)D2 SERPL-MCNC: 25 NG/ML (ref 30–96)
ALBUMIN SERPL BCP-MCNC: 3 G/DL (ref 3.5–5.2)
ALP SERPL-CCNC: 133 U/L (ref 55–135)
ALT SERPL W/O P-5'-P-CCNC: 25 U/L (ref 10–44)
ANION GAP SERPL CALC-SCNC: 11 MMOL/L (ref 8–16)
AST SERPL-CCNC: 21 U/L (ref 10–40)
BASOPHILS # BLD AUTO: 0.05 K/UL (ref 0–0.2)
BASOPHILS NFR BLD: 0.7 % (ref 0–1.9)
BILIRUB SERPL-MCNC: 0.2 MG/DL (ref 0.1–1)
BUN SERPL-MCNC: 22 MG/DL (ref 8–23)
CALCIUM SERPL-MCNC: 9.6 MG/DL (ref 8.7–10.5)
CHLORIDE SERPL-SCNC: 106 MMOL/L (ref 95–110)
CHOLEST SERPL-MCNC: 186 MG/DL (ref 120–199)
CHOLEST/HDLC SERPL: 3.7 {RATIO} (ref 2–5)
CO2 SERPL-SCNC: 23 MMOL/L (ref 23–29)
CREAT SERPL-MCNC: 1 MG/DL (ref 0.5–1.4)
DIFFERENTIAL METHOD: ABNORMAL
EOSINOPHIL # BLD AUTO: 0.1 K/UL (ref 0–0.5)
EOSINOPHIL NFR BLD: 0.9 % (ref 0–8)
ERYTHROCYTE [DISTWIDTH] IN BLOOD BY AUTOMATED COUNT: 15.5 % (ref 11.5–14.5)
EST. GFR  (AFRICAN AMERICAN): >60 ML/MIN/1.73 M^2
EST. GFR  (NON AFRICAN AMERICAN): 55.6 ML/MIN/1.73 M^2
ESTIMATED AVG GLUCOSE: 212 MG/DL (ref 68–131)
GLUCOSE SERPL-MCNC: 227 MG/DL (ref 70–110)
HBA1C MFR BLD: 9 % (ref 4–5.6)
HCT VFR BLD AUTO: 48.1 % (ref 37–48.5)
HDLC SERPL-MCNC: 50 MG/DL (ref 40–75)
HDLC SERPL: 26.9 % (ref 20–50)
HGB BLD-MCNC: 14.1 G/DL (ref 12–16)
IMM GRANULOCYTES # BLD AUTO: 0.01 K/UL (ref 0–0.04)
IMM GRANULOCYTES NFR BLD AUTO: 0.1 % (ref 0–0.5)
LDLC SERPL CALC-MCNC: 120 MG/DL (ref 63–159)
LYMPHOCYTES # BLD AUTO: 3.3 K/UL (ref 1–4.8)
LYMPHOCYTES NFR BLD: 44.3 % (ref 18–48)
MCH RBC QN AUTO: 26 PG (ref 27–31)
MCHC RBC AUTO-ENTMCNC: 29.3 G/DL (ref 32–36)
MCV RBC AUTO: 89 FL (ref 82–98)
MONOCYTES # BLD AUTO: 0.3 K/UL (ref 0.3–1)
MONOCYTES NFR BLD: 4.5 % (ref 4–15)
NEUTROPHILS # BLD AUTO: 3.7 K/UL (ref 1.8–7.7)
NEUTROPHILS NFR BLD: 49.5 % (ref 38–73)
NONHDLC SERPL-MCNC: 136 MG/DL
NRBC BLD-RTO: 0 /100 WBC
PLATELET # BLD AUTO: 404 K/UL (ref 150–450)
PMV BLD AUTO: 11.6 FL (ref 9.2–12.9)
POTASSIUM SERPL-SCNC: 4.6 MMOL/L (ref 3.5–5.1)
PROT SERPL-MCNC: 6.9 G/DL (ref 6–8.4)
RBC # BLD AUTO: 5.42 M/UL (ref 4–5.4)
SODIUM SERPL-SCNC: 140 MMOL/L (ref 136–145)
TRIGL SERPL-MCNC: 80 MG/DL (ref 30–150)
TSH SERPL DL<=0.005 MIU/L-ACNC: 1.09 UIU/ML (ref 0.4–4)
WBC # BLD AUTO: 7.4 K/UL (ref 3.9–12.7)

## 2022-05-12 PROCEDURE — 4010F PR ACE/ARB THEARPY RXD/TAKEN: ICD-10-PCS | Mod: CPTII,S$GLB,, | Performed by: INTERNAL MEDICINE

## 2022-05-12 PROCEDURE — 3288F FALL RISK ASSESSMENT DOCD: CPT | Mod: CPTII,S$GLB,, | Performed by: INTERNAL MEDICINE

## 2022-05-12 PROCEDURE — 99214 PR OFFICE/OUTPT VISIT, EST, LEVL IV, 30-39 MIN: ICD-10-PCS | Mod: S$GLB,,, | Performed by: INTERNAL MEDICINE

## 2022-05-12 PROCEDURE — 3079F DIAST BP 80-89 MM HG: CPT | Mod: CPTII,S$GLB,, | Performed by: INTERNAL MEDICINE

## 2022-05-12 PROCEDURE — 4010F ACE/ARB THERAPY RXD/TAKEN: CPT | Mod: CPTII,S$GLB,, | Performed by: INTERNAL MEDICINE

## 2022-05-12 PROCEDURE — 1101F PR PT FALLS ASSESS DOC 0-1 FALLS W/OUT INJ PAST YR: ICD-10-PCS | Mod: CPTII,S$GLB,, | Performed by: INTERNAL MEDICINE

## 2022-05-12 PROCEDURE — 3077F PR MOST RECENT SYSTOLIC BLOOD PRESSURE >= 140 MM HG: ICD-10-PCS | Mod: CPTII,S$GLB,, | Performed by: INTERNAL MEDICINE

## 2022-05-12 PROCEDURE — 3077F SYST BP >= 140 MM HG: CPT | Mod: CPTII,S$GLB,, | Performed by: INTERNAL MEDICINE

## 2022-05-12 PROCEDURE — 80053 COMPREHEN METABOLIC PANEL: CPT | Performed by: INTERNAL MEDICINE

## 2022-05-12 PROCEDURE — 3079F PR MOST RECENT DIASTOLIC BLOOD PRESSURE 80-89 MM HG: ICD-10-PCS | Mod: CPTII,S$GLB,, | Performed by: INTERNAL MEDICINE

## 2022-05-12 PROCEDURE — 1160F RVW MEDS BY RX/DR IN RCRD: CPT | Mod: CPTII,S$GLB,, | Performed by: INTERNAL MEDICINE

## 2022-05-12 PROCEDURE — 82306 VITAMIN D 25 HYDROXY: CPT | Performed by: INTERNAL MEDICINE

## 2022-05-12 PROCEDURE — 1159F PR MEDICATION LIST DOCUMENTED IN MEDICAL RECORD: ICD-10-PCS | Mod: CPTII,S$GLB,, | Performed by: INTERNAL MEDICINE

## 2022-05-12 PROCEDURE — 3288F PR FALLS RISK ASSESSMENT DOCUMENTED: ICD-10-PCS | Mod: CPTII,S$GLB,, | Performed by: INTERNAL MEDICINE

## 2022-05-12 PROCEDURE — 1101F PT FALLS ASSESS-DOCD LE1/YR: CPT | Mod: CPTII,S$GLB,, | Performed by: INTERNAL MEDICINE

## 2022-05-12 PROCEDURE — 1158F PR ADVANCE CARE PLANNING DISCUSS DOCUMENTED IN MEDICAL RECORD: ICD-10-PCS | Mod: CPTII,S$GLB,, | Performed by: INTERNAL MEDICINE

## 2022-05-12 PROCEDURE — 1126F AMNT PAIN NOTED NONE PRSNT: CPT | Mod: CPTII,S$GLB,, | Performed by: INTERNAL MEDICINE

## 2022-05-12 PROCEDURE — 99499 UNLISTED E&M SERVICE: CPT | Mod: S$GLB,,, | Performed by: INTERNAL MEDICINE

## 2022-05-12 PROCEDURE — 83036 HEMOGLOBIN GLYCOSYLATED A1C: CPT | Performed by: INTERNAL MEDICINE

## 2022-05-12 PROCEDURE — 1159F MED LIST DOCD IN RCRD: CPT | Mod: CPTII,S$GLB,, | Performed by: INTERNAL MEDICINE

## 2022-05-12 PROCEDURE — 3008F PR BODY MASS INDEX (BMI) DOCUMENTED: ICD-10-PCS | Mod: CPTII,S$GLB,, | Performed by: INTERNAL MEDICINE

## 2022-05-12 PROCEDURE — 3072F LOW RISK FOR RETINOPATHY: CPT | Mod: CPTII,S$GLB,, | Performed by: INTERNAL MEDICINE

## 2022-05-12 PROCEDURE — 85025 COMPLETE CBC W/AUTO DIFF WBC: CPT | Performed by: INTERNAL MEDICINE

## 2022-05-12 PROCEDURE — 99214 OFFICE O/P EST MOD 30 MIN: CPT | Mod: S$GLB,,, | Performed by: INTERNAL MEDICINE

## 2022-05-12 PROCEDURE — 3046F PR MOST RECENT HEMOGLOBIN A1C LEVEL > 9.0%: ICD-10-PCS | Mod: CPTII,S$GLB,, | Performed by: INTERNAL MEDICINE

## 2022-05-12 PROCEDURE — 84443 ASSAY THYROID STIM HORMONE: CPT | Performed by: INTERNAL MEDICINE

## 2022-05-12 PROCEDURE — 99499 RISK ADDL DX/OHS AUDIT: ICD-10-PCS | Mod: S$GLB,,, | Performed by: INTERNAL MEDICINE

## 2022-05-12 PROCEDURE — 1160F PR REVIEW ALL MEDS BY PRESCRIBER/CLIN PHARMACIST DOCUMENTED: ICD-10-PCS | Mod: CPTII,S$GLB,, | Performed by: INTERNAL MEDICINE

## 2022-05-12 PROCEDURE — 80061 LIPID PANEL: CPT | Performed by: INTERNAL MEDICINE

## 2022-05-12 PROCEDURE — 99999 PR PBB SHADOW E&M-EST. PATIENT-LVL V: CPT | Mod: PBBFAC,,, | Performed by: INTERNAL MEDICINE

## 2022-05-12 PROCEDURE — 3046F HEMOGLOBIN A1C LEVEL >9.0%: CPT | Mod: CPTII,S$GLB,, | Performed by: INTERNAL MEDICINE

## 2022-05-12 PROCEDURE — 36415 COLL VENOUS BLD VENIPUNCTURE: CPT | Mod: PN | Performed by: INTERNAL MEDICINE

## 2022-05-12 PROCEDURE — 3072F PR LOW RISK FOR RETINOPATHY: ICD-10-PCS | Mod: CPTII,S$GLB,, | Performed by: INTERNAL MEDICINE

## 2022-05-12 PROCEDURE — 1126F PR PAIN SEVERITY QUANTIFIED, NO PAIN PRESENT: ICD-10-PCS | Mod: CPTII,S$GLB,, | Performed by: INTERNAL MEDICINE

## 2022-05-12 PROCEDURE — 99999 PR PBB SHADOW E&M-EST. PATIENT-LVL V: ICD-10-PCS | Mod: PBBFAC,,, | Performed by: INTERNAL MEDICINE

## 2022-05-12 PROCEDURE — 1158F ADVNC CARE PLAN TLK DOCD: CPT | Mod: CPTII,S$GLB,, | Performed by: INTERNAL MEDICINE

## 2022-05-12 PROCEDURE — 3008F BODY MASS INDEX DOCD: CPT | Mod: CPTII,S$GLB,, | Performed by: INTERNAL MEDICINE

## 2022-05-12 RX ORDER — SEMAGLUTIDE 1.34 MG/ML
INJECTION, SOLUTION SUBCUTANEOUS
Qty: 3 PEN | Refills: 3 | Status: SHIPPED | OUTPATIENT
Start: 2022-05-12 | End: 2022-05-13

## 2022-05-12 NOTE — PATIENT INSTRUCTIONS
Start taking over the counter vitamin D 5000 IU daily. Stop the weekly vitamin D.     Try melatonin 3mg over the counter 1-2 tabs prior to sleep.

## 2022-05-12 NOTE — Clinical Note
Coral, can you call pt next week to get a home BP reading so we could input a remote reading? She says it's always higher in the office.

## 2022-05-12 NOTE — PROGRESS NOTES
"Subjective:       Patient ID: Buck Ibarra is a 76 y.o. female.    Chief Complaint: DM2 Follow-up    HPI   S/P COVID 3 shots. Wants to get 2nd booster.   eAWV w/ ISABEL Persaud 4/2022.   Daughter lives upstairs. Pt barely walks up the stairs. Just mopped 2 floors yesterday and so her lower back is aching. Reports w/ exercise, knees will hurt. Reports "relaxes" most of the time.     Changed from trulicity to ozempic. Had some hypoglycemic episodes (a few days ago, was down to 60s - does get symptoms; improved after a few oz of orange juice/soda). Lost 4lbs since the beginning of April since being on ozempic.   DM2 w/ hyper and hypoglycemia.   DEXCOM in place. Loves it.   tresiba 30 units nightly, humalog 7-7-7 plus SSI.   Follows w/ Raisa Rivera NP.   a1c 3/28/22 9.1<--11/22/21 9.9  MAC 11/22/21 neg.     Insomnia - trouble falling asleep and staying asleep. Sometimes does not have good sleep for 4-5 nights.   Never tried melatonin.  Trazodone didn't work.     HLD - atorvastatin 80mg daily.   .2 11/2021.     Vitamin D def - on ergo 50k weekly. Reports is $32 and is high. Never been on OTC vitamin D.     HTN - chlorthalidone 50mg daily, coreg 6.25mg BID, candesartan 32mg daily  Checks BP daily and SBP usually 120s.   Didn't take meds this morning as she was ready for labs.  No CP/SOB/ROMERO.     Review of Systems  Comprehensive review of systems otherwise negative. See history/subjective section for more details.    Objective:      Physical Exam    BP (!) 158/88   Pulse 81   Temp 97.7 °F (36.5 °C) (Oral)   Ht 5' 6" (1.676 m)   Wt 97.4 kg (214 lb 11.7 oz)   SpO2 99%   BMI 34.66 kg/m²     GEN - A+OX4, NAD   HEENT - PERRL, EOMI, OP clear. MMM. TM normal.   Neck - No cervical LAD. Thyroid nodule. .  CV - RRR, no m/r   Chest - CTAB, no wheezing or rhonchi  Abd - S/NT/ND/+BS.   Ext - 2+BDP and radial pulses. No C/C/E.  Neuro - 5/5 BUE and BLE strength. Normal gait.   MSK - no spinal tenderness to " palpation.   Skin - No rash.    Previous labs reviewed.     Assessment/Plan     Buck was seen today for follow-up.    Diagnoses and all orders for this visit:    Hyperlipidemia associated with type 2 diabetes mellitus - cont atorva 80mg daily.   -     Lipid Panel; Future  -     Comprehensive Metabolic Panel; Future    Diabetic hypoglycemia - repeat a1c. DEXCOM in place. Consider going up on ozempic from 0.5 to 1mg and cutting down on tresiba and humalog.   -     Hemoglobin A1C; Future  -     semaglutide (OZEMPIC) 0.25 mg or 0.5 mg(2 mg/1.5 mL) pen injector; Inject 0.5 mg into the skin weekly.    Benign essential hypertension - BP nto at goal today. However reports BP at home well controlled. Didn't take BP meds today in prep for labs.   -     Comprehensive Metabolic Panel; Future    BMI 34.66,adult - try to increase exercise - just adding more movement to daily routine.   -     CBC Auto Differential; Future    CKD stage 3 due to type 2 diabetes mellitus - cont candesartan. CMp today.  -     Hemoglobin A1C; Future  -     semaglutide (OZEMPIC) 0.25 mg or 0.5 mg(2 mg/1.5 mL) pen injector; Inject 0.5 mg into the skin weekly.    Insomnia, unspecified type - trial of melatonin. Consider doxepin.     Vitamin D deficiency - stop ergo. Start OTC 5k IU daily.   -     Vitamin D; Future    Thyroid nodule - repeat thyroid US in 12/2022. No dysphagia.   -     TSH; Future    Give number for covid 2nd booster scheduling.     Advance Care Planning     Date: 05/12/2022    Living Will  During this visit, I engaged the patient  in the advance care planning process.  The patient and I reviewed the role for advance directives and their purpose in directing future healthcare if the patient's unable to speak for him/herself.  At this point in time, the patient does have full decision-making capacity.  We discussed different extreme health states that she could experience, and reviewed what kind of medical care she would want in those  situations.  The patient communicated that if she were comatose and had little chance of a meaningful recovery, she would not want machines/life-sustaining treatments used. In addition to the above preference, other important end-of-life issues for the patient include N/A. Pt will complete the living will and HCPOA.  I spent a total of 2 minutes engaging the patient in this advance care planning discussion.          Power of   I initiated the process of advance care planning today and explained the importance of this process to the patient.  I introduced the concept of advance directives to the patient, as well. Then the patient received detailed information about the importance of designating a Health Care Power of  (HCPOA). She was also instructed to communicate with this person about their wishes for future healthcare, should she become sick and lose decision-making capacity. The patient has not previously appointed a HCPOA. After our discussion, the patient has not decided to complete a HCPOA and has appointed her daughter, health care agent: Olga & health care agent number: [phone] I spent a total time of 2 minutes discussing this issue with the patient.         Follow up in about 2 months (around 7/12/2022).      Ericka Cortez MD  Department of Internal Medicine - [facility]  9:56 AM

## 2022-05-13 ENCOUNTER — TELEPHONE (OUTPATIENT)
Dept: PRIMARY CARE CLINIC | Facility: CLINIC | Age: 77
End: 2022-05-13
Payer: MEDICARE

## 2022-05-13 DIAGNOSIS — E11.69 DIABETES MELLITUS TYPE 2 IN OBESE: ICD-10-CM

## 2022-05-13 DIAGNOSIS — E66.9 DIABETES MELLITUS TYPE 2 IN OBESE: ICD-10-CM

## 2022-05-13 RX ORDER — INSULIN DEGLUDEC 200 U/ML
INJECTION, SOLUTION SUBCUTANEOUS
Qty: 3 PEN | Refills: 6
Start: 2022-05-13 | End: 2022-08-04

## 2022-05-13 NOTE — TELEPHONE ENCOUNTER
Let patient know about her labs. She will adjust diabetic meds. has discussed. She will take daily Vitamin D.   She states she has been taking her atorvastatin every night 80 mg. She says she needs refills.   She will call or message us with any questions or concerns.

## 2022-05-13 NOTE — TELEPHONE ENCOUNTER
Please call and notify pt:    Let pt know the a1c is about the same as previously. As discussed, let's go up on the ozempic from 0.5mg to 1mg weekly and let's decrease tresiba from 30 units daily to 28 units daily and keep humalog 7 units prior to meals. Keep appointment with Graeme Rivera NP. I'm sending in a new script and with the new pen and dosage, there will be FOUR doses in ONE pen.    Vitamin D is still borderline low. Ok to stop high dose vitamin D weekly and start D over the counter 5000IU daily.     Blood counts are normal. Kidney function is slightly better. Cholesterol is ok but not quite at goal still. Please make sure she's taking atorvastatin 80mg daily - it looks like I last refilled it 5/20/21. Make sure she doesn't need refills please.

## 2022-05-27 ENCOUNTER — TELEPHONE (OUTPATIENT)
Dept: PRIMARY CARE CLINIC | Facility: CLINIC | Age: 77
End: 2022-05-27
Payer: MEDICARE

## 2022-05-27 NOTE — TELEPHONE ENCOUNTER
Spoke with Ms Sabrina. She has been taking b/p. This am before meds was 140/80. She did not have another reading after her meds. She is agreeable to us calling next week to check in on her b/p numbers.

## 2022-06-02 ENCOUNTER — IMMUNIZATION (OUTPATIENT)
Dept: INTERNAL MEDICINE | Facility: CLINIC | Age: 77
End: 2022-06-02
Payer: MEDICARE

## 2022-06-02 ENCOUNTER — OFFICE VISIT (OUTPATIENT)
Dept: DERMATOLOGY | Facility: CLINIC | Age: 77
End: 2022-06-02
Payer: MEDICARE

## 2022-06-02 DIAGNOSIS — L21.9 SEBORRHEIC DERMATITIS: Primary | ICD-10-CM

## 2022-06-02 DIAGNOSIS — Z23 NEED FOR VACCINATION: Primary | ICD-10-CM

## 2022-06-02 DIAGNOSIS — L65.9 ALOPECIA: ICD-10-CM

## 2022-06-02 PROCEDURE — 1160F PR REVIEW ALL MEDS BY PRESCRIBER/CLIN PHARMACIST DOCUMENTED: ICD-10-PCS | Mod: CPTII,S$GLB,, | Performed by: STUDENT IN AN ORGANIZED HEALTH CARE EDUCATION/TRAINING PROGRAM

## 2022-06-02 PROCEDURE — 99203 OFFICE O/P NEW LOW 30 MIN: CPT | Mod: S$GLB,,, | Performed by: STUDENT IN AN ORGANIZED HEALTH CARE EDUCATION/TRAINING PROGRAM

## 2022-06-02 PROCEDURE — 4010F PR ACE/ARB THEARPY RXD/TAKEN: ICD-10-PCS | Mod: CPTII,S$GLB,, | Performed by: STUDENT IN AN ORGANIZED HEALTH CARE EDUCATION/TRAINING PROGRAM

## 2022-06-02 PROCEDURE — 1159F PR MEDICATION LIST DOCUMENTED IN MEDICAL RECORD: ICD-10-PCS | Mod: CPTII,S$GLB,, | Performed by: STUDENT IN AN ORGANIZED HEALTH CARE EDUCATION/TRAINING PROGRAM

## 2022-06-02 PROCEDURE — 99999 PR PBB SHADOW E&M-EST. PATIENT-LVL III: CPT | Mod: PBBFAC,,, | Performed by: STUDENT IN AN ORGANIZED HEALTH CARE EDUCATION/TRAINING PROGRAM

## 2022-06-02 PROCEDURE — 3072F PR LOW RISK FOR RETINOPATHY: ICD-10-PCS | Mod: CPTII,S$GLB,, | Performed by: STUDENT IN AN ORGANIZED HEALTH CARE EDUCATION/TRAINING PROGRAM

## 2022-06-02 PROCEDURE — 1126F PR PAIN SEVERITY QUANTIFIED, NO PAIN PRESENT: ICD-10-PCS | Mod: CPTII,S$GLB,, | Performed by: STUDENT IN AN ORGANIZED HEALTH CARE EDUCATION/TRAINING PROGRAM

## 2022-06-02 PROCEDURE — 1126F AMNT PAIN NOTED NONE PRSNT: CPT | Mod: CPTII,S$GLB,, | Performed by: STUDENT IN AN ORGANIZED HEALTH CARE EDUCATION/TRAINING PROGRAM

## 2022-06-02 PROCEDURE — 3052F HG A1C>EQUAL 8.0%<EQUAL 9.0%: CPT | Mod: CPTII,S$GLB,, | Performed by: STUDENT IN AN ORGANIZED HEALTH CARE EDUCATION/TRAINING PROGRAM

## 2022-06-02 PROCEDURE — 91300 COVID-19, MRNA, LNP-S, PF, 30 MCG/0.3 ML DOSE VACCINE: CPT | Mod: PBBFAC | Performed by: INTERNAL MEDICINE

## 2022-06-02 PROCEDURE — 3052F PR MOST RECENT HEMOGLOBIN A1C LEVEL 8.0 - < 9.0%: ICD-10-PCS | Mod: CPTII,S$GLB,, | Performed by: STUDENT IN AN ORGANIZED HEALTH CARE EDUCATION/TRAINING PROGRAM

## 2022-06-02 PROCEDURE — 1159F MED LIST DOCD IN RCRD: CPT | Mod: CPTII,S$GLB,, | Performed by: STUDENT IN AN ORGANIZED HEALTH CARE EDUCATION/TRAINING PROGRAM

## 2022-06-02 PROCEDURE — 99203 PR OFFICE/OUTPT VISIT, NEW, LEVL III, 30-44 MIN: ICD-10-PCS | Mod: S$GLB,,, | Performed by: STUDENT IN AN ORGANIZED HEALTH CARE EDUCATION/TRAINING PROGRAM

## 2022-06-02 PROCEDURE — 99999 PR PBB SHADOW E&M-EST. PATIENT-LVL III: ICD-10-PCS | Mod: PBBFAC,,, | Performed by: STUDENT IN AN ORGANIZED HEALTH CARE EDUCATION/TRAINING PROGRAM

## 2022-06-02 PROCEDURE — 1160F RVW MEDS BY RX/DR IN RCRD: CPT | Mod: CPTII,S$GLB,, | Performed by: STUDENT IN AN ORGANIZED HEALTH CARE EDUCATION/TRAINING PROGRAM

## 2022-06-02 PROCEDURE — 4010F ACE/ARB THERAPY RXD/TAKEN: CPT | Mod: CPTII,S$GLB,, | Performed by: STUDENT IN AN ORGANIZED HEALTH CARE EDUCATION/TRAINING PROGRAM

## 2022-06-02 PROCEDURE — 3072F LOW RISK FOR RETINOPATHY: CPT | Mod: CPTII,S$GLB,, | Performed by: STUDENT IN AN ORGANIZED HEALTH CARE EDUCATION/TRAINING PROGRAM

## 2022-06-02 RX ORDER — FLUOCINONIDE TOPICAL SOLUTION USP, 0.05% 0.5 MG/ML
SOLUTION TOPICAL 2 TIMES DAILY
Qty: 60 ML | Refills: 11 | Status: SHIPPED | OUTPATIENT
Start: 2022-06-02 | End: 2023-01-01

## 2022-06-02 RX ORDER — KETOCONAZOLE 20 MG/ML
SHAMPOO, SUSPENSION TOPICAL
Qty: 120 ML | Refills: 11 | Status: SHIPPED | OUTPATIENT
Start: 2022-06-02 | End: 2023-01-01

## 2022-06-02 NOTE — PROGRESS NOTES
Subjective:       Patient ID:  Buck Ibarra is a 76 y.o. female who presents for   Chief Complaint   Patient presents with    Hair Loss     Scalp     Hair Loss - Initial  Affected locations: scalp  Duration: several years, progressive, dxed with seb derm in past.  Signs / symptoms: dryness, itching and tender  Aggravated by: scratching  Treatments tried: ketoconazole shampoo.  Improvement on treatment: no relief        Review of Systems   Constitutional: Negative.    Skin: Positive for itching and dry skin.        Objective:    Physical Exam   Constitutional: She appears well-developed and well-nourished. No distress.   Neurological: She is alert and oriented to person, place, and time. She is not disoriented.   Psychiatric: She has a normal mood and affect.   Skin:   Areas Examined (abnormalities noted in diagram):   Scalp / Hair Palpated and Inspected  Head / Face Inspection Performed              Diagram Legend     Erythematous scaling macule/papule c/w actinic keratosis       Vascular papule c/w angioma      Pigmented verrucoid papule/plaque c/w seborrheic keratosis      Yellow umbilicated papule c/w sebaceous hyperplasia      Irregularly shaped tan macule c/w lentigo     1-2 mm smooth white papules consistent with Milia      Movable subcutaneous cyst with punctum c/w epidermal inclusion cyst      Subcutaneous movable cyst c/w pilar cyst      Firm pink to brown papule c/w dermatofibroma      Pedunculated fleshy papule(s) c/w skin tag(s)      Evenly pigmented macule c/w junctional nevus     Mildly variegated pigmented, slightly irregular-bordered macule c/w mildly atypical nevus      Flesh colored to evenly pigmented papule c/w intradermal nevus       Pink pearly papule/plaque c/w basal cell carcinoma      Erythematous hyperkeratotic cursted plaque c/w SCC      Surgical scar with no sign of skin cancer recurrence      Open and closed comedones      Inflammatory papules and pustules      Verrucoid papule  consistent consistent with wart     Erythematous eczematous patches and plaques     Dystrophic onycholytic nail with subungual debris c/w onychomycosis     Umbilicated papule    Erythematous-base heme-crusted tan verrucoid plaque consistent with inflamed seborrheic keratosis     Erythematous Silvery Scaling Plaque c/w Psoriasis     See annotation      Assessment / Plan:        Seborrheic dermatitis, flaring  Discussed pathogenesis of seb derm and its chronicity. Discussed how there is no cure but it can be managed with topicals. Discussed the side effects of ketoconazole shampoo including dry, brittle hair.    -     fluocinonide (LIDEX) 0.05 % external solution; Apply topically 2 (two) times daily.  Dispense: 60 mL; Refill: 11  -     ketoconazole (NIZORAL) 2 % shampoo; Apply topically every 7 days.  Dispense: 120 mL; Refill: 11    Alopecia  Multifactorial 2/2 seb derm, AGA  - Ok to start oil in scalp (pt uses A&D ointment vs castor oil  -     fluocinonide (LIDEX) 0.05 % external solution; Apply topically 2 (two) times daily.  Dispense: 60 mL; Refill: 11  -     ketoconazole (NIZORAL) 2 % shampoo; Apply topically every 7 days.  Dispense: 120 mL; Refill: 11             No follow-ups on file.

## 2022-06-07 ENCOUNTER — TELEPHONE (OUTPATIENT)
Dept: INTERNAL MEDICINE | Facility: CLINIC | Age: 77
End: 2022-06-07
Payer: MEDICARE

## 2022-06-07 ENCOUNTER — TELEPHONE (OUTPATIENT)
Dept: PRIMARY CARE CLINIC | Facility: CLINIC | Age: 77
End: 2022-06-07
Payer: MEDICARE

## 2022-06-07 NOTE — TELEPHONE ENCOUNTER
Please call to check and see if pt can give us a remote home BP reading. She has appt in July also. Her home readings per pt are usually better than here. Also she didn't take her meds prior to seeing us last visit.

## 2022-06-07 NOTE — TELEPHONE ENCOUNTER
----- Message from Víctor Ramos sent at 6/7/2022 12:20 PM CDT -----  Contact: [phone]  Pt would like a call back about her meter she has some questions.

## 2022-06-08 VITALS — SYSTOLIC BLOOD PRESSURE: 119 MMHG | DIASTOLIC BLOOD PRESSURE: 80 MMHG

## 2022-06-08 NOTE — TELEPHONE ENCOUNTER
Spoke with Ms. Sabrina. She stated her blood pressure has been doing very well. Upon taking it while on the phone it was 119/80. Let her know we would be checking in periodically to check in on her. She understands to call us with any questions or concerns.

## 2022-06-15 DIAGNOSIS — I10 BENIGN ESSENTIAL HYPERTENSION: ICD-10-CM

## 2022-06-15 DIAGNOSIS — E11.65 TYPE 2 DIABETES MELLITUS WITH HYPERGLYCEMIA, WITH LONG-TERM CURRENT USE OF INSULIN: Chronic | ICD-10-CM

## 2022-06-15 DIAGNOSIS — Z79.4 TYPE 2 DIABETES MELLITUS WITH HYPERGLYCEMIA, WITH LONG-TERM CURRENT USE OF INSULIN: Chronic | ICD-10-CM

## 2022-06-15 DIAGNOSIS — E78.5 HYPERLIPIDEMIA ASSOCIATED WITH TYPE 2 DIABETES MELLITUS: ICD-10-CM

## 2022-06-15 DIAGNOSIS — E11.69 HYPERLIPIDEMIA ASSOCIATED WITH TYPE 2 DIABETES MELLITUS: ICD-10-CM

## 2022-06-15 RX ORDER — CLOPIDOGREL BISULFATE 75 MG/1
75 TABLET ORAL DAILY
Qty: 90 TABLET | Refills: 3 | Status: CANCELLED | OUTPATIENT
Start: 2022-06-15 | End: 2023-01-01

## 2022-06-16 RX ORDER — GABAPENTIN 300 MG/1
CAPSULE ORAL
Qty: 90 CAPSULE | Refills: 3 | Status: SHIPPED | OUTPATIENT
Start: 2022-06-16 | End: 2023-01-01

## 2022-06-16 RX ORDER — CHLORTHALIDONE 50 MG/1
50 TABLET ORAL DAILY
Qty: 90 TABLET | Refills: 3 | Status: SHIPPED | OUTPATIENT
Start: 2022-06-16 | End: 2023-01-01

## 2022-06-16 RX ORDER — ATORVASTATIN CALCIUM 80 MG/1
80 TABLET, FILM COATED ORAL DAILY
Qty: 90 TABLET | Refills: 3 | Status: SHIPPED | OUTPATIENT
Start: 2022-06-16 | End: 2022-08-19

## 2022-06-22 ENCOUNTER — TELEPHONE (OUTPATIENT)
Dept: PRIMARY CARE CLINIC | Facility: CLINIC | Age: 77
End: 2022-06-22
Payer: MEDICARE

## 2022-06-22 NOTE — TELEPHONE ENCOUNTER
Tried to contact pt / Left a message for patient to call the office back. Re bookout for august 11

## 2022-06-30 ENCOUNTER — HOSPITAL ENCOUNTER (OUTPATIENT)
Dept: CARDIOLOGY | Facility: HOSPITAL | Age: 77
Discharge: HOME | End: 2022-06-30
Attending: INTERNAL MEDICINE
Payer: MEDICARE

## 2022-06-30 DIAGNOSIS — I73.9 PAD (PERIPHERAL ARTERY DISEASE): ICD-10-CM

## 2022-06-30 LAB
LEFT ANT TIBIAL SYS PSV: 20 CM/S
LEFT CFA PSV: 155 CM/S
LEFT EXTERNAL ILIAC PSV: 109 CM/S
LEFT PERONEAL SYS PSV: 20 CM/S
LEFT POPLITEAL PSV: 75 CM/S
LEFT POST TIBIAL SYS PSV: 0 CM/S
LEFT PROFUNDA SYS PSV: 119 CM/S
LEFT SUPER FEMORAL DIST SYS PSV: 98 CM/S
LEFT SUPER FEMORAL MID SYS PSV: 297 CM/S
LEFT SUPER FEMORAL OSTIAL SYS PSV: 121 CM/S
LEFT SUPER FEMORAL PROX SYS PSV: 91 CM/S
LEFT TIB/PER TRUNK SYS PSV: 54 CM/S
OHS CV LEFT LOWER EXTREMITY ABI (NO CALC): 0.9
OHS CV LOWER EXTREMITY STENT MEASUREMENTS - LEFT - MSFA S1 - DIST: 44
OHS CV LOWER EXTREMITY STENT MEASUREMENTS - LEFT - MSFA S1 - MID: 74
OHS CV LOWER EXTREMITY STENT MEASUREMENTS - LEFT - MSFA S1 - OST: 151
OHS CV LOWER EXTREMITY STENT MEASUREMENTS - LEFT - MSFA S1 - PROX: 137
OHS CV LOWER EXTREMITY STENT MEASUREMENTS - RIGHT - DSFA S1 - DIST: 69
OHS CV LOWER EXTREMITY STENT MEASUREMENTS - RIGHT - DSFA S1 - MID: 50
OHS CV LOWER EXTREMITY STENT MEASUREMENTS - RIGHT - DSFA S1 - PROX: 72
OHS CV LOWER EXTREMITY STENT MEASUREMENTS - RIGHT - MSFA S1 - DIST: 78
OHS CV LOWER EXTREMITY STENT MEASUREMENTS - RIGHT - MSFA S1 - MID: 64
OHS CV LOWER EXTREMITY STENT MEASUREMENTS - RIGHT - MSFA S1 - PROX: 92
OHS CV LOWER EXTREMITY STENT MEASUREMENTS - RIGHT - PSFA S1 - DIST: 67
OHS CV LOWER EXTREMITY STENT MEASUREMENTS - RIGHT - PSFA S1 - MID: 76
OHS CV LOWER EXTREMITY STENT MEASUREMENTS - RIGHT - PSFA S1 - OST: 124
OHS CV LOWER EXTREMITY STENT MEASUREMENTS - RIGHT - PSFA S1 - PROX: 113
OHS CV RIGHT ABI LOWER EXTREMITY (NO CALC): 1.1
RIGHT ANT TIBIAL SYS PSV: 47 CM/S
RIGHT CFA PSV: 125 CM/S
RIGHT EXTERNAL ILLIAC PSV: 209 CM/S
RIGHT PERONEAL SYS PSV: 33 CM/S
RIGHT POPLITEAL PSV: 76 CM/S
RIGHT POST TIBIAL SYS PSV: 84 CM/S
RIGHT PROFUNDA SYS PSV: 129 CM/S
RIGHT SUPER FEMORAL OSTIAL SYS PSV: 174 CM/S
RIGHT TIB/PER TRUNK SYS PSV: 120 CM/S

## 2022-06-30 PROCEDURE — 93925 LOWER EXTREMITY STUDY: CPT

## 2022-06-30 PROCEDURE — 93925 LOWER EXTREMITY STUDY: CPT | Mod: 26,,, | Performed by: INTERNAL MEDICINE

## 2022-06-30 PROCEDURE — 93925 CV US DOPPLER ARTERIAL LEGS BILATERAL (CUPID ONLY): ICD-10-PCS | Mod: 26,,, | Performed by: INTERNAL MEDICINE

## 2022-07-05 ENCOUNTER — DOCUMENTATION ONLY (OUTPATIENT)
Dept: CARDIOLOGY | Facility: CLINIC | Age: 77
End: 2022-07-05
Payer: MEDICARE

## 2022-07-14 ENCOUNTER — OFFICE VISIT (OUTPATIENT)
Dept: PODIATRY | Facility: CLINIC | Age: 77
End: 2022-07-14
Payer: MEDICARE

## 2022-07-14 VITALS
BODY MASS INDEX: 34.33 KG/M2 | WEIGHT: 213.63 LBS | DIASTOLIC BLOOD PRESSURE: 84 MMHG | HEIGHT: 66 IN | SYSTOLIC BLOOD PRESSURE: 171 MMHG | HEART RATE: 77 BPM

## 2022-07-14 DIAGNOSIS — I73.9 PAD (PERIPHERAL ARTERY DISEASE): ICD-10-CM

## 2022-07-14 DIAGNOSIS — L84 CORN OR CALLUS: ICD-10-CM

## 2022-07-14 DIAGNOSIS — E11.49 TYPE II DIABETES MELLITUS WITH NEUROLOGICAL MANIFESTATIONS: Primary | ICD-10-CM

## 2022-07-14 DIAGNOSIS — B35.1 ONYCHOMYCOSIS DUE TO DERMATOPHYTE: ICD-10-CM

## 2022-07-14 PROCEDURE — 3008F PR BODY MASS INDEX (BMI) DOCUMENTED: ICD-10-PCS | Mod: CPTII,S$GLB,, | Performed by: PODIATRIST

## 2022-07-14 PROCEDURE — 1126F AMNT PAIN NOTED NONE PRSNT: CPT | Mod: CPTII,S$GLB,, | Performed by: PODIATRIST

## 2022-07-14 PROCEDURE — 3072F LOW RISK FOR RETINOPATHY: CPT | Mod: CPTII,S$GLB,, | Performed by: PODIATRIST

## 2022-07-14 PROCEDURE — 11721 DEBRIDE NAIL 6 OR MORE: CPT | Mod: 59,Q8,S$GLB, | Performed by: PODIATRIST

## 2022-07-14 PROCEDURE — 3052F HG A1C>EQUAL 8.0%<EQUAL 9.0%: CPT | Mod: CPTII,S$GLB,, | Performed by: PODIATRIST

## 2022-07-14 PROCEDURE — 3077F SYST BP >= 140 MM HG: CPT | Mod: CPTII,S$GLB,, | Performed by: PODIATRIST

## 2022-07-14 PROCEDURE — 1160F RVW MEDS BY RX/DR IN RCRD: CPT | Mod: CPTII,S$GLB,, | Performed by: PODIATRIST

## 2022-07-14 PROCEDURE — 3072F PR LOW RISK FOR RETINOPATHY: ICD-10-PCS | Mod: CPTII,S$GLB,, | Performed by: PODIATRIST

## 2022-07-14 PROCEDURE — 1159F MED LIST DOCD IN RCRD: CPT | Mod: CPTII,S$GLB,, | Performed by: PODIATRIST

## 2022-07-14 PROCEDURE — 3008F BODY MASS INDEX DOCD: CPT | Mod: CPTII,S$GLB,, | Performed by: PODIATRIST

## 2022-07-14 PROCEDURE — 99999 PR PBB SHADOW E&M-EST. PATIENT-LVL IV: ICD-10-PCS | Mod: PBBFAC,,, | Performed by: PODIATRIST

## 2022-07-14 PROCEDURE — 1126F PR PAIN SEVERITY QUANTIFIED, NO PAIN PRESENT: ICD-10-PCS | Mod: CPTII,S$GLB,, | Performed by: PODIATRIST

## 2022-07-14 PROCEDURE — 3079F DIAST BP 80-89 MM HG: CPT | Mod: CPTII,S$GLB,, | Performed by: PODIATRIST

## 2022-07-14 PROCEDURE — 11721 PR DEBRIDEMENT OF NAILS, 6 OR MORE: ICD-10-PCS | Mod: 59,Q8,S$GLB, | Performed by: PODIATRIST

## 2022-07-14 PROCEDURE — 3052F PR MOST RECENT HEMOGLOBIN A1C LEVEL 8.0 - < 9.0%: ICD-10-PCS | Mod: CPTII,S$GLB,, | Performed by: PODIATRIST

## 2022-07-14 PROCEDURE — 99499 UNLISTED E&M SERVICE: CPT | Mod: S$GLB,,, | Performed by: PODIATRIST

## 2022-07-14 PROCEDURE — 99499 NO LOS: ICD-10-PCS | Mod: S$GLB,,, | Performed by: PODIATRIST

## 2022-07-14 PROCEDURE — 11057 PARNG/CUTG B9 HYPRKR LES >4: CPT | Mod: Q8,S$GLB,, | Performed by: PODIATRIST

## 2022-07-14 PROCEDURE — 11057 PR TRIM BENIGN HYPERKERATOTIC SKIN LESION,>4: ICD-10-PCS | Mod: Q8,S$GLB,, | Performed by: PODIATRIST

## 2022-07-14 PROCEDURE — 4010F PR ACE/ARB THEARPY RXD/TAKEN: ICD-10-PCS | Mod: CPTII,S$GLB,, | Performed by: PODIATRIST

## 2022-07-14 PROCEDURE — 3079F PR MOST RECENT DIASTOLIC BLOOD PRESSURE 80-89 MM HG: ICD-10-PCS | Mod: CPTII,S$GLB,, | Performed by: PODIATRIST

## 2022-07-14 PROCEDURE — 99999 PR PBB SHADOW E&M-EST. PATIENT-LVL IV: CPT | Mod: PBBFAC,,, | Performed by: PODIATRIST

## 2022-07-14 PROCEDURE — 1160F PR REVIEW ALL MEDS BY PRESCRIBER/CLIN PHARMACIST DOCUMENTED: ICD-10-PCS | Mod: CPTII,S$GLB,, | Performed by: PODIATRIST

## 2022-07-14 PROCEDURE — 1159F PR MEDICATION LIST DOCUMENTED IN MEDICAL RECORD: ICD-10-PCS | Mod: CPTII,S$GLB,, | Performed by: PODIATRIST

## 2022-07-14 PROCEDURE — 4010F ACE/ARB THERAPY RXD/TAKEN: CPT | Mod: CPTII,S$GLB,, | Performed by: PODIATRIST

## 2022-07-14 PROCEDURE — 3077F PR MOST RECENT SYSTOLIC BLOOD PRESSURE >= 140 MM HG: ICD-10-PCS | Mod: CPTII,S$GLB,, | Performed by: PODIATRIST

## 2022-07-14 NOTE — PROGRESS NOTES
Subjective:      Patient ID: Buck Ibarra is a 76 y.o. female.    Chief Complaint: Diabetes Mellitus (Pcp - Ericka Cortez MD - 5/12/2022) and Nail Care    Buck is a 76 y.o. female who presents to the clinic upon referral from Dr. Mica azra. provider found  for evaluation and treatment of diabetic feet. Buck has a past medical history of Allergy, Cataract, Diabetes mellitus type II, Gastritis, GERD (gastroesophageal reflux disease), H. pylori infection, History of hepatitis C, SVR as of 8-2016  (8/25/2015), Hypertension, Osteopenia, and Type 2 diabetes mellitus with diabetic polyneuropathy. Patient relates no major problem with feet. Only complaints today consist of routine DM foot care and nail/callus trimming. Has difficulty with toenails that are thick, discolored. Routine trimming helps prevent complications and pain. She also has hx of PAD, lower extremity stenting and is on long term Plavix.     PCP: Ericka Cortez MD    Date Last Seen by PCP:   Chief Complaint   Patient presents with    Diabetes Mellitus     Pcp - Ericka Cortez MD - 5/12/2022    Nail Care        Current shoe gear: Casual shoes    Hemoglobin A1C   Date Value Ref Range Status   05/12/2022 9.0 (H) 4.0 - 5.6 % Final     Comment:     ADA Screening Guidelines:  5.7-6.4%  Consistent with prediabetes  >or=6.5%  Consistent with diabetes    High levels of fetal hemoglobin interfere with the HbA1C  assay. Heterozygous hemoglobin variants (HbS, HgC, etc)do  not significantly interfere with this assay.   However, presence of multiple variants may affect accuracy.     03/28/2022 9.1 (H) 4.0 - 5.6 % Final     Comment:     ADA Screening Guidelines:  5.7-6.4%  Consistent with prediabetes  >or=6.5%  Consistent with diabetes    High levels of fetal hemoglobin interfere with the HbA1C  assay. Heterozygous hemoglobin variants (HbS, HgC, etc)do  not significantly interfere with this assay.   However, presence of multiple variants may affect accuracy.     11/22/2021  9.9 (H) 4.0 - 5.6 % Final     Comment:     ADA Screening Guidelines:  5.7-6.4%  Consistent with prediabetes  >or=6.5%  Consistent with diabetes    High levels of fetal hemoglobin interfere with the HbA1C  assay. Heterozygous hemoglobin variants (HbS, HgC, etc)do  not significantly interfere with this assay.   However, presence of multiple variants may affect accuracy.             Review of Systems   Constitutional: Negative for chills, diaphoresis, fever, malaise/fatigue and night sweats.   Cardiovascular: Negative for claudication, cyanosis, leg swelling and syncope.   Skin: Positive for nail changes. Negative for color change, dry skin, rash, suspicious lesions and unusual hair distribution.   Musculoskeletal: Negative for falls, joint pain, joint swelling, muscle cramps, muscle weakness and stiffness.   Gastrointestinal: Negative for constipation, diarrhea, nausea and vomiting.   Neurological: Positive for numbness and sensory change. Negative for brief paralysis, disturbances in coordination, focal weakness, paresthesias and tremors.           Objective:      Physical Exam  Vitals reviewed.   Constitutional:       Appearance: She is well-developed.   HENT:      Head: Normocephalic.   Cardiovascular:      Pulses:           Dorsalis pedis pulses are 1+ on the right side and 1+ on the left side.      Comments: PT pulses diminished b/l. CRT < 3 sec to tips of toes.  No vericosities noted to b/l LEs.     Pulmonary:      Effort: No respiratory distress.   Musculoskeletal:      Right lower le+ Edema present.      Left lower le+ Edema present.      Comments: Rectus foot and toe position bilateral with no major deformities noted. Mild equinus noted b/l ankles with < 10 deg DF noted. MMT 5/5 in DF/PF/Inv/Ev resistance with no reproduction of pain in any direction. Passive range of motion of ankle and pedal joints is painless b/l.     Skin:     General: Skin is warm and dry.      Findings: No erythema.       Comments: No open lesions, lacerations or wounds noted. Nails are thickened, elongated, discolored yellow/brown with subungual debris and brittleness to R 1-5 and L 1-5. Interdigital spaces clean, dry and intact b/l. No erythema noted to b/l foot. Skin texture thin, atrophic, dry. Mild hyperpigmentation to skin of both ankles and/or feet, consistent with hemosiderin deposits. Pedal hair diminished. Toes cool to touch b/l. Hyperkeratotic lesion noted to distal tips of b/l hallux, 2nd and 3rd toes. Skin lines present to lesion/s. No signs of deep tissue injury.     Neurological:      Mental Status: She is alert and oriented to person, place, and time.      Sensory: Sensory deficit present.      Comments: Light touch, proprioception, and sharp/dull sensation are all intact bilaterally. Protective threshold with the [place]-Wienstein monofilament is intact bilaterally. Subjective paresthesias with no clearly identifiable source or trigger.    Psychiatric:         Behavior: Behavior normal.         Thought Content: Thought content normal.         Judgment: Judgment normal.               Assessment:       Encounter Diagnoses   Name Primary?    Type II diabetes mellitus with neurological manifestations Yes    Onychomycosis due to dermatophyte     Corn or callus     PAD (peripheral artery disease)          Plan:       Buck was seen today for diabetes mellitus and nail care.    Diagnoses and all orders for this visit:    Type II diabetes mellitus with neurological manifestations    Onychomycosis due to dermatophyte    Corn or callus    PAD (peripheral artery disease)      I counseled the patient on her conditions, their implications and medical management.    - Shoe inspection. Diabetic Foot Education. Patient reminded of the importance of good nutrition and blood sugar control to help prevent podiatric complications of diabetes. Patient instructed on proper foot hygeine. We discussed wearing proper shoe gear, daily  foot inspections, never walking without protective shoe gear, caution putting sharp instruments to feet     - Discussed DM foot care:  Wear comfortable, proper fitting shoes. Wash feet daily. Dry well. After drying, apply moisturizer to feet (no lotion to webspaces). Inspect feet daily for skin breaks, blisters, swelling, or redness. Wear cotton socks (preferably white)  Change socks every day. Do NOT walk barefoot. Do NOT use heating pads or warm/hot water soaks     With patient's permission, nails were aggressively reduced and debrided 1,2,3,4, 5 R and 1,2, 3,4,5 L and filed to their soft tissue attachment mechanically and with electric , removing all offending nail and debris. Patient tolerated this well and no blood was drawn. Patient reports relief following the procedure.     The affected area was cleansed with an alcohol prep pad. Next, utilizing a 5mm curette, the hyperkeratotic tissues were trimmed from distal tips of toes 1-3 b/l, down to appropriate level of skin. Care was taken to remove any nucleated core from the center of the lesion. No pinpoint bleeding was encountered. The patient tolerated relief following this procedure.     Rx Ketoconazole cream to be applied to affected toenails for up to 1 year. Continue.     RTC 3 months, sooner PRN

## 2022-07-28 ENCOUNTER — LAB VISIT (OUTPATIENT)
Dept: LAB | Facility: HOSPITAL | Age: 77
End: 2022-07-28
Payer: MEDICARE

## 2022-07-28 DIAGNOSIS — Z79.4 TYPE 2 DIABETES MELLITUS WITH DIABETIC POLYNEUROPATHY, WITH LONG-TERM CURRENT USE OF INSULIN: Chronic | ICD-10-CM

## 2022-07-28 DIAGNOSIS — E11.42 TYPE 2 DIABETES MELLITUS WITH DIABETIC POLYNEUROPATHY, WITH LONG-TERM CURRENT USE OF INSULIN: Chronic | ICD-10-CM

## 2022-07-28 LAB
ESTIMATED AVG GLUCOSE: 186 MG/DL (ref 68–131)
HBA1C MFR BLD: 8.1 % (ref 4–5.6)

## 2022-07-28 PROCEDURE — 83036 HEMOGLOBIN GLYCOSYLATED A1C: CPT | Performed by: NURSE PRACTITIONER

## 2022-07-28 PROCEDURE — 36415 COLL VENOUS BLD VENIPUNCTURE: CPT | Performed by: NURSE PRACTITIONER

## 2022-08-03 ENCOUNTER — PATIENT MESSAGE (OUTPATIENT)
Dept: INTERNAL MEDICINE | Facility: CLINIC | Age: 77
End: 2022-08-03
Payer: MEDICARE

## 2022-08-03 NOTE — PROGRESS NOTES
CHIEF COMPLAINT: Type 2 Diabetes     HPI: Mrs. Buck Ibarra is a 76 y.o. female who was diagnosed with Type 2 DM x 6-7 years.  Past Medical History:   Diagnosis Date    Allergy     Cataract     Diabetes mellitus type II     Gastritis     with gastric ulcer    GERD (gastroesophageal reflux disease)     H. pylori infection     History of hepatitis C, SVR as of 8-2016 8/25/2015    Harv[facility] 12 wks completed.  SVR(12) as of 8/4/16 SVR(24) as of 10-     Hypertension     Osteopenia     Type 2 diabetes mellitus with diabetic polyneuropathy      Last seen by me in spring 2022   C/o dizzy spells at times, has cgm, bp cuff at home   a1c improved from 9.9% to 9.1% to 9% to 8.1%   Lab Results   Component Value Date    HGBA1C 8.1 (H) 07/28/2022     TIR 74%  gmi 7.3%   sd 51 mg/dl  avg 166 mg/dl     Dietary habits:  3 meals  11a, 2p, 7-8p    Uses dexcom  Highest 250, 227 mg/dl  Lowest 75  mg/dl   H/o severe hypoglycemia 40 mg/dl    Not getting proper sleep, insomnia   Stressors with pandemic, take care of a disabled child  +PN, nephropathy  [employer] : supplier for dexcom g6     Medical necessity for cgm    On MDI injections 4 x a day  Testing 4 x a day  Patient is willing and able to use the device  Demonstrated an understanding of the technology and is motivated to use CGM  Patient expected to adhere to a comprehensive diabetes treatment plan and patient has adequate medical supervision  Patient experiences multiple impaired awareness of hypoglycemia (hypoglycemia unawareness)    Patient is willing and able to use the device  Demonstrated an understanding of the technology and is motivated to use CGM  Patient expected to adhere to a comprehensive diabetes treatment plan and patient has adequate medical supervision  Patient experiences multiple impaired awareness of hypoglycemia (hypoglycemia unawareness)    Retired . Has children (5), 16 grandchildren, babysits grandchildren---age  2, 8, 11, has 10 great  "grandchildren.  Helping with [employer]     PREVIOUS DIABETES MEDICATIONS TRIED  novolog  tresiba  Trulicity  vgo--did not feel right on it.  victoza   invokana-SEs /dehydration episode in 2017  Metformin- gi  ozempic     CURRENT DIABETIC MEDS: ozempic 1 mg weekly,  tresiba 28 units qhs-off for weeks, humalog 7-7-7 units ac w/ scale   Self adjustment per scale   180-230+2, etc     Diabetes Management Status    Statin: Taking  ACE/ARB: Taking    Screening or Prevention Patient's value Goal Complete/Controlled?   HgA1C Testing and Control   Lab Results   Component Value Date    HGBA1C 8.1 (H) 07/28/2022      Annually/Less than 8% No   Lipid profile : 05/12/2022 Annually Yes   LDL control Lab Results   Component Value Date    LDLCALC 120.0 05/12/2022    Annually/Less than 100 mg/dl  Yes   Nephropathy screening Lab Results   Component Value Date    LABMICR 21.0 11/22/2021     Lab Results   Component Value Date    PROTEINUA Negative 11/19/2021    Annually No   Blood pressure BP Readings from Last 1 Encounters:   08/04/22 130/80    Less than 140/90 Yes   Dilated retinal exam : 03/10/2022 Annually Yes   Foot exam   : 07/14/2022 Annually Yes       REVIEW OF SYSTEMS  General: no weakness, fatigue, +weight fluctuations   Eyes: no double or blurred vision, eye pain, or redness   Cardiovascular: no chest pain, palpitations, edema, or murmurs.   Respiratory: no cough or dyspnea.   GI: no heartburn, nausea, +diarrhea w/ metformin-ok on lower dose; good appetite.   Skin: no rashes, dryness, itching, or reactions at insulin injection sites.  Neuro: + numbness, tingling, tremors,+ vertigo. +insomnia  Endocrine: no polyuria, polydipsia, polyphagia, heat or cold intolerance. +hair loss    Vital Signs  /80 (BP Location: Left arm, Patient Position: Sitting, BP Method: Medium (Manual))   Pulse 106   Ht 5' 6" (1.676 m)   Wt 93 kg (205 lb 0.4 oz)   SpO2 95%   BMI 33.09 kg/m²     Hemoglobin A1C   Date Value Ref Range Status "   07/28/2022 8.1 (H) 4.0 - 5.6 % Final     Comment:     ADA Screening Guidelines:  5.7-6.4%  Consistent with prediabetes  >or=6.5%  Consistent with diabetes    High levels of fetal hemoglobin interfere with the HbA1C  assay. Heterozygous hemoglobin variants (HbS, HgC, etc)do  not significantly interfere with this assay.   However, presence of multiple variants may affect accuracy.     05/12/2022 9.0 (H) 4.0 - 5.6 % Final     Comment:     ADA Screening Guidelines:  5.7-6.4%  Consistent with prediabetes  >or=6.5%  Consistent with diabetes    High levels of fetal hemoglobin interfere with the HbA1C  assay. Heterozygous hemoglobin variants (HbS, HgC, etc)do  not significantly interfere with this assay.   However, presence of multiple variants may affect accuracy.     03/28/2022 9.1 (H) 4.0 - 5.6 % Final     Comment:     ADA Screening Guidelines:  5.7-6.4%  Consistent with prediabetes  >or=6.5%  Consistent with diabetes    High levels of fetal hemoglobin interfere with the HbA1C  assay. Heterozygous hemoglobin variants (HbS, HgC, etc)do  not significantly interfere with this assay.   However, presence of multiple variants may affect accuracy.          Chemistry        Component Value Date/Time     05/12/2022 1040    K 4.6 05/12/2022 1040     05/12/2022 1040    CO2 23 05/12/2022 1040    BUN 22 05/12/2022 1040    CREATININE 1.0 05/12/2022 1040     (H) 05/12/2022 1040        Component Value Date/Time    CALCIUM 9.6 05/12/2022 1040    ALKPHOS 133 05/12/2022 1040    AST 21 05/12/2022 1040    ALT 25 05/12/2022 1040    BILITOT 0.2 05/12/2022 1040    ESTGFRAFRICA >60.0 05/12/2022 1040    EGFRNONAA 55.6 (A) 05/12/2022 1040           Lab Results   Component Value Date    TSH 1.095 05/12/2022      Lab Results   Component Value Date    CHOL 186 05/12/2022    CHOL 186 11/04/2021    CHOL 131 05/20/2021     Lab Results   Component Value Date    HDL 50 05/12/2022    HDL 37 (L) 11/04/2021    HDL 41 05/20/2021     Lab  Results   Component Value Date    LDLCALC 120.0 05/12/2022    LDLCALC 127.2 11/04/2021    LDLCALC 75.0 05/20/2021     Lab Results   Component Value Date    TRIG 80 05/12/2022    TRIG 109 11/04/2021    TRIG 75 05/20/2021     Lab Results   Component Value Date    CHOLHDL 26.9 05/12/2022    CHOLHDL 19.9 (L) 11/04/2021    CHOLHDL 31.3 05/20/2021       PHYSICAL EXAMINATION  Constitutional: Appears well, no distress  Neck: Supple, trachea midline.   Respiratory: no wheezes, even and unlabored.  Cardiovascular: RRR; (+) DP pulses; no edema.   Lymph: deferred   Skin: warm and dry; no injection site reactions, + acanthosis nigracans observed.  Neuro:patient alert and cooperative, normal affect; steady gait.    Diabetes Foot Exam:  Deferred     Assessment/Plan  1. Type 2 diabetes mellitus with diabetic polyneuropathy, with long-term current use of insulin  Hemoglobin A1C    Hemoglobin A1C   2. Type 2 diabetes mellitus with hyperglycemia, with long-term current use of insulin     3. Stage 3a chronic kidney disease     4. Hyperlipidemia LDL goal <70     5. Obesity (BMI 30-39.9)     6. PAD (peripheral artery disease)     7. Essential hypertension     1., 2. F/u in 5 months   a1c in 3 months   a1c in 5-6 months   a1c goal less than 7.5%   Almost at goal   Change to tresiba to 20 units at night   Continue ozempic 1 mg weekly  Cut back humalog to 6-6-6 w/ scale 180-230+2, etc  Continue use of dexcom-[employer] supplier  3. Avoid hypoglycemia  4.   Lab Results   Component Value Date    LDLCALC 120.0 05/12/2022     Above goal   5. Body mass index is 33.09 kg/m².  May increase insulin resistance  6. F/u with vascular prn   7. Continue med(s)  Controlled    FOLLOW UP  In 5-6 months

## 2022-08-04 ENCOUNTER — OFFICE VISIT (OUTPATIENT)
Dept: INTERNAL MEDICINE | Facility: CLINIC | Age: 77
End: 2022-08-04
Payer: MEDICARE

## 2022-08-04 VITALS
SYSTOLIC BLOOD PRESSURE: 130 MMHG | WEIGHT: 205 LBS | DIASTOLIC BLOOD PRESSURE: 80 MMHG | HEIGHT: 66 IN | BODY MASS INDEX: 32.95 KG/M2 | OXYGEN SATURATION: 95 % | HEART RATE: 106 BPM

## 2022-08-04 DIAGNOSIS — E66.9 DIABETES MELLITUS TYPE 2 IN OBESE: ICD-10-CM

## 2022-08-04 DIAGNOSIS — I73.9 PAD (PERIPHERAL ARTERY DISEASE): ICD-10-CM

## 2022-08-04 DIAGNOSIS — E66.9 OBESITY (BMI 30-39.9): Chronic | ICD-10-CM

## 2022-08-04 DIAGNOSIS — E78.5 HYPERLIPIDEMIA LDL GOAL <70: Chronic | ICD-10-CM

## 2022-08-04 DIAGNOSIS — I10 ESSENTIAL HYPERTENSION: ICD-10-CM

## 2022-08-04 DIAGNOSIS — N18.31 STAGE 3A CHRONIC KIDNEY DISEASE: ICD-10-CM

## 2022-08-04 DIAGNOSIS — E11.65 TYPE 2 DIABETES MELLITUS WITH HYPERGLYCEMIA, WITH LONG-TERM CURRENT USE OF INSULIN: Chronic | ICD-10-CM

## 2022-08-04 DIAGNOSIS — Z79.4 TYPE 2 DIABETES MELLITUS WITH DIABETIC POLYNEUROPATHY, WITH LONG-TERM CURRENT USE OF INSULIN: Primary | Chronic | ICD-10-CM

## 2022-08-04 DIAGNOSIS — E11.69 DIABETES MELLITUS TYPE 2 IN OBESE: ICD-10-CM

## 2022-08-04 DIAGNOSIS — Z79.4 TYPE 2 DIABETES MELLITUS WITH HYPERGLYCEMIA, WITH LONG-TERM CURRENT USE OF INSULIN: Chronic | ICD-10-CM

## 2022-08-04 DIAGNOSIS — E11.42 TYPE 2 DIABETES MELLITUS WITH DIABETIC POLYNEUROPATHY, WITH LONG-TERM CURRENT USE OF INSULIN: Primary | Chronic | ICD-10-CM

## 2022-08-04 PROCEDURE — 1126F PR PAIN SEVERITY QUANTIFIED, NO PAIN PRESENT: ICD-10-PCS | Mod: CPTII,S$GLB,, | Performed by: NURSE PRACTITIONER

## 2022-08-04 PROCEDURE — 3079F DIAST BP 80-89 MM HG: CPT | Mod: CPTII,S$GLB,, | Performed by: NURSE PRACTITIONER

## 2022-08-04 PROCEDURE — 3288F PR FALLS RISK ASSESSMENT DOCUMENTED: ICD-10-PCS | Mod: CPTII,S$GLB,, | Performed by: NURSE PRACTITIONER

## 2022-08-04 PROCEDURE — 1101F PT FALLS ASSESS-DOCD LE1/YR: CPT | Mod: CPTII,S$GLB,, | Performed by: NURSE PRACTITIONER

## 2022-08-04 PROCEDURE — 3052F HG A1C>EQUAL 8.0%<EQUAL 9.0%: CPT | Mod: CPTII,S$GLB,, | Performed by: NURSE PRACTITIONER

## 2022-08-04 PROCEDURE — 3288F FALL RISK ASSESSMENT DOCD: CPT | Mod: CPTII,S$GLB,, | Performed by: NURSE PRACTITIONER

## 2022-08-04 PROCEDURE — 3079F PR MOST RECENT DIASTOLIC BLOOD PRESSURE 80-89 MM HG: ICD-10-PCS | Mod: CPTII,S$GLB,, | Performed by: NURSE PRACTITIONER

## 2022-08-04 PROCEDURE — 99214 PR OFFICE/OUTPT VISIT, EST, LEVL IV, 30-39 MIN: ICD-10-PCS | Mod: S$GLB,,, | Performed by: NURSE PRACTITIONER

## 2022-08-04 PROCEDURE — 99999 PR PBB SHADOW E&M-EST. PATIENT-LVL V: CPT | Mod: PBBFAC,,, | Performed by: NURSE PRACTITIONER

## 2022-08-04 PROCEDURE — 99999 PR PBB SHADOW E&M-EST. PATIENT-LVL V: ICD-10-PCS | Mod: PBBFAC,,, | Performed by: NURSE PRACTITIONER

## 2022-08-04 PROCEDURE — 95251 CONT GLUC MNTR ANALYSIS I&R: CPT | Mod: S$GLB,,, | Performed by: NURSE PRACTITIONER

## 2022-08-04 PROCEDURE — 3075F PR MOST RECENT SYSTOLIC BLOOD PRESS GE 130-139MM HG: ICD-10-PCS | Mod: CPTII,S$GLB,, | Performed by: NURSE PRACTITIONER

## 2022-08-04 PROCEDURE — 3075F SYST BP GE 130 - 139MM HG: CPT | Mod: CPTII,S$GLB,, | Performed by: NURSE PRACTITIONER

## 2022-08-04 PROCEDURE — 1160F PR REVIEW ALL MEDS BY PRESCRIBER/CLIN PHARMACIST DOCUMENTED: ICD-10-PCS | Mod: CPTII,S$GLB,, | Performed by: NURSE PRACTITIONER

## 2022-08-04 PROCEDURE — 1159F MED LIST DOCD IN RCRD: CPT | Mod: CPTII,S$GLB,, | Performed by: NURSE PRACTITIONER

## 2022-08-04 PROCEDURE — 3072F PR LOW RISK FOR RETINOPATHY: ICD-10-PCS | Mod: CPTII,S$GLB,, | Performed by: NURSE PRACTITIONER

## 2022-08-04 PROCEDURE — 3008F PR BODY MASS INDEX (BMI) DOCUMENTED: ICD-10-PCS | Mod: CPTII,S$GLB,, | Performed by: NURSE PRACTITIONER

## 2022-08-04 PROCEDURE — 3052F PR MOST RECENT HEMOGLOBIN A1C LEVEL 8.0 - < 9.0%: ICD-10-PCS | Mod: CPTII,S$GLB,, | Performed by: NURSE PRACTITIONER

## 2022-08-04 PROCEDURE — 3072F LOW RISK FOR RETINOPATHY: CPT | Mod: CPTII,S$GLB,, | Performed by: NURSE PRACTITIONER

## 2022-08-04 PROCEDURE — 4010F PR ACE/ARB THEARPY RXD/TAKEN: ICD-10-PCS | Mod: CPTII,S$GLB,, | Performed by: NURSE PRACTITIONER

## 2022-08-04 PROCEDURE — 95251 PR GLUCOSE MONITOR, 72 HOUR, PHYS INTERP: ICD-10-PCS | Mod: S$GLB,,, | Performed by: NURSE PRACTITIONER

## 2022-08-04 PROCEDURE — 4010F ACE/ARB THERAPY RXD/TAKEN: CPT | Mod: CPTII,S$GLB,, | Performed by: NURSE PRACTITIONER

## 2022-08-04 PROCEDURE — 1126F AMNT PAIN NOTED NONE PRSNT: CPT | Mod: CPTII,S$GLB,, | Performed by: NURSE PRACTITIONER

## 2022-08-04 PROCEDURE — 1159F PR MEDICATION LIST DOCUMENTED IN MEDICAL RECORD: ICD-10-PCS | Mod: CPTII,S$GLB,, | Performed by: NURSE PRACTITIONER

## 2022-08-04 PROCEDURE — 1101F PR PT FALLS ASSESS DOC 0-1 FALLS W/OUT INJ PAST YR: ICD-10-PCS | Mod: CPTII,S$GLB,, | Performed by: NURSE PRACTITIONER

## 2022-08-04 PROCEDURE — 99214 OFFICE O/P EST MOD 30 MIN: CPT | Mod: S$GLB,,, | Performed by: NURSE PRACTITIONER

## 2022-08-04 PROCEDURE — 3008F BODY MASS INDEX DOCD: CPT | Mod: CPTII,S$GLB,, | Performed by: NURSE PRACTITIONER

## 2022-08-04 PROCEDURE — 1160F RVW MEDS BY RX/DR IN RCRD: CPT | Mod: CPTII,S$GLB,, | Performed by: NURSE PRACTITIONER

## 2022-08-04 RX ORDER — INSULIN DEGLUDEC 200 U/ML
INJECTION, SOLUTION SUBCUTANEOUS
Qty: 3 PEN | Refills: 6 | Status: ON HOLD
Start: 2022-08-04 | End: 2023-01-01 | Stop reason: HOSPADM

## 2022-08-04 RX ORDER — INSULIN DEGLUDEC 200 U/ML
INJECTION, SOLUTION SUBCUTANEOUS
Qty: 3 PEN | Refills: 6
Start: 2022-08-04 | End: 2022-08-04

## 2022-08-04 RX ORDER — INSULIN LISPRO 100 [IU]/ML
INJECTION, SOLUTION INTRAVENOUS; SUBCUTANEOUS
Qty: 15 ML | Refills: 6
Start: 2022-08-04 | End: 2022-10-04 | Stop reason: SDUPTHER

## 2022-08-04 NOTE — PATIENT INSTRUCTIONS
Follow up in 5-6 months w/Irielle  A1c in 3 months   A1c in 5-6 months     Change tresiba 20 units at night -long acting   Humalog 6-6-6 units before meals -meal insulin  Scale 180-230+2, etc.  Ozempic 1 mg weekly -helps with weight loss     [email or web address]  Eat fit clarita  [employer] clarita  [email or web address]     Goal  no higher than 180    [email or web address]  [employer]

## 2022-08-18 ENCOUNTER — OFFICE VISIT (OUTPATIENT)
Dept: PRIMARY CARE CLINIC | Facility: CLINIC | Age: 77
End: 2022-08-18
Payer: MEDICARE

## 2022-08-18 ENCOUNTER — LAB VISIT (OUTPATIENT)
Dept: LAB | Facility: HOSPITAL | Age: 77
End: 2022-08-18
Attending: INTERNAL MEDICINE
Payer: MEDICARE

## 2022-08-18 VITALS
HEART RATE: 80 BPM | HEIGHT: 66 IN | WEIGHT: 202.63 LBS | OXYGEN SATURATION: 99 % | SYSTOLIC BLOOD PRESSURE: 134 MMHG | DIASTOLIC BLOOD PRESSURE: 68 MMHG | BODY MASS INDEX: 32.56 KG/M2

## 2022-08-18 DIAGNOSIS — Z78.0 POSTMENOPAUSAL ESTROGEN DEFICIENCY: ICD-10-CM

## 2022-08-18 DIAGNOSIS — E11.649 DIABETIC HYPOGLYCEMIA: Primary | ICD-10-CM

## 2022-08-18 DIAGNOSIS — N18.30 CKD STAGE 3 DUE TO TYPE 2 DIABETES MELLITUS: ICD-10-CM

## 2022-08-18 DIAGNOSIS — E77.8 HYPOPROTEINEMIA: ICD-10-CM

## 2022-08-18 DIAGNOSIS — I10 BENIGN ESSENTIAL HYPERTENSION: ICD-10-CM

## 2022-08-18 DIAGNOSIS — I73.9 PAD (PERIPHERAL ARTERY DISEASE): ICD-10-CM

## 2022-08-18 DIAGNOSIS — M85.80 OSTEOPENIA, UNSPECIFIED LOCATION: ICD-10-CM

## 2022-08-18 DIAGNOSIS — Z12.31 ENCOUNTER FOR SCREENING MAMMOGRAM FOR MALIGNANT NEOPLASM OF BREAST: ICD-10-CM

## 2022-08-18 DIAGNOSIS — E04.1 THYROID NODULE: ICD-10-CM

## 2022-08-18 DIAGNOSIS — E78.5 HYPERLIPIDEMIA, UNSPECIFIED HYPERLIPIDEMIA TYPE: ICD-10-CM

## 2022-08-18 DIAGNOSIS — E11.22 CKD STAGE 3 DUE TO TYPE 2 DIABETES MELLITUS: ICD-10-CM

## 2022-08-18 DIAGNOSIS — G47.00 INSOMNIA, UNSPECIFIED TYPE: ICD-10-CM

## 2022-08-18 DIAGNOSIS — K21.9 GASTROESOPHAGEAL REFLUX DISEASE, UNSPECIFIED WHETHER ESOPHAGITIS PRESENT: ICD-10-CM

## 2022-08-18 DIAGNOSIS — E55.9 VITAMIN D DEFICIENCY: ICD-10-CM

## 2022-08-18 PROBLEM — E66.01 SEVERE OBESITY (BMI 35.0-39.9) WITH COMORBIDITY: Status: RESOLVED | Noted: 2022-04-14 | Resolved: 2022-08-18

## 2022-08-18 PROBLEM — R51.9 ACUTE NONINTRACTABLE HEADACHE: Status: RESOLVED | Noted: 2019-04-20 | Resolved: 2022-08-18

## 2022-08-18 LAB
25(OH)D3+25(OH)D2 SERPL-MCNC: 25 NG/ML (ref 30–96)
ALBUMIN SERPL BCP-MCNC: 3.2 G/DL (ref 3.5–5.2)
ALP SERPL-CCNC: 106 U/L (ref 55–135)
ALT SERPL W/O P-5'-P-CCNC: 13 U/L (ref 10–44)
ANION GAP SERPL CALC-SCNC: 10 MMOL/L (ref 8–16)
AST SERPL-CCNC: 13 U/L (ref 10–40)
BASOPHILS # BLD AUTO: 0.06 K/UL (ref 0–0.2)
BASOPHILS NFR BLD: 0.7 % (ref 0–1.9)
BILIRUB SERPL-MCNC: 0.4 MG/DL (ref 0.1–1)
BUN SERPL-MCNC: 21 MG/DL (ref 8–23)
CALCIUM SERPL-MCNC: 9.4 MG/DL (ref 8.7–10.5)
CHLORIDE SERPL-SCNC: 104 MMOL/L (ref 95–110)
CHOLEST SERPL-MCNC: 172 MG/DL (ref 120–199)
CHOLEST/HDLC SERPL: 3.9 {RATIO} (ref 2–5)
CO2 SERPL-SCNC: 26 MMOL/L (ref 23–29)
CREAT SERPL-MCNC: 1.1 MG/DL (ref 0.5–1.4)
DIFFERENTIAL METHOD: ABNORMAL
EOSINOPHIL # BLD AUTO: 0.1 K/UL (ref 0–0.5)
EOSINOPHIL NFR BLD: 1.2 % (ref 0–8)
ERYTHROCYTE [DISTWIDTH] IN BLOOD BY AUTOMATED COUNT: 16.2 % (ref 11.5–14.5)
EST. GFR  (NO RACE VARIABLE): 52.7 ML/MIN/1.73 M^2
GLUCOSE SERPL-MCNC: 174 MG/DL (ref 70–110)
HCT VFR BLD AUTO: 47 % (ref 37–48.5)
HDLC SERPL-MCNC: 44 MG/DL (ref 40–75)
HDLC SERPL: 25.6 % (ref 20–50)
HGB BLD-MCNC: 14.4 G/DL (ref 12–16)
IMM GRANULOCYTES # BLD AUTO: 0.05 K/UL (ref 0–0.04)
IMM GRANULOCYTES NFR BLD AUTO: 0.6 % (ref 0–0.5)
LDLC SERPL CALC-MCNC: 111 MG/DL (ref 63–159)
LYMPHOCYTES # BLD AUTO: 3.7 K/UL (ref 1–4.8)
LYMPHOCYTES NFR BLD: 40.6 % (ref 18–48)
MCH RBC QN AUTO: 27.4 PG (ref 27–31)
MCHC RBC AUTO-ENTMCNC: 30.6 G/DL (ref 32–36)
MCV RBC AUTO: 89 FL (ref 82–98)
MONOCYTES # BLD AUTO: 0.4 K/UL (ref 0.3–1)
MONOCYTES NFR BLD: 3.9 % (ref 4–15)
NEUTROPHILS # BLD AUTO: 4.8 K/UL (ref 1.8–7.7)
NEUTROPHILS NFR BLD: 53 % (ref 38–73)
NONHDLC SERPL-MCNC: 128 MG/DL
NRBC BLD-RTO: 0 /100 WBC
PLATELET # BLD AUTO: 414 K/UL (ref 150–450)
PMV BLD AUTO: 11.3 FL (ref 9.2–12.9)
POTASSIUM SERPL-SCNC: 4.3 MMOL/L (ref 3.5–5.1)
PROT SERPL-MCNC: 6.4 G/DL (ref 6–8.4)
RBC # BLD AUTO: 5.26 M/UL (ref 4–5.4)
SODIUM SERPL-SCNC: 140 MMOL/L (ref 136–145)
TRIGL SERPL-MCNC: 85 MG/DL (ref 30–150)
WBC # BLD AUTO: 8.99 K/UL (ref 3.9–12.7)

## 2022-08-18 PROCEDURE — 4010F PR ACE/ARB THEARPY RXD/TAKEN: ICD-10-PCS | Mod: CPTII,S$GLB,, | Performed by: INTERNAL MEDICINE

## 2022-08-18 PROCEDURE — 80053 COMPREHEN METABOLIC PANEL: CPT | Performed by: INTERNAL MEDICINE

## 2022-08-18 PROCEDURE — 3008F BODY MASS INDEX DOCD: CPT | Mod: CPTII,S$GLB,, | Performed by: INTERNAL MEDICINE

## 2022-08-18 PROCEDURE — 85025 COMPLETE CBC W/AUTO DIFF WBC: CPT | Performed by: INTERNAL MEDICINE

## 2022-08-18 PROCEDURE — 1101F PR PT FALLS ASSESS DOC 0-1 FALLS W/OUT INJ PAST YR: ICD-10-PCS | Mod: CPTII,S$GLB,, | Performed by: INTERNAL MEDICINE

## 2022-08-18 PROCEDURE — 4010F ACE/ARB THERAPY RXD/TAKEN: CPT | Mod: CPTII,S$GLB,, | Performed by: INTERNAL MEDICINE

## 2022-08-18 PROCEDURE — 36415 COLL VENOUS BLD VENIPUNCTURE: CPT | Mod: PN | Performed by: INTERNAL MEDICINE

## 2022-08-18 PROCEDURE — 1160F PR REVIEW ALL MEDS BY PRESCRIBER/CLIN PHARMACIST DOCUMENTED: ICD-10-PCS | Mod: CPTII,S$GLB,, | Performed by: INTERNAL MEDICINE

## 2022-08-18 PROCEDURE — 3052F PR MOST RECENT HEMOGLOBIN A1C LEVEL 8.0 - < 9.0%: ICD-10-PCS | Mod: CPTII,S$GLB,, | Performed by: INTERNAL MEDICINE

## 2022-08-18 PROCEDURE — 3288F PR FALLS RISK ASSESSMENT DOCUMENTED: ICD-10-PCS | Mod: CPTII,S$GLB,, | Performed by: INTERNAL MEDICINE

## 2022-08-18 PROCEDURE — 3288F FALL RISK ASSESSMENT DOCD: CPT | Mod: CPTII,S$GLB,, | Performed by: INTERNAL MEDICINE

## 2022-08-18 PROCEDURE — 3078F PR MOST RECENT DIASTOLIC BLOOD PRESSURE < 80 MM HG: ICD-10-PCS | Mod: CPTII,S$GLB,, | Performed by: INTERNAL MEDICINE

## 2022-08-18 PROCEDURE — 1159F MED LIST DOCD IN RCRD: CPT | Mod: CPTII,S$GLB,, | Performed by: INTERNAL MEDICINE

## 2022-08-18 PROCEDURE — 3072F LOW RISK FOR RETINOPATHY: CPT | Mod: CPTII,S$GLB,, | Performed by: INTERNAL MEDICINE

## 2022-08-18 PROCEDURE — 3078F DIAST BP <80 MM HG: CPT | Mod: CPTII,S$GLB,, | Performed by: INTERNAL MEDICINE

## 2022-08-18 PROCEDURE — 1159F PR MEDICATION LIST DOCUMENTED IN MEDICAL RECORD: ICD-10-PCS | Mod: CPTII,S$GLB,, | Performed by: INTERNAL MEDICINE

## 2022-08-18 PROCEDURE — 82306 VITAMIN D 25 HYDROXY: CPT | Performed by: INTERNAL MEDICINE

## 2022-08-18 PROCEDURE — 3052F HG A1C>EQUAL 8.0%<EQUAL 9.0%: CPT | Mod: CPTII,S$GLB,, | Performed by: INTERNAL MEDICINE

## 2022-08-18 PROCEDURE — 1126F AMNT PAIN NOTED NONE PRSNT: CPT | Mod: CPTII,S$GLB,, | Performed by: INTERNAL MEDICINE

## 2022-08-18 PROCEDURE — 80061 LIPID PANEL: CPT | Performed by: INTERNAL MEDICINE

## 2022-08-18 PROCEDURE — 1126F PR PAIN SEVERITY QUANTIFIED, NO PAIN PRESENT: ICD-10-PCS | Mod: CPTII,S$GLB,, | Performed by: INTERNAL MEDICINE

## 2022-08-18 PROCEDURE — 99214 OFFICE O/P EST MOD 30 MIN: CPT | Mod: S$GLB,,, | Performed by: INTERNAL MEDICINE

## 2022-08-18 PROCEDURE — 3075F PR MOST RECENT SYSTOLIC BLOOD PRESS GE 130-139MM HG: ICD-10-PCS | Mod: CPTII,S$GLB,, | Performed by: INTERNAL MEDICINE

## 2022-08-18 PROCEDURE — 3072F PR LOW RISK FOR RETINOPATHY: ICD-10-PCS | Mod: CPTII,S$GLB,, | Performed by: INTERNAL MEDICINE

## 2022-08-18 PROCEDURE — 99214 PR OFFICE/OUTPT VISIT, EST, LEVL IV, 30-39 MIN: ICD-10-PCS | Mod: S$GLB,,, | Performed by: INTERNAL MEDICINE

## 2022-08-18 PROCEDURE — 99499 UNLISTED E&M SERVICE: CPT | Mod: S$GLB,,, | Performed by: INTERNAL MEDICINE

## 2022-08-18 PROCEDURE — 99999 PR PBB SHADOW E&M-EST. PATIENT-LVL V: CPT | Mod: PBBFAC,,, | Performed by: INTERNAL MEDICINE

## 2022-08-18 PROCEDURE — 3075F SYST BP GE 130 - 139MM HG: CPT | Mod: CPTII,S$GLB,, | Performed by: INTERNAL MEDICINE

## 2022-08-18 PROCEDURE — 1101F PT FALLS ASSESS-DOCD LE1/YR: CPT | Mod: CPTII,S$GLB,, | Performed by: INTERNAL MEDICINE

## 2022-08-18 PROCEDURE — 99499 RISK ADDL DX/OHS AUDIT: ICD-10-PCS | Mod: S$GLB,,, | Performed by: INTERNAL MEDICINE

## 2022-08-18 PROCEDURE — 1160F RVW MEDS BY RX/DR IN RCRD: CPT | Mod: CPTII,S$GLB,, | Performed by: INTERNAL MEDICINE

## 2022-08-18 PROCEDURE — 3008F PR BODY MASS INDEX (BMI) DOCUMENTED: ICD-10-PCS | Mod: CPTII,S$GLB,, | Performed by: INTERNAL MEDICINE

## 2022-08-18 PROCEDURE — 99999 PR PBB SHADOW E&M-EST. PATIENT-LVL V: ICD-10-PCS | Mod: PBBFAC,,, | Performed by: INTERNAL MEDICINE

## 2022-08-18 RX ORDER — CARVEDILOL 6.25 MG/1
6.25 TABLET ORAL 2 TIMES DAILY WITH MEALS
Qty: 180 TABLET | Refills: 3 | Status: ON HOLD | OUTPATIENT
Start: 2022-08-18 | End: 2023-01-01 | Stop reason: HOSPADM

## 2022-08-18 RX ORDER — DOXEPIN HYDROCHLORIDE 10 MG/1
10 CAPSULE ORAL NIGHTLY
Qty: 30 CAPSULE | Refills: 3 | Status: SHIPPED | OUTPATIENT
Start: 2022-08-18 | End: 2023-01-01 | Stop reason: SDUPTHER

## 2022-08-18 RX ORDER — ESOMEPRAZOLE MAGNESIUM 40 MG/1
CAPSULE, DELAYED RELEASE ORAL
Qty: 90 CAPSULE | Refills: 3 | Status: SHIPPED | OUTPATIENT
Start: 2022-08-18 | End: 2023-01-01

## 2022-08-18 RX ORDER — ASPIRIN 81 MG/1
81 TABLET ORAL DAILY
Qty: 90 TABLET | Refills: 3 | Status: SHIPPED | OUTPATIENT
Start: 2022-08-18 | End: 2023-01-01 | Stop reason: SDUPTHER

## 2022-08-18 RX ORDER — CLOPIDOGREL BISULFATE 75 MG/1
75 TABLET ORAL DAILY
Qty: 90 TABLET | Refills: 3 | Status: SHIPPED | OUTPATIENT
Start: 2022-08-18 | End: 2023-01-01

## 2022-08-18 NOTE — PROGRESS NOTES
Subjective:       Patient ID: Buck Ibarra is a 76 y.o. female.    Chief Complaint: DM f/u    HPI   Helps to take care of her 1 and 3 y/o great grandchildren every day.     Reports lots of trouble sleeping.   Trouble falling asleep and also may only sleep like 4 hours a night and then cannot go back to sleep. Lately moreso 3 hours. When she wakes up she feels drained and dragging b/c it's so little amount of hours.   Trazodone 100mg didn't help.   Tried melatonin OTC and that doesn't help.     DM2 - ozempic 1mg weekly, tresiba 20 u qhs, humalog 6-6-6 plus SSI.  DEXCOM in place. Was having hypoglycemic eps and so there were some changes w/ tresiba and humalog. Feels really bad when she's low on sugars. Not exercising. Just had 2 low sugars this week - 46.   Recently saw Graeme Rivera NP 8/4/22  a1c 7/28/22 8.1<--5/12/22 9  Foot exam - Dr. Escamilla 7/14/22.  MAC neg 11/22/21    PAD - atorva 80mg daily, plavix 75mg daily, asa 81mg daily.  BLE arterial US 6/30/22  The right DREAD is 1.1 and the left DREAD is 0.9.  Right lower extremity atherosclerosis with a patent mid SFA stent.  No evidence of significant stenosis.  Left lower extremity atherosclerosis with a patent mid SFA stent.  Elevated velocities noted in the mid stent suggesting a greater than 50% stenosis.  The distal PTA appears occluded.  LDL 5/12/22 120. Compliant w/ atorvastatin.    HTN - chlorthalidone 50mg qd, candesartan 32mg daily, coreg 6.25mg BID.     Vit D def. Last D 5/2022 25. Takes 1 vit D every day (not sure dosage). Previously on ergo 50k and still not repleted.     Low albumin on labs. 5/12/22 3.  Only eats 2 meals a day. Snack on fruits throughout the day. Does eat sausage, eggs, meats, etc.     Thyroid nodule  TSH WNL 5/12/22  Last US 1/15/21 - Nodule in the isthmus meets criteria for follow-up.  2.2 cm nodule in the left thyroid lobe meets criteria for follow-up. Due to repeat  No dysphagia or SOB. No neck pain.     Due for MMG  "9/30/22.  Osteopenia on DEXA 6/25/20. Due to repeat.  Cscope not due till 8/8/2024.     Review of Systems  Comprehensive review of systems otherwise negative. See history/subjective section for more details.    Objective:      Physical Exam    /68 (BP Location: Left arm, Patient Position: Sitting, BP Method: Large (Manual))   Pulse 80   Ht 5' 6" (1.676 m)   Wt 91.9 kg (202 lb 9.6 oz)   SpO2 99%   BMI 32.70 kg/m²     GEN - A+OX4, NAD   HEENT - PERRL, EOMI, OP clear  Neck - thyroid nodule - nontender, mid line.   CV - RRR, no m/r   Chest - CTAB, no wheezing or rhonchi  Abd - S/NT/ND/+BS.   Ext - decreased BDP and 2+ radial pulses. No LE edema.  Neuro - 5/5 BUE and BLE strength. Normal gait.   Skin - No rash.    Previous labs reviewed.    Assessment/Plan     Buck was seen today for follow-up.    Diagnoses and all orders for this visit:    Diabetic hypoglycemia - f/u w/ Ms. Rivera. Recent insulin adjustment. DEXCOM in place.  -     Microalbumin/Creatinine Ratio, Urine; Future    PAD (peripheral artery disease)  -     Lipid Panel; Future  -     clopidogreL (PLAVIX) 75 mg tablet; Take 1 tablet (75 mg total) by mouth once daily. Take 4 pills the first day followed by one pill daily  -     aspirin (ECOTRIN) 81 MG EC tablet; Take 1 tablet (81 mg total) by mouth once daily.    Benign essential hypertension - Stable and controlled. Continue current medications.  -     Comprehensive Metabolic Panel; Future  -     carvediloL (COREG) 6.25 MG tablet; Take 1 tablet (6.25 mg total) by mouth 2 (two) times daily with meals.    Vitamin D deficiency - ergo did not help replete. On otc 1 pill daily.   -     Vitamin D; Future    Hypoproteinemia - inc protein intake. Add nuts or other glucerna daily.  -     Comprehensive Metabolic Panel; Future    Thyroid nodule  -     US Soft Tissue Head Neck Thyroid; Future    Postmenopausal estrogen deficiency  -     DXA Bone Density Spine And Hip; Future    Osteopenia, unspecified " location  -     DXA Bone Density Spine And Hip; Future    Encounter for screening mammogram for malignant neoplasm of breast  -     Mammo Digital Screening Bilat w/ Girish; Future    CKD stage 3 due to type 2 diabetes mellitus  -     CBC Auto Differential; Future    Hyperlipidemia, unspecified hyperlipidemia type  -     Lipid Panel; Future  -     aspirin (ECOTRIN) 81 MG EC tablet; Take 1 tablet (81 mg total) by mouth once daily.    Insomnia, unspecified type  -     doxepin (SINEQUAN) 10 MG capsule; Take 1 capsule (10 mg total) by mouth every evening.    Gastroesophageal reflux disease, unspecified whether esophagitis present  -     esomeprazole (NEXIUM) 40 MG capsule; Take one capsule by mouth daily as needed for acid reflux    1 mo phone f/u for insomnia.     3.5 mo f/u for dm.      Ericka Cortez MD  Department of Internal Medicine - [facility]  7:24 AM

## 2022-08-19 ENCOUNTER — TELEPHONE (OUTPATIENT)
Dept: PRIMARY CARE CLINIC | Facility: CLINIC | Age: 77
End: 2022-08-19
Payer: MEDICARE

## 2022-08-19 DIAGNOSIS — I73.9 PAD (PERIPHERAL ARTERY DISEASE): Primary | ICD-10-CM

## 2022-08-19 RX ORDER — ROSUVASTATIN CALCIUM 40 MG/1
40 TABLET, COATED ORAL NIGHTLY
Qty: 90 TABLET | Refills: 3 | Status: SHIPPED | OUTPATIENT
Start: 2022-08-19 | End: 2023-01-01 | Stop reason: SDUPTHER

## 2022-08-19 NOTE — TELEPHONE ENCOUNTER
Please call and notify pt:    Her cholesterol is still not quite at goal despite being on the highest dose of atorvastatin. Will change to rosuvastatin 40mg daily.   No anemia. Kidney function is stable.  Her vitamin D level is still low. Recommend to take 2 pills over the counter daily instead of 1. Will recheck at next visit.

## 2022-08-22 NOTE — TELEPHONE ENCOUNTER
Pt notified of labs. She will start taking 2 Vit D daily. Will p/u crestor 40mg. No questions at this time. She will call or message with any questions or concerns.

## 2022-09-08 ENCOUNTER — TELEPHONE (OUTPATIENT)
Dept: INTERNAL MEDICINE | Facility: CLINIC | Age: 77
End: 2022-09-08
Payer: MEDICARE

## 2022-09-08 RX ORDER — BLOOD-GLUCOSE TRANSMITTER
EACH MISCELLANEOUS
Qty: 1 EACH | Refills: 3 | Status: SHIPPED | OUTPATIENT
Start: 2022-09-08 | End: 2023-01-01 | Stop reason: SDUPTHER

## 2022-09-08 RX ORDER — BLOOD-GLUCOSE SENSOR
EACH MISCELLANEOUS
Qty: 9 EACH | Refills: 3 | Status: SHIPPED | OUTPATIENT
Start: 2022-09-08 | End: 2023-01-01 | Stop reason: SDUPTHER

## 2022-09-08 NOTE — TELEPHONE ENCOUNTER
Spoke to representative and they stated they will need a prescription for patient dexcom and supplies and last chart note faxed to number below.

## 2022-09-08 NOTE — TELEPHONE ENCOUNTER
----- Message from Abby Nguyen sent at 9/8/2022 10:36 AM CDT -----  Contact: [phone] [facility] with [facility].  What supplies are needed: Dexcom and supplies  What is the brand name of the supplies:   Is this a refill or new prescription:  new  Who prescribed the supplies:    Pharmacy or company name, phone # and location:  [facility] fax   Would the patient like a call back, or a response through their [facility] portal?:   call back  Comments:  Huber states an order and most recent clinic notes are needed

## 2022-09-19 ENCOUNTER — OFFICE VISIT (OUTPATIENT)
Dept: PRIMARY CARE CLINIC | Facility: CLINIC | Age: 77
End: 2022-09-19
Payer: MEDICARE

## 2022-09-19 DIAGNOSIS — G47.00 INSOMNIA, UNSPECIFIED TYPE: Primary | ICD-10-CM

## 2022-09-19 PROCEDURE — 1160F RVW MEDS BY RX/DR IN RCRD: CPT | Mod: CPTII,95,, | Performed by: INTERNAL MEDICINE

## 2022-09-19 PROCEDURE — 3060F PR POS MICROALBUMINURIA RESULT DOCUMENTED/REVIEW: ICD-10-PCS | Mod: CPTII,95,, | Performed by: INTERNAL MEDICINE

## 2022-09-19 PROCEDURE — 3052F HG A1C>EQUAL 8.0%<EQUAL 9.0%: CPT | Mod: CPTII,95,, | Performed by: INTERNAL MEDICINE

## 2022-09-19 PROCEDURE — 3072F LOW RISK FOR RETINOPATHY: CPT | Mod: CPTII,95,, | Performed by: INTERNAL MEDICINE

## 2022-09-19 PROCEDURE — 99441 PR PHYSICIAN TELEPHONE EVALUATION 5-10 MIN: ICD-10-PCS | Mod: 95,,, | Performed by: INTERNAL MEDICINE

## 2022-09-19 PROCEDURE — 3066F NEPHROPATHY DOC TX: CPT | Mod: CPTII,95,, | Performed by: INTERNAL MEDICINE

## 2022-09-19 PROCEDURE — 3052F PR MOST RECENT HEMOGLOBIN A1C LEVEL 8.0 - < 9.0%: ICD-10-PCS | Mod: CPTII,95,, | Performed by: INTERNAL MEDICINE

## 2022-09-19 PROCEDURE — 99441 PR PHYSICIAN TELEPHONE EVALUATION 5-10 MIN: CPT | Mod: 95,,, | Performed by: INTERNAL MEDICINE

## 2022-09-19 PROCEDURE — 3072F PR LOW RISK FOR RETINOPATHY: ICD-10-PCS | Mod: CPTII,95,, | Performed by: INTERNAL MEDICINE

## 2022-09-19 PROCEDURE — 4010F ACE/ARB THERAPY RXD/TAKEN: CPT | Mod: CPTII,95,, | Performed by: INTERNAL MEDICINE

## 2022-09-19 PROCEDURE — 4010F PR ACE/ARB THEARPY RXD/TAKEN: ICD-10-PCS | Mod: CPTII,95,, | Performed by: INTERNAL MEDICINE

## 2022-09-19 PROCEDURE — 3066F PR DOCUMENTATION OF TREATMENT FOR NEPHROPATHY: ICD-10-PCS | Mod: CPTII,95,, | Performed by: INTERNAL MEDICINE

## 2022-09-19 PROCEDURE — 1159F MED LIST DOCD IN RCRD: CPT | Mod: CPTII,95,, | Performed by: INTERNAL MEDICINE

## 2022-09-19 PROCEDURE — 1159F PR MEDICATION LIST DOCUMENTED IN MEDICAL RECORD: ICD-10-PCS | Mod: CPTII,95,, | Performed by: INTERNAL MEDICINE

## 2022-09-19 PROCEDURE — 3060F POS MICROALBUMINURIA REV: CPT | Mod: CPTII,95,, | Performed by: INTERNAL MEDICINE

## 2022-09-19 PROCEDURE — 1160F PR REVIEW ALL MEDS BY PRESCRIBER/CLIN PHARMACIST DOCUMENTED: ICD-10-PCS | Mod: CPTII,95,, | Performed by: INTERNAL MEDICINE

## 2022-09-19 NOTE — PROGRESS NOTES
Established Patient - Audio Only Telehealth Visit     The patient location is: [facility]  The chief complaint leading to consultation is: insomnia  Visit type: Virtual visit with audio only (telephone)  Total time spent with patient: 5 min       The reason for the audio only service rather than synchronous audio and video virtual visit was related to technical difficulties or patient preference/necessity.     Each patient to whom I provide medical services by telemedicine is:  (1) informed of the relationship between the physician and patient and the respective role of any other health care provider with respect to management of the patient; and (2) notified that they may decline to receive medical services by telemedicine and may withdraw from such care at any time. Patient verbally consented to receive this service via voice-only telephone call.       HPI:   At our last visit, pt was having issues w/ insomnia. Started on doxepin. Reports after taking her first dose, felt sleepy for a long time even the next day. And was able to have good sleep for a week. Takes once a week and feels like body is adjusting to it. Not drowsy the next morning anymore. Works very well. Likes the med. Would like to cont.      Assessment and plan:    Diagnoses and all orders for this visit:    Insomnia, unspecified type  Doing well on doxepin. Only taking weekly and works well for her.     Ericka Cortez MD  Department of Internal Medicine - [facility]  10:31 AM                          This service was not originating from a related E/M service provided within the previous 7 days nor will  to an E/M service or procedure within the next 24 hours or my soonest available appointment.  Prevailing standard of care was able to be met in this audio-only visit.

## 2022-10-04 RX ORDER — INSULIN LISPRO 100 [IU]/ML
INJECTION, SOLUTION INTRAVENOUS; SUBCUTANEOUS
Qty: 15 ML | Refills: 2 | Status: ON HOLD | OUTPATIENT
Start: 2022-10-04 | End: 2023-01-01 | Stop reason: HOSPADM

## 2022-10-04 NOTE — TELEPHONE ENCOUNTER
----- Message from Janet Moreno sent at 10/4/2022  9:52 AM CDT -----  Contact: @ [phone]  Pt is calling to get a refill on the following insulin lispro (HUMALOG KWIKPEN INSULIN) 100 unit/mL pen please call and adv @ [phone]

## 2022-10-06 ENCOUNTER — HOSPITAL ENCOUNTER (OUTPATIENT)
Dept: RADIOLOGY | Facility: OTHER | Age: 77
Discharge: HOME | End: 2022-10-06
Attending: INTERNAL MEDICINE
Payer: MEDICARE

## 2022-10-06 DIAGNOSIS — Z12.31 ENCOUNTER FOR SCREENING MAMMOGRAM FOR MALIGNANT NEOPLASM OF BREAST: ICD-10-CM

## 2022-10-06 DIAGNOSIS — Z78.0 POSTMENOPAUSAL ESTROGEN DEFICIENCY: ICD-10-CM

## 2022-10-06 DIAGNOSIS — E04.1 THYROID NODULE: ICD-10-CM

## 2022-10-06 DIAGNOSIS — M85.80 OSTEOPENIA, UNSPECIFIED LOCATION: ICD-10-CM

## 2022-10-06 PROCEDURE — 77080 DEXA BONE DENSITY SPINE HIP: ICD-10-PCS | Mod: 26,,, | Performed by: RADIOLOGY

## 2022-10-06 PROCEDURE — 77067 MAMMO DIGITAL SCREENING BILAT WITH TOMO: ICD-10-PCS | Mod: 26,,, | Performed by: RADIOLOGY

## 2022-10-06 PROCEDURE — 76536 US SOFT TISSUE HEAD NECK THYROID: ICD-10-PCS | Mod: 26,,, | Performed by: RADIOLOGY

## 2022-10-06 PROCEDURE — 77080 DXA BONE DENSITY AXIAL: CPT | Mod: TC

## 2022-10-06 PROCEDURE — 76536 US EXAM OF HEAD AND NECK: CPT | Mod: 26,,, | Performed by: RADIOLOGY

## 2022-10-06 PROCEDURE — 76536 US EXAM OF HEAD AND NECK: CPT | Mod: TC

## 2022-10-06 PROCEDURE — 77063 BREAST TOMOSYNTHESIS BI: CPT | Mod: TC

## 2022-10-06 PROCEDURE — 77080 DXA BONE DENSITY AXIAL: CPT | Mod: 26,,, | Performed by: RADIOLOGY

## 2022-10-06 PROCEDURE — 77067 SCR MAMMO BI INCL CAD: CPT | Mod: 26,,, | Performed by: RADIOLOGY

## 2022-10-06 PROCEDURE — 77067 SCR MAMMO BI INCL CAD: CPT | Mod: TC

## 2022-10-06 PROCEDURE — 77063 MAMMO DIGITAL SCREENING BILAT WITH TOMO: ICD-10-PCS | Mod: 26,,, | Performed by: RADIOLOGY

## 2022-10-06 PROCEDURE — 77063 BREAST TOMOSYNTHESIS BI: CPT | Mod: 26,,, | Performed by: RADIOLOGY

## 2022-10-13 ENCOUNTER — OFFICE VISIT (OUTPATIENT)
Dept: PODIATRY | Facility: CLINIC | Age: 77
End: 2022-10-13
Payer: MEDICARE

## 2022-10-13 VITALS
BODY MASS INDEX: 33.73 KG/M2 | DIASTOLIC BLOOD PRESSURE: 91 MMHG | WEIGHT: 209.88 LBS | HEART RATE: 77 BPM | SYSTOLIC BLOOD PRESSURE: 183 MMHG | RESPIRATION RATE: 18 BRPM | HEIGHT: 66 IN

## 2022-10-13 DIAGNOSIS — L84 CORN OR CALLUS: ICD-10-CM

## 2022-10-13 DIAGNOSIS — B35.1 ONYCHOMYCOSIS DUE TO DERMATOPHYTE: ICD-10-CM

## 2022-10-13 DIAGNOSIS — E11.49 TYPE II DIABETES MELLITUS WITH NEUROLOGICAL MANIFESTATIONS: Primary | ICD-10-CM

## 2022-10-13 DIAGNOSIS — I73.9 PAD (PERIPHERAL ARTERY DISEASE): ICD-10-CM

## 2022-10-13 PROCEDURE — 99999 PR PBB SHADOW E&M-EST. PATIENT-LVL IV: CPT | Mod: PBBFAC,,, | Performed by: PODIATRIST

## 2022-10-13 PROCEDURE — 11721 DEBRIDE NAIL 6 OR MORE: CPT | Mod: 59,Q8,S$GLB, | Performed by: PODIATRIST

## 2022-10-13 PROCEDURE — 3052F PR MOST RECENT HEMOGLOBIN A1C LEVEL 8.0 - < 9.0%: ICD-10-PCS | Mod: CPTII,S$GLB,, | Performed by: PODIATRIST

## 2022-10-13 PROCEDURE — 1125F PR PAIN SEVERITY QUANTIFIED, PAIN PRESENT: ICD-10-PCS | Mod: CPTII,S$GLB,, | Performed by: PODIATRIST

## 2022-10-13 PROCEDURE — 1160F PR REVIEW ALL MEDS BY PRESCRIBER/CLIN PHARMACIST DOCUMENTED: ICD-10-PCS | Mod: CPTII,S$GLB,, | Performed by: PODIATRIST

## 2022-10-13 PROCEDURE — 1125F AMNT PAIN NOTED PAIN PRSNT: CPT | Mod: CPTII,S$GLB,, | Performed by: PODIATRIST

## 2022-10-13 PROCEDURE — 99499 UNLISTED E&M SERVICE: CPT | Mod: S$GLB,,, | Performed by: PODIATRIST

## 2022-10-13 PROCEDURE — 11057 PARNG/CUTG B9 HYPRKR LES >4: CPT | Mod: Q8,S$GLB,, | Performed by: PODIATRIST

## 2022-10-13 PROCEDURE — 1159F MED LIST DOCD IN RCRD: CPT | Mod: CPTII,S$GLB,, | Performed by: PODIATRIST

## 2022-10-13 PROCEDURE — 3052F HG A1C>EQUAL 8.0%<EQUAL 9.0%: CPT | Mod: CPTII,S$GLB,, | Performed by: PODIATRIST

## 2022-10-13 PROCEDURE — 99499 NO LOS: ICD-10-PCS | Mod: S$GLB,,, | Performed by: PODIATRIST

## 2022-10-13 PROCEDURE — 11057 PR TRIM BENIGN HYPERKERATOTIC SKIN LESION,>4: ICD-10-PCS | Mod: Q8,S$GLB,, | Performed by: PODIATRIST

## 2022-10-13 PROCEDURE — 3080F DIAST BP >= 90 MM HG: CPT | Mod: CPTII,S$GLB,, | Performed by: PODIATRIST

## 2022-10-13 PROCEDURE — 4010F ACE/ARB THERAPY RXD/TAKEN: CPT | Mod: CPTII,S$GLB,, | Performed by: PODIATRIST

## 2022-10-13 PROCEDURE — 3072F LOW RISK FOR RETINOPATHY: CPT | Mod: CPTII,S$GLB,, | Performed by: PODIATRIST

## 2022-10-13 PROCEDURE — 11721 PR DEBRIDEMENT OF NAILS, 6 OR MORE: ICD-10-PCS | Mod: 59,Q8,S$GLB, | Performed by: PODIATRIST

## 2022-10-13 PROCEDURE — 3072F PR LOW RISK FOR RETINOPATHY: ICD-10-PCS | Mod: CPTII,S$GLB,, | Performed by: PODIATRIST

## 2022-10-13 PROCEDURE — 3060F PR POS MICROALBUMINURIA RESULT DOCUMENTED/REVIEW: ICD-10-PCS | Mod: CPTII,S$GLB,, | Performed by: PODIATRIST

## 2022-10-13 PROCEDURE — 1160F RVW MEDS BY RX/DR IN RCRD: CPT | Mod: CPTII,S$GLB,, | Performed by: PODIATRIST

## 2022-10-13 PROCEDURE — 1159F PR MEDICATION LIST DOCUMENTED IN MEDICAL RECORD: ICD-10-PCS | Mod: CPTII,S$GLB,, | Performed by: PODIATRIST

## 2022-10-13 PROCEDURE — 4010F PR ACE/ARB THEARPY RXD/TAKEN: ICD-10-PCS | Mod: CPTII,S$GLB,, | Performed by: PODIATRIST

## 2022-10-13 PROCEDURE — 3077F PR MOST RECENT SYSTOLIC BLOOD PRESSURE >= 140 MM HG: ICD-10-PCS | Mod: CPTII,S$GLB,, | Performed by: PODIATRIST

## 2022-10-13 PROCEDURE — 3080F PR MOST RECENT DIASTOLIC BLOOD PRESSURE >= 90 MM HG: ICD-10-PCS | Mod: CPTII,S$GLB,, | Performed by: PODIATRIST

## 2022-10-13 PROCEDURE — 99999 PR PBB SHADOW E&M-EST. PATIENT-LVL IV: ICD-10-PCS | Mod: PBBFAC,,, | Performed by: PODIATRIST

## 2022-10-13 PROCEDURE — 3060F POS MICROALBUMINURIA REV: CPT | Mod: CPTII,S$GLB,, | Performed by: PODIATRIST

## 2022-10-13 PROCEDURE — 3066F PR DOCUMENTATION OF TREATMENT FOR NEPHROPATHY: ICD-10-PCS | Mod: CPTII,S$GLB,, | Performed by: PODIATRIST

## 2022-10-13 PROCEDURE — 3077F SYST BP >= 140 MM HG: CPT | Mod: CPTII,S$GLB,, | Performed by: PODIATRIST

## 2022-10-13 PROCEDURE — 3066F NEPHROPATHY DOC TX: CPT | Mod: CPTII,S$GLB,, | Performed by: PODIATRIST

## 2022-10-13 NOTE — PROGRESS NOTES
Subjective:      Patient ID: Buck Ibarra is a 76 y.o. female.    Chief Complaint: PCP (Ericka Cortez MD   -- ARNOLD Sawyer, FNP  8/4/22), Diabetic Foot Exam, Nail Care, and Ankle Pain (Rt ankle pain )    Buck is a 76 y.o. female who presents to the clinic upon referral from Dr. Mica azar. provider found  for evaluation and treatment of diabetic feet. Buck has a past medical history of Allergy, Cataract, Diabetes mellitus type II, Gastritis, GERD (gastroesophageal reflux disease), H. pylori infection, History of hepatitis C, SVR as of 8-2016  (8/25/2015), Hypertension, Osteopenia, and Type 2 diabetes mellitus with diabetic polyneuropathy. Patient relates no major problem with feet. Only complaints today consist of routine DM foot care and nail/callus trimming. Has difficulty with toenails that are thick, discolored. Routine trimming helps prevent complications and pain. She also has hx of PAD, lower extremity stenting and is on long term Plavix. No other pedal concerns at this time.     PCP: Ericka Cortez MD    Date Last Seen by PCP:   Chief Complaint   Patient presents with    PCP     Ericka Cortez MD   -- ARNOLD Sawyer, FNP  8/4/22    Diabetic Foot Exam    Nail Care    Ankle Pain     Rt ankle pain         Current shoe gear: Casual shoes    Hemoglobin A1C   Date Value Ref Range Status   07/28/2022 8.1 (H) 4.0 - 5.6 % Final     Comment:     ADA Screening Guidelines:  5.7-6.4%  Consistent with prediabetes  >or=6.5%  Consistent with diabetes    High levels of fetal hemoglobin interfere with the HbA1C  assay. Heterozygous hemoglobin variants (HbS, HgC, etc)do  not significantly interfere with this assay.   However, presence of multiple variants may affect accuracy.     05/12/2022 9.0 (H) 4.0 - 5.6 % Final     Comment:     ADA Screening Guidelines:  5.7-6.4%  Consistent with prediabetes  >or=6.5%  Consistent with diabetes    High levels of fetal hemoglobin interfere with the HbA1C  assay. Heterozygous  hemoglobin variants (HbS, HgC, etc)do  not significantly interfere with this assay.   However, presence of multiple variants may affect accuracy.     2022 9.1 (H) 4.0 - 5.6 % Final     Comment:     ADA Screening Guidelines:  5.7-6.4%  Consistent with prediabetes  >or=6.5%  Consistent with diabetes    High levels of fetal hemoglobin interfere with the HbA1C  assay. Heterozygous hemoglobin variants (HbS, HgC, etc)do  not significantly interfere with this assay.   However, presence of multiple variants may affect accuracy.             Review of Systems   Constitutional: Negative for chills, diaphoresis, fever, malaise/fatigue and night sweats.   Cardiovascular:  Negative for claudication, cyanosis, leg swelling and syncope.   Skin:  Positive for nail changes. Negative for color change, dry skin, rash, suspicious lesions and unusual hair distribution.   Musculoskeletal:  Negative for falls, joint pain, joint swelling, muscle cramps, muscle weakness and stiffness.   Gastrointestinal:  Negative for constipation, diarrhea, nausea and vomiting.   Neurological:  Positive for numbness and sensory change. Negative for brief paralysis, disturbances in coordination, focal weakness, paresthesias and tremors.         Objective:      Physical Exam  Vitals reviewed.   Constitutional:       Appearance: She is well-developed.   HENT:      Head: Normocephalic.   Cardiovascular:      Pulses:           Dorsalis pedis pulses are 1+ on the right side and 1+ on the left side.      Comments: PT pulses diminished b/l. CRT < 3 sec to tips of toes.  No vericosities noted to b/l LEs.     Pulmonary:      Effort: No respiratory distress.   Musculoskeletal:      Right lower le+ Edema present.      Left lower le+ Edema present.      Comments: Rectus foot and toe position bilateral with no major deformities noted. Mild equinus noted b/l ankles with < 10 deg DF noted. MMT 5/5 in DF/PF/Inv/Ev resistance with no reproduction of pain in any  direction. Passive range of motion of ankle and pedal joints is painless b/l.     Skin:     General: Skin is warm and dry.      Findings: No erythema.      Comments: No open lesions, lacerations or wounds noted. Nails are thickened, elongated, discolored yellow/brown with subungual debris and brittleness to R 1-5 and L 1-5. Interdigital spaces clean, dry and intact b/l. No erythema noted to b/l foot. Skin texture thin, atrophic, dry. Mild hyperpigmentation to skin of both ankles and/or feet, consistent with hemosiderin deposits. Pedal hair diminished. Toes cool to touch b/l. Hyperkeratotic lesion noted to distal tips of b/l hallux, 2nd and 3rd toes. Skin lines present to lesion/s. No signs of deep tissue injury.     Neurological:      Mental Status: She is alert and oriented to person, place, and time.      Sensory: Sensory deficit present.      Comments: Light touch, proprioception, and sharp/dull sensation are all intact bilaterally. Protective threshold with the Independence-Wienstein monofilament is intact bilaterally. Subjective paresthesias with no clearly identifiable source or trigger.    Psychiatric:         Behavior: Behavior normal.         Thought Content: Thought content normal.         Judgment: Judgment normal.             Assessment:       Encounter Diagnoses   Name Primary?    Type II diabetes mellitus with neurological manifestations Yes    PAD (peripheral artery disease)     Onychomycosis due to dermatophyte     Corn or callus            Plan:       Buck was seen today for pcp, diabetic foot exam, nail care and ankle pain.    Diagnoses and all orders for this visit:    Type II diabetes mellitus with neurological manifestations    PAD (peripheral artery disease)    Onychomycosis due to dermatophyte    Corn or callus      I counseled the patient on her conditions, their implications and medical management.    - Shoe inspection. Diabetic Foot Education. Patient reminded of the importance of good  nutrition and blood sugar control to help prevent podiatric complications of diabetes. Patient instructed on proper foot hygeine. We discussed wearing proper shoe gear, daily foot inspections, never walking without protective shoe gear, caution putting sharp instruments to feet     - Discussed DM foot care:  Wear comfortable, proper fitting shoes. Wash feet daily. Dry well. After drying, apply moisturizer to feet (no lotion to webspaces). Inspect feet daily for skin breaks, blisters, swelling, or redness. Wear cotton socks (preferably white)  Change socks every day. Do NOT walk barefoot. Do NOT use heating pads or warm/hot water soaks     With patient's permission, nails were aggressively reduced and debrided 1,2,3,4, 5 R and 1,2, 3,4,5 L and filed to their soft tissue attachment mechanically and with electric , removing all offending nail and debris. Patient tolerated this well and no blood was drawn. Patient reports relief following the procedure.     The affected area was cleansed with an alcohol prep pad. Next, utilizing a 5mm curette, the hyperkeratotic tissues were trimmed from distal tips of toes 1-3 b/l, down to appropriate level of skin. Care was taken to remove any nucleated core from the center of the lesion. No pinpoint bleeding was encountered. The patient tolerated relief following this procedure.     Rx Ketoconazole cream to be applied to affected toenails for up to 1 year. Continue.     RTC 3 months, sooner PRN

## 2022-11-12 ENCOUNTER — HOSPITAL ENCOUNTER (EMERGENCY)
Facility: HOSPITAL | Age: 77
Discharge: HOME | End: 2022-11-12
Attending: EMERGENCY MEDICINE
Payer: MEDICARE

## 2022-11-12 VITALS
SYSTOLIC BLOOD PRESSURE: 195 MMHG | HEIGHT: 65 IN | DIASTOLIC BLOOD PRESSURE: 88 MMHG | OXYGEN SATURATION: 99 % | HEART RATE: 89 BPM | TEMPERATURE: 99 F | WEIGHT: 203 LBS | RESPIRATION RATE: 16 BRPM | BODY MASS INDEX: 33.82 KG/M2

## 2022-11-12 DIAGNOSIS — I10 HYPERTENSION: ICD-10-CM

## 2022-11-12 DIAGNOSIS — H57.12 LEFT EYE PAIN: Primary | ICD-10-CM

## 2022-11-12 DIAGNOSIS — H05.20 EXOPHTHALMOS: ICD-10-CM

## 2022-11-12 LAB
BUN SERPL-MCNC: 17 MG/DL (ref 6–30)
CHLORIDE SERPL-SCNC: 101 MMOL/L (ref 95–110)
CREAT SERPL-MCNC: 0.9 MG/DL (ref 0.5–1.4)
GLUCOSE SERPL-MCNC: 144 MG/DL (ref 70–110)
HCT VFR BLD CALC: 47 %PCV (ref 36–54)
POC IONIZED CALCIUM: 1.16 MMOL/L (ref 1.06–1.42)
POC TCO2 (MEASURED): 26 MMOL/L (ref 23–29)
POTASSIUM BLD-SCNC: 3.9 MMOL/L (ref 3.5–5.1)
SAMPLE: ABNORMAL
SODIUM BLD-SCNC: 139 MMOL/L (ref 136–145)
TSH SERPL DL<=0.005 MIU/L-ACNC: 0.92 UIU/ML (ref 0.4–4)

## 2022-11-12 PROCEDURE — 93005 ELECTROCARDIOGRAM TRACING: CPT

## 2022-11-12 PROCEDURE — 25000003 PHARM REV CODE 250: Performed by: PHYSICIAN ASSISTANT

## 2022-11-12 PROCEDURE — 63600175 PHARM REV CODE 636 W HCPCS: Performed by: PHYSICIAN ASSISTANT

## 2022-11-12 PROCEDURE — 80047 BASIC METABLC PNL IONIZED CA: CPT

## 2022-11-12 PROCEDURE — 96374 THER/PROPH/DIAG INJ IV PUSH: CPT

## 2022-11-12 PROCEDURE — 99285 EMERGENCY DEPT VISIT HI MDM: CPT | Mod: 25

## 2022-11-12 PROCEDURE — 99285 EMERGENCY DEPT VISIT HI MDM: CPT | Mod: ,,, | Performed by: PHYSICIAN ASSISTANT

## 2022-11-12 PROCEDURE — 93010 ELECTROCARDIOGRAM REPORT: CPT | Mod: ,,, | Performed by: INTERNAL MEDICINE

## 2022-11-12 PROCEDURE — 99285 PR EMERGENCY DEPT VISIT,LEVEL V: ICD-10-PCS | Mod: ,,, | Performed by: PHYSICIAN ASSISTANT

## 2022-11-12 PROCEDURE — 84443 ASSAY THYROID STIM HORMONE: CPT | Performed by: PHYSICIAN ASSISTANT

## 2022-11-12 PROCEDURE — 96375 TX/PRO/DX INJ NEW DRUG ADDON: CPT

## 2022-11-12 PROCEDURE — 93010 EKG 12-LEAD: ICD-10-PCS | Mod: ,,, | Performed by: INTERNAL MEDICINE

## 2022-11-12 RX ORDER — PROCHLORPERAZINE EDISYLATE 5 MG/ML
10 INJECTION INTRAMUSCULAR; INTRAVENOUS
Status: COMPLETED | OUTPATIENT
Start: 2022-11-12 | End: 2022-11-12

## 2022-11-12 RX ORDER — BUTALBITAL, ACETAMINOPHEN AND CAFFEINE 50; 325; 40 MG/1; MG/1; MG/1
1 TABLET ORAL
Status: COMPLETED | OUTPATIENT
Start: 2022-11-12 | End: 2022-11-12

## 2022-11-12 RX ORDER — HYDRALAZINE HYDROCHLORIDE 20 MG/ML
10 INJECTION INTRAMUSCULAR; INTRAVENOUS
Status: COMPLETED | OUTPATIENT
Start: 2022-11-12 | End: 2022-11-12

## 2022-11-12 RX ORDER — MORPHINE SULFATE 2 MG/ML
2 INJECTION, SOLUTION INTRAMUSCULAR; INTRAVENOUS
Status: COMPLETED | OUTPATIENT
Start: 2022-11-12 | End: 2022-11-12

## 2022-11-12 RX ORDER — IBUPROFEN 600 MG/1
600 TABLET ORAL
Status: COMPLETED | OUTPATIENT
Start: 2022-11-12 | End: 2022-11-12

## 2022-11-12 RX ORDER — PROPARACAINE HYDROCHLORIDE 5 MG/ML
1 SOLUTION/ DROPS OPHTHALMIC
Status: COMPLETED | OUTPATIENT
Start: 2022-11-12 | End: 2022-11-12

## 2022-11-12 RX ADMIN — BUTALBITAL, ACETAMINOPHEN, AND CAFFEINE 1 TABLET: 50; 325; 40 TABLET ORAL at 05:11

## 2022-11-12 RX ADMIN — PROCHLORPERAZINE EDISYLATE 10 MG: 5 INJECTION INTRAMUSCULAR; INTRAVENOUS at 01:11

## 2022-11-12 RX ADMIN — HYDRALAZINE HYDROCHLORIDE 10 MG: 20 INJECTION, SOLUTION INTRAMUSCULAR; INTRAVENOUS at 03:11

## 2022-11-12 RX ADMIN — IBUPROFEN 600 MG: 600 TABLET, FILM COATED ORAL at 05:11

## 2022-11-12 RX ADMIN — FLUORESCEIN SODIUM 1 EACH: 1 STRIP OPHTHALMIC at 02:11

## 2022-11-12 RX ADMIN — PROPARACAINE HYDROCHLORIDE 1 DROP: 5 SOLUTION/ DROPS OPHTHALMIC at 02:11

## 2022-11-12 RX ADMIN — MORPHINE SULFATE 2 MG: 2 INJECTION, SOLUTION INTRAMUSCULAR; INTRAVENOUS at 02:11

## 2022-11-12 NOTE — ED PROVIDER NOTES
Encounter Date: 11/12/2022       History     Chief Complaint   Patient presents with    Headache    Hypertension     Compliant with BP meds     This is a 76 year old female with a PMH of HTN and DM presenting to the ED with a chief complaint of headache. She reports waking up with pain behind her left eye radiating to the occipital area. She describes intermittent sharp pain. She noticed her BP was elevated 200s/100s. She took tylenol without improvement. Denies fever, chills, URI symptoms, photophobia/vision changes, neck pain, chest pain, SOB, changes in urine, focal weakness or numbness.    Review of patient's allergies indicates:   Allergen Reactions    Amlodipine Swelling    Erythromycin Anaphylaxis    Erythropoietin analogues Anaphylaxis    Pegasys [peginterferon ruben-2a] Anaphylaxis    Lisinopril Hives     Past Medical History:   Diagnosis Date    Allergy     Cataract     Diabetes mellitus type II     Gastritis     with gastric ulcer    GERD (gastroesophageal reflux disease)     H. pylori infection     History of hepatitis C, SVR as of 8-2016 8/25/2015    [facility] 12 wks completed.  SVR(12) as of 8/4/16 SVR(24) as of 10-     Hypertension     Osteopenia     Type 2 diabetes mellitus with diabetic polyneuropathy      Past Surgical History:   Procedure Laterality Date    COLONOSCOPY      COLONOSCOPY N/A 1/28/2016    Procedure: COLONOSCOPY;  Surgeon: Emeka Ahn MD;  Location: [address]);  Service: Endoscopy;  Laterality: N/A;    COLONOSCOPY N/A 8/8/2019    Procedure: COLONOSCOPY;  Surgeon: Susan Dia MD;  Location: [address]);  Service: Endoscopy;  Laterality: N/A;  appt confirmed-rb    HYSTERECTOMY      LIVER BIOPSY      OOPHORECTOMY      PERIPHERAL ANGIOGRAPHY Left 5/5/2021    Procedure: Peripheral angiography;  Surgeon: Randolph Toribio MD;  Location: [facility] CATH LAB;  Service: Cardiology;  Laterality: Left;    PERIPHERAL ANGIOGRAPHY N/A 6/21/2021    Procedure: Peripheral  angiography;  Surgeon: Randolph Toribio MD;  Location: [facility] CATH LAB;  Service: Cardiology;  Laterality: N/A;    UPPER GASTROINTESTINAL ENDOSCOPY       Family History   Problem Relation Age of Onset    Heart disease Mother     Diabetes Mother     Hypertension Mother     Hyperlipidemia Mother     Heart disease Father     Cancer Sister         breast cancer    Diabetes Sister     Cancer Sister 70        colon CA    Diabetes Sister     Breast cancer Sister     Diabetes Sister     Glaucoma Neg Hx     Melanoma Neg Hx     Cirrhosis Neg Hx     Psoriasis Neg Hx     Lupus Neg Hx      Social History     Tobacco Use    Smoking status: Former     Packs/day: 0.25     Years: 48.00     Pack years: 12.00     Types: Cigarettes     Quit date: 3/28/2021     Years since quittin.6    Smokeless tobacco: Never   Substance Use Topics    Alcohol use: No     Comment: special occasions    Drug use: No     Review of Systems   Constitutional:  Negative for chills and fever.   HENT:  Negative for sore throat.    Eyes:  Positive for pain.   Respiratory:  Negative for shortness of breath.    Cardiovascular:  Negative for chest pain.   Gastrointestinal:  Negative for nausea and vomiting.   Genitourinary:  Negative for dysuria.   Musculoskeletal:  Negative for back pain.   Skin:  Negative for rash.   Neurological:  Positive for headaches. Negative for weakness.   Hematological:  Does not bruise/bleed easily.     Physical Exam     Initial Vitals [22 1217]   BP Pulse Resp Temp SpO2   (!) 236/115 86 18 97.8 °F (36.6 °C) 97 %      MAP       --         Physical Exam    Constitutional: She appears well-developed and well-nourished. No distress.   HENT:   Head: Atraumatic.   Eyes: Conjunctivae and EOM are normal. Pupils are equal, round, and reactive to light.   Slit lamp exam:       The left eye shows no corneal abrasion, no corneal ulcer and no fluorescein uptake.   Left occular globe proptosis  Intraocular pressure is 14   Cardiovascular:   Normal rate, regular rhythm and normal heart sounds.           Pulmonary/Chest: Breath sounds normal. No respiratory distress. She has no wheezes. She has no rhonchi. She has no rales.   Abdominal: Abdomen is soft. Bowel sounds are normal. There is no abdominal tenderness.     Neurological: She is alert and oriented to person, place, and time. She has normal strength. No cranial nerve deficit or sensory deficit. Coordination normal.   Skin: Skin is warm and dry. No rash noted.     ED Course   Procedures  Labs Reviewed   ISTAT PROCEDURE - Abnormal; Notable for the following components:       Result Value    POC Glucose 144 (*)     All other components within normal limits   TSH   ISTAT CHEM8          Imaging Results              CT ORBITS WITHOUT CONTRAST (Final result)  Result time 11/12/22 16:25:57      Final result by Christopher Parra DO (11/12/22 16:25:57)                   Impression:      CT head: No acute intracranial findings specifically without evidence for acute intracranial hemorrhage or sulcal effacement to suggest large territory recent infarction    Cerebral volume loss with patchy and confluent decreased attenuation supratentorial white matter while nonspecific concerning for chronic ischemic change and 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    CT orbits: Question bilateral globe proptosis clinical correlation for underlying thyroid orbitopathy.    Patchy paranasal sinus disease as detailed above    Otherwise unremarkable noncontrast CT orbits specifically without evidence for orbital fracture.      Electronically signed by: Christopher Parra DO  Date:    11/12/2022  Time:    16:25               Narrative:    EXAMINATION:  CT HEAD WITHOUT CONTRAST; CT ORBITS WITHOUT CONTRAST    CLINICAL HISTORY:  Headache, new or worsening (Age >= 50y);; Exophthalmos;    TECHNIQUE:  CT head: Multiple sequential 5 mm axial images of the head without contrast.  Coronal and sagittal reformatted imaging from the axial  acquisition.    CT orbits: Multi sequential 1.25 mm axial images of the orbits were performed without contrast.  Coronal and sagittal reformatted imaging from the axial acquisition.    COMPARISON:  None    FINDINGS:  CT head: Mild generalized cerebral volume loss with compensatory enlargement ventricles without hydrocephalus.  Patchy and confluent decreased attenuation supratentorial white matter while nonspecific concerning for chronic ischemic change with ill-defined hypoattenuation in the right basal ganglia suggestive for prior infarction    No evidence for acute intracranial hemorrhage or sulcal effacement to suggest large territory recent infarction.  Lobular opacity the right maxillary antra suggestive for mucous retention cyst with few patchy areas of mucosal thickening in the ethmoid air cells and right sphenoid sinus.    CT orbits: Questioned bilateral globe proptosis.  Allowing for noncontrast technique the globes and extraocular muscles are symmetric and within normal limits.  Bony orbits are intact    Patchy mucosal thickening bilateral ethmoid air cells with additional mucosal thickening right inferior frontal sinus and visualized right maxillary antra with lobular focus in the mid right maxillary antral laterally suggestive for mucous retention cyst.  In addition there is mild moderate mucosal thickening within the right sphenoid sinus ventrally extending to opacified the right sphenoid ethmoid recess.                                       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    CT orbits: Question bilateral globe proptosis clinical correlation for underlying thyroid orbitopathy.    Patchy paranasal sinus disease as detailed above    Otherwise unremarkable noncontrast CT orbits specifically without evidence for orbital fracture.      Electronically signed by: Christopher Parra DO  Date:    11/12/2022  Time:    16:25               Narrative:    EXAMINATION:  CT HEAD WITHOUT CONTRAST; CT ORBITS WITHOUT CONTRAST    CLINICAL HISTORY:  Headache, new or worsening (Age >= 50y);; Exophthalmos;    TECHNIQUE:  CT head: Multiple sequential 5 mm axial images of the head without contrast.  Coronal and sagittal reformatted imaging from the axial acquisition.    CT orbits: Multi sequential 1.25 mm axial images of the orbits were performed without contrast.  Coronal and sagittal reformatted imaging from the axial acquisition.    COMPARISON:  None    FINDINGS:  CT head: Mild generalized cerebral volume loss with compensatory enlargement ventricles without hydrocephalus.  Patchy and confluent decreased attenuation supratentorial white matter while nonspecific concerning for chronic ischemic change with ill-defined hypoattenuation in the right basal ganglia suggestive for prior infarction    No evidence for acute intracranial hemorrhage or sulcal effacement to suggest large territory recent infarction.  Lobular opacity the right maxillary antra suggestive for mucous retention cyst with few patchy areas of mucosal thickening in the ethmoid air cells and right sphenoid sinus.    CT orbits: Questioned bilateral globe proptosis.  Allowing for noncontrast technique the globes and extraocular muscles are symmetric and within normal limits.  Bony orbits are intact    Patchy mucosal thickening bilateral ethmoid air cells with additional mucosal thickening right inferior frontal sinus and visualized right maxillary antra with lobular focus in the  mid right maxillary antral laterally suggestive for mucous retention cyst.  In addition there is mild moderate mucosal thickening within the right sphenoid sinus ventrally extending to opacified the right sphenoid ethmoid recess.                                       Medications   fluorescein ophthalmic strip 1 each (1 each Both Eyes Given by Other 11/12/22 1414)   prochlorperazine injection Soln 10 mg (10 mg Intravenous Given 11/12/22 1351)   proparacaine 0.5 % ophthalmic solution 1 drop (1 drop Left Eye Given by Other 11/12/22 1415)   morphine injection 2 mg (2 mg Intravenous Given 11/12/22 1450)   hydrALAZINE injection 10 mg (10 mg Intravenous Given 11/12/22 1536)   butalbital-acetaminophen-caffeine -40 mg per tablet 1 tablet (1 tablet Oral Given 11/12/22 1715)   ibuprofen tablet 600 mg (600 mg Oral Given 11/12/22 1715)     Medical Decision Making:   History:   Old Medical Records: I decided to obtain old medical records.  Clinical Tests:   Lab Tests: Ordered and Reviewed  Radiological Study: Ordered and Reviewed     APC / Resident Notes:   76 y.o. year old female presenting with eye pain/headache.    DDx includes but is not limited to ocular migraine, cluster headache, acute angle closure glaucoma, hypertensive emergency. I considered but do not suspect GCA given absence of temporal pain or tenderness.     ED workup reveals her intraocular pressure is normal.   Her BP remained elevated despite improving pain with compazine and morphine. Hydralazine given for persistently elevated BP and headache. Her symptoms improved with treatment but she did note it was starting to recur after returning from CT. The CT showed no acute findings noting chronic ischemic changes, bilateral globe proptosis in setting of normal TSH.    Plan  Patient requested to be discharged, her workup is largely benign in nature and I suspect her symptoms are due to a complex migraine. I have placed a referral for ophthalmology  f/u.    Discussed findings and plan with patient who verbalized understanding and agrees with the plan and course of treatment. Return to ED precautions discussed. Patient is stable for discharge. I discussed the care of this patient with my supervising physician.                       Clinical Impression:   Final diagnoses:  [I10] Hypertension  [H57.12] Left eye pain (Primary)  [H05.20] Exophthalmos        ED Disposition Condition    Discharge Stable          ED Prescriptions    None       Follow-up Information       Follow up With Specialties Details Why Contact Info    Ericka Cortez MD Internal Medicine Schedule an appointment as soon as possible for a visit   [address] [address]  [phone]      [facility] OPHTHALMOLOGY Ophthalmology   [address] [facility] [address]  [phone]             Inga Mo PA-C  11/12/22 3350

## 2022-11-12 NOTE — ED NOTES
Pt identifiers Buck Ibarra were checked and are correct  LOC: The patient is awake, alert, aware of environment with an appropriate affect. Oriented x4, speaking appropriately  APPEARANCE: Pt resting comfortably, in no acute distress, pt is clean and well groomed, clothing properly fastened  SKIN: Skin warm, dry and intact, normal skin turgor, moist mucus membranes  RESPIRATORY: Airway is open and patent, respirations are spontaneous, even and unlabored, normal effort and rate  CARDIAC: Normal rate and rhythm, no peripheral edema noted, capillary refill < 3 seconds, bilateral radial pulses 2+  ABDOMEN: Soft, nontender, nondistended. Bowel sounds present   NEUROLOGIC: PERRL, facial expression is symmetrical, patient moving all extremities spontaneously, normal sensation in all extremities when touched with a finger.  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

## 2022-11-12 NOTE — ED NOTES
Pt. Dc'd home all dc information reviewed with Pt. Verbalized understanding Pt. Assisted to POV via wheel chair

## 2022-11-12 NOTE — ED TRIAGE NOTES
"Pt states "I have a shooting pain from my left eye to the back of my head "  Pain started around 12:30 pm today States she has no change in vision  Describes her pain as stabbing   "

## 2022-11-12 NOTE — ED NOTES
I-STAT Chem-8+ Results:   Value Reference Range   Sodium 139 136-145 mmol/L   Potassium  3.9 3.5-5.1 mmol/L   Chloride 101  mmol/L   Ionized Calcium 1.16 1.06-1.42 mmol/L   CO2 (measured) 26 23-29 mmol/L   Glucose 144  mg/dL   BUN 17 6-30 mg/dL   Creatinine 0.9 0.5-1.4 mg/dL   Hematocrit 47 36-54%

## 2022-11-14 ENCOUNTER — TELEPHONE (OUTPATIENT)
Dept: PRIMARY CARE CLINIC | Facility: CLINIC | Age: 77
End: 2022-11-14
Payer: MEDICARE

## 2022-11-15 NOTE — TELEPHONE ENCOUNTER
No Answer / vm not set up. Will also send my chart message. She has a DM f/u with you this Friday.

## 2022-12-16 DIAGNOSIS — I10 BENIGN ESSENTIAL HYPERTENSION: ICD-10-CM

## 2022-12-16 RX ORDER — CANDESARTAN 32 MG/1
32 TABLET ORAL DAILY
Qty: 90 TABLET | Refills: 3 | Status: SHIPPED | OUTPATIENT
Start: 2022-12-16 | End: 2023-01-01 | Stop reason: SDUPTHER

## 2023-01-01 ENCOUNTER — EXTERNAL HOME HEALTH (OUTPATIENT)
Dept: HOME HEALTH SERVICES | Facility: HOSPITAL | Age: 78
End: 2023-01-01
Payer: MEDICARE

## 2023-01-01 ENCOUNTER — PATIENT OUTREACH (OUTPATIENT)
Dept: ADMINISTRATIVE | Facility: CLINIC | Age: 78
End: 2023-01-01
Payer: MEDICARE

## 2023-01-01 ENCOUNTER — TELEPHONE (OUTPATIENT)
Dept: INTERNAL MEDICINE | Facility: CLINIC | Age: 78
End: 2023-01-01
Payer: MEDICARE

## 2023-01-01 ENCOUNTER — DOCUMENTATION ONLY (OUTPATIENT)
Dept: REHABILITATION | Facility: HOSPITAL | Age: 78
End: 2023-01-01
Payer: MEDICARE

## 2023-01-01 ENCOUNTER — OFFICE VISIT (OUTPATIENT)
Dept: PRIMARY CARE CLINIC | Facility: CLINIC | Age: 78
End: 2023-01-01
Payer: MEDICARE

## 2023-01-01 ENCOUNTER — CLINICAL SUPPORT (OUTPATIENT)
Dept: REHABILITATION | Facility: HOSPITAL | Age: 78
End: 2023-01-01
Payer: MEDICARE

## 2023-01-01 ENCOUNTER — LAB VISIT (OUTPATIENT)
Dept: LAB | Facility: HOSPITAL | Age: 78
End: 2023-01-01
Attending: INTERNAL MEDICINE
Payer: MEDICARE

## 2023-01-01 ENCOUNTER — TELEPHONE (OUTPATIENT)
Dept: PRIMARY CARE CLINIC | Facility: CLINIC | Age: 78
End: 2023-01-01
Payer: MEDICARE

## 2023-01-01 ENCOUNTER — HOSPITAL ENCOUNTER (INPATIENT)
Facility: HOSPITAL | Age: 78
LOS: 8 days | Discharge: NURSING, REHAB OR LONG-TERM CARE | DRG: 065 | End: 2023-12-04
Attending: EMERGENCY MEDICINE | Admitting: PSYCHIATRY & NEUROLOGY
Payer: MEDICARE

## 2023-01-01 ENCOUNTER — PATIENT OUTREACH (OUTPATIENT)
Dept: ADMINISTRATIVE | Facility: OTHER | Age: 78
End: 2023-01-01
Payer: MEDICARE

## 2023-01-01 ENCOUNTER — OFFICE VISIT (OUTPATIENT)
Dept: HOME HEALTH SERVICES | Facility: CLINIC | Age: 78
End: 2023-01-01
Payer: MEDICARE

## 2023-01-01 ENCOUNTER — TELEPHONE (OUTPATIENT)
Dept: REHABILITATION | Facility: HOSPITAL | Age: 78
End: 2023-01-01
Payer: MEDICARE

## 2023-01-01 ENCOUNTER — OFFICE VISIT (OUTPATIENT)
Dept: INTERNAL MEDICINE | Facility: CLINIC | Age: 78
End: 2023-01-01
Payer: MEDICARE

## 2023-01-01 ENCOUNTER — HOME CARE VISIT (OUTPATIENT)
Dept: NEUROLOGY | Facility: HOSPITAL | Age: 78
End: 2023-01-01
Payer: MEDICARE

## 2023-01-01 ENCOUNTER — HOSPITAL ENCOUNTER (EMERGENCY)
Facility: HOSPITAL | Age: 78
Discharge: HOME | End: 2023-03-19
Attending: EMERGENCY MEDICINE
Payer: MEDICARE

## 2023-01-01 ENCOUNTER — TELEPHONE (OUTPATIENT)
Dept: PRIMARY CARE CLINIC | Facility: CLINIC | Age: 78
End: 2023-01-01

## 2023-01-01 ENCOUNTER — OFFICE VISIT (OUTPATIENT)
Dept: PODIATRY | Facility: CLINIC | Age: 78
End: 2023-01-01
Payer: MEDICARE

## 2023-01-01 ENCOUNTER — HOSPITAL ENCOUNTER (INPATIENT)
Facility: HOSPITAL | Age: 78
LOS: 2 days | Discharge: NURSING, REHAB OR LONG-TERM CARE | DRG: 065 | End: 2023-03-01
Attending: STUDENT IN AN ORGANIZED HEALTH CARE EDUCATION/TRAINING PROGRAM | Admitting: PSYCHIATRY & NEUROLOGY
Payer: MEDICARE

## 2023-01-01 ENCOUNTER — LAB VISIT (OUTPATIENT)
Dept: LAB | Facility: HOSPITAL | Age: 78
End: 2023-01-01
Payer: MEDICARE

## 2023-01-01 ENCOUNTER — DOCUMENTATION ONLY (OUTPATIENT)
Dept: REHABILITATION | Facility: HOSPITAL | Age: 78
End: 2023-01-01

## 2023-01-01 ENCOUNTER — HOSPITAL ENCOUNTER (OUTPATIENT)
Facility: HOSPITAL | Age: 78
Discharge: HOME | End: 2023-10-25
Attending: EMERGENCY MEDICINE | Admitting: STUDENT IN AN ORGANIZED HEALTH CARE EDUCATION/TRAINING PROGRAM
Payer: MEDICARE

## 2023-01-01 ENCOUNTER — PATIENT MESSAGE (OUTPATIENT)
Dept: INTERNAL MEDICINE | Facility: CLINIC | Age: 78
End: 2023-01-01
Payer: MEDICARE

## 2023-01-01 ENCOUNTER — CLINICAL SUPPORT (OUTPATIENT)
Dept: CARDIOLOGY | Facility: HOSPITAL | Age: 78
End: 2023-01-01
Attending: STUDENT IN AN ORGANIZED HEALTH CARE EDUCATION/TRAINING PROGRAM
Payer: MEDICARE

## 2023-01-01 ENCOUNTER — DOCUMENT SCAN (OUTPATIENT)
Dept: HOME HEALTH SERVICES | Facility: HOSPITAL | Age: 78
End: 2023-01-01
Payer: MEDICARE

## 2023-01-01 ENCOUNTER — TELEPHONE (OUTPATIENT)
Dept: NEUROLOGY | Facility: CLINIC | Age: 78
End: 2023-01-01
Payer: MEDICARE

## 2023-01-01 ENCOUNTER — CLINICAL SUPPORT (OUTPATIENT)
Dept: PRIMARY CARE CLINIC | Facility: CLINIC | Age: 78
End: 2023-01-01
Payer: MEDICARE

## 2023-01-01 ENCOUNTER — CLINICAL SUPPORT (OUTPATIENT)
Dept: REHABILITATION | Facility: HOSPITAL | Age: 78
End: 2023-01-01
Attending: INTERNAL MEDICINE
Payer: MEDICARE

## 2023-01-01 ENCOUNTER — TELEPHONE (OUTPATIENT)
Dept: PODIATRY | Facility: CLINIC | Age: 78
End: 2023-01-01
Payer: MEDICARE

## 2023-01-01 ENCOUNTER — TELEPHONE (OUTPATIENT)
Dept: ADMINISTRATIVE | Facility: CLINIC | Age: 78
End: 2023-01-01
Payer: MEDICARE

## 2023-01-01 VITALS
DIASTOLIC BLOOD PRESSURE: 78 MMHG | WEIGHT: 207 LBS | BODY MASS INDEX: 32.42 KG/M2 | SYSTOLIC BLOOD PRESSURE: 142 MMHG | HEART RATE: 81 BPM | OXYGEN SATURATION: 98 %

## 2023-01-01 VITALS
WEIGHT: 210 LBS | DIASTOLIC BLOOD PRESSURE: 67 MMHG | DIASTOLIC BLOOD PRESSURE: 78 MMHG | BODY MASS INDEX: 36.49 KG/M2 | HEIGHT: 67 IN | OXYGEN SATURATION: 97 % | RESPIRATION RATE: 20 BRPM | OXYGEN SATURATION: 99 % | HEART RATE: 73 BPM | SYSTOLIC BLOOD PRESSURE: 179 MMHG | TEMPERATURE: 98 F | HEIGHT: 65 IN | WEIGHT: 219 LBS | TEMPERATURE: 98 F | BODY MASS INDEX: 32.96 KG/M2 | SYSTOLIC BLOOD PRESSURE: 146 MMHG | HEART RATE: 74 BPM | RESPIRATION RATE: 19 BRPM

## 2023-01-01 VITALS
HEART RATE: 92 BPM | DIASTOLIC BLOOD PRESSURE: 82 MMHG | BODY MASS INDEX: 30.65 KG/M2 | WEIGHT: 195.31 LBS | OXYGEN SATURATION: 97 % | HEIGHT: 67 IN | SYSTOLIC BLOOD PRESSURE: 144 MMHG

## 2023-01-01 VITALS
TEMPERATURE: 99 F | DIASTOLIC BLOOD PRESSURE: 78 MMHG | RESPIRATION RATE: 20 BRPM | WEIGHT: 196 LBS | SYSTOLIC BLOOD PRESSURE: 140 MMHG | BODY MASS INDEX: 30.7 KG/M2 | OXYGEN SATURATION: 99 % | HEART RATE: 88 BPM

## 2023-01-01 VITALS
HEART RATE: 68 BPM | SYSTOLIC BLOOD PRESSURE: 102 MMHG | DIASTOLIC BLOOD PRESSURE: 54 MMHG | OXYGEN SATURATION: 100 % | BODY MASS INDEX: 31.15 KG/M2 | TEMPERATURE: 98 F | RESPIRATION RATE: 20 BRPM | HEIGHT: 67 IN | WEIGHT: 198.44 LBS

## 2023-01-01 VITALS
OXYGEN SATURATION: 99 % | DIASTOLIC BLOOD PRESSURE: 62 MMHG | HEART RATE: 95 BPM | HEIGHT: 67 IN | SYSTOLIC BLOOD PRESSURE: 136 MMHG | WEIGHT: 194.25 LBS | BODY MASS INDEX: 30.49 KG/M2

## 2023-01-01 VITALS
SYSTOLIC BLOOD PRESSURE: 156 MMHG | BODY MASS INDEX: 30.45 KG/M2 | HEIGHT: 67 IN | TEMPERATURE: 98 F | OXYGEN SATURATION: 98 % | DIASTOLIC BLOOD PRESSURE: 58 MMHG | WEIGHT: 194 LBS | HEART RATE: 63 BPM | RESPIRATION RATE: 19 BRPM

## 2023-01-01 VITALS
HEART RATE: 77 BPM | SYSTOLIC BLOOD PRESSURE: 132 MMHG | HEIGHT: 67 IN | DIASTOLIC BLOOD PRESSURE: 75 MMHG | TEMPERATURE: 98 F | OXYGEN SATURATION: 98 % | BODY MASS INDEX: 32.18 KG/M2 | WEIGHT: 205 LBS

## 2023-01-01 VITALS
BODY MASS INDEX: 33.15 KG/M2 | HEIGHT: 67 IN | WEIGHT: 211.19 LBS | SYSTOLIC BLOOD PRESSURE: 166 MMHG | DIASTOLIC BLOOD PRESSURE: 78 MMHG | HEART RATE: 77 BPM

## 2023-01-01 VITALS
SYSTOLIC BLOOD PRESSURE: 170 MMHG | WEIGHT: 209.44 LBS | TEMPERATURE: 98 F | DIASTOLIC BLOOD PRESSURE: 57 MMHG | HEIGHT: 67 IN | HEART RATE: 76 BPM | SYSTOLIC BLOOD PRESSURE: 115 MMHG | HEIGHT: 67 IN | OXYGEN SATURATION: 99 % | BODY MASS INDEX: 32.87 KG/M2 | DIASTOLIC BLOOD PRESSURE: 75 MMHG | BODY MASS INDEX: 32.35 KG/M2 | OXYGEN SATURATION: 100 % | HEART RATE: 76 BPM | WEIGHT: 206.13 LBS | TEMPERATURE: 98 F

## 2023-01-01 VITALS
WEIGHT: 200.81 LBS | BODY MASS INDEX: 31.52 KG/M2 | HEIGHT: 67 IN | TEMPERATURE: 98 F | SYSTOLIC BLOOD PRESSURE: 136 MMHG | HEART RATE: 80 BPM | OXYGEN SATURATION: 99 % | DIASTOLIC BLOOD PRESSURE: 68 MMHG

## 2023-01-01 VITALS — SYSTOLIC BLOOD PRESSURE: 137 MMHG | DIASTOLIC BLOOD PRESSURE: 61 MMHG

## 2023-01-01 DIAGNOSIS — F17.210 TOBACCO DEPENDENCE DUE TO CIGARETTES: Primary | ICD-10-CM

## 2023-01-01 DIAGNOSIS — Z74.09 IMPAIRED FUNCTIONAL MOBILITY, BALANCE, GAIT, AND ENDURANCE: Primary | ICD-10-CM

## 2023-01-01 DIAGNOSIS — E77.8 HYPOPROTEINEMIA: ICD-10-CM

## 2023-01-01 DIAGNOSIS — I16.0 HYPERTENSIVE URGENCY: ICD-10-CM

## 2023-01-01 DIAGNOSIS — E66.09 CLASS 1 OBESITY DUE TO EXCESS CALORIES WITH SERIOUS COMORBIDITY AND BODY MASS INDEX (BMI) OF 31.0 TO 31.9 IN ADULT: ICD-10-CM

## 2023-01-01 DIAGNOSIS — M85.80 OSTEOPENIA, UNSPECIFIED LOCATION: ICD-10-CM

## 2023-01-01 DIAGNOSIS — I10 ESSENTIAL HYPERTENSION: ICD-10-CM

## 2023-01-01 DIAGNOSIS — I10 BENIGN ESSENTIAL HYPERTENSION: ICD-10-CM

## 2023-01-01 DIAGNOSIS — R42 DIZZINESS: ICD-10-CM

## 2023-01-01 DIAGNOSIS — D64.9 NORMOCYTIC ANEMIA: ICD-10-CM

## 2023-01-01 DIAGNOSIS — Z74.09 IMPAIRED FUNCTIONAL MOBILITY, BALANCE, GAIT, AND ENDURANCE: ICD-10-CM

## 2023-01-01 DIAGNOSIS — I15.2 HYPERTENSION ASSOCIATED WITH DIABETES: Primary | ICD-10-CM

## 2023-01-01 DIAGNOSIS — I10 ASYMPTOMATIC HYPERTENSION: ICD-10-CM

## 2023-01-01 DIAGNOSIS — I77.9 BILATERAL CAROTID ARTERY DISEASE, UNSPECIFIED TYPE: ICD-10-CM

## 2023-01-01 DIAGNOSIS — R06.02 SOB (SHORTNESS OF BREATH): ICD-10-CM

## 2023-01-01 DIAGNOSIS — Z09 HOSPITAL DISCHARGE FOLLOW-UP: Primary | ICD-10-CM

## 2023-01-01 DIAGNOSIS — D69.2 SENILE PURPURA: ICD-10-CM

## 2023-01-01 DIAGNOSIS — N18.31 STAGE 3A CHRONIC KIDNEY DISEASE: ICD-10-CM

## 2023-01-01 DIAGNOSIS — M79.602 PAIN IN BOTH UPPER EXTREMITIES: ICD-10-CM

## 2023-01-01 DIAGNOSIS — Z92.29 HX OF LONG TERM USE OF BLOOD THINNERS: ICD-10-CM

## 2023-01-01 DIAGNOSIS — I63.312 CEREBROVASCULAR ACCIDENT (CVA) DUE TO THROMBOSIS OF LEFT MIDDLE CEREBRAL ARTERY: Primary | ICD-10-CM

## 2023-01-01 DIAGNOSIS — I70.0 AORTIC ATHEROSCLEROSIS: ICD-10-CM

## 2023-01-01 DIAGNOSIS — R74.01 ELEVATED ALT MEASUREMENT: ICD-10-CM

## 2023-01-01 DIAGNOSIS — I63.341 THROMBOTIC STROKE INVOLVING RIGHT CEREBELLAR ARTERY: ICD-10-CM

## 2023-01-01 DIAGNOSIS — B35.1 ONYCHOMYCOSIS DUE TO DERMATOPHYTE: ICD-10-CM

## 2023-01-01 DIAGNOSIS — R29.898 BILATERAL LEG WEAKNESS: ICD-10-CM

## 2023-01-01 DIAGNOSIS — I73.9 PAD (PERIPHERAL ARTERY DISEASE): ICD-10-CM

## 2023-01-01 DIAGNOSIS — R00.1 BRADYCARDIA: ICD-10-CM

## 2023-01-01 DIAGNOSIS — Z79.4 TYPE 2 DIABETES MELLITUS WITH DIABETIC POLYNEUROPATHY, WITH LONG-TERM CURRENT USE OF INSULIN: Primary | Chronic | ICD-10-CM

## 2023-01-01 DIAGNOSIS — G47.00 INSOMNIA, UNSPECIFIED TYPE: ICD-10-CM

## 2023-01-01 DIAGNOSIS — E11.42 DIABETIC PERIPHERAL NEUROPATHY: ICD-10-CM

## 2023-01-01 DIAGNOSIS — R55 SYNCOPE: ICD-10-CM

## 2023-01-01 DIAGNOSIS — I49.9 IRREGULAR HEART BEAT: ICD-10-CM

## 2023-01-01 DIAGNOSIS — I63.9 CEREBROVASCULAR ACCIDENT (CVA), UNSPECIFIED MECHANISM: ICD-10-CM

## 2023-01-01 DIAGNOSIS — E04.2 MULTINODULAR GOITER: ICD-10-CM

## 2023-01-01 DIAGNOSIS — Z79.4 TYPE 2 DIABETES MELLITUS WITH HYPERGLYCEMIA, WITH LONG-TERM CURRENT USE OF INSULIN: Primary | Chronic | ICD-10-CM

## 2023-01-01 DIAGNOSIS — I63.342 THROMBOTIC STROKE INVOLVING LEFT CEREBELLAR ARTERY: Primary | ICD-10-CM

## 2023-01-01 DIAGNOSIS — E78.5 HYPERLIPIDEMIA, UNSPECIFIED HYPERLIPIDEMIA TYPE: ICD-10-CM

## 2023-01-01 DIAGNOSIS — I95.9 HYPOTENSION, UNSPECIFIED HYPOTENSION TYPE: ICD-10-CM

## 2023-01-01 DIAGNOSIS — E11.69 HYPERLIPIDEMIA ASSOCIATED WITH TYPE 2 DIABETES MELLITUS: ICD-10-CM

## 2023-01-01 DIAGNOSIS — Z86.73 HISTORY OF CVA (CEREBROVASCULAR ACCIDENT): ICD-10-CM

## 2023-01-01 DIAGNOSIS — R29.818 ACUTE FOCAL NEUROLOGICAL DEFICIT: ICD-10-CM

## 2023-01-01 DIAGNOSIS — I69.993 CVA, OLD, ATAXIA: ICD-10-CM

## 2023-01-01 DIAGNOSIS — M79.10 MYALGIA: Primary | ICD-10-CM

## 2023-01-01 DIAGNOSIS — N18.30 CKD STAGE 3 DUE TO TYPE 2 DIABETES MELLITUS: ICD-10-CM

## 2023-01-01 DIAGNOSIS — I63.9 STROKE: ICD-10-CM

## 2023-01-01 DIAGNOSIS — I69.30 HISTORY OF CEREBROVASCULAR ACCIDENT (CVA) WITH RESIDUAL DEFICIT: ICD-10-CM

## 2023-01-01 DIAGNOSIS — I10 BENIGN ESSENTIAL HYPERTENSION: Primary | ICD-10-CM

## 2023-01-01 DIAGNOSIS — E11.65 TYPE 2 DIABETES MELLITUS WITH HYPERGLYCEMIA, WITH LONG-TERM CURRENT USE OF INSULIN: ICD-10-CM

## 2023-01-01 DIAGNOSIS — E66.9 OBESITY (BMI 30-39.9): Chronic | ICD-10-CM

## 2023-01-01 DIAGNOSIS — E04.1 THYROID NODULE: ICD-10-CM

## 2023-01-01 DIAGNOSIS — Z09 HOSPITAL DISCHARGE FOLLOW-UP: ICD-10-CM

## 2023-01-01 DIAGNOSIS — E78.5 HYPERLIPIDEMIA ASSOCIATED WITH TYPE 2 DIABETES MELLITUS: ICD-10-CM

## 2023-01-01 DIAGNOSIS — I10 ESSENTIAL HYPERTENSION: Primary | ICD-10-CM

## 2023-01-01 DIAGNOSIS — M79.10 MYALGIA: ICD-10-CM

## 2023-01-01 DIAGNOSIS — R03.0 ELEVATED BLOOD PRESSURE READING: Primary | ICD-10-CM

## 2023-01-01 DIAGNOSIS — I25.118 ATHEROSCLEROTIC HEART DISEASE OF NATIVE CORONARY ARTERY WITH OTHER FORMS OF ANGINA PECTORIS: ICD-10-CM

## 2023-01-01 DIAGNOSIS — E11.65 TYPE 2 DIABETES MELLITUS WITH HYPERGLYCEMIA, WITH LONG-TERM CURRENT USE OF INSULIN: Primary | Chronic | ICD-10-CM

## 2023-01-01 DIAGNOSIS — E11.22 CKD STAGE 3 DUE TO TYPE 2 DIABETES MELLITUS: ICD-10-CM

## 2023-01-01 DIAGNOSIS — E11.42 TYPE 2 DIABETES MELLITUS WITH DIABETIC POLYNEUROPATHY, WITH LONG-TERM CURRENT USE OF INSULIN: Primary | Chronic | ICD-10-CM

## 2023-01-01 DIAGNOSIS — L84 CORN OR CALLUS: ICD-10-CM

## 2023-01-01 DIAGNOSIS — Z79.4 TYPE 2 DIABETES MELLITUS WITH DIABETIC POLYNEUROPATHY, WITH LONG-TERM CURRENT USE OF INSULIN: Chronic | ICD-10-CM

## 2023-01-01 DIAGNOSIS — R20.2 PARESTHESIA: ICD-10-CM

## 2023-01-01 DIAGNOSIS — E11.65 TYPE 2 DIABETES MELLITUS WITH HYPERGLYCEMIA, WITH LONG-TERM CURRENT USE OF INSULIN: Chronic | ICD-10-CM

## 2023-01-01 DIAGNOSIS — Z12.31 ENCOUNTER FOR SCREENING MAMMOGRAM FOR MALIGNANT NEOPLASM OF BREAST: ICD-10-CM

## 2023-01-01 DIAGNOSIS — R25.1 TREMOR OF BOTH HANDS: Primary | ICD-10-CM

## 2023-01-01 DIAGNOSIS — E11.42 TYPE 2 DIABETES MELLITUS WITH DIABETIC POLYNEUROPATHY, WITH LONG-TERM CURRENT USE OF INSULIN: Chronic | ICD-10-CM

## 2023-01-01 DIAGNOSIS — M25.571 ACUTE RIGHT ANKLE PAIN: ICD-10-CM

## 2023-01-01 DIAGNOSIS — Z79.4 TYPE 2 DIABETES MELLITUS WITH HYPERGLYCEMIA, WITH LONG-TERM CURRENT USE OF INSULIN: ICD-10-CM

## 2023-01-01 DIAGNOSIS — Z79.4 TYPE 2 DIABETES MELLITUS WITH HYPERGLYCEMIA, WITH LONG-TERM CURRENT USE OF INSULIN: Primary | ICD-10-CM

## 2023-01-01 DIAGNOSIS — Z79.4 TYPE 2 DIABETES MELLITUS WITH DIABETIC POLYNEUROPATHY, WITH LONG-TERM CURRENT USE OF INSULIN: Primary | ICD-10-CM

## 2023-01-01 DIAGNOSIS — E11.65 TYPE 2 DIABETES MELLITUS WITH HYPERGLYCEMIA, WITH LONG-TERM CURRENT USE OF INSULIN: Primary | ICD-10-CM

## 2023-01-01 DIAGNOSIS — E11.649 DIABETIC HYPOGLYCEMIA: Primary | ICD-10-CM

## 2023-01-01 DIAGNOSIS — R55 SYNCOPE, UNSPECIFIED SYNCOPE TYPE: ICD-10-CM

## 2023-01-01 DIAGNOSIS — E78.5 HYPERLIPIDEMIA LDL GOAL <70: Chronic | ICD-10-CM

## 2023-01-01 DIAGNOSIS — R68.89 COLD INTOLERANCE: ICD-10-CM

## 2023-01-01 DIAGNOSIS — H53.8 BLURRED VISION, BILATERAL: ICD-10-CM

## 2023-01-01 DIAGNOSIS — R07.9 CHEST PAIN: ICD-10-CM

## 2023-01-01 DIAGNOSIS — I10 HYPERTENSION: ICD-10-CM

## 2023-01-01 DIAGNOSIS — E11.42 TYPE 2 DIABETES MELLITUS WITH DIABETIC POLYNEUROPATHY, WITH LONG-TERM CURRENT USE OF INSULIN: Primary | ICD-10-CM

## 2023-01-01 DIAGNOSIS — N39.0 URINARY TRACT INFECTION WITHOUT HEMATURIA, SITE UNSPECIFIED: ICD-10-CM

## 2023-01-01 DIAGNOSIS — E11.59 HYPERTENSION ASSOCIATED WITH DIABETES: Primary | ICD-10-CM

## 2023-01-01 DIAGNOSIS — K21.00 GASTROESOPHAGEAL REFLUX DISEASE WITH ESOPHAGITIS WITHOUT HEMORRHAGE: ICD-10-CM

## 2023-01-01 DIAGNOSIS — Z79.4 TYPE 2 DIABETES MELLITUS WITH HYPERGLYCEMIA, WITH LONG-TERM CURRENT USE OF INSULIN: Chronic | ICD-10-CM

## 2023-01-01 DIAGNOSIS — R42 DIZZINESS: Primary | ICD-10-CM

## 2023-01-01 DIAGNOSIS — R11.2 NAUSEA AND VOMITING, UNSPECIFIED VOMITING TYPE: ICD-10-CM

## 2023-01-01 DIAGNOSIS — I15.2 HYPERTENSION ASSOCIATED WITH DIABETES: ICD-10-CM

## 2023-01-01 DIAGNOSIS — I63.512 ACUTE ISCHEMIC LEFT MCA STROKE: ICD-10-CM

## 2023-01-01 DIAGNOSIS — I42.8 NICM (NONISCHEMIC CARDIOMYOPATHY): ICD-10-CM

## 2023-01-01 DIAGNOSIS — E78.2 MIXED HYPERLIPIDEMIA: ICD-10-CM

## 2023-01-01 DIAGNOSIS — E11.59 HYPERTENSION ASSOCIATED WITH DIABETES: ICD-10-CM

## 2023-01-01 DIAGNOSIS — M79.601 PAIN IN BOTH UPPER EXTREMITIES: ICD-10-CM

## 2023-01-01 DIAGNOSIS — Z87.891 FORMER SMOKER: ICD-10-CM

## 2023-01-01 DIAGNOSIS — E11.49 TYPE II DIABETES MELLITUS WITH NEUROLOGICAL MANIFESTATIONS: Primary | ICD-10-CM

## 2023-01-01 LAB
ALBUMIN SERPL BCP-MCNC: 2.4 G/DL (ref 3.5–5.2)
ALBUMIN SERPL BCP-MCNC: 2.5 G/DL (ref 3.5–5.2)
ALBUMIN SERPL BCP-MCNC: 2.6 G/DL (ref 3.5–5.2)
ALBUMIN SERPL BCP-MCNC: 2.7 G/DL (ref 3.5–5.2)
ALBUMIN SERPL BCP-MCNC: 2.8 G/DL (ref 3.5–5.2)
ALBUMIN SERPL BCP-MCNC: 2.9 G/DL (ref 3.5–5.2)
ALBUMIN SERPL BCP-MCNC: 3.1 G/DL (ref 3.5–5.2)
ALP SERPL-CCNC: 102 U/L (ref 55–135)
ALP SERPL-CCNC: 105 U/L (ref 55–135)
ALP SERPL-CCNC: 107 U/L (ref 55–135)
ALP SERPL-CCNC: 108 U/L (ref 55–135)
ALP SERPL-CCNC: 120 U/L (ref 55–135)
ALP SERPL-CCNC: 120 U/L (ref 55–135)
ALP SERPL-CCNC: 125 U/L (ref 55–135)
ALP SERPL-CCNC: 73 U/L (ref 55–135)
ALP SERPL-CCNC: 78 U/L (ref 55–135)
ALP SERPL-CCNC: 84 U/L (ref 55–135)
ALP SERPL-CCNC: 85 U/L (ref 55–135)
ALP SERPL-CCNC: 86 U/L (ref 55–135)
ALP SERPL-CCNC: 90 U/L (ref 55–135)
ALP SERPL-CCNC: 90 U/L (ref 55–135)
ALP SERPL-CCNC: 96 U/L (ref 55–135)
ALP SERPL-CCNC: 98 U/L (ref 55–135)
ALT SERPL W/O P-5'-P-CCNC: 11 U/L (ref 10–44)
ALT SERPL W/O P-5'-P-CCNC: 11 U/L (ref 10–44)
ALT SERPL W/O P-5'-P-CCNC: 12 U/L (ref 10–44)
ALT SERPL W/O P-5'-P-CCNC: 16 U/L (ref 10–44)
ALT SERPL W/O P-5'-P-CCNC: 17 U/L (ref 10–44)
ALT SERPL W/O P-5'-P-CCNC: 17 U/L (ref 10–44)
ALT SERPL W/O P-5'-P-CCNC: 18 U/L (ref 10–44)
ALT SERPL W/O P-5'-P-CCNC: 18 U/L (ref 10–44)
ALT SERPL W/O P-5'-P-CCNC: 19 U/L (ref 10–44)
ALT SERPL W/O P-5'-P-CCNC: 20 U/L (ref 10–44)
ALT SERPL W/O P-5'-P-CCNC: 20 U/L (ref 10–44)
ALT SERPL W/O P-5'-P-CCNC: 21 U/L (ref 10–44)
ALT SERPL W/O P-5'-P-CCNC: 21 U/L (ref 10–44)
ALT SERPL W/O P-5'-P-CCNC: 23 U/L (ref 10–44)
ALT SERPL W/O P-5'-P-CCNC: 24 U/L (ref 10–44)
ALT SERPL W/O P-5'-P-CCNC: 9 U/L (ref 10–44)
ANION GAP SERPL CALC-SCNC: 10 MMOL/L (ref 8–16)
ANION GAP SERPL CALC-SCNC: 10 MMOL/L (ref 8–16)
ANION GAP SERPL CALC-SCNC: 11 MMOL/L (ref 8–16)
ANION GAP SERPL CALC-SCNC: 12 MMOL/L (ref 8–16)
ANION GAP SERPL CALC-SCNC: 12 MMOL/L (ref 8–16)
ANION GAP SERPL CALC-SCNC: 6 MMOL/L (ref 8–16)
ANION GAP SERPL CALC-SCNC: 6 MMOL/L (ref 8–16)
ANION GAP SERPL CALC-SCNC: 7 MMOL/L (ref 8–16)
ANION GAP SERPL CALC-SCNC: 8 MMOL/L (ref 8–16)
ANION GAP SERPL CALC-SCNC: 9 MMOL/L (ref 8–16)
APPEARANCE FLD: NORMAL
APTT BLDCRRT: 26.5 SEC (ref 21–32)
APTT PPP: 28.8 SEC (ref 21–32)
ASCENDING AORTA: 2.83 CM
ASCENDING AORTA: 2.97 CM
AST SERPL-CCNC: 10 U/L (ref 10–40)
AST SERPL-CCNC: 11 U/L (ref 10–40)
AST SERPL-CCNC: 11 U/L (ref 10–40)
AST SERPL-CCNC: 12 U/L (ref 10–40)
AST SERPL-CCNC: 16 U/L (ref 10–40)
AST SERPL-CCNC: 17 U/L (ref 10–40)
AST SERPL-CCNC: 18 U/L (ref 10–40)
AST SERPL-CCNC: 18 U/L (ref 10–40)
AST SERPL-CCNC: 19 U/L (ref 10–40)
AST SERPL-CCNC: 20 U/L (ref 10–40)
AST SERPL-CCNC: 21 U/L (ref 10–40)
AST SERPL-CCNC: 23 U/L (ref 10–40)
AV INDEX (PROSTH): 0.65
AV INDEX (PROSTH): 0.66
AV MEAN GRADIENT: 2 MMHG
AV MEAN GRADIENT: 5 MMHG
AV PEAK GRADIENT: 10 MMHG
AV PEAK GRADIENT: 3 MMHG
AV VALVE AREA BY VELOCITY RATIO: 2.77 CM²
AV VALVE AREA: 2.24 CM2
AV VALVE AREA: 2.24 CM²
AV VELOCITY RATIO: 0.64
AV VELOCITY RATIO: 0.82
B-OH-BUTYR BLD STRIP-SCNC: 0.3 MMOL/L (ref 0–0.5)
BACTERIA #/AREA URNS AUTO: ABNORMAL /HPF
BACTERIA #/AREA URNS AUTO: NORMAL /HPF
BACTERIA SPEC AEROBE CULT: NO GROWTH
BACTERIA SPEC ANAEROBE CULT: NORMAL
BACTERIA UR CULT: ABNORMAL
BASOPHILS # BLD AUTO: 0.02 K/UL (ref 0–0.2)
BASOPHILS # BLD AUTO: 0.03 K/UL (ref 0–0.2)
BASOPHILS # BLD AUTO: 0.04 K/UL (ref 0–0.2)
BASOPHILS # BLD AUTO: 0.05 K/UL (ref 0–0.2)
BASOPHILS # BLD AUTO: 0.06 K/UL (ref 0–0.2)
BASOPHILS NFR BLD: 0.2 % (ref 0–1.9)
BASOPHILS NFR BLD: 0.3 % (ref 0–1.9)
BASOPHILS NFR BLD: 0.4 % (ref 0–1.9)
BASOPHILS NFR BLD: 0.5 % (ref 0–1.9)
BASOPHILS NFR BLD: 0.6 % (ref 0–1.9)
BASOPHILS NFR BLD: 0.6 % (ref 0–1.9)
BASOPHILS NFR BLD: 0.7 % (ref 0–1.9)
BASOPHILS NFR BLD: 0.7 % (ref 0–1.9)
BILIRUB SERPL-MCNC: 0.2 MG/DL (ref 0.1–1)
BILIRUB SERPL-MCNC: 0.3 MG/DL (ref 0.1–1)
BILIRUB SERPL-MCNC: 0.4 MG/DL (ref 0.1–1)
BILIRUB SERPL-MCNC: 0.5 MG/DL (ref 0.1–1)
BILIRUB UR QL STRIP: NEGATIVE
BILIRUB UR QL STRIP: NEGATIVE
BNP SERPL-MCNC: 25 PG/ML (ref 0–99)
BODY FLD TYPE: NORMAL
BODY FLD TYPE: NORMAL
BSA FOR ECHO PROCEDURE: 2.04 M2
BSA FOR ECHO PROCEDURE: 2.13 M2
BUN SERPL-MCNC: 11 MG/DL (ref 8–23)
BUN SERPL-MCNC: 14 MG/DL (ref 8–23)
BUN SERPL-MCNC: 14 MG/DL (ref 8–23)
BUN SERPL-MCNC: 16 MG/DL (ref 8–23)
BUN SERPL-MCNC: 20 MG/DL (ref 8–23)
BUN SERPL-MCNC: 20 MG/DL (ref 8–23)
BUN SERPL-MCNC: 21 MG/DL (ref 8–23)
BUN SERPL-MCNC: 22 MG/DL (ref 8–23)
BUN SERPL-MCNC: 23 MG/DL (ref 8–23)
BUN SERPL-MCNC: 25 MG/DL (ref 8–23)
BUN SERPL-MCNC: 27 MG/DL (ref 8–23)
BUN SERPL-MCNC: 29 MG/DL (ref 8–23)
BUN SERPL-MCNC: 30 MG/DL (ref 8–23)
BUN SERPL-MCNC: 30 MG/DL (ref 8–23)
BUN SERPL-MCNC: 31 MG/DL (ref 8–23)
BUN SERPL-MCNC: 31 MG/DL (ref 8–23)
CALCIUM SERPL-MCNC: 8.6 MG/DL (ref 8.7–10.5)
CALCIUM SERPL-MCNC: 8.8 MG/DL (ref 8.7–10.5)
CALCIUM SERPL-MCNC: 8.8 MG/DL (ref 8.7–10.5)
CALCIUM SERPL-MCNC: 8.9 MG/DL (ref 8.7–10.5)
CALCIUM SERPL-MCNC: 8.9 MG/DL (ref 8.7–10.5)
CALCIUM SERPL-MCNC: 9 MG/DL (ref 8.7–10.5)
CALCIUM SERPL-MCNC: 9 MG/DL (ref 8.7–10.5)
CALCIUM SERPL-MCNC: 9.1 MG/DL (ref 8.7–10.5)
CALCIUM SERPL-MCNC: 9.2 MG/DL (ref 8.7–10.5)
CALCIUM SERPL-MCNC: 9.3 MG/DL (ref 8.7–10.5)
CALCIUM SERPL-MCNC: 9.3 MG/DL (ref 8.7–10.5)
CALCIUM SERPL-MCNC: 9.4 MG/DL (ref 8.7–10.5)
CALCIUM SERPL-MCNC: 9.7 MG/DL (ref 8.7–10.5)
CHLORIDE SERPL-SCNC: 103 MMOL/L (ref 95–110)
CHLORIDE SERPL-SCNC: 104 MMOL/L (ref 95–110)
CHLORIDE SERPL-SCNC: 104 MMOL/L (ref 95–110)
CHLORIDE SERPL-SCNC: 105 MMOL/L (ref 95–110)
CHLORIDE SERPL-SCNC: 105 MMOL/L (ref 95–110)
CHLORIDE SERPL-SCNC: 107 MMOL/L (ref 95–110)
CHLORIDE SERPL-SCNC: 108 MMOL/L (ref 95–110)
CHLORIDE SERPL-SCNC: 109 MMOL/L (ref 95–110)
CHLORIDE SERPL-SCNC: 110 MMOL/L (ref 95–110)
CHLORIDE SERPL-SCNC: 111 MMOL/L (ref 95–110)
CHLORIDE SERPL-SCNC: 97 MMOL/L (ref 95–110)
CHOLEST SERPL-MCNC: 109 MG/DL (ref 120–199)
CHOLEST SERPL-MCNC: 137 MG/DL (ref 120–199)
CHOLEST SERPL-MCNC: 205 MG/DL (ref 120–199)
CHOLEST SERPL-MCNC: 220 MG/DL (ref 120–199)
CHOLEST/HDLC SERPL: 2.7 {RATIO} (ref 2–5)
CHOLEST/HDLC SERPL: 3.7 {RATIO} (ref 2–5)
CHOLEST/HDLC SERPL: 4.5 {RATIO} (ref 2–5)
CHOLEST/HDLC SERPL: 5.6 {RATIO} (ref 2–5)
CK MB SERPL-MCNC: 1.2 NG/ML (ref 0.1–6.5)
CK MB SERPL-RTO: 2.7 % (ref 0–5)
CK SERPL-CCNC: 44 U/L (ref 20–180)
CK SERPL-CCNC: 44 U/L (ref 20–180)
CK SERPL-CCNC: 93 U/L (ref 20–180)
CLARITY UR REFRACT.AUTO: ABNORMAL
CLARITY UR REFRACT.AUTO: CLEAR
CO2 SERPL-SCNC: 16 MMOL/L (ref 23–29)
CO2 SERPL-SCNC: 16 MMOL/L (ref 23–29)
CO2 SERPL-SCNC: 18 MMOL/L (ref 23–29)
CO2 SERPL-SCNC: 18 MMOL/L (ref 23–29)
CO2 SERPL-SCNC: 19 MMOL/L (ref 23–29)
CO2 SERPL-SCNC: 20 MMOL/L (ref 23–29)
CO2 SERPL-SCNC: 20 MMOL/L (ref 23–29)
CO2 SERPL-SCNC: 21 MMOL/L (ref 23–29)
CO2 SERPL-SCNC: 22 MMOL/L (ref 23–29)
CO2 SERPL-SCNC: 23 MMOL/L (ref 23–29)
CO2 SERPL-SCNC: 23 MMOL/L (ref 23–29)
CO2 SERPL-SCNC: 24 MMOL/L (ref 23–29)
CO2 SERPL-SCNC: 26 MMOL/L (ref 23–29)
CO2 SERPL-SCNC: 28 MMOL/L (ref 23–29)
COLOR FLD: NORMAL
COLOR UR AUTO: YELLOW
COLOR UR AUTO: YELLOW
CORTIS SERPL-MCNC: 5.5 UG/DL (ref 4.3–22.4)
CREAT SERPL-MCNC: 0.8 MG/DL (ref 0.5–1.4)
CREAT SERPL-MCNC: 0.9 MG/DL (ref 0.5–1.4)
CREAT SERPL-MCNC: 1 MG/DL (ref 0.5–1.4)
CREAT SERPL-MCNC: 1 MG/DL (ref 0.5–1.4)
CREAT SERPL-MCNC: 1.1 MG/DL (ref 0.5–1.4)
CREAT SERPL-MCNC: 1.2 MG/DL (ref 0.5–1.4)
CREAT SERPL-MCNC: 1.2 MG/DL (ref 0.5–1.4)
CREAT SERPL-MCNC: 1.3 MG/DL (ref 0.5–1.4)
CREAT SERPL-MCNC: 1.3 MG/DL (ref 0.5–1.4)
CREAT SERPL-MCNC: 1.5 MG/DL (ref 0.5–1.4)
CREAT SERPL-MCNC: 1.7 MG/DL (ref 0.5–1.4)
CREAT SERPL-MCNC: 2.1 MG/DL (ref 0.5–1.4)
CRP SERPL-MCNC: 1.2 MG/L (ref 0–8.2)
CRP SERPL-MCNC: 2.98 MG/L (ref 0–3.19)
CRP SERPL-MCNC: 42.2 MG/L (ref 0–8.2)
CRYSTALS FLD MICRO: NEGATIVE
CV ECHO LV RWT: 0.47 CM
CV ECHO LV RWT: 0.71 CM
DIFFERENTIAL METHOD: ABNORMAL
DOP CALC AO PEAK VEL: 0.92 M/S
DOP CALC AO PEAK VEL: 1.55 M/S
DOP CALC AO VTI: 24.21 CM
DOP CALC AO VTI: 32.6 CM
DOP CALC LVOT AREA: 3.4 CM2
DOP CALC LVOT AREA: 3.4 CM2
DOP CALC LVOT DIAMETER: 2.08 CM
DOP CALC LVOT DIAMETER: 2.09 CM
DOP CALC LVOT PEAK VEL: 0.75 M/S
DOP CALC LVOT PEAK VEL: 0.99 M/S
DOP CALC LVOT STROKE VOLUME: 54.31 CM3
DOP CALC LVOT STROKE VOLUME: 73.07 CM3
DOP CALCLVOT PEAK VEL VTI: 15.99 CM
DOP CALCLVOT PEAK VEL VTI: 21.31 CM
E WAVE DECELERATION TIME: 217.27 MSEC
E WAVE DECELERATION TIME: 270.74 MSEC
E/A RATIO: 0.79
E/A RATIO: 0.97
E/E' RATIO: 15 M/S
E/E' RATIO: 9 M/S
ECHO LV POSTERIOR WALL: 1.04 CM (ref 0.6–1.1)
ECHO LV POSTERIOR WALL: 1.09 CM (ref 0.6–1.1)
EJECTION FRACTION: 55 %
EOSINOPHIL # BLD AUTO: 0 K/UL (ref 0–0.5)
EOSINOPHIL # BLD AUTO: 0.1 K/UL (ref 0–0.5)
EOSINOPHIL NFR BLD: 0.3 % (ref 0–8)
EOSINOPHIL NFR BLD: 0.3 % (ref 0–8)
EOSINOPHIL NFR BLD: 0.4 % (ref 0–8)
EOSINOPHIL NFR BLD: 0.4 % (ref 0–8)
EOSINOPHIL NFR BLD: 0.5 % (ref 0–8)
EOSINOPHIL NFR BLD: 0.6 % (ref 0–8)
EOSINOPHIL NFR BLD: 0.7 % (ref 0–8)
EOSINOPHIL NFR BLD: 0.8 % (ref 0–8)
EOSINOPHIL NFR BLD: 0.8 % (ref 0–8)
EOSINOPHIL NFR BLD: 0.9 % (ref 0–8)
EOSINOPHIL NFR BLD: 1 % (ref 0–8)
EOSINOPHIL NFR BLD: 1 % (ref 0–8)
ERYTHROCYTE [DISTWIDTH] IN BLOOD BY AUTOMATED COUNT: 14.2 % (ref 11.5–14.5)
ERYTHROCYTE [DISTWIDTH] IN BLOOD BY AUTOMATED COUNT: 14.6 % (ref 11.5–14.5)
ERYTHROCYTE [DISTWIDTH] IN BLOOD BY AUTOMATED COUNT: 14.7 % (ref 11.5–14.5)
ERYTHROCYTE [DISTWIDTH] IN BLOOD BY AUTOMATED COUNT: 14.8 % (ref 11.5–14.5)
ERYTHROCYTE [DISTWIDTH] IN BLOOD BY AUTOMATED COUNT: 15 % (ref 11.5–14.5)
ERYTHROCYTE [DISTWIDTH] IN BLOOD BY AUTOMATED COUNT: 15.1 % (ref 11.5–14.5)
ERYTHROCYTE [DISTWIDTH] IN BLOOD BY AUTOMATED COUNT: 15.2 % (ref 11.5–14.5)
ERYTHROCYTE [DISTWIDTH] IN BLOOD BY AUTOMATED COUNT: 15.3 % (ref 11.5–14.5)
ERYTHROCYTE [DISTWIDTH] IN BLOOD BY AUTOMATED COUNT: 15.3 % (ref 11.5–14.5)
ERYTHROCYTE [DISTWIDTH] IN BLOOD BY AUTOMATED COUNT: 15.5 % (ref 11.5–14.5)
ERYTHROCYTE [SEDIMENTATION RATE] IN BLOOD BY PHOTOMETRIC METHOD: 15 MM/HR (ref 0–36)
ERYTHROCYTE [SEDIMENTATION RATE] IN BLOOD BY PHOTOMETRIC METHOD: 37 MM/HR (ref 0–36)
ERYTHROCYTE [SEDIMENTATION RATE] IN BLOOD BY PHOTOMETRIC METHOD: 85 MM/HR (ref 0–36)
EST. GFR  (NO RACE VARIABLE): 24 ML/MIN/1.73 M^2
EST. GFR  (NO RACE VARIABLE): 30.9 ML/MIN/1.73 M^2
EST. GFR  (NO RACE VARIABLE): 35.9 ML/MIN/1.73 M^2
EST. GFR  (NO RACE VARIABLE): 42.6 ML/MIN/1.73 M^2
EST. GFR  (NO RACE VARIABLE): 42.9 ML/MIN/1.73 M^2
EST. GFR  (NO RACE VARIABLE): 46.9 ML/MIN/1.73 M^2
EST. GFR  (NO RACE VARIABLE): 47.2 ML/MIN/1.73 M^2
EST. GFR  (NO RACE VARIABLE): 52.1 ML/MIN/1.73 M^2
EST. GFR  (NO RACE VARIABLE): 52.1 ML/MIN/1.73 M^2
EST. GFR  (NO RACE VARIABLE): 52.4 ML/MIN/1.73 M^2
EST. GFR  (NO RACE VARIABLE): 52.4 ML/MIN/1.73 M^2
EST. GFR  (NO RACE VARIABLE): 58.4 ML/MIN/1.73 M^2
EST. GFR  (NO RACE VARIABLE): 58.8 ML/MIN/1.73 M^2
EST. GFR  (NO RACE VARIABLE): >60 ML/MIN/1.73 M^2
ESTIMATED AVG GLUCOSE: 180 MG/DL (ref 68–131)
ESTIMATED AVG GLUCOSE: 226 MG/DL (ref 68–131)
ESTIMATED AVG GLUCOSE: 229 MG/DL (ref 68–131)
ESTIMATED AVG GLUCOSE: 240 MG/DL (ref 68–131)
ESTIMATED AVG GLUCOSE: 286 MG/DL (ref 68–131)
FERRITIN SERPL-MCNC: 100 NG/ML (ref 20–300)
FERRITIN SERPL-MCNC: 105 NG/ML (ref 20–300)
FOLATE SERPL-MCNC: 8.7 NG/ML (ref 4–24)
FRACTIONAL SHORTENING: 21 % (ref 28–44)
FRACTIONAL SHORTENING: 26 % (ref 28–44)
FUNGUS SPEC CULT: NORMAL
GLUCOSE SERPL-MCNC: 100 MG/DL (ref 70–110)
GLUCOSE SERPL-MCNC: 105 MG/DL (ref 70–110)
GLUCOSE SERPL-MCNC: 112 MG/DL (ref 70–110)
GLUCOSE SERPL-MCNC: 145 MG/DL (ref 70–110)
GLUCOSE SERPL-MCNC: 146 MG/DL (ref 70–110)
GLUCOSE SERPL-MCNC: 147 MG/DL (ref 70–110)
GLUCOSE SERPL-MCNC: 155 MG/DL (ref 70–110)
GLUCOSE SERPL-MCNC: 158 MG/DL (ref 70–110)
GLUCOSE SERPL-MCNC: 171 MG/DL (ref 70–110)
GLUCOSE SERPL-MCNC: 185 MG/DL (ref 70–110)
GLUCOSE SERPL-MCNC: 209 MG/DL (ref 70–110)
GLUCOSE SERPL-MCNC: 211 MG/DL (ref 70–110)
GLUCOSE SERPL-MCNC: 236 MG/DL (ref 70–110)
GLUCOSE SERPL-MCNC: 263 MG/DL (ref 70–110)
GLUCOSE SERPL-MCNC: 411 MG/DL (ref 70–110)
GLUCOSE SERPL-MCNC: 491 MG/DL (ref 70–110)
GLUCOSE SERPL-MCNC: 77 MG/DL (ref 70–110)
GLUCOSE SERPL-MCNC: 83 MG/DL (ref 70–110)
GLUCOSE UR QL STRIP: ABNORMAL
GLUCOSE UR QL STRIP: ABNORMAL
GRAM STN SPEC: NORMAL
GRAM STN SPEC: NORMAL
HBA1C MFR BLD: 10 % (ref 4–5.6)
HBA1C MFR BLD: 11.6 % (ref 4–5.6)
HBA1C MFR BLD: 7.9 % (ref 4–5.6)
HBA1C MFR BLD: 9.5 % (ref 4–5.6)
HBA1C MFR BLD: 9.6 % (ref 4–5.6)
HCT VFR BLD AUTO: 34.7 % (ref 37–48.5)
HCT VFR BLD AUTO: 35.3 % (ref 37–48.5)
HCT VFR BLD AUTO: 36.1 % (ref 37–48.5)
HCT VFR BLD AUTO: 36.9 % (ref 37–48.5)
HCT VFR BLD AUTO: 37.3 % (ref 37–48.5)
HCT VFR BLD AUTO: 37.3 % (ref 37–48.5)
HCT VFR BLD AUTO: 37.8 % (ref 37–48.5)
HCT VFR BLD AUTO: 38.4 % (ref 37–48.5)
HCT VFR BLD AUTO: 39.4 % (ref 37–48.5)
HCT VFR BLD AUTO: 40.1 % (ref 37–48.5)
HCT VFR BLD AUTO: 42 % (ref 37–48.5)
HCT VFR BLD AUTO: 42.8 % (ref 37–48.5)
HCT VFR BLD AUTO: 43.3 % (ref 37–48.5)
HCT VFR BLD AUTO: 43.8 % (ref 37–48.5)
HCT VFR BLD AUTO: 44.3 % (ref 37–48.5)
HCT VFR BLD AUTO: 45.7 % (ref 37–48.5)
HCT VFR BLD AUTO: 46.2 % (ref 37–48.5)
HDLC SERPL-MCNC: 37 MG/DL (ref 40–75)
HDLC SERPL-MCNC: 39 MG/DL (ref 40–75)
HDLC SERPL-MCNC: 40 MG/DL (ref 40–75)
HDLC SERPL-MCNC: 46 MG/DL (ref 40–75)
HDLC SERPL: 17.7 % (ref 20–50)
HDLC SERPL: 22.4 % (ref 20–50)
HDLC SERPL: 27 % (ref 20–50)
HDLC SERPL: 36.7 % (ref 20–50)
HGB BLD-MCNC: 11.4 G/DL (ref 12–16)
HGB BLD-MCNC: 11.4 G/DL (ref 12–16)
HGB BLD-MCNC: 11.7 G/DL (ref 12–16)
HGB BLD-MCNC: 11.8 G/DL (ref 12–16)
HGB BLD-MCNC: 11.9 G/DL (ref 12–16)
HGB BLD-MCNC: 12 G/DL (ref 12–16)
HGB BLD-MCNC: 12.1 G/DL (ref 12–16)
HGB BLD-MCNC: 12.6 G/DL (ref 12–16)
HGB BLD-MCNC: 12.6 G/DL (ref 12–16)
HGB BLD-MCNC: 13.3 G/DL (ref 12–16)
HGB BLD-MCNC: 13.3 G/DL (ref 12–16)
HGB BLD-MCNC: 13.7 G/DL (ref 12–16)
HGB BLD-MCNC: 13.7 G/DL (ref 12–16)
HGB BLD-MCNC: 13.8 G/DL (ref 12–16)
HGB BLD-MCNC: 14.5 G/DL (ref 12–16)
HGB UR QL STRIP: NEGATIVE
HGB UR QL STRIP: NEGATIVE
HIV 1+2 AB+HIV1 P24 AG SERPL QL IA: NORMAL
HYALINE CASTS UR QL AUTO: 0 /LPF
HYALINE CASTS UR QL AUTO: 4 /LPF
IMM GRANULOCYTES # BLD AUTO: 0.02 K/UL (ref 0–0.04)
IMM GRANULOCYTES # BLD AUTO: 0.03 K/UL (ref 0–0.04)
IMM GRANULOCYTES # BLD AUTO: 0.04 K/UL (ref 0–0.04)
IMM GRANULOCYTES # BLD AUTO: 0.04 K/UL (ref 0–0.04)
IMM GRANULOCYTES NFR BLD AUTO: 0.2 % (ref 0–0.5)
IMM GRANULOCYTES NFR BLD AUTO: 0.2 % (ref 0–0.5)
IMM GRANULOCYTES NFR BLD AUTO: 0.3 % (ref 0–0.5)
IMM GRANULOCYTES NFR BLD AUTO: 0.4 % (ref 0–0.5)
IMM GRANULOCYTES NFR BLD AUTO: 0.5 % (ref 0–0.5)
IMM GRANULOCYTES NFR BLD AUTO: 0.5 % (ref 0–0.5)
INR PPP: 1 (ref 0.8–1.2)
INTERVENTRICULAR SEPTUM: 0.97 CM (ref 0.6–1.1)
INTERVENTRICULAR SEPTUM: 1.01 CM (ref 0.6–1.1)
IRON SERPL-MCNC: 58 UG/DL (ref 30–160)
IRON SERPL-MCNC: 59 UG/DL (ref 30–160)
KETONES UR QL STRIP: NEGATIVE
KETONES UR QL STRIP: NEGATIVE
LA MAJOR: 4.76 CM
LA MAJOR: 5.76 CM
LA MINOR: 5.05 CM
LA MINOR: 6.28 CM
LA WIDTH: 3.79 CM
LA WIDTH: 4.33 CM
LACTATE SERPL-SCNC: 0.8 MMOL/L (ref 0.5–2.2)
LDLC SERPL CALC-MCNC: 138.2 MG/DL (ref 63–159)
LDLC SERPL CALC-MCNC: 157.4 MG/DL (ref 63–159)
LDLC SERPL CALC-MCNC: 50.2 MG/DL (ref 63–159)
LDLC SERPL CALC-MCNC: 79.2 MG/DL (ref 63–159)
LEFT ATRIUM SIZE: 2.88 CM
LEFT ATRIUM SIZE: 4.84 CM
LEFT ATRIUM VOLUME INDEX MOD: 25.6 ML/M2
LEFT ATRIUM VOLUME INDEX MOD: 28.9 ML/M2
LEFT ATRIUM VOLUME INDEX: 22.7 ML/M2
LEFT ATRIUM VOLUME INDEX: 52 ML/M2
LEFT ATRIUM VOLUME MOD: 51.16 CM3
LEFT ATRIUM VOLUME MOD: 59.61 CM3
LEFT ATRIUM VOLUME: 107.04 CM3
LEFT ATRIUM VOLUME: 45.47 CM3
LEFT INTERNAL DIMENSION IN SYSTOLE: 2.44 CM (ref 2.1–4)
LEFT INTERNAL DIMENSION IN SYSTOLE: 3.27 CM (ref 2.1–4)
LEFT VENTRICLE DIASTOLIC VOLUME INDEX: 18.59 ML/M2
LEFT VENTRICLE DIASTOLIC VOLUME INDEX: 42.46 ML/M2
LEFT VENTRICLE DIASTOLIC VOLUME: 37.18 ML
LEFT VENTRICLE DIASTOLIC VOLUME: 87.47 ML
LEFT VENTRICLE MASS INDEX: 45 G/M2
LEFT VENTRICLE MASS INDEX: 74 G/M2
LEFT VENTRICLE SYSTOLIC VOLUME INDEX: 10.5 ML/M2
LEFT VENTRICLE SYSTOLIC VOLUME INDEX: 21 ML/M2
LEFT VENTRICLE SYSTOLIC VOLUME: 20.96 ML
LEFT VENTRICLE SYSTOLIC VOLUME: 43.25 ML
LEFT VENTRICULAR INTERNAL DIMENSION IN DIASTOLE: 3.08 CM (ref 3.5–6)
LEFT VENTRICULAR INTERNAL DIMENSION IN DIASTOLE: 4.4 CM (ref 3.5–6)
LEFT VENTRICULAR MASS: 152.98 G
LEFT VENTRICULAR MASS: 89.27 G
LEUKOCYTE ESTERASE UR QL STRIP: ABNORMAL
LEUKOCYTE ESTERASE UR QL STRIP: NEGATIVE
LV LATERAL E/E' RATIO: 15 M/S
LV LATERAL E/E' RATIO: 7.88 M/S
LV SEPTAL E/E' RATIO: 10.5 M/S
LV SEPTAL E/E' RATIO: 15 M/S
LYMPHOCYTES # BLD AUTO: 2.3 K/UL (ref 1–4.8)
LYMPHOCYTES # BLD AUTO: 2.4 K/UL (ref 1–4.8)
LYMPHOCYTES # BLD AUTO: 2.5 K/UL (ref 1–4.8)
LYMPHOCYTES # BLD AUTO: 2.6 K/UL (ref 1–4.8)
LYMPHOCYTES # BLD AUTO: 2.6 K/UL (ref 1–4.8)
LYMPHOCYTES # BLD AUTO: 2.7 K/UL (ref 1–4.8)
LYMPHOCYTES # BLD AUTO: 2.7 K/UL (ref 1–4.8)
LYMPHOCYTES # BLD AUTO: 2.8 K/UL (ref 1–4.8)
LYMPHOCYTES # BLD AUTO: 2.8 K/UL (ref 1–4.8)
LYMPHOCYTES # BLD AUTO: 3.1 K/UL (ref 1–4.8)
LYMPHOCYTES # BLD AUTO: 3.1 K/UL (ref 1–4.8)
LYMPHOCYTES # BLD AUTO: 3.4 K/UL (ref 1–4.8)
LYMPHOCYTES # BLD AUTO: 3.4 K/UL (ref 1–4.8)
LYMPHOCYTES # BLD AUTO: 3.8 K/UL (ref 1–4.8)
LYMPHOCYTES NFR BLD: 24.3 % (ref 18–48)
LYMPHOCYTES NFR BLD: 26.3 % (ref 18–48)
LYMPHOCYTES NFR BLD: 27.6 % (ref 18–48)
LYMPHOCYTES NFR BLD: 27.7 % (ref 18–48)
LYMPHOCYTES NFR BLD: 28 % (ref 18–48)
LYMPHOCYTES NFR BLD: 28.7 % (ref 18–48)
LYMPHOCYTES NFR BLD: 29.6 % (ref 18–48)
LYMPHOCYTES NFR BLD: 30.1 % (ref 18–48)
LYMPHOCYTES NFR BLD: 31.1 % (ref 18–48)
LYMPHOCYTES NFR BLD: 32.4 % (ref 18–48)
LYMPHOCYTES NFR BLD: 34.2 % (ref 18–48)
LYMPHOCYTES NFR BLD: 35.7 % (ref 18–48)
LYMPHOCYTES NFR BLD: 40.8 % (ref 18–48)
LYMPHOCYTES NFR BLD: 41.2 % (ref 18–48)
LYMPHOCYTES NFR BLD: 42.8 % (ref 18–48)
LYMPHOCYTES NFR BLD: 49.7 % (ref 18–48)
LYMPHOCYTES NFR BLD: 50.7 % (ref 18–48)
LYMPHOCYTES NFR FLD MANUAL: 9 %
MAGNESIUM SERPL-MCNC: 1.9 MG/DL (ref 1.6–2.6)
MAGNESIUM SERPL-MCNC: 2.1 MG/DL (ref 1.6–2.6)
MAGNESIUM SERPL-MCNC: 2.2 MG/DL (ref 1.6–2.6)
MAGNESIUM SERPL-MCNC: 2.3 MG/DL (ref 1.6–2.6)
MAGNESIUM SERPL-MCNC: 2.4 MG/DL (ref 1.6–2.6)
MAGNESIUM SERPL-MCNC: 2.4 MG/DL (ref 1.6–2.6)
MCH RBC QN AUTO: 27.1 PG (ref 27–31)
MCH RBC QN AUTO: 27.2 PG (ref 27–31)
MCH RBC QN AUTO: 27.2 PG (ref 27–31)
MCH RBC QN AUTO: 27.3 PG (ref 27–31)
MCH RBC QN AUTO: 27.5 PG (ref 27–31)
MCH RBC QN AUTO: 27.6 PG (ref 27–31)
MCH RBC QN AUTO: 27.7 PG (ref 27–31)
MCH RBC QN AUTO: 27.8 PG (ref 27–31)
MCH RBC QN AUTO: 28.1 PG (ref 27–31)
MCH RBC QN AUTO: 29.3 PG (ref 27–31)
MCHC RBC AUTO-ENTMCNC: 29.7 G/DL (ref 32–36)
MCHC RBC AUTO-ENTMCNC: 29.7 G/DL (ref 32–36)
MCHC RBC AUTO-ENTMCNC: 30 G/DL (ref 32–36)
MCHC RBC AUTO-ENTMCNC: 30.4 G/DL (ref 32–36)
MCHC RBC AUTO-ENTMCNC: 31.4 G/DL (ref 32–36)
MCHC RBC AUTO-ENTMCNC: 31.4 G/DL (ref 32–36)
MCHC RBC AUTO-ENTMCNC: 31.5 G/DL (ref 32–36)
MCHC RBC AUTO-ENTMCNC: 31.7 G/DL (ref 32–36)
MCHC RBC AUTO-ENTMCNC: 31.9 G/DL (ref 32–36)
MCHC RBC AUTO-ENTMCNC: 32 G/DL (ref 32–36)
MCHC RBC AUTO-ENTMCNC: 32.2 G/DL (ref 32–36)
MCHC RBC AUTO-ENTMCNC: 32.3 G/DL (ref 32–36)
MCHC RBC AUTO-ENTMCNC: 32.4 G/DL (ref 32–36)
MCHC RBC AUTO-ENTMCNC: 32.4 G/DL (ref 32–36)
MCHC RBC AUTO-ENTMCNC: 34 G/DL (ref 32–36)
MCV RBC AUTO: 84 FL (ref 82–98)
MCV RBC AUTO: 85 FL (ref 82–98)
MCV RBC AUTO: 86 FL (ref 82–98)
MCV RBC AUTO: 88 FL (ref 82–98)
MCV RBC AUTO: 88 FL (ref 82–98)
MCV RBC AUTO: 91 FL (ref 82–98)
MCV RBC AUTO: 92 FL (ref 82–98)
MCV RBC AUTO: 93 FL (ref 82–98)
MCV RBC AUTO: 93 FL (ref 82–98)
MICROSCOPIC COMMENT: ABNORMAL
MICROSCOPIC COMMENT: NORMAL
MONOCYTES # BLD AUTO: 0.3 K/UL (ref 0.3–1)
MONOCYTES # BLD AUTO: 0.4 K/UL (ref 0.3–1)
MONOCYTES # BLD AUTO: 0.5 K/UL (ref 0.3–1)
MONOCYTES # BLD AUTO: 0.6 K/UL (ref 0.3–1)
MONOCYTES # BLD AUTO: 0.7 K/UL (ref 0.3–1)
MONOCYTES NFR BLD: 4.1 % (ref 4–15)
MONOCYTES NFR BLD: 4.5 % (ref 4–15)
MONOCYTES NFR BLD: 4.6 % (ref 4–15)
MONOCYTES NFR BLD: 4.9 % (ref 4–15)
MONOCYTES NFR BLD: 5 % (ref 4–15)
MONOCYTES NFR BLD: 5.1 % (ref 4–15)
MONOCYTES NFR BLD: 5.4 % (ref 4–15)
MONOCYTES NFR BLD: 5.5 % (ref 4–15)
MONOCYTES NFR BLD: 5.8 % (ref 4–15)
MONOCYTES NFR BLD: 6.1 % (ref 4–15)
MONOCYTES NFR BLD: 6.1 % (ref 4–15)
MONOCYTES NFR BLD: 6.2 % (ref 4–15)
MONOCYTES NFR BLD: 6.3 % (ref 4–15)
MONOCYTES NFR BLD: 6.4 % (ref 4–15)
MONOCYTES NFR BLD: 6.5 % (ref 4–15)
MONOCYTES NFR BLD: 6.6 % (ref 4–15)
MONOCYTES NFR BLD: 7.6 % (ref 4–15)
MONOS+MACROS NFR FLD MANUAL: 4 %
MV A" WAVE DURATION": 15.22 MSEC
MV PEAK A VEL: 0.62 M/S
MV PEAK A VEL: 0.8 M/S
MV PEAK E VEL: 0.6 M/S
MV PEAK E VEL: 0.63 M/S
MV STENOSIS PRESSURE HALF TIME: 63.01 MS
MV STENOSIS PRESSURE HALF TIME: 78.51 MS
MV VALVE AREA P 1/2 METHOD: 2.8 CM2
MV VALVE AREA P 1/2 METHOD: 3.49 CM2
NEUTROPHILS # BLD AUTO: 3 K/UL (ref 1.8–7.7)
NEUTROPHILS # BLD AUTO: 3.1 K/UL (ref 1.8–7.7)
NEUTROPHILS # BLD AUTO: 3.6 K/UL (ref 1.8–7.7)
NEUTROPHILS # BLD AUTO: 4.1 K/UL (ref 1.8–7.7)
NEUTROPHILS # BLD AUTO: 4.3 K/UL (ref 1.8–7.7)
NEUTROPHILS # BLD AUTO: 4.6 K/UL (ref 1.8–7.7)
NEUTROPHILS # BLD AUTO: 4.8 K/UL (ref 1.8–7.7)
NEUTROPHILS # BLD AUTO: 5 K/UL (ref 1.8–7.7)
NEUTROPHILS # BLD AUTO: 5.1 K/UL (ref 1.8–7.7)
NEUTROPHILS # BLD AUTO: 5.2 K/UL (ref 1.8–7.7)
NEUTROPHILS # BLD AUTO: 5.3 K/UL (ref 1.8–7.7)
NEUTROPHILS # BLD AUTO: 5.3 K/UL (ref 1.8–7.7)
NEUTROPHILS # BLD AUTO: 5.4 K/UL (ref 1.8–7.7)
NEUTROPHILS # BLD AUTO: 5.5 K/UL (ref 1.8–7.7)
NEUTROPHILS # BLD AUTO: 5.8 K/UL (ref 1.8–7.7)
NEUTROPHILS # BLD AUTO: 6.2 K/UL (ref 1.8–7.7)
NEUTROPHILS # BLD AUTO: 6.5 K/UL (ref 1.8–7.7)
NEUTROPHILS NFR BLD: 41.5 % (ref 38–73)
NEUTROPHILS NFR BLD: 44.7 % (ref 38–73)
NEUTROPHILS NFR BLD: 50.5 % (ref 38–73)
NEUTROPHILS NFR BLD: 52.5 % (ref 38–73)
NEUTROPHILS NFR BLD: 53.4 % (ref 38–73)
NEUTROPHILS NFR BLD: 58.6 % (ref 38–73)
NEUTROPHILS NFR BLD: 58.7 % (ref 38–73)
NEUTROPHILS NFR BLD: 59.6 % (ref 38–73)
NEUTROPHILS NFR BLD: 60.3 % (ref 38–73)
NEUTROPHILS NFR BLD: 62.7 % (ref 38–73)
NEUTROPHILS NFR BLD: 63.1 % (ref 38–73)
NEUTROPHILS NFR BLD: 63.6 % (ref 38–73)
NEUTROPHILS NFR BLD: 64.1 % (ref 38–73)
NEUTROPHILS NFR BLD: 64.8 % (ref 38–73)
NEUTROPHILS NFR BLD: 65.2 % (ref 38–73)
NEUTROPHILS NFR BLD: 66 % (ref 38–73)
NEUTROPHILS NFR BLD: 69.5 % (ref 38–73)
NEUTROPHILS NFR FLD MANUAL: 87 %
NITRITE UR QL STRIP: NEGATIVE
NITRITE UR QL STRIP: POSITIVE
NONHDLC SERPL-MCNC: 100 MG/DL
NONHDLC SERPL-MCNC: 159 MG/DL
NONHDLC SERPL-MCNC: 181 MG/DL
NONHDLC SERPL-MCNC: 69 MG/DL
NRBC BLD-RTO: 0 /100 WBC
OHS CV EVENT MONITOR DAY: 0
OHS CV HOLTER LENGTH DECIMAL HOURS: 48
OHS CV HOLTER LENGTH HOURS: 48
OHS CV HOLTER LENGTH MINUTES: 0
OHS CV HOLTER SINUS AVERAGE HR: 81
OHS CV HOLTER SINUS MAX HR: 128
OHS CV HOLTER SINUS MIN HR: 52
PATH INTERP FLD-IMP: NORMAL
PH UR STRIP: 7 [PH] (ref 5–8)
PH UR STRIP: 8 [PH] (ref 5–8)
PHOSPHATE SERPL-MCNC: 2.3 MG/DL (ref 2.7–4.5)
PHOSPHATE SERPL-MCNC: 2.4 MG/DL (ref 2.7–4.5)
PHOSPHATE SERPL-MCNC: 2.8 MG/DL (ref 2.7–4.5)
PHOSPHATE SERPL-MCNC: 3 MG/DL (ref 2.7–4.5)
PHOSPHATE SERPL-MCNC: 3.2 MG/DL (ref 2.7–4.5)
PHOSPHATE SERPL-MCNC: 3.2 MG/DL (ref 2.7–4.5)
PHOSPHATE SERPL-MCNC: 3.3 MG/DL (ref 2.7–4.5)
PHOSPHATE SERPL-MCNC: 3.3 MG/DL (ref 2.7–4.5)
PHOSPHATE SERPL-MCNC: 3.6 MG/DL (ref 2.7–4.5)
PHOSPHATE SERPL-MCNC: 4.3 MG/DL (ref 2.7–4.5)
PISA TR MAX VEL: 1.8 M/S
PLATELET # BLD AUTO: 258 K/UL (ref 150–450)
PLATELET # BLD AUTO: 285 K/UL (ref 150–450)
PLATELET # BLD AUTO: 312 K/UL (ref 150–450)
PLATELET # BLD AUTO: 316 K/UL (ref 150–450)
PLATELET # BLD AUTO: 333 K/UL (ref 150–450)
PLATELET # BLD AUTO: 357 K/UL (ref 150–450)
PLATELET # BLD AUTO: 364 K/UL (ref 150–450)
PLATELET # BLD AUTO: 371 K/UL (ref 150–450)
PLATELET # BLD AUTO: 375 K/UL (ref 150–450)
PLATELET # BLD AUTO: 381 K/UL (ref 150–450)
PLATELET # BLD AUTO: 394 K/UL (ref 150–450)
PLATELET # BLD AUTO: 400 K/UL (ref 150–450)
PLATELET # BLD AUTO: 401 K/UL (ref 150–450)
PLATELET # BLD AUTO: 411 K/UL (ref 150–450)
PLATELET # BLD AUTO: 415 K/UL (ref 150–450)
PLATELET # BLD AUTO: 429 K/UL (ref 150–450)
PLATELET # BLD AUTO: 471 K/UL (ref 150–450)
PMV BLD AUTO: 10.4 FL (ref 9.2–12.9)
PMV BLD AUTO: 10.6 FL (ref 9.2–12.9)
PMV BLD AUTO: 10.7 FL (ref 9.2–12.9)
PMV BLD AUTO: 10.8 FL (ref 9.2–12.9)
PMV BLD AUTO: 10.8 FL (ref 9.2–12.9)
PMV BLD AUTO: 10.9 FL (ref 9.2–12.9)
PMV BLD AUTO: 11 FL (ref 9.2–12.9)
PMV BLD AUTO: 11.1 FL (ref 9.2–12.9)
PMV BLD AUTO: 11.2 FL (ref 9.2–12.9)
PMV BLD AUTO: 11.3 FL (ref 9.2–12.9)
PMV BLD AUTO: 11.3 FL (ref 9.2–12.9)
PMV BLD AUTO: 11.5 FL (ref 9.2–12.9)
PMV BLD AUTO: 11.7 FL (ref 9.2–12.9)
PMV BLD AUTO: 12.1 FL (ref 9.2–12.9)
POC PTINR: 1.1 (ref 0.9–1.2)
POC PTWBT: 13 SEC (ref 9.7–14.3)
POCT GLUCOSE: 106 MG/DL (ref 70–110)
POCT GLUCOSE: 109 MG/DL (ref 70–110)
POCT GLUCOSE: 110 MG/DL (ref 70–110)
POCT GLUCOSE: 113 MG/DL (ref 70–110)
POCT GLUCOSE: 116 MG/DL (ref 70–110)
POCT GLUCOSE: 120 MG/DL (ref 70–110)
POCT GLUCOSE: 122 MG/DL (ref 70–110)
POCT GLUCOSE: 124 MG/DL (ref 70–110)
POCT GLUCOSE: 128 MG/DL (ref 70–110)
POCT GLUCOSE: 128 MG/DL (ref 70–110)
POCT GLUCOSE: 133 MG/DL (ref 70–110)
POCT GLUCOSE: 141 MG/DL (ref 70–110)
POCT GLUCOSE: 142 MG/DL (ref 70–110)
POCT GLUCOSE: 148 MG/DL (ref 70–110)
POCT GLUCOSE: 149 MG/DL (ref 70–110)
POCT GLUCOSE: 150 MG/DL (ref 70–110)
POCT GLUCOSE: 151 MG/DL (ref 70–110)
POCT GLUCOSE: 153 MG/DL (ref 70–110)
POCT GLUCOSE: 155 MG/DL (ref 70–110)
POCT GLUCOSE: 163 MG/DL (ref 70–110)
POCT GLUCOSE: 171 MG/DL (ref 70–110)
POCT GLUCOSE: 175 MG/DL (ref 70–110)
POCT GLUCOSE: 185 MG/DL (ref 70–110)
POCT GLUCOSE: 187 MG/DL (ref 70–110)
POCT GLUCOSE: 188 MG/DL (ref 70–110)
POCT GLUCOSE: 197 MG/DL (ref 70–110)
POCT GLUCOSE: 199 MG/DL (ref 70–110)
POCT GLUCOSE: 202 MG/DL (ref 70–110)
POCT GLUCOSE: 204 MG/DL (ref 70–110)
POCT GLUCOSE: 204 MG/DL (ref 70–110)
POCT GLUCOSE: 210 MG/DL (ref 70–110)
POCT GLUCOSE: 214 MG/DL (ref 70–110)
POCT GLUCOSE: 242 MG/DL (ref 70–110)
POCT GLUCOSE: 255 MG/DL (ref 70–110)
POCT GLUCOSE: 256 MG/DL (ref 70–110)
POCT GLUCOSE: 263 MG/DL (ref 70–110)
POCT GLUCOSE: 301 MG/DL (ref 70–110)
POCT GLUCOSE: 303 MG/DL (ref 70–110)
POCT GLUCOSE: 339 MG/DL (ref 70–110)
POCT GLUCOSE: 439 MG/DL (ref 70–110)
POCT GLUCOSE: 78 MG/DL (ref 70–110)
POCT GLUCOSE: 79 MG/DL (ref 70–110)
POTASSIUM SERPL-SCNC: 3 MMOL/L (ref 3.5–5.1)
POTASSIUM SERPL-SCNC: 3.1 MMOL/L (ref 3.5–5.1)
POTASSIUM SERPL-SCNC: 3.4 MMOL/L (ref 3.5–5.1)
POTASSIUM SERPL-SCNC: 3.5 MMOL/L (ref 3.5–5.1)
POTASSIUM SERPL-SCNC: 3.6 MMOL/L (ref 3.5–5.1)
POTASSIUM SERPL-SCNC: 3.7 MMOL/L (ref 3.5–5.1)
POTASSIUM SERPL-SCNC: 3.7 MMOL/L (ref 3.5–5.1)
POTASSIUM SERPL-SCNC: 3.8 MMOL/L (ref 3.5–5.1)
POTASSIUM SERPL-SCNC: 3.9 MMOL/L (ref 3.5–5.1)
POTASSIUM SERPL-SCNC: 4 MMOL/L (ref 3.5–5.1)
POTASSIUM SERPL-SCNC: 4.1 MMOL/L (ref 3.5–5.1)
POTASSIUM SERPL-SCNC: 4.1 MMOL/L (ref 3.5–5.1)
PROCALCITONIN SERPL IA-MCNC: 0.03 NG/ML
PROT SERPL-MCNC: 5.7 G/DL (ref 6–8.4)
PROT SERPL-MCNC: 5.9 G/DL (ref 6–8.4)
PROT SERPL-MCNC: 5.9 G/DL (ref 6–8.4)
PROT SERPL-MCNC: 6 G/DL (ref 6–8.4)
PROT SERPL-MCNC: 6.1 G/DL (ref 6–8.4)
PROT SERPL-MCNC: 6.2 G/DL (ref 6–8.4)
PROT SERPL-MCNC: 6.2 G/DL (ref 6–8.4)
PROT SERPL-MCNC: 6.3 G/DL (ref 6–8.4)
PROT SERPL-MCNC: 6.4 G/DL (ref 6–8.4)
PROT SERPL-MCNC: 6.5 G/DL (ref 6–8.4)
PROT SERPL-MCNC: 7 G/DL (ref 6–8.4)
PROT SERPL-MCNC: 7.3 G/DL (ref 6–8.4)
PROT UR QL STRIP: ABNORMAL
PROT UR QL STRIP: ABNORMAL
PROTHROMBIN TIME: 10.3 SEC (ref 9–12.5)
PROTHROMBIN TIME: 10.4 SEC (ref 9–12.5)
PROTHROMBIN TIME: 10.6 SEC (ref 9–12.5)
PROTHROMBIN TIME: 10.9 SEC (ref 9–12.5)
PTH-INTACT SERPL-MCNC: 66.2 PG/ML (ref 9–77)
PULM VEIN S/D RATIO: 1.74
PV PEAK D VEL: 0.34 M/S
PV PEAK S VEL: 0.59 M/S
RA MAJOR: 4.38 CM
RA MAJOR: 4.74 CM
RA PRESSURE ESTIMATED: 3 MMHG
RA PRESSURE: 3 MMHG
RA WIDTH: 2.13 CM
RA WIDTH: 2.8 CM
RBC # BLD AUTO: 4.03 M/UL (ref 4–5.4)
RBC # BLD AUTO: 4.15 M/UL (ref 4–5.4)
RBC # BLD AUTO: 4.17 M/UL (ref 4–5.4)
RBC # BLD AUTO: 4.25 M/UL (ref 4–5.4)
RBC # BLD AUTO: 4.3 M/UL (ref 4–5.4)
RBC # BLD AUTO: 4.32 M/UL (ref 4–5.4)
RBC # BLD AUTO: 4.34 M/UL (ref 4–5.4)
RBC # BLD AUTO: 4.35 M/UL (ref 4–5.4)
RBC # BLD AUTO: 4.39 M/UL (ref 4–5.4)
RBC # BLD AUTO: 4.64 M/UL (ref 4–5.4)
RBC # BLD AUTO: 4.65 M/UL (ref 4–5.4)
RBC # BLD AUTO: 4.78 M/UL (ref 4–5.4)
RBC # BLD AUTO: 4.8 M/UL (ref 4–5.4)
RBC # BLD AUTO: 4.87 M/UL (ref 4–5.4)
RBC # BLD AUTO: 4.92 M/UL (ref 4–5.4)
RBC # BLD AUTO: 5.1 M/UL (ref 4–5.4)
RBC # BLD AUTO: 5.28 M/UL (ref 4–5.4)
RBC #/AREA URNS AUTO: 1 /HPF (ref 0–4)
RBC #/AREA URNS AUTO: 3 /HPF (ref 0–4)
RIGHT VENTRICULAR END-DIASTOLIC DIMENSION: 2.6 CM
RIGHT VENTRICULAR END-DIASTOLIC DIMENSION: 2.63 CM
SAMPLE: NORMAL
SAMPLE: NORMAL
SATURATED IRON: 20 % (ref 20–50)
SATURATED IRON: 21 % (ref 20–50)
SINUS: 2.85 CM
SINUS: 3.4 CM
SODIUM SERPL-SCNC: 134 MMOL/L (ref 136–145)
SODIUM SERPL-SCNC: 135 MMOL/L (ref 136–145)
SODIUM SERPL-SCNC: 136 MMOL/L (ref 136–145)
SODIUM SERPL-SCNC: 137 MMOL/L (ref 136–145)
SODIUM SERPL-SCNC: 138 MMOL/L (ref 136–145)
SODIUM SERPL-SCNC: 139 MMOL/L (ref 136–145)
SODIUM SERPL-SCNC: 139 MMOL/L (ref 136–145)
SODIUM SERPL-SCNC: 140 MMOL/L (ref 136–145)
SODIUM SERPL-SCNC: 140 MMOL/L (ref 136–145)
SODIUM SERPL-SCNC: 141 MMOL/L (ref 136–145)
SP GR UR STRIP: 1.02 (ref 1–1.03)
SP GR UR STRIP: >1.03 (ref 1–1.03)
SQUAMOUS #/AREA URNS AUTO: 11 /HPF
SQUAMOUS #/AREA URNS AUTO: 9 /HPF
STJ: 3.13 CM
STJ: 3.17 CM
TDI LATERAL: 0.04 M/S
TDI LATERAL: 0.08 M/S
TDI SEPTAL: 0.04 M/S
TDI SEPTAL: 0.06 M/S
TDI: 0.04 M/S
TDI: 0.07 M/S
TOTAL IRON BINDING CAPACITY: 280 UG/DL (ref 250–450)
TOTAL IRON BINDING CAPACITY: 293 UG/DL (ref 250–450)
TR MAX PG: 13 MMHG
TRANSFERRIN SERPL-MCNC: 189 MG/DL (ref 200–375)
TRANSFERRIN SERPL-MCNC: 198 MG/DL (ref 200–375)
TRI-PHOS CRY UR QL COMP ASSIST: ABNORMAL
TRICUSPID ANNULAR PLANE SYSTOLIC EXCURSION: 1.62 CM
TRICUSPID ANNULAR PLANE SYSTOLIC EXCURSION: 2.3 CM
TRIGL SERPL-MCNC: 104 MG/DL (ref 30–150)
TRIGL SERPL-MCNC: 104 MG/DL (ref 30–150)
TRIGL SERPL-MCNC: 118 MG/DL (ref 30–150)
TRIGL SERPL-MCNC: 94 MG/DL (ref 30–150)
TROPONIN I SERPL DL<=0.01 NG/ML-MCNC: 0.01 NG/ML (ref 0–0.03)
TSH SERPL DL<=0.005 MIU/L-ACNC: 0.79 UIU/ML (ref 0.4–4)
TSH SERPL DL<=0.005 MIU/L-ACNC: 1.16 UIU/ML (ref 0.4–4)
TSH SERPL DL<=0.005 MIU/L-ACNC: 1.77 UIU/ML (ref 0.4–4)
TSH SERPL DL<=0.005 MIU/L-ACNC: 2.31 UIU/ML (ref 0.4–4)
TV REST PULMONARY ARTERY PRESSURE: 16 MMHG
URN SPEC COLLECT METH UR: ABNORMAL
URN SPEC COLLECT METH UR: ABNORMAL
VIT B12 SERPL-MCNC: 781 PG/ML (ref 210–950)
WBC # BLD AUTO: 6.8 K/UL (ref 3.9–12.7)
WBC # BLD AUTO: 7.13 K/UL (ref 3.9–12.7)
WBC # BLD AUTO: 7.45 K/UL (ref 3.9–12.7)
WBC # BLD AUTO: 7.62 K/UL (ref 3.9–12.7)
WBC # BLD AUTO: 7.78 K/UL (ref 3.9–12.7)
WBC # BLD AUTO: 8.16 K/UL (ref 3.9–12.7)
WBC # BLD AUTO: 8.18 K/UL (ref 3.9–12.7)
WBC # BLD AUTO: 8.29 K/UL (ref 3.9–12.7)
WBC # BLD AUTO: 8.29 K/UL (ref 3.9–12.7)
WBC # BLD AUTO: 8.35 K/UL (ref 3.9–12.7)
WBC # BLD AUTO: 8.35 K/UL (ref 3.9–12.7)
WBC # BLD AUTO: 8.46 K/UL (ref 3.9–12.7)
WBC # BLD AUTO: 8.54 K/UL (ref 3.9–12.7)
WBC # BLD AUTO: 8.61 K/UL (ref 3.9–12.7)
WBC # BLD AUTO: 8.79 K/UL (ref 3.9–12.7)
WBC # BLD AUTO: 9.39 K/UL (ref 3.9–12.7)
WBC # BLD AUTO: 9.57 K/UL (ref 3.9–12.7)
WBC # FLD: 5000 /CU MM
WBC #/AREA URNS AUTO: 3 /HPF (ref 0–5)
WBC #/AREA URNS AUTO: >100 /HPF (ref 0–5)
Z-SCORE OF LEFT VENTRICULAR DIMENSION IN END DIASTOLE: -6.38
Z-SCORE OF LEFT VENTRICULAR DIMENSION IN END SYSTOLE: -3.03

## 2023-01-01 PROCEDURE — 85652 RBC SED RATE AUTOMATED: CPT | Performed by: FAMILY MEDICINE

## 2023-01-01 PROCEDURE — 25000003 PHARM REV CODE 250: Performed by: NURSE PRACTITIONER

## 2023-01-01 PROCEDURE — 11720 DEBRIDE NAIL 1-5: CPT | Mod: 59,Q8,S$GLB, | Performed by: PODIATRIST

## 2023-01-01 PROCEDURE — 1101F PT FALLS ASSESS-DOCD LE1/YR: CPT | Mod: CPTII,S$GLB,, | Performed by: INTERNAL MEDICINE

## 2023-01-01 PROCEDURE — 99232 SBSQ HOSP IP/OBS MODERATE 35: CPT | Mod: ,,, | Performed by: NURSE PRACTITIONER

## 2023-01-01 PROCEDURE — 80053 COMPREHEN METABOLIC PANEL: CPT | Performed by: INTERNAL MEDICINE

## 2023-01-01 PROCEDURE — 1126F AMNT PAIN NOTED NONE PRSNT: CPT | Mod: CPTII,S$GLB,, | Performed by: INTERNAL MEDICINE

## 2023-01-01 PROCEDURE — 97535 SELF CARE MNGMENT TRAINING: CPT

## 2023-01-01 PROCEDURE — 63600175 PHARM REV CODE 636 W HCPCS: Performed by: PSYCHIATRY & NEUROLOGY

## 2023-01-01 PROCEDURE — 1159F PR MEDICATION LIST DOCUMENTED IN MEDICAL RECORD: ICD-10-PCS | Mod: CPTII,S$GLB,, | Performed by: INTERNAL MEDICINE

## 2023-01-01 PROCEDURE — 11000001 HC ACUTE MED/SURG PRIVATE ROOM

## 2023-01-01 PROCEDURE — 97110 THERAPEUTIC EXERCISES: CPT

## 2023-01-01 PROCEDURE — 63600175 PHARM REV CODE 636 W HCPCS: Performed by: EMERGENCY MEDICINE

## 2023-01-01 PROCEDURE — 99222 PR INITIAL HOSPITAL CARE,LEVL II: ICD-10-PCS | Mod: ,,, | Performed by: NURSE PRACTITIONER

## 2023-01-01 PROCEDURE — 1101F PR PT FALLS ASSESS DOC 0-1 FALLS W/OUT INJ PAST YR: ICD-10-PCS | Mod: CPTII,S$GLB,, | Performed by: NURSE PRACTITIONER

## 2023-01-01 PROCEDURE — 84484 ASSAY OF TROPONIN QUANT: CPT | Performed by: NURSE PRACTITIONER

## 2023-01-01 PROCEDURE — 99999 PR PBB SHADOW E&M-EST. PATIENT-LVL V: CPT | Mod: PBBFAC,,, | Performed by: INTERNAL MEDICINE

## 2023-01-01 PROCEDURE — 94761 N-INVAS EAR/PLS OXIMETRY MLT: CPT

## 2023-01-01 PROCEDURE — 99233 PR SUBSEQUENT HOSPITAL CARE,LEVL III: ICD-10-PCS | Mod: ,,, | Performed by: PSYCHIATRY & NEUROLOGY

## 2023-01-01 PROCEDURE — 99233 PR SUBSEQUENT HOSPITAL CARE,LEVL III: ICD-10-PCS | Mod: GC,,, | Performed by: PSYCHIATRY & NEUROLOGY

## 2023-01-01 PROCEDURE — 3077F PR MOST RECENT SYSTOLIC BLOOD PRESSURE >= 140 MM HG: ICD-10-PCS | Mod: CPTII,S$GLB,, | Performed by: PODIATRIST

## 2023-01-01 PROCEDURE — 3288F PR FALLS RISK ASSESSMENT DOCUMENTED: ICD-10-PCS | Mod: CPTII,S$GLB,, | Performed by: INTERNAL MEDICINE

## 2023-01-01 PROCEDURE — 36415 COLL VENOUS BLD VENIPUNCTURE: CPT

## 2023-01-01 PROCEDURE — 97110 THERAPEUTIC EXERCISES: CPT | Mod: PO

## 2023-01-01 PROCEDURE — 83036 HEMOGLOBIN GLYCOSYLATED A1C: CPT | Performed by: FAMILY MEDICINE

## 2023-01-01 PROCEDURE — 1126F PR PAIN SEVERITY QUANTIFIED, NO PAIN PRESENT: ICD-10-PCS | Mod: CPTII,S$GLB,, | Performed by: PODIATRIST

## 2023-01-01 PROCEDURE — 83735 ASSAY OF MAGNESIUM: CPT

## 2023-01-01 PROCEDURE — 97110 THERAPEUTIC EXERCISES: CPT | Mod: PO,CQ

## 2023-01-01 PROCEDURE — 3051F HG A1C>EQUAL 7.0%<8.0%: CPT | Mod: CPTII,S$GLB,, | Performed by: INTERNAL MEDICINE

## 2023-01-01 PROCEDURE — 25000003 PHARM REV CODE 250: Performed by: PHYSICIAN ASSISTANT

## 2023-01-01 PROCEDURE — 11057 PARNG/CUTG B9 HYPRKR LES >4: CPT | Mod: Q8,S$GLB,, | Performed by: PODIATRIST

## 2023-01-01 PROCEDURE — 3075F SYST BP GE 130 - 139MM HG: CPT | Mod: CPTII,S$GLB,, | Performed by: NURSE PRACTITIONER

## 2023-01-01 PROCEDURE — 99233 SBSQ HOSP IP/OBS HIGH 50: CPT | Mod: GC,,, | Performed by: PSYCHIATRY & NEUROLOGY

## 2023-01-01 PROCEDURE — 87102 FUNGUS ISOLATION CULTURE: CPT

## 2023-01-01 PROCEDURE — 99499 UNLISTED E&M SERVICE: CPT | Mod: S$GLB,,, | Performed by: PODIATRIST

## 2023-01-01 PROCEDURE — 3077F SYST BP >= 140 MM HG: CPT | Mod: CPTII,S$GLB,, | Performed by: NURSE PRACTITIONER

## 2023-01-01 PROCEDURE — 36415 COLL VENOUS BLD VENIPUNCTURE: CPT | Performed by: NURSE PRACTITIONER

## 2023-01-01 PROCEDURE — 25000003 PHARM REV CODE 250

## 2023-01-01 PROCEDURE — 99283 EMERGENCY DEPT VISIT LOW MDM: CPT

## 2023-01-01 PROCEDURE — 1160F RVW MEDS BY RX/DR IN RCRD: CPT | Mod: CPTII,S$GLB,, | Performed by: NURSE PRACTITIONER

## 2023-01-01 PROCEDURE — 97530 THERAPEUTIC ACTIVITIES: CPT | Mod: PO

## 2023-01-01 PROCEDURE — 3077F SYST BP >= 140 MM HG: CPT | Mod: CPTII,S$GLB,, | Performed by: PODIATRIST

## 2023-01-01 PROCEDURE — 84145 PROCALCITONIN (PCT): CPT | Performed by: FAMILY MEDICINE

## 2023-01-01 PROCEDURE — 83880 ASSAY OF NATRIURETIC PEPTIDE: CPT | Performed by: EMERGENCY MEDICINE

## 2023-01-01 PROCEDURE — 3288F FALL RISK ASSESSMENT DOCD: CPT | Mod: CPTII,S$GLB,, | Performed by: INTERNAL MEDICINE

## 2023-01-01 PROCEDURE — 99232 PR SUBSEQUENT HOSPITAL CARE,LEVL II: ICD-10-PCS | Mod: ,,, | Performed by: PHYSICIAN ASSISTANT

## 2023-01-01 PROCEDURE — 96375 TX/PRO/DX INJ NEW DRUG ADDON: CPT

## 2023-01-01 PROCEDURE — 1157F ADVNC CARE PLAN IN RCRD: CPT | Mod: CPTII,S$GLB,, | Performed by: INTERNAL MEDICINE

## 2023-01-01 PROCEDURE — 99214 PR OFFICE/OUTPT VISIT, EST, LEVL IV, 30-39 MIN: ICD-10-PCS | Mod: S$GLB,,, | Performed by: NURSE PRACTITIONER

## 2023-01-01 PROCEDURE — 87206 SMEAR FLUORESCENT/ACID STAI: CPT

## 2023-01-01 PROCEDURE — 1157F PR ADVANCE CARE PLAN OR EQUIV PRESENT IN MEDICAL RECORD: ICD-10-PCS | Mod: CPTII,S$GLB,, | Performed by: NURSE PRACTITIONER

## 2023-01-01 PROCEDURE — 84443 ASSAY THYROID STIM HORMONE: CPT | Performed by: EMERGENCY MEDICINE

## 2023-01-01 PROCEDURE — 87116 MYCOBACTERIA CULTURE: CPT

## 2023-01-01 PROCEDURE — 97530 THERAPEUTIC ACTIVITIES: CPT

## 2023-01-01 PROCEDURE — 81001 URINALYSIS AUTO W/SCOPE: CPT | Performed by: PHYSICIAN ASSISTANT

## 2023-01-01 PROCEDURE — 99232 SBSQ HOSP IP/OBS MODERATE 35: CPT | Mod: ,,, | Performed by: PHYSICIAN ASSISTANT

## 2023-01-01 PROCEDURE — 87186 SC STD MICRODIL/AGAR DIL: CPT | Performed by: PHYSICIAN ASSISTANT

## 2023-01-01 PROCEDURE — 3075F SYST BP GE 130 - 139MM HG: CPT | Mod: CPTII,S$GLB,, | Performed by: INTERNAL MEDICINE

## 2023-01-01 PROCEDURE — 1160F RVW MEDS BY RX/DR IN RCRD: CPT | Mod: CPTII,S$GLB,, | Performed by: INTERNAL MEDICINE

## 2023-01-01 PROCEDURE — 1159F PR MEDICATION LIST DOCUMENTED IN MEDICAL RECORD: ICD-10-PCS | Mod: CPTII,S$GLB,, | Performed by: NURSE PRACTITIONER

## 2023-01-01 PROCEDURE — 85025 COMPLETE CBC W/AUTO DIFF WBC: CPT | Performed by: FAMILY MEDICINE

## 2023-01-01 PROCEDURE — 3288F PR FALLS RISK ASSESSMENT DOCUMENTED: ICD-10-PCS | Mod: CPTII,S$GLB,, | Performed by: NURSE PRACTITIONER

## 2023-01-01 PROCEDURE — 92507 TX SP LANG VOICE COMM INDIV: CPT

## 2023-01-01 PROCEDURE — 25000003 PHARM REV CODE 250: Performed by: STUDENT IN AN ORGANIZED HEALTH CARE EDUCATION/TRAINING PROGRAM

## 2023-01-01 PROCEDURE — 84100 ASSAY OF PHOSPHORUS: CPT

## 2023-01-01 PROCEDURE — 96372 THER/PROPH/DIAG INJ SC/IM: CPT | Mod: 59 | Performed by: FAMILY MEDICINE

## 2023-01-01 PROCEDURE — 3075F PR MOST RECENT SYSTOLIC BLOOD PRESS GE 130-139MM HG: ICD-10-PCS | Mod: CPTII,S$GLB,, | Performed by: NURSE PRACTITIONER

## 2023-01-01 PROCEDURE — 82728 ASSAY OF FERRITIN: CPT | Performed by: INTERNAL MEDICINE

## 2023-01-01 PROCEDURE — 63600175 PHARM REV CODE 636 W HCPCS: Performed by: NURSE PRACTITIONER

## 2023-01-01 PROCEDURE — 83735 ASSAY OF MAGNESIUM: CPT | Performed by: NURSE PRACTITIONER

## 2023-01-01 PROCEDURE — 84100 ASSAY OF PHOSPHORUS: CPT | Performed by: FAMILY MEDICINE

## 2023-01-01 PROCEDURE — 1125F AMNT PAIN NOTED PAIN PRSNT: CPT | Mod: CPTII,S$GLB,, | Performed by: INTERNAL MEDICINE

## 2023-01-01 PROCEDURE — 99285 EMERGENCY DEPT VISIT HI MDM: CPT | Mod: 25

## 2023-01-01 PROCEDURE — 85025 COMPLETE CBC W/AUTO DIFF WBC: CPT

## 2023-01-01 PROCEDURE — 84443 ASSAY THYROID STIM HORMONE: CPT | Performed by: INTERNAL MEDICINE

## 2023-01-01 PROCEDURE — 83036 HEMOGLOBIN GLYCOSYLATED A1C: CPT | Performed by: INTERNAL MEDICINE

## 2023-01-01 PROCEDURE — 84484 ASSAY OF TROPONIN QUANT: CPT | Performed by: PHYSICIAN ASSISTANT

## 2023-01-01 PROCEDURE — 85025 COMPLETE CBC W/AUTO DIFF WBC: CPT | Performed by: NURSE PRACTITIONER

## 2023-01-01 PROCEDURE — 97112 NEUROMUSCULAR REEDUCATION: CPT

## 2023-01-01 PROCEDURE — 97162 PT EVAL MOD COMPLEX 30 MIN: CPT

## 2023-01-01 PROCEDURE — 3046F PR MOST RECENT HEMOGLOBIN A1C LEVEL > 9.0%: ICD-10-PCS | Mod: CPTII,S$GLB,, | Performed by: NURSE PRACTITIONER

## 2023-01-01 PROCEDURE — 97112 NEUROMUSCULAR REEDUCATION: CPT | Mod: PO

## 2023-01-01 PROCEDURE — 99233 SBSQ HOSP IP/OBS HIGH 50: CPT | Mod: ,,, | Performed by: PSYCHIATRY & NEUROLOGY

## 2023-01-01 PROCEDURE — 90694 VACC AIIV4 NO PRSRV 0.5ML IM: CPT | Mod: S$GLB,,, | Performed by: NURSE PRACTITIONER

## 2023-01-01 PROCEDURE — 87205 SMEAR GRAM STAIN: CPT

## 2023-01-01 PROCEDURE — 80053 COMPREHEN METABOLIC PANEL: CPT

## 2023-01-01 PROCEDURE — 93227 HOLTER MONITOR - 48 HOUR (CUPID ONLY): ICD-10-PCS | Mod: ,,, | Performed by: INTERNAL MEDICINE

## 2023-01-01 PROCEDURE — 93010 ELECTROCARDIOGRAM REPORT: CPT | Mod: ,,, | Performed by: INTERNAL MEDICINE

## 2023-01-01 PROCEDURE — 3288F FALL RISK ASSESSMENT DOCD: CPT | Mod: CPTII,S$GLB,, | Performed by: NURSE PRACTITIONER

## 2023-01-01 PROCEDURE — 99215 PR OFFICE/OUTPT VISIT, EST, LEVL V, 40-54 MIN: ICD-10-PCS | Mod: S$GLB,,, | Performed by: INTERNAL MEDICINE

## 2023-01-01 PROCEDURE — 11720 PR DEBRIDEMENT OF NAIL(S), 1-5: ICD-10-PCS | Mod: 59,Q8,S$GLB, | Performed by: PODIATRIST

## 2023-01-01 PROCEDURE — 1125F AMNT PAIN NOTED PAIN PRSNT: CPT | Mod: CPTII,S$GLB,, | Performed by: NURSE PRACTITIONER

## 2023-01-01 PROCEDURE — G0008 FLU VACCINE - QUADRIVALENT - ADJUVANTED: ICD-10-PCS | Mod: S$GLB,,, | Performed by: NURSE PRACTITIONER

## 2023-01-01 PROCEDURE — 25500020 PHARM REV CODE 255: Performed by: EMERGENCY MEDICINE

## 2023-01-01 PROCEDURE — 1101F PT FALLS ASSESS-DOCD LE1/YR: CPT | Mod: CPTII,S$GLB,, | Performed by: NURSE PRACTITIONER

## 2023-01-01 PROCEDURE — 25500020 PHARM REV CODE 255: Performed by: PSYCHIATRY & NEUROLOGY

## 2023-01-01 PROCEDURE — 83036 HEMOGLOBIN GLYCOSYLATED A1C: CPT | Performed by: NURSE PRACTITIONER

## 2023-01-01 PROCEDURE — 3078F DIAST BP <80 MM HG: CPT | Mod: CPTII,S$GLB,, | Performed by: NURSE PRACTITIONER

## 2023-01-01 PROCEDURE — 97165 OT EVAL LOW COMPLEX 30 MIN: CPT

## 2023-01-01 PROCEDURE — 80061 LIPID PANEL: CPT | Performed by: EMERGENCY MEDICINE

## 2023-01-01 PROCEDURE — 1157F PR ADVANCE CARE PLAN OR EQUIV PRESENT IN MEDICAL RECORD: ICD-10-PCS | Mod: CPTII,S$GLB,, | Performed by: INTERNAL MEDICINE

## 2023-01-01 PROCEDURE — 83605 ASSAY OF LACTIC ACID: CPT | Performed by: FAMILY MEDICINE

## 2023-01-01 PROCEDURE — 99223 PR INITIAL HOSPITAL CARE,LEVL III: ICD-10-PCS | Mod: AI,,, | Performed by: PSYCHIATRY & NEUROLOGY

## 2023-01-01 PROCEDURE — 3078F PR MOST RECENT DIASTOLIC BLOOD PRESSURE < 80 MM HG: ICD-10-PCS | Mod: CPTII,S$GLB,, | Performed by: INTERNAL MEDICINE

## 2023-01-01 PROCEDURE — 97116 GAIT TRAINING THERAPY: CPT

## 2023-01-01 PROCEDURE — 1101F PR PT FALLS ASSESS DOC 0-1 FALLS W/OUT INJ PAST YR: ICD-10-PCS | Mod: CPTII,S$GLB,, | Performed by: INTERNAL MEDICINE

## 2023-01-01 PROCEDURE — 99499 RISK ADDL DX/OHS AUDIT: ICD-10-PCS | Mod: S$GLB,,, | Performed by: INTERNAL MEDICINE

## 2023-01-01 PROCEDURE — 85025 COMPLETE CBC W/AUTO DIFF WBC: CPT | Performed by: INTERNAL MEDICINE

## 2023-01-01 PROCEDURE — 1160F PR REVIEW ALL MEDS BY PRESCRIBER/CLIN PHARMACIST DOCUMENTED: ICD-10-PCS | Mod: CPTII,S$GLB,, | Performed by: INTERNAL MEDICINE

## 2023-01-01 PROCEDURE — 25000003 PHARM REV CODE 250: Performed by: FAMILY MEDICINE

## 2023-01-01 PROCEDURE — 85610 PROTHROMBIN TIME: CPT | Performed by: EMERGENCY MEDICINE

## 2023-01-01 PROCEDURE — 3078F PR MOST RECENT DIASTOLIC BLOOD PRESSURE < 80 MM HG: ICD-10-PCS | Mod: CPTII,S$GLB,, | Performed by: PODIATRIST

## 2023-01-01 PROCEDURE — 99223 1ST HOSP IP/OBS HIGH 75: CPT | Mod: AI,,, | Performed by: PSYCHIATRY & NEUROLOGY

## 2023-01-01 PROCEDURE — 84484 ASSAY OF TROPONIN QUANT: CPT | Performed by: EMERGENCY MEDICINE

## 2023-01-01 PROCEDURE — 83036 HEMOGLOBIN GLYCOSYLATED A1C: CPT | Performed by: EMERGENCY MEDICINE

## 2023-01-01 PROCEDURE — 1160F PR REVIEW ALL MEDS BY PRESCRIBER/CLIN PHARMACIST DOCUMENTED: ICD-10-PCS | Mod: CPTII,S$GLB,, | Performed by: NURSE PRACTITIONER

## 2023-01-01 PROCEDURE — 80053 COMPREHEN METABOLIC PANEL: CPT | Performed by: EMERGENCY MEDICINE

## 2023-01-01 PROCEDURE — 99214 OFFICE O/P EST MOD 30 MIN: CPT | Mod: S$GLB,,, | Performed by: NURSE PRACTITIONER

## 2023-01-01 PROCEDURE — 97112 NEUROMUSCULAR REEDUCATION: CPT | Mod: PO,CQ

## 2023-01-01 PROCEDURE — 99223 1ST HOSP IP/OBS HIGH 75: CPT | Mod: ,,, | Performed by: ORTHOPAEDIC SURGERY

## 2023-01-01 PROCEDURE — 99215 OFFICE O/P EST HI 40 MIN: CPT | Mod: S$GLB,,, | Performed by: INTERNAL MEDICINE

## 2023-01-01 PROCEDURE — 63600175 PHARM REV CODE 636 W HCPCS

## 2023-01-01 PROCEDURE — G0008 ADMIN INFLUENZA VIRUS VAC: HCPCS | Mod: S$GLB,,, | Performed by: NURSE PRACTITIONER

## 2023-01-01 PROCEDURE — 99999 PR PBB SHADOW E&M-EST. PATIENT-LVL V: ICD-10-PCS | Mod: PBBFAC,,, | Performed by: INTERNAL MEDICINE

## 2023-01-01 PROCEDURE — 84100 ASSAY OF PHOSPHORUS: CPT | Performed by: NURSE PRACTITIONER

## 2023-01-01 PROCEDURE — 93010 EKG 12-LEAD: ICD-10-PCS | Mod: ,,, | Performed by: INTERNAL MEDICINE

## 2023-01-01 PROCEDURE — 99284 PR EMERGENCY DEPT VISIT,LEVEL IV: ICD-10-PCS | Mod: FS,,, | Performed by: EMERGENCY MEDICINE

## 2023-01-01 PROCEDURE — G0378 HOSPITAL OBSERVATION PER HR: HCPCS

## 2023-01-01 PROCEDURE — 99222 1ST HOSP IP/OBS MODERATE 55: CPT | Mod: ,,, | Performed by: NURSE PRACTITIONER

## 2023-01-01 PROCEDURE — 84443 ASSAY THYROID STIM HORMONE: CPT | Performed by: NURSE PRACTITIONER

## 2023-01-01 PROCEDURE — 1159F PR MEDICATION LIST DOCUMENTED IN MEDICAL RECORD: ICD-10-PCS | Mod: CPTII,S$GLB,, | Performed by: PODIATRIST

## 2023-01-01 PROCEDURE — 1125F PR PAIN SEVERITY QUANTIFIED, PAIN PRESENT: ICD-10-PCS | Mod: CPTII,S$GLB,, | Performed by: INTERNAL MEDICINE

## 2023-01-01 PROCEDURE — 99999 PR PBB SHADOW E&M-EST. PATIENT-LVL IV: CPT | Mod: PBBFAC,,, | Performed by: INTERNAL MEDICINE

## 2023-01-01 PROCEDURE — 36415 COLL VENOUS BLD VENIPUNCTURE: CPT | Performed by: FAMILY MEDICINE

## 2023-01-01 PROCEDURE — 99214 OFFICE O/P EST MOD 30 MIN: CPT | Mod: S$GLB,,, | Performed by: INTERNAL MEDICINE

## 2023-01-01 PROCEDURE — 81001 URINALYSIS AUTO W/SCOPE: CPT

## 2023-01-01 PROCEDURE — 99214 PR OFFICE/OUTPT VISIT, EST, LEVL IV, 30-39 MIN: ICD-10-PCS | Mod: S$GLB,,, | Performed by: INTERNAL MEDICINE

## 2023-01-01 PROCEDURE — 85610 PROTHROMBIN TIME: CPT | Mod: 91 | Performed by: EMERGENCY MEDICINE

## 2023-01-01 PROCEDURE — 3051F HG A1C>EQUAL 7.0%<8.0%: CPT | Mod: CPTII,S$GLB,, | Performed by: PODIATRIST

## 2023-01-01 PROCEDURE — 3078F PR MOST RECENT DIASTOLIC BLOOD PRESSURE < 80 MM HG: ICD-10-PCS | Mod: CPTII,S$GLB,, | Performed by: NURSE PRACTITIONER

## 2023-01-01 PROCEDURE — 86140 C-REACTIVE PROTEIN: CPT

## 2023-01-01 PROCEDURE — 36415 COLL VENOUS BLD VENIPUNCTURE: CPT | Mod: PN | Performed by: INTERNAL MEDICINE

## 2023-01-01 PROCEDURE — 97112 NEUROMUSCULAR REEDUCATION: CPT | Mod: CO

## 2023-01-01 PROCEDURE — 93005 ELECTROCARDIOGRAM TRACING: CPT

## 2023-01-01 PROCEDURE — 80061 LIPID PANEL: CPT | Performed by: INTERNAL MEDICINE

## 2023-01-01 PROCEDURE — 83970 ASSAY OF PARATHORMONE: CPT | Performed by: INTERNAL MEDICINE

## 2023-01-01 PROCEDURE — 82550 ASSAY OF CK (CPK): CPT | Performed by: INTERNAL MEDICINE

## 2023-01-01 PROCEDURE — 99999 PR PBB SHADOW E&M-EST. PATIENT-LVL IV: ICD-10-PCS | Mod: PBBFAC,,, | Performed by: INTERNAL MEDICINE

## 2023-01-01 PROCEDURE — 1157F ADVNC CARE PLAN IN RCRD: CPT | Mod: CPTII,S$GLB,, | Performed by: NURSE PRACTITIONER

## 2023-01-01 PROCEDURE — 1126F AMNT PAIN NOTED NONE PRSNT: CPT | Mod: CPTII,S$GLB,, | Performed by: NURSE PRACTITIONER

## 2023-01-01 PROCEDURE — 99496 TRANSJ CARE MGMT HIGH F2F 7D: CPT | Mod: 25,S$GLB,, | Performed by: NURSE PRACTITIONER

## 2023-01-01 PROCEDURE — 85610 PROTHROMBIN TIME: CPT | Performed by: NURSE PRACTITIONER

## 2023-01-01 PROCEDURE — 36415 COLL VENOUS BLD VENIPUNCTURE: CPT | Performed by: INTERNAL MEDICINE

## 2023-01-01 PROCEDURE — 3075F PR MOST RECENT SYSTOLIC BLOOD PRESS GE 130-139MM HG: ICD-10-PCS | Mod: CPTII,S$GLB,, | Performed by: INTERNAL MEDICINE

## 2023-01-01 PROCEDURE — G0180 MD CERTIFICATION HHA PATIENT: HCPCS | Mod: ,,, | Performed by: NURSE PRACTITIONER

## 2023-01-01 PROCEDURE — 85730 THROMBOPLASTIN TIME PARTIAL: CPT | Performed by: NURSE PRACTITIONER

## 2023-01-01 PROCEDURE — 3079F DIAST BP 80-89 MM HG: CPT | Mod: CPTII,S$GLB,, | Performed by: NURSE PRACTITIONER

## 2023-01-01 PROCEDURE — 1125F PR PAIN SEVERITY QUANTIFIED, PAIN PRESENT: ICD-10-PCS | Mod: CPTII,S$GLB,, | Performed by: NURSE PRACTITIONER

## 2023-01-01 PROCEDURE — 80048 BASIC METABOLIC PNL TOTAL CA: CPT | Performed by: PHYSICIAN ASSISTANT

## 2023-01-01 PROCEDURE — 99999 PR PBB SHADOW E&M-EST. PATIENT-LVL III: CPT | Mod: PBBFAC,,,

## 2023-01-01 PROCEDURE — 25000003 PHARM REV CODE 250: Performed by: EMERGENCY MEDICINE

## 2023-01-01 PROCEDURE — 3051F PR MOST RECENT HEMOGLOBIN A1C LEVEL 7.0 - < 8.0%: ICD-10-PCS | Mod: CPTII,S$GLB,, | Performed by: INTERNAL MEDICINE

## 2023-01-01 PROCEDURE — 82565 ASSAY OF CREATININE: CPT | Mod: 91

## 2023-01-01 PROCEDURE — 85652 RBC SED RATE AUTOMATED: CPT

## 2023-01-01 PROCEDURE — 99999 PR PBB SHADOW E&M-EST. PATIENT-LVL III: CPT | Mod: PBBFAC,,, | Performed by: PODIATRIST

## 2023-01-01 PROCEDURE — 99232 PR SUBSEQUENT HOSPITAL CARE,LEVL II: ICD-10-PCS | Mod: ,,, | Performed by: NURSE PRACTITIONER

## 2023-01-01 PROCEDURE — 3077F PR MOST RECENT SYSTOLIC BLOOD PRESSURE >= 140 MM HG: ICD-10-PCS | Mod: CPTII,S$GLB,, | Performed by: NURSE PRACTITIONER

## 2023-01-01 PROCEDURE — 82565 ASSAY OF CREATININE: CPT

## 2023-01-01 PROCEDURE — 1100F PTFALLS ASSESS-DOCD GE2>/YR: CPT | Mod: CPTII,S$GLB,, | Performed by: NURSE PRACTITIONER

## 2023-01-01 PROCEDURE — 3077F SYST BP >= 140 MM HG: CPT | Mod: CPTII,S$GLB,, | Performed by: INTERNAL MEDICINE

## 2023-01-01 PROCEDURE — 1159F MED LIST DOCD IN RCRD: CPT | Mod: CPTII,S$GLB,, | Performed by: INTERNAL MEDICINE

## 2023-01-01 PROCEDURE — 63600175 PHARM REV CODE 636 W HCPCS: Performed by: FAMILY MEDICINE

## 2023-01-01 PROCEDURE — 3078F DIAST BP <80 MM HG: CPT | Mod: CPTII,S$GLB,, | Performed by: INTERNAL MEDICINE

## 2023-01-01 PROCEDURE — 99900035 HC TECH TIME PER 15 MIN (STAT)

## 2023-01-01 PROCEDURE — 99223 1ST HOSP IP/OBS HIGH 75: CPT | Mod: ,,, | Performed by: FAMILY MEDICINE

## 2023-01-01 PROCEDURE — 99999 PR PBB SHADOW E&M-EST. PATIENT-LVL V: ICD-10-PCS | Mod: PBBFAC,,, | Performed by: NURSE PRACTITIONER

## 2023-01-01 PROCEDURE — 3051F PR MOST RECENT HEMOGLOBIN A1C LEVEL 7.0 - < 8.0%: ICD-10-PCS | Mod: CPTII,S$GLB,, | Performed by: PODIATRIST

## 2023-01-01 PROCEDURE — 82962 GLUCOSE BLOOD TEST: CPT

## 2023-01-01 PROCEDURE — 87086 URINE CULTURE/COLONY COUNT: CPT | Performed by: PHYSICIAN ASSISTANT

## 2023-01-01 PROCEDURE — 82010 KETONE BODYS QUAN: CPT

## 2023-01-01 PROCEDURE — 99999 PR PBB SHADOW E&M-EST. PATIENT-LVL V: CPT | Mod: PBBFAC,,, | Performed by: NURSE PRACTITIONER

## 2023-01-01 PROCEDURE — 3046F HEMOGLOBIN A1C LEVEL >9.0%: CPT | Mod: CPTII,S$GLB,, | Performed by: NURSE PRACTITIONER

## 2023-01-01 PROCEDURE — 1126F PR PAIN SEVERITY QUANTIFIED, NO PAIN PRESENT: ICD-10-PCS | Mod: CPTII,S$GLB,, | Performed by: INTERNAL MEDICINE

## 2023-01-01 PROCEDURE — 1157F PR ADVANCE CARE PLAN OR EQUIV PRESENT IN MEDICAL RECORD: ICD-10-PCS | Mod: CPTII,S$GLB,, | Performed by: PODIATRIST

## 2023-01-01 PROCEDURE — 99999 PR PBB SHADOW E&M-EST. PATIENT-LVL III: ICD-10-PCS | Mod: PBBFAC,,, | Performed by: PODIATRIST

## 2023-01-01 PROCEDURE — 99499 UNLISTED E&M SERVICE: CPT | Mod: S$GLB,,, | Performed by: INTERNAL MEDICINE

## 2023-01-01 PROCEDURE — 99214 OFFICE O/P EST MOD 30 MIN: CPT | Mod: GC,,, | Performed by: PSYCHIATRY & NEUROLOGY

## 2023-01-01 PROCEDURE — 82550 ASSAY OF CK (CPK): CPT | Performed by: FAMILY MEDICINE

## 2023-01-01 PROCEDURE — 87075 CULTR BACTERIA EXCEPT BLOOD: CPT

## 2023-01-01 PROCEDURE — 99291 CRITICAL CARE FIRST HOUR: CPT | Mod: ,,, | Performed by: PHYSICIAN ASSISTANT

## 2023-01-01 PROCEDURE — 1158F ADVNC CARE PLAN TLK DOCD: CPT | Mod: CPTII,S$GLB,, | Performed by: INTERNAL MEDICINE

## 2023-01-01 PROCEDURE — 11057 PR TRIM BENIGN HYPERKERATOTIC SKIN LESION,>4: ICD-10-PCS | Mod: Q8,S$GLB,, | Performed by: PODIATRIST

## 2023-01-01 PROCEDURE — 86141 C-REACTIVE PROTEIN HS: CPT | Performed by: FAMILY MEDICINE

## 2023-01-01 PROCEDURE — 93005 ELECTROCARDIOGRAM TRACING: CPT | Mod: S$GLB,,, | Performed by: INTERNAL MEDICINE

## 2023-01-01 PROCEDURE — 85610 PROTHROMBIN TIME: CPT

## 2023-01-01 PROCEDURE — 96361 HYDRATE IV INFUSION ADD-ON: CPT

## 2023-01-01 PROCEDURE — 99284 EMERGENCY DEPT VISIT MOD MDM: CPT | Mod: FS,,, | Performed by: EMERGENCY MEDICINE

## 2023-01-01 PROCEDURE — 86140 C-REACTIVE PROTEIN: CPT | Performed by: INTERNAL MEDICINE

## 2023-01-01 PROCEDURE — 87088 URINE BACTERIA CULTURE: CPT | Performed by: PHYSICIAN ASSISTANT

## 2023-01-01 PROCEDURE — 97535 SELF CARE MNGMENT TRAINING: CPT | Mod: CO

## 2023-01-01 PROCEDURE — 82533 TOTAL CORTISOL: CPT | Performed by: FAMILY MEDICINE

## 2023-01-01 PROCEDURE — 99223 PR INITIAL HOSPITAL CARE,LEVL III: ICD-10-PCS | Mod: ,,, | Performed by: FAMILY MEDICINE

## 2023-01-01 PROCEDURE — 80053 COMPREHEN METABOLIC PANEL: CPT | Performed by: NURSE PRACTITIONER

## 2023-01-01 PROCEDURE — 63600175 PHARM REV CODE 636 W HCPCS: Performed by: STUDENT IN AN ORGANIZED HEALTH CARE EDUCATION/TRAINING PROGRAM

## 2023-01-01 PROCEDURE — 90694 FLU VACCINE - QUADRIVALENT - ADJUVANTED: ICD-10-PCS | Mod: S$GLB,,, | Performed by: NURSE PRACTITIONER

## 2023-01-01 PROCEDURE — 1159F MED LIST DOCD IN RCRD: CPT | Mod: CPTII,S$GLB,, | Performed by: NURSE PRACTITIONER

## 2023-01-01 PROCEDURE — 89051 BODY FLUID CELL COUNT: CPT

## 2023-01-01 PROCEDURE — 85025 COMPLETE CBC W/AUTO DIFF WBC: CPT | Performed by: EMERGENCY MEDICINE

## 2023-01-01 PROCEDURE — 1157F ADVNC CARE PLAN IN RCRD: CPT | Mod: CPTII,S$GLB,, | Performed by: PODIATRIST

## 2023-01-01 PROCEDURE — 1159F MED LIST DOCD IN RCRD: CPT | Mod: CPTII,S$GLB,, | Performed by: PODIATRIST

## 2023-01-01 PROCEDURE — 99999 PR PBB SHADOW E&M-EST. PATIENT-LVL IV: CPT | Mod: PBBFAC,,, | Performed by: NURSE PRACTITIONER

## 2023-01-01 PROCEDURE — 99291 PR CRITICAL CARE, E/M 30-74 MINUTES: ICD-10-PCS | Mod: ,,, | Performed by: PHYSICIAN ASSISTANT

## 2023-01-01 PROCEDURE — 3074F PR MOST RECENT SYSTOLIC BLOOD PRESSURE < 130 MM HG: ICD-10-PCS | Mod: CPTII,S$GLB,, | Performed by: INTERNAL MEDICINE

## 2023-01-01 PROCEDURE — 92526 ORAL FUNCTION THERAPY: CPT

## 2023-01-01 PROCEDURE — 82607 VITAMIN B-12: CPT | Performed by: INTERNAL MEDICINE

## 2023-01-01 PROCEDURE — 12000002 HC ACUTE/MED SURGE SEMI-PRIVATE ROOM

## 2023-01-01 PROCEDURE — 99499 NO LOS: ICD-10-PCS | Mod: S$GLB,,, | Performed by: PODIATRIST

## 2023-01-01 PROCEDURE — 96365 THER/PROPH/DIAG IV INF INIT: CPT

## 2023-01-01 PROCEDURE — 99496 TRANSITIONAL CARE MANAGE SERVICE 7 DAY DISCHARGE: ICD-10-PCS | Mod: 25,S$GLB,, | Performed by: NURSE PRACTITIONER

## 2023-01-01 PROCEDURE — 99499 UNLISTED E&M SERVICE: CPT | Mod: 95,,, | Performed by: INTERNAL MEDICINE

## 2023-01-01 PROCEDURE — 97166 OT EVAL MOD COMPLEX 45 MIN: CPT

## 2023-01-01 PROCEDURE — 1158F PR ADVANCE CARE PLANNING DISCUSS DOCUMENTED IN MEDICAL RECORD: ICD-10-PCS | Mod: CPTII,S$GLB,, | Performed by: INTERNAL MEDICINE

## 2023-01-01 PROCEDURE — 87389 HIV-1 AG W/HIV-1&-2 AB AG IA: CPT | Performed by: PHYSICIAN ASSISTANT

## 2023-01-01 PROCEDURE — 3074F SYST BP LT 130 MM HG: CPT | Mod: CPTII,S$GLB,, | Performed by: INTERNAL MEDICINE

## 2023-01-01 PROCEDURE — 87070 CULTURE OTHR SPECIMN AEROBIC: CPT

## 2023-01-01 PROCEDURE — 92610 EVALUATE SWALLOWING FUNCTION: CPT

## 2023-01-01 PROCEDURE — 80061 LIPID PANEL: CPT | Performed by: NURSE PRACTITIONER

## 2023-01-01 PROCEDURE — G0180 PR HOME HEALTH MD CERTIFICATION: ICD-10-PCS | Mod: ,,, | Performed by: NURSE PRACTITIONER

## 2023-01-01 PROCEDURE — 80053 COMPREHEN METABOLIC PANEL: CPT | Performed by: FAMILY MEDICINE

## 2023-01-01 PROCEDURE — 80048 BASIC METABOLIC PNL TOTAL CA: CPT | Performed by: INTERNAL MEDICINE

## 2023-01-01 PROCEDURE — 1160F RVW MEDS BY RX/DR IN RCRD: CPT | Mod: CPTII,S$GLB,, | Performed by: PODIATRIST

## 2023-01-01 PROCEDURE — 93226 XTRNL ECG REC<48 HR SCAN A/R: CPT

## 2023-01-01 PROCEDURE — 3077F PR MOST RECENT SYSTOLIC BLOOD PRESSURE >= 140 MM HG: ICD-10-PCS | Mod: CPTII,S$GLB,, | Performed by: INTERNAL MEDICINE

## 2023-01-01 PROCEDURE — 1111F PR DISCHARGE MEDS RECONCILED W/ CURRENT OUTPATIENT MED LIST: ICD-10-PCS | Mod: CPTII,S$GLB,, | Performed by: INTERNAL MEDICINE

## 2023-01-01 PROCEDURE — 83735 ASSAY OF MAGNESIUM: CPT | Performed by: INTERNAL MEDICINE

## 2023-01-01 PROCEDURE — 93005 EKG 12-LEAD: ICD-10-PCS | Mod: S$GLB,,, | Performed by: INTERNAL MEDICINE

## 2023-01-01 PROCEDURE — 97162 PT EVAL MOD COMPLEX 30 MIN: CPT | Mod: PO

## 2023-01-01 PROCEDURE — 92523 SPEECH SOUND LANG COMPREHEN: CPT

## 2023-01-01 PROCEDURE — 82746 ASSAY OF FOLIC ACID SERUM: CPT | Performed by: INTERNAL MEDICINE

## 2023-01-01 PROCEDURE — 84466 ASSAY OF TRANSFERRIN: CPT | Performed by: INTERNAL MEDICINE

## 2023-01-01 PROCEDURE — 99223 PR INITIAL HOSPITAL CARE,LEVL III: ICD-10-PCS | Mod: ,,, | Performed by: ORTHOPAEDIC SURGERY

## 2023-01-01 PROCEDURE — 93010 ELECTROCARDIOGRAM REPORT: CPT | Mod: S$GLB,,, | Performed by: INTERNAL MEDICINE

## 2023-01-01 PROCEDURE — 89060 EXAM SYNOVIAL FLUID CRYSTALS: CPT

## 2023-01-01 PROCEDURE — 83735 ASSAY OF MAGNESIUM: CPT | Performed by: FAMILY MEDICINE

## 2023-01-01 PROCEDURE — 85025 COMPLETE CBC W/AUTO DIFF WBC: CPT | Performed by: PHYSICIAN ASSISTANT

## 2023-01-01 PROCEDURE — 87077 CULTURE AEROBIC IDENTIFY: CPT | Performed by: PHYSICIAN ASSISTANT

## 2023-01-01 PROCEDURE — 96374 THER/PROPH/DIAG INJ IV PUSH: CPT | Mod: 59

## 2023-01-01 PROCEDURE — 3078F DIAST BP <80 MM HG: CPT | Mod: CPTII,S$GLB,, | Performed by: PODIATRIST

## 2023-01-01 PROCEDURE — 99499 NO LOS: ICD-10-PCS | Mod: 95,,, | Performed by: INTERNAL MEDICINE

## 2023-01-01 PROCEDURE — 1100F PR PT FALLS ASSESS DOC 2+ FALLS/FALL W/INJURY/YR: ICD-10-PCS | Mod: CPTII,S$GLB,, | Performed by: NURSE PRACTITIONER

## 2023-01-01 PROCEDURE — 99406 BEHAV CHNG SMOKING 3-10 MIN: CPT | Mod: S$GLB,,, | Performed by: NURSE PRACTITIONER

## 2023-01-01 PROCEDURE — 1126F AMNT PAIN NOTED NONE PRSNT: CPT | Mod: CPTII,S$GLB,, | Performed by: PODIATRIST

## 2023-01-01 PROCEDURE — 99999 PR PBB SHADOW E&M-EST. PATIENT-LVL III: ICD-10-PCS | Mod: PBBFAC,,,

## 2023-01-01 PROCEDURE — 82553 CREATINE MB FRACTION: CPT | Performed by: NURSE PRACTITIONER

## 2023-01-01 PROCEDURE — 99214 PR OFFICE/OUTPT VISIT, EST, LEVL IV, 30-39 MIN: ICD-10-PCS | Mod: GC,,, | Performed by: PSYCHIATRY & NEUROLOGY

## 2023-01-01 PROCEDURE — 99406 PR TOBACCO USE CESSATION INTERMEDIATE 3-10 MINUTES: ICD-10-PCS | Mod: S$GLB,,, | Performed by: NURSE PRACTITIONER

## 2023-01-01 PROCEDURE — 93010 EKG 12-LEAD: ICD-10-PCS | Mod: S$GLB,,, | Performed by: INTERNAL MEDICINE

## 2023-01-01 PROCEDURE — 99999 PR PBB SHADOW E&M-EST. PATIENT-LVL IV: ICD-10-PCS | Mod: PBBFAC,,, | Performed by: NURSE PRACTITIONER

## 2023-01-01 PROCEDURE — 93227 XTRNL ECG REC<48 HR R&I: CPT | Mod: ,,, | Performed by: INTERNAL MEDICINE

## 2023-01-01 PROCEDURE — 85652 RBC SED RATE AUTOMATED: CPT | Performed by: INTERNAL MEDICINE

## 2023-01-01 PROCEDURE — 1160F PR REVIEW ALL MEDS BY PRESCRIBER/CLIN PHARMACIST DOCUMENTED: ICD-10-PCS | Mod: CPTII,S$GLB,, | Performed by: PODIATRIST

## 2023-01-01 PROCEDURE — 1111F DSCHRG MED/CURRENT MED MERGE: CPT | Mod: CPTII,S$GLB,, | Performed by: INTERNAL MEDICINE

## 2023-01-01 PROCEDURE — 1126F PR PAIN SEVERITY QUANTIFIED, NO PAIN PRESENT: ICD-10-PCS | Mod: CPTII,S$GLB,, | Performed by: NURSE PRACTITIONER

## 2023-01-01 PROCEDURE — 3079F PR MOST RECENT DIASTOLIC BLOOD PRESSURE 80-89 MM HG: ICD-10-PCS | Mod: CPTII,S$GLB,, | Performed by: NURSE PRACTITIONER

## 2023-01-01 RX ORDER — NIFEDIPINE 30 MG/1
30 TABLET, EXTENDED RELEASE ORAL DAILY
Qty: 90 TABLET | Refills: 0 | Status: ON HOLD | OUTPATIENT
Start: 2023-01-01 | End: 2023-01-01 | Stop reason: HOSPADM

## 2023-01-01 RX ORDER — CARVEDILOL 25 MG/1
25 TABLET ORAL 2 TIMES DAILY
Qty: 60 TABLET | Refills: 11
Start: 2023-01-01 | End: 2023-01-01 | Stop reason: DRUGHIGH

## 2023-01-01 RX ORDER — HYDRALAZINE HYDROCHLORIDE 20 MG/ML
10 INJECTION INTRAMUSCULAR; INTRAVENOUS EVERY 6 HOURS PRN
Status: DISCONTINUED | OUTPATIENT
Start: 2023-01-01 | End: 2023-01-01 | Stop reason: HOSPADM

## 2023-01-01 RX ORDER — CARVEDILOL 12.5 MG/1
TABLET ORAL
Qty: 60 TABLET | Refills: 11 | Status: SHIPPED | OUTPATIENT
Start: 2023-01-01 | End: 2024-12-28

## 2023-01-01 RX ORDER — ROSUVASTATIN CALCIUM 40 MG/1
40 TABLET, COATED ORAL NIGHTLY
Qty: 90 TABLET | Refills: 3 | Status: SHIPPED | OUTPATIENT
Start: 2023-01-01 | End: 2023-01-01 | Stop reason: SDUPTHER

## 2023-01-01 RX ORDER — ACETAMINOPHEN 325 MG/1
650 TABLET ORAL EVERY 6 HOURS PRN
Status: DISCONTINUED | OUTPATIENT
Start: 2023-01-01 | End: 2023-01-01 | Stop reason: HOSPADM

## 2023-01-01 RX ORDER — LABETALOL HCL 20 MG/4 ML
10 SYRINGE (ML) INTRAVENOUS EVERY 6 HOURS PRN
Status: DISCONTINUED | OUTPATIENT
Start: 2023-01-01 | End: 2023-01-01 | Stop reason: HOSPADM

## 2023-01-01 RX ORDER — SEMAGLUTIDE 1.34 MG/ML
1 INJECTION, SOLUTION SUBCUTANEOUS
Qty: 9 ML | Refills: 3 | Status: SHIPPED | OUTPATIENT
Start: 2023-01-01 | End: 2023-01-01

## 2023-01-01 RX ORDER — ROSUVASTATIN CALCIUM 40 MG/1
40 TABLET, COATED ORAL NIGHTLY
Qty: 90 TABLET | Refills: 3 | Status: ON HOLD | OUTPATIENT
Start: 2023-01-01 | End: 2024-01-01 | Stop reason: HOSPADM

## 2023-01-01 RX ORDER — VALSARTAN 160 MG/1
320 TABLET ORAL DAILY
Status: DISCONTINUED | OUTPATIENT
Start: 2023-01-01 | End: 2023-01-01 | Stop reason: HOSPADM

## 2023-01-01 RX ORDER — BLOOD-GLUCOSE SENSOR
EACH MISCELLANEOUS
Qty: 9 EACH | Refills: 3 | Status: SHIPPED | OUTPATIENT
Start: 2023-01-01

## 2023-01-01 RX ORDER — SODIUM CHLORIDE 9 MG/ML
INJECTION, SOLUTION INTRAVENOUS CONTINUOUS
Status: ACTIVE | OUTPATIENT
Start: 2023-01-01 | End: 2023-01-01

## 2023-01-01 RX ORDER — AMLODIPINE BESYLATE 10 MG/1
TABLET ORAL
Qty: 90 TABLET | Refills: 0 | Status: CANCELLED | OUTPATIENT
Start: 2023-01-01

## 2023-01-01 RX ORDER — POTASSIUM CHLORIDE 20 MEQ/1
60 TABLET, EXTENDED RELEASE ORAL ONCE
Status: COMPLETED | OUTPATIENT
Start: 2023-01-01 | End: 2023-01-01

## 2023-01-01 RX ORDER — PANTOPRAZOLE SODIUM 20 MG/1
40 TABLET, DELAYED RELEASE ORAL DAILY
Status: DISCONTINUED | OUTPATIENT
Start: 2023-01-01 | End: 2023-01-01 | Stop reason: HOSPADM

## 2023-01-01 RX ORDER — CARVEDILOL 6.25 MG/1
TABLET ORAL
Qty: 180 TABLET | Refills: 3 | Status: ON HOLD | OUTPATIENT
Start: 2023-01-01 | End: 2023-01-01 | Stop reason: HOSPADM

## 2023-01-01 RX ORDER — PANTOPRAZOLE SODIUM 40 MG/1
40 TABLET, DELAYED RELEASE ORAL DAILY PRN
Qty: 30 TABLET | Refills: 11 | Status: SHIPPED | OUTPATIENT
Start: 2023-01-01 | End: 2024-12-28

## 2023-01-01 RX ORDER — HEPARIN SODIUM 5000 [USP'U]/ML
5000 INJECTION, SOLUTION INTRAVENOUS; SUBCUTANEOUS EVERY 8 HOURS
Status: DISCONTINUED | OUTPATIENT
Start: 2023-01-01 | End: 2023-01-01 | Stop reason: HOSPADM

## 2023-01-01 RX ORDER — HYDRALAZINE HYDROCHLORIDE 50 MG/1
50 TABLET, FILM COATED ORAL 3 TIMES DAILY
Qty: 90 TABLET | Refills: 11
Start: 2023-01-01 | End: 2023-01-01

## 2023-01-01 RX ORDER — LABETALOL HCL 20 MG/4 ML
10 SYRINGE (ML) INTRAVENOUS EVERY 6 HOURS PRN
Status: DISCONTINUED | OUTPATIENT
Start: 2023-01-01 | End: 2023-01-01

## 2023-01-01 RX ORDER — LABETALOL HCL 20 MG/4 ML
10 SYRINGE (ML) INTRAVENOUS
Status: COMPLETED | OUTPATIENT
Start: 2023-01-01 | End: 2023-01-01

## 2023-01-01 RX ORDER — ONDANSETRON 2 MG/ML
4 INJECTION INTRAMUSCULAR; INTRAVENOUS EVERY 12 HOURS PRN
Status: DISCONTINUED | OUTPATIENT
Start: 2023-01-01 | End: 2023-01-01 | Stop reason: HOSPADM

## 2023-01-01 RX ORDER — CARVEDILOL 6.25 MG/1
6.25 TABLET ORAL 2 TIMES DAILY
Status: DISCONTINUED | OUTPATIENT
Start: 2023-01-01 | End: 2023-01-01

## 2023-01-01 RX ORDER — PANTOPRAZOLE SODIUM 40 MG/1
40 TABLET, DELAYED RELEASE ORAL DAILY
Status: DISCONTINUED | OUTPATIENT
Start: 2023-01-01 | End: 2023-01-01 | Stop reason: HOSPADM

## 2023-01-01 RX ORDER — INSULIN ASPART 100 [IU]/ML
0-5 INJECTION, SOLUTION INTRAVENOUS; SUBCUTANEOUS
Refills: 0 | Status: ON HOLD
Start: 2023-01-01 | End: 2024-01-01 | Stop reason: HOSPADM

## 2023-01-01 RX ORDER — GABAPENTIN 300 MG/1
CAPSULE ORAL
Qty: 180 CAPSULE | Refills: 3 | Status: ON HOLD | OUTPATIENT
Start: 2023-01-01 | End: 2023-01-01 | Stop reason: HOSPADM

## 2023-01-01 RX ORDER — CARVEDILOL 3.12 MG/1
6.25 TABLET ORAL
Status: COMPLETED | OUTPATIENT
Start: 2023-01-01 | End: 2023-01-01

## 2023-01-01 RX ORDER — NAPROXEN SODIUM 220 MG/1
162 TABLET, FILM COATED ORAL
Status: COMPLETED | OUTPATIENT
Start: 2023-01-01 | End: 2023-01-01

## 2023-01-01 RX ORDER — TALC
6 POWDER (GRAM) TOPICAL NIGHTLY PRN
Status: DISCONTINUED | OUTPATIENT
Start: 2023-01-01 | End: 2023-01-01 | Stop reason: HOSPADM

## 2023-01-01 RX ORDER — NIFEDIPINE 30 MG/1
30 TABLET, EXTENDED RELEASE ORAL DAILY
Status: DISCONTINUED | OUTPATIENT
Start: 2023-01-01 | End: 2023-01-01 | Stop reason: HOSPADM

## 2023-01-01 RX ORDER — HYDRALAZINE HYDROCHLORIDE 20 MG/ML
10 INJECTION INTRAMUSCULAR; INTRAVENOUS EVERY 8 HOURS PRN
Status: DISCONTINUED | OUTPATIENT
Start: 2023-01-01 | End: 2023-01-01 | Stop reason: HOSPADM

## 2023-01-01 RX ORDER — CANDESARTAN 32 MG/1
32 TABLET ORAL DAILY
Qty: 90 TABLET | Refills: 3 | Status: ON HOLD | OUTPATIENT
Start: 2023-01-01 | End: 2024-01-01 | Stop reason: HOSPADM

## 2023-01-01 RX ORDER — INSULIN ASPART 100 [IU]/ML
0-5 INJECTION, SOLUTION INTRAVENOUS; SUBCUTANEOUS
Status: DISCONTINUED | OUTPATIENT
Start: 2023-01-01 | End: 2023-01-01 | Stop reason: HOSPADM

## 2023-01-01 RX ORDER — GABAPENTIN 300 MG/1
300 CAPSULE ORAL 2 TIMES DAILY
Qty: 60 CAPSULE | Refills: 11
Start: 2023-01-01 | End: 2024-12-28

## 2023-01-01 RX ORDER — AMOXICILLIN 250 MG
1 CAPSULE ORAL 2 TIMES DAILY
Status: DISCONTINUED | OUTPATIENT
Start: 2023-01-01 | End: 2023-01-01 | Stop reason: HOSPADM

## 2023-01-01 RX ORDER — PANTOPRAZOLE SODIUM 40 MG/1
TABLET, DELAYED RELEASE ORAL
Qty: 90 TABLET | Refills: 3 | Status: ON HOLD | OUTPATIENT
Start: 2023-01-01 | End: 2023-01-01 | Stop reason: HOSPADM

## 2023-01-01 RX ORDER — INSULIN ASPART 100 [IU]/ML
4 INJECTION, SOLUTION INTRAVENOUS; SUBCUTANEOUS
Status: DISCONTINUED | OUTPATIENT
Start: 2023-01-01 | End: 2023-01-01 | Stop reason: HOSPADM

## 2023-01-01 RX ORDER — HYDRALAZINE HYDROCHLORIDE 20 MG/ML
10 INJECTION INTRAMUSCULAR; INTRAVENOUS EVERY 8 HOURS PRN
Status: DISCONTINUED | OUTPATIENT
Start: 2023-01-01 | End: 2023-01-01

## 2023-01-01 RX ORDER — AMLODIPINE BESYLATE 10 MG/1
TABLET ORAL
Qty: 90 TABLET | Refills: 3 | Status: ON HOLD | OUTPATIENT
Start: 2023-01-01 | End: 2023-01-01 | Stop reason: HOSPADM

## 2023-01-01 RX ORDER — GABAPENTIN 300 MG/1
300 CAPSULE ORAL 2 TIMES DAILY
Status: DISCONTINUED | OUTPATIENT
Start: 2023-01-01 | End: 2023-01-01 | Stop reason: HOSPADM

## 2023-01-01 RX ORDER — ONDANSETRON 8 MG/1
8 TABLET, ORALLY DISINTEGRATING ORAL EVERY 8 HOURS PRN
Status: DISCONTINUED | OUTPATIENT
Start: 2023-01-01 | End: 2023-01-01 | Stop reason: HOSPADM

## 2023-01-01 RX ORDER — NALOXONE HCL 0.4 MG/ML
0.02 VIAL (ML) INJECTION
Status: DISCONTINUED | OUTPATIENT
Start: 2023-01-01 | End: 2023-01-01 | Stop reason: HOSPADM

## 2023-01-01 RX ORDER — GABAPENTIN 300 MG/1
300 CAPSULE ORAL NIGHTLY
Status: DISCONTINUED | OUTPATIENT
Start: 2023-01-01 | End: 2023-01-01

## 2023-01-01 RX ORDER — PANTOPRAZOLE SODIUM 40 MG/1
TABLET, DELAYED RELEASE ORAL
Qty: 90 TABLET | Refills: 0 | Status: CANCELLED | OUTPATIENT
Start: 2023-01-01

## 2023-01-01 RX ORDER — ENOXAPARIN SODIUM 100 MG/ML
30 INJECTION SUBCUTANEOUS EVERY 24 HOURS
Status: DISCONTINUED | OUTPATIENT
Start: 2023-01-01 | End: 2023-01-01

## 2023-01-01 RX ORDER — CARVEDILOL 3.12 MG/1
6.25 TABLET ORAL 2 TIMES DAILY
Status: DISCONTINUED | OUTPATIENT
Start: 2023-01-01 | End: 2023-01-01 | Stop reason: HOSPADM

## 2023-01-01 RX ORDER — CARVEDILOL 12.5 MG/1
12.5 TABLET ORAL 2 TIMES DAILY
Status: DISCONTINUED | OUTPATIENT
Start: 2023-01-01 | End: 2023-01-01

## 2023-01-01 RX ORDER — INSULIN ASPART 100 [IU]/ML
3 INJECTION, SOLUTION INTRAVENOUS; SUBCUTANEOUS
Status: DISCONTINUED | OUTPATIENT
Start: 2023-01-01 | End: 2023-01-01

## 2023-01-01 RX ORDER — ADHESIVE BANDAGE
30 BANDAGE TOPICAL ONCE
Status: COMPLETED | OUTPATIENT
Start: 2023-01-01 | End: 2023-01-01

## 2023-01-01 RX ORDER — ADHESIVE BANDAGE
30 BANDAGE TOPICAL ONCE
Status: DISCONTINUED | OUTPATIENT
Start: 2023-01-01 | End: 2023-01-01

## 2023-01-01 RX ORDER — INSULIN ASPART 100 [IU]/ML
4 INJECTION, SOLUTION INTRAVENOUS; SUBCUTANEOUS
Status: DISCONTINUED | OUTPATIENT
Start: 2023-01-01 | End: 2023-01-01

## 2023-01-01 RX ORDER — INSULIN ASPART 100 [IU]/ML
4 INJECTION, SOLUTION INTRAVENOUS; SUBCUTANEOUS 3 TIMES DAILY
Refills: 0 | Status: ON HOLD
Start: 2023-01-01 | End: 2024-01-01 | Stop reason: HOSPADM

## 2023-01-01 RX ORDER — SODIUM CHLORIDE 0.9 % (FLUSH) 0.9 %
10 SYRINGE (ML) INJECTION EVERY 12 HOURS PRN
Status: DISCONTINUED | OUTPATIENT
Start: 2023-01-01 | End: 2023-01-01 | Stop reason: HOSPADM

## 2023-01-01 RX ORDER — GLUCAGON 1 MG
1 KIT INJECTION
Status: DISCONTINUED | OUTPATIENT
Start: 2023-01-01 | End: 2023-01-01 | Stop reason: HOSPADM

## 2023-01-01 RX ORDER — ATORVASTATIN CALCIUM 40 MG/1
40 TABLET, FILM COATED ORAL DAILY
Status: DISCONTINUED | OUTPATIENT
Start: 2023-01-01 | End: 2023-01-01 | Stop reason: HOSPADM

## 2023-01-01 RX ORDER — IBUPROFEN 200 MG
24 TABLET ORAL
Status: DISCONTINUED | OUTPATIENT
Start: 2023-01-01 | End: 2023-01-01 | Stop reason: HOSPADM

## 2023-01-01 RX ORDER — DOXEPIN HYDROCHLORIDE 10 MG/1
CAPSULE ORAL
Qty: 90 CAPSULE | Refills: 0 | Status: CANCELLED | OUTPATIENT
Start: 2023-01-01

## 2023-01-01 RX ORDER — INSULIN ASPART 100 [IU]/ML
0-10 INJECTION, SOLUTION INTRAVENOUS; SUBCUTANEOUS
Status: DISCONTINUED | OUTPATIENT
Start: 2023-01-01 | End: 2023-01-01 | Stop reason: HOSPADM

## 2023-01-01 RX ORDER — GABAPENTIN 300 MG/1
CAPSULE ORAL
Qty: 180 CAPSULE | Refills: 0 | Status: CANCELLED | OUTPATIENT
Start: 2023-01-01

## 2023-01-01 RX ORDER — SODIUM,POTASSIUM PHOSPHATES 280-250MG
2 POWDER IN PACKET (EA) ORAL ONCE
Status: COMPLETED | OUTPATIENT
Start: 2023-01-01 | End: 2023-01-01

## 2023-01-01 RX ORDER — CHLORTHALIDONE 25 MG/1
50 TABLET ORAL DAILY
Status: DISCONTINUED | OUTPATIENT
Start: 2023-01-01 | End: 2023-01-01 | Stop reason: HOSPADM

## 2023-01-01 RX ORDER — DEXTROSE 40 %
30 GEL (GRAM) ORAL
Status: DISCONTINUED | OUTPATIENT
Start: 2023-01-01 | End: 2023-01-01 | Stop reason: HOSPADM

## 2023-01-01 RX ORDER — SODIUM CHLORIDE 0.9 % (FLUSH) 0.9 %
10 SYRINGE (ML) INJECTION
Status: DISCONTINUED | OUTPATIENT
Start: 2023-01-01 | End: 2023-01-01 | Stop reason: HOSPADM

## 2023-01-01 RX ORDER — DOXEPIN HYDROCHLORIDE 10 MG/1
10 CAPSULE ORAL NIGHTLY
Status: DISCONTINUED | OUTPATIENT
Start: 2023-01-01 | End: 2023-01-01 | Stop reason: HOSPADM

## 2023-01-01 RX ORDER — ALBUTEROL SULFATE 90 UG/1
2 AEROSOL, METERED RESPIRATORY (INHALATION) EVERY 6 HOURS PRN
Status: DISCONTINUED | OUTPATIENT
Start: 2023-01-01 | End: 2023-01-01 | Stop reason: HOSPADM

## 2023-01-01 RX ORDER — HYDRALAZINE HYDROCHLORIDE 50 MG/1
TABLET, FILM COATED ORAL
Qty: 90 TABLET | Refills: 0 | Status: CANCELLED | OUTPATIENT
Start: 2023-01-01

## 2023-01-01 RX ORDER — HYDRALAZINE HYDROCHLORIDE 25 MG/1
25 TABLET, FILM COATED ORAL 3 TIMES DAILY PRN
Qty: 90 TABLET | Refills: 11 | Status: ON HOLD | OUTPATIENT
Start: 2023-01-01 | End: 2024-01-01 | Stop reason: HOSPADM

## 2023-01-01 RX ORDER — INSULIN ASPART 100 [IU]/ML
5 INJECTION, SOLUTION INTRAVENOUS; SUBCUTANEOUS
Status: DISCONTINUED | OUTPATIENT
Start: 2023-01-01 | End: 2023-01-01

## 2023-01-01 RX ORDER — SEMAGLUTIDE 2.68 MG/ML
2 INJECTION, SOLUTION SUBCUTANEOUS
Qty: 3 ML | Refills: 11 | Status: ON HOLD | OUTPATIENT
Start: 2023-01-01 | End: 2024-01-01 | Stop reason: HOSPADM

## 2023-01-01 RX ORDER — ATORVASTATIN CALCIUM 40 MG/1
40 TABLET, FILM COATED ORAL DAILY
Status: DISCONTINUED | OUTPATIENT
Start: 2023-01-01 | End: 2023-01-01

## 2023-01-01 RX ORDER — CARVEDILOL 12.5 MG/1
12.5 TABLET ORAL 2 TIMES DAILY
Qty: 60 TABLET | Refills: 11
Start: 2023-01-01 | End: 2023-01-01

## 2023-01-01 RX ORDER — PSEUDOEPHEDRINE/ACETAMINOPHEN 30MG-500MG
100 TABLET ORAL ONCE
Status: DISCONTINUED | OUTPATIENT
Start: 2023-01-01 | End: 2023-01-01

## 2023-01-01 RX ORDER — ASPIRIN 81 MG/1
81 TABLET ORAL DAILY
Status: DISCONTINUED | OUTPATIENT
Start: 2023-01-01 | End: 2023-01-01 | Stop reason: HOSPADM

## 2023-01-01 RX ORDER — HYDRALAZINE HYDROCHLORIDE 50 MG/1
TABLET, FILM COATED ORAL
Qty: 270 TABLET | Refills: 3 | Status: SHIPPED | OUTPATIENT
Start: 2023-01-01 | End: 2023-01-01 | Stop reason: SDUPTHER

## 2023-01-01 RX ORDER — ATORVASTATIN CALCIUM 40 MG/1
80 TABLET, FILM COATED ORAL DAILY
Status: DISCONTINUED | OUTPATIENT
Start: 2023-01-01 | End: 2023-01-01 | Stop reason: HOSPADM

## 2023-01-01 RX ORDER — DOXEPIN HYDROCHLORIDE 10 MG/1
10 CAPSULE ORAL NIGHTLY PRN
Qty: 90 CAPSULE | Refills: 3 | Status: SHIPPED | OUTPATIENT
Start: 2023-01-01

## 2023-01-01 RX ORDER — MECLIZINE HYDROCHLORIDE 25 MG/1
25 TABLET ORAL 3 TIMES DAILY PRN
Qty: 90 TABLET | Refills: 0 | Status: SHIPPED | OUTPATIENT
Start: 2023-01-01 | End: 2023-01-01

## 2023-01-01 RX ORDER — HYDRALAZINE HYDROCHLORIDE 25 MG/1
50 TABLET, FILM COATED ORAL
Status: COMPLETED | OUTPATIENT
Start: 2023-01-01 | End: 2023-01-01

## 2023-01-01 RX ORDER — HYDRALAZINE HYDROCHLORIDE 25 MG/1
25 TABLET, FILM COATED ORAL EVERY 8 HOURS PRN
Status: DISCONTINUED | OUTPATIENT
Start: 2023-01-01 | End: 2023-01-01

## 2023-01-01 RX ORDER — CLOPIDOGREL BISULFATE 75 MG/1
75 TABLET ORAL DAILY
Status: DISCONTINUED | OUTPATIENT
Start: 2023-01-01 | End: 2023-01-01 | Stop reason: HOSPADM

## 2023-01-01 RX ORDER — SODIUM CHLORIDE 9 MG/ML
INJECTION, SOLUTION INTRAVENOUS CONTINUOUS
Status: DISCONTINUED | OUTPATIENT
Start: 2023-01-01 | End: 2023-01-01

## 2023-01-01 RX ORDER — CARVEDILOL 12.5 MG/1
12.5 TABLET ORAL ONCE
Status: COMPLETED | OUTPATIENT
Start: 2023-01-01 | End: 2023-01-01

## 2023-01-01 RX ORDER — PANTOPRAZOLE SODIUM 40 MG/1
40 TABLET, DELAYED RELEASE ORAL DAILY
Qty: 30 TABLET | Refills: 11
Start: 2023-01-01 | End: 2023-01-01 | Stop reason: SDUPTHER

## 2023-01-01 RX ORDER — ASPIRIN 81 MG/1
81 TABLET ORAL DAILY
Qty: 30 TABLET | Refills: 11 | Status: ON HOLD | OUTPATIENT
Start: 2023-01-01 | End: 2024-01-01 | Stop reason: HOSPADM

## 2023-01-01 RX ORDER — HYDRALAZINE HYDROCHLORIDE 25 MG/1
50 TABLET, FILM COATED ORAL EVERY 8 HOURS
Status: DISCONTINUED | OUTPATIENT
Start: 2023-01-01 | End: 2023-01-01

## 2023-01-01 RX ORDER — LABETALOL HCL 20 MG/4 ML
10 SYRINGE (ML) INTRAVENOUS EVERY 4 HOURS PRN
Status: DISCONTINUED | OUTPATIENT
Start: 2023-01-01 | End: 2023-01-01 | Stop reason: HOSPADM

## 2023-01-01 RX ORDER — PSEUDOEPHEDRINE/ACETAMINOPHEN 30MG-500MG
100 TABLET ORAL ONCE
Status: COMPLETED | OUTPATIENT
Start: 2023-01-01 | End: 2023-01-01

## 2023-01-01 RX ORDER — GABAPENTIN 300 MG/1
300 CAPSULE ORAL 2 TIMES DAILY
Qty: 60 CAPSULE | Refills: 11
Start: 2023-01-01 | End: 2023-01-01 | Stop reason: SDUPTHER

## 2023-01-01 RX ORDER — NIFEDIPINE 30 MG/1
30 TABLET, EXTENDED RELEASE ORAL DAILY
Status: DISCONTINUED | OUTPATIENT
Start: 2023-01-01 | End: 2023-01-01

## 2023-01-01 RX ORDER — NIFEDIPINE 30 MG/1
30 TABLET, EXTENDED RELEASE ORAL ONCE
Status: COMPLETED | OUTPATIENT
Start: 2023-01-01 | End: 2023-01-01

## 2023-01-01 RX ORDER — POLYETHYLENE GLYCOL 3350 17 G/17G
17 POWDER, FOR SOLUTION ORAL DAILY
Status: DISCONTINUED | OUTPATIENT
Start: 2023-01-01 | End: 2023-01-01

## 2023-01-01 RX ORDER — PEN NEEDLE, DIABETIC 30 GX3/16"
NEEDLE, DISPOSABLE MISCELLANEOUS
Qty: 300 EACH | Refills: 3 | Status: ON HOLD | OUTPATIENT
Start: 2023-01-01 | End: 2024-01-01

## 2023-01-01 RX ORDER — NAPROXEN SODIUM 220 MG
1 TABLET ORAL 4 TIMES DAILY PRN
Qty: 100 EACH | Refills: 11 | Status: SHIPPED | OUTPATIENT
Start: 2023-01-01

## 2023-01-01 RX ORDER — INSULIN ASPART 100 [IU]/ML
6 INJECTION, SOLUTION INTRAVENOUS; SUBCUTANEOUS
Status: DISCONTINUED | OUTPATIENT
Start: 2023-01-01 | End: 2023-01-01

## 2023-01-01 RX ORDER — DOXEPIN HYDROCHLORIDE 10 MG/1
CAPSULE ORAL
Qty: 90 CAPSULE | Refills: 3 | Status: SHIPPED | OUTPATIENT
Start: 2023-01-01 | End: 2023-01-01

## 2023-01-01 RX ORDER — IBUPROFEN 200 MG
16 TABLET ORAL
Status: DISCONTINUED | OUTPATIENT
Start: 2023-01-01 | End: 2023-01-01 | Stop reason: HOSPADM

## 2023-01-01 RX ORDER — NIFEDIPINE 90 MG/1
90 TABLET, EXTENDED RELEASE ORAL DAILY
Qty: 30 TABLET | Refills: 11
Start: 2023-01-01 | End: 2023-01-01 | Stop reason: SDUPTHER

## 2023-01-01 RX ORDER — INSULIN ASPART 100 [IU]/ML
1-10 INJECTION, SOLUTION INTRAVENOUS; SUBCUTANEOUS
Status: DISCONTINUED | OUTPATIENT
Start: 2023-01-01 | End: 2023-01-01 | Stop reason: HOSPADM

## 2023-01-01 RX ORDER — LABETALOL HCL 20 MG/4 ML
20 SYRINGE (ML) INTRAVENOUS EVERY 6 HOURS PRN
Status: DISCONTINUED | OUTPATIENT
Start: 2023-01-01 | End: 2023-01-01 | Stop reason: HOSPADM

## 2023-01-01 RX ORDER — ACETAMINOPHEN 500 MG
1000 TABLET ORAL EVERY 8 HOURS PRN
Status: DISCONTINUED | OUTPATIENT
Start: 2023-01-01 | End: 2023-01-01 | Stop reason: HOSPADM

## 2023-01-01 RX ORDER — ENOXAPARIN SODIUM 100 MG/ML
40 INJECTION SUBCUTANEOUS EVERY 24 HOURS
Status: DISCONTINUED | OUTPATIENT
Start: 2023-01-01 | End: 2023-01-01 | Stop reason: HOSPADM

## 2023-01-01 RX ORDER — BLOOD-GLUCOSE TRANSMITTER
EACH MISCELLANEOUS
Qty: 1 EACH | Refills: 3 | Status: SHIPPED | OUTPATIENT
Start: 2023-01-01

## 2023-01-01 RX ORDER — DEXTROSE 40 %
15 GEL (GRAM) ORAL
Status: DISCONTINUED | OUTPATIENT
Start: 2023-01-01 | End: 2023-01-01 | Stop reason: HOSPADM

## 2023-01-01 RX ORDER — HYDRALAZINE HYDROCHLORIDE 50 MG/1
50 TABLET, FILM COATED ORAL EVERY 8 HOURS
Qty: 270 TABLET | Refills: 3 | Status: SHIPPED | OUTPATIENT
Start: 2023-01-01 | End: 2023-01-01 | Stop reason: SDUPTHER

## 2023-01-01 RX ORDER — CARVEDILOL 25 MG/1
25 TABLET ORAL 2 TIMES DAILY
Status: DISCONTINUED | OUTPATIENT
Start: 2023-01-01 | End: 2023-01-01 | Stop reason: HOSPADM

## 2023-01-01 RX ORDER — HYDRALAZINE HYDROCHLORIDE 25 MG/1
50 TABLET, FILM COATED ORAL 3 TIMES DAILY
Status: DISCONTINUED | OUTPATIENT
Start: 2023-01-01 | End: 2023-01-01 | Stop reason: HOSPADM

## 2023-01-01 RX ORDER — HYDRALAZINE HYDROCHLORIDE 20 MG/ML
20 INJECTION INTRAMUSCULAR; INTRAVENOUS EVERY 8 HOURS PRN
Status: DISCONTINUED | OUTPATIENT
Start: 2023-01-01 | End: 2023-01-01 | Stop reason: HOSPADM

## 2023-01-01 RX ORDER — OXYCODONE HYDROCHLORIDE 5 MG/1
5 TABLET ORAL EVERY 6 HOURS PRN
Status: DISCONTINUED | OUTPATIENT
Start: 2023-01-01 | End: 2023-01-01 | Stop reason: HOSPADM

## 2023-01-01 RX ORDER — GABAPENTIN 300 MG/1
300 CAPSULE ORAL 2 TIMES DAILY
Status: CANCELLED | OUTPATIENT
Start: 2023-01-01

## 2023-01-01 RX ORDER — LIDOCAINE HYDROCHLORIDE 10 MG/ML
10 INJECTION INFILTRATION; PERINEURAL ONCE
Status: COMPLETED | OUTPATIENT
Start: 2023-01-01 | End: 2023-01-01

## 2023-01-01 RX ORDER — NIFEDIPINE 90 MG/1
90 TABLET, EXTENDED RELEASE ORAL DAILY
Qty: 30 TABLET | Refills: 11
Start: 2023-01-01 | End: 2024-12-28

## 2023-01-01 RX ORDER — SYRING-NEEDL,DISP,INSUL,0.3 ML 29 G X1/2"
296 SYRINGE, EMPTY DISPOSABLE MISCELLANEOUS ONCE
Status: COMPLETED | OUTPATIENT
Start: 2023-01-01 | End: 2023-01-01

## 2023-01-01 RX ORDER — MECLIZINE HCL 12.5 MG 12.5 MG/1
25 TABLET ORAL
Status: COMPLETED | OUTPATIENT
Start: 2023-01-01 | End: 2023-01-01

## 2023-01-01 RX ORDER — CLOPIDOGREL BISULFATE 75 MG/1
75 TABLET ORAL DAILY
Qty: 90 TABLET | Refills: 3 | Status: ON HOLD | OUTPATIENT
Start: 2023-01-01 | End: 2024-01-01 | Stop reason: HOSPADM

## 2023-01-01 RX ORDER — ROSUVASTATIN CALCIUM 40 MG/1
40 TABLET, COATED ORAL NIGHTLY
Qty: 90 TABLET | Refills: 3 | Status: SHIPPED | OUTPATIENT
Start: 2023-01-01 | End: 2023-01-01

## 2023-01-01 RX ORDER — NIFEDIPINE 30 MG/1
60 TABLET, EXTENDED RELEASE ORAL DAILY
Status: DISCONTINUED | OUTPATIENT
Start: 2023-01-01 | End: 2023-01-01

## 2023-01-01 RX ORDER — CLOPIDOGREL BISULFATE 75 MG/1
75 TABLET ORAL DAILY
Qty: 90 TABLET | Refills: 3 | Status: SHIPPED | OUTPATIENT
Start: 2023-01-01 | End: 2023-01-01 | Stop reason: SDUPTHER

## 2023-01-01 RX ORDER — GABAPENTIN 100 MG/1
100 CAPSULE ORAL 2 TIMES DAILY
Status: DISCONTINUED | OUTPATIENT
Start: 2023-01-01 | End: 2023-01-01 | Stop reason: HOSPADM

## 2023-01-01 RX ORDER — SYRING-NEEDL,DISP,INSUL,0.3 ML 29 G X1/2"
296 SYRINGE, EMPTY DISPOSABLE MISCELLANEOUS ONCE
Status: DISCONTINUED | OUTPATIENT
Start: 2023-01-01 | End: 2023-01-01

## 2023-01-01 RX ORDER — HYDRALAZINE HYDROCHLORIDE 50 MG/1
50 TABLET, FILM COATED ORAL EVERY 8 HOURS
Qty: 270 TABLET | Refills: 3 | Status: ON HOLD | OUTPATIENT
Start: 2023-01-01 | End: 2023-01-01 | Stop reason: HOSPADM

## 2023-01-01 RX ORDER — ONDANSETRON 2 MG/ML
4 INJECTION INTRAMUSCULAR; INTRAVENOUS EVERY 8 HOURS PRN
Status: DISCONTINUED | OUTPATIENT
Start: 2023-01-01 | End: 2023-01-01 | Stop reason: HOSPADM

## 2023-01-01 RX ORDER — CARVEDILOL 12.5 MG/1
12.5 TABLET ORAL 2 TIMES DAILY
Status: DISCONTINUED | OUTPATIENT
Start: 2023-01-01 | End: 2023-01-01 | Stop reason: HOSPADM

## 2023-01-01 RX ORDER — NIFEDIPINE 30 MG/1
90 TABLET, EXTENDED RELEASE ORAL DAILY
Status: DISCONTINUED | OUTPATIENT
Start: 2023-01-01 | End: 2023-01-01 | Stop reason: HOSPADM

## 2023-01-01 RX ORDER — MAG HYDROX/ALUMINUM HYD/SIMETH 200-200-20
30 SUSPENSION, ORAL (FINAL DOSE FORM) ORAL 4 TIMES DAILY PRN
Status: DISCONTINUED | OUTPATIENT
Start: 2023-01-01 | End: 2023-01-01 | Stop reason: HOSPADM

## 2023-01-01 RX ORDER — LIDOCAINE HYDROCHLORIDE 10 MG/ML
INJECTION, SOLUTION EPIDURAL; INFILTRATION; INTRACAUDAL; PERINEURAL
Status: DISPENSED
Start: 2023-01-01 | End: 2023-01-01

## 2023-01-01 RX ADMIN — ATORVASTATIN CALCIUM 40 MG: 40 TABLET, FILM COATED ORAL at 08:02

## 2023-01-01 RX ADMIN — HYDRALAZINE HYDROCHLORIDE 50 MG: 25 TABLET, FILM COATED ORAL at 10:12

## 2023-01-01 RX ADMIN — INSULIN DETEMIR 10 UNITS: 100 INJECTION, SOLUTION SUBCUTANEOUS at 08:02

## 2023-01-01 RX ADMIN — INSULIN ASPART 2 UNITS: 100 INJECTION, SOLUTION INTRAVENOUS; SUBCUTANEOUS at 05:12

## 2023-01-01 RX ADMIN — HYDRALAZINE HYDROCHLORIDE 10 MG: 20 INJECTION, SOLUTION INTRAMUSCULAR; INTRAVENOUS at 01:11

## 2023-01-01 RX ADMIN — INSULIN ASPART 4 UNITS: 100 INJECTION, SOLUTION INTRAVENOUS; SUBCUTANEOUS at 12:12

## 2023-01-01 RX ADMIN — HYDRALAZINE HYDROCHLORIDE 20 MG: 20 INJECTION, SOLUTION INTRAMUSCULAR; INTRAVENOUS at 12:02

## 2023-01-01 RX ADMIN — CARVEDILOL 6.25 MG: 6.25 TABLET, FILM COATED ORAL at 09:12

## 2023-01-01 RX ADMIN — INSULIN DETEMIR 10 UNITS: 100 INJECTION, SOLUTION SUBCUTANEOUS at 09:02

## 2023-01-01 RX ADMIN — SODIUM CHLORIDE 1000 ML: 9 INJECTION, SOLUTION INTRAVENOUS at 02:10

## 2023-01-01 RX ADMIN — HYDRALAZINE HYDROCHLORIDE 10 MG: 20 INJECTION, SOLUTION INTRAMUSCULAR; INTRAVENOUS at 01:12

## 2023-01-01 RX ADMIN — GABAPENTIN 300 MG: 300 CAPSULE ORAL at 10:11

## 2023-01-01 RX ADMIN — SENNOSIDES AND DOCUSATE SODIUM 1 TABLET: 8.6; 5 TABLET ORAL at 09:11

## 2023-01-01 RX ADMIN — POTASSIUM CHLORIDE 60 MEQ: 1500 TABLET, EXTENDED RELEASE ORAL at 06:10

## 2023-01-01 RX ADMIN — GABAPENTIN 300 MG: 300 CAPSULE ORAL at 09:02

## 2023-01-01 RX ADMIN — INSULIN ASPART 3 UNITS: 100 INJECTION, SOLUTION INTRAVENOUS; SUBCUTANEOUS at 04:11

## 2023-01-01 RX ADMIN — LABETALOL HYDROCHLORIDE 20 MG: 5 INJECTION, SOLUTION INTRAVENOUS at 08:10

## 2023-01-01 RX ADMIN — CARVEDILOL 6.25 MG: 6.25 TABLET, FILM COATED ORAL at 10:11

## 2023-01-01 RX ADMIN — PANTOPRAZOLE SODIUM 40 MG: 20 TABLET, DELAYED RELEASE ORAL at 09:12

## 2023-01-01 RX ADMIN — INSULIN ASPART 5 UNITS: 100 INJECTION, SOLUTION INTRAVENOUS; SUBCUTANEOUS at 01:11

## 2023-01-01 RX ADMIN — ACETAMINOPHEN 650 MG: 325 TABLET ORAL at 09:11

## 2023-01-01 RX ADMIN — INSULIN ASPART 4 UNITS: 100 INJECTION, SOLUTION INTRAVENOUS; SUBCUTANEOUS at 08:12

## 2023-01-01 RX ADMIN — HEPARIN SODIUM 5000 UNITS: 5000 INJECTION INTRAVENOUS; SUBCUTANEOUS at 02:02

## 2023-01-01 RX ADMIN — ATORVASTATIN CALCIUM 80 MG: 40 TABLET, FILM COATED ORAL at 10:12

## 2023-01-01 RX ADMIN — LABETALOL HYDROCHLORIDE 10 MG: 5 INJECTION, SOLUTION INTRAVENOUS at 07:11

## 2023-01-01 RX ADMIN — CLOPIDOGREL BISULFATE 75 MG: 75 TABLET ORAL at 08:12

## 2023-01-01 RX ADMIN — HYDRALAZINE HYDROCHLORIDE 10 MG: 20 INJECTION, SOLUTION INTRAMUSCULAR; INTRAVENOUS at 05:11

## 2023-01-01 RX ADMIN — HYDRALAZINE HYDROCHLORIDE 50 MG: 25 TABLET, FILM COATED ORAL at 09:12

## 2023-01-01 RX ADMIN — INSULIN ASPART 2 UNITS: 100 INJECTION, SOLUTION INTRAVENOUS; SUBCUTANEOUS at 12:11

## 2023-01-01 RX ADMIN — SENNOSIDES AND DOCUSATE SODIUM 1 TABLET: 50; 8.6 TABLET ORAL at 08:02

## 2023-01-01 RX ADMIN — CARVEDILOL 6.25 MG: 6.25 TABLET, FILM COATED ORAL at 06:10

## 2023-01-01 RX ADMIN — Medication 100 ML: at 03:12

## 2023-01-01 RX ADMIN — ATORVASTATIN CALCIUM 40 MG: 40 TABLET, FILM COATED ORAL at 08:11

## 2023-01-01 RX ADMIN — DOXEPIN HYDROCHLORIDE 10 MG: 10 CAPSULE ORAL at 09:12

## 2023-01-01 RX ADMIN — ATORVASTATIN CALCIUM 80 MG: 40 TABLET, FILM COATED ORAL at 09:12

## 2023-01-01 RX ADMIN — NIFEDIPINE 30 MG: 30 TABLET, FILM COATED, EXTENDED RELEASE ORAL at 08:10

## 2023-01-01 RX ADMIN — SODIUM CHLORIDE: 9 INJECTION, SOLUTION INTRAVENOUS at 12:10

## 2023-01-01 RX ADMIN — INSULIN ASPART 4 UNITS: 100 INJECTION, SOLUTION INTRAVENOUS; SUBCUTANEOUS at 11:02

## 2023-01-01 RX ADMIN — GABAPENTIN 300 MG: 300 CAPSULE ORAL at 08:12

## 2023-01-01 RX ADMIN — LABETALOL HYDROCHLORIDE 10 MG: 5 INJECTION, SOLUTION INTRAVENOUS at 09:02

## 2023-01-01 RX ADMIN — LABETALOL HYDROCHLORIDE 10 MG: 5 INJECTION, SOLUTION INTRAVENOUS at 11:12

## 2023-01-01 RX ADMIN — CARVEDILOL 6.25 MG: 6.25 TABLET, FILM COATED ORAL at 10:10

## 2023-01-01 RX ADMIN — INSULIN ASPART 3 UNITS: 100 INJECTION, SOLUTION INTRAVENOUS; SUBCUTANEOUS at 08:12

## 2023-01-01 RX ADMIN — VALSARTAN 320 MG: 160 TABLET, FILM COATED ORAL at 09:12

## 2023-01-01 RX ADMIN — INSULIN ASPART 5 UNITS: 100 INJECTION, SOLUTION INTRAVENOUS; SUBCUTANEOUS at 09:11

## 2023-01-01 RX ADMIN — CLOPIDOGREL BISULFATE 75 MG: 75 TABLET ORAL at 09:11

## 2023-01-01 RX ADMIN — LABETALOL HYDROCHLORIDE 10 MG: 5 INJECTION, SOLUTION INTRAVENOUS at 06:12

## 2023-01-01 RX ADMIN — ATORVASTATIN CALCIUM 40 MG: 40 TABLET, FILM COATED ORAL at 09:11

## 2023-01-01 RX ADMIN — INSULIN ASPART 4 UNITS: 100 INJECTION, SOLUTION INTRAVENOUS; SUBCUTANEOUS at 08:02

## 2023-01-01 RX ADMIN — POTASSIUM BICARBONATE 40 MEQ: 391 TABLET, EFFERVESCENT ORAL at 08:11

## 2023-01-01 RX ADMIN — CARVEDILOL 12.5 MG: 12.5 TABLET, FILM COATED ORAL at 10:12

## 2023-01-01 RX ADMIN — ENOXAPARIN SODIUM 40 MG: 40 INJECTION SUBCUTANEOUS at 04:12

## 2023-01-01 RX ADMIN — GABAPENTIN 100 MG: 100 CAPSULE ORAL at 10:10

## 2023-01-01 RX ADMIN — MAGNESIUM HYDROXIDE 2400 MG: 400 SUSPENSION ORAL at 03:03

## 2023-01-01 RX ADMIN — INSULIN ASPART 1 UNITS: 100 INJECTION, SOLUTION INTRAVENOUS; SUBCUTANEOUS at 09:11

## 2023-01-01 RX ADMIN — POTASSIUM & SODIUM PHOSPHATES POWDER PACK 280-160-250 MG 2 PACKET: 280-160-250 PACK at 06:10

## 2023-01-01 RX ADMIN — HEPARIN SODIUM 5000 UNITS: 5000 INJECTION INTRAVENOUS; SUBCUTANEOUS at 05:02

## 2023-01-01 RX ADMIN — ASPIRIN 81 MG: 81 TABLET, COATED ORAL at 09:11

## 2023-01-01 RX ADMIN — INSULIN ASPART 3 UNITS: 100 INJECTION, SOLUTION INTRAVENOUS; SUBCUTANEOUS at 04:12

## 2023-01-01 RX ADMIN — INSULIN DETEMIR 10 UNITS: 100 INJECTION, SOLUTION SUBCUTANEOUS at 08:03

## 2023-01-01 RX ADMIN — SENNOSIDES AND DOCUSATE SODIUM 1 TABLET: 50; 8.6 TABLET ORAL at 08:03

## 2023-01-01 RX ADMIN — SODIUM CHLORIDE 500 ML: 9 INJECTION, SOLUTION INTRAVENOUS at 11:12

## 2023-01-01 RX ADMIN — CLOPIDOGREL BISULFATE 75 MG: 75 TABLET ORAL at 09:12

## 2023-01-01 RX ADMIN — CLOPIDOGREL BISULFATE 75 MG: 75 TABLET ORAL at 10:10

## 2023-01-01 RX ADMIN — NIFEDIPINE 90 MG: 30 TABLET, FILM COATED, EXTENDED RELEASE ORAL at 09:12

## 2023-01-01 RX ADMIN — SENNOSIDES AND DOCUSATE SODIUM 1 TABLET: 8.6; 5 TABLET ORAL at 08:12

## 2023-01-01 RX ADMIN — IOHEXOL 100 ML: 350 INJECTION, SOLUTION INTRAVENOUS at 01:10

## 2023-01-01 RX ADMIN — HEPARIN SODIUM 5000 UNITS: 5000 INJECTION INTRAVENOUS; SUBCUTANEOUS at 09:02

## 2023-01-01 RX ADMIN — OXYCODONE HYDROCHLORIDE 5 MG: 5 TABLET ORAL at 01:12

## 2023-01-01 RX ADMIN — PANTOPRAZOLE SODIUM 40 MG: 20 TABLET, DELAYED RELEASE ORAL at 10:12

## 2023-01-01 RX ADMIN — DOXEPIN HYDROCHLORIDE 10 MG: 10 CAPSULE ORAL at 09:02

## 2023-01-01 RX ADMIN — NIFEDIPINE 30 MG: 30 TABLET, FILM COATED, EXTENDED RELEASE ORAL at 08:11

## 2023-01-01 RX ADMIN — INSULIN ASPART 3 UNITS: 100 INJECTION, SOLUTION INTRAVENOUS; SUBCUTANEOUS at 12:02

## 2023-01-01 RX ADMIN — NIFEDIPINE 30 MG: 30 TABLET, FILM COATED, EXTENDED RELEASE ORAL at 02:11

## 2023-01-01 RX ADMIN — INSULIN ASPART 4 UNITS: 100 INJECTION, SOLUTION INTRAVENOUS; SUBCUTANEOUS at 04:02

## 2023-01-01 RX ADMIN — SENNOSIDES AND DOCUSATE SODIUM 1 TABLET: 8.6; 5 TABLET ORAL at 10:12

## 2023-01-01 RX ADMIN — INSULIN ASPART 6 UNITS: 100 INJECTION, SOLUTION INTRAVENOUS; SUBCUTANEOUS at 08:02

## 2023-01-01 RX ADMIN — INSULIN DETEMIR 10 UNITS: 100 INJECTION, SOLUTION SUBCUTANEOUS at 08:11

## 2023-01-01 RX ADMIN — CLOPIDOGREL BISULFATE 75 MG: 75 TABLET ORAL at 08:02

## 2023-01-01 RX ADMIN — VALSARTAN 320 MG: 160 TABLET, FILM COATED ORAL at 08:03

## 2023-01-01 RX ADMIN — ASPIRIN 81 MG: 81 TABLET, COATED ORAL at 08:03

## 2023-01-01 RX ADMIN — DOXEPIN HYDROCHLORIDE 10 MG: 10 CAPSULE ORAL at 09:11

## 2023-01-01 RX ADMIN — PANTOPRAZOLE SODIUM 40 MG: 20 TABLET, DELAYED RELEASE ORAL at 09:11

## 2023-01-01 RX ADMIN — CLOPIDOGREL BISULFATE 75 MG: 75 TABLET ORAL at 10:12

## 2023-01-01 RX ADMIN — ASPIRIN 81 MG CHEWABLE TABLET 162 MG: 81 TABLET CHEWABLE at 10:02

## 2023-01-01 RX ADMIN — CLOPIDOGREL BISULFATE 75 MG: 75 TABLET ORAL at 08:03

## 2023-01-01 RX ADMIN — ACETAMINOPHEN 650 MG: 325 TABLET ORAL at 02:02

## 2023-01-01 RX ADMIN — NIFEDIPINE 30 MG: 30 TABLET, FILM COATED, EXTENDED RELEASE ORAL at 09:11

## 2023-01-01 RX ADMIN — SENNOSIDES AND DOCUSATE SODIUM 1 TABLET: 8.6; 5 TABLET ORAL at 09:12

## 2023-01-01 RX ADMIN — ASPIRIN 81 MG: 81 TABLET, COATED ORAL at 08:11

## 2023-01-01 RX ADMIN — INSULIN ASPART 3 UNITS: 100 INJECTION, SOLUTION INTRAVENOUS; SUBCUTANEOUS at 01:12

## 2023-01-01 RX ADMIN — INSULIN ASPART 3 UNITS: 100 INJECTION, SOLUTION INTRAVENOUS; SUBCUTANEOUS at 05:12

## 2023-01-01 RX ADMIN — GABAPENTIN 300 MG: 300 CAPSULE ORAL at 09:12

## 2023-01-01 RX ADMIN — INSULIN ASPART 4 UNITS: 100 INJECTION, SOLUTION INTRAVENOUS; SUBCUTANEOUS at 04:12

## 2023-01-01 RX ADMIN — VALSARTAN 320 MG: 160 TABLET, FILM COATED ORAL at 09:11

## 2023-01-01 RX ADMIN — INSULIN ASPART 4 UNITS: 100 INJECTION, SOLUTION INTRAVENOUS; SUBCUTANEOUS at 11:03

## 2023-01-01 RX ADMIN — INSULIN ASPART 2 UNITS: 100 INJECTION, SOLUTION INTRAVENOUS; SUBCUTANEOUS at 04:02

## 2023-01-01 RX ADMIN — GABAPENTIN 300 MG: 300 CAPSULE ORAL at 03:02

## 2023-01-01 RX ADMIN — GABAPENTIN 300 MG: 300 CAPSULE ORAL at 09:11

## 2023-01-01 RX ADMIN — SENNOSIDES AND DOCUSATE SODIUM 1 TABLET: 8.6; 5 TABLET ORAL at 08:11

## 2023-01-01 RX ADMIN — GABAPENTIN 300 MG: 300 CAPSULE ORAL at 08:03

## 2023-01-01 RX ADMIN — CHLORTHALIDONE 50 MG: 25 TABLET ORAL at 08:03

## 2023-01-01 RX ADMIN — CARVEDILOL 6.25 MG: 6.25 TABLET, FILM COATED ORAL at 09:11

## 2023-01-01 RX ADMIN — ASPIRIN 81 MG: 81 TABLET, COATED ORAL at 10:12

## 2023-01-01 RX ADMIN — SENNOSIDES AND DOCUSATE SODIUM 1 TABLET: 50; 8.6 TABLET ORAL at 09:02

## 2023-01-01 RX ADMIN — INSULIN ASPART 3 UNITS: 100 INJECTION, SOLUTION INTRAVENOUS; SUBCUTANEOUS at 12:11

## 2023-01-01 RX ADMIN — INSULIN ASPART 3 UNITS: 100 INJECTION, SOLUTION INTRAVENOUS; SUBCUTANEOUS at 11:12

## 2023-01-01 RX ADMIN — DOXEPIN HYDROCHLORIDE 10 MG: 10 CAPSULE ORAL at 08:10

## 2023-01-01 RX ADMIN — ENOXAPARIN SODIUM 40 MG: 40 INJECTION SUBCUTANEOUS at 05:11

## 2023-01-01 RX ADMIN — MECLIZINE HYDROCHLORIDE 25 MG: 12.5 TABLET ORAL at 08:02

## 2023-01-01 RX ADMIN — HYDRALAZINE HYDROCHLORIDE 50 MG: 25 TABLET, FILM COATED ORAL at 10:11

## 2023-01-01 RX ADMIN — GABAPENTIN 300 MG: 300 CAPSULE ORAL at 10:12

## 2023-01-01 RX ADMIN — ASPIRIN 81 MG: 81 TABLET, COATED ORAL at 08:02

## 2023-01-01 RX ADMIN — HYDRALAZINE HYDROCHLORIDE 10 MG: 20 INJECTION, SOLUTION INTRAMUSCULAR; INTRAVENOUS at 04:11

## 2023-01-01 RX ADMIN — ACETAMINOPHEN 650 MG: 325 TABLET ORAL at 08:11

## 2023-01-01 RX ADMIN — CARVEDILOL 12.5 MG: 12.5 TABLET, FILM COATED ORAL at 12:12

## 2023-01-01 RX ADMIN — ATORVASTATIN CALCIUM 40 MG: 40 TABLET, FILM COATED ORAL at 08:12

## 2023-01-01 RX ADMIN — PANTOPRAZOLE SODIUM 40 MG: 40 TABLET, DELAYED RELEASE ORAL at 10:10

## 2023-01-01 RX ADMIN — INSULIN ASPART 8 UNITS: 100 INJECTION, SOLUTION INTRAVENOUS; SUBCUTANEOUS at 11:03

## 2023-01-01 RX ADMIN — ASPIRIN 81 MG: 81 TABLET, COATED ORAL at 08:12

## 2023-01-01 RX ADMIN — HYDRALAZINE HYDROCHLORIDE 10 MG: 20 INJECTION, SOLUTION INTRAMUSCULAR; INTRAVENOUS at 03:11

## 2023-01-01 RX ADMIN — HYDRALAZINE HYDROCHLORIDE 50 MG: 25 TABLET, FILM COATED ORAL at 02:12

## 2023-01-01 RX ADMIN — INSULIN ASPART 2 UNITS: 100 INJECTION, SOLUTION INTRAVENOUS; SUBCUTANEOUS at 11:02

## 2023-01-01 RX ADMIN — HEPARIN SODIUM 5000 UNITS: 5000 INJECTION INTRAVENOUS; SUBCUTANEOUS at 06:02

## 2023-01-01 RX ADMIN — HYDRALAZINE HYDROCHLORIDE 25 MG: 25 TABLET, FILM COATED ORAL at 11:10

## 2023-01-01 RX ADMIN — PANTOPRAZOLE SODIUM 40 MG: 20 TABLET, DELAYED RELEASE ORAL at 08:11

## 2023-01-01 RX ADMIN — ENOXAPARIN SODIUM 30 MG: 30 INJECTION SUBCUTANEOUS at 10:11

## 2023-01-01 RX ADMIN — HYDRALAZINE HYDROCHLORIDE 50 MG: 25 TABLET, FILM COATED ORAL at 03:12

## 2023-01-01 RX ADMIN — VALSARTAN 320 MG: 160 TABLET, FILM COATED ORAL at 08:12

## 2023-01-01 RX ADMIN — CARVEDILOL 6.25 MG: 6.25 TABLET, FILM COATED ORAL at 08:03

## 2023-01-01 RX ADMIN — POTASSIUM BICARBONATE 30 MEQ: 391 TABLET, EFFERVESCENT ORAL at 01:11

## 2023-01-01 RX ADMIN — PANTOPRAZOLE SODIUM 40 MG: 20 TABLET, DELAYED RELEASE ORAL at 08:12

## 2023-01-01 RX ADMIN — CLOPIDOGREL BISULFATE 75 MG: 75 TABLET ORAL at 08:11

## 2023-01-01 RX ADMIN — DOXEPIN HYDROCHLORIDE 10 MG: 10 CAPSULE ORAL at 10:12

## 2023-01-01 RX ADMIN — INSULIN ASPART 8 UNITS: 100 INJECTION, SOLUTION INTRAVENOUS; SUBCUTANEOUS at 12:02

## 2023-01-01 RX ADMIN — HEPARIN SODIUM 5000 UNITS: 5000 INJECTION INTRAVENOUS; SUBCUTANEOUS at 05:03

## 2023-01-01 RX ADMIN — INSULIN ASPART 4 UNITS: 100 INJECTION, SOLUTION INTRAVENOUS; SUBCUTANEOUS at 08:03

## 2023-01-01 RX ADMIN — IOHEXOL 100 ML: 350 INJECTION, SOLUTION INTRAVENOUS at 09:12

## 2023-01-01 RX ADMIN — ASPIRIN 81 MG: 81 TABLET, COATED ORAL at 10:10

## 2023-01-01 RX ADMIN — ENOXAPARIN SODIUM 40 MG: 40 INJECTION SUBCUTANEOUS at 06:11

## 2023-01-01 RX ADMIN — LABETALOL HYDROCHLORIDE 10 MG: 5 INJECTION, SOLUTION INTRAVENOUS at 02:02

## 2023-01-01 RX ADMIN — NIFEDIPINE 90 MG: 30 TABLET, FILM COATED, EXTENDED RELEASE ORAL at 08:12

## 2023-01-01 RX ADMIN — ASPIRIN 81 MG: 81 TABLET, COATED ORAL at 09:12

## 2023-01-01 RX ADMIN — INSULIN ASPART 5 UNITS: 100 INJECTION, SOLUTION INTRAVENOUS; SUBCUTANEOUS at 04:02

## 2023-01-01 RX ADMIN — DOXEPIN HYDROCHLORIDE 10 MG: 10 CAPSULE ORAL at 03:02

## 2023-01-01 RX ADMIN — VALSARTAN 320 MG: 160 TABLET, FILM COATED ORAL at 10:12

## 2023-01-01 RX ADMIN — INSULIN ASPART 2 UNITS: 100 INJECTION, SOLUTION INTRAVENOUS; SUBCUTANEOUS at 12:12

## 2023-01-01 RX ADMIN — ENOXAPARIN SODIUM 40 MG: 40 INJECTION SUBCUTANEOUS at 05:12

## 2023-01-01 RX ADMIN — LIDOCAINE HYDROCHLORIDE 10 ML: 10 INJECTION, SOLUTION EPIDURAL; INFILTRATION; INTRACAUDAL at 07:11

## 2023-01-01 RX ADMIN — ATORVASTATIN CALCIUM 40 MG: 40 TABLET, FILM COATED ORAL at 10:10

## 2023-01-01 RX ADMIN — VALSARTAN 320 MG: 160 TABLET, FILM COATED ORAL at 08:11

## 2023-01-01 RX ADMIN — SODIUM CHLORIDE 500 ML: 0.9 INJECTION, SOLUTION INTRAVENOUS at 02:12

## 2023-01-01 RX ADMIN — CARVEDILOL 6.25 MG: 3.12 TABLET, FILM COATED ORAL at 10:11

## 2023-01-01 RX ADMIN — INSULIN ASPART 3 UNITS: 100 INJECTION, SOLUTION INTRAVENOUS; SUBCUTANEOUS at 08:02

## 2023-01-01 RX ADMIN — SODIUM CHLORIDE: 9 INJECTION, SOLUTION INTRAVENOUS at 05:11

## 2023-01-01 RX ADMIN — ATORVASTATIN CALCIUM 80 MG: 40 TABLET, FILM COATED ORAL at 08:03

## 2023-01-01 RX ADMIN — IOHEXOL 100 ML: 350 INJECTION, SOLUTION INTRAVENOUS at 01:02

## 2023-01-01 RX ADMIN — INSULIN DETEMIR 12 UNITS: 100 INJECTION, SOLUTION SUBCUTANEOUS at 08:10

## 2023-01-01 RX ADMIN — BE HEALTH MAGNESIUM CITRATE ORAL SOLUTION - LEMON 296 ML: 1.75 LIQUID ORAL at 03:12

## 2023-01-01 RX ADMIN — ACETAMINOPHEN 650 MG: 325 TABLET ORAL at 07:11

## 2023-01-01 RX ADMIN — SODIUM CHLORIDE: 9 INJECTION, SOLUTION INTRAVENOUS at 07:11

## 2023-01-01 RX ADMIN — DEXTROSE MONOHYDRATE 1 G: 5 INJECTION INTRAVENOUS at 11:02

## 2023-01-01 RX ADMIN — HYDRALAZINE HYDROCHLORIDE 20 MG: 20 INJECTION, SOLUTION INTRAMUSCULAR; INTRAVENOUS at 04:02

## 2023-01-01 RX ADMIN — HYDRALAZINE HYDROCHLORIDE 50 MG: 25 TABLET, FILM COATED ORAL at 04:12

## 2023-01-01 RX ADMIN — CARVEDILOL 6.25 MG: 6.25 TABLET, FILM COATED ORAL at 08:12

## 2023-01-01 RX ADMIN — NIFEDIPINE 90 MG: 30 TABLET, FILM COATED, EXTENDED RELEASE ORAL at 10:12

## 2023-01-01 RX ADMIN — ENOXAPARIN SODIUM 40 MG: 40 INJECTION SUBCUTANEOUS at 04:11

## 2023-01-01 RX ADMIN — CARVEDILOL 6.25 MG: 6.25 TABLET, FILM COATED ORAL at 08:10

## 2023-01-01 RX ADMIN — INSULIN ASPART 3 UNITS: 100 INJECTION, SOLUTION INTRAVENOUS; SUBCUTANEOUS at 12:12

## 2023-01-01 RX ADMIN — INSULIN ASPART 2 UNITS: 100 INJECTION, SOLUTION INTRAVENOUS; SUBCUTANEOUS at 11:10

## 2023-01-01 RX ADMIN — HEPARIN SODIUM 5000 UNITS: 5000 INJECTION INTRAVENOUS; SUBCUTANEOUS at 03:03

## 2023-01-01 RX ADMIN — GABAPENTIN 100 MG: 100 CAPSULE ORAL at 08:10

## 2023-01-19 ENCOUNTER — IMMUNIZATION (OUTPATIENT)
Dept: INTERNAL MEDICINE | Facility: CLINIC | Age: 78
End: 2023-01-19
Payer: MEDICARE

## 2023-01-19 ENCOUNTER — OFFICE VISIT (OUTPATIENT)
Dept: PODIATRY | Facility: CLINIC | Age: 78
End: 2023-01-19
Payer: MEDICARE

## 2023-01-19 VITALS
DIASTOLIC BLOOD PRESSURE: 85 MMHG | HEIGHT: 65 IN | HEART RATE: 82 BPM | SYSTOLIC BLOOD PRESSURE: 135 MMHG | WEIGHT: 203.06 LBS | BODY MASS INDEX: 33.83 KG/M2

## 2023-01-19 DIAGNOSIS — I73.9 PAD (PERIPHERAL ARTERY DISEASE): ICD-10-CM

## 2023-01-19 DIAGNOSIS — L84 CORN OR CALLUS: ICD-10-CM

## 2023-01-19 DIAGNOSIS — Z23 NEED FOR VACCINATION: Primary | ICD-10-CM

## 2023-01-19 DIAGNOSIS — B35.1 ONYCHOMYCOSIS DUE TO DERMATOPHYTE: ICD-10-CM

## 2023-01-19 DIAGNOSIS — Z92.29 HX OF LONG TERM USE OF BLOOD THINNERS: ICD-10-CM

## 2023-01-19 DIAGNOSIS — E11.49 TYPE II DIABETES MELLITUS WITH NEUROLOGICAL MANIFESTATIONS: Primary | ICD-10-CM

## 2023-01-19 PROCEDURE — 3079F DIAST BP 80-89 MM HG: CPT | Mod: CPTII,S$GLB,, | Performed by: PODIATRIST

## 2023-01-19 PROCEDURE — 99999 PR PBB SHADOW E&M-EST. PATIENT-LVL IV: CPT | Mod: PBBFAC,,, | Performed by: PODIATRIST

## 2023-01-19 PROCEDURE — 3075F PR MOST RECENT SYSTOLIC BLOOD PRESS GE 130-139MM HG: ICD-10-PCS | Mod: CPTII,S$GLB,, | Performed by: PODIATRIST

## 2023-01-19 PROCEDURE — 11721 DEBRIDE NAIL 6 OR MORE: CPT | Mod: 59,Q8,S$GLB, | Performed by: PODIATRIST

## 2023-01-19 PROCEDURE — 99499 UNLISTED E&M SERVICE: CPT | Mod: S$GLB,,, | Performed by: PODIATRIST

## 2023-01-19 PROCEDURE — 1159F MED LIST DOCD IN RCRD: CPT | Mod: CPTII,S$GLB,, | Performed by: PODIATRIST

## 2023-01-19 PROCEDURE — 99999 PR PBB SHADOW E&M-EST. PATIENT-LVL IV: ICD-10-PCS | Mod: PBBFAC,,, | Performed by: PODIATRIST

## 2023-01-19 PROCEDURE — 91312 COVID-19, MRNA, LNP-S, BIVALENT BOOSTER, PF, 30 MCG/0.3 ML DOSE: CPT | Mod: S$GLB,,, | Performed by: INTERNAL MEDICINE

## 2023-01-19 PROCEDURE — 1159F PR MEDICATION LIST DOCUMENTED IN MEDICAL RECORD: ICD-10-PCS | Mod: CPTII,S$GLB,, | Performed by: PODIATRIST

## 2023-01-19 PROCEDURE — 1160F PR REVIEW ALL MEDS BY PRESCRIBER/CLIN PHARMACIST DOCUMENTED: ICD-10-PCS | Mod: CPTII,S$GLB,, | Performed by: PODIATRIST

## 2023-01-19 PROCEDURE — 0124A COVID-19, MRNA, LNP-S, BIVALENT BOOSTER, PF, 30 MCG/0.3 ML DOSE: CPT | Mod: CV19,PBBFAC | Performed by: INTERNAL MEDICINE

## 2023-01-19 PROCEDURE — 3075F SYST BP GE 130 - 139MM HG: CPT | Mod: CPTII,S$GLB,, | Performed by: PODIATRIST

## 2023-01-19 PROCEDURE — 11721 PR DEBRIDEMENT OF NAILS, 6 OR MORE: ICD-10-PCS | Mod: 59,Q8,S$GLB, | Performed by: PODIATRIST

## 2023-01-19 PROCEDURE — 91312 COVID-19, MRNA, LNP-S, BIVALENT BOOSTER, PF, 30 MCG/0.3 ML DOSE: ICD-10-PCS | Mod: S$GLB,,, | Performed by: INTERNAL MEDICINE

## 2023-01-19 PROCEDURE — 99499 RISK ADDL DX/OHS AUDIT: ICD-10-PCS | Mod: S$GLB,,, | Performed by: PODIATRIST

## 2023-01-19 PROCEDURE — 1125F PR PAIN SEVERITY QUANTIFIED, PAIN PRESENT: ICD-10-PCS | Mod: CPTII,S$GLB,, | Performed by: PODIATRIST

## 2023-01-19 PROCEDURE — 11057 PR TRIM BENIGN HYPERKERATOTIC SKIN LESION,>4: ICD-10-PCS | Mod: Q8,S$GLB,, | Performed by: PODIATRIST

## 2023-01-19 PROCEDURE — 1125F AMNT PAIN NOTED PAIN PRSNT: CPT | Mod: CPTII,S$GLB,, | Performed by: PODIATRIST

## 2023-01-19 PROCEDURE — 11057 PARNG/CUTG B9 HYPRKR LES >4: CPT | Mod: Q8,S$GLB,, | Performed by: PODIATRIST

## 2023-01-19 PROCEDURE — 3079F PR MOST RECENT DIASTOLIC BLOOD PRESSURE 80-89 MM HG: ICD-10-PCS | Mod: CPTII,S$GLB,, | Performed by: PODIATRIST

## 2023-01-19 PROCEDURE — 1160F RVW MEDS BY RX/DR IN RCRD: CPT | Mod: CPTII,S$GLB,, | Performed by: PODIATRIST

## 2023-01-19 NOTE — PROGRESS NOTES
Subjective:      Patient ID: Buck Ibarra is a 77 y.o. female.    Chief Complaint: Nail Care, Diabetes Mellitus (PCP- 09/19/2022/Ericka Cortez), and Ankle Pain (Right foot)      Buck is a 77 y.o. female who presents to the clinic upon referral from Dr. Mica azar. provider found  for evaluation and treatment of diabetic feet. Buck has a past medical history of Allergy, Cataract, Diabetes mellitus type II, Gastritis, GERD (gastroesophageal reflux disease), H. pylori infection, History of hepatitis C, SVR as of 8-2016  (8/25/2015), Hypertension, Osteopenia, and Type 2 diabetes mellitus with diabetic polyneuropathy. Patient relates no major problem with feet. Only complaints today consist of routine DM foot care and nail/callus trimming. Has difficulty with toenails that are thick, discolored. Routine trimming helps prevent complications and pain. Feels nails dont grow too fast and would like to space out visits a little. She also has hx of PAD, lower extremity stenting and is on long term Plavix. No other pedal concerns at this time.     PCP: Ericka Cortez MD    Date Last Seen by PCP:   Chief Complaint   Patient presents with    Nail Care    Diabetes Mellitus     PCP- 09/19/2022  Ericka Cortez    Ankle Pain     Right foot        Current shoe gear: Casual shoes    Hemoglobin A1C   Date Value Ref Range Status   07/28/2022 8.1 (H) 4.0 - 5.6 % Final     Comment:     ADA Screening Guidelines:  5.7-6.4%  Consistent with prediabetes  >or=6.5%  Consistent with diabetes    High levels of fetal hemoglobin interfere with the HbA1C  assay. Heterozygous hemoglobin variants (HbS, HgC, etc)do  not significantly interfere with this assay.   However, presence of multiple variants may affect accuracy.     05/12/2022 9.0 (H) 4.0 - 5.6 % Final     Comment:     ADA Screening Guidelines:  5.7-6.4%  Consistent with prediabetes  >or=6.5%  Consistent with diabetes    High levels of fetal hemoglobin interfere with the HbA1C  assay. Heterozygous  hemoglobin variants (HbS, HgC, etc)do  not significantly interfere with this assay.   However, presence of multiple variants may affect accuracy.     2022 9.1 (H) 4.0 - 5.6 % Final     Comment:     ADA Screening Guidelines:  5.7-6.4%  Consistent with prediabetes  >or=6.5%  Consistent with diabetes    High levels of fetal hemoglobin interfere with the HbA1C  assay. Heterozygous hemoglobin variants (HbS, HgC, etc)do  not significantly interfere with this assay.   However, presence of multiple variants may affect accuracy.             Review of Systems   Constitutional: Negative for chills, diaphoresis, fever, malaise/fatigue and night sweats.   Cardiovascular:  Negative for claudication, cyanosis, leg swelling and syncope.   Skin:  Positive for nail changes. Negative for color change, dry skin, rash, suspicious lesions and unusual hair distribution.   Musculoskeletal:  Negative for falls, joint pain, joint swelling, muscle cramps, muscle weakness and stiffness.   Gastrointestinal:  Negative for constipation, diarrhea, nausea and vomiting.   Neurological:  Positive for numbness and sensory change. Negative for brief paralysis, disturbances in coordination, focal weakness, paresthesias and tremors.         Objective:      Physical Exam  Vitals reviewed.   Constitutional:       Appearance: She is well-developed.   HENT:      Head: Normocephalic.   Cardiovascular:      Pulses:           Dorsalis pedis pulses are 1+ on the right side and 1+ on the left side.      Comments: PT pulses diminished b/l. CRT < 3 sec to tips of toes.  No vericosities noted to b/l LEs.     Pulmonary:      Effort: No respiratory distress.   Musculoskeletal:      Right lower le+ Edema present.      Left lower le+ Edema present.      Comments: Rectus foot and toe position bilateral with no major deformities noted. Mild equinus noted b/l ankles with < 10 deg DF noted. MMT 5/5 in DF/PF/Inv/Ev resistance with no reproduction of pain in any  direction. Passive range of motion of ankle and pedal joints is painless b/l.     Skin:     General: Skin is warm and dry.      Findings: No erythema.      Comments: No open lesions, lacerations or wounds noted. Nails are thickened, elongated, discolored yellow/brown with subungual debris and brittleness to R 1-5 and L 1-5. Interdigital spaces clean, dry and intact b/l. No erythema noted to b/l foot. Skin texture thin, atrophic, dry. Mild hyperpigmentation to skin of both ankles and/or feet, consistent with hemosiderin deposits. Pedal hair diminished. Toes cool to touch b/l. Hyperkeratotic lesion noted to distal tips of b/l hallux, 2nd and 3rd toes. Skin lines present to lesion/s. No signs of deep tissue injury.     Neurological:      Mental Status: She is alert and oriented to person, place, and time.      Sensory: Sensory deficit present.      Comments: Light touch, proprioception, and sharp/dull sensation are all intact bilaterally. Protective threshold with the [place]ienstein monofilament is intact bilaterally. Subjective paresthesias with no clearly identifiable source or trigger.    Psychiatric:         Behavior: Behavior normal.         Thought Content: Thought content normal.         Judgment: Judgment normal.             Assessment:       Encounter Diagnoses   Name Primary?    Type II diabetes mellitus with neurological manifestations Yes    PAD (peripheral artery disease)     Onychomycosis due to dermatophyte     Corn or callus     Hx of long term use of blood thinners              Plan:       Buck was seen today for nail care, diabetes mellitus and ankle pain.    Diagnoses and all orders for this visit:    Type II diabetes mellitus with neurological manifestations    PAD (peripheral artery disease)    Onychomycosis due to dermatophyte    Corn or callus    Hx of long term use of blood thinners          I counseled the patient on her conditions, their implications and medical management.    - Shoe  inspection. Diabetic Foot Education. Patient reminded of the importance of good nutrition and blood sugar control to help prevent podiatric complications of diabetes. Patient instructed on proper foot hygeine. We discussed wearing proper shoe gear, daily foot inspections, never walking without protective shoe gear, caution putting sharp instruments to feet     - Discussed DM foot care:  Wear comfortable, proper fitting shoes. Wash feet daily. Dry well. After drying, apply moisturizer to feet (no lotion to webspaces). Inspect feet daily for skin breaks, blisters, swelling, or redness. Wear cotton socks (preferably white)  Change socks every day. Do NOT walk barefoot. Do NOT use heating pads or warm/hot water soaks     With patient's permission, nails were aggressively reduced and debrided 1,2,3,4, 5 R and 1,2, 3,4,5 L and filed to their soft tissue attachment mechanically and with electric , removing all offending nail and debris. Patient tolerated this well and no blood was drawn. Patient reports relief following the procedure.     The affected area was cleansed with an alcohol prep pad. Next, utilizing a 5mm curette, the hyperkeratotic tissues were trimmed from distal tips of toes 1-3 b/l, down to appropriate level of skin. Care was taken to remove any nucleated core from the center of the lesion. No pinpoint bleeding was encountered. The patient tolerated relief following this procedure.       RTC 3.5 months, sooner PRN

## 2023-02-20 NOTE — TELEPHONE ENCOUNTER
Pt has a lab appointment coming up also a follow up with you, do you wanna add any labs to the lab apt?

## 2023-02-27 PROBLEM — I63.341 THROMBOTIC STROKE INVOLVING RIGHT CEREBELLAR ARTERY: Status: ACTIVE | Noted: 2023-01-01

## 2023-02-27 PROBLEM — I63.342 THROMBOTIC STROKE INVOLVING LEFT CEREBELLAR ARTERY: Status: ACTIVE | Noted: 2023-01-01

## 2023-02-27 NOTE — H&P
[facility] Dept  Vascular Neurology  [facility]  History & Physical    Inpatient consult to Vascular (Stroke) Neurology  Consult performed by: Jeimy Rene, KIM, NP  Consult ordered by: Jaleel Mendiola MD  Reason for consult: Stroke on MRI, dizziness        Assessment/Plan:       * Thrombotic stroke involving right cerebellar artery  Buck Ibarra is a 77 y.o. female with a significant medical history of DMII, HTN, HLD, CAD, and NICM who presents to the hospital for evaluation of dizziness and difficulty walking for 2 days with associated vomiting.     Antithrombotics for secondary stroke prevention: Antiplatelets: Aspirin: 81 mg daily  Clopidogrel: 75 mg daily    Statins for secondary stroke prevention and hyperlipidemia, if present:   Statins: Atorvastatin- 40 mg daily    Aggressive risk factor modification: HTN, DM, HLD, Diet, Exercise, Obesity, CAD     Rehab efforts: The patient has been evaluated by a stroke team provider and the therapy needs have been fully considered based off the presenting complaints and exam findings. The following therapy evaluations are needed: PT evaluate and treat, OT evaluate and treat, PM&R evaluate for appropriate placement    Diagnostics ordered/pending: CTA Head to assess vasculature , CTA Neck/Arch to assess vasculature, Lipid Profile to assess cholesterol levels, TTE to assess cardiac function/status     VTE prophylaxis: Heparin 5000 units SQ every 8 hours  Mechanical prophylaxis: Place SCDs    BP parameters: Infarct: No intervention, SBP <220        Essential hypertension  Stroke Risk Factor  SBP < 220  Will hold home meds for now, can likely resume in 48-72 hours.  Received 1 time dose of Labetalol in the ED for elevated blood pressure.    Type 2 diabetes mellitus with hyperglycemia, with long-term current use of insulin  Stroke Risk Factor  Glucose 263  Last A1C 2/23/23 7.9.  Hold home insulin regimen while hospitalized.  Moderate  correction SSI.  Khyefrn16 units qHS  Aspart 3 units TIDWM  Titrate regimen PRN    Hyperlipidemia LDL goal <70  Stroke Risk Factor  LDL pending  Atorvastatin 40mg PO daily  If LDL above 70, consider increasing atorvastatin to 80mg.    Atherosclerotic heart disease of native coronary artery with other forms of angina pectoris  Continue DAPT    NICM (nonischemic cardiomyopathy)  TTE pending    Bilateral carotid artery disease  CTA head and neck pending        STROKE DOCUMENTATION          NIH Scale:  Interval: baseline  1a. Level of Consciousness: 0-->Alert, keenly responsive  1b. LOC Questions: 0-->Answers both questions correctly  1c. LOC Commands: 0-->Performs both tasks correctly  2. Best Gaze: 0-->Normal  3. Visual: 0-->No visual loss  4. Facial Palsy: 0-->Normal symmetrical movements  5a. Motor Arm, Left: 0-->No drift, limb holds 90 (or 45) degrees for full 10 secs  5b. Motor Arm, Right: 0-->No drift, limb holds 90 (or 45) degrees for full 10 secs  6a. Motor Leg, Left: 0-->No drift, leg holds 30 degree position for full 5 secs  6b. Motor Leg, Right: 0-->No drift, leg holds 30 degree position for full 5 secs  7. Limb Ataxia: 1-->Present in one limb (right upper extremity)  8. Sensory: 0-->Normal, no sensory loss  9. Best Language: 0-->No aphasia, normal  10. Dysarthria: 0-->Normal  11. Extinction and Inattention (formerly Neglect): 0-->No abnormality  Total (NIH Stroke Scale): 1     Modified Quebeck Score: 0  Cherelle Coma Scale:15   ABCD2 Score:    [id number] Score:   HAS -BLED Score:   ICH Score:   Hunt & Hutton Classification:      Thrombolysis Candidate? No, Out of window - Symptom onset > 4.5 hours    Delays to Thrombolysis?  Not Applicable    Interventional Revascularization Candidate?   Is the patient eligible for mechanical endovascular reperfusion (PAUL)?  No; at this time symptoms not suggestive of large vessel occlusion    Delays to Thrombectomy? Not Applicable    Hemorrhagic change of an Ischemic Stroke:  Does this patient have an ischemic stroke with hemorrhagic changes? No         Subjective:     History of Present Illness:  Buck Ibarra is a 77 y.o. female with a significant medical history of DMII, HTN, HLD, CAD, and NICM who presents to the hospital for evaluation of dizziness and difficulty walking for 2 days with associated vomiting. Patient states symptoms persisted prompting her to seek evaluation. She denies, chest pain, shortness of breath, neck pain, and HA. Endorses vomiting, blurred vision, and gait instability. Reported taking nothing to alleviate her symptoms. Movement exacerbated symptoms.     ED evaluation included UA positive for UTI, culture pending, started on Rocephin. CTH was obtained and was negative for acute findings. MRI was obtained and revealed new right punctate cerebellar infarct. Glucose on labs was 263.     Currently the patient is awake and oriented x4. She remains with complaint of dizziness, nausea, and blurry vision. She denies chest pain, SOB, numbness, or HA. On exam, she has right upper extremity dysmetria. NIHSS 1. She will admitted to Vascular Neurology for further evaluation.             Past Medical History:   Diagnosis Date    Allergy     Cataract     Diabetes mellitus type II     Gastritis     with gastric ulcer    GERD (gastroesophageal reflux disease)     H. pylori infection     History of hepatitis C, SVR as of 8-2016 8/25/2015    [facility] 12 wks completed.  SVR(12) as of 8/4/16 SVR(24) as of 10-     Hypertension     Osteopenia     Type 2 diabetes mellitus with diabetic polyneuropathy      Past Surgical History:   Procedure Laterality Date    COLONOSCOPY      COLONOSCOPY N/A 1/28/2016    Procedure: COLONOSCOPY;  Surgeon: Emeka Ahn MD;  [facility] [place] [address]);  Service: Endoscopy;  Laterality: N/A;    COLONOSCOPY N/A 8/8/2019    Procedure: COLONOSCOPY;  Surgeon: Susan Dia MD;  [facility] ([address] [place] [address]);  Service:  Endoscopy;  Laterality: N/A;  appt confirmed-rb    HYSTERECTOMY      LIVER BIOPSY      OOPHORECTOMY      PERIPHERAL ANGIOGRAPHY Left 2021    Procedure: Peripheral angiography;  Surgeon: Randolph Toribio MD;  Location: [facility] CATH LAB;  Service: Cardiology;  Laterality: Left;    PERIPHERAL ANGIOGRAPHY N/A 2021    Procedure: Peripheral angiography;  Surgeon: Randolph Toribio MD;  Location: [facility] CATH LAB;  Service: Cardiology;  Laterality: N/A;    UPPER GASTROINTESTINAL ENDOSCOPY       Family History   Problem Relation Age of Onset    Heart disease Mother     Diabetes Mother     Hypertension Mother     Hyperlipidemia Mother     Heart disease Father     Cancer Sister         breast cancer    Diabetes Sister     Cancer Sister 70        colon CA    Diabetes Sister     Breast cancer Sister     Diabetes Sister     Glaucoma Neg Hx     Melanoma Neg Hx     Cirrhosis Neg Hx     Psoriasis Neg Hx     Lupus Neg Hx      Social History     Tobacco Use    Smoking status: Former     Packs/day: 0.25     Years: 48.00     Pack years: 12.00     Types: Cigarettes     Quit date: 3/28/2021     Years since quittin.9    Smokeless tobacco: Never   Substance Use Topics    Alcohol use: No     Comment: special occasions    Drug use: No     Review of patient's allergies indicates:   Allergen Reactions    Amlodipine Swelling    Erythromycin Anaphylaxis    Erythropoietin analogues Anaphylaxis    Pegasys [peginterferon ruben-2a] Anaphylaxis    Lisinopril Hives       Medications: I have reviewed the current medication administration record.    Current Outpatient Medications   Medication Instructions    albuterol (PROVENTIL/VENTOLIN HFA) 90 mcg/actuation inhaler 1-2 puffs, Inhalation, Every 6 hours PRN, Rescue    aspirin (ECOTRIN) 81 mg, Oral, Daily    blood sugar diagnostic Strp To check BG 3 times daily, to use with insurance preferred meter    blood-glucose meter kit To check BG 3 times daily, to  "use with insurance preferred meter    blood-glucose meter,continuous (DEXCOM G6 ) Misc Use as directed.    blood-glucose sensor (DEXCOM G6 SENSOR) Rashida Change every 10 days. 90 day e 11.65    blood-glucose transmitter (DEXCOM G6 TRANSMITTER) Rashida Change every 3 months.    candesartan (ATACAND) 32 mg, Oral, Daily    carvediloL (COREG) 6.25 mg, Oral, 2 times daily with meals    chlorthalidone (HYGROTEN) 50 mg, Oral, Daily    clopidogreL (PLAVIX) 75 mg, Oral, Daily, Take 4 pills the first day followed by one pill daily    clotrimazole-betamethasone 1-0.05% (LOTRISONE) cream Topical (Top), 2 times daily    doxepin (SINEQUAN) 10 mg, Oral, Nightly    esomeprazole (NEXIUM) 40 MG capsule Take one capsule by mouth daily as needed for acid reflux    fluocinonide (LIDEX) 0.05 % external solution Topical (Top), 2 times daily    fluocinonide (LIDEX) 0.05 % ointment Topical (Top), 2 times daily    gabapentin (NEURONTIN) 300 MG capsule TAKE ONE CAPSULE BY MOUTH EVERY EVENING    glucagon 1 mg SolR Use as directed.    insulin degludec (TRESIBA FLEXTOUCH U-200) 200 unit/mL (3 mL) insulin pen Inject 20 units into the skin once nightly.    insulin lispro (HUMALOG KWIKPEN INSULIN) 100 unit/mL pen Inject 6 units before meals plus scale 180-230+2, 231-280+4, 281-330+6, 331-380+8, >380+10. Max daily 48 units.    ketoconazole (NIZORAL) 2 % cream Topical (Top), Daily, Apply to affected toenails daily for 6-12 months    ketoconazole (NIZORAL) 2 % shampoo Topical (Top), Every 7 days    lancets 33 gauge Misc To check BG 3 times daily, to use with insurance preferred meter    OZEMPIC 1 mg, Subcutaneous, Every 7 days    pen needle, diabetic (BD ULTRA-FINE MILAN PEN NEEDLE) 32 gauge x 5/32" Ndle Use as directed 3 times daily with insulin pens    rosuvastatin (CRESTOR) 40 mg, Oral, Nightly         Review of Systems   Constitutional:  Positive for activity change. Negative for fever.   HENT:  Negative for drooling and " trouble swallowing.    Eyes:  Positive for visual disturbance.   Respiratory:  Negative for cough and shortness of breath.    Cardiovascular:  Negative for chest pain and leg swelling.   Gastrointestinal:  Positive for nausea and vomiting. Negative for abdominal pain.   Genitourinary:  Positive for urgency. Negative for dysuria.   Musculoskeletal:  Positive for gait problem. Negative for neck pain and neck stiffness.   Skin:  Negative for color change and rash.   Allergic/Immunologic: Negative for food allergies.   Neurological:  Positive for dizziness. Negative for speech difficulty, weakness and headaches.   Psychiatric/Behavioral:  Negative for agitation and confusion.    Objective:     Vital Signs (Most Recent):  Temp: 97.4 °F (36.3 °C) (02/26/23 1936)  Pulse: 61 (02/27/23 0003)  Resp: 20 (02/27/23 0003)  BP: (!) 175/71 (02/27/23 0003)  SpO2: 100 % (02/27/23 0003)    Vital Signs Range (Last 24H):  Temp:  [97.4 °F (36.3 °C)]   Pulse:  [61-83]   Resp:  [16-20]   BP: (171-236)/()   SpO2:  [98 %-100 %]     Physical Exam  Vitals and nursing note reviewed.   Constitutional:       General: She is not in acute distress.     Appearance: She is well-developed. She is not diaphoretic.   HENT:      Head: Normocephalic and atraumatic.      Right Ear: External ear normal.      Left Ear: External ear normal.      Nose: Nose normal.      Mouth/Throat:      Mouth: Mucous membranes are moist.   Eyes:      General: No scleral icterus.        Right eye: No discharge.         Left eye: No discharge.      Extraocular Movements: Extraocular movements intact.      Conjunctiva/sclera: Conjunctivae normal.      Pupils: Pupils are equal, round, and reactive to light.   Cardiovascular:      Rate and Rhythm: Normal rate and regular rhythm.   Pulmonary:      Effort: Pulmonary effort is normal. No respiratory distress.   Abdominal:      Palpations: Abdomen is soft.      Tenderness: There is no abdominal tenderness.   Musculoskeletal:       Cervical back: Normal range of motion and neck supple.      Right lower leg: No edema.      Left lower leg: No edema.   Skin:     General: Skin is warm and dry.      Capillary Refill: Capillary refill takes less than 2 seconds.   Neurological:      Mental Status: She is alert and oriented to person, place, and time.      Sensory: No sensory deficit.      Motor: No weakness.      Coordination: Coordination abnormal.       Neurological Exam:   LOC: alert  Language: No aphasia  Articulation: No dysarthria  Orientation: Person, Place, Time   EOM (CN III, IV, VI): Full/intact  Pupils (CN II, III): PERRL  Facial Movement (CN VII): Symmetric facial expression    Motor: Arm left  Normal 5/5  Leg left  Normal 5/5  Arm right  Normal 5/5  Leg right Normal 5/5  Cerebellum: Upper Extremity Appendicular Ataxia (Finger Nose Finger)  Right      Laboratory:  CMP:   Recent Labs   Lab 02/26/23 2022   CALCIUM 9.1      K 3.8   CO2 26      BUN 25*   CREATININE 1.2     CBC:   Recent Labs   Lab 02/26/23 2022   WBC 7.62   RBC 4.78   HGB 13.3   HCT 43.8      MCV 92   MCH 27.8   MCHC 30.4*     Lipid Panel: No results for input(s): CHOL, LDLCALC, HDL, TRIG in the last 168 hours.  Coagulation: No results for input(s): PT, INR, APTT in the last 168 hours.  Hgb A1C:   Recent Labs   Lab 02/23/23  0802   HGBA1C 7.9*     TSH: No results for input(s): TSH in the last 168 hours.    Diagnostic Results:      Brain imaging:  MRI brain 2/26/2023 - Impression:     1. There are 2 small adjacent foci of acute lacunar infarction in the right cerebellar peduncle.  Recommend follow-up.  2. Possible minimal subacute infarction at the periphery of a small remote right corona radiata infarct..  3. Involutional changes with chronic microvascular ischemic changes.  4.  This report was flagged in Epic as abnormal.     CT Head 2/26/2023 - Impression:     No acute intracranial abnormalities.     Remote right basal ganglia lacunar infarcts,  similar to prior.     Senescent changes, similar to prior.    Vessel Imaging:  CTA head and neck pending    Cardiac Evaluation:   TTE pending          Jeimy Rene, KIM, NP  [facility]  Department of Vascular Neurology   [facility]

## 2023-02-27 NOTE — PLAN OF CARE
Problem: Adjustment to Illness (Stroke, Ischemic/Transient Ischemic Attack)  Goal: Optimal Coping  Outcome: Ongoing, Progressing     Problem: Cerebral Tissue Perfusion (Stroke, Ischemic/Transient Ischemic Attack)  Goal: Optimal Cerebral Tissue Perfusion  Outcome: Ongoing, Progressing     Problem: Functional Ability Impaired (Stroke, Ischemic/Transient Ischemic Attack)  Goal: Optimal Functional Ability  Outcome: Ongoing, Progressing     Problem: Sensorimotor Impairment (Stroke, Ischemic/Transient Ischemic Attack)  Goal: Improved Sensorimotor Function  Outcome: Ongoing, Progressing     Problem: Fall Injury Risk  Goal: Absence of Fall and Fall-Related Injury  Outcome: Ongoing, Progressing     Problem: Adult Inpatient Plan of Care  Goal: Plan of Care Review  Outcome: Ongoing, Progressing  Goal: Absence of Hospital-Acquired Illness or Injury  Outcome: Ongoing, Progressing  Goal: Optimal Comfort and Wellbeing  Outcome: Ongoing, Progressing     Problem: Diabetes Comorbidity  Goal: Blood Glucose Level Within Targeted Range  Outcome: Ongoing, Progressing

## 2023-02-27 NOTE — HPI
Buck Ibarra is a 77 y.o. female with a significant medical history of DMII, HTN, HLD, CAD, and NICM who presents to the hospital for evaluation of dizziness and difficulty walking for 2 days with associated vomiting. Patient states symptoms persisted prompting her to seek evaluation. She denies, chest pain, shortness of breath, neck pain, and HA. Endorses vomiting, blurred vision, and gait instability. Reported taking nothing to alleviate her symptoms. Movement exacerbated symptoms.     ED evaluation included UA positive for UTI, culture pending, started on Rocephin. CTH was obtained and was negative for acute findings. MRI was obtained and revealed new right punctate cerebellar infarct. Glucose on labs was 263.     Currently the patient is awake and oriented x4. She remains with complaint of dizziness, nausea, and blurry vision. She denies chest pain, SOB, numbness, or HA. On exam, she has right upper extremity dysmetria. NIHSS 1. She will admitted to Vascular Neurology for further evaluation.

## 2023-02-27 NOTE — ASSESSMENT & PLAN NOTE
Stroke Risk Factor  Glucose 263  Last A1C 2/23/23 7.9.  Hold home insulin regimen while hospitalized.  Moderate correction SSI.  [id number] units qHS  Aspart 3 units TIDWM  Titrate regimen PRN

## 2023-02-27 NOTE — NURSING
Nurses Note -- 4 Eyes      2/27/2023   3:00 AM      Skin assessed during: Admit      [x] No Pressure Injuries Present    []Prevention Measures Documented      [] Yes- Altered Skin Integrity Present or Discovered   [] LDA Added if Not in Epic (Describe Wound)   [] New Altered Skin Integrity was Present on Admit and Documented in LDA   [] Wound Image Taken    Wound Care Consulted? No    Attending Nurse:  Regina Acosta RN     Second RN/Staff Member:  Mena Botello RN

## 2023-02-27 NOTE — ED NOTES
Assumed care of pt; received report from SANA Fuentes.    The patient is resting quietly in ED stretcher, and is AAOx4 at this time. Respirations are even and unlabored, with no distress noted. The patient remains on continuous cardiac monitor, automated BP cuff cycling Q30 minutes, and continuous pulse oximeter. The patient is aware of POC and all questions and concerns addressed at this time. The patient was offered restroom assistance and denies need to void. The patient denies further needs and has no complaints at this time. SR raised x2, bed locked and in low position with brake engaged. Call bell within reach and the patient verbalized she would call for assistance if needed. Personal belongings are at bedside within reach. Patient has a visitor at bedside.      Adult Physical Assessment  LOC: Buck Ibarra, 77 y.o. female verified via two identifiers.  The patient is awake, alert, oriented and speaking appropriately at this time.  APPEARANCE: Patient resting comfortably and appears to be in no acute distress at this time. Patient is clean and well groomed, patient's clothing is properly fastened.  SKIN:The skin is warm and dry, color consistent with ethnicity, patient has normal skin turgor and moist mucus membranes, skin intact, no breakdown or brusing noted.  MUSCULOSKELETAL: Patient moving all extremities well, no obvious swelling or deformities noted. Patient reports dizziness which is affecting her ability walk appropriately.  RESPIRATORY: Airway is open and patent, respirations are spontaneous, patient has a normal effort and rate, no accessory muscle use noted.  CARDIAC: Patient has a normal rate and rhythm, no periphreal edema noted in any extremity, capillary refill < 3 seconds in all extremities.  ABDOMEN: Soft and non tender to palpation, no abdominal distention noted. Bowel sounds present in all four quadrants.  NEUROLOGIC: Eyes open spontaneously, behavior appropriate to situation, follows  commands, facial expression symmetrical, bilateral hand grasp equal and even, purposeful motor response noted, normal sensation in all extremities when touched with a finger.

## 2023-02-27 NOTE — SUBJECTIVE & OBJECTIVE
Past Medical History:   Diagnosis Date    Allergy     Cataract     Diabetes mellitus type II     Gastritis     with gastric ulcer    GERD (gastroesophageal reflux disease)     H. pylori infection     History of hepatitis C, SVR as of 2015    Harvoni 12 wks completed.  SVR(12) as of 16 SVR(24) as of 10-     Hypertension     Osteopenia     Type 2 diabetes mellitus with diabetic polyneuropathy      Past Surgical History:   Procedure Laterality Date    COLONOSCOPY      COLONOSCOPY N/A 2016    Procedure: COLONOSCOPY;  Surgeon: Emeka Ahn MD;  [facility]  Service: Endoscopy;  Laterality: N/A;    COLONOSCOPY N/A 2019    Procedure: COLONOSCOPY;  Surgeon: Susan Dia MD;  [facility]);  Service: Endoscopy;  Laterality: N/A;  appt confirmed-rb    HYSTERECTOMY      LIVER BIOPSY      OOPHORECTOMY      PERIPHERAL ANGIOGRAPHY Left 2021    Procedure: Peripheral angiography;  Surgeon: Randolph Toribio MD;  Location: [facility] CATH LAB;  Service: Cardiology;  Laterality: Left;    PERIPHERAL ANGIOGRAPHY N/A 2021    Procedure: Peripheral angiography;  Surgeon: Randolph Toribio MD;  Location: [facility] CATH LAB;  Service: Cardiology;  Laterality: N/A;    UPPER GASTROINTESTINAL ENDOSCOPY       Family History   Problem Relation Age of Onset    Heart disease Mother     Diabetes Mother     Hypertension Mother     Hyperlipidemia Mother     Heart disease Father     Cancer Sister         breast cancer    Diabetes Sister     Cancer Sister 70        colon CA    Diabetes Sister     Breast cancer Sister     Diabetes Sister     Glaucoma Neg Hx     Melanoma Neg Hx     Cirrhosis Neg Hx     Psoriasis Neg Hx     Lupus Neg Hx      Social History     Tobacco Use    Smoking status: Former     Packs/day: 0.25     Years: 48.00     Pack years: 12.00     Types: Cigarettes     Quit date: 3/28/2021     Years since quittin.9    Smokeless tobacco: Never   Substance  Use Topics    Alcohol use: No     Comment: special occasions    Drug use: No     Review of patient's allergies indicates:   Allergen Reactions    Amlodipine Swelling    Erythromycin Anaphylaxis    Erythropoietin analogues Anaphylaxis    Pegasys [peginterferon ruben-2a] Anaphylaxis    Lisinopril Hives       Medications: I have reviewed the current medication administration record.    Current Outpatient Medications   Medication Instructions    albuterol (PROVENTIL/VENTOLIN HFA) 90 mcg/actuation inhaler 1-2 puffs, Inhalation, Every 6 hours PRN, Rescue    aspirin (ECOTRIN) 81 mg, Oral, Daily    blood sugar diagnostic Strp To check BG 3 times daily, to use with insurance preferred meter    blood-glucose meter kit To check BG 3 times daily, to use with insurance preferred meter    blood-glucose meter,continuous (DEXCOM G6 ) Misc Use as directed.    blood-glucose sensor (DEXCOM G6 SENSOR) Rashida Change every 10 days. 90 day e 11.65    blood-glucose transmitter (DEXCOM G6 TRANSMITTER) Rashida Change every 3 months.    candesartan (ATACAND) 32 mg, Oral, Daily    carvediloL (COREG) 6.25 mg, Oral, 2 times daily with meals    chlorthalidone (HYGROTEN) 50 mg, Oral, Daily    clopidogreL (PLAVIX) 75 mg, Oral, Daily, Take 4 pills the first day followed by one pill daily    clotrimazole-betamethasone 1-0.05% (LOTRISONE) cream Topical (Top), 2 times daily    doxepin (SINEQUAN) 10 mg, Oral, Nightly    esomeprazole (NEXIUM) 40 MG capsule Take one capsule by mouth daily as needed for acid reflux    fluocinonide (LIDEX) 0.05 % external solution Topical (Top), 2 times daily    fluocinonide (LIDEX) 0.05 % ointment Topical (Top), 2 times daily    gabapentin (NEURONTIN) 300 MG capsule TAKE ONE CAPSULE BY MOUTH EVERY EVENING    glucagon 1 mg SolR Use as directed.    insulin degludec (TRESIBA FLEXTOUCH U-200) 200 unit/mL (3 mL) insulin pen Inject 20 units into the skin once nightly.    insulin lispro (HUMALOG KWIKPEN INSULIN) 100 unit/mL pen  "Inject 6 units before meals plus scale 180-230+2, 231-280+4, 281-330+6, 331-380+8, >380+10. Max daily 48 units.    ketoconazole (NIZORAL) 2 % cream Topical (Top), Daily, Apply to affected toenails daily for 6-12 months    ketoconazole (NIZORAL) 2 % shampoo Topical (Top), Every 7 days    lancets 33 gauge Misc To check BG 3 times daily, to use with insurance preferred meter    OZEMPIC 1 mg, Subcutaneous, Every 7 days    pen needle, diabetic (BD ULTRA-FINE MILAN PEN NEEDLE) 32 gauge x 5/32" Ndle Use as directed 3 times daily with insulin pens    rosuvastatin (CRESTOR) 40 mg, Oral, Nightly         Review of Systems   Constitutional:  Positive for activity change. Negative for fever.   HENT:  Negative for drooling and trouble swallowing.    Eyes:  Positive for visual disturbance.   Respiratory:  Negative for cough and shortness of breath.    Cardiovascular:  Negative for chest pain and leg swelling.   Gastrointestinal:  Positive for nausea and vomiting. Negative for abdominal pain.   Genitourinary:  Positive for urgency. Negative for dysuria.   Musculoskeletal:  Positive for gait problem. Negative for neck pain and neck stiffness.   Skin:  Negative for color change and rash.   Allergic/Immunologic: Negative for food allergies.   Neurological:  Positive for dizziness. Negative for speech difficulty, weakness and headaches.   Psychiatric/Behavioral:  Negative for agitation and confusion.    Objective:     Vital Signs (Most Recent):  Temp: 97.4 °F (36.3 °C) (02/26/23 1936)  Pulse: 61 (02/27/23 0003)  Resp: 20 (02/27/23 0003)  BP: (!) 175/71 (02/27/23 0003)  SpO2: 100 % (02/27/23 0003)    Vital Signs Range (Last 24H):  Temp:  [97.4 °F (36.3 °C)]   Pulse:  [61-83]   Resp:  [16-20]   BP: (171-236)/()   SpO2:  [98 %-100 %]     Physical Exam  Vitals and nursing note reviewed.   Constitutional:       General: She is not in acute distress.     Appearance: She is well-developed. She is not diaphoretic.   HENT:      Head: " Normocephalic and atraumatic.      Right Ear: External ear normal.      Left Ear: External ear normal.      Nose: Nose normal.      Mouth/Throat:      Mouth: Mucous membranes are moist.   Eyes:      General: No scleral icterus.        Right eye: No discharge.         Left eye: No discharge.      Extraocular Movements: Extraocular movements intact.      Conjunctiva/sclera: Conjunctivae normal.      Pupils: Pupils are equal, round, and reactive to light.   Cardiovascular:      Rate and Rhythm: Normal rate and regular rhythm.   Pulmonary:      Effort: Pulmonary effort is normal. No respiratory distress.   Abdominal:      Palpations: Abdomen is soft.      Tenderness: There is no abdominal tenderness.   Musculoskeletal:      Cervical back: Normal range of motion and neck supple.      Right lower leg: No edema.      Left lower leg: No edema.   Skin:     General: Skin is warm and dry.      Capillary Refill: Capillary refill takes less than 2 seconds.   Neurological:      Mental Status: She is alert and oriented to person, place, and time.      Sensory: No sensory deficit.      Motor: No weakness.      Coordination: Coordination abnormal.       Neurological Exam:   LOC: alert  Language: No aphasia  Articulation: No dysarthria  Orientation: Person, Place, Time   EOM (CN III, IV, VI): Full/intact  Pupils (CN II, III): PERRL  Facial Movement (CN VII): Symmetric facial expression    Motor: Arm left  Normal 5/5  Leg left  Normal 5/5  Arm right  Normal 5/5  Leg right Normal 5/5  Cerebellum: Upper Extremity Appendicular Ataxia (Finger Nose Finger)  Right      Laboratory:  CMP:   Recent Labs   Lab 02/26/23 2022   CALCIUM 9.1      K 3.8   CO2 26      BUN 25*   CREATININE 1.2     CBC:   Recent Labs   Lab 02/26/23 2022   WBC 7.62   RBC 4.78   HGB 13.3   HCT 43.8      MCV 92   MCH 27.8   MCHC 30.4*     Lipid Panel: No results for input(s): CHOL, LDLCALC, HDL, TRIG in the last 168 hours.  Coagulation: No results for  input(s): PT, INR, APTT in the last 168 hours.  Hgb A1C:   Recent Labs   Lab 02/23/23  0802   HGBA1C 7.9*     TSH: No results for input(s): TSH in the last 168 hours.    Diagnostic Results:      Brain imaging:  MRI brain 2/26/2023 - Impression:     1. There are 2 small adjacent foci of acute lacunar infarction in the right cerebellar peduncle.  Recommend follow-up.  2. Possible minimal subacute infarction at the periphery of a small remote right corona radiata infarct..  3. Involutional changes with chronic microvascular ischemic changes.  4.  This report was flagged in Epic as abnormal.     CT Head 2/26/2023 - Impression:     No acute intracranial abnormalities.     Remote right basal ganglia lacunar infarcts, similar to prior.     Senescent changes, similar to prior.    Vessel Imaging:  CTA head and neck pending    Cardiac Evaluation:   TTE pending

## 2023-02-27 NOTE — ASSESSMENT & PLAN NOTE
Stroke Risk Factor  LDL pending  Atorvastatin 40mg PO daily  If LDL above 70, consider increasing atorvastatin to 80mg.

## 2023-02-27 NOTE — PROVIDER PROGRESS NOTES - EMERGENCY DEPT.
Encounter Date: 2/26/2023    ED Physician Progress Notes          Sabrina is a 77 y.o. F her for severe dizziness and imbalance.  Pending MRI brain.    Update:  MRI shows acute posterior circulation stroke.  Aspirin given and vascular neurology called and consulted for further management.  Pt without severe dizziness on my re-eval.  BP improved near target of SBP <220 after labetalol.  Pt admitted to vascular neurology.

## 2023-02-27 NOTE — PLAN OF CARE
Problem: Adjustment to Illness (Stroke, Ischemic/Transient Ischemic Attack)  Goal: Optimal Coping  Outcome: Ongoing, Progressing     Problem: Bowel Elimination Impaired (Stroke, Ischemic/Transient Ischemic Attack)  Goal: Effective Bowel Elimination  Outcome: Ongoing, Progressing     Problem: Cerebral Tissue Perfusion (Stroke, Ischemic/Transient Ischemic Attack)  Goal: Optimal Cerebral Tissue Perfusion  Outcome: Ongoing, Progressing     Problem: Functional Ability Impaired (Stroke, Ischemic/Transient Ischemic Attack)  Goal: Optimal Functional Ability  Outcome: Ongoing, Progressing     Problem: Sensorimotor Impairment (Stroke, Ischemic/Transient Ischemic Attack)  Goal: Improved Sensorimotor Function  Outcome: Ongoing, Progressing     Problem: Swallowing Impairment (Stroke, Ischemic/Transient Ischemic Attack)  Goal: Optimal Eating and Swallowing without Aspiration  Outcome: Ongoing, Progressing     Problem: Urinary Elimination Impaired (Stroke, Ischemic/Transient Ischemic Attack)  Goal: Effective Urinary Elimination  Outcome: Ongoing, Progressing     Problem: Fall Injury Risk  Goal: Absence of Fall and Fall-Related Injury  Outcome: Ongoing, Progressing

## 2023-02-27 NOTE — ED PROVIDER NOTES
Encounter Date: 2/26/2023       History     Chief Complaint   Patient presents with    Dizziness     Dizziness for the past few days with some n/v. Pt states she has been having trouble with balance due to dizziness intermittently and reports some intermittent blurry vision tonight. Denies any weakness or trouble with speech.      The patient is a 77 year old female, who has a past medical history of HTN, HLD, DM, HCV, and PAD. She presents to the ER for an emergent evaluation due to acute dizziness. She states that she has been feeling dizzy since yesterday. She states that the dizziness is worse when she attempts ambulation. She has associated intermittent nausea and blurred vision. She has vomited twice. She states that symptoms are moderate in degree. She states that the course is constant. She denies any head injury or headache. She denies any confusion. She denies any speech difficulty. She denies any numbness or weakness. She denies any chest pain, palpitations, ROMERO/SOB. She denies any pre-arrival treatment.      Review of patient's allergies indicates:   Allergen Reactions    Amlodipine Swelling    Erythromycin Anaphylaxis    Erythropoietin analogues Anaphylaxis    Pegasys [peginterferon ruben-2a] Anaphylaxis    Lisinopril Hives     Past Medical History:   Diagnosis Date    Allergy     Cataract     Diabetes mellitus type II     Gastritis     with gastric ulcer    GERD (gastroesophageal reflux disease)     H. pylori infection     History of hepatitis C, SVR as of 8-2016 8/25/2015    Harvoni 12 wks completed.  SVR(12) as of 8/4/16 SVR(24) as of 10-     Hypertension     Osteopenia     Type 2 diabetes mellitus with diabetic polyneuropathy      Past Surgical History:   Procedure Laterality Date    COLONOSCOPY      COLONOSCOPY N/A 1/28/2016    Procedure: COLONOSCOPY;  Surgeon: Emeka Ahn MD;  Location: [address];  Service: Endoscopy;  Laterality: N/A;    COLONOSCOPY N/A 8/8/2019    Procedure:  COLONOSCOPY;  Surgeon: Susan Dia MD;  [facility]  Service: Endoscopy;  Laterality: N/A;  appt confirmed-rb    HYSTERECTOMY      LIVER BIOPSY      OOPHORECTOMY      PERIPHERAL ANGIOGRAPHY Left 2021    Procedure: Peripheral angiography;  Surgeon: Randolph Toribio MD;  Location: [facility] CATH LAB;  Service: Cardiology;  Laterality: Left;    PERIPHERAL ANGIOGRAPHY N/A 2021    Procedure: Peripheral angiography;  Surgeon: Randolph Toribio MD;  Location: [facility] CATH LAB;  Service: Cardiology;  Laterality: N/A;    UPPER GASTROINTESTINAL ENDOSCOPY       Family History   Problem Relation Age of Onset    Heart disease Mother     Diabetes Mother     Hypertension Mother     Hyperlipidemia Mother     Heart disease Father     Cancer Sister         breast cancer    Diabetes Sister     Cancer Sister 70        colon CA    Diabetes Sister     Breast cancer Sister     Diabetes Sister     Glaucoma Neg Hx     Melanoma Neg Hx     Cirrhosis Neg Hx     Psoriasis Neg Hx     Lupus Neg Hx      Social History     Tobacco Use    Smoking status: Former     Packs/day: 0.25     Years: 48.00     Pack years: 12.00     Types: Cigarettes     Quit date: 3/28/2021     Years since quittin.9    Smokeless tobacco: Never   Substance Use Topics    Alcohol use: No     Comment: special occasions    Drug use: No     Review of Systems   Constitutional:  Negative for activity change, appetite change, chills, diaphoresis, fatigue and fever.   HENT:  Negative for congestion, ear pain, facial swelling, rhinorrhea, sinus pressure, sinus pain, sore throat, tinnitus and trouble swallowing.    Eyes:  Positive for visual disturbance. Negative for pain and redness.   Respiratory:  Negative for shortness of breath.    Cardiovascular:  Negative for chest pain, palpitations and leg swelling.   Gastrointestinal:  Positive for nausea and vomiting. Negative for abdominal pain, blood in stool, constipation and diarrhea.    Genitourinary:  Negative for decreased urine volume, dysuria, flank pain, frequency and urgency.   Musculoskeletal:  Positive for gait problem. Negative for back pain and neck pain.   Skin:  Negative for rash.   Allergic/Immunologic: Negative for immunocompromised state.   Neurological:  Positive for dizziness. Negative for seizures, syncope, facial asymmetry, speech difficulty, weakness, light-headedness, numbness and headaches.   Psychiatric/Behavioral:  Negative for confusion.      Physical Exam     Initial Vitals [02/26/23 1936]   BP Pulse Resp Temp SpO2   (!) 219/102 83 18 97.4 °F (36.3 °C) 98 %      MAP       --         Physical Exam    Nursing note and vitals reviewed.  Constitutional: She appears well-developed and well-nourished. She is not diaphoretic.   Alert and interactive. Accompanied by her daughter.    HENT:   Head: Normocephalic and atraumatic.   Eyes: EOM are normal. Pupils are equal, round, and reactive to light.   Neck: Neck supple.   Cardiovascular:  Normal rate and intact distal pulses.           Pulmonary/Chest: No respiratory distress.   Abdominal: She exhibits no distension. There is no abdominal tenderness.   Musculoskeletal:      Cervical back: Neck supple.     Neurological: She is alert and oriented to person, place, and time. She has normal strength. No sensory deficit.   No facial droop. Oriented appropriately. No dysarthric speech. Unable to assess gait - very dizzy when attempting to stand - unsteady. Clumsy finger to nose but able to perform - indeterminate.    Skin: Skin is warm and dry. No rash noted.   Psychiatric: She has a normal mood and affect. Her behavior is normal.       ED Course   Critical Care    Date/Time: 2/26/2023 8:00 PM  Performed by: Cedric Lipscomb PA-C  Authorized by: Perri Villasenor MD   Direct patient critical care time: 10 minutes  Additional history critical care time: 5 minutes  Ordering / reviewing critical care time: 5 minutes  Documentation  critical care time: 5 minutes  Consulting other physicians critical care time: 5 minutes  Total critical care time (exclusive of procedural time) : 30 minutes  Critical care time was exclusive of separately billable procedures and treating other patients and teaching time.  Critical care was necessary to treat or prevent imminent or life-threatening deterioration of the following conditions: CNS failure or compromise.  Critical care was time spent personally by me on the following activities: development of treatment plan with patient or surrogate, discussions with consultants, interpretation of cardiac output measurements, evaluation of patient's response to treatment, examination of patient, obtaining history from patient or surrogate, ordering and performing treatments and interventions, ordering and review of laboratory studies, ordering and review of radiographic studies, pulse oximetry, re-evaluation of patient's condition and review of old charts.      Labs Reviewed   CBC W/ AUTO DIFFERENTIAL - Abnormal; Notable for the following components:       Result Value    MCHC 30.4 (*)     RDW 14.6 (*)     All other components within normal limits   BASIC METABOLIC PANEL - Abnormal; Notable for the following components:    Glucose 263 (*)     BUN 25 (*)     eGFR 47.2 (*)     All other components within normal limits   URINALYSIS, REFLEX TO URINE CULTURE - Abnormal; Notable for the following components:    Appearance, UA Cloudy (*)     Protein, UA 2+ (*)     Glucose, UA 1+ (*)     Nitrite, UA Positive (*)     Leukocytes, UA 2+ (*)     All other components within normal limits    Narrative:     Specimen Source->Urine   URINALYSIS MICROSCOPIC - Abnormal; Notable for the following components:    WBC, UA >100 (*)     Bacteria Many (*)     Hyaline Casts, UA 4 (*)     All other components within normal limits    Narrative:     Specimen Source->Urine   CULTURE, URINE   TROPONIN I   HIV 1 / 2 ANTIBODY    Narrative:     Release  to patient->Immediate   POCT GLUCOSE MONITORING CONTINUOUS     Results for orders placed or performed during the hospital encounter of 02/26/23   CBC auto differential   Result Value Ref Range    WBC 7.62 3.90 - 12.70 K/uL    RBC 4.78 4.00 - 5.40 M/uL    Hemoglobin 13.3 12.0 - 16.0 g/dL    Hematocrit 43.8 37.0 - 48.5 %    MCV 92 82 - 98 fL    MCH 27.8 27.0 - 31.0 pg    MCHC 30.4 (L) 32.0 - 36.0 g/dL    RDW 14.6 (H) 11.5 - 14.5 %    Platelets 312 150 - 450 K/uL    MPV 10.8 9.2 - 12.9 fL    Immature Granulocytes 0.3 0.0 - 0.5 %    Gran # (ANC) 4.1 1.8 - 7.7 K/uL    Immature Grans (Abs) 0.02 0.00 - 0.04 K/uL    Lymph # 3.1 1.0 - 4.8 K/uL    Mono # 0.4 0.3 - 1.0 K/uL    Eos # 0.0 0.0 - 0.5 K/uL    Baso # 0.03 0.00 - 0.20 K/uL    nRBC 0 0 /100 WBC    Gran % 53.4 38.0 - 73.0 %    Lymph % 40.8 18.0 - 48.0 %    Mono % 4.6 4.0 - 15.0 %    Eosinophil % 0.5 0.0 - 8.0 %    Basophil % 0.4 0.0 - 1.9 %    Differential Method Automated    Basic metabolic panel   Result Value Ref Range    Sodium 139 136 - 145 mmol/L    Potassium 3.8 3.5 - 5.1 mmol/L    Chloride 104 95 - 110 mmol/L    CO2 26 23 - 29 mmol/L    Glucose 263 (H) 70 - 110 mg/dL    BUN 25 (H) 8 - 23 mg/dL    Creatinine 1.2 0.5 - 1.4 mg/dL    Calcium 9.1 8.7 - 10.5 mg/dL    Anion Gap 9 8 - 16 mmol/L    eGFR 47.2 (A) >60 mL/min/1.73 m^2   Troponin I   Result Value Ref Range    Troponin I 0.015 0.000 - 0.026 ng/mL   Urinalysis, Reflex to Urine Culture Urine, Clean Catch    Specimen: Urine   Result Value Ref Range    Specimen UA Urine, Clean Catch     Color, UA Yellow Yellow, Straw, Marily    Appearance, UA Cloudy (A) Clear    pH, UA 8.0 5.0 - 8.0    Specific Gravity, UA 1.025 1.005 - 1.030    Protein, UA 2+ (A) Negative    Glucose, UA 1+ (A) Negative    Ketones, UA Negative Negative    Bilirubin (UA) Negative Negative    Occult Blood UA Negative Negative    Nitrite, UA Positive (A) Negative    Leukocytes, UA 2+ (A) Negative   HIV 1/2 Ag/Ab (4th Gen)   Result Value Ref Range     "HIV 1/2 Ag/Ab Non-reactive Non-reactive   Urinalysis Microscopic   Result Value Ref Range    RBC, UA 3 0 - 4 /hpf    WBC, UA >100 (H) 0 - 5 /hpf    Bacteria Many (A) None-Occ /hpf    Squam Epithel, UA 11 /hpf    Hyaline Casts, UA 4 (A) 0-1/lpf /lpf    Triplephos Mayra, UA Occasional None-Moderate    Microscopic Comment SEE COMMENT    Lipid panel   Result Value Ref Range    Cholesterol 205 (H) 120 - 199 mg/dL    Triglycerides 104 30 - 150 mg/dL    HDL 46 40 - 75 mg/dL    LDL Cholesterol 138.2 63.0 - 159.0 mg/dL    HDL/Cholesterol Ratio 22.4 20.0 - 50.0 %    Total Cholesterol/HDL Ratio 4.5 2.0 - 5.0    Non-HDL Cholesterol 159 mg/dL   TSH   Result Value Ref Range    TSH 0.790 0.400 - 4.000 uIU/mL   Troponin I   Result Value Ref Range    Troponin I 0.015 0.000 - 0.026 ng/mL   CK-MB   Result Value Ref Range    CPK 44 20 - 180 U/L    CPK MB 1.2 0.1 - 6.5 ng/mL    MB % 2.7 0.0 - 5.0 %   APTT   Result Value Ref Range    aPTT 26.5 21.0 - 32.0 sec   Protime-INR   Result Value Ref Range    Prothrombin Time 10.3 9.0 - 12.5 sec    INR 1.0 0.8 - 1.2   Echo   Result Value Ref Range    BSA 2.13 m2    TDI SEPTAL 0.06 m/s    LV LATERAL E/E' RATIO 7.88 m/s    LV SEPTAL E/E' RATIO 10.50 m/s    LA WIDTH 4.33 cm    TDI LATERAL 0.08 m/s    LVIDd 4.40 3.5 - 6.0 cm    IVS 1.01 0.6 - 1.1 cm    Posterior Wall 1.04 0.6 - 1.1 cm    LVIDs 3.27 2.1 - 4.0 cm    FS 26 28 - 44 %    LA volume 107.04 cm3    Sinus 2.85 cm    STJ 3.13 cm    Ascending aorta 2.97 cm    LV mass 152.98 g    LA size 4.84 cm    RVDD 2.63 cm    TAPSE 1.62 cm    Left Ventricle Relative Wall Thickness 0.47 cm    AV mean gradient 5 mmHg    AV valve area 2.24 cm2    AV Velocity Ratio 0.64     AV index (prosthetic) 0.65     MV valve area p 1/2 method 2.80 cm2    E/A ratio 0.79     Mean e' 0.07 m/s    E wave deceleration time 270.74 msec    MV "A" wave duration 15.22 msec    Pulm vein S/D ratio 1.74     LVOT diameter 2.09 cm    LVOT area 3.4 cm2    LVOT peak ramila 0.99 m/s    LVOT peak " VTI 21.31 cm    Ao peak hector 1.55 m/s    Ao VTI 32.60 cm    LVOT stroke volume 73.07 cm3    AV peak gradient 10 mmHg    E/E' ratio 9.00 m/s    MV Peak E Hector 0.63 m/s    TR Max Hector 1.80 m/s    MV stenosis pressure 1/2 time 78.51 ms    MV Peak A Hector 0.80 m/s    PV Peak S Hector 0.59 m/s    PV Peak D Hector 0.34 m/s    LV Systolic Volume 43.25 mL    LV Systolic Volume Index 21.0 mL/m2    LV Diastolic Volume 87.47 mL    LV Diastolic Volume Index 42.46 mL/m2    LA Volume Index 52.0 mL/m2    LV Mass Index 74 g/m2    RA Major Axis 4.74 cm    Left Atrium Minor Axis 6.28 cm    Left Atrium Major Axis 5.76 cm    Triscuspid Valve Regurgitation Peak Gradient 13 mmHg    LA Volume Index (Mod) 28.9 mL/m2    LA volume (mod) 59.61 cm3    RA Width 2.13 cm    Right Atrial Pressure (from IVC) 3 mmHg    EF 55 %    TV rest pulmonary artery pressure 16 mmHg   POCT glucose   Result Value Ref Range    POCT Glucose 263 (H) 70 - 110 mg/dL   POCT glucose   Result Value Ref Range    POCT Glucose 301 (H) 70 - 110 mg/dL   POCT glucose   Result Value Ref Range    POCT Glucose 171 (H) 70 - 110 mg/dL     *Note: Due to a large number of results and/or encounters for the requested time period, some results have not been displayed. A complete set of results can be found in Results Review.       EKG Readings: (Independently Interpreted)   Initial Reading: No STEMI. Previous EKG: Compared with most recent EKG Rhythm: Normal Sinus Rhythm. Heart Rate: 81. T Waves Flipped: V4, V5 and V6.   ER attending physician interpreted and signed        Imaging Results              CTA Head and Neck (xpd) (Final result)  Result time 02/27/23 02:50:57      Final result by Torres Crawford MD (02/27/23 02:50:57)                   Impression:      Acute small infarcts in the right cerebellar peduncles and subtle possible subacute infarct in the right coronary radiata seen on prior MRI are not as well demonstrated on CTA exam.  No aneurysm, high-grade stenosis, or major vessel  occlusion.  Hypoplastic right A1.    Findings compatible generalized volume loss and microvascular ischemic changes.    Multinodular thyroid.  Evaluation with thyroid ultrasound on a nonemergent basis recommended.    Additional findings above.    Electronically signed by resident: Shakir Mcgraw  Date:    02/27/2023  Time:    02:17    Electronically signed by: Torres Crawford MD  Date:    02/27/2023  Time:    02:50               Narrative:    EXAMINATION:  CTA HEAD AND NECK (XPD)    CLINICAL HISTORY:  Dizziness, persistent/recurrent, cardiac or vascular cause suspected;    TECHNIQUE:  Non contrast low dose axial images were obtained through the head. CT angiogram was performed from the level of the domitila to the top of the head following the IV administration of 100mL of Omnipaque 350.   Sagittal and coronal reconstructions and maximum intensity projection reconstructions were performed. Arterial stenosis percentages are based on NASCET measurement criteria.    COMPARISON:  MR brain 02/26/2023, CT head 02/26/2023, CT orbits 11/12/2022    FINDINGS:  Intracranial Compartment:    Prominence of the ventricles and sulci compatible with generalized cerebral volume loss.  No hydrocephalus. No extra-axial blood or fluid collections.    There is confluent and patchy hypoattenuation in the supratentorial white matter, nonspecific but most likely reflecting chronic microvascular ischemic changes.  Remote right basal ganglia infarcts.  Small infarcts in the right cerebellar peduncle seen on prior MRI are likely beyond the resolution of CT.  No major vascular distribution infarct. No acute hemorrhage.  No mass effect or midline shift.    Skull/Extracranial Contents (limited evaluation): No fracture. Mastoid air cells and paranasal sinuses are essentially clear.    Non-Vascular Structures of the Neck/Thoracic Inlet (limited evaluation): Mild dependent atelectasis of the visualized lungs.  Multinodular enlargement of the left  thyroid gland.  The largest nodule measures 1.5 cm.  Subcentimeter right thyroid lobe nodule additionally seen.  Mild degenerative changes of the cervical spine.  No acute fracture.  No osseous destructive lesions.    Aorta: 2 vessel left-sided aortic arch with common origin of the right brachiocephalic and left common carotid arteries.  There is moderate calcific atherosclerosis.    Extracranial carotid circulation: Mild calcification of the left carotid bulb.  No hemodynamically significant stenosis, aneurysmal dilatation, or dissection.    Extracranial vertebral circulation: No hemodynamically significant stenosis, aneurysmal dilatation, or dissection.    Intracranial Arteries: Hypoplastic right MARGARITA A1.  No focal high-grade stenosis, occlusion, or aneurysm.    Venous structures (limited evaluation): Normal.                                        MRI Brain Without Contrast (Final result)  Result time 02/26/23 21:59:24      Final result by Alfonzo Zapata MD (02/26/23 21:59:24)                   Impression:      1. There are 2 small adjacent foci of acute lacunar infarction in the right cerebellar peduncle.  Recommend follow-up.  2. Possible minimal subacute infarction at the periphery of a small remote right corona radiata infarct..  3. Involutional changes with chronic microvascular ischemic changes.  4.  This report was flagged in Epic as abnormal.      Electronically signed by: Alfonzo Zapata  Date:    02/26/2023  Time:    21:59               Narrative:    EXAMINATION:  MRI BRAIN WITHOUT CONTRAST    CLINICAL HISTORY:  Dizziness, non-specific;Dizziness, persistent/recurrent, cardiac or vascular cause suspected;.    TECHNIQUE:  Multiplanar multisequence MR imaging of the brain was performed without contrast.    COMPARISON:  CT 02/26/2023, 11/12/2022    FINDINGS:  Intracranial compartment:    No midline shift or hydrocephalus.  No extra-axial blood or fluid collections.    Moderate involutional changes and  probable chronic microvascular ischemic changes in the periventricular and subcortical white matter.    Two small adjacent foci of acute lacunar infarction in the right cerebellar peduncle.  Very minimal increased diffusion signal adjacent to a remote right corona radiata infarction could represent very minimal subacute infarction at the periphery of the prior infarction.    No mass, acute hemorrhage or significant edema.    Few tiny foci of chronic hemosiderin staining in the left frontal and left occipital lobes.    Normal vascular flow voids are preserved.    Skull/extracranial contents (limited evaluation): Bone marrow signal intensity is normal.                                       X-Ray Chest AP Portable (Final result)  Result time 02/26/23 20:57:47      Final result by Torres Crawford MD (02/26/23 20:57:47)                   Impression:      No acute findings.    No significant change from prior study.      Electronically signed by: Torres Crawford MD  Date:    02/26/2023  Time:    20:57               Narrative:    EXAMINATION:  XR CHEST AP PORTABLE    CLINICAL HISTORY:  Dizziness and giddiness    TECHNIQUE:  Single frontal view of the chest was performed.    COMPARISON:  06/25/2021.    FINDINGS:  Heart and lungs  appear unchanged when allowing for differences in technique and positioning.                                       CT Head Without Contrast (Final result)  Result time 02/26/23 20:49:00      Final result by Torres Crawford MD (02/26/23 20:49:00)                   Impression:      No acute intracranial abnormalities.    Remote right basal ganglia lacunar infarcts, similar to prior.    Senescent changes, similar to prior.      Electronically signed by: Torres Crawford MD  Date:    02/26/2023  Time:    20:49               Narrative:    EXAMINATION:  CT HEAD WITHOUT CONTRAST    CLINICAL HISTORY:  Dizziness, persistent/recurrent, cardiac or vascular cause suspected;    TECHNIQUE:  Low dose axial  images were obtained through the head.  Coronal and sagittal reformations were also performed. Contrast was not administered.    COMPARISON:  11/12/2022.    FINDINGS:  Remote right basal ganglia lacunar infarcts, similar to prior.  There is no evidence of acute infarct, hemorrhage, or mass.  There is ventricular and sulcal enlargement consistent with generalized atrophy.  Moderate confluent decreased supratentorial white matter attenuation most likely related to chronic nonspecific small vessel disease.   No mass-effect or midline shift.  There are no abnormal extra-axial fluid collections.  The paranasal sinuses and mastoid air cells are essentially clear .  The calvarium appears intact.  .                                       Medications   sodium chloride 0.9% flush 10 mL (has no administration in time range)   sodium chloride 0.9% bolus 500 mL 500 mL (has no administration in time range)   heparin (porcine) injection 5,000 Units (5,000 Units Subcutaneous Given 2/27/23 1416)   acetaminophen tablet 650 mg (has no administration in time range)   labetalol 20 mg/4 mL (5 mg/mL) IV syring (10 mg Intravenous Given 2/27/23 0232)   aspirin EC tablet 81 mg (81 mg Oral Given 2/27/23 0809)   clopidogreL tablet 75 mg (75 mg Oral Given 2/27/23 0809)   atorvastatin tablet 40 mg (40 mg Oral Given 2/27/23 0820)   ondansetron disintegrating tablet 8 mg (has no administration in time range)   ondansetron injection 4 mg (has no administration in time range)   senna-docusate 8.6-50 mg per tablet 1 tablet (1 tablet Oral Given 2/27/23 0809)   glucagon (human recombinant) injection 1 mg (has no administration in time range)   dextrose 10% bolus 125 mL 125 mL (has no administration in time range)   dextrose 10% bolus 250 mL 250 mL (has no administration in time range)   dextrose 40 % gel 15,000 mg (has no administration in time range)   dextrose 40 % gel 30,000 mg (has no administration in time range)   insulin aspart U-100 pen 1-10  Units (2 Units Subcutaneous Given 2/27/23 1630)   albuterol inhaler 2 puff (has no administration in time range)   doxepin capsule 10 mg (10 mg Oral Given 2/27/23 0327)   gabapentin capsule 300 mg (300 mg Oral Given 2/27/23 0327)   hydrALAZINE injection 20 mg (20 mg Intravenous Given 2/27/23 0403)   insulin detemir U-100 pen 10 Units (has no administration in time range)   insulin aspart U-100 pen 5 Units (5 Units Subcutaneous Given 2/27/23 1631)   meclizine tablet 25 mg (25 mg Oral Given 2/26/23 2036)   labetalol 20 mg/4 mL (5 mg/mL) IV syring (10 mg Intravenous Given 2/26/23 2149)   aspirin chewable tablet 162 mg (162 mg Oral Given 2/26/23 2215)   cefTRIAXone (ROCEPHIN) 1 g in dextrose 5 % in water (D5W) 5 % 50 mL IVPB (MB+) (0 g Intravenous Stopped 2/27/23 0041)   iohexoL (OMNIPAQUE 350) injection 100 mL (100 mLs Intravenous Given 2/27/23 0110)     Medical Decision Making:   History:   Old Medical Records: I decided to obtain old medical records.  Initial Assessment:   78 yo female, presents to the ER for an emergent evaluation due to acute dizziness, with associated nausea, vomiting, blurred vision, and difficulty ambulating x 2 days   Differential Diagnosis:   Vertigo, Dysrhythmia, ACS, CVA, cerebellar infarction, carotid stenosis, cerebral hypoperfusion, hypoglycemia, electrolyte derangement, etc   Clinical Tests:   Lab Tests: Ordered and Reviewed  Radiological Study: Ordered and Reviewed  Medical Tests: Ordered and Reviewed  ED Management:  Vital signs reviewed - very elevated blood pressure reading noted   Records reviewed   I discussed the case in detail with the ER attending physician - will initiate CVA work up with advanced imaging of brain including CT and MRI   EKG completed - interpreted by attending - no acute ischemic changes in comparison to previous    Repeat blood pressure reading went up - dizzy acute neuro deficit, concerning for hypertensive urgency vs CVA - attending ordered IV Labetolol,  will continue to monitor BP   I gave report and signed out patient to PM ER attending physician who will assume further care and management due to pending work up and end of my shift. Admission anticipated.            ED Course as of 02/27/23 1644   Sun Feb 26, 2023   1950 EKG independently interpreted by me.  EKG shows sinus rhythm with PACs, rate 81, morphology similar to prior, no STEMI. 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

## 2023-02-27 NOTE — PLAN OF CARE
POC established and functional mobility goals were created to help pt return to PLOF. Will be reassessed as appropriate to measure pt progress.    Problem: Physical Therapy  Goal: Physical Therapy Goal  Description: Goals to be met by: 3/13/23     Patient will increase functional independence with mobility by performin. Supine to sit with [place]  2. Sit to supine with [place]  3. Sit to stand transfer with Stand-by Assistance  4. Bed to chair transfer with Stand-by Assistance using RW  5. Gait  x 150 feet with Contact Guard Assistance using RW.   6. Ascend/descend 1 stair with no Handrails and Minimal Assistance  7. Lower extremity exercise program x15 reps per handout, with assistance as needed    Outcome: Ongoing, Progressing

## 2023-02-27 NOTE — PT/OT/SLP EVAL
Physical Therapy Co-Evaluation and Co-Treatment    Patient Name:  Buck Ibarra   MRN:  [id number]    Recent Surgery: * No surgery found *      *Co-treatment with OT due to patient complexity and need for two skilled therapists to ensure safe mobilization.    Recommendations:     Discharge Recommendations:  rehabilitation facility   Discharge Equipment Recommendations: walker, rolling (tbd @ rehab)   Barriers to discharge: Increased level of assist and Decreased caregiver support    Highest Level of Mobility: Gait 35'  Assistance Required: Mod-max(A) w/ RW    Assessment:     Buck Ibarra is a 77 y.o. female admitted with a medical diagnosis of Thrombotic stroke involving right cerebellar artery. She presents with the following impairments/functional limitations:  gait instability, decreased ROM, impaired endurance, impaired balance, decreased lower extremity function, impaired coordination, decreased safety awareness, impaired self care skills, pain, impaired functional mobility    Pt met with HOB elevated, daughter present and agreeable to PT session. Pt reports her PLOF is (I) with functional mobility and ADLs using no DME. Pt limited by poor coordination of R UE and R LE, leading to LOB and increased fall risk. Pt able to ambulate 35' with mostly mod(a) and RW, but had 3 LOB that required max(A) to correct. Pt is a high fall risk at this time and has very limited (A) at home.     Pt would benefit from continued skilled acute PT 4x/wk to address above listed functional deficits, provide patient/caregiver education, reduce fall risk, and maximize (I) and safety with functional mobility. After hospital discharge, pt would benefit from inpatient rehab to maximize rehab potential.    Rehab Prognosis: Good; patient would benefit from acute skilled PT services to address these deficits and reach maximum level of function.      Plan:     During this hospitalization, patient to be seen 4 x/week to address the  "identified rehab impairments via gait training, therapeutic activities, therapeutic exercises, neuromuscular re-education and progress toward the following goals:    Plan of Care Expires:  03/27/23    This plan of care has been discussed with the patient/caregiver, who was included in its development and is in agreement with the identified goals and treatment plan.     Subjective     Communicated with RN prior to session.  Patient agreeable to participate.     Chief Complaint: R cerebellar a CVA  Patient/Family Comments/goals: "My coordination is off"    Pain/Comfort:  Pain Rating 1: 0/10  Location - Side 1: Right  Location - Orientation 1: generalized  Location 1: leg  Pain Rating Post-Intervention 1: 10/10 (pt reported 10/10 R LE pain towards end of ambulation)    Patients cultural, spiritual, Advent conflicts given the current situation: no    Patient's living environment is as follows:  Living Environment: Pt lives with daughter in downstairs of 2SH with 1 CHUCK. Pt's daughter resides upstairs and works during the day. Bathroom set-up: walk-in shower  Prior Level of Function: independent with mobility and ADLs  DME used: none  DME owned (not currently used): single point cane and wheelchair  Upon discharge, patient will have assistance from: Limited ((A) from family    Objective:     Patient found HOB elevated with bed alarm, telemetry  upon PT entry to room.    General Precautions: Standard, fall   Orthopedic Precautions:N/A   Braces: N/A   BP (!) 167/91 (BP Location: Right arm, Patient Position: Lying)   Pulse 61   Temp 98.4 °F (36.9 °C) (Oral)   Resp 18   Wt 99.3 kg (219 lb)   SpO2 99%   Breastfeeding No   BMI 36.44 kg/m²   Oxygen Device:  room air      Exams:    Cognition:  Patient is oriented to Person, Place, Time, Situation  Follows two-step commands  Insight to deficits/safety awareness: intact    Edema: None present    Postural examination/scapula alignment: Rounded shoulder    Lower Extremity " Range of Motion:  Right Lower Extremity: WNL  Left Lower Extremity: WNL    Lower Extremity Strength    Right LE  Left LE    Hip Flexion: 4+/5 Hip Flexion: 4+/5   Knee Extension: 5/5 Knee Extension: 5/5   Knee Flexion: 5/5 Knee Flexion: 5/5   Ankle Dorsiflexion:  5/5 Ankle Dorsiflexion: 5/5   Ankle Plantarflexion: 5/5 Ankle Plantarflexion: 5/5        Sensation:   Light touch sensation: Intact BLEs    Functional Mobility:    Bed Mobility:  Supine to Sit: Stand-by Assistance on R side of bed  Sit to Supine: Stand-by Assistance  Scooting anteriorly to EOB to plant feet on floor: Stand-by Assistance    Transfers:   Sit to Stand Transfer: Minimal Assistance  from EOB with HHAx2              Gait:  Patient received gait training ~15 feet to bathroom and back with Moderate Assistance and 2 persons  and  HHAx2, then 35' with mod-max(A) and RW  Gait Assessment: unsteady gait, decreased step length, narrow base of support, decreased weight shift, decreased alyx, inconsistent right foot placement, and decreased right knee stability  Gait Pattern Observed: Step-to  Comments: All lines remained intact throughout ambulation trial, gait belt utilized. When ambulating with RW, pt had 3 LOB due to R LE instability that required max(A) to correct. Pt ataxic on R with occasional scissoring       Balance:  Static Sit:   Stand-By Assist at EOB  Normal: Patient able to maintain steady balance without handhold support.  Static Stand:   Min Assist with Rolling walker  Fair: Patient able to maintain balance with handhold support; may require occasional minimal assistance.  Dynamic Stand:  Mod Assist with Rolling walker        Therapeutic Activities/Exercises     Patient assisted with functional mobility as noted above  Discussed at length benefits of PT as well as d/c recommendations. Pt agreeable  Patient educated on the importance of early mobility, OOB to prevent functional decline during hospital stay  Patient was instructed to  utilize staff assistance for mobility/transfers.  Patient is appropriate to transfer with mod(A) and RN/PCT assist  Patient educated on PT POC and role of PT in acute care  White board updated to include patient's safest level of mobility with staff assistance, RN also updated    AM-PAC 6 CLICK MOBILITY  Turning over in bed (including adjusting bedclothes, sheets and blankets)?: 4  Sitting down on and standing up from a chair with arms (e.g., wheelchair, bedside commode, etc.): 3  Moving from lying on back to sitting on the side of the bed?: 3  Moving to and from a bed to a chair (including a wheelchair)?: 2  Need to walk in hospital room?: 2  Climbing 3-5 steps with a railing?: 1  Basic Mobility Total Score: 15      Patient left HOB elevated with all lines intact, call button in reach, bed alarm on, rn notified, and daughter present.      History/Goals:     PAST MEDICAL HISTORY:  Past Medical History:   Diagnosis Date    Allergy     Cataract     Diabetes mellitus type II     Gastritis     with gastric ulcer    GERD (gastroesophageal reflux disease)     H. pylori infection     History of hepatitis C, SVR as of 8-2016 8/25/2015    [facility] 12 wks completed.  SVR(12) as of 8/4/16 SVR(24) as of 10-     Hypertension     Osteopenia     Type 2 diabetes mellitus with diabetic polyneuropathy        Past Surgical History:   Procedure Laterality Date    COLONOSCOPY      COLONOSCOPY N/A 1/28/2016    Procedure: COLONOSCOPY;  Surgeon: Emeka Ahn MD;  Location: [address]);  Service: Endoscopy;  Laterality: N/A;    COLONOSCOPY N/A 8/8/2019    Procedure: COLONOSCOPY;  Surgeon: Susan Dia MD;  Location: [facility] ([address] [place] [address]);  Service: Endoscopy;  Laterality: N/A;  appt confirmed-rb    HYSTERECTOMY      LIVER BIOPSY      OOPHORECTOMY      PERIPHERAL ANGIOGRAPHY Left 5/5/2021    Procedure: Peripheral angiography;  Surgeon: Randolph Toribio MD;  Location: [facility] CATH LAB;  Service: Cardiology;  Laterality:  Left;    PERIPHERAL ANGIOGRAPHY N/A 2021    Procedure: Peripheral angiography;  Surgeon: Randolph Toribio MD;  Location: [facility] CATH LAB;  Service: Cardiology;  Laterality: N/A;    UPPER GASTROINTESTINAL ENDOSCOPY         GOALS:   Multidisciplinary Problems       Physical Therapy Goals          Problem: Physical Therapy    Goal Priority Disciplines Outcome Goal Variances Interventions   Physical Therapy Goal     PT, PT/OT Ongoing, Progressing     Description: Goals to be met by: 3/13/23     Patient will increase functional independence with mobility by performin. Supine to sit with [place]  2. Sit to supine with [place]  3. Sit to stand transfer with Stand-by Assistance  4. Bed to chair transfer with Stand-by Assistance using RW  5. Gait  x 150 feet with Contact Guard Assistance using RW.   6. Ascend/descend 1 stair with no Handrails and Minimal Assistance  7. Lower extremity exercise program x15 reps per handout, with assistance as needed                         Time Tracking:     PT Received On: 23  PT Start Time: 0956     PT Stop Time: 1024  PT Total Time (min): 28 min     Billable Minutes: Evaluation 15 and Gait Training 13      Dolly Fraser, PT  2023  Pager# [phone]

## 2023-02-27 NOTE — ASSESSMENT & PLAN NOTE
Buck Ibarra is a 77 y.o. female with a significant medical history of DMII, HTN, HLD, CAD, and NICM who presents to the hospital for evaluation of dizziness and difficulty walking for 2 days with associated vomiting.     Antithrombotics for secondary stroke prevention: Antiplatelets: Aspirin: 81 mg daily  Clopidogrel: 75 mg daily    Statins for secondary stroke prevention and hyperlipidemia, if present:   Statins: Atorvastatin- 40 mg daily    Aggressive risk factor modification: HTN, DM, HLD, Diet, Exercise, Obesity, CAD     Rehab efforts: The patient has been evaluated by a stroke team provider and the therapy needs have been fully considered based off the presenting complaints and exam findings. The following therapy evaluations are needed: PT evaluate and treat, OT evaluate and treat, PM&R evaluate for appropriate placement    Diagnostics ordered/pending: CTA Head to assess vasculature , CTA Neck/Arch to assess vasculature, Lipid Profile to assess cholesterol levels, TTE to assess cardiac function/status     VTE prophylaxis: Heparin 5000 units SQ every 8 hours  Mechanical prophylaxis: Place SCDs    BP parameters: Infarct: No intervention, SBP <220

## 2023-02-27 NOTE — PLAN OF CARE
[facility] (Hospital)  Initial Discharge Assessment       Primary Care Provider: Ericka Cortez MD    Admission Diagnosis: Dizziness [R42]  Hypertension [I10]  Blurred vision, bilateral [H53.8]  Thrombotic stroke involving left cerebellar artery [I63.342]  Nausea and vomiting, unspecified vomiting type [R11.2]    Admission Date: 2/26/2023  Expected Discharge Date:          Payor: [employer] MEDICARE / Plan: [employer] COMPLETE / Product Type: Medicare Advantage /     Extended Emergency Contact Information  Primary Emergency Contact: KimberlyhoracioOlga   [place]  Mobile Phone: [phone]  Relation: Daughter    Discharge Plan A: (P) Rehab  Discharge Plan B: (P) Home Health      [facility]  [address]  [facility] [address]  Phone: [phone] Fax: [phone]      Initial Assessment (most recent)       Adult Discharge Assessment - 02/27/23 1330          Discharge Assessment    Assessment Type Discharge Planning Assessment (P)      Confirmed/corrected address, phone number and insurance Yes (P)      Confirmed Demographics Correct on Facesheet (P)      Source of Information patient;family (P)      When was your last doctors appointment? 01/10/23 (P)      Communicated AMADO with patient/caregiver Yes (P)      Reason For Admission stroke (P)      People in Home child(annabelle), adult (P)      Facility Arrived From: home (P)      Do you expect to return to your current living situation? Yes (P)      Do you have help at home or someone to help you manage your care at home? Yes (P)      Who are your caregiver(s) and their phone number(s)? allegra Espinoza [phone] (P)      Prior to hospitilization cognitive status: Unable to Assess (P)      Current cognitive status: Alert/Oriented (P)      Walking or Climbing Stairs ambulation difficulty, requires equipment (P)      Mobility Management WC, Cane (P)      Home Accessibility wheelchair accessible (P)      Home  Layout Able to live on 1st floor (P)      Equipment Currently Used at Home wheelchair;cane, straight (P)      Readmission within 30 days? No (P)      Patient currently being followed by outpatient case management? No (P)      Do you currently have service(s) that help you manage your care at home? No (P)      Do you take prescription medications? Yes (P)      Do you have prescription coverage? Yes (P)      Coverage PHN (P)      Do you have any problems affording any of your prescribed medications? No (P)      Is the patient taking medications as prescribed? yes (P)      Who is going to help you get home at discharge? Dtr. Olga (P)      How do you get to doctors appointments? family or friend will provide (P)      Are you on dialysis? No (P)      Do you take coumadin? No (P)      Discharge Plan A Rehab (P)      Discharge Plan B Home Health (P)      DME Needed Upon Discharge  none (P)      Discharge Plan discussed with: Patient;Adult children (P)         OTHER    Name(s) of People in Home Olga Ibarra (P)                  FADY met with patient and her dtr Olga at bedside.  Patient lives with her dtr in a 2 story home, but the patient lives only on the 1st floor.  Patient has a WC and cane at home.  She does not have any HH services nor is she on HD or Coumadin.  Therapy recommending rehab at discharge and patient and her dtr would like [facility].  FADY sent referral and PMR consult placed.  Pateint will be evaluated by [facility].  FADY will continue to follow.      Riana Barnes, BELLE  [facility]  [phone]

## 2023-02-27 NOTE — CONSULTS
Food & Nutrition  Education     Diet Education: Cardiac diet   Time Spent: 10 minutes   Learners: Patient     Handouts provided: Cardiac diet nutrition therapy      Comments: Pt endorses poor intake of food due to taste. No N/V/D/C at this time. Discussed importance of eating a balanced diet with whole grains, fruits and vegetables, and lean protein sources. Encouraged heart-healthy unsaturated fats while limiting saturated fats, trans fats, and cholesterol intake. Reviewed high sodium foods that should be avoided. Food labels, salt free seasonings, and recommended sodium intake reviewed. All questions/concerns were addressed.      Follow-Up: 3/6/23     Please Re-consult as needed.   Thanks!    Yasmine Thornton, Registration Eligible, Provisional LDN

## 2023-02-27 NOTE — PLAN OF CARE
Problem: Occupational Therapy  Goal: Occupational Therapy Goal  Description: Goals to be met by: 3/13/2023     Patient will increase functional independence with ADLs by performing:    UE Dressing with Modified [place].  LE Dressing with Stand-by Assistance.  Grooming while standing at sink with Stand-by Assistance.  Toileting from toilet with Stand-by Assistance for hygiene and clothing management.   Supine to sit with [place].  Step transfer with Stand-by Assistance with RW.  Toilet transfer to toilet with Stand-by Assistance with RW.    Outcome: Ongoing, Progressing     Pt evaluated and OT goals established.  Due to her current level of function compared to her PLOF, her high fall risk, and her home environment, inpatient rehab recommended at d/c for maximal pt gains in functional independence.

## 2023-02-27 NOTE — CONSULTS
Inpatient consult to Physical Medicine Rehab  Consult performed by: Roxane Baumann, NP  Consult ordered by: Jeimy Rene, KIM, NP  Reason for consult: Assess rehab needs    Reviewed patient history and current admission.  Rehab team following.  Full consult to follow.    ARNOLD Rosales, FNP-C  Physical Medicine & Rehabilitation   02/27/2023

## 2023-02-27 NOTE — ASSESSMENT & PLAN NOTE
Stroke Risk Factor  SBP < 220  Will hold home meds for now, can likely resume in 48-72 hours.  Received 1 time dose of Labetalol in the ED for elevated blood pressure.

## 2023-02-27 NOTE — PT/OT/SLP EVAL
Occupational Therapy   Evaluation and Treatment    Co-eval with PT to have 2 skilled therapists present to safely assess pt's functional mobility.     Name: Buck Ibarra  MRN: [id number]  Admitting Diagnosis: Thrombotic stroke involving right cerebellar artery  Recent Surgery: * No surgery found *      Recommendations:     Discharge Recommendations: rehabilitation facility  Discharge Equipment Recommendations:  walker, rolling, other (see comments) (TBD at rehab)  Barriers to discharge:  Other (Comment), Decreased caregiver support (high fall risk and impaired functional mobility)    Assessment:     Buck Ibarra is a 77 y.o. female with a medical diagnosis of Thrombotic stroke involving right cerebellar artery.  Pt demonstrated good participation, performing grooming ADLs standing at the bathroom sink and ambulating in the hallway.  However, she was significantly unsteady and required Mod - Max while ambulating.  Pt is performing significantly below her functional baseline.  Due to her current level of function compared to her PLOF, her living environment, and her high fall risk, inpatient rehab recommended at d/c for maximal pt gains in functional independence and to ensure her safety upon returning home.  She presents with the following.  Performance deficits affecting function: weakness, impaired endurance, impaired self care skills, impaired functional mobility, gait instability, impaired balance, pain, decreased safety awareness, decreased coordination, impaired coordination, decreased ROM, decreased lower extremity function.      Rehab Prognosis: Good; patient would benefit from acute skilled OT services to address these deficits and reach maximum level of function.       Plan:     Patient to be seen 4 x/week to address the above listed problems via self-care/home management, therapeutic activities, therapeutic exercises, neuromuscular re-education  Plan of Care Expires: 03/29/23  Plan of Care  "Reviewed with: patient    Subjective     Chief Complaint: "I'm dizzy."  Patient/Family Comments/goals: "My leg hurts."    Occupational Profile:  Living Environment: Pt lives with her 2 adult daughters in a 2  with a small threshold to enter.  She lives on the 1st floor and never uses the stairs.  Pt has a walk-in shower.  Previous level of function: Independent with ADLs and mobility; pt seldomly drives  Roles and Routines: mother  Equipment Used at Home: other (see comments), wheelchair, cane, straight (Pt said she owns but does not use a cane and w/c.)  Assistance upon Discharge: Her daughters.  However, one daughter is disabled and w/c bound while the other works.      Pain/Comfort:  Pain Rating 1: 0/10  Location - Side 1: Right  Location - Orientation 1: generalized  Location 1: leg  Pain Addressed 1: Cessation of Activity, Nurse notified  Pain Rating Post-Intervention 1: 10/10 (pt began to report pain toward end of ambulation)    Patients cultural, spiritual, Pentec[employer]al conflicts given the current situation: no    Objective:     Communicated with: nursing and PT prior to session.  Patient found HOB elevated with telemetry, SCD, bed alarm, PureWick with her daughter and sister-in-law present upon OT entry to room.    General Precautions: Standard, fall, aspiration  Orthopedic Precautions: N/A  Braces: N/A  Respiratory Status: Room air    Occupational Performance:    Bed Mobility:    Patient completed Rolling/Turning to Right with stand by assistance  Patient completed Scooting/Bridging with stand by assistance  Patient completed Supine to Sit with stand by assistance  Patient completed Sit to Supine with stand by assistance    Functional Mobility/Transfers:  Patient completed Sit <> Stand Transfer from EOB and from toilet x 1 trial each with minimum assistance with hand-held assist   Patient completed Toilet Transfer Step Transfer technique with minimum assistance with  hand-held assist  Functional Mobility: " Pt ambulated ~10 ft to the bathroom with Mod A of 2 with B HHA.  She then ambulated ~5 ft to the RW with Mod A with HHA.  Pt ambulated ~35 ft with Mod-Max A with RW, requiring Max A to correct 3 LOB due to her RLE buckling.    Activities of Daily Living:  Grooming: CGA for standing balance to wash her face and brush her teeth at the bathroom sink  Lower Body Dressing: SBA - CGA for sitting balance to oleg non-skid socks while seated EOB   Toileting: minimum assistance to manage her pants down and up over her hips in standing.  SBA to perform perineal care while seated on toilet.  Pt was able to twist and grasp toilet paper from roll.      Cognitive/Visual Perceptual:  Cognitive/Psychosocial Skills:     -       Oriented to: Person, Place, Time, and Situation   -       Follows Commands/attention:Follows all one-step commands  -       Communication: clear/fluent    Physical Exam:  Upper Extremity Range of Motion:     -       Right Upper Extremity: WFL  -       Left Upper Extremity: WFL  Upper Extremity Strength:    -       Right Upper Extremity: 4/5  -       Left Upper Extremity: 4/5   Strength:    -       Right Upper Extremity: WFL  -       Left Upper Extremity: WFL  Fine Motor Coordination:    -       Intact  Left hand thumb/finger opposition skills and Right hand thumb/finger opposition skills; intact while performing oral care.  Impaired RUE when reaching for PT's hand repeatedly (back and forth) while seated EOB.    [facility] 6 Click ADL:  AMPAC Total Score: 18    Treatment & Education:  Pt and her family edu on role of OT, POC, safety when performing self care tasks, benefit of performing OOB activity, and safety when performing functional transfers and mobility.    - Self care tasks completed-- as noted above      Patient left HOB elevated with all lines intact, call button in reach, bed alarm on, nursing notified, and her daughter present    GOALS:   Multidisciplinary Problems       Occupational Therapy Goals           Problem: Occupational Therapy    Goal Priority Disciplines Outcome Interventions   Occupational Therapy Goal     OT, PT/OT Ongoing, Progressing    Description: Goals to be met by: 3/13/2023     Patient will increase functional independence with ADLs by performing:    UE Dressing with Modified [place].  LE Dressing with Stand-by Assistance.  Grooming while standing at sink with Stand-by Assistance.  Toileting from toilet with Stand-by Assistance for hygiene and clothing management.   Supine to sit with Supervision.  Step transfer with Stand-by Assistance with RW.  Toilet transfer to toilet with Stand-by Assistance with RW.                         History:     Past Medical History:   Diagnosis Date    Allergy     Cataract     Diabetes mellitus type II     Gastritis     with gastric ulcer    GERD (gastroesophageal reflux disease)     H. pylori infection     History of hepatitis C, SVR as of 8-2016 8/25/2015    Harvoni 12 wks completed.  SVR(12) as of 8/4/16 SVR(24) as of 10-     Hypertension     Osteopenia     Type 2 diabetes mellitus with diabetic polyneuropathy          Past Surgical History:   Procedure Laterality Date    COLONOSCOPY      COLONOSCOPY N/A 1/28/2016    Procedure: COLONOSCOPY;  Surgeon: Emeka Ahn MD;  Location: [facility] ([address] [place] [address]);  Service: Endoscopy;  Laterality: N/A;    COLONOSCOPY N/A 8/8/2019    Procedure: COLONOSCOPY;  Surgeon: Susan Dia MD;  Location: [facility] ([address] [place] [address]);  Service: Endoscopy;  Laterality: N/A;  appt confirmed-rb    HYSTERECTOMY      LIVER BIOPSY      OOPHORECTOMY      PERIPHERAL ANGIOGRAPHY Left 5/5/2021    Procedure: Peripheral angiography;  Surgeon: Randolph Toribio MD;  Location: [facility] CATH LAB;  Service: Cardiology;  Laterality: Left;    PERIPHERAL ANGIOGRAPHY N/A 6/21/2021    Procedure: Peripheral angiography;  Surgeon: Randolph Toribio MD;  Location: [facility] CATH LAB;  Service: Cardiology;  Laterality: N/A;    UPPER GASTROINTESTINAL  ENDOSCOPY         Time Tracking:     OT Date of Treatment: 02/27/23  OT Start Time: 0956  OT Stop Time: 1024  OT Total Time (min): 28 min    Billable Minutes:Evaluation 10 min  Therapeutic Activity 18 min    2/27/2023

## 2023-02-27 NOTE — ED TRIAGE NOTES
Buck Ibarra, a 77 y.o. female presents to the ED w/ complaint of dizziness, blurred vision and difficulty with walking due to dizziness. Pt denies unilateral weakness/numbness/ or tingling of extremities/slurred speech/facial droop/HA/cp/sob. Pt states symptoms started 2 days ago.     Triage note:  Chief Complaint   Patient presents with    Dizziness     Dizziness for the past few days with some n/v. Pt states she has been having trouble with balance due to dizziness intermittently and reports some intermittent blurry vision tonight. Denies any weakness or trouble with speech.      Review of patient's allergies indicates:   Allergen Reactions    Amlodipine Swelling    Erythromycin Anaphylaxis    Erythropoietin analogues Anaphylaxis    Pegasys [peginterferon ruben-2a] Anaphylaxis    Lisinopril Hives     Past Medical History:   Diagnosis Date    Allergy     Cataract     Diabetes mellitus type II     Gastritis     with gastric ulcer    GERD (gastroesophageal reflux disease)     H. pylori infection     History of hepatitis C, SVR as of 8-2016 8/25/2015    Harvoni 12 wks completed.  SVR(12) as of 8/4/16 SVR(24) as of 10-     Hypertension     Osteopenia     Type 2 diabetes mellitus with diabetic polyneuropathy

## 2023-02-27 NOTE — ED NOTES
Telemetry Verification   Patient placed on Telemetry Box  Verified with War Room  Box # [id number]   Monitor Tech Rachelle   Rate 69   Rhythm NSR

## 2023-02-28 NOTE — ASSESSMENT & PLAN NOTE
TTE with   The left ventricle is normal in size with concentric remodeling and normal systolic function. The estimated ejection fraction is 55%.   Normal right ventricular size with normal right ventricular systolic function.   Indeterminate left ventricular diastolic function.   Severe left atrial enlargement.   The estimated PA systolic pressure is 16 mmHg.   Normal central venous pressure (3 mmHg).

## 2023-02-28 NOTE — ASSESSMENT & PLAN NOTE
-MRI Brain- There are 2 small adjacent foci of acute lacunar infarction in the right cerebellar peduncle.  Recommend follow-up. Possible minimal subacute infarction at the periphery of a small remote right corona radiata infarct. Involutional changes with chronic microvascular ischemic changes.  -CTH- Without acute intracranial abnormality.   -TTE- With EF of 55%.  -PT/OT rec IPR. Pt amenable.  -Continue DAPT.  -Continue statin.  -Monitor BP closely.

## 2023-02-28 NOTE — SUBJECTIVE & OBJECTIVE
Past Medical History:   Diagnosis Date    Allergy     Cataract     Diabetes mellitus type II     Gastritis     with gastric ulcer    GERD (gastroesophageal reflux disease)     H. pylori infection     History of hepatitis C, SVR as of 8-2016 8/25/2015    Harvoni 12 wks completed.  SVR(12) as of 8/4/16 SVR(24) as of 10-     Hypertension     Osteopenia     Type 2 diabetes mellitus with diabetic polyneuropathy      Past Surgical History:   Procedure Laterality Date    COLONOSCOPY      COLONOSCOPY N/A 1/28/2016    Procedure: COLONOSCOPY;  Surgeon: Emeka Ahn MD;  Location: [facility] ENDO ([facility]);  Service: Endoscopy;  Laterality: N/A;    COLONOSCOPY N/A 8/8/2019    Procedure: COLONOSCOPY;  Surgeon: Susan Dia MD;  Location: [facility] ENDO ([facility]);  Service: Endoscopy;  Laterality: N/A;  appt confirmed-rb    HYSTERECTOMY      LIVER BIOPSY      OOPHORECTOMY      PERIPHERAL ANGIOGRAPHY Left 5/5/2021    Procedure: Peripheral angiography;  Surgeon: Randolph Toribio MD;  Location: [facility] CATH LAB;  Service: Cardiology;  Laterality: Left;    PERIPHERAL ANGIOGRAPHY N/A 6/21/2021    Procedure: Peripheral angiography;  Surgeon: Randolph Toribio MD;  Location: [facility] CATH LAB;  Service: Cardiology;  Laterality: N/A;    UPPER GASTROINTESTINAL ENDOSCOPY       Review of patient's allergies indicates:   Allergen Reactions    Amlodipine Swelling    Erythromycin Anaphylaxis    Erythropoietin analogues Anaphylaxis    Pegasys [peginterferon ruben-2a] Anaphylaxis    Lisinopril Hives       Scheduled Medications:    aspirin  81 mg Oral Daily    atorvastatin  40 mg Oral Daily    clopidogreL  75 mg Oral Daily    doxepin  10 mg Oral QHS    gabapentin  300 mg Oral QHS    heparin (porcine)  5,000 Units Subcutaneous Q8H    insulin aspart U-100  4 Units Subcutaneous TIDWM    insulin detemir U-100  10 Units Subcutaneous BID    senna-docusate 8.6-50 mg  1 tablet Oral BID       PRN Medications: acetaminophen, albuterol, dextrose 10%,  dextrose 10%, dextrose, dextrose, glucagon (human recombinant), hydrALAZINE, insulin aspart U-100, labetaloL, ondansetron, ondansetron, sodium chloride 0.9%, sodium chloride 0.9%    Family History       Problem Relation (Age of Onset)    Breast cancer Sister    Cancer Sister, Sister (70)    Diabetes Mother, Sister, Sister, Sister    Heart disease Mother, Father    Hyperlipidemia Mother    Hypertension Mother          Tobacco Use    Smoking status: Former     Packs/day: 0.25     Years: 48.00     Pack years: 12.00     Types: Cigarettes     Quit date: 3/28/2021     Years since quittin.9    Smokeless tobacco: Never   Substance and Sexual Activity    Alcohol use: No     Comment: special occasions    Drug use: No    Sexual activity: Yes     Partners: Male     Review of Systems   Constitutional:  Positive for activity change.   Eyes: Negative.    Respiratory:  Negative for cough and shortness of breath.    Cardiovascular:  Negative for chest pain and leg swelling.   Gastrointestinal:  Positive for constipation. Negative for nausea and vomiting.   Endocrine: Negative.    Genitourinary: Negative.    Musculoskeletal:  Positive for gait problem.   Allergic/Immunologic: Negative.    Neurological:  Positive for weakness. Negative for dizziness and numbness.   Psychiatric/Behavioral:  Negative for confusion.    Objective:     Vital Signs (Most Recent):  Temp: 98.4 °F (36.9 °C) (23 08)  Pulse: 62 (23 08)  Resp: 17 (23 08)  BP: 120/82 (23)  SpO2: 97 % (23)    Vital Signs (24h Range):  Temp:  [97.8 °F (36.6 °C)-98.8 °F (37.1 °C)] 98.4 °F (36.9 °C)  Pulse:  [56-77] 62  Resp:  [17-18] 17  SpO2:  [96 %-100 %] 97 %  BP: (120-232)/(79-98) 120/82     Body mass index is 36.44 kg/m².    Physical Exam  Vitals and nursing note reviewed.   Constitutional:       General: She is awake.      Appearance: Normal appearance. She is obese.   HENT:      Head: Normocephalic and atraumatic.   Pulmonary:       Effort: Pulmonary effort is normal. No respiratory distress.   Abdominal:      General: There is no distension.      Palpations: Abdomen is soft.   Musculoskeletal:         General: Normal range of motion.      Cervical back: Normal range of motion and neck supple.   Skin:     General: Skin is warm and dry.      Capillary Refill: Capillary refill takes 2 to 3 seconds.   Neurological:      General: No focal deficit present.      Mental Status: She is alert and oriented to person, place, and time.      GCS: GCS eye subscore is 4. GCS verbal subscore is 5. GCS motor subscore is 6.      Sensory: No sensory deficit.      Motor: Weakness present.      Gait: Gait abnormal.   Psychiatric:         Attention and Perception: Attention normal.         Mood and Affect: Mood normal. Affect is flat.         Speech: Speech normal.         Behavior: Behavior is cooperative.     NEUROLOGICAL EXAMINATION:     MENTAL STATUS   Oriented to person, place, and time.   Speech: speech is normal     Diagnostic Results: Labs: Reviewed

## 2023-02-28 NOTE — ASSESSMENT & PLAN NOTE
Stroke Risk Factor  Glucose 263  Last A1C 2/23/23 7.9.  Basal/bolus regimen  Titrate regimen PRN  Will increase gabapentin frequency given burning pain reported in legs

## 2023-02-28 NOTE — PLAN OF CARE
Problem: Adjustment to Illness (Stroke, Ischemic/Transient Ischemic Attack)  Goal: Optimal Coping  Outcome: Ongoing, Progressing     Problem: Bowel Elimination Impaired (Stroke, Ischemic/Transient Ischemic Attack)  Goal: Effective Bowel Elimination  Outcome: Ongoing, Progressing     Problem: Cerebral Tissue Perfusion (Stroke, Ischemic/Transient Ischemic Attack)  Goal: Optimal Cerebral Tissue Perfusion  Outcome: Ongoing, Progressing     Problem: Functional Ability Impaired (Stroke, Ischemic/Transient Ischemic Attack)  Goal: Optimal Functional Ability  Outcome: Ongoing, Progressing     Problem: Sensorimotor Impairment (Stroke, Ischemic/Transient Ischemic Attack)  Goal: Improved Sensorimotor Function  Outcome: Ongoing, Progressing     Problem: Swallowing Impairment (Stroke, Ischemic/Transient Ischemic Attack)  Goal: Optimal Eating and Swallowing without Aspiration  Outcome: Ongoing, Progressing     Problem: Urinary Elimination Impaired (Stroke, Ischemic/Transient Ischemic Attack)  Goal: Effective Urinary Elimination  Outcome: Ongoing, Progressing     Problem: Fall Injury Risk  Goal: Absence of Fall and Fall-Related Injury  Outcome: Ongoing, Progressing     Problem: Infection  Goal: Absence of Infection Signs and Symptoms  Outcome: Ongoing, Progressing     Problem: Adult Inpatient Plan of Care  Goal: Plan of Care Review  Outcome: Ongoing, Progressing  Goal: Patient-Specific Goal (Individualized)  Outcome: Ongoing, Progressing  Goal: Absence of Hospital-Acquired Illness or Injury  Outcome: Ongoing, Progressing  Goal: Optimal Comfort and Wellbeing  Outcome: Ongoing, Progressing  Goal: Readiness for Transition of Care  Outcome: Ongoing, Progressing     Problem: Diabetes Comorbidity  Goal: Blood Glucose Level Within Targeted Range  Outcome: Ongoing, Progressing     Problem: Skin Injury Risk Increased  Goal: Skin Health and Integrity  Outcome: Ongoing, Progressing     A+Ox4, all meds adm per order. SBP >230 MD sanju  notified, prn adm. Pt reports headache, tylenol adm, MD notified. Family at bedside, involved in care. OOB to chair with pt, simona well.

## 2023-02-28 NOTE — HOSPITAL COURSE
Patient admitted to stroke service in setting of R cerebellar peduncle stroke. NIHSS 1 on admission for limb ataxia. Evaluated by PT/OT with recs for rehab. Admitted to rehab 3/1.

## 2023-02-28 NOTE — HPI
Per chart review, Buck Ibarra is a 77 y.o. female with a significant medical history of DMII, HTN, HLD, CAD, and NICM who presents to the hospital for evaluation of dizziness and difficulty walking for 2 days with associated vomiting. MRI of brain was completed and was found to be  with 2 small adjacent foci of acute lacunar infarction in the right cerebellar peduncle. Also with possible minimal subacute infarction at the periphery of a small remote right corona radiata infarct and Involutional changes with chronic microvascular ischemic changes. CTA of head and neck was with out aneurysm and with no large vessel occlusion and without high grade stenosis. TTE was completed with EF of 55% and large atrial enlargement. PM & R was consulted to evaluate pt for IPR placement.     Functional History: Patient lives  with her daughter  in a two  story home with one step to enter.  Prior to admission, Pt was I with ADLs and mobility.  DME: [facility].

## 2023-02-28 NOTE — PT/OT/SLP PROGRESS
"Occupational Therapy   Treatment    Name: Buck Ibarra  MRN: [id number]  Admitting Diagnosis:  Thrombotic stroke involving right cerebellar artery       Recommendations:     Discharge Recommendations: rehabilitation facility  Discharge Equipment Recommendations:  walker, rolling, other (see comments) (TBD at rehab)  Barriers to discharge:  Other (Comment), Decreased caregiver support (high fall risk and impaired functional mobility)    Assessment:     Buck Ibarra is a 77 y.o. female with a medical diagnosis of Thrombotic stroke involving right cerebellar artery.  Pt participated in session despite significant headache, transferring to the bedside chair.  She remains very unsteady and is a fall risk.  She presents with the following. Performance deficits affecting function are weakness, impaired endurance, impaired self care skills, impaired functional mobility, gait instability, impaired balance, decreased safety awareness, pain, decreased coordination, decreased ROM, impaired coordination, decreased lower extremity function.     Rehab Prognosis:  Good; patient would benefit from acute skilled OT services to address these deficits and reach maximum level of function.       Plan:     Patient to be seen 4 x/week to address the above listed problems via self-care/home management, therapeutic activities, therapeutic exercises, neuromuscular re-education  Plan of Care Expires: 03/29/23  Plan of Care Reviewed with: patient    Subjective   "My head is killing me."  Pain/Comfort:  Pain Rating 1: other (see comments) (not rated, but pt said was "terrible")  Location - Orientation 1: generalized  Location 1: head  Pain Addressed 1: Distraction, Reposition  Pain Rating Post-Intervention 1: other (see comments) (not rated)    Objective:     Communicated with: nursing prior to session.  Patient found HOB elevated with telemetry, bed alarm, SCD, PureWick, Other (comments) ([employer] camera) upon OT entry to " room.    General Precautions: Standard, aspiration, fall    Orthopedic Precautions:N/A  Braces: N/A  Respiratory Status: Room air     Occupational Performance:     Bed Mobility:    Patient completed Rolling/Turning to Right with stand by assistance  Patient completed Scooting/Bridging with stand by assistance  Patient completed Supine to Sit with stand by assistance     Functional Mobility/Transfers:  Patient completed Sit <> Stand Transfer from EOB x 2 trials and from bedside chair x 1 trial with contact guard assistance  with  hand-held assist   Patient completed Bed <> Chair Transfer x 2 trials and Chair <> Bed t/f x 1 trial using Stand Pivot technique with minimum assistance with hand-held assist  Functional Mobility: Pt declined to use the restroom.     Activities of Daily Living:  Pt declined to perform ADLs.      [facility] 6 Click ADL: 18    Treatment & Education:  Pt edu on role of OT, POC, safety when performing self care tasks, benefit of performing OOB activity, and safety when performing functional transfers and mobility.    - Self care tasks completed-- as noted above      Patient left up in chair with all lines intact, call button in reach, chair alarm on, and nursing and telesitter notified    GOALS:   Multidisciplinary Problems       Occupational Therapy Goals          Problem: Occupational Therapy    Goal Priority Disciplines Outcome Interventions   Occupational Therapy Goal     OT, PT/OT Ongoing, Progressing    Description: Goals to be met by: 3/13/2023     Patient will increase functional independence with ADLs by performing:    UE Dressing with Modified [place].  LE Dressing with Stand-by Assistance.  Grooming while standing at sink with Stand-by Assistance.  Toileting from toilet with Stand-by Assistance for hygiene and clothing management.   Supine to sit with Supervision.  Step transfer with Stand-by Assistance with RW.  Toilet transfer to toilet with Stand-by Assistance with RW.                          Time Tracking:     OT Date of Treatment: 02/28/23  OT Start Time: 1509  OT Stop Time: 1529  OT Total Time (min): 20 min    Billable Minutes:Therapeutic Activity 20 min    OT/PREETHI: OT          2/28/2023

## 2023-02-28 NOTE — ASSESSMENT & PLAN NOTE
-CTA Head and neck- Acute small infarcts in the right cerebellar peduncles and subtle possible subacute infarct in the right coronary radiata seen on prior MRI are not as well demonstrated on CTA exam.  No aneurysm, high-grade stenosis, or major vessel occlusion.  Hypoplastic right A1.Findings compatible generalized volume loss and microvascular ischemic changes.     -Multinodular thyroid.  Evaluation with thyroid ultrasound on a nonemergent basis recommended.

## 2023-02-28 NOTE — ASSESSMENT & PLAN NOTE
Buck Ibarra is a 77 y.o. female with a significant medical history of DMII, HTN, HLD, CAD, and NICM who presents to the hospital for evaluation of dizziness and difficulty walking for 2 days with associated vomiting.     Antithrombotics for secondary stroke prevention: Antiplatelets: Aspirin: 81 mg daily  Clopidogrel: 75 mg daily    Statins for secondary stroke prevention and hyperlipidemia, if present:   Statins: Atorvastatin- 40 mg daily    Aggressive risk factor modification: HTN, DM, HLD, Diet, Exercise, Obesity, CAD     Rehab efforts: The patient has been evaluated by a stroke team provider and the therapy needs have been fully considered based off the presenting complaints and exam findings. The following therapy evaluations are needed: PT evaluate and treat, OT evaluate and treat, PM&R evaluate for appropriate placement Recs for rehab, pending placement    Diagnostics ordered/pending: CTA Head to assess vasculature , CTA Neck/Arch to assess vasculature, Lipid Profile to assess cholesterol levels, TTE to assess cardiac function/status     VTE prophylaxis: Heparin 5000 units SQ every 8 hours  Mechanical prophylaxis: Place SCDs    BP parameters: Infarct: No intervention, SBP <220

## 2023-02-28 NOTE — HOSPITAL COURSE
Per chart review,     OT- 02/27    Bed Mobility:    Patient completed Rolling/Turning to Right with stand by assistance  Patient completed Scooting/Bridging with stand by assistance  Patient completed Supine to Sit with stand by assistance  Patient completed Sit to Supine with stand by assistance     Functional Mobility/Transfers:  Patient completed Sit <> Stand Transfer from EOB and from toilet x 1 trial each with minimum assistance with hand-held assist   Patient completed Toilet Transfer Step Transfer technique with minimum assistance with  hand-held assist  Functional Mobility: Pt ambulated ~10 ft to the bathroom with Mod A of 2 with B HHA.  She then ambulated ~5 ft to the RW with Mod A with HHA.  Pt ambulated ~35 ft with Mod-Max A with RW, requiring Max A to correct 3 LOB due to her RLE buckling.     Activities of Daily Living:  Grooming: CGA for standing balance to wash her face and brush her teeth at the bathroom sink  Lower Body Dressing: SBA - CGA for sitting balance to oleg non-skid socks while seated EOB   Toileting: minimum assistance to manage her pants down and up over her hips in standing.  SBA to perform perineal care while seated on toilet.  Pt was able to twist and grasp toilet paper from roll.        PT-02/27    Bed Mobility:  Supine to Sit: Stand-by Assistance on R side of bed  Sit to Supine: Stand-by Assistance  Scooting anteriorly to EOB to plant feet on floor: Stand-by Assistance     Transfers:   Sit to Stand Transfer: Minimal Assistance  from EOB with HHAx2              Gait:  Patient received gait training ~15 feet to bathroom and back with Moderate Assistance and 2 persons  and  HHAx2, then 35' with mod-max(A) and RW  Gait Assessment: unsteady gait, decreased step length, narrow base of support, decreased weight shift, decreased alyx, inconsistent right foot placement, and decreased right knee stability  Gait Pattern Observed: Step-to  Comments: All lines remained intact throughout  ambulation trial, gait belt utilized. When ambulating with RW, pt had 3 LOB due to R LE instability that required max(A) to correct. Pt ataxic on R with occasional scissoring         Balance:  Static Sit:   Stand-By Assist at EOB  Normal: Patient able to maintain steady balance without handhold support.  Static Stand:   Min Assist with Rolling walker  Fair: Patient able to maintain balance with handhold support; may require occasional minimal assistance.  Dynamic Stand:  Mod Assist with Rolling walker

## 2023-02-28 NOTE — CONSULTS
[facility] - Neurosurgery ([facility])  Physical Medicine & Rehab  Consult Note    Patient Name: Buck Ibarra  MRN: [id number]  Admission Date: 2/26/2023  Hospital Length of Stay: 1 days  Attending Physician: Eriberto Breaux MD  Consults  Subjective:     Principal Problem: Thrombotic stroke involving right cerebellar artery    HPI: Per chart review, Buck Ibarra is a 77 y.o. female with a significant medical history of DMII, HTN, HLD, CAD, and NICM who presents to the hospital for evaluation of dizziness and difficulty walking for 2 days with associated vomiting. MRI of brain was completed and was found to be  with 2 small adjacent foci of acute lacunar infarction in the right cerebellar peduncle. Also with possible minimal subacute infarction at the periphery of a small remote right corona radiata infarct and Involutional changes with chronic microvascular ischemic changes. CTA of head and neck was with out aneurysm and with no large vessel occlusion and without high grade stenosis. TTE was completed with EF of 55% and large atrial enlargement. PM & R was consulted to evaluate pt for IPR placement.     Functional History: Patient lives  with her daughter  in a two  story home with one step to enter.  Prior to admission, Pt was I with ADLs and mobility.  DME: [facility].        Hospital Course: Per chart review,     OT- 02/27    Bed Mobility:     Patient completed Rolling/Turning to Right with stand by assistance   Patient completed Scooting/Bridging with stand by assistance   Patient completed Supine to Sit with stand by assistance   Patient completed Sit to Supine with stand by assistance     Functional Mobility/Transfers:   Patient completed Sit <> Stand Transfer from EOB and from toilet x 1 trial each with minimum assistance with hand-held assist    Patient completed Toilet Transfer Step Transfer technique with minimum assistance with  hand-held assist   Functional Mobility: Pt ambulated ~10 ft to  the bathroom with Mod A of 2 with B HHA.  She then ambulated ~5 ft to the RW with Mod A with HHA.  Pt ambulated ~35 ft with Mod-Max A with RW, requiring Max A to correct 3 LOB due to her RLE buckling.     Activities of Daily Living:   Grooming: CGA for standing balance to wash her face and brush her teeth at the bathroom sink   Lower Body Dressing: SBA - CGA for sitting balance to oleg non-skid socks while seated EOB    Toileting: minimum assistance to manage her pants down and up over her hips in standing.  SBA to perform perineal care while seated on toilet.  Pt was able to twist and grasp toilet paper from roll.        PT-02/27    Bed Mobility:   Supine to Sit: Stand-by Assistance on R side of bed   Sit to Supine: Stand-by Assistance   Scooting anteriorly to EOB to plant feet on floor: Stand-by Assistance     Transfers:    Sit to Stand Transfer: Minimal Assistance  from EOB with HHAx2              Gait:   Patient received gait training ~15 feet to bathroom and back with Moderate Assistance and 2 persons  and  HHAx2, then 35' with mod-max(A) and RW   Gait Assessment: unsteady gait, decreased step length, narrow base of support, decreased weight shift, decreased alyx, inconsistent right foot placement, and decreased right knee stability   Gait Pattern Observed: Step-to   Comments: All lines remained intact throughout ambulation trial, gait belt utilized. When ambulating with RW, pt had 3 LOB due to R LE instability that required max(A) to correct. Pt ataxic on R with occasional scissoring         Balance:  1. Static Sit:   - Stand-By Assist at EOB  - Normal: Patient able to maintain steady balance without handhold support.  2. Static Stand:   - Min Assist with Rolling walker  - Fair: Patient able to maintain balance with handhold support; may require occasional minimal assistance.  3. Dynamic Stand:  - Mod Assist with Rolling walker            Past Medical History:   Diagnosis Date    Allergy      Cataract     Diabetes mellitus type II     Gastritis     with gastric ulcer    GERD (gastroesophageal reflux disease)     H. pylori infection     History of hepatitis C, SVR as of 8-2016 8/25/2015    [facility] 12 wks completed.  SVR(12) as of 8/4/16 SVR(24) as of 10-     Hypertension     Osteopenia     Type 2 diabetes mellitus with diabetic polyneuropathy      Past Surgical History:   Procedure Laterality Date    COLONOSCOPY      COLONOSCOPY N/A 1/28/2016    Procedure: COLONOSCOPY;  Surgeon: Emeka Ahn MD;  [facility]  Service: Endoscopy;  Laterality: N/A;    COLONOSCOPY N/A 8/8/2019    Procedure: COLONOSCOPY;  Surgeon: Susan Dia MD;  Location: [facility] ENDO (4TH FLR);  Service: Endoscopy;  Laterality: N/A;  appt confirmed-rb    HYSTERECTOMY      LIVER BIOPSY      OOPHORECTOMY      PERIPHERAL ANGIOGRAPHY Left 5/5/2021    Procedure: Peripheral angiography;  Surgeon: Randolph Toribio MD;  Location: [facility] CATH LAB;  Service: Cardiology;  Laterality: Left;    PERIPHERAL ANGIOGRAPHY N/A 6/21/2021    Procedure: Peripheral angiography;  Surgeon: Randolph Toribio MD;  Location: [facility] CATH LAB;  Service: Cardiology;  Laterality: N/A;    UPPER GASTROINTESTINAL ENDOSCOPY       Review of patient's allergies indicates:   Allergen Reactions    Amlodipine Swelling    Erythromycin Anaphylaxis    Erythropoietin analogues Anaphylaxis    Pegasys [peginterferon ruben-2a] Anaphylaxis    Lisinopril Hives       Scheduled Medications:    aspirin  81 mg Oral Daily    atorvastatin  40 mg Oral Daily    clopidogreL  75 mg Oral Daily    doxepin  10 mg Oral QHS    gabapentin  300 mg Oral QHS    heparin (porcine)  5,000 Units Subcutaneous Q8H    insulin aspart U-100  4 Units Subcutaneous TIDWM    insulin detemir U-100  10 Units Subcutaneous BID    senna-docusate 8.6-50 mg  1 tablet Oral BID       PRN Medications: acetaminophen, albuterol, dextrose 10%, dextrose 10%, dextrose,  dextrose, glucagon (human recombinant), hydrALAZINE, insulin aspart U-100, labetaloL, ondansetron, ondansetron, sodium chloride 0.9%, sodium chloride 0.9%    Family History       Problem Relation (Age of Onset)    Breast cancer Sister    Cancer Sister, Sister (70)    Diabetes Mother, Sister, Sister, Sister    Heart disease Mother, Father    Hyperlipidemia Mother    Hypertension Mother          Tobacco Use    Smoking status: Former     Packs/day: 0.25     Years: 48.00     Pack years: 12.00     Types: Cigarettes     Quit date: 3/28/2021     Years since quittin.9    Smokeless tobacco: Never   Substance and Sexual Activity    Alcohol use: No     Comment: special occasions    Drug use: No    Sexual activity: Yes     Partners: Male     Review of Systems   Constitutional:  Positive for activity change.   Eyes: Negative.    Respiratory:  Negative for cough and shortness of breath.    Cardiovascular:  Negative for chest pain and leg swelling.   Gastrointestinal:  Positive for constipation. Negative for nausea and vomiting.   Endocrine: Negative.    Genitourinary: Negative.    Musculoskeletal:  Positive for gait problem.   Allergic/Immunologic: Negative.    Neurological:  Positive for weakness. Negative for dizziness and numbness.   Psychiatric/Behavioral:  Negative for confusion.    Objective:     Vital Signs (Most Recent):  Temp: 98.4 °F (36.9 °C) (23 08)  Pulse: 62 (23 08)  Resp: 17 (23)  BP: 120/82 (23)  SpO2: 97 % (23)    Vital Signs (24h Range):  Temp:  [97.8 °F (36.6 °C)-98.8 °F (37.1 °C)] 98.4 °F (36.9 °C)  Pulse:  [56-77] 62  Resp:  [17-18] 17  SpO2:  [96 %-100 %] 97 %  BP: (120-232)/(79-98) 120/82     Body mass index is 36.44 kg/m².    Physical Exam  Vitals and nursing note reviewed.   Constitutional:       General: She is awake.      Appearance: Normal appearance. She is obese.   HENT:      Head: Normocephalic and atraumatic.   Pulmonary:      Effort:  Pulmonary effort is normal. No respiratory distress.   Abdominal:      General: There is no distension.      Palpations: Abdomen is soft.   Musculoskeletal:         General: Normal range of motion.      Cervical back: Normal range of motion and neck supple.   Skin:     General: Skin is warm and dry.      Capillary Refill: Capillary refill takes 2 to 3 seconds.   Neurological:      General: No focal deficit present.      Mental Status: She is alert and oriented to person, place, and time.      GCS: GCS eye subscore is 4. GCS verbal subscore is 5. GCS motor subscore is 6.      Sensory: No sensory deficit.      Motor: Weakness present.      Gait: Gait abnormal.   Psychiatric:         Attention and Perception: Attention normal.         Mood and Affect: Mood normal. Affect is flat.         Speech: Speech normal.         Behavior: Behavior is cooperative.     NEUROLOGICAL EXAMINATION:     MENTAL STATUS   Oriented to person, place, and time.   Speech: speech is normal     Diagnostic Results: Labs: Reviewed    Assessment/Plan:     * Thrombotic stroke involving right cerebellar artery  -MRI Brain- There are 2 small adjacent foci of acute lacunar infarction in the right cerebellar peduncle.  Recommend follow-up. Possible minimal subacute infarction at the periphery of a small remote right corona radiata infarct. Involutional changes with chronic microvascular ischemic changes.  -CTH- Without acute intracranial abnormality.   -TTE- With EF of 55%.  -PT/OT rec IPR. Pt amenable.  -Continue DAPT.  -Continue statin.  -Monitor BP closely.     Atherosclerotic heart disease of native coronary artery with other forms of angina pectoris  -Continue medical management with DAPT and statin.     NICM (nonischemic cardiomyopathy)  -TTE with EF of 55%.      Hyperlipidemia LDL goal <70  -Continue statin.    Bilateral carotid artery disease  -CTA Head and neck- Acute small infarcts in the right cerebellar peduncles and subtle possible subacute  infarct in the right coronary radiata seen on prior MRI are not as well demonstrated on CTA exam.  No aneurysm, high-grade stenosis, or major vessel occlusion.  Hypoplastic right A1.Findings compatible generalized volume loss and microvascular ischemic changes.     -Multinodular thyroid.  Evaluation with thyroid ultrasound on a nonemergent basis recommended.       Type 2 diabetes mellitus with hyperglycemia, with long-term current use of insulin  -SSI while in patient.     Essential hypertension  -Monitor BP closely.     PM&R Recommendation:     At this time, the PM&R team has reviewed this patient's ongoing medical case including inpatient diagnosis, medical history, clinical examination, labs, vitals, current social and functional history to provide the post-acute recommendation as follows:     RECOMMENDATIONS:inpatient rehabilitation due to good motivation/participation with therapies, has been determined to tolerate 3 hours of therapy and good potential for recovery, once medically stable.        The patient will be admitted for comprehensive interdisciplinary inpatient rehabilitation to address the impairments due to her medical diagnosis of Right cerebellar artery thrombotic stroke The patient will benefit from an inpatient rehabilitation program to promote functional recovery, implement compensatory strategies and will undergo assessment for needs for durable medical equipment for safe discharge to the community. This patient will benefit from a coordinated interdisciplinary rehabilitation program services that require close monitoring and treatment with 24-hour rehabilitative nursing and physical/occupational therapies for 3 hours/day for 5 days/week, physical/occupational/speech therapies for 3 hours/day for 5 days/week. This interdisciplinary program will be performed under the direction of a physiatrist.        We will  sign off with the transfer of the patient to [facility] liaison who will  continue to follow going forward until admission to [facility].        Thank you for your consult.     Carmen Berry NP  Department of Physical Medicine & Rehab  [facility] - Neurosurgery ([facility])

## 2023-02-28 NOTE — MEDICAL/APP STUDENT
"[facility]  In-Patient Hospital Progress Note  9th Floor  DATE  02/28/2023  ASSESSMENT and PLAN:  Problem List:  Thrombotic stroke involving right cerebellar artery  Buck Ibarra is a 77 y.o. female with a significant medical history of DMII, HTN, HLD, CAD, and NICM who presents to the hospital for evaluation of dizziness and difficulty walking for 2 days with associated vomiting.   Patient Active Problem List    Diagnosis Date Noted    Thrombotic stroke involving right cerebellar artery 02/27/2023    Atherosclerotic heart disease of native coronary artery with other forms of angina pectoris 04/14/2022    Simple chronic bronchitis 04/14/2022    Leg pain, right     NICM (nonischemic cardiomyopathy) 03/04/2021    PAD (peripheral artery disease) 03/04/2021    Screening for malignant neoplasm of colon 08/08/2019    Blood glucose elevated 05/09/2019    Chest wall tenderness 01/01/2019    Aortic atherosclerosis 07/14/2016    Obesity (BMI 30-39.9) 04/21/2016    Stage 3a chronic kidney disease 01/21/2016    Type 2 diabetes mellitus with diabetic polyneuropathy, with long-term current use of insulin 01/21/2016    Multinodular goiter 09/01/2015    Gastroesophageal reflux disease with esophagitis 08/25/2015    History of hepatitis C, SVR as of 8-2016 08/25/2015    Bilateral carotid artery disease 08/25/2015    Hyperlipidemia LDL goal <70 08/25/2015    Insomnia 07/22/2015    Essential hypertension 07/22/2015    Type 2 diabetes mellitus with hyperglycemia, with long-term current use of insulin 07/22/2015    Osteopenia      Assessment:  - NIH Stroke Scale Scoring today 2.  - Patient mood is "alright but a little whoozy".  Plan:  - Patient will continue medical management for stroke prevention, including:  Antithrombotic medicine for secondary stroke prevention:   Antiplatelets: Aspirin: 81 mg daily  Clopidogrel: 75 mg daily  Statins for secondary stroke prevention and hyperlipidemia:   Statins: " Atorvastatin- 40 mg daily  Antihypertensives:   SBP < 220  Will hold home meds for now, can likely resume in 48-72 hours.  Received 1 time dose of Labetalol in the ED for elevated blood pressure.  In-patient VTE prophylaxis:   Heparin 5000 units SQ every 8 hours  Mechanical prophylaxis: Place SCDs  - PT/OT:   The patient has been evaluated by a stroke team provider and the therapy needs have been fully considered based off the presenting complaints and exam findings. The following therapy evaluations are needed: PT evaluate and treat, OT evaluate and treat, PM&R evaluate for appropriate placement    - Discharge Plan:   Patient would benefit from acute skilled OT services to address these deficits and reach maximum level of function. Therapy recommending rehab at discharge and patient and her dtr would like [facility].      NIH Stroke Scale  1a. Level of Consciousness: 0=alert; keenly responsive  1b. Level of Consciousness questions (Age,  Month):  0=Answers both tasks correctly  1c.: Level of Consciousness: commands (Blink,  Open/Close Fist):  0=Answers both tasks correctly  2. Horizontal extraocular movement for gaze: 0=normal  3. Visual fields: 0=No visual loss  4. Facial palsy: 0=Normal symmetric movement  5a. Motor drift, left arm for 10 seconds: 0=No drift, limb holds 90 (or 45) degrees for full 10 seconds  5b. Motor drift, right arm for 10 seconds: 1=Drift, limb holds 90 (or 45) degrees but drifts down before full 10 seconds: does not hit bed  6a. Motor drift, left leg for 5 seconds: 0=No drift, limb holds 90 (or 45) degrees for full 10 seconds  6b. Motor drift, right leg for 5 seconds: 0=No drift, limb holds 90 (or 45) degrees for full 10 seconds  7. Limb ataxia (finger-nose/finger, heel-shin): 1=Present in one limb  8. Sensory: 0=Normal; no sensory loss  9. Best Language: 0=No aphasia, normal  10. Dysarthria: 0=Normal  11. Extinction and Inattention (formerly  Neglect):  0=No abnormality\  NIH Stroke Scale  "Scoring Today: 2  Previous score was 1 taken on 2/27/23. Today, NIH Stroke Scale score is worse than compared to the  previous scoring.    SUBJECTIVE:  History of Present Illness:  Patient is a 77 y.o. female who  has a past medical history of Allergy, Cataract, Diabetes mellitus type II, Gastritis, GERD (gastroesophageal reflux disease), H. pylori infection, History of hepatitis C, SVR as of 8-2016 , Hypertension, Osteopenia, and Type 2 diabetes mellitus with diabetic polyneuropathy.. Patient presented on 2/26/23 due to dizziness and difficulty walking. Patient's last known normal was 2/24/23.  Patient's symptom onset was 2/25/23. Upon admission, patient's NIHSS score was 1.  She reports feeling "alright, but whoozy." Specifically, leg burning is the same as yesterday when ambulating and at rest. Patient reports ok mood. She also  reports right arm weakness and trouble performing the heel-to-shin test on the right leg. Her right leg burns more than the left. She denies chest pain, shortness of breath, neck pain, and headache.  Allergies:  Review of patient's allergies indicates:   Allergen Reactions    Amlodipine Swelling    Erythromycin Anaphylaxis    Erythropoietin analogues Anaphylaxis    Pegasys [peginterferon ruben-2a] Anaphylaxis    Lisinopril Hives     Current Medications:  Current Facility-Administered Medications   Medication Dose Route Frequency Provider Last Rate Last Admin    acetaminophen tablet 650 mg  650 mg Oral Q6H PRN Jeimy Rene DNP, KEYLA        albuterol inhaler 2 puff  2 puff Inhalation Q6H PRN Jeimy Rene DNP, NP        aspirin EC tablet 81 mg  81 mg Oral Daily Jeimy Rene DNP, NP   81 mg at 02/28/23 0832    atorvastatin tablet 40 mg  40 mg Oral Daily Jeimy Rene DNP, NP   40 mg at 02/28/23 0832    clopidogreL tablet 75 mg  75 mg Oral Daily Jeimy Rene DNP, NP   75 mg at 02/28/23 0832    dextrose 10% bolus 125 mL 125 mL  12.5 g Intravenous PRN Jeimy Rene DNP, " NP        dextrose 10% bolus 250 mL 250 mL  25 g Intravenous PRN Jeimy Rene DNP, NP        dextrose 40 % gel 15,000 mg  15 g Oral PRN Jeimy Rene DNP, NP        dextrose 40 % gel 30,000 mg  30 g Oral PRN Jeimy Rene DNP, NP        doxepin capsule 10 mg  10 mg Oral QHS Jeimy Rene DNP, NP   10 mg at 02/27/23 2117    gabapentin capsule 300 mg  300 mg Oral QHS Jeimy Rene DNP, NP   300 mg at 02/27/23 2117    glucagon (human recombinant) injection 1 mg  1 mg Intramuscular PRN Jeimy Rene DNP, NP        heparin (porcine) injection 5,000 Units  5,000 Units Subcutaneous Q8H Jeimy Rene DNP, NP   5,000 Units at 02/28/23 0543    hydrALAZINE injection 20 mg  20 mg Intravenous Q8H PRN Jeimy Rene DNP, NP   20 mg at 02/27/23 0403    insulin aspart U-100 pen 1-10 Units  1-10 Units Subcutaneous QID (AC + HS) PRN Jeimy Rene DNP, NP   2 Units at 02/27/23 1630    insulin aspart U-100 pen 4 Units  4 Units Subcutaneous TIDWM Emeka Galindo MD   4 Units at 02/28/23 0840    insulin detemir U-100 pen 10 Units  10 Units Subcutaneous BID Emeka Galindo MD   10 Units at 02/28/23 0834    labetalol 20 mg/4 mL (5 mg/mL) IV syring  10 mg Intravenous Q4H PRN Jeimy Rene DNP, NP   10 mg at 02/27/23 0232    ondansetron disintegrating tablet 8 mg  8 mg Oral Q8H PRN Jeimy Rene DNP, NP        ondansetron injection 4 mg  4 mg Intravenous Q12H PRN Jeimy Rene DNP, NP        senna-docusate 8.6-50 mg per tablet 1 tablet  1 tablet Oral BID Jeimy Rene DNP, NP   1 tablet at 02/28/23 0832    sodium chloride 0.9% bolus 500 mL 500 mL  500 mL Intravenous Continuous PRN Jeimy M. Anju, DNP, NP        sodium chloride 0.9% flush 10 mL  10 mL Intravenous PRN Jeimy Rene DNP, NP         Review of Systems:  Review of Systems   Constitutional: Negative.    HENT: Negative.     Eyes: Negative.    Respiratory: Negative.     Cardiovascular: Negative.    Gastrointestinal:  "Negative.    Endocrine: Negative.    Genitourinary: Negative.    Musculoskeletal: Negative.    Skin: Negative.    Allergic/Immunologic: Negative.    Neurological: Negative.    Hematological: Negative.    Psychiatric/Behavioral: Negative.     OBJECTIVE:  Vital Signs (Most Recent)  /82 (BP Location: Right arm, Patient Position: Lying)   Pulse 62   Temp 98.4 °F (36.9 °C) (Oral)   Resp 17   Ht 5' 5" (1.651 m)   Wt 99.3 kg (219 lb)   SpO2 97%   Breastfeeding No   BMI 36.44 kg/m²   Temp (24hrs), Av.3 °F (36.8 °C), Min:97.8 °F (36.6 °C), Max:98.8 °F (37.1 °C)    Physical Exam:  Physical Exam  Constitutional:       Appearance: Normal appearance.   HENT:      Head: Normocephalic.      Nose: Nose normal.      Mouth/Throat:      Mouth: Mucous membranes are moist.   Eyes:      Pupils: Pupils are equal, round, and reactive to light.      Comments: Corneal Arcus   Cardiovascular:      Rate and Rhythm: Normal rate and regular rhythm.      Pulses: Normal pulses.      Heart sounds: Normal heart sounds.   Pulmonary:      Effort: Pulmonary effort is normal.      Breath sounds: Normal breath sounds.   Abdominal:      General: Bowel sounds are normal.   Musculoskeletal:         General: Normal range of motion.      Cervical back: Normal range of motion.   Skin:     General: Skin is warm.      Capillary Refill: Capillary refill takes less than 2 seconds.   Neurological:      Mental Status: She is alert. Mental status is at baseline.      Cranial Nerves: No cranial nerve deficit.      Sensory: No sensory deficit.      Motor: Weakness (right arm weakness) present.      Coordination: Coordination abnormal (right leg ataxia).      Gait: Gait abnormal (impaired balance and gait).      Deep Tendon Reflexes: Reflexes normal.   Psychiatric:         Mood and Affect: Mood normal.         Behavior: Behavior normal.         Thought Content: Thought content normal.         Judgment: Judgment normal.     Laboratory Results:  Recent " Labs   Lab 02/26/23 2022 02/28/23  0630    139   K 3.8 3.8    109   CO2 26 24   BUN 25* 21   CREATININE 1.2 0.9   * 100   CALCIUM 9.1 9.2   MG  --  2.2   PHOS  --  4.3     Recent Labs   Lab 02/27/23  0541 02/28/23  0630   ALKPHOS  --  85   ALT  --  11   AST  --  11   ALBUMIN  --  2.8*   PROT  --  6.0   BILITOT  --  0.3   INR 1.0  --      Recent Labs   Lab 02/26/23 2022 02/28/23  0630   WBC 7.62 7.45   HGB 13.3 13.7   HCT 43.8 45.7    258     Imaging/Pathology Results:  TTE (2/27/23)  The left ventricle is normal in size with concentric remodeling and normal systolic function. The estimated ejection fraction is 55%.  Normal right ventricular size with normal right ventricular systolic function.  Indeterminate left ventricular diastolic function.  Severe left atrial enlargement.  The estimated PA systolic pressure is 16 mmHg.  Normal central venous pressure (3 mmHg).    CTA Head and Neck  Acute small infarcts in the right cerebellar peduncles and subtle possible subacute infarct in the right coronary radiata seen on prior MRI are not as well demonstrated on CTA exam.  No aneurysm, high-grade stenosis, or major vessel occlusion.  Hypoplastic right A1.     Findings compatible generalized volume loss and microvascular ischemic changes.     Multinodular thyroid.  Evaluation with thyroid ultrasound on a nonemergent basis recommended.  Reviewed past medical, surgical & family history.    She has a past medical history of Allergy, Cataract, Diabetes mellitus type II, Gastritis, GERD (gastroesophageal reflux disease), H. pylori infection, History of hepatitis C, SVR as of 8-2016 , Hypertension, Osteopenia, and Type 2 diabetes mellitus with diabetic polyneuropathy.  She has a past surgical history that includes Hysterectomy; Liver biopsy; Upper gastrointestinal endoscopy; Colonoscopy; Colonoscopy (N/A, 1/28/2016); Oophorectomy; Colonoscopy (N/A, 8/8/2019); Peripheral angiography (Left, 5/5/2021);  and Peripheral angiography (N/A, 2021).  Her family history includes Breast cancer in her sister; Cancer in her sister; Cancer (age of onset: 70) in her sister; Diabetes in her mother, sister, sister, and sister; Heart disease in her father and mother; Hyperlipidemia in her mother; Hypertension in her mother.  Social History     Socioeconomic History    Marital status: Single   Tobacco Use    Smoking status: Former     Packs/day: 0.25     Years: 48.00     Pack years: 12.00     Types: Cigarettes     Quit date: 3/28/2021     Years since quittin.9    Smokeless tobacco: Never   Substance and Sexual Activity    Alcohol use: No     Comment: special occasions    Drug use: No    Sexual activity: Yes     Partners: Male   Other Topics Concern    Are you pregnant or think you may be? No    Breast-feeding No   Social History Narrative    Lives with daughter and grandson. No assistance with ADLs and still driving.     Social Determinants of Health     Financial Resource Strain: Low Risk     Difficulty of Paying Living Expenses: Not hard at all   Food Insecurity: No Food Insecurity    Worried About Running Out of Food in the Last Year: Never true    Ran Out of Food in the Last Year: Never true   Transportation Needs: No Transportation Needs    Lack of Transportation (Medical): No    Lack of Transportation (Non-Medical): No   Physical Activity: Inactive    Days of Exercise per Week: 0 days    Minutes of Exercise per Session: 0 min   Stress: No Stress Concern Present    Feeling of Stress : Not at all   Social Connections: Socially Isolated    Frequency of Social Gatherings with Friends and Family: More than three times a week    Attends Sabianism Services: Never    Active Member of Clubs or Organizations: No    Attends Club or Organization Meetings: Never    Marital Status:    Housing Stability: Low Risk     Unable to Pay for Housing in the Last Year: No    Number of Places Lived in the Last Year: 1    Unstable  Housing in the Last Year: No     Patient Education:  - Per the American Stroke Association, the patient has been educated on secondary stroke prevention.  - Diet: Patient has been advised to follow a diet emphasizing vegetables, fruits, whole  grains, low-fat dairy products, fish, legumes, and nuts. Patient has been advised to limit sodium, sweets and red meat.  - Exercise: Patient has been advised that regular physical activity reduces stroke risk, positively impacts stroke risk factors and aids in  recovery. Patients who are able should engage in mild-to-moderate-intensity aerobic for a minimum of 10-20 minutes 3-4 times a  week. For patients with deficits that impair their ability to exercise, a supervised exercise program can be beneficial.  - Smoking: Patient has been advised that smoking approximately doubles the risk of stroke. Patient has been advised that counseling  with or without drug therapy is available to help quit smoking.  - Sleep Apnea: Patient has been advised that sleep apnea affects about 38%-40% of patients who have had a stroke. Patient has  been advised that a referral for testing is available, and that treatment with positive airway pressure may be beneficial.  I spent 20 minutes on this visit, including face-to-face with the patient, chart review, and updating medical, surgical, social, and  family history. All questions were directed to the medical team and answered.  --  Brian Gutierrez [facility]

## 2023-02-28 NOTE — PROGRESS NOTES
Rob Borjas - Neurosurgery ([facility])  Vascular Neurology  Comprehensive Stroke Center  Progress Note    Assessment/Plan:     * Thrombotic stroke involving right cerebellar artery  Buck Ibarra is a 77 y.o. female with a significant medical history of DMII, HTN, HLD, CAD, and NICM who presents to the hospital for evaluation of dizziness and difficulty walking for 2 days with associated vomiting.     Antithrombotics for secondary stroke prevention: Antiplatelets: Aspirin: 81 mg daily  Clopidogrel: 75 mg daily    Statins for secondary stroke prevention and hyperlipidemia, if present:   Statins: Atorvastatin- 40 mg daily    Aggressive risk factor modification: HTN, DM, HLD, Diet, Exercise, Obesity, CAD     Rehab efforts: The patient has been evaluated by a stroke team provider and the therapy needs have been fully considered based off the presenting complaints and exam findings. The following therapy evaluations are needed: PT evaluate and treat, OT evaluate and treat, PM&R evaluate for appropriate placement Recs for rehab, pending placement    Diagnostics ordered/pending: CTA Head to assess vasculature , CTA Neck/Arch to assess vasculature, Lipid Profile to assess cholesterol levels, TTE to assess cardiac function/status     VTE prophylaxis: Heparin 5000 units SQ every 8 hours  Mechanical prophylaxis: Place SCDs    BP parameters: Infarct: No intervention, SBP <220        Atherosclerotic heart disease of native coronary artery with other forms of angina pectoris  Continue DAPT/statin    NICM (nonischemic cardiomyopathy)  TTE with   The left ventricle is normal in size with concentric remodeling and normal systolic function. The estimated ejection fraction is 55%.   Normal right ventricular size with normal right ventricular systolic function.   Indeterminate left ventricular diastolic function.   Severe left atrial enlargement.   The estimated PA systolic pressure is 16 mmHg.   Normal central venous pressure  (3 mmHg).      Hyperlipidemia LDL goal <70  Stroke Risk Factor    Atorvastatin 40mg PO daily at home, will increase to 80    Bilateral carotid artery disease  CTA head and neck with no aneurysm, high-grade stenosis, or major vessel occlusion    Type 2 diabetes mellitus with hyperglycemia, with long-term current use of insulin  Stroke Risk Factor  Glucose 263  Last A1C 2/23/23 7.9.  Basal/bolus regimen  Titrate regimen PRN  Will increase gabapentin frequency given burning pain reported in legs    Essential hypertension  Stroke Risk Factor  SBP < 220  Will hold home meds for now, can likely resume in 48-72 hours.  Received 1 time dose of Labetalol in the ED for elevated blood pressure.         Patient admitted to stroke service in setting of R cerebellar peduncle stroke. NIHSS 1 on admission for limb ataxia. Evaluated by PT/OT with recs for rehab.      STROKE DOCUMENTATION        NIH Scale:  1a. Level of Consciousness: 0-->Alert, keenly responsive  1b. LOC Questions: 0-->Answers both questions correctly  1c. LOC Commands: 0-->Performs both tasks correctly  2. Best Gaze: 0-->Normal  3. Visual: 0-->No visual loss  4. Facial Palsy: 0-->Normal symmetrical movements  5a. Motor Arm, Left: 0-->No drift, limb holds 90 (or 45) degrees for full 10 secs  5b. Motor Arm, Right: 0-->No drift, limb holds 90 (or 45) degrees for full 10 secs  6a. Motor Leg, Left: 0-->No drift, leg holds 30 degree position for full 5 secs  6b. Motor Leg, Right: 0-->No drift, leg holds 30 degree position for full 5 secs  7. Limb Ataxia: 1-->Present in one limb  8. Sensory: 0-->Normal, no sensory loss  9. Best Language: 0-->No aphasia, normal  10. Dysarthria: 0-->Normal  11. Extinction and Inattention (formerly Neglect): 0-->No abnormality  Total (NIH Stroke Scale): 1       Modified Natalia Score: 0  [place] Coma Scale:    ABCD2 Score:    [id number] Score:   HAS -BLED Score:   ICH Score:   Hunt & Hutton Classification:      Hemorrhagic change of an  Ischemic Stroke: Does this patient have an ischemic stroke with hemorrhagic changes? No     Neurologic Chief Complaint: dizziness, R cerebellar peduncle stroke    Subjective:     Interval History: Patient is seen for follow-up neurological assessment and treatment recommendations: No events overnight. Feeling well this morning. Had some burning pain in legs after walking yesterday.    HPI, Past Medical, Family, and Social History remains the same as documented in the initial encounter.     Review of Systems   Constitutional:  Positive for activity change. Negative for fever.   HENT:  Negative for drooling and trouble swallowing.    Eyes:  Positive for visual disturbance.   Respiratory:  Negative for cough and shortness of breath.    Cardiovascular:  Negative for chest pain and leg swelling.   Gastrointestinal:  Positive for nausea and vomiting. Negative for abdominal pain.   Genitourinary:  Positive for urgency. Negative for dysuria.   Musculoskeletal:  Positive for gait problem. Negative for neck pain and neck stiffness.   Skin:  Negative for color change and rash.   Allergic/Immunologic: Negative for food allergies.   Neurological:  Positive for dizziness. Negative for speech difficulty, weakness and headaches.   Psychiatric/Behavioral:  Negative for agitation and confusion.    Scheduled Meds:   aspirin  81 mg Oral Daily    atorvastatin  40 mg Oral Daily    clopidogreL  75 mg Oral Daily    doxepin  10 mg Oral QHS    gabapentin  300 mg Oral QHS    heparin (porcine)  5,000 Units Subcutaneous Q8H    insulin aspart U-100  4 Units Subcutaneous TIDWM    insulin detemir U-100  10 Units Subcutaneous BID    senna-docusate 8.6-50 mg  1 tablet Oral BID     Continuous Infusions:   sodium chloride 0.9%       PRN Meds:acetaminophen, albuterol, dextrose 10%, dextrose 10%, dextrose, dextrose, glucagon (human recombinant), hydrALAZINE, insulin aspart U-100, labetaloL, ondansetron, ondansetron, sodium chloride 0.9%,  sodium chloride 0.9%    Objective:     Vital Signs (Most Recent):  Temp: 98.4 °F (36.9 °C) (02/28/23 0830)  Pulse: 62 (02/28/23 0830)  Resp: 17 (02/28/23 0830)  BP: 120/82 (02/28/23 0830)  SpO2: 97 % (02/28/23 0830)  BP Location: Right arm    Vital Signs Range (Last 24H):  Temp:  [97.8 °F (36.6 °C)-98.8 °F (37.1 °C)]   Pulse:  [56-77]   Resp:  [17-18]   BP: (120-232)/(79-98)   SpO2:  [96 %-100 %]   BP Location: Right arm    Physical Exam  Vitals and nursing note reviewed.   Constitutional:       General: She is not in acute distress.     Appearance: She is well-developed. She is not diaphoretic.   HENT:      Head: Normocephalic and atraumatic.      Right Ear: External ear normal.      Left Ear: External ear normal.      Nose: Nose normal.      Mouth/Throat:      Mouth: Mucous membranes are moist.   Eyes:      General: No scleral icterus.        Right eye: No discharge.         Left eye: No discharge.      Extraocular Movements: Extraocular movements intact.      Conjunctiva/sclera: Conjunctivae normal.      Pupils: Pupils are equal, round, and reactive to light.   Cardiovascular:      Rate and Rhythm: Normal rate and regular rhythm.   Pulmonary:      Effort: Pulmonary effort is normal. No respiratory distress.   Abdominal:      Palpations: Abdomen is soft.      Tenderness: There is no abdominal tenderness.   Musculoskeletal:      Cervical back: Normal range of motion and neck supple.      Right lower leg: No edema.      Left lower leg: No edema.   Skin:     General: Skin is warm and dry.      Capillary Refill: Capillary refill takes less than 2 seconds.   Neurological:      Mental Status: She is alert and oriented to person, place, and time.      Sensory: No sensory deficit.      Motor: No weakness.      Coordination: Coordination abnormal.       Neurological Exam:   LOC: alert  Attention Span: Good   Language: No aphasia  Articulation: No dysarthria  Orientation: Person, Place, Time   Visual Fields: Full  EOM (CN  III, IV, VI): Full/intact  Pupils (CN II, III): PERRL  Facial Movement (CN VII): Symmetric facial expression    Cerebellum: Upper Extremity Appendicular Ataxia (Finger Nose Finger)  Right  Sensation: Intact to light touch, temperature and vibration  Tone: Normal tone throughout    Laboratory:  BMP:   Recent Labs   Lab 02/28/23  0630      K 3.8      CO2 24   BUN 21   CREATININE 0.9   CALCIUM 9.2     CBC:   Recent Labs   Lab 02/28/23  0630   WBC 7.45   RBC 4.92   HGB 13.7   HCT 45.7      MCV 93   MCH 27.8   MCHC 30.0*       Diagnostic Results     Brain Imaging   MRI brain 2/26/2023 - Impression:     1. There are 2 small adjacent foci of acute lacunar infarction in the right cerebellar peduncle.  Recommend follow-up.  2. Possible minimal subacute infarction at the periphery of a small remote right corona radiata infarct..  3. Involutional changes with chronic microvascular ischemic changes.    Vessel Imaging   CTA 2/27/23  No aneurysm, high-grade stenosis, or major vessel occlusion. Hypoplastic right A1.     Findings compatible generalized volume loss and microvascular ischemic changes.    Cardiac Imaging   Echo 2/27/23   The left ventricle is normal in size with concentric remodeling and normal systolic function. The estimated ejection fraction is 55%.   Normal right ventricular size with normal right ventricular systolic function.   Indeterminate left ventricular diastolic function.   Severe left atrial enlargement.   The estimated PA systolic pressure is 16 mmHg.   Normal central venous pressure (3 mmHg).      Emeka Galindo MD  Comprehensive Stroke Center  Department of Vascular Neurology   [facility])

## 2023-02-28 NOTE — PLAN OF CARE
Problem: Cerebral Tissue Perfusion (Stroke, Ischemic/Transient Ischemic Attack)  Goal: Optimal Cerebral Tissue Perfusion  Outcome: Ongoing, Progressing     Problem: Functional Ability Impaired (Stroke, Ischemic/Transient Ischemic Attack)  Goal: Optimal Functional Ability  Outcome: Ongoing, Progressing     Problem: Sensorimotor Impairment (Stroke, Ischemic/Transient Ischemic Attack)  Goal: Improved Sensorimotor Function  Outcome: Ongoing, Progressing     Problem: Fall Injury Risk  Goal: Absence of Fall and Fall-Related Injury  Outcome: Ongoing, Progressing     Problem: Adult Inpatient Plan of Care  Goal: Plan of Care Review  Outcome: Ongoing, Progressing  Goal: Absence of Hospital-Acquired Illness or Injury  Outcome: Ongoing, Progressing  Goal: Optimal Comfort and Wellbeing  Outcome: Ongoing, Progressing     Problem: Diabetes Comorbidity  Goal: Blood Glucose Level Within Targeted Range  Outcome: Ongoing, Progressing

## 2023-03-01 NOTE — PROGRESS NOTES
Rob Borjas - Neurosurgery ([facility])  Vascular Neurology  Comprehensive Stroke Center  Progress Note    Assessment/Plan:     * Thrombotic stroke involving right cerebellar artery  Buck Ibarra is a 77 y.o. female with a significant medical history of DMII, HTN, HLD, CAD, and NICM who presents to the hospital for evaluation of dizziness and difficulty walking for 2 days with associated vomiting.     Antithrombotics for secondary stroke prevention: Antiplatelets: Aspirin: 81 mg daily  Clopidogrel: 75 mg daily    Statins for secondary stroke prevention and hyperlipidemia, if present:   Statins: Atorvastatin- 40 mg daily    Aggressive risk factor modification: HTN, DM, HLD, Diet, Exercise, Obesity, CAD     Rehab efforts: The patient has been evaluated by a stroke team provider and the therapy needs have been fully considered based off the presenting complaints and exam findings. The following therapy evaluations are needed: PT evaluate and treat, OT evaluate and treat, PM&R evaluate for appropriate placement Recs for rehab, pending placement    Diagnostics ordered/pending: CTA Head to assess vasculature , CTA Neck/Arch to assess vasculature, Lipid Profile to assess cholesterol levels, TTE to assess cardiac function/status     VTE prophylaxis: Heparin 5000 units SQ every 8 hours  Mechanical prophylaxis: Place SCDs    BP parameters: Infarct: No intervention, SBP <220        Atherosclerotic heart disease of native coronary artery with other forms of angina pectoris  Continue DAPT/statin    NICM (nonischemic cardiomyopathy)  TTE with   The left ventricle is normal in size with concentric remodeling and normal systolic function. The estimated ejection fraction is 55%.   Normal right ventricular size with normal right ventricular systolic function.   Indeterminate left ventricular diastolic function.   Severe left atrial enlargement.   The estimated PA systolic pressure is 16 mmHg.   Normal central venous pressure  (3 mmHg).      Hyperlipidemia LDL goal <70  Stroke Risk Factor    Atorvastatin 40mg PO daily at home, will increase to 80    Bilateral carotid artery disease  CTA head and neck with no aneurysm, high-grade stenosis, or major vessel occlusion    Type 2 diabetes mellitus with hyperglycemia, with long-term current use of insulin  Stroke Risk Factor  Glucose 263  Last A1C 2/23/23 7.9.  Basal/bolus regimen  Titrate regimen PRN  Will increase gabapentin frequency given burning pain reported in legs    Essential hypertension  Stroke Risk Factor  SBP < 220  Will hold home meds for now, can likely resume in 48-72 hours.  Received 1 time dose of Labetalol in the ED for elevated blood pressure.         Patient admitted to stroke service in setting of R cerebellar peduncle stroke. NIHSS 1 on admission for limb ataxia. Evaluated by PT/OT with recs for rehab.      STROKE DOCUMENTATION        NIH Scale:  1a. Level of Consciousness: 0-->Alert, keenly responsive  1b. LOC Questions: 0-->Answers both questions correctly  1c. LOC Commands: 0-->Performs both tasks correctly  2. Best Gaze: 0-->Normal  3. Visual: 0-->No visual loss  4. Facial Palsy: 0-->Normal symmetrical movements  5a. Motor Arm, Left: 0-->No drift, limb holds 90 (or 45) degrees for full 10 secs  5b. Motor Arm, Right: 0-->No drift, limb holds 90 (or 45) degrees for full 10 secs  6a. Motor Leg, Left: 0-->No drift, leg holds 30 degree position for full 5 secs  6b. Motor Leg, Right: 0-->No drift, leg holds 30 degree position for full 5 secs  7. Limb Ataxia: 1-->Present in one limb  8. Sensory: 0-->Normal, no sensory loss  9. Best Language: 0-->No aphasia, normal  10. Dysarthria: 0-->Normal  11. Extinction and Inattention (formerly Neglect): 0-->No abnormality  Total (NIH Stroke Scale): 1       Modified Natalia Score: 0  [place] Coma Scale:    ABCD2 Score:    [id number] Score:   HAS -BLED Score:   ICH Score:   Hunt & Hutton Classification:      Hemorrhagic change of an  Ischemic Stroke: Does this patient have an ischemic stroke with hemorrhagic changes? No     Neurologic Chief Complaint: dizziness, R cerebellar peduncle stroke    Subjective:     Interval History: Patient is seen for follow-up neurological assessment and treatment recommendations: No events overnight. Feeling well this morning. Added miralax for constipation.    HPI, Past Medical, Family, and Social History remains the same as documented in the initial encounter.     Review of Systems   Constitutional:  Positive for activity change. Negative for fever.   HENT:  Negative for drooling and trouble swallowing.    Eyes:  Positive for visual disturbance.   Respiratory:  Negative for cough and shortness of breath.    Cardiovascular:  Negative for chest pain and leg swelling.   Gastrointestinal:  Positive for nausea and vomiting. Negative for abdominal pain.   Genitourinary:  Positive for urgency. Negative for dysuria.   Musculoskeletal:  Positive for gait problem. Negative for neck pain and neck stiffness.   Skin:  Negative for color change and rash.   Allergic/Immunologic: Negative for food allergies.   Neurological:  Positive for dizziness. Negative for speech difficulty, weakness and headaches.   Psychiatric/Behavioral:  Negative for agitation and confusion.    Scheduled Meds:   aspirin  81 mg Oral Daily    atorvastatin  80 mg Oral Daily    carvediloL  6.25 mg Oral BID    chlorthalidone  50 mg Oral Daily    clopidogreL  75 mg Oral Daily    doxepin  10 mg Oral QHS    gabapentin  300 mg Oral BID    heparin (porcine)  5,000 Units Subcutaneous Q8H    insulin aspart U-100  4 Units Subcutaneous TIDWM    insulin detemir U-100  10 Units Subcutaneous BID    polyethylene glycol  17 g Oral Daily    senna-docusate 8.6-50 mg  1 tablet Oral BID    valsartan  320 mg Oral Daily     Continuous Infusions:   sodium chloride 0.9%       PRN Meds:acetaminophen, albuterol, dextrose 10%, dextrose 10%, dextrose, dextrose, glucagon  (human recombinant), hydrALAZINE, insulin aspart U-100, labetaloL, ondansetron, ondansetron, sodium chloride 0.9%, sodium chloride 0.9%    Objective:     Vital Signs (Most Recent):  Temp: 98.5 °F (36.9 °C) (03/01/23 1129)  Pulse: 93 (03/01/23 1135)  Resp: 16 (03/01/23 1129)  BP: (!) 190/87 (03/01/23 1129)  SpO2: 98 % (03/01/23 1129)  BP Location: Right arm    Vital Signs Range (Last 24H):  Temp:  [97 °F (36.1 °C)-98.7 °F (37.1 °C)]   Pulse:  [66-96]   Resp:  [16-20]   BP: (136-202)/(71-89)   SpO2:  [92 %-98 %]   BP Location: Right arm    Physical Exam  Vitals and nursing note reviewed.   Constitutional:       General: She is not in acute distress.     Appearance: She is well-developed. She is not diaphoretic.   HENT:      Head: Normocephalic and atraumatic.      Right Ear: External ear normal.      Left Ear: External ear normal.      Nose: Nose normal.      Mouth/Throat:      Mouth: Mucous membranes are moist.   Eyes:      General: No scleral icterus.        Right eye: No discharge.         Left eye: No discharge.      Extraocular Movements: Extraocular movements intact.      Conjunctiva/sclera: Conjunctivae normal.      Pupils: Pupils are equal, round, and reactive to light.   Cardiovascular:      Rate and Rhythm: Normal rate and regular rhythm.   Pulmonary:      Effort: Pulmonary effort is normal. No respiratory distress.   Abdominal:      Palpations: Abdomen is soft.      Tenderness: There is no abdominal tenderness.   Musculoskeletal:      Cervical back: Normal range of motion and neck supple.      Right lower leg: No edema.      Left lower leg: No edema.   Skin:     General: Skin is warm and dry.      Capillary Refill: Capillary refill takes less than 2 seconds.   Neurological:      Mental Status: She is alert and oriented to person, place, and time.      Sensory: No sensory deficit.      Motor: No weakness.      Coordination: Coordination abnormal.       Neurological Exam:   LOC: alert  Attention Span: Good    Language: No aphasia  Articulation: No dysarthria  Orientation: Person, Place, Time   Visual Fields: Full  EOM (CN III, IV, VI): Full/intact  Pupils (CN II, III): PERRL  Facial Movement (CN VII): Symmetric facial expression    Cerebellum: Upper Extremity Appendicular Ataxia (Finger Nose Finger)  Right  Sensation: Intact to light touch, temperature and vibration  Tone: Normal tone throughout    Laboratory:  BMP:   Recent Labs   Lab 03/01/23  0614      K 4.1      CO2 23   BUN 31*   CREATININE 1.0   CALCIUM 9.1       CBC:   Recent Labs   Lab 03/01/23  0614   WBC 7.13   RBC 4.80   HGB 13.3   HCT 44.3      MCV 92   MCH 27.7   MCHC 30.0*         Diagnostic Results     Brain Imaging   MRI brain 2/26/2023 - Impression:     1. There are 2 small adjacent foci of acute lacunar infarction in the right cerebellar peduncle.  Recommend follow-up.  2. Possible minimal subacute infarction at the periphery of a small remote right corona radiata infarct..  3. Involutional changes with chronic microvascular ischemic changes.    Vessel Imaging   CTA 2/27/23  No aneurysm, high-grade stenosis, or major vessel occlusion. Hypoplastic right A1.     Findings compatible generalized volume loss and microvascular ischemic changes.    Cardiac Imaging   Echo 2/27/23   The left ventricle is normal in size with concentric remodeling and normal systolic function. The estimated ejection fraction is 55%.   Normal right ventricular size with normal right ventricular systolic function.   Indeterminate left ventricular diastolic function.   Severe left atrial enlargement.   The estimated PA systolic pressure is 16 mmHg.   Normal central venous pressure (3 mmHg).      Emeka Galindo MD  Comprehensive Stroke Center  Department of Vascular Neurology   [facility]

## 2023-03-01 NOTE — PLAN OF CARE
03/01/23 1440   Final Note   Assessment Type Final Discharge Note   Anticipated Discharge Disposition Rehab   Post-Acute Status   Post-Acute Authorization Placement   Post-Acute Placement Status Set-up Complete/Auth obtained   Coverage PHN     SW provided RN with number for report and set up transport for 4pm with  raquel.  Patient discharging to [facility].  Patient and dtr aware.      Closed to FADY [facility] LMSW  [facility]  [phone]

## 2023-03-01 NOTE — SUBJECTIVE & OBJECTIVE
Neurologic Chief Complaint: dizziness, R cerebellar peduncle stroke    Subjective:     Interval History: Patient is seen for follow-up neurological assessment and treatment recommendations: No events overnight. Feeling well this morning. Added miralax for constipation.    HPI, Past Medical, Family, and Social History remains the same as documented in the initial encounter.     Review of Systems   Constitutional:  Positive for activity change. Negative for fever.   HENT:  Negative for drooling and trouble swallowing.    Eyes:  Positive for visual disturbance.   Respiratory:  Negative for cough and shortness of breath.    Cardiovascular:  Negative for chest pain and leg swelling.   Gastrointestinal:  Positive for nausea and vomiting. Negative for abdominal pain.   Genitourinary:  Positive for urgency. Negative for dysuria.   Musculoskeletal:  Positive for gait problem. Negative for neck pain and neck stiffness.   Skin:  Negative for color change and rash.   Allergic/Immunologic: Negative for food allergies.   Neurological:  Positive for dizziness. Negative for speech difficulty, weakness and headaches.   Psychiatric/Behavioral:  Negative for agitation and confusion.    Scheduled Meds:   aspirin  81 mg Oral Daily    atorvastatin  80 mg Oral Daily    carvediloL  6.25 mg Oral BID    chlorthalidone  50 mg Oral Daily    clopidogreL  75 mg Oral Daily    doxepin  10 mg Oral QHS    gabapentin  300 mg Oral BID    heparin (porcine)  5,000 Units Subcutaneous Q8H    insulin aspart U-100  4 Units Subcutaneous TIDWM    insulin detemir U-100  10 Units Subcutaneous BID    polyethylene glycol  17 g Oral Daily    senna-docusate 8.6-50 mg  1 tablet Oral BID    valsartan  320 mg Oral Daily     Continuous Infusions:   sodium chloride 0.9%       PRN Meds:acetaminophen, albuterol, dextrose 10%, dextrose 10%, dextrose, dextrose, glucagon (human recombinant), hydrALAZINE, insulin aspart U-100, labetaloL, ondansetron, ondansetron, sodium  chloride 0.9%, sodium chloride 0.9%    Objective:     Vital Signs (Most Recent):  Temp: 98.5 °F (36.9 °C) (03/01/23 1129)  Pulse: 93 (03/01/23 1135)  Resp: 16 (03/01/23 1129)  BP: (!) 190/87 (03/01/23 1129)  SpO2: 98 % (03/01/23 1129)  BP Location: Right arm    Vital Signs Range (Last 24H):  Temp:  [97 °F (36.1 °C)-98.7 °F (37.1 °C)]   Pulse:  [66-96]   Resp:  [16-20]   BP: (136-202)/(71-89)   SpO2:  [92 %-98 %]   BP Location: Right arm    Physical Exam  Vitals and nursing note reviewed.   Constitutional:       General: She is not in acute distress.     Appearance: She is well-developed. She is not diaphoretic.   HENT:      Head: Normocephalic and atraumatic.      Right Ear: External ear normal.      Left Ear: External ear normal.      Nose: Nose normal.      Mouth/Throat:      Mouth: Mucous membranes are moist.   Eyes:      General: No scleral icterus.        Right eye: No discharge.         Left eye: No discharge.      Extraocular Movements: Extraocular movements intact.      Conjunctiva/sclera: Conjunctivae normal.      Pupils: Pupils are equal, round, and reactive to light.   Cardiovascular:      Rate and Rhythm: Normal rate and regular rhythm.   Pulmonary:      Effort: Pulmonary effort is normal. No respiratory distress.   Abdominal:      Palpations: Abdomen is soft.      Tenderness: There is no abdominal tenderness.   Musculoskeletal:      Cervical back: Normal range of motion and neck supple.      Right lower leg: No edema.      Left lower leg: No edema.   Skin:     General: Skin is warm and dry.      Capillary Refill: Capillary refill takes less than 2 seconds.   Neurological:      Mental Status: She is alert and oriented to person, place, and time.      Sensory: No sensory deficit.      Motor: No weakness.      Coordination: Coordination abnormal.       Neurological Exam:   LOC: alert  Attention Span: Good   Language: No aphasia  Articulation: No dysarthria  Orientation: Person, Place, Time   Visual Fields:  Full  EOM (CN III, IV, VI): Full/intact  Pupils (CN II, III): PERRL  Facial Movement (CN VII): Symmetric facial expression    Cerebellum: Upper Extremity Appendicular Ataxia (Finger Nose Finger)  Right  Sensation: Intact to light touch, temperature and vibration  Tone: Normal tone throughout    Laboratory:  BMP:   Recent Labs   Lab 03/01/23  0614      K 4.1      CO2 23   BUN 31*   CREATININE 1.0   CALCIUM 9.1       CBC:   Recent Labs   Lab 03/01/23  0614   WBC 7.13   RBC 4.80   HGB 13.3   HCT 44.3      MCV 92   MCH 27.7   MCHC 30.0*         Diagnostic Results     Brain Imaging   MRI brain 2/26/2023 - Impression:     1. There are 2 small adjacent foci of acute lacunar infarction in the right cerebellar peduncle.  Recommend follow-up.  2. Possible minimal subacute infarction at the periphery of a small remote right corona radiata infarct..  3. Involutional changes with chronic microvascular ischemic changes.    Vessel Imaging   CTA 2/27/23  No aneurysm, high-grade stenosis, or major vessel occlusion. Hypoplastic right A1.     Findings compatible generalized volume loss and microvascular ischemic changes.    Cardiac Imaging   Echo 2/27/23  The left ventricle is normal in size with concentric remodeling and normal systolic function. The estimated ejection fraction is 55%.  Normal right ventricular size with normal right ventricular systolic function.  Indeterminate left ventricular diastolic function.  Severe left atrial enlargement.  The estimated PA systolic pressure is 16 mmHg.  Normal central venous pressure (3 mmHg).

## 2023-03-01 NOTE — PLAN OF CARE
[facility]    FACILITY TRANSFER ORDERS      Patient Name: Buck Ibarra  YOB: 1945    PCP: Ericka Cortez MD   PCP Address: [address] / [place] [address]  PCP Phone Number: [phone]  PCP Fax: [phone]    Encounter Date: 03/01/2023    Admit to: rehab facility    Vital Signs:  Routine    Diagnoses:   Active Hospital Problems    Diagnosis  POA    *Thrombotic stroke involving right cerebellar artery [I63.341]  Yes    Atherosclerotic heart disease of native coronary artery with other forms of angina pectoris [I25.118]  Yes    NICM (nonischemic cardiomyopathy) [I42.8]  Yes    Bilateral carotid artery disease [I77.9]  Yes     1-39% B      Hyperlipidemia LDL goal <70 [E78.5]  Yes     Chronic    Type 2 diabetes mellitus with hyperglycemia, with long-term current use of insulin [E11.65, Z79.4]  Not Applicable     Chronic    Essential hypertension [I10]  Yes      Resolved Hospital Problems   No resolved problems to display.       Allergies:  Review of patient's allergies indicates:   Allergen Reactions    Amlodipine Swelling    Erythromycin Anaphylaxis    Erythropoietin analogues Anaphylaxis    Pegasys [peginterferon ruben-2a] Anaphylaxis    Lisinopril Hives       Diet: diabetic diet: 2000 calorie    Activities: Activity as tolerated    Goals of Care Treatment Preferences:  Code Status: Full Code    Health care agent: [facility] agent number: [phone]                   Nursing: per facility     Labs: per facility    CONSULTS:    Physical Therapy to evaluate and treat. , Occupational Therapy to evaluate and treat., Speech Therapy to evaluate and treat for Language., and  to evaluate for community resources/long-range planning.    MISCELLANEOUS CARE:  Diabetes Care:   SN to perform and educate Diabetic management with blood glucose monitoring:, Fingerstick blood sugar AC and HS, and Report CBG < 60 or > 350 to physician.    WOUND CARE ORDERS  None    Medications:  Review discharge medications with patient and family and provide education.      Current Discharge Medication List        CONTINUE these medications which have CHANGED    Details   carvediloL (COREG) 12.5 MG tablet Take 1 tablet (12.5 mg total) by mouth 2 (two) times daily.  Qty: 60 tablet, Refills: 11    Comments: .           CONTINUE these medications which have NOT CHANGED    Details   albuterol (PROVENTIL/VENTOLIN HFA) 90 mcg/actuation inhaler Inhale 1-2 puffs into the lungs every 6 (six) hours as needed for Wheezing. Rescue  Qty: 18 g, Refills: 0      aspirin (ECOTRIN) 81 MG EC tablet Take 1 tablet (81 mg total) by mouth once daily.  Qty: 90 tablet, Refills: 3    Associated Diagnoses: PAD (peripheral artery disease); Hyperlipidemia, unspecified hyperlipidemia type      blood sugar diagnostic Strp To check BG 3 times daily, to use with insurance preferred meter  Qty: 300 each, Refills: 3    Associated Diagnoses: Type 2 diabetes mellitus with diabetic polyneuropathy; Diabetes mellitus type 2, insulin dependent      blood-glucose meter kit To check BG 3 times daily, to use with insurance preferred meter  Qty: 1 each, Refills: 0      blood-glucose meter,continuous (DEXCOM G6 ) Misc Use as directed.  Qty: 1 each, Refills: 0      blood-glucose sensor (DEXCOM G6 SENSOR) Rashida Change every 10 days. 90 day e 11.65  Qty: 9 each, Refills: 3      blood-glucose transmitter (DEXCOM G6 TRANSMITTER) Rashida Change every 3 months.  Qty: 1 each, Refills: 3      candesartan (ATACAND) 32 MG tablet Take 1 tablet (32 mg total) by mouth once daily.  Qty: 90 tablet, Refills: 3    Associated Diagnoses: Benign essential hypertension      chlorthalidone (HYGROTEN) 50 MG Tab Take 1 tablet (50 mg total) by mouth once daily.  Qty: 90 tablet, Refills: 3    Comments: Stop hctz  Associated Diagnoses: Benign essential hypertension      clopidogreL (PLAVIX) 75 mg tablet Take 1 tablet (75 mg total) by mouth once daily. Take 4 pills the first  day followed by one pill daily  Qty: 90 tablet, Refills: 3    Associated Diagnoses: PAD (peripheral artery disease)      clotrimazole-betamethasone 1-0.05% (LOTRISONE) cream Apply topically 2 (two) times daily.  Qty: 45 g, Refills: 1    Associated Diagnoses: Candidal intertrigo      doxepin (SINEQUAN) 10 MG capsule Take 1 capsule (10 mg total) by mouth every evening.  Qty: 30 capsule, Refills: 3    Associated Diagnoses: Insomnia, unspecified type      esomeprazole (NEXIUM) 40 MG capsule Take one capsule by mouth daily as needed for acid reflux  Qty: 90 capsule, Refills: 3    Associated Diagnoses: Gastroesophageal reflux disease, unspecified whether esophagitis present      fluocinonide (LIDEX) 0.05 % external solution Apply topically 2 (two) times daily.  Qty: 60 mL, Refills: 11    Associated Diagnoses: Seborrheic dermatitis; Alopecia      fluocinonide (LIDEX) 0.05 % ointment Apply topically 2 (two) times daily.  Qty: 60 g, Refills: 2      gabapentin (NEURONTIN) 300 MG capsule TAKE ONE CAPSULE BY MOUTH EVERY EVENING  Qty: 90 capsule, Refills: 3    Associated Diagnoses: Type 2 diabetes mellitus with hyperglycemia, with long-term current use of insulin      glucagon 1 mg SolR Use as directed.  Qty: 1 each, Refills: 2      insulin degludec (TRESIBA FLEXTOUCH U-200) 200 unit/mL (3 mL) insulin pen Inject 20 units into the skin once nightly.  Qty: 3 pen, Refills: 6    Associated Diagnoses: Diabetes mellitus type 2 in obese      insulin lispro (HUMALOG KWIKPEN INSULIN) 100 unit/mL pen Inject 6 units before meals plus scale 180-230+2, 231-280+4, 281-330+6, 331-380+8, >380+10. Max daily 48 units.  Qty: 15 mL, Refills: 2      ketoconazole (NIZORAL) 2 % cream Apply topically once daily. Apply to affected toenails daily for 6-12 months  Qty: 60 g, Refills: 6      ketoconazole (NIZORAL) 2 % shampoo Apply topically every 7 days.  Qty: 120 mL, Refills: 11    Associated Diagnoses: Seborrheic dermatitis; Alopecia      lancets 33  "gauge Misc To check BG 3 times daily, to use with insurance preferred meter  Qty: 300 each, Refills: 3    Associated Diagnoses: Type 2 diabetes mellitus with diabetic polyneuropathy; Diabetes mellitus type 2, insulin dependent      pen needle, diabetic (BD ULTRA-FINE MILAN PEN NEEDLE) 32 gauge x 5/32" Ndle Use as directed 3 times daily with insulin pens  Qty: 300 each, Refills: 3    Associated Diagnoses: Type 2 diabetes mellitus with hyperglycemia, with long-term current use of insulin      rosuvastatin (CRESTOR) 40 MG Tab Take 1 tablet (40 mg total) by mouth every evening.  Qty: 90 tablet, Refills: 3    Comments: Stop atorva 80  Associated Diagnoses: PAD (peripheral artery disease)      semaglutide (OZEMPIC) 1 mg/dose (4 mg/3 mL) Inject 1 mg into the skin every 7 days.  Qty: 3 pen, Refills: 3                Immunizations Administered as of 3/1/2023       Name Date Dose VIS Date Route Exp Date    COVID-19, MRNA, LN-S, PF (Pfizer) (Purple Cap) 6/2/2022 12:03 PM 0.3 mL 9/22/2021 Intramuscular 10/31/2022    Site: Left deltoid     Given By: Sweta Lindsey LPN     : [employer]     Lot: [id number]     COVID-19, MRNA, LN-S, PF (Pfizer) (Purple Cap) 6/10/2021  8:59 AM 0.3 mL 12/12/2020 Intramuscular 8/31/2021    Site: Left deltoid     Given By: Quin Olivia     : [employer]     Lot: [id number]     COVID-19, MRNA, LN-S, PF (Pfizer) (Purple Cap) 5/20/2021  8:36 AM 0.3 mL 12/12/2020 Intramuscular 7/31/2021    Site: Left deltoid     Given By: Farzaneh Velazquez RN     : [employer]     Lot: [id number]             This patient has had both covid vaccinations    Some patients may experience side effects after vaccination.  These may include fever, headache, muscle or joint aches.  Most symptoms resolve with 24-48 hours and do not require urgent medical evaluation unless they persist for more than 72 hours or symptoms are concerning for an unrelated medical condition.    "       _________________________________  Emeka Galindo MD  03/01/2023

## 2023-03-01 NOTE — PT/OT/SLP PROGRESS
Physical Therapy Treatment    Patient Name:  Buck Ibarra   MRN:  [id number]    Recent Surgery: * No surgery found *      Therapy tech utilized during this treatment due to patient complexity and physical assistance required to ensure safe mobilization     Recommendations:     Discharge Recommendations:  rehabilitation facility   Discharge Equipment Recommendations: walker, rolling (tbd @ rehab)   Barriers to discharge: None    Highest Level of Mobility: Gait 180'  Assistance Required: Min(A)X2 persons with RW    Assessment:     Buck Ibarra is a 77 y.o. female admitted with a medical diagnosis of Thrombotic stroke involving right cerebellar artery.    Pt met seated in bedside chair and agreeable to PT treatment. Today's PT treatment focus was on gait training to improve activity tolerance and LE coordination. Pt with difficulty managing RW today and unable to maintain a straight path. Pt with continued ataxic gait with R LE, but improved overall from previous session.    Pt is progressing towards acute PT goals appropriately and continues to benefit from acute PT sessions. After hospital discharge, pt would benefit from inpatient rehab to maximize rehab potential.    Rehab Prognosis: Good; patient would benefit from acute skilled PT services to address these deficits and reach maximum level of function.      Plan:     During this hospitalization, patient to be seen 4 x/week to address the identified rehab impairments via therapeutic activities, gait training, therapeutic exercises, neuromuscular re-education and progress toward the following goals:    Plan of Care Expires:  03/27/23    This plan of care has been discussed with the patient/caregiver, who was included in its development and is in agreement with the identified goals and treatment plan.     Subjective     Communicated with RN prior to session.  Patient agreeable to participate.     Pain/Comfort:  Pain Rating 1: 0/10  Location - Side 1:  "Bilateral  Location - Orientation 1: generalized  Location 1: calf  Pain Addressed 1: Distraction, Nurse notified, Reposition  Pain Rating Post-Intervention 1:  (unrated, pt reported B calf pain after ambulation)    Chief Complaint: Thrombotic stroke involving R cerebellar a  Patient/Family Comments/goals: "I'm going to the rehab tomorrow"      Objective:     Patient found up in chair with telemetry, bed alarm, SCD, PureWick  upon PT entry to room.    General Precautions: Standard, aspiration, fall   Orthopedic Precautions:N/A   Braces: N/A         Exams:    Cognition:  Patient is oriented to Person, Place, Time, Situation  Follows multistep  commands   Insight to deficits/safety awareness: intact    Functional Mobility:    Bed Mobility:  NT, pt found sitting up in chair and returned to chair after session    Transfers:   Sit to Stand Transfer: Minimal Assistance  from EOB with RW              Gait:  Patient received gait training in hallway 185 feet with Minimal Assistance and 2 persons  and rolling walker  Gait Assessment: unsteady gait, decreased step length, flexed posture, decreased alyx, and inconsistent right foot placement  Gait Pattern Observed: Step-through, ataxic on R  Comments: All lines remained intact throughout ambulation trial, gait belt utilized. PT and therapy tech assisted with RW management as pt has difficulty maintaining a straight path    Balance:  Static Stand:   Contact-Guard Assist with Rolling walker  Dynamic Stand:  Min(a)x2  with Rolling walker    Therapeutic Activities/Exercises     Patient assisted with functional mobility as noted above  Patient educated on the importance of early mobility to prevent functional decline during hospital stay  Patient was instructed to utilize staff assistance for mobility/transfers.  Patient is appropriate to transfer with min(a) and RN/PCT assist  Patient educated on PT POC and role of PT in acute care  White board updated regarding patient's " safest level of mobility with staff assistance, RN also updated.     AM-PAC 6 CLICK MOBILITY  Turning over in bed (including adjusting bedclothes, sheets and blankets)?: 4  Sitting down on and standing up from a chair with arms (e.g., wheelchair, bedside commode, etc.): 3  Moving from lying on back to sitting on the side of the bed?: 3  Moving to and from a bed to a chair (including a wheelchair)?: 3  Need to walk in hospital room?: 3  Climbing 3-5 steps with a railing?: 2  Basic Mobility Total Score: 18     Patient left up in chair with all lines intact, call button in reach, chair alarm on, and RN notified.        History/Goals:     PAST MEDICAL HISTORY:  Past Medical History:   Diagnosis Date    Allergy     Cataract     Diabetes mellitus type II     Gastritis     with gastric ulcer    GERD (gastroesophageal reflux disease)     H. pylori infection     History of hepatitis C, SVR as of 8-2016 8/25/2015    Harvoni 12 wks completed.  SVR(12) as of 8/4/16 SVR(24) as of 10-     Hypertension     Osteopenia     Type 2 diabetes mellitus with diabetic polyneuropathy        Past Surgical History:   Procedure Laterality Date    COLONOSCOPY      COLONOSCOPY N/A 1/28/2016    Procedure: COLONOSCOPY;  Surgeon: Emeka Ahn MD;  Location: [facility] ([address] [place] [address]);  Service: Endoscopy;  Laterality: N/A;    COLONOSCOPY N/A 8/8/2019    Procedure: COLONOSCOPY;  Surgeon: Susan Dia MD;  Location: [address]);  Service: Endoscopy;  Laterality: N/A;  appt confirmed-rb    HYSTERECTOMY      LIVER BIOPSY      OOPHORECTOMY      PERIPHERAL ANGIOGRAPHY Left 5/5/2021    Procedure: Peripheral angiography;  Surgeon: Randolph Toribio MD;  Location: [facility] CATH LAB;  Service: Cardiology;  Laterality: Left;    PERIPHERAL ANGIOGRAPHY N/A 6/21/2021    Procedure: Peripheral angiography;  Surgeon: Randolph Toribio MD;  Location: [facility] CATH LAB;  Service: Cardiology;  Laterality: N/A;    UPPER GASTROINTESTINAL ENDOSCOPY          GOALS:   Multidisciplinary Problems       Physical Therapy Goals          Problem: Physical Therapy    Goal Priority Disciplines Outcome Goal Variances Interventions   Physical Therapy Goal     PT, PT/OT Ongoing, Progressing     Description: Goals to be met by: 3/13/23     Patient will increase functional independence with mobility by performin. Supine to sit with Bonita  2. Sit to supine with Bonita  3. Sit to stand transfer with Stand-by Assistance  4. Bed to chair transfer with Stand-by Assistance using RW  5. Gait  x 150 feet with Contact Guard Assistance using RW.   6. Ascend/descend 1 stair with no Handrails and Minimal Assistance  7. Lower extremity exercise program x15 reps per handout, with assistance as needed                         Time Tracking:     PT Received On: 23  PT Start Time: 1330     PT Stop Time: 1346  PT Total Time (min): 16 min     Billable Minutes: Gait Training 16      Dolly Fraser, PT  2023  Pager# [phone]

## 2023-03-01 NOTE — PLAN OF CARE
03/01/23 1300   Post-Acute Status   Post-Acute Authorization Placement   Post-Acute Placement Status Set-up Complete/Auth obtained   Coverage PHN   Discharge Plan   Discharge Plan A Rehab     SW received phone call from Shira at [facility].  She stated that the request for rehab had been approved and that she was sending the auth info over to [facility].  FADY confirmed with Asaf at [facility] that it had been received and he is working on getting the patient over today.  Transfer orders are in.  FADY awaiting number for report and transfer time.      Riana Barnes, BELLE  [facility]  [phone]

## 2023-03-02 NOTE — TELEPHONE ENCOUNTER
----- Message from ARNOLD Sawyer, FNP sent at 3/2/2023  8:17 AM CST -----  Regarding: reschedule and remove off schedule r/t hospitalization  Hi!  Reschedule pt in 6 weeks  Remove off schedule -it should not be a no show  Thanks  Graeme   A1c is fine for now it is 7.9%  Cva- admission

## 2023-03-02 NOTE — TELEPHONE ENCOUNTER
----- Message from ARNOLD Sawyer, SENAITP sent at 3/2/2023  9:10 AM CST -----  Regarding: pt is admitted  Hi!  Can you remove 8a slot today  Pt is admitted for stroke  Did not want to place as no show  Thanks  Graeme

## 2023-03-02 NOTE — PLAN OF CARE
Problem: Adjustment to Illness (Stroke, Ischemic/Transient Ischemic Attack)  Goal: Optimal Coping  Outcome: Met     Problem: Bowel Elimination Impaired (Stroke, Ischemic/Transient Ischemic Attack)  Goal: Effective Bowel Elimination  Outcome: Met     Problem: Cerebral Tissue Perfusion (Stroke, Ischemic/Transient Ischemic Attack)  Goal: Optimal Cerebral Tissue Perfusion  Outcome: Met     Problem: Functional Ability Impaired (Stroke, Ischemic/Transient Ischemic Attack)  Goal: Optimal Functional Ability  Outcome: Met     Problem: Sensorimotor Impairment (Stroke, Ischemic/Transient Ischemic Attack)  Goal: Improved Sensorimotor Function  Outcome: Met     Problem: Swallowing Impairment (Stroke, Ischemic/Transient Ischemic Attack)  Goal: Optimal Eating and Swallowing without Aspiration  Outcome: Met     Problem: Urinary Elimination Impaired (Stroke, Ischemic/Transient Ischemic Attack)  Goal: Effective Urinary Elimination  Outcome: Met     Problem: Fall Injury Risk  Goal: Absence of Fall and Fall-Related Injury  Outcome: Met     Problem: Infection  Goal: Absence of Infection Signs and Symptoms  Outcome: Met     Problem: Adult Inpatient Plan of Care  Goal: Plan of Care Review  Outcome: Met  Goal: Patient-Specific Goal (Individualized)  Outcome: Met  Goal: Absence of Hospital-Acquired Illness or Injury  Outcome: Met  Goal: Optimal Comfort and Wellbeing  Outcome: Met  Goal: Readiness for Transition of Care  Outcome: Met     Problem: Diabetes Comorbidity  Goal: Blood Glucose Level Within Targeted Range  Outcome: Met     Problem: Skin Injury Risk Increased  Goal: Skin Health and Integrity  Outcome: Met   A+Ox4, family at bedside. SBP remains in 190/200s, MD notified/aware. Pt oob to bathroom/chair with RW. DC to [facility], report called, all questions answered.

## 2023-03-02 NOTE — DISCHARGE SUMMARY
Rob Borjas - Neurosurgery ([facility])  Vascular Neurology  Comprehensive Stroke Center  Discharge Summary     Summary:     Admit Date: 2/26/2023  7:41 PM    Discharge Date and Time:  03/01/2023 7:43 PM    Attending Physician: Mica att. providers found     Discharge Provider: Emeka Galindo MD    History of Present Illness: Buck Ibarra is a 77 y.o. female with a significant medical history of DMII, HTN, HLD, CAD, and NICM who presents to the hospital for evaluation of dizziness and difficulty walking for 2 days with associated vomiting. Patient states symptoms persisted prompting her to seek evaluation. She denies, chest pain, shortness of breath, neck pain, and HA. Endorses vomiting, blurred vision, and gait instability. Reported taking nothing to alleviate her symptoms. Movement exacerbated symptoms.     ED evaluation included UA positive for UTI, culture pending, started on Rocephin. CTH was obtained and was negative for acute findings. MRI was obtained and revealed new right punctate cerebellar infarct. Glucose on labs was 263.     Currently the patient is awake and oriented x4. She remains with complaint of dizziness, nausea, and blurry vision. She denies chest pain, SOB, numbness, or HA. On exam, she has right upper extremity dysmetria. NIHSS 1. She will admitted to Vascular Neurology for further evaluation.         Hospital Course (synopsis of major diagnoses, care, treatment, and services provided during the course of the hospital stay): Patient admitted to stroke service in setting of R cerebellar peduncle stroke. NIHSS 1 on admission for limb ataxia. Evaluated by PT/OT with recs for rehab. Admitted to rehab 3/1.      Goals of Care Treatment Preferences:  Code Status: Full Code    Health care agent: Olga  [facility] agent number: [phone]                   Stroke Etiology: Ischemic Small Vessel Disease ([place])    STROKE DOCUMENTATION         NIH Scale:  1a. Level of Consciousness: 0-->Alert,  keenly responsive  1b. LOC Questions: 0-->Answers both questions correctly  1c. LOC Commands: 0-->Performs both tasks correctly  2. Best Gaze: 0-->Normal  3. Visual: 0-->No visual loss  4. Facial Palsy: 0-->Normal symmetrical movements  5a. Motor Arm, Left: 0-->No drift, limb holds 90 (or 45) degrees for full 10 secs  5b. Motor Arm, Right: 0-->No drift, limb holds 90 (or 45) degrees for full 10 secs  6a. Motor Leg, Left: 0-->No drift, leg holds 30 degree position for full 5 secs  6b. Motor Leg, Right: 0-->No drift, leg holds 30 degree position for full 5 secs  7. Limb Ataxia: 1-->Present in one limb  8. Sensory: 0-->Normal, no sensory loss  9. Best Language: 0-->No aphasia, normal  10. Dysarthria: 0-->Normal  11. Extinction and Inattention (formerly Neglect): 0-->No abnormality  Total (NIH Stroke Scale): 1        Modified Leflore Score: 0  Cherelle Coma Scale:    ABCD2 Score:    [id number] Score:   HAS -BLED Score:   ICH Score:   Hunt & Hutton Classification:       Assessment/Plan:     Diagnostic Results:      Brain Imaging   MRI brain 2/26/2023 - Impression:     1. There are 2 small adjacent foci of acute lacunar infarction in the right cerebellar peduncle.  Recommend follow-up.  2. Possible minimal subacute infarction at the periphery of a small remote right corona radiata infarct..  3. Involutional changes with chronic microvascular ischemic changes.     Vessel Imaging   CTA 2/27/23  No aneurysm, high-grade stenosis, or major vessel occlusion. Hypoplastic right A1.     Findings compatible generalized volume loss and microvascular ischemic changes.     Cardiac Imaging   Echo 2/27/23   The left ventricle is normal in size with concentric remodeling and normal systolic function. The estimated ejection fraction is 55%.   Normal right ventricular size with normal right ventricular systolic function.   Indeterminate left ventricular diastolic function.   Severe left atrial enlargement.   The estimated PA systolic  pressure is 16 mmHg.   Normal central venous pressure (3 mmHg).    Interventions: None    Complications: None    Disposition: Rehab Facility    Final Active Diagnoses:    Diagnosis Date Noted POA    PRINCIPAL PROBLEM:  Thrombotic stroke involving right cerebellar artery [I63.341] 02/27/2023 Yes    Atherosclerotic heart disease of native coronary artery with other forms of angina pectoris [I25.118] 04/14/2022 Yes    NICM (nonischemic cardiomyopathy) [I42.8] 03/04/2021 Yes    Bilateral carotid artery disease [I77.9] 08/25/2015 Yes    Hyperlipidemia LDL goal <70 [E78.5] 08/25/2015 Yes     Chronic    Type 2 diabetes mellitus with hyperglycemia, with long-term current use of insulin [E11.65, Z79.4] 07/22/2015 Not Applicable     Chronic    Essential hypertension [I10] 07/22/2015 Yes      Problems Resolved During this Admission:     Neuro  * Thrombotic stroke involving right cerebellar artery  Buck Ibarra is a 77 y.o. female with a significant medical history of DMII, HTN, HLD, CAD, and NICM who presents to the hospital for evaluation of dizziness and difficulty walking for 2 days with associated vomiting.     Antithrombotics for secondary stroke prevention: Antiplatelets: Aspirin: 81 mg daily  Clopidogrel: 75 mg daily    Statins for secondary stroke prevention and hyperlipidemia, if present:   Statins: Atorvastatin- 40 mg daily    Aggressive risk factor modification: HTN, DM, HLD, Diet, Exercise, Obesity, CAD     Rehab efforts: The patient has been evaluated by a stroke team provider and the therapy needs have been fully considered based off the presenting complaints and exam findings. The following therapy evaluations are needed: PT evaluate and treat, OT evaluate and treat, PM&R evaluate for appropriate placement Recs for rehab, pending placement    Diagnostics ordered/pending: CTA Head to assess vasculature , CTA Neck/Arch to assess vasculature, Lipid Profile to assess cholesterol levels, TTE to assess  cardiac function/status     VTE prophylaxis: Heparin 5000 units SQ every 8 hours  Mechanical prophylaxis: Place SCDs    BP parameters: Infarct: No intervention, SBP <220        Cardiac/Vascular  Atherosclerotic heart disease of native coronary artery with other forms of angina pectoris  Continue DAPT/statin    NICM (nonischemic cardiomyopathy)  TTE with   The left ventricle is normal in size with concentric remodeling and normal systolic function. The estimated ejection fraction is 55%.   Normal right ventricular size with normal right ventricular systolic function.   Indeterminate left ventricular diastolic function.   Severe left atrial enlargement.   The estimated PA systolic pressure is 16 mmHg.   Normal central venous pressure (3 mmHg).      Hyperlipidemia LDL goal <70  Stroke Risk Factor    Atorvastatin 40mg PO daily at home, will increase to 80    Bilateral carotid artery disease  CTA head and neck with no aneurysm, high-grade stenosis, or major vessel occlusion    Essential hypertension  Stroke Risk Factor  SBP < 220  Will hold home meds for now, can likely resume in 48-72 hours.  Received 1 time dose of Labetalol in the ED for elevated blood pressure.    Endocrine  Type 2 diabetes mellitus with hyperglycemia, with long-term current use of insulin  Stroke Risk Factor  Glucose 263  Last A1C 2/23/23 7.9.  Basal/bolus regimen  Titrate regimen PRN  Will increase gabapentin frequency given burning pain reported in legs        Recommendations:     Post-discharge complication risks: Falls    Stroke Education given to: patient and family    Follow-up in Stroke Clinic in 4-6 weeks.     Discharge Plan:  Antithrombotics: Aspirin 81mg, Clopidogrel 75mg  Statin: Rosuvastatin 40mg  Aggresive risk factor modification:  Hypertension  Diet  Exercise  Obesity    Follow Up:      Patient Instructions:      Ambulatory referral/consult to Vascular Neurology   Standing Status: Future   Referral Priority: Routine  "Referral Type: Consultation   Referral Reason: Specialty Services Required   Requested Specialty: Vascular Neurology   Number of Visits Requested: 1     Diet Cardiac   Order Comments: See Stroke Patient Education Guide Booklet for details.     Call 911 for any of the following:   Order Comments: Call 911  right away if any of the following warning signs come on suddenly, even if the symptoms only last for a few minutes. With stroke, timing is very important.   - Warning Signs of Stroke:  - Weakness: You may feel a sudden weakness, tingling or loss of feeling on one side of your face or body.  - Vision Problems: You may have sudden double vision or trouble seeing in one or both eyes.  - Speech Problems: You may have sudden trouble talking, slured speech, or problems understanding others.  - Headache: You may have sudden, severe headache.  - Movement Problems: You may experience dizziness, a feeling of spinning, a loss of balance, a feeling of falling or blackouts.       Medications:  Reconciled Home Medications:      Medication List      CHANGE how you take these medications    carvediloL 12.5 MG tablet  Commonly known as: COREG  Take 1 tablet (12.5 mg total) by mouth 2 (two) times daily.  What changed:   · medication strength  · how much to take  · when to take this        CONTINUE taking these medications    aspirin 81 MG EC tablet  Commonly known as: ECOTRIN  Take 1 tablet (81 mg total) by mouth once daily.     BD ULTRA-FINE MILAN PEN NEEDLE 32 gauge x 5/32" Ndle  Generic drug: pen needle, diabetic  Use as directed 3 times daily with insulin pens     blood sugar diagnostic Strp  To check BG 3 times daily, to use with insurance preferred meter     blood-glucose meter kit  To check BG 3 times daily, to use with insurance preferred meter     candesartan 32 MG tablet  Commonly known as: ATACAND  Take 1 tablet (32 mg total) by mouth once daily.     chlorthalidone 50 MG Tab  Commonly known as: HYGROTEN  Take 1 tablet (50 " mg total) by mouth once daily.     clopidogreL 75 mg tablet  Commonly known as: PLAVIX  Take 1 tablet (75 mg total) by mouth once daily. Take 4 pills the first day followed by one pill daily     clotrimazole-betamethasone 1-0.05% cream  Commonly known as: LOTRISONE  Apply topically 2 (two) times daily.     DEXCOM G6  Misc  Generic drug: blood-glucose meter,continuous  Use as directed.     DEXCOM G6 SENSOR Rashida  Generic drug: blood-glucose sensor  Change every 10 days. 90 day e 11.65     DEXCOM G6 TRANSMITTER Rashida  Generic drug: blood-glucose transmitter  Change every 3 months.     doxepin 10 MG capsule  Commonly known as: SINEQUAN  Take 1 capsule (10 mg total) by mouth every evening.     esomeprazole 40 MG capsule  Commonly known as: NexIUM  Take one capsule by mouth daily as needed for acid reflux     * fluocinonide 0.05 % ointment  Commonly known as: LIDEX  Apply topically 2 (two) times daily.     * fluocinonide 0.05 % external solution  Commonly known as: LIDEX  Apply topically 2 (two) times daily.     gabapentin 300 MG capsule  Commonly known as: NEURONTIN  TAKE ONE CAPSULE BY MOUTH EVERY EVENING     GLUCAGON EMERGENCY KIT (HUMAN) 1 mg Solr  Generic drug: glucagon  Use as directed.     HumaLOG KwikPen Insulin 100 unit/mL pen  Generic drug: insulin lispro  Inject 6 units before meals plus scale 180-230+2, 231-280+4, 281-330+6, 331-380+8, >380+10. Max daily 48 units.     * ketoconazole 2 % cream  Commonly known as: NIZORAL  Apply topically once daily. Apply to affected toenails daily for 6-12 months     * ketoconazole 2 % shampoo  Commonly known as: NIZORAL  Apply topically every 7 days.     lancets 33 gauge Misc  To check BG 3 times daily, to use with insurance preferred meter     OZEMPIC 1 mg/dose (4 mg/3 mL)  Generic drug: semaglutide  Inject 1 mg into the skin every 7 days.     rosuvastatin 40 MG Tab  Commonly known as: CRESTOR  Take 1 tablet (40 mg total) by mouth every evening.     TRESIBA FLEXTOUCH  U-200 200 unit/mL (3 mL) insulin pen  Generic drug: insulin degludec  Inject 20 units into the skin once nightly.     VENTOLIN HFA 90 mcg/actuation inhaler  Generic drug: albuterol  Inhale 1-2 puffs into the lungs every 6 (six) hours as needed for Wheezing. Rescue         * This list has 4 medication(s) that are the same as other medications prescribed for you. Read the directions carefully, and ask your doctor or other care provider to review them with you.                Emeka Galindo MD  Comprehensive Stroke Center  Department of Vascular Neurology   [facility])

## 2023-03-07 NOTE — TELEPHONE ENCOUNTER
Pt states she is admitted, she is expecting to be d/c today or tomorrow. She would like to keep her appt. I will touch base with her tomorrow afternoon.

## 2023-03-14 NOTE — PROGRESS NOTES
Ms. Ibarra seen in [facility] Rm 536. Initial SM encounter completed. Patient will be discharged on 3/15/23. Educated patient and caregiver on purpose of SM program.

## 2023-03-14 NOTE — TELEPHONE ENCOUNTER
Mary PAIGE Dana Ericka Staff  Caller: [phone] (Today,  1:47 PM)  Caller is requesting an earlier appointment then we can schedule.  Caller is requesting a message be sent to the provider.   If this is for urgent care symptoms, did you offer other providers at this location, providers at other locations, or [facility]? (yes, no, n/a):  n/a   If this is for the patients physical, did you offer to schedule next available and put on wait list, or to see NP or PA for their physical?  (yes, no, n/a):  n/a   When is the next available appointment with their provider:  04/26/23   Reason for the appointment:  hosp f/u   Patient preference of timeframe to be scheduled:   within 7-10 days   Would the patient like a call back, or a response through their [facility] portal?:   phone   Comments:

## 2023-03-19 NOTE — ED TRIAGE NOTES
"Pt presents via personal transport. States "I was short winded and recently had a problem with hypertension". Pt recently hospitalized for hypertension less than a week ago. States BP was 193/115 earlier today. Reports taking a dose of hydralazine prior to arrival.    LOC: The patient is awake, alert and aware of environment with an appropriate affect, the patient is oriented x 3 and speaking appropriately.   APPEARANCE: Patient appears comfortable and in no acute distress, patient is clean and well groomed.  SKIN: The skin is warm and dry, color consistent with ethnicity, patient has normal skin turgor and moist mucus membranes, skin intact, no breakdown or bruising noted.   MUSCULOSKELETAL: Patient moving all extremities spontaneously, no swelling noted. Pt uses personal walker.  RESPIRATORY: Airway is open and patent, respirations are spontaneous, patient has a normal effort and rate, no accessory muscle use noted. Denies current SOB.  CARDIAC: Patient has a normal rate and regular rhythm, no edema noted, capillary refill < 3 seconds.   GASTRO: Soft and non tender to palpation, no distention noted, normoactive bowel sounds present in all four quadrants. Pt states bowel movements have been regular.  : Pt denies any pain or frequency with urination.  NEURO: Pt opens eyes spontaneously, behavior appropriate to situation, follows commands, facial expression symmetrical, bilateral hand grasp equal and even, purposeful motor response noted, normal sensation in all extremities when touched with a finger..  "

## 2023-03-19 NOTE — DISCHARGE INSTRUCTIONS
Measure your blood pressure no more than twice a day or if you have any unusual symptoms.  Take your measurements to your primary doctor for follow-up appointment in the next several days.    Return to the ER for any new or significantly worsening symptoms such as severe headache, confusion, blurry vision, chest pain, persistent difficulty breathing or any other worrisome symptoms.

## 2023-03-20 NOTE — ED PROVIDER NOTES
Encounter Date: 3/19/2023       History     Chief Complaint   Patient presents with    Hypertension     Reports high BP readings today, 3/19/23, despite BP medication compliance. Denies chest pain or visual changes. +SOB     77-year-old female with multiple medical comorbidities listed below presents to the ED with elevated blood pressure at home.  Her blood pressure was 173/115.  Patient had an episode of shortness of breath today as well lasting about 15 minutes.  She states that it completely resolved.  She did not have any other associated symptoms.  She states that she has been compliant with her BP meds as well as a low-sodium diet.  She denies headache, blurry vision, chest pain or any other concerns.     Review of patient's allergies indicates:   Allergen Reactions    Amlodipine Swelling    Erythromycin Anaphylaxis    Erythropoietin analogues Anaphylaxis    Pegasys [peginterferon ruben-2a] Anaphylaxis    Lisinopril Hives     Past Medical History:   Diagnosis Date    Allergy     Cataract     Diabetes mellitus type II     Gastritis     with gastric ulcer    GERD (gastroesophageal reflux disease)     H. pylori infection     History of hepatitis C, SVR as of 8-2016 8/25/2015    Harvoni 12 wks completed.  SVR(12) as of 8/4/16 SVR(24) as of 10-     Hypertension     Osteopenia     Type 2 diabetes mellitus with diabetic polyneuropathy      Past Surgical History:   Procedure Laterality Date    COLONOSCOPY      COLONOSCOPY N/A 1/28/2016    Procedure: COLONOSCOPY;  Surgeon: Emeka Ahn MD;  Location: [address]);  Service: Endoscopy;  Laterality: N/A;    COLONOSCOPY N/A 8/8/2019    Procedure: COLONOSCOPY;  Surgeon: Susan Dai MD;  Location: [address]);  Service: Endoscopy;  Laterality: N/A;  appt confirmed-rb    HYSTERECTOMY      LIVER BIOPSY      OOPHORECTOMY      PERIPHERAL ANGIOGRAPHY Left 5/5/2021    Procedure: Peripheral angiography;  Surgeon: Randolph Toribio MD;  Location:  [facility] CATH LAB;  Service: Cardiology;  Laterality: Left;    PERIPHERAL ANGIOGRAPHY N/A 2021    Procedure: Peripheral angiography;  Surgeon: Randolph Toribio MD;  Location: [facility] CATH LAB;  Service: Cardiology;  Laterality: N/A;    UPPER GASTROINTESTINAL ENDOSCOPY       Family History   Problem Relation Age of Onset    Heart disease Mother     Diabetes Mother     Hypertension Mother     Hyperlipidemia Mother     Heart disease Father     Cancer Sister         breast cancer    Diabetes Sister     Cancer Sister 70        colon CA    Diabetes Sister     Breast cancer Sister     Diabetes Sister     Glaucoma Neg Hx     Melanoma Neg Hx     Cirrhosis Neg Hx     Psoriasis Neg Hx     Lupus Neg Hx      Social History     Tobacco Use    Smoking status: Former     Packs/day: 0.25     Years: 48.00     Pack years: 12.00     Types: Cigarettes     Quit date: 3/28/2021     Years since quittin.9    Smokeless tobacco: Never   Substance Use Topics    Alcohol use: No     Comment: special occasions    Drug use: No     Review of Systems   Constitutional:  Negative for fever.   HENT:  Negative for sore throat.    Respiratory:  Positive for shortness of breath.    Cardiovascular:  Negative for chest pain.   Gastrointestinal:  Negative for nausea.   Genitourinary:  Negative for dysuria.   Musculoskeletal:  Negative for back pain.   Skin:  Negative for rash.   Neurological:  Negative for weakness and headaches.   Hematological:  Does not bruise/bleed easily.     Physical Exam     Initial Vitals [23 1632]   BP Pulse Resp Temp SpO2   (!) 179/78 74 19 98.3 °F (36.8 °C) 99 %      MAP       --         Physical Exam    Nursing note and vitals reviewed.  Constitutional: She appears well-developed and well-nourished. She is not diaphoretic.  Non-toxic appearance. She does not appear ill. No distress.   HENT:   Head: Normocephalic and atraumatic.   Neck: Neck supple. No JVD present.   Cardiovascular:  Normal rate and regular rhythm.      Exam reveals no gallop and no friction rub.       No murmur heard.  Pulses:       Radial pulses are 2+ on the right side and 2+ on the left side.   Pulmonary/Chest: Effort normal and breath sounds normal. No accessory muscle usage. No tachypnea. No respiratory distress. She has no decreased breath sounds. She has no wheezes. She has no rhonchi. She has no rales.   Abdominal: She exhibits no distension.   Musculoskeletal:      Cervical back: Neck supple.      Comments: No peripheral edema     Neurological: She is alert.   Skin: No rash noted.   Psychiatric: She has a normal mood and affect. Her behavior is normal.       ED Course   Procedures  Labs Reviewed - No data to display       Imaging Results    None          Medications - No data to display  Medical Decision Making:   History:   Old Medical Records: I decided to obtain old medical records.  Initial Assessment:   77-year-old female presents to the ED with elevated blood pressure at home as well as an episode of shortness of breath.  She is hypertensive at 179/78.  Vitals otherwise within normal limits.  Currently asymptomatic.  Differential Diagnosis:   My differential diagnosis includes but is not limited to:  Uncontrolled hypertension, asymptomatic hypertension, anxiety, diet noncompliance, heart failure  Independently Interpreted Test(s):   I have ordered and independently interpreted EKG Reading(s) - see summary below       <> Summary of EKG Reading(s): Sinus rhythm with a rate of 78.  PVCs.  T-wave inversions are no longer evident in lateral leads.   Clinical Tests:   Medical Tests: Reviewed  ED Management:  Patient has no evidence of volume overload on exam to warrant additional workup for shortness of breath.  Additionally, her symptoms completely resolved.  Labs obtained within the past few weeks showed a normal renal function.   This patient has asymptomatic HTN.  There is no severe HA, visual changes/disturbances, confusion/signs of encephalopathy,  chest pain or SOB.  Per the most recent [facility] clinical policy, there is no indication for an emergent work-up or treatment in the ED.  The risks of emergently lowering of the pt's BP (iatrogenic stroke/hypotension) would outweigh any potential benefits.   Patient to f/u in outpatient setting with PCP for further management of HTN.  Patient to be given strict ED return precautions.  I have reviewed the patient's records and discussed this case with my supervising physician.                               Clinical Impression:   Final diagnoses:  [R06.02] SOB (shortness of breath)  [R03.0] Elevated blood pressure reading (Primary)  [I10] Asymptomatic hypertension        ED Disposition Condition    Discharge Stable          ED Prescriptions    None       Follow-up Information    None          Mya Garcia PA-C  03/19/23 [id number]

## 2023-03-21 NOTE — TELEPHONE ENCOUNTER
Looks like she may be discharged 3/15 from rehab and went to ED 3/19 for elevated BP. Can we make sure she has a hosp f/u next week? 2 held slots on Wed I believe. Either one is fine.

## 2023-03-22 NOTE — TELEPHONE ENCOUNTER
----- Message from Ericka Cortez MD sent at 3/22/2023 11:32 AM CDT -----  B, when you get a chance, can you call pt this afternoon so she can read all her meds out to you? She doesn't know what she's on.   Sea, can you fax her bath/shower chair to [facility]? Also please remind her to bring all her meds when you call her [employer] next week please.

## 2023-03-22 NOTE — PROGRESS NOTES
"Transitional Care Note  Subjective:       Patient ID: Buck Ibarra is a 77 y.o. female.  Chief Complaint: hospital f/u    Family and/or Caretaker present at visit?  No.  Diagnostic tests reviewed/disposition: No diagnosic tests pending after this hospitalization.  Disease/illness education: yes  Home health/community services discussion/referrals: Patient has home health established at outside  - skilled nurse, PT/OT .   Establishment or re-establishment of referral orders for community resources: No other necessary community resources.   Discussion with other health care providers: No discussion with other health care providers necessary.     HPI  Hosp 2/26 through 3/1/23 for cerebellar CVA (dizziness and unsteady gait). S/p rehab. Has  PT/OT  Discharged on asa 81, plavix 75, atorva 80 qd (inc from 40mg qd due to LDL not at goal).   No issues w/ vision.   Walks w/ a walker/rollator. Able to do "everything". Not cooking - daughter does that. Able to shower.     Reports today she feels dizzy - room spinning sensation. Does have some R nasal congestion. No issues w/ hearing. No fevers/chills/postnasal drip or cough. No numbness/tlngling outside of her peripheral neuropathy. No limb weakness, just unsteady gait since stroke. No issues w/ doing hair or putting on her earrings or buttons. Some change w/ handwriting since stroke.    DM2 - ozempic 1mg weekly, tresiba 20 u qhs, humalog 6-6-6 plus SSI.   DEXCOM in place - currently it is 204. Variable sugars w/ some hypoglycemia.  A1c - 2/23/23 7.9<--7/28/23 8.1  Foot - 1/19/23 Dr. Escamilla. Peripheral neuropathy. Gabapentin 300mg BID (inc from daily due to burning in the legs pain)  MAC - 8/18/22 109.1  EYE - due.  Hasn't eaten this morning.     PAD s/p SFA stents of BLE - atorva 80, plavix 75, asa81  BLE arterial US.   CTA of head 2/27/23 - aortic atherosclerosis.     HTN - chlorthalidone 50mg qd, candesartan 32mg daily, coreg 12.5mg BID (inc from 6.25mg BID), " "amlodipine 10mg daily (new since rehab), hydralazine 50mg q 8 hours (new since rehab)    CKD3 - Cr baseline 0.9-1.2. eGFR 58.8 3/1/23  PTH WNL 2/23/23  Vit D mildly low 8/18/22 25.    Low albumin - 2.7-3.3    Thyroid nodule  Tsh wnl 2/27/23    Normocytic anemia - Hgb baseline   Easy bruising.     Mildly elevated ALT.    Review of Systems Comprehensive review of systems otherwise negative. See history/subjective section for more details.      Objective:      Physical Exam    BP (!) 115/57 (BP Location: Right arm, Patient Position: Lying, BP Method: Large (Manual))   Pulse 76   Temp 97.9 °F (36.6 °C) (Oral)   Ht 5' 7" (1.702 m)   Wt 93.5 kg (206 lb 2.1 oz)   SpO2 100%   BMI 32.28 kg/m²     GEN - A+OX4, NAD   HEENT - PERRL, EOMI, OP clear. MMM. TM normal. No nystagmus.   Neck - thyroid nodule - nontender, mid line.   CV - RRR, no m/r   Chest - CTAB, no wheezing or rhonchi  Abd - S/NT/ND/+BS.   Ext - decreased BDP and 2+ radial pulses. No LE edema.  Neuro - 5/5 BUE and BLE strength. antalgic gait - walks w/ rollator. Good lumbrical strength.   Skin - No rash. Multiple bruises but not painful in the abdomen and also R upper arm.     MRI BRAIN 2/26/23  1. There are 2 small adjacent foci of acute lacunar infarction in the right cerebellar peduncle.  Recommend follow-up.  2. Possible minimal subacute infarction at the periphery of a small remote right corona radiata infarct..  3. Involutional changes with chronic microvascular ischemic changes.  4.  This report was flagged in Epic as abnormal.    CTA HEAD/NECK 2/27/23  Acute small infarcts in the right cerebellar peduncles and subtle possible subacute infarct in the right coronary radiata seen on prior MRI are not as well demonstrated on CTA exam.  No aneurysm, high-grade stenosis, or major vessel occlusion.  Hypoplastic right A1.  Findings compatible generalized volume loss and microvascular ischemic changes.  Multinodular thyroid.  Evaluation with thyroid ultrasound " on a nonemergent basis recommended.    Of note, thyroid US done 10/6/22 - Multinodular thyroid gland.  No new nodules meet criteria for follow-up or FNA.  Dominant nodules on the left have undergone FNA previously.    TTE 2/27/23  The left ventricle is normal in size with concentric remodeling and normal systolic function. The estimated ejection fraction is 55%.  Normal right ventricular size with normal right ventricular systolic function.  Indeterminate left ventricular diastolic function.  Severe left atrial enlargement.  The estimated PA systolic pressure is 16 mmHg.  Normal central venous pressure (3 mmHg).    Assessment/Plan       Buck was seen today for hospital follow up.    Diagnoses and all orders for this visit:    Hospital discharge follow-up - cont  PT/OT/skilled nurse.   -     BATH/SHOWER CHAIR FOR HOME USE  -     Comprehensive Metabolic Panel; Future; Expected date: 03/22/2023  -     CBC Auto Differential; Future; Expected date: 03/22/2023    CVA, old, ataxia - cont HH.  -     BATH/SHOWER CHAIR FOR HOME USE  -     Comprehensive Metabolic Panel; Future; Expected date: 03/22/2023  -     CBC Auto Differential; Future; Expected date: 03/22/2023    Dizziness - not orthostatic but when she went to ED 2 days ago, BP was 170s/110s and today it was 115/57. Cut amlodipine in 1/2. Since hosp, her coreg had inc from 6.25 to 12.5mg BID, added amlodipine and hydralazine 50mg q 8 hours. Pt doesn't knwo if she's on all these meds b/c she didn't bring her meds to clinic visit today. Will have RN call pt this afternoon and have her read through her meds to match against ours.    CKD stage 3 due to type 2 diabetes mellitus - cont current meds. DEXCOM in place. Cont candesartan.   -     Comprehensive Metabolic Panel; Future; Expected date: 03/22/2023    Diabetic peripheral neuropathy - cont gabapentin.     PAD (peripheral artery disease) - cont statin and asa/plavix.  -     Comprehensive Metabolic Panel; Future;  "Expected date: 03/22/2023    Aortic atherosclerosis - as above.     Benign essential hypertension - as above.   -     Comprehensive Metabolic Panel; Future; Expected date: 03/22/2023    Hypoproteinemia - no issues w/ swallowing per pt. Inc lean protein intake.   -     Comprehensive Metabolic Panel; Future; Expected date: 03/22/2023    Thyroid nodule - has had US to eval nodules 10/2022.     Normocytic anemia  -     CBC Auto Differential; Future; Expected date: 03/22/2023    Senile purpura  -     CBC Auto Differential; Future; Expected date: 03/22/2023    Elevated ALT measurement  -     Comprehensive Metabolic Panel; Future; Expected date: 03/22/2023    Advance Care Planning     Date: 03/22/2023    Living Will  Reviewed living will and HCPOA on file. Pt wishes no changes to be made.    Power of   Reviewed living will and HCPOA on file. Pt wishes no changes to be made.      Still smokes about 5 cigarettes a day. Discussed risk of smoking and would inc her risk of having recurrent stroke. Not quite ready to quit but will try to cut down. Tried patches in the past but "burned" her skin. Will cont conversation at next visit.    F/u in 1 week, sooner if needed.     Ericka Cortez MD  Department of Internal Medicine - [facility]  11:26 AM    "

## 2023-03-22 NOTE — Clinical Note
B, when you get a chance, can you call pt this afternoon so she can read all her meds out to you? She doesn't know what she's on.  Sea, can you fax her bath/shower chair to [facility]? Also please remind her to bring all her meds when you call her [employer] next week please.

## 2023-03-23 NOTE — TELEPHONE ENCOUNTER
Lab results given. Mild anemia. No blood in stool or urine. Seeing me next week and will repeat CBC then. Pt reports no more dizziness and feels fine. Will need Ly[employer] for next week.

## 2023-03-29 NOTE — PATIENT INSTRUCTIONS
Check your blood pressure twice daily and write it down.   Bring all your medicines to the next visit along with your blood pressure cuff to have a blood pressure check with the nurse.

## 2023-03-29 NOTE — PROGRESS NOTES
"Subjective:       Patient ID: Buck Ibarra is a 77 y.o. female.    Chief Complaint: Dizziness    HPI  Cerebellar CVA in Feb/early March. Currently undergoing PT 2x/week. Yesterday PT checks her BP and told her it was 110 yesterday. She checks her BP at home also and it was 120s-130s.   Reports still w/ dizziness as soon as she wakes up and last about 1/2 the day and it goes away. Later on it may come back.   Pt didn't bring her meds from home today. Doesn't know what she's on.   Also found out that insurance does not cover shower chair but family members helped her out.   Walking w/ rollator. No falls.   No CP/palpitations/SOB. No fevers/chills. No blood in stool or urine. Feels like she's getting stronger w/ PT.     Review of Systems  as above in HPI.     Objective:      Physical Exam    BP (!) 170/75   Pulse 76   Temp 98.4 °F (36.9 °C) (Oral)   Ht 5' 7" (1.702 m)   Wt 95 kg (209 lb 7 oz)   SpO2 99%   BMI 32.80 kg/m²     GEN - A+OX4, NAD   HEENT - PERRL, EOMI, OP clear. MMM. TM normal. No nystagmus.   Neck - thyroid nodule - nontender, mid line.   CV - irreg irreg.  Chest - CTAB, no wheezing or rhonchi  Abd - S/NT/ND/+BS.   Ext - decreased BDP and 2+ radial pulses. No LE edema.  Neuro - 5/5 BUE and BLE strength. antalgic gait - walks w/ rollator.    Previous labs reviewed.    Assessment/Plan     Buck was seen today for dizziness.    Diagnoses and all orders for this visit:    Dizziness - reports BPs at home 120s and yesterday PT tolder her SBP was 110. Today in clinic BP is 170/75. Reports took her medicine today. Doesn't know waht they are. Gathered all her meds but when Lyft ride came, forgot them at home.     Benign essential hypertension -   Check your blood pressure twice daily and write it down.   Bring all your medicines to the next visit along with your blood pressure cuff to have a blood pressure check with the nurse.     Irregular heart beat  -     IN OFFICE EKG 12-LEAD (to " Muse)    Normocytic anemia  -     CBC Auto Differential; Future  -     Ferritin; Future  -     Iron and TIBC; Future        Follow up in about 5 weeks (around 5/3/2023). With me in person.      Ericka Cortez MD  Department of Internal Medicine - [facility]  10:08 AM

## 2023-04-05 NOTE — PROGRESS NOTES
Pt here for b/p check and medication check  Meds updated in rooming.    Patient has brought home blood pressure log with her, as well as home blood pressure machine    Expresses concern over blood pressure this am of 200 /81  After asking her if blood pressure was taken before or after taking medications,   Patient stated that she took blood pressure before taking any of her morning medications.   Instructed patient on timing of blood pressure measurements.    Asked patient to demonstrate how she takes her blood pressure readings at home  Asked her to place cuff on her arm.  Patient placed cuff on lower left arm.   Instructed patient on proper cuff placement when taking home blood pressure measurement  Had patient re-demonstrate , and place cuff on proper area.   Home blood pressure cuff reading is 141/62    Clinic manual blood pressure reading taken 10 minutes after.  Reading is 137/61.     Patient given new blood pressure logs and AHA blood pressure reading instructions.  Reviewed with pt and daughter on proper techniques.     Patient expressed that she was still having ongoing intermittent dizziness   Let pt know that this would be communicated to PCP, and we would call her with any updates,   And let her know what date / time PCP would like f/u , if earlier than 5 weeks.

## 2023-04-06 NOTE — TELEPHONE ENCOUNTER
Can you let her know she absolutely should be on her plavix 75mg daily and crestor 40mg (instead of atorvastatin 80mg daily as the atorvastatin didn't bring her cholesterol at goal) nightly to prevent another stroke?  Maybe f/u w/ me in about 4-6 weeks in person. Bring meds again.

## 2023-04-11 PROBLEM — Z87.891 FORMER SMOKER: Status: ACTIVE | Noted: 2023-01-01

## 2023-04-11 NOTE — PROGRESS NOTES
Ms. Ibarra BP is slightly elevated all other VS are WNL. Patient reports family history of stroke, PMH of DM, HTN, tobacco abuse,and HLP. Denies any personal history of stroke. A1C-7.9 and CHOL 205 .2 @ time of the stroke. She reports medication compliance.  nurse educated patient on each of her stroke RF present, Stroke S/S, tobacco cessation, and purpose of  program.

## 2023-04-11 NOTE — TELEPHONE ENCOUNTER
Patient advised on medications. Does not think she has them, but will check again. Will check in at [facility] pharmacy tomorrow when she goes to see Ms Rivera for DM check   Appt on 4/26 with PCP for review of meds and b/p check / education reinforcement

## 2023-04-24 NOTE — TELEPHONE ENCOUNTER
----- Message from Theresa Blaine sent at 4/24/2023  9:39 AM CDT -----  Regarding: Refill  Rx Name:hydrALAZINE (APRESOLINE) 50 MG tablet      Preferred Pharmacy with number:   [facility]  [address]  [facility] [address]  Phone: [phone] Fax: [phone]          Ordering Provider: Bill Gustafson       Contact preference: [phone]        Comments/Notes: Is completely out -

## 2023-04-26 NOTE — PATIENT INSTRUCTIONS
Instead of atorvastatin 80mg daily, you're supposed to be rosuvastatin 40mg daily for cholesterol.     I refilled your hydralazine 50mg every 8 hours - I recommend checking your blood pressure prior to taking medicine. If top number is <110, skip this medicine.     Restart clopidogrel (Plavix) 75mg daily and continue aspirin 81mg daily as this is to keep the blood vessels in the legs open.     Make sure to eat after taking Humalog because it will drop your sugars quickly! If you don't eat, DON'T take the Humalog!

## 2023-04-26 NOTE — PROGRESS NOTES
"Subjective:       Patient ID: Buck Ibarra is a 77 y.o. female.    Chief Complaint: BP follow up    HPI  Saw pt in March after she had a cerebellar CVA in Feb 2023 w/ remnant dizziness. Working w/ HH PT/OT.  Had neuro homecare visit w/ RN and BP was 142/78.  When I saw her last 3/29, her BP was very elevated in the clinic. However at that time, pt reports PT goes to her home twice a week and pt reports bps were at goal. She didn't know what meds she was on and forgot her meds at home. She's here for a f/u visit today and told to bring all her meds along w/ BP logs.   Reports dizziness is doing better although today is dizzy b/c her sugars and BP are low.   Ran out of hydralazine for a few days.     DM2 - on ozempic 1mg weekly, tresiba 20units night and humalog 6-6-6 w/ SSI. Took her humalog this morning along with all of her medicines but didn't eat or drink anything all day.   DEXCOM in place. Today sugar was 66 - felt dizzy and lightheaded.   Last a1c 2/23/23 7.9    Still taking atorva 80 but supposed to be on crestor 40mg daily.     PAD s/p SFA stents of BLE - supposed to be on crestor 40 and plavix 75 (not currently on it as not in medicine bag)    Review of Systems  Comprehensive review of systems otherwise negative. See history/subjective section for more details.    Objective:      Physical Exam    /75   Pulse 77   Temp 97.9 °F (36.6 °C) (Oral)   Ht 5' 7" (1.702 m)   Wt 93 kg (205 lb 0.4 oz)   SpO2 98%   BMI 32.11 kg/m²     GEN - A+OX4, NAD   HEENT - PERRL, EOMI, OP clear. MMM. TM normal. No nystagmus.   Neck - thyroid nodule - nontender, mid line.   CV - irreg irreg.  Chest - CTAB, no wheezing or rhonchi  Abd - S/NT/ND/+BS.   Ext - decreased BDP and 2+ radial pulses. No LE edema.  Neuro - 5/5 BUE and BLE strength. antalgic gait - walks w/ rollator.    Previous labs reviewed.     Assessment/Plan     Buck was seen today for hypertension.    Diagnoses and all orders for this " visit:    Diabetic hypoglycemia  When pt first got to exam room, DEXCOM was reading 66 and pt reprots took humalog but hasn't eaten yet. She felt dizzy and didn't want to lie down as that may make her even dizzier Given candies and glucose tablet and nutrigrain bar. After 45 min, sugars started coming back up - 86 when we just examined her. Nurse sat w/ pt the entire time until sugars >70.   Educated on the difference between short acting and long acting insulin.   Make sure to eat after taking Humalog because it will drop your sugars quickly! If you don't eat, DON'T take the Humalog!    PAD (peripheral artery disease) - pt is supposed to be on crestor 40 and not atorva. Made the change.   Make sure to eat after taking Humalog because it will drop your sugars quickly! If you don't eat, DON'T take the Humalog!  -     rosuvastatin (CRESTOR) 40 MG Tab; Take 1 tablet (40 mg total) by mouth every evening.    Hypotension, unspecified hypotension type - when sugars were low and pt was feeling bad. BP came back up after sugars returned to normal.     Benign essential hypertension - refilled hydralazine but given parameters.    -     hydrALAZINE (APRESOLINE) 50 MG tablet; Take 1 tablet (50 mg total) by mouth every 8 (eight) hours. Check BP before taking. If top number <110, do not take medicine.  -     clopidogreL (PLAVIX) 75 mg tablet; Take 1 tablet (75 mg total) by mouth once daily.    50 minutes was spent on patient with over half the time was spent in coordination of care and/or counseling.      Follow up in about 2 months (around 6/26/2023).      Ericka Cortez MD  Department of Internal Medicine - [facility]  12:55 PM

## 2023-04-27 NOTE — PROGRESS NOTES
Subjective:      Patient ID: Buck Ibarra is a 77 y.o. female.    Chief Complaint: Nail Care and Diabetes Mellitus (PCP-Ericka Cortez MD-4/26/2023)      Buck is a 77 y.o. female who presents to the clinic upon referral from Dr. Mica azar. provider found  for evaluation and treatment of diabetic feet. Buck has a past medical history of Allergy, Cataract, Diabetes mellitus type II, Gastritis, GERD (gastroesophageal reflux disease), H. pylori infection, History of hepatitis C, SVR as of 8-2016  (8/25/2015), Hypertension, Osteopenia, and Type 2 diabetes mellitus with diabetic polyneuropathy. Patient relates no major problem with feet. Only complaints today consist of routine DM foot care and nail/callus trimming. Has difficulty with toenails that are thick, discolored. Routine trimming helps prevent complications and pain. Had a stroke 2 months ago which caused some lower extremity weakness. Nevaeh to PT and this helped. She also has hx of PAD, lower extremity stenting and is on long term Plavix. No other pedal concerns at this time.     PCP: Ericka Cortez MD    Date Last Seen by PCP:   Chief Complaint   Patient presents with    Nail Care    Diabetes Mellitus     PCP-Ericka Cortez MD-4/26/2023        Current shoe gear: Casual shoes    Hemoglobin A1C   Date Value Ref Range Status   02/23/2023 7.9 (H) 4.0 - 5.6 % Final     Comment:     ADA Screening Guidelines:  5.7-6.4%  Consistent with prediabetes  >or=6.5%  Consistent with diabetes    High levels of fetal hemoglobin interfere with the HbA1C  assay. Heterozygous hemoglobin variants (HbS, HgC, etc)do  not significantly interfere with this assay.   However, presence of multiple variants may affect accuracy.     07/28/2022 8.1 (H) 4.0 - 5.6 % Final     Comment:     ADA Screening Guidelines:  5.7-6.4%  Consistent with prediabetes  >or=6.5%  Consistent with diabetes    High levels of fetal hemoglobin interfere with the HbA1C  assay. Heterozygous hemoglobin variants (HbS, HgC,  etc)do  not significantly interfere with this assay.   However, presence of multiple variants may affect accuracy.     2022 9.0 (H) 4.0 - 5.6 % Final     Comment:     ADA Screening Guidelines:  5.7-6.4%  Consistent with prediabetes  >or=6.5%  Consistent with diabetes    High levels of fetal hemoglobin interfere with the HbA1C  assay. Heterozygous hemoglobin variants (HbS, HgC, etc)do  not significantly interfere with this assay.   However, presence of multiple variants may affect accuracy.             Review of Systems   Constitutional: Negative for chills, diaphoresis, fever, malaise/fatigue and night sweats.   Cardiovascular:  Negative for claudication, cyanosis, leg swelling and syncope.   Skin:  Positive for nail changes. Negative for color change, dry skin, rash, suspicious lesions and unusual hair distribution.   Musculoskeletal:  Negative for falls, joint pain, joint swelling, muscle cramps, muscle weakness and stiffness.   Gastrointestinal:  Negative for constipation, diarrhea, nausea and vomiting.   Neurological:  Positive for numbness and sensory change. Negative for brief paralysis, disturbances in coordination, focal weakness, paresthesias and tremors.         Objective:      Physical Exam  Vitals reviewed.   Constitutional:       Appearance: She is well-developed.   HENT:      Head: Normocephalic.   Cardiovascular:      Pulses:           Dorsalis pedis pulses are 1+ on the right side and 1+ on the left side.      Comments: PT pulses diminished b/l. CRT < 3 sec to tips of toes.  No vericosities noted to b/l LEs.     Pulmonary:      Effort: No respiratory distress.   Musculoskeletal:      Right lower le+ Edema present.      Left lower le+ Edema present.      Comments: Rectus foot and toe position bilateral with no major deformities noted. Mild equinus noted b/l ankles with < 10 deg DF noted. MMT 5/5 in DF/PF/Inv/Ev resistance with no reproduction of pain in any direction. Passive range of  motion of ankle and pedal joints is painless b/l.     Skin:     General: Skin is warm and dry.      Findings: No erythema.      Comments: No open lesions, lacerations or wounds noted. Nails are thickened, elongated, discolored yellow/brown with subungual debris and brittleness to R 1-5 and L 1-5. Interdigital spaces clean, dry and intact b/l. No erythema noted to b/l foot. Skin texture thin, atrophic, dry. Mild hyperpigmentation to skin of both ankles and/or feet, consistent with hemosiderin deposits. Pedal hair diminished. Toes cool to touch b/l. Hyperkeratotic lesion noted to distal tips of b/l hallux, 2nd and 3rd toes. Skin lines present to lesion/s. No signs of deep tissue injury.     Neurological:      Mental Status: She is alert and oriented to person, place, and time.      Sensory: Sensory deficit present.      Comments: Light touch, proprioception, and sharp/dull sensation are all intact bilaterally. Protective threshold with the Sussex-Wienstein monofilament is intact bilaterally. Subjective paresthesias with no clearly identifiable source or trigger.    Psychiatric:         Behavior: Behavior normal.         Thought Content: Thought content normal.         Judgment: Judgment normal.             Assessment:       Encounter Diagnoses   Name Primary?    Type II diabetes mellitus with neurological manifestations Yes    PAD (peripheral artery disease)     Onychomycosis due to dermatophyte     Corn or callus     Hx of long term use of blood thinners                Plan:       Buck was seen today for nail care and diabetes mellitus.    Diagnoses and all orders for this visit:    Type II diabetes mellitus with neurological manifestations    PAD (peripheral artery disease)    Onychomycosis due to dermatophyte    Corn or callus    Hx of long term use of blood thinners            I counseled the patient on her conditions, their implications and medical management.    - Shoe inspection. Diabetic Foot Education.  Patient reminded of the importance of good nutrition and blood sugar control to help prevent podiatric complications of diabetes. Patient instructed on proper foot hygeine. We discussed wearing proper shoe gear, daily foot inspections, never walking without protective shoe gear, caution putting sharp instruments to feet     - Discussed DM foot care:  Wear comfortable, proper fitting shoes. Wash feet daily. Dry well. After drying, apply moisturizer to feet (no lotion to webspaces). Inspect feet daily for skin breaks, blisters, swelling, or redness. Wear cotton socks (preferably white)  Change socks every day. Do NOT walk barefoot. Do NOT use heating pads or warm/hot water soaks     With patient's permission, nails were aggressively reduced and debrided 1,2,3,4, 5 R and 1,2, 3,4,5 L and filed to their soft tissue attachment mechanically and with electric , removing all offending nail and debris. Patient tolerated this well and no blood was drawn. Patient reports relief following the procedure.     The affected area was cleansed with an alcohol prep pad. Next, utilizing a 5mm curette, the hyperkeratotic tissues were trimmed from distal tips of toes 1-3 b/l, down to appropriate level of skin. Care was taken to remove any nucleated core from the center of the lesion. No pinpoint bleeding was encountered. The patient tolerated relief following this procedure.       RTC 3.5 months, sooner PRN

## 2023-05-16 NOTE — TELEPHONE ENCOUNTER
Pt wanted to know why she was referred to [facility], pt was upset wanted to know why they thought she had cancer and nothing was told to her, adv pt that is where colorectal is located, pt is being referred to follow up for symptoms presented to the ED with. And in addition to have polyps checked   Unknown

## 2023-05-29 PROBLEM — I63.341 THROMBOTIC STROKE INVOLVING RIGHT CEREBELLAR ARTERY: Status: RESOLVED | Noted: 2023-01-01 | Resolved: 2023-01-01

## 2023-06-12 NOTE — TELEPHONE ENCOUNTER
----- Message from Ashlee Lundy sent at 6/12/2023  9:15 AM CDT -----  Contact: [phone]  Pt needs callback in regards to medications       Please call and advise @ # [phone]

## 2023-06-12 NOTE — TELEPHONE ENCOUNTER
Pt calling b/c all of her meds are running low. States some of these are from her hospital visit. Pended all of the meds that she is low on, that were in current meds.     She also requested Atorvastatin, but that medication was not current. Pt has appointment on 6/28

## 2023-06-28 PROBLEM — J41.0 SIMPLE CHRONIC BRONCHITIS: Status: RESOLVED | Noted: 2022-04-14 | Resolved: 2023-01-01

## 2023-06-28 NOTE — PATIENT INSTRUCTIONS
Labs and urine test today.   Schedule eye appointment.     Stop rosuvastatin (cholesterol medicine) to see if muscle cramps improve.    Physical therapy should call to schedule an appointment with you. If you don't hear from them in a week, call us back.

## 2023-06-28 NOTE — PROGRESS NOTES
"Subjective:       Patient ID: Buck Ibarra is a 77 y.o. female.    Chief Complaint: DM f/u    HPI  For the last month, her legs and B forearms started hurting. Sudden onset. Constant and all the time. Feels like muscle pains. Nothing makes it better or worse. Reports legs hurt so bad that sometimes she can't get to her room w/o help. B legs feel weak overall too. Walks w/ walker.   H/o cerebellar CVA 2/2023 - h/o HH PT/OT. Pt does not drive and daughter usually drives pt around. Still daily w/ dizziness.   No falls.     DM2 - on ozempic 1mg weekly, tresiba 20units night and humalog 6-6-6 w/ SSI. Overall decreased appetite. No nausea/vomiting/stomach pains. No low sugars. Bowels are ok.   DEXCOM in place.   Follows w/ Graeme Rivera NP.   A1c - 2/23/23 7.9  Foot - 4/27/23 Dr. Escamilla. Has f/u appt 7/31/23.  MAC - due  Eye - due  B lower extremities to knees w/ numbness. Unchanged from prior.     HTN - amlodipine 10mg daily, candesartan 32mg daily, coreg 6.25mg BID, hydralazine 50mg q8 hours, hold if BP <110 on top.   H/o cerebellar CVA 2/2023.     HLD - crestor 40mg daily. Previously was on atorvastatin that did not control cholesterol.     Feels cold all the time. No blood in stool or urine.     Review of Systems  Comprehensive review of systems otherwise negative. See history/subjective section for more details.    Objective:      Physical Exam    /68 (BP Location: Left arm, Patient Position: Sitting, BP Method: Large (Manual))   Pulse 80   Temp 98.4 °F (36.9 °C) (Oral)   Ht 5' 7" (1.702 m)   Wt 91.1 kg (200 lb 13.4 oz)   SpO2 99%   BMI 31.46 kg/m²     Gen - A+OX4, ill appearing.   HEENT - PERRL, OP clear. Dry mucous membrane.   Neck - no LAD.   CV - RRR  CHEST - CTAB, no crackles/wheezing. Normal work of breathing.   Abd - S/NT/ND/+BS  Ext - 1+ B DP and 2+ B radial pulses. No LE edema.   MSK - paraspinal and hsoulder tenderness B. B upper and forearms painful to palpation along w/ B thighs and " calves. 4/5 B hip flexion but otherwise 5/5 BUE and b knee flexion/extension. Walks w/ walker.   Skin - no rash.     Previous labs reviewed.     Assessment/Plan     Buck was seen today for dizziness, leg pain and arm pain.    Diagnoses and all orders for this visit:    Myalgia - stop crestor. Labs as below. Reassess in 4 weeks.  -     CK; Future  -     Magnesium; Future  -     Sedimentation rate; Future  -     C-Reactive Protein; Future    Type 2 diabetes mellitus with hyperglycemia, with long-term current use of insulin  -     Hemoglobin A1C; Future  -     Comprehensive Metabolic Panel; Future  -     Microalbumin/Creatinine Ratio, Urine; Future  -     Ambulatory referral/consult to Optometry; Future    Benign essential hypertension - Stable and controlled. Continue current medications.  -     Comprehensive Metabolic Panel; Future    Hyperlipidemia associated with type 2 diabetes mellitus - cont DM meds. Stop crestor for now.  -     Hemoglobin A1C; Future  -     Comprehensive Metabolic Panel; Future  -     Microalbumin/Creatinine Ratio, Urine; Future  -     Lipid Panel; Future    Normocytic anemia  -     CBC Auto Differential; Future  -     Ferritin; Future  -     Iron and TIBC; Future    History of cerebrovascular accident (CVA) with residual deficit  -     Ambulatory referral/consult to Physical/Occupational Therapy; Future    Bilateral leg weakness  -     Ambulatory referral/consult to Physical/Occupational Therapy; Future    Paresthesia  -     Folate; Future  -     Vitamin B12; Future    Cold intolerance  -     TSH; Future    Dizziness  -     Ambulatory referral/consult to Physical/Occupational Therapy; Future    Labs and urine test today.   Schedule eye appointment.     Stop rosuvastatin (cholesterol medicine) to see if muscle cramps improve.    Physical therapy should call to schedule an appointment with you. If you don't hear from them in a week, call us back.     F/u in 4 weeks, sooner if  needed.      Ericka Cortez MD  Department of Internal Medicine - [facility]  12:07 PM

## 2023-06-29 NOTE — TELEPHONE ENCOUNTER
B can you call pt to let her know that her a1c is WAY worse? 7.9 in Feb-->11.6. kidney function has worsened mildly also.  Blood counts are normal. Iron stores, B12 and folate are normal.   Muscle enzymes are normal. Inflammation markers are normal. Ok to hold crestor as discussed but likely not the cause her muscle cramping/pain.   Please confirm she's taking ozempic 1mg weekly, tresiba 20 units nightly and humalog 6-6-6 plus sliding scale. If she's been consistent on this regimen, I would inc humalog to 8-8-8 plus SSI and tresiba to 26 units nightly. Let me know what she says.    F/u in 4 weeks as scheduled.

## 2023-06-29 NOTE — PROGRESS NOTES
On-call and notified by lab at 11:30pm for glucose 491 - pt with worsening hba1c now 11.6 on insulin with deacon. Will notify Dr. Cortez of result in am since I have clinic with her.

## 2023-06-29 NOTE — TELEPHONE ENCOUNTER
Daiana Martínez MD  Internal Medicine     Progress Notes  Daiana Martínez MD (Physician)   Internal Medicine  Unsigned  On-call and notified by lab at 11:30pm for glucose 491 - pt with worsening hba1c now 11.6 on insulin with deacon. Will notify Dr. Cortez of result in am since I have clinic with her.

## 2023-07-10 NOTE — TELEPHONE ENCOUNTER
Contacted pt to reschedule appointment since Dr. Escamilla will no longer be seeing patients at the [facility] location.  Left voice message to contact clinic at [phone].

## 2023-07-21 NOTE — PROGRESS NOTES
Unable to reach patient to schedule or conduct monthly SM visit for 2nd consecutive month patient will be D/C at this time.

## 2023-07-25 NOTE — PROGRESS NOTES
Established Patient - Audio Only Telehealth Visit     The patient location is: [place], [place]  The chief complaint leading to consultation is: follow up for muscle pains.   Visit type: Virtual visit with audio only (telephone)  Total time spent with patient: 10 min       The reason for the audio only service rather than synchronous audio and video virtual visit was related to technical difficulties or patient preference/necessity.     Each patient to whom I provide medical services by telemedicine is:  (1) informed of the relationship between the physician and patient and the respective role of any other health care provider with respect to management of the patient; and (2) notified that they may decline to receive medical services by telemedicine and may withdraw from such care at any time. Patient verbally consented to receive this service via voice-only telephone call.       HPI:   Last seen pt 6/28/23 and pt c/o leg and forearm muscle pains that are constant. Stopped on crestor to see if myalgia better. Reports stabbing pain is in the R leg and hasn't gotten better since being off of crestor.   Referred to PT for complaints of B leg weakness. Starting PT on Thursday.     DM2 - was on ozempic 1mg weekly and tresiba 20 u nightly and humalog 6-6-6 plus SSI.  DEXCOM in place. Follows w/ Graeme Rivera NP but hasn't seen her in a long me.   A1c went from 2/23/23 7.9 to 11.6 6/28/23     Assessment and plan:    Diagnoses and all orders for this visit:    Type 2 diabetes mellitus with hyperglycemia, with long-term current use of insulin - inc ozempic from 1 to 2mg weekly. Will message Ms. Rivera's staff to plug back into clinic. Cont tresiba and humalog.   -     semaglutide (OZEMPIC) 2 mg/dose (8 mg/3 mL) PnIj; Inject 2 mg into the skin every 7 days.    Myalgia - unchangeda fter stopping crestor. Restart crestor due to h/o stroke and HLD w/ DM.     Hyperlipidemia associated with type 2 diabetes mellitus - restart  crestor. DM as above.     History of CVA (cerebrovascular accident) - restart mita.       Ericka Cortez MD  Department of Internal Medicine - [facility]  9:57 AM                        This service was not originating from a related E/M service provided within the previous 7 days nor will  to an E/M service or procedure within the next 24 hours or my soonest available appointment.  Prevailing standard of care was able to be met in this audio-only visit.

## 2023-07-25 NOTE — Clinical Note
Graeme Greer! I saw Ms. Bennett today. Her a1c went from 7.9 in Feb to 11.6 last mo. I just increased her ozempic from 1 to 2mg weekly and kept the tresiba and humalog. Would you be able to plug her back in with you?  Hope you're doing well! Ericka

## 2023-07-26 NOTE — TELEPHONE ENCOUNTER
----- Message from ARNOLD Sawyer, CARO sent at 7/26/2023 10:19 AM CDT -----  Regarding: RE:  Percy Barnett!  Im on medical leave but going better   Had appendectomy 7/22  Yikes on increase   Ill have Anika work on this   Ill be back 8/14 in the office  ----- Message -----  From: Ericka Cortez MD  Sent: 7/25/2023   9:58 AM CDT  To: ARNOLD Sawyer, CARO, Nurse Graeme Lyons!  I saw Ms. Bennett today. Her a1c went from 7.9 in Feb to 11.6 last mo. I just increased her ozempic from 1 to 2mg weekly and kept the tresiba and humalog. Would you be able to plug her back in with you?   Hope you're doing well!  Ericka

## 2023-07-27 PROBLEM — Z74.09 IMPAIRED FUNCTIONAL MOBILITY, BALANCE, GAIT, AND ENDURANCE: Status: ACTIVE | Noted: 2023-01-01

## 2023-07-27 NOTE — PLAN OF CARE
[facility]  Physical Therapy Neurological Rehabilitation Initial Evaluation     Name: Buck Ibarra  Clinic Number: [id number]    Therapy Diagnosis:   Encounter Diagnoses   Name Primary?    History of cerebrovascular accident (CVA) with residual deficit     Bilateral leg weakness     Dizziness     Impaired functional mobility, balance, gait, and endurance      Physician: Ericka Cortez MD    Physician Orders: PT Eval and Treat   Medical Diagnosis from Referral:   I69.30 (ICD-10-CM) - History of cerebrovascular accident (CVA) with residual deficit   R29.898 (ICD-10-CM) - Bilateral leg weakness   R42 (ICD-10-CM) - Dizziness     Evaluation Date: 7/27/2023  Authorization Period Expiration: 9/21/23  Plan of Care Expiration: 9/21/23  Progress Note Due: 8/27/23  Visit # / Visits authorized: 1/ 1  FOTO: 1/1    Precautions: Standard, Diabetes, Fall, and blood pressure    Time In: 1330  Time Out: 1429  Total Billable Time: 59 minutes    Subjective      Date of onset: per pt's chart, she had a cerebellar CVA in February of 2023.    History of current condition - Bennett reports: she was in inpatient rehab for 1-2 weeks at [facility]. She had 3 or 4 weeks of therapy at home. Once this stopped, she began having aching with her R leg. Pt reports that the home therapy stopped in May.     Imaging, MRI studies: brain, 2/26/23:    COMPARISON:  CT 02/26/2023, 11/12/2022     FINDINGS:  Intracranial compartment:     No midline shift or hydrocephalus.  No extra-axial blood or fluid collections.     Moderate involutional changes and probable chronic microvascular ischemic changes in the periventricular and subcortical white matter.     Two small adjacent foci of acute lacunar infarction in the right cerebellar peduncle.  Very minimal increased diffusion signal adjacent to a remote right corona radiata infarction could represent very minimal subacute infarction at the periphery of the prior infarction.     No mass, acute  "hemorrhage or significant edema.     Few tiny foci of chronic hemosiderin staining in the left frontal and left occipital lobes.     Normal vascular flow voids are preserved.     Skull/extracranial contents (limited evaluation): Bone marrow signal intensity is normal.     Impression:     1. There are 2 small adjacent foci of acute lacunar infarction in the right cerebellar peduncle.  Recommend follow-up.  2. Possible minimal subacute infarction at the periphery of a small remote right corona radiata infarct..  3. Involutional changes with chronic microvascular ischemic changes.  4.  This report was flagged in Epic as abnormal.       Prior Therapy: HH PT and OT  Social History:  lives with her family  Falls: she had three falls at her sister's home in [place] due to tripping on the carpet   DME: wheelchair( which she doesn't use) and rollator; shower chair  Home Environment: 1 story with 2 small steps   Exercise Routine / History: none   Family Present at time of Eval: granddaughter   Occupation: retired [employer]  Prior Level of Function: complete independence prior to the stroke  Current Level of Function: uses rollator for ambulation, not driving, having difficulty making her bed    Pain:  Current 5/10, worst 10/10, best not captured  Location: right calf   Description: stabbing  Aggravating Factors: nothing  Easing Factors: pt uses [place] patches    Patient's goals: " to get rid of this pain and be able to walk more."    Medical History:   Past Medical History:   Diagnosis Date    Allergy     Cataract     Diabetes mellitus type II     Gastritis     with gastric ulcer    GERD (gastroesophageal reflux disease)     H. pylori infection     History of hepatitis C, SVR as of 8-2016 8/25/2015    [facility] 12 wks completed.  SVR(12) as of 8/4/16 SVR(24) as of 10-     Hypertension     Osteopenia     Type 2 diabetes mellitus with diabetic polyneuropathy        Surgical History:   Buck Ibarra  has a past surgical " history that includes Hysterectomy; Liver biopsy; Upper gastrointestinal endoscopy; Colonoscopy; Colonoscopy (N/A, 1/28/2016); Oophorectomy; Colonoscopy (N/A, 8/8/2019); Peripheral angiography (Left, 5/5/2021); and Peripheral angiography (N/A, 6/21/2021).    Medications:   Buck has a current medication list which includes the following prescription(s): amlodipine, aspirin, dexcom g6 , dexcom g6 sensor, dexcom g6 transmitter, candesartan, carvedilol, clopidogrel, doxepin, gabapentin, hydralazine, tresiba flextouch u-200, insulin lispro, pantoprazole, pen needle, diabetic, rosuvastatin, ozempic, and [DISCONTINUED] insulin aspart u-100.    Allergies:   Review of patient's allergies indicates:   Allergen Reactions    Amlodipine Swelling    Erythromycin Anaphylaxis    Erythropoietin analogues Anaphylaxis    Pegasys [peginterferon ruben-2a] Anaphylaxis    Lisinopril Hives        Objective     BP: 88/73,  ~ multiple attempts to achieve with automatic cuff( questionable accuracy). PT later attempted with manual cuff and had difficulty hearing to record accurate reading( estimated to be in 80's systolic and 60's diastolic)     Observation: pleasant and cooperative   Speech: clear    Mental status: alert, oriented to person, place, and time  Appearance: Casually dressed and Well groomed  Behavior:  calm, cooperative, and adequate rapport can be established  Attention Span and Concentration:  Normal    Posture Alignment : Seated: mild R trunk elongation, forward head, rounded shoulders  Standing: B knee flexion noted    Dominant hand:  right     Skin integrity:  Intact    Visual/Auditory: denies changes~ wears reading glasses       ROM:   GROSS AROM/PROM  UPPER EXTREMITY  (R) UE: limited as follows: shoulder flexion just above 90 degrees with attempts to actively lift arm  (L) UE: limited as follows: shoulder flexion just above 90 degrees with attempts to actively lift arm  LOWER EXTREMITY  (R) LE: WFL as  observed during MMT  (L) LE: WFL as observed during MMT       Lower Extremity Strength~ tested with pt seated in gold chair  Right LE  Left LE    Hip flexion:  3/5 Hip flexion: 3/5   Knee extension: 4+/5 Knee extension: 4+/5   Knee flexion: 4/5 Knee flexion: 3+/5   Ankle dorsiflexion:  3-/5 Ankle dorsiflexion: 3-/5   Ankle plantarflexion:  3+/5 Ankle plantarflexion: 3-/5   Hip abduction: 4+/5 Hip abduction: 3+/5   Hip adduction: 4/5 Hip adduction 3+/5   Hip extension: 3+/5 Hip extension: 3/5     Upper extremity strength: Not formally tested due to time constraints~  strength appears good or near good B    Coordination:   Rapid Alternating Movements: NT due to time constraints  Point to Point:    -Finger to Nose: NT due to time constraints   -Heel to Shin: NT due to time constraints    Sensation: intact to B UE and LE for LT  Proprioception: NT    Tone/Spasticity: No abnormal tone or spasticity noted to B UE or LE    Functional Mobility (Bed mobility, transfers)  Mod I        Evaluation   Single Limb Stance R LE NT  (<10 sec = HIGH FALL RISK)   Single Limb Stance L LE NT  (<10 sec = HIGH FALL RISK)   30 second Chair Rise 5 completed with arms   5 times sit to stand NT   TUG 26 seconds with rollator   Self selected walking speed 0.50 m/sec (6m/12s)   Fast walking speed 0.67 m/sec (6m/9s)     30 second chair rise below average score:   Age  Men  Women  75-79  <14  <13    A below average score indicates a risk for fall.    5x sit to stand normative information:   >12 sec= fall risk for general elderly  >16 sec= fall risk for Parkinson's disease  >10 sec= balance/vestibular dysfunction (<61 y/o)  >14.2 sec= balance/vestibular dysfunction (>61 y/o)  >12 sec= fall risk for CVA    Postural control:  MCTSIB:  1. Eyes Open/feet together/Firm: 30 seconds, P~ min sway~ stopped remainder of test due to pt's complaints of feeling dizzy  2. Eyes Closed/feet together/Firm: NT  3. Eyes Open/feet together/Foam: NT  4. Eyes  Closed/feet together/Foam: NT    Gait Assessment:   - AD used: rollator  - Assistance: mod I  - Stairs: Not tested    GAIT DEVIATIONS:   Buck displays the following deviations with ambulation: B knee flexion in stance phase, decreased alyx, decreased step and stride length, mild pelvic drop noted B   Impairments contributing to deviations: impaired LE strength, impaired balance, decreased endurance, pain    Endurance Deficit: Yes, at least minimal to moderate     PT Evaluation Completed? Yes      Intake Outcome Measure for FOTO Stroke Lower Extremity Survey    Therapist reviewed FOTO scores for Buck Ibarra on 7/27/2023.   FOTO documents entered into [employer] - see Media section.    Intake Score: 42 %       Treatment     Total Treatment time separate from Evaluation: 14 minutes    Treatment start time: 1415  Treatment end time: 1429    Patient participated in dynamic functional therapeutic activities to improve functional performance for 14 minutes. Including:    Monitoring of pt's BP until an accurate reading could be determined. PT provided pt with 2 cups of water and assisted she and granddaughter to complete her FOTO survey. PT also discussed with pt the fact that she only has a PT referral at the present time and PT would have to message referring provider to get an OT order if she is interested. ( PT noted pt had some difficulty holding ipad steady while completing FOTO survey.) Pt verbalized interest in an OT evaluation.      BP 96/70 at end of session      Patient Education and Home Exercises     Education provided:   - Pt educated regarding evaluation findings, potential plan of care and scheduling process. PT will message MD to request an OT order.      Written Home Exercises Provided: to be provided in a future session.    Assessment     Buck is a 77 y.o. female referred to outpatient Physical Therapy with a medical diagnosis of History of CVA with residual deficit, bilateral leg weakness  and dizziness. Patient presents with decreased balance, decreased LE strength, dizziness during attempt to perform mCTSIB test and likely hypotension during a portion of today's evaluation. Consequently, PT was not able to perform or complete all the intended tests and measures. PT is unsure of the source of pt's LE pain, but this could be from lack of exercise since her home therapy ended. During today's objective testing, pt demonstrates B LE weakness( though L LE is slightly weaker.) Her 30 second chair rise is very limited at 5 repetitions, indicating pt is at risk for falls. Her TUG score of 26 seconds with her rollator also points to fall risk. Since PT was unable to complete mCTSIB testing due to pt complaints of dizziness, so will plan to do at first PT follow-up. Pt's SSWS of 0.50 m/sec places her in the 60-79 % limited category with respect to safe, independent community ambulators. PT feels that pt can benefit from 2 x weekly therapy sessions to address the above deficits. PT also feels that OT evaluation would be helpful, so he will message pt's referring provider.    Patient prognosis is Fair.   Patient will benefit from skilled outpatient Physical Therapy to address the deficits stated above and in the chart below, provide patient/family education, and to maximize patient's level of independence.     Plan of care discussed with patient: Yes  Patient's spiritual, cultural and educational needs considered and patient is agreeable to the plan of care and goals as stated below:     Anticipated Barriers for therapy: pt does not drive; blood pressure; diabetes    Medical Necessity is demonstrated by the following  History  Co-morbidities and personal factors that may impact the plan of care [] LOW: no personal factors / co-morbidities  [] MODERATE: 1-2 personal factors / co-morbidities  [x] HIGH: 3+ personal factors / co-morbidities    Moderate / High Support Documentation: DM 2, HTN, hypoglycemia, stage 3  kidney disease, osteopenia     Examination  Body Structures and Functions, activity limitations and participation restrictions that may impact the plan of care [] LOW: addressing 1-2 elements  [] MODERATE: 3+ elements  [x] HIGH: 4+ elements (please support below)    Moderate / High Support Documentation: pain, balance, ambulation, LE weakness, postural deficits, endurance deficits     Clinical Presentation [] LOW: stable  [x] MODERATE: Evolving  [] HIGH: Unstable     Decision Making/ Complexity Score: moderate       Goals:  Short Term Goals: 4 weeks   Pt will be issued first installment of HEP and report at least partial compliance  Pt to complete mCTSIB testing and PT to establish appropriate goals  Pt to improve her 30 second chair rise score to 6-7 repetitions to demonstrate improved LE functional strength and muscular endurance  Pt to improve her TUG score to 24 seconds or < with rollator for improved household ambulation and decreased fall risk  Pt to improve her SSWS to 0.55 m/sec with rollator for improved community ambulation and decreased fall risk  Pt to improve at least 4 LE muscles tested by 1/3 grade for improved stability and balance when ambulating    Long Term Goals: 8 weeks   Pt will improve her FOTO intake score to 38 % or < to demonstrate improved self-perception of deficits  Pt to improve her 30 second chair rise score to 7-8 repetitions to demonstrate improved LE functional strength and muscular endurance  Pt to improve her TUG score to 22 seconds or < with rollator for improved household ambulation and decreased fall risk  Pt to improve her SSWS to 0.60 m/sec with rollator for improved community ambulation and decreased fall risk  Pt to improve at least 6 LE muscles tested by 1/3 grade for improved stability and balance when ambulating  Pt to be at least partially compliant with finalized HEP to help maintain any potential gains realized in PT  Plan     Plan of care Certification: 7/27/2023 to  9/21/23.    Outpatient Physical Therapy 2 times weekly for 8 weeks to include the following interventions: Gait Training, Neuromuscular Re-ed, Patient Education, Therapeutic Activities, and Therapeutic Exercise.     Wayne Middleton, PT   7/27/2023

## 2023-07-28 NOTE — TELEPHONE ENCOUNTER
----- Message from Wayne Middleton, PT sent at 7/27/2023  5:49 PM CDT -----  Regarding: OT orders  Hi Dr. Cortez,    I evaluated the above patient in PT today and she is interested in an OT evaluation. I wasn't able to fully test her upper extremities today, but she did have some trouble holding the tablet still when she was completing her patient survey. Would you kindly place orders for an OT evaluation?    Thank you in advance and also for the referral,    Wayne Middleton, PT

## 2023-08-03 NOTE — PROGRESS NOTES
Pt screened by ortho OT with no concerns of tremors or weakness. Pt with  strength of 40lb on L and 50 lb on R. Pt does not present with neuro OT concerns. Pt independent in all ADLs with endurance difficulties addressed in PT.

## 2023-08-11 NOTE — PROGRESS NOTES
PT/PTA met face to face to discuss pt's treatment plan and progress towards established goals.  Continue with current PT POC with focus on strength, endurance, and balance.  Patient will be seen by physical therapist at least every sixth treatment or 30 days, whichever occurs first.    Ama Zuniga, PTA  08/11/2023

## 2023-08-16 NOTE — PROGRESS NOTES
"[facility]   Physical Therapy Treatment Note      Name: Buck Ibarra  Clinic Number: [id number]    Therapy Diagnosis:   Encounter Diagnosis   Name Primary?    Impaired functional mobility, balance, gait, and endurance Yes     Physician: Ericka Cortez MD    Visit Date: 8/17/2023    Physician: Ericka Cortez MD     Physician Orders: PT Eval and Treat   Medical Diagnosis from Referral:   I69.30 (ICD-10-CM) - History of cerebrovascular accident (CVA) with residual deficit   R29.898 (ICD-10-CM) - Bilateral leg weakness   R42 (ICD-10-CM) - Dizziness      Evaluation Date: 7/27/2023  Authorization Period Expiration: 9/21/23  Plan of Care Expiration: 9/21/23  Progress Note Due: 8/27/23  Visit # / Visits authorized: 1/ 23  FOTO: 1/1     Precautions: Standard, Diabetes, Fall, and blood pressure     Time In: 13:30  Time Out:14:15  Total Billable Time:  minutes      PTA Visit #: 1/5       Subjective     Pt reports: " I fell last Tuesday and Thursday over a damn plunger."  She  will be given and HEP today    Response to previous treatment: no problems  Functional change: ongoing    Pain: 8/10  Location: left thigh     Objective      Objective Measures updated at progress report unless specified.     Treatment     Sabrina received the treatments listed below:      therapeutic exercises to develop strength, endurance, ROM, flexibility, posture, and core stabilization for 40 minutes including:  X 8 min on Sci Fit stepper, B UE/B LE on level 1.0    Vitals post stepper:   /75,     Educated pt on HEP including:  2 x 10 reps of B LE LAQ with 3 sec hold  2 x 10 reps of B LE marching in place  2 x 10 reps of B LE hip adduction squeeze  2 x 10 reps of B LE hip abduction  2 x 10 reps of B LE DF/PF      neuromuscular re-education activities to improve: Balance, Coordination, Kinesthetic, Sense, Proprioception, and Posture for 0 minutes. The following activities were included:      therapeutic activities to " improve functional performance for 0  minutes, including:      gait training to improve functional mobility and safety for 0  minutes, including:        Patient Education and Home Exercises       Education provided:   - HEP    Written Home Exercises Provided: yes. Exercises were reviewed and Sabrina was able to demonstrate them prior to the end of the session.  Sabrina demonstrated good  understanding of the education provided. See EMR under Patient Instructions for exercises provided during therapy sessions    Assessment     Sabrina tolerated her first tx session following initial evaluation well and did not have any problems.  Sabrina began cardiovascular endurance on the stepper and was educated on her first installment of sitting HEP.  Sabrina was able to demonstrate understanding of all exercises.  Cont with plan of care.   Sabrina Is progressing well towards her goals.   Pt prognosis is Fair.     Pt will continue to benefit from skilled outpatient physical therapy to address the deficits listed in the problem list box on initial evaluation, provide pt/family education and to maximize pt's level of independence in the home and community environment.     Pt's spiritual, cultural and educational needs considered and pt agreeable to plan of care and goals.     Anticipated barriers to physical therapy: pt does not drive; blood pressure; diabetes    Goals:  Short Term Goals: 4 weeks   Pt will be issued first installment of HEP and report at least partial compliance  Pt to complete mCTSIB testing and PT to establish appropriate goals  Pt to improve her 30 second chair rise score to 6-7 repetitions to demonstrate improved LE functional strength and muscular endurance  Pt to improve her TUG score to 24 seconds or < with rollator for improved household ambulation and decreased fall risk  Pt to improve her SSWS to 0.55 m/sec with rollator for improved community ambulation and decreased fall risk  Pt to improve at least 4 LE muscles tested by  1/3 grade for improved stability and balance when ambulating     Long Term Goals: 8 weeks   Pt will improve her FOTO intake score to 38 % or < to demonstrate improved self-perception of deficits  Pt to improve her 30 second chair rise score to 7-8 repetitions to demonstrate improved LE functional strength and muscular endurance  Pt to improve her TUG score to 22 seconds or < with rollator for improved household ambulation and decreased fall risk  Pt to improve her SSWS to 0.60 m/sec with rollator for improved community ambulation and decreased fall risk  Pt to improve at least 6 LE muscles tested by 1/3 grade for improved stability and balance when ambulating  Pt to be at least partially compliant with finalized HEP to help maintain any potential gains realized in PT    Plan     Cont with strengthening, endurance, balance and functional mobility    Ama Zuniga, PTA

## 2023-08-17 NOTE — PATIENT INSTRUCTIONS
KNEE: Extension, Long Arc Quads - Sitting        Raise leg until knee is straight.  ___ reps per set, ___ sets per day, ___ days per week    Copyright © [employer]. All rights reserved.   High Stepping in Place (Sitting)        Sitting, alternately lift knees as high as possible. Keep torso erect.  Repeat ____ times, each leg.    Copyright © I. All rights reserved.   Abduction: Isometric - Bilateral (Sitting)        Position Patient: Keep legs slightly apart, feet flat. [place]: Place hands on outside of both knees. Motion - Cue patient to press legs apart. - [place] blocks movement.  Hold ___ seconds. Relax. Repeat ___ times. Do ___ sessions per day. Variation: Perform with knees ___ inches apart.    Copyright © [employer]. All rights reserved.   Adduction: Hip - Knees Together (Sitting)        Sit with towel roll between knees. Push knees together. Hold for ___ seconds. Rest for ___ seconds.  Repeat ___ times. Do ___ times a day.    Copyright © [employer]. All rights reserved.   Ankle Bend: Dorsiflexion / Plantar Flexion, Sitting        Sit with feet on floor. Point toes up, keeping both heels on floor. Then press toes to floor raising heels. Hold each position ___ seconds.  Repeat ___ times per session. Do ___ sessions per day.    Copyright © [employer]. All rights reserved.   KNEE: Extension, Long Arc Quads - Sitting      Copyright © [employer]. All rights reserved.

## 2023-08-20 NOTE — PROGRESS NOTES
"[facility]   Physical Therapy Treatment Note      Name: Buck Ibarra  Clinic Number: [id number]    Therapy Diagnosis:   No diagnosis found.    Physician: Ericka Cortez MD    Visit Date: 8/22/2023    Physician: Ericka Cortez MD     Physician Orders: PT Eval and Treat   Medical Diagnosis from Referral:   I69.30 (ICD-10-CM) - History of cerebrovascular accident (CVA) with residual deficit   R29.898 (ICD-10-CM) - Bilateral leg weakness   R42 (ICD-10-CM) - Dizziness   Evaluation Date: 7/27/2023  Authorization Period Expiration: 9/21/23  Plan of Care Expiration: 9/21/23  Progress Note Due: 8/27/23  Visit # / Visits authorized: 2/23  FOTO: 1/3     Precautions: Standard, Diabetes, Fall, and blood pressure     Time In: 1436  Time Out: 1533  Total Billable Time: 38 minutes      PTA Visit #: 0/5       Subjective     Pt reports: "I feel fine, I just don't have any energy."    She  will be given and HEP today    Response to previous treatment: no problems  Functional change: ongoing    Pain: 8/10  Location: left thigh     Objective      Objective Measures updated at progress report unless specified.     Treatment     Sabrina received the treatments listed below:      therapeutic exercises to develop strength, endurance, ROM, flexibility, posture, and core stabilization for 23 minutes including:    X 8 min on Sci Fit stepper, B UE/B LE on level 1.0  X10 BLE LAQ with 3" hold  2 x10 seated marching in place while holding yellow med ball    neuromuscular re-education activities to improve: Balance, Coordination, Kinesthetic, Sense, Proprioception, and Posture for 15 minutes. The following activities were included:    Sit to stands from elevated mat holding yellow med ball, no UE support, 2 x10  Mat lowered between sets  Static standing balance on foam airex pad, eyes open, 3 x30"    Vitals at end of session:  BP: 130/81 mmHg  HR: 98 bpm    therapeutic activities to improve functional performance for 0  minutes, " including:      gait training to improve functional mobility and safety for 0  minutes, including:        Patient Education and Home Exercises       Education provided:   - HEP    Written Home Exercises Provided: yes. Exercises were reviewed and Sabrina was able to demonstrate them prior to the end of the session.  Sabrina demonstrated good  understanding of the education provided. See EMR under Patient Instructions for exercises provided during therapy sessions    Assessment     Sabrina put forth great effort in today's session focused on standing and endurance training. She reported feeling pretty fatigued at the start of the session and PT provided education on post-stroke fatigue to reinforce the importance of participating in endurance training and standing activities to improve her tolerance. She had difficulty with sit to stands initially however performance improved with mat height elevated and VC to lean forward; showed good carryover to performing at a lower height. She had limited tolerance to standing activities following sit to stands and showed fatigue in her left leg with visible shaking noted during last round of standing on foam. She continues to be appropriate for skilled physical therapy services to improve her activity tolerance and functional mobility.     Sabrina Is progressing well towards her goals.   Pt prognosis is Fair.     Pt will continue to benefit from skilled outpatient physical therapy to address the deficits listed in the problem list box on initial evaluation, provide pt/family education and to maximize pt's level of independence in the home and community environment.     Pt's spiritual, cultural and educational needs considered and pt agreeable to plan of care and goals.     Anticipated barriers to physical therapy: pt does not drive; blood pressure; diabetes    Goals:  Short Term Goals: 4 weeks   Pt will be issued first installment of HEP and report at least partial compliance  Pt to complete  mCTSIB testing and PT to establish appropriate goals  Pt to improve her 30 second chair rise score to 6-7 repetitions to demonstrate improved LE functional strength and muscular endurance  Pt to improve her TUG score to 24 seconds or < with rollator for improved household ambulation and decreased fall risk  Pt to improve her SSWS to 0.55 m/sec with rollator for improved community ambulation and decreased fall risk  Pt to improve at least 4 LE muscles tested by 1/3 grade for improved stability and balance when ambulating     Long Term Goals: 8 weeks   Pt will improve her FOTO intake score to 38 % or < to demonstrate improved self-perception of deficits  Pt to improve her 30 second chair rise score to 7-8 repetitions to demonstrate improved LE functional strength and muscular endurance  Pt to improve her TUG score to 22 seconds or < with rollator for improved household ambulation and decreased fall risk  Pt to improve her SSWS to 0.60 m/sec with rollator for improved community ambulation and decreased fall risk  Pt to improve at least 6 LE muscles tested by 1/3 grade for improved stability and balance when ambulating  Pt to be at least partially compliant with finalized HEP to help maintain any potential gains realized in PT    Plan     Cont with strengthening, endurance, balance and functional mobility    Anya Tyson, PT

## 2023-08-21 NOTE — PROGRESS NOTES
[facility]   Physical Therapy Treatment Note      Name: Buck Ibarra  Clinic Number: [id number]    Therapy Diagnosis:   Encounter Diagnosis   Name Primary?    Impaired functional mobility, balance, gait, and endurance Yes     Physician: Ericka Cortez MD    Visit Date: 8/24/2023     Physician Orders: PT Eval and Treat   Medical Diagnosis from Referral:   I69.30 (ICD-10-CM) - History of cerebrovascular accident (CVA) with residual deficit   R29.898 (ICD-10-CM) - Bilateral leg weakness   R42 (ICD-10-CM) - Dizziness   Evaluation Date: 7/27/2023  Authorization Period Expiration: 9/21/23  Plan of Care Expiration: 9/21/23  Progress Note Due: 8/27/23  Visit # / Visits authorized: 3/23  FOTO: 1/3     Precautions: Standard, Diabetes, Fall, and blood pressure     Time In: 1558  Time Out: 1645  Total Billable Time: 47 minutes      PTA Visit #: 0/5     Subjective     Pt reports: Patient states that she is feeling pretty good this evening. States that she has some pain in the back of her head and her bottom in the spots where she fell last week but denies any recent falls.     She was compliant with her home exercise program   Response to previous treatment: no problems  Functional change: ongoing    Pain: 6/10  Location: back of head and buttocks    Objective      Objective Measures updated at progress report unless specified.     Treatment     Sabrina received the treatments listed below:      therapeutic exercises to develop strength, endurance, ROM, flexibility, posture, and core stabilization for 11 minutes including:    X 8 min on Sci Fit stepper, B UE/B LE on level 1.0  Seated paloff press with red theraband 2 x15 B    neuromuscular re-education activities to improve: Balance, Coordination, Kinesthetic, Sense, Proprioception, and Posture for 36 minutes. The following activities were included:    Sit to stands from elevated mat holding yellow med ball, no UE support, 2 x10  Mat lowered between  "sets  Static standing balance on foam airex pad, eyes closed, 3 x30"  5/10 Left knee pain reported following  Alternating toe taps to foam airex pad x15  5/10 Left knee pain reported following   Forward/backward stepping in parallel bars, no UE support 2 x10 B  One UE support when standing on left lower extremity   VC for step length    Vitals at end of session:  BP: 113/79 mmHg  HR: 130 bpm    therapeutic activities to improve functional performance for 0  minutes, including:      gait training to improve functional mobility and safety for 0  minutes, including:        Patient Education and Home Exercises       Education provided:   - HEP    Written Home Exercises Provided: yes. Exercises were reviewed and Sabrina was able to demonstrate them prior to the end of the session.  Sarbina demonstrated good  understanding of the education provided. See EMR under Patient Instructions for exercises provided during therapy sessions    Assessment     Sabrina put forth great effort in today's session focused on standing balance and endurance training. She was able to tolerate participating in standing interventions for the majority of today's sessions with good performance on sit to stands and static standing balance on today. She was most challenged by forward and backward stepping when asked to increase step length especially when challenged to decrease UE support. She reported feeling slightly light headed at the end of the session and required an extended seated rest break to recover prior to exiting the clinic. Vitals checked and stable with HR slightly elevated which is to be expected following exercise but may also be related to her BP reading lower than usual. Therapy session terminated at it's end and patient allowed to continue resting until she felt safely able to exit the clinic with the assistance of a second PT. She continues to be appropriate for skilled physical therapy services to improve her activity tolerance and " functional mobility.     Sabrina Is progressing well towards her goals.   Pt prognosis is Fair.     Pt will continue to benefit from skilled outpatient physical therapy to address the deficits listed in the problem list box on initial evaluation, provide pt/family education and to maximize pt's level of independence in the home and community environment.   re  Pt's spiritual, cultural and educational needs considered and pt agreeable to plan of care and goals.     Anticipated barriers to physical therapy: pt does not drive; blood pressure; diabetes    Goals:  Short Term Goals: 4 weeks   Pt will be issued first installment of HEP and report at least partial compliance. Met 8/17/23  Pt to complete mCTSIB testing and PT to establish appropriate goals Ongoing  Pt to improve her 30 second chair rise score to 6-7 repetitions to demonstrate improved LE functional strength and muscular endurance Ongoing  Pt to improve her TUG score to 24 seconds or < with rollator for improved household ambulation and decreased fall risk Ongoing  Pt to improve her SSWS to 0.55 m/sec with rollator for improved community ambulation and decreased fall risk Ongoing  Pt to improve at least 4 LE muscles tested by 1/3 grade for improved stability and balance when ambulating Ongoing     Long Term Goals: 8 weeks   Pt will improve her FOTO intake score to 38 % or < to demonstrate improved self-perception of deficits Ongoing  Pt to improve her 30 second chair rise score to 7-8 repetitions to demonstrate improved LE functional strength and muscular endurance Ongoing  Pt to improve her TUG score to 22 seconds or < with rollator for improved household ambulation and decreased fall risk Ongoing  Pt to improve her SSWS to 0.60 m/sec with rollator for improved community ambulation and decreased fall risk Ongoing  Pt to improve at least 6 LE muscles tested by 1/3 grade for improved stability and balance when ambulating Ongoing  Pt to be at least partially  compliant with finalized HEP to help maintain any potential gains realized in PT Ongoing    Plan     Cont with strengthening, endurance, balance and functional mobility    Anya Tyson, PT

## 2023-08-28 NOTE — PROGRESS NOTES
Please see Plan of Care for most recent Progress Note    [facility]   Physical Therapy Treatment Note / Progress Note     Name: Buck Ibarra  Clinic Number: [id number]    Therapy Diagnosis:   Encounter Diagnosis   Name Primary?    Impaired functional mobility, balance, gait, and endurance Yes     Physician: Ericka Cortez MD    Visit Date: 8/29/2023     Physician Orders: PT Eval and Treat   Medical Diagnosis from Referral:   I69.30 (ICD-10-CM) - History of cerebrovascular accident (CVA) with residual deficit   R29.898 (ICD-10-CM) - Bilateral leg weakness   R42 (ICD-10-CM) - Dizziness   Evaluation Date: 7/27/2023  Authorization Period Expiration: 9/21/23  Plan of Care Expiration: 9/21/23  Progress Note Due: 8/27/23  Visit # / Visits authorized: 4/23  FOTO: 1/3     Precautions: Standard, Diabetes, Fall, and blood pressure     Time In: 1301  Time Out: 1343  Total Billable Time: 42 minutes      PTA Visit #: 0/5     Subjective     Pt reports: She is doing pretty good this afternoon but was unable to eat lunch prior to her session.     She was compliant with her home exercise program   Response to previous treatment: no problems  Functional change: ongoing    Pain: 6/10  Location: back of head and buttocks    Objective      Objective Measures updated at progress report unless specified.        Lower Extremity Strength~ tested with pt seated in gold chair  Right LE   8/29/23 Left LE   8/29/23   Hip flexion:  3/5 3-/5 (unable to move through full available ROM) Hip flexion: 3/5 3-/5 (unable to move through full available ROM)   Knee extension: 4+/5 4+/5 Knee extension: 4+/5 4+/5   Knee flexion: 4/5 4/5 Knee flexion: 3+/5 4/5   Ankle dorsiflexion:  3-/5 3/5 Ankle dorsiflexion: 3-/5 3-/5   Ankle plantarflexion:  3+/5 3+/5 Ankle plantarflexion: 3-/5 3/5   Hip abduction: 4+/5 4+/5 Hip abduction: 3+/5 4/5   Hip adduction: 4/5 4/5 Hip adduction 3+/5 4/5   Hip extension: 3+/5 4/5 Hip extension: 3/5 4/5           Evaluation 8/25/23   Single Limb Stance R LE NT  (<10 sec = HIGH FALL RISK) 1 sec   Single Limb Stance L LE NT  (<10 sec = HIGH FALL RISK) 4 sec   30 second Chair Rise 5 completed with arms 6 reps completed no hands   5 times sit to stand NT 25 seconds   TUG 26 seconds with rollator 15 seconds no rollator   Self selected walking speed 0.50 m/sec (6m/12s) 0.46 m/sec (6m/13.14s) no rollator   Fast walking speed 0.67 m/sec (6m/9s) 0.55 m/sec (6m/10.88s) no rollator     Postural control: Eval  MCTSIB:  1. Eyes Open/feet together/Firm: 30 seconds, P~ min sway~ stopped remainder of test due to pt's complaints of feeling dizzy  2. Eyes Closed/feet together/Firm: NT  3. Eyes Open/feet together/Foam: NT  4. Eyes Closed/feet together/Foam: NT    Postural control: 8/29/23  MCTSIB:  1. Eyes Open/feet together/Firm: 30 seconds, no sway  2. Eyes Closed/feet together/Firm: 30 seconds, min sway, increased dizziness following  3. Eyes Open/feet together/Foam: NT due to dizziness and pt feeling hot; required increased seated rest break following condition 2  4. Eyes Closed/feet together/Foam: NT due to dizziness and pt feeling hot; required increased seated rest break following condition 2    Intake Outcome Measure for FOTO Stroke Lower Extremity Survey     Therapist reviewed FOTO scores for Buck Ibarra on 8/29/2023.   FOTO documents entered into [employer] - see Media section.     FOTO Score: deferred on today due to time constraints and patient leaving glasses in car. Plan to administer at next session.           Treatment     Sabrina received the treatments listed below:      therapeutic exercises to develop strength, endurance, ROM, flexibility, posture, and core stabilization for 8 minutes including:    X 8 min on Sci Fit stepper, B UE/B LE on level 2.0    neuromuscular re-education activities to improve: Balance, Coordination, Kinesthetic, Sense, Proprioception, and Posture for 00 minutes. The following activities were  included:    NP    therapeutic activities to improve functional performance for 34  minutes, including:    Objective testing listed above    gait training to improve functional mobility and safety for 0  minutes, including:        Patient Education and Home Exercises       Education provided:   - HEP    Written Home Exercises Provided: yes. Exercises were reviewed and Sabrina was able to demonstrate them prior to the end of the session.  Sabrina demonstrated good  understanding of the education provided. See EMR under Patient Instructions for exercises provided during therapy sessions    Assessment     Sabrina put forth great effort in today's session focused on assessment of progress towards goals. She states that since starting therapy, she feels stronger in her legs. To date, she has met 4/6 short- and 2/6 long-term goals and has made progress towards the remainder of her goals established at evaluation. She continues to be limited in therapy by her decreased tolerance to prolonged standing activities and by sedentary behavior when in the home. On objective testing performed today, she showed improvements in her BLE SLS time, 30 second sit to stand, 5 time sit to stand and Timed Up and Go scores however with performance continuing to indicate an increased with of falling with functional transfers and mobility. Her self-selected and fast walking speeds on today showed a decline since her initial evaluation however she was able to perform on today without the use of her rollator which likely contributed to her decline as she had less support. At this time, she continues to be appropriate for skilled physical therapy services to improve her activity tolerance and safety with functional mobility.     Sabrina Is progressing well towards her goals.   Pt prognosis is Fair.     Pt will continue to benefit from skilled outpatient physical therapy to address the deficits listed in the problem list box on initial evaluation, provide  pt/family education and to maximize pt's level of independence in the home and community environment.   re  Pt's spiritual, cultural and educational needs considered and pt agreeable to plan of care and goals.     Anticipated barriers to physical therapy: pt does not drive; blood pressure; diabetes    Goals:  Short Term Goals: 4 weeks   Pt will be issued first installment of HEP and report at least partial compliance. Met 8/17/23  Pt to complete mCTSIB testing and PT to establish appropriate goals Ongoing, 8/29/23 (able to complete conditions 1 and 2)  Pt to improve her 30 second chair rise score to 6-7 repetitions to demonstrate improved LE functional strength and muscular endurance Met 8/29/23  Pt to improve her TUG score to 24 seconds or < with rollator for improved household ambulation and decreased fall risk Met 8/29/23  Pt to improve her SSWS to 0.55 m/sec with rollator for improved community ambulation and decreased fall risk Ongoing, 8/29/23  Pt to improve at least 4 LE muscles tested by 1/3 grade for improved stability and balance when ambulating Met 8/29/23     Long Term Goals: 8 weeks   Pt will improve her FOTO intake score to 38 % or < to demonstrate improved self-perception of deficits Ongoing, 8/29/23  Pt to improve her 30 second chair rise score to 7-8 repetitions to demonstrate improved LE functional strength and muscular endurance Ongoing, 8/29/23  Pt to improve her TUG score to 22 seconds or < with rollator for improved household ambulation and decreased fall risk Met 8/29/23  Pt to improve her SSWS to 0.60 m/sec with rollator for improved community ambulation and decreased fall risk Ongoing, 8/29/23  Pt to improve at least 6 LE muscles tested by 1/3 grade for improved stability and balance when ambulating Met 8/29/23  Pt to be at least partially compliant with finalized HEP to help maintain any potential gains realized in PT Ongoing, 8/29/23    New 8/29/23:  Pt to improve her TUG score to 14  seconds or < without rollator for improved household ambulation and decreased fall risk   Pt to improve at least 6 LE muscles tested by 1/2 grade for improved stability and balance when ambulating    Plan     Cont with strengthening, endurance, balance and functional mobility    Anya Tyson, PT

## 2023-08-30 NOTE — PLAN OF CARE
[facility]   Physical Therapy Treatment Note / Progress Note     Name: Buck Ibarra  Clinic Number: [id number]    Therapy Diagnosis:   Encounter Diagnosis   Name Primary?    Impaired functional mobility, balance, gait, and endurance Yes     Physician: Ericka Cortez MD    Visit Date: 8/29/2023     Physician Orders: PT Eval and Treat   Medical Diagnosis from Referral:   I69.30 (ICD-10-CM) - History of cerebrovascular accident (CVA) with residual deficit   R29.898 (ICD-10-CM) - Bilateral leg weakness   R42 (ICD-10-CM) - Dizziness   Evaluation Date: 7/27/2023  Authorization Period Expiration: 9/21/23  Plan of Care Expiration: 9/21/23  Progress Note Due: 8/27/23  Visit # / Visits authorized: 4/23  FOTO: 1/3     Precautions: Standard, Diabetes, Fall, and blood pressure     Time In: 1301  Time Out: 1343  Total Billable Time: 42 minutes      PTA Visit #: 0/5     Subjective     Pt reports: She is doing pretty good this afternoon but was unable to eat lunch prior to her session.     She was compliant with her home exercise program   Response to previous treatment: no problems  Functional change: ongoing    Pain: 6/10  Location: back of head and buttocks    Objective      Objective Measures updated at progress report unless specified.        Lower Extremity Strength~ tested with pt seated in gold chair  Right LE   8/29/23 Left LE   8/29/23   Hip flexion:  3/5 3-/5 (unable to move through full available ROM) Hip flexion: 3/5 3-/5 (unable to move through full available ROM)   Knee extension: 4+/5 4+/5 Knee extension: 4+/5 4+/5   Knee flexion: 4/5 4/5 Knee flexion: 3+/5 4/5   Ankle dorsiflexion:  3-/5 3/5 Ankle dorsiflexion: 3-/5 3-/5   Ankle plantarflexion:  3+/5 3+/5 Ankle plantarflexion: 3-/5 3/5   Hip abduction: 4+/5 4+/5 Hip abduction: 3+/5 4/5   Hip adduction: 4/5 4/5 Hip adduction 3+/5 4/5   Hip extension: 3+/5 4/5 Hip extension: 3/5 4/5          Evaluation 8/25/23   Single Limb Stance R LE  NT  (<10 sec = HIGH FALL RISK) 1 sec   Single Limb Stance L LE NT  (<10 sec = HIGH FALL RISK) 4 sec   30 second Chair Rise 5 completed with arms 6 reps completed no hands   5 times sit to stand NT 25 seconds   TUG 26 seconds with rollator 15 seconds no rollator   Self selected walking speed 0.50 m/sec (6m/12s) 0.46 m/sec (6m/13.14s) no rollator   Fast walking speed 0.67 m/sec (6m/9s) 0.55 m/sec (6m/10.88s) no rollator     Postural control: Eval  MCTSIB:  1. Eyes Open/feet together/Firm: 30 seconds, P~ min sway~ stopped remainder of test due to pt's complaints of feeling dizzy  2. Eyes Closed/feet together/Firm: NT  3. Eyes Open/feet together/Foam: NT  4. Eyes Closed/feet together/Foam: NT    Postural control: 8/29/23  MCTSIB:  1. Eyes Open/feet together/Firm: 30 seconds, no sway  2. Eyes Closed/feet together/Firm: 30 seconds, min sway, increased dizziness following  3. Eyes Open/feet together/Foam: NT due to dizziness and pt feeling hot; required increased seated rest break following condition 2  4. Eyes Closed/feet together/Foam: NT due to dizziness and pt feeling hot; required increased seated rest break following condition 2    Intake Outcome Measure for FOTO Stroke Lower Extremity Survey     Therapist reviewed FOTO scores for Buck Ibarra on 8/29/2023.   FOTO documents entered into [employer] - see Media section.     FOTO Score: deferred on today due to time constraints and patient leaving glasses in car. Plan to administer at next session.           Treatment     Sabrina received the treatments listed below:      therapeutic exercises to develop strength, endurance, ROM, flexibility, posture, and core stabilization for 8 minutes including:    X 8 min on Sci Fit stepper, B UE/B LE on level 2.0    neuromuscular re-education activities to improve: Balance, Coordination, Kinesthetic, Sense, Proprioception, and Posture for 00 minutes. The following activities were included:    NP    therapeutic activities to improve  functional performance for 34  minutes, including:    Objective testing listed above    gait training to improve functional mobility and safety for 0  minutes, including:        Patient Education and Home Exercises       Education provided:   - HEP    Written Home Exercises Provided: yes. Exercises were reviewed and Sabrina was able to demonstrate them prior to the end of the session.  Sabrina demonstrated good  understanding of the education provided. See EMR under Patient Instructions for exercises provided during therapy sessions    Assessment     Sabrina put forth great effort in today's session focused on assessment of progress towards goals. She states that since starting therapy, she feels stronger in her legs. To date, she has met 4/6 short- and 2/6 long-term goals and has made progress towards the remainder of her goals established at evaluation. She continues to be limited in therapy by her decreased tolerance to prolonged standing activities and by sedentary behavior when in the home. On objective testing performed today, she showed improvements in her BLE SLS time, 30 second sit to stand, 5 time sit to stand and Timed Up and Go scores however with performance continuing to indicate an increased with of falling with functional transfers and mobility. Her self-selected and fast walking speeds on today showed a decline since her initial evaluation however she was able to perform on today without the use of her rollator which likely contributed to her decline as she had less support. At this time, she continues to be appropriate for skilled physical therapy services to improve her activity tolerance and safety with functional mobility.     Sabrina Is progressing well towards her goals.   Pt prognosis is Fair.     Pt will continue to benefit from skilled outpatient physical therapy to address the deficits listed in the problem list box on initial evaluation, provide pt/family education and to maximize pt's level of  independence in the home and community environment.   re  Pt's spiritual, cultural and educational needs considered and pt agreeable to plan of care and goals.     Anticipated barriers to physical therapy: pt does not drive; blood pressure; diabetes    Goals:  Short Term Goals: 4 weeks   Pt will be issued first installment of HEP and report at least partial compliance. Met 8/17/23  Pt to complete mCTSIB testing and PT to establish appropriate goals Ongoing, 8/29/23 (able to complete conditions 1 and 2)  Pt to improve her 30 second chair rise score to 6-7 repetitions to demonstrate improved LE functional strength and muscular endurance Met 8/29/23  Pt to improve her TUG score to 24 seconds or < with rollator for improved household ambulation and decreased fall risk Met 8/29/23  Pt to improve her SSWS to 0.55 m/sec with rollator for improved community ambulation and decreased fall risk Ongoing, 8/29/23  Pt to improve at least 4 LE muscles tested by 1/3 grade for improved stability and balance when ambulating Met 8/29/23     Long Term Goals: 8 weeks   Pt will improve her FOTO intake score to 38 % or < to demonstrate improved self-perception of deficits Ongoing, 8/29/23  Pt to improve her 30 second chair rise score to 7-8 repetitions to demonstrate improved LE functional strength and muscular endurance Ongoing, 8/29/23  Pt to improve her TUG score to 22 seconds or < with rollator for improved household ambulation and decreased fall risk Met 8/29/23  Pt to improve her SSWS to 0.60 m/sec with rollator for improved community ambulation and decreased fall risk Ongoing, 8/29/23  Pt to improve at least 6 LE muscles tested by 1/3 grade for improved stability and balance when ambulating Met 8/29/23  Pt to be at least partially compliant with finalized HEP to help maintain any potential gains realized in PT Ongoing, 8/29/23    New 8/29/23:  Pt to improve her TUG score to 14 seconds or < without rollator for improved household  ambulation and decreased fall risk   Pt to improve at least 6 LE muscles tested by 1/2 grade for improved stability and balance when ambulating    Plan     Cont with strengthening, endurance, balance and functional mobility    Anya Tyson, PT

## 2023-08-31 NOTE — PROGRESS NOTES
[facility]   Physical Therapy Treatment Note / Progress Note     Name: Buck Ibarra  Clinic Number: [id number]    Therapy Diagnosis:   Encounter Diagnosis   Name Primary?    Impaired functional mobility, balance, gait, and endurance Yes     Physician: Ericka Cortez MD    Visit Date: 8/31/2023     Physician Orders: PT Eval and Treat   Medical Diagnosis from Referral:   I69.30 (ICD-10-CM) - History of cerebrovascular accident (CVA) with residual deficit   R29.898 (ICD-10-CM) - Bilateral leg weakness   R42 (ICD-10-CM) - Dizziness   Evaluation Date: 7/27/2023  Authorization Period Expiration: 9/21/23  Plan of Care Expiration: 9/21/23  Progress Note Due: 8/27/23  Visit # / Visits authorized: 5/23  FOTO: 1/3     Precautions: Standard, Diabetes, Fall, and blood pressure     Time In: 1330  Time Out: 1345  Total Billable Time: 45 minutes      PTA Visit #: 0/5     Subjective     Pt reports: She is doing good this afternoon, denies any complaints at the start of the session.    She was compliant with her home exercise program   Response to previous treatment: no problems  Functional change: ongoing    Pain: 6/10  Location: back of head and buttocks    Objective      Objective Measures updated at progress report unless specified.     Intake Outcome Measure for FOTO Stroke Lower Extremity Survey     Therapist reviewed FOTO scores for Buck Ibarra on 8/29/2023.   FOTO documents entered into [employer] - see Media section.     FOTO Score: 57%           Treatment     Bennett received the treatments listed below:      therapeutic exercises to develop strength, endurance, ROM, flexibility, posture, and core stabilization for 16 minutes including:    X 8 min on Sci Fit stepper, B UE/B LE on level 2.0  FOTO survey       neuromuscular re-education activities to improve: Balance, Coordination, Kinesthetic, Sense, Proprioception, and Posture for 29 minutes. The following activities were included:     Sit to stands  "from low mat holding tidal tank, 2 x10  Step ups to foam fitter 2 x10 leading with each leg  One UE support on 1st set, no UE on 2nd  Static standing balance on foam airex pad, eyes open  X30" up/down head nods  X30" left/right head turns    therapeutic activities to improve functional performance for 34  minutes, including:    NP    gait training to improve functional mobility and safety for 0  minutes, including:        Patient Education and Home Exercises       Education provided:   - HEP    Written Home Exercises Provided: yes. Exercises were reviewed and Sabrina was able to demonstrate them prior to the end of the session.  Sabrina demonstrated good  understanding of the education provided. See EMR under Patient Instructions for exercises provided during therapy sessions    Assessment     Sabrina put forth great effort in today's session. She required increased time to perform sit to stands while holding the tidal tank due to reports of feeling off balance but performed well. She also performed well with dynamic and static balance activities however required motivation to attempt without UE support. She continues to be appropriate for skilled physical therapy services to improve her activity tolerance and safety with functional mobility.     Sabrina Is progressing well towards her goals.   Pt prognosis is Fair.     Pt will continue to benefit from skilled outpatient physical therapy to address the deficits listed in the problem list box on initial evaluation, provide pt/family education and to maximize pt's level of independence in the home and community environment.   re  Pt's spiritual, cultural and educational needs considered and pt agreeable to plan of care and goals.     Anticipated barriers to physical therapy: pt does not drive; blood pressure; diabetes    Goals:  Short Term Goals: 4 weeks   Pt will be issued first installment of HEP and report at least partial compliance. Met 8/17/23  Pt to complete mCTSIB testing and " PT to establish appropriate goals Ongoing, 8/29/23 (able to complete conditions 1 and 2)  Pt to improve her 30 second chair rise score to 6-7 repetitions to demonstrate improved LE functional strength and muscular endurance Met 8/29/23  Pt to improve her TUG score to 24 seconds or < with rollator for improved household ambulation and decreased fall risk Met 8/29/23  Pt to improve her SSWS to 0.55 m/sec with rollator for improved community ambulation and decreased fall risk Ongoing, 8/29/23  Pt to improve at least 4 LE muscles tested by 1/3 grade for improved stability and balance when ambulating Met 8/29/23     Long Term Goals: 8 weeks   Pt will improve her FOTO intake score to 38 % or < to demonstrate improved self-perception of deficits Ongoing, 8/29/23  Pt to improve her 30 second chair rise score to 7-8 repetitions to demonstrate improved LE functional strength and muscular endurance Ongoing, 8/29/23  Pt to improve her TUG score to 22 seconds or < with rollator for improved household ambulation and decreased fall risk Met 8/29/23  Pt to improve her SSWS to 0.60 m/sec with rollator for improved community ambulation and decreased fall risk Ongoing, 8/29/23  Pt to improve at least 6 LE muscles tested by 1/3 grade for improved stability and balance when ambulating Met 8/29/23  Pt to be at least partially compliant with finalized HEP to help maintain any potential gains realized in PT Ongoing, 8/29/23    New 8/29/23:  Pt to improve her TUG score to 14 seconds or < without rollator for improved household ambulation and decreased fall risk   Pt to improve at least 6 LE muscles tested by 1/2 grade for improved stability and balance when ambulating    Plan     Cont with strengthening, endurance, balance and functional mobility    Anya Tyson, PT

## 2023-09-05 NOTE — PROGRESS NOTES
"Pt arrived for her 1pm appointment at 1:30.   Pt arrived with her caregiver and reported " I thought my appointment for was 1:30. "  Therapist offered to find another therapist to see her at 1:45, but she declined and opted to just return home.  No charges at this time.  Ama Zuniga, PTA    " Patient is a 55y old  Female who presents with a chief complaint of FTT (28 Jan 2023 12:57)    Date of servie : 01-29-23 @ 11:32  INTERVAL HPI/OVERNIGHT EVENTS:  T(C): 36.4 (01-29-23 @ 05:00), Max: 36.4 (01-28-23 @ 12:35)  HR: 89 (01-29-23 @ 05:00) (80 - 93)  BP: 120/89 (01-29-23 @ 05:00) (120/84 - 133/95)  RR: 18 (01-29-23 @ 05:00) (18 - 18)  SpO2: 100% (01-29-23 @ 05:00) (99% - 100%)  Wt(kg): --  I&O's Summary      LABS:                        11.0   4.59  )-----------( 466      ( 29 Jan 2023 06:03 )             34.7     01-29    136  |  100  |  8   ----------------------------<  70  3.7   |  27  |  0.25<L>    Ca    8.8      29 Jan 2023 06:03  Phos  2.6     01-29  Mg     1.70     01-29          CAPILLARY BLOOD GLUCOSE                MEDICATIONS  (STANDING):  amLODIPine   Tablet 5 milliGRAM(s) Oral daily  ascorbic acid 500 milliGRAM(s) Oral daily  chlorhexidine 2% Cloths 1 Application(s) Topical daily  Dakins Solution - 1/4 Strength 1 Application(s) Topical two times a day  ferrous    sulfate 325 milliGRAM(s) Oral daily  levETIRAcetam 500 milliGRAM(s) Oral two times a day  multivitamin 1 Tablet(s) Oral daily  piperacillin/tazobactam IVPB.. 3.375 Gram(s) IV Intermittent every 8 hours  polyethylene glycol 3350 17 Gram(s) Oral two times a day  QUEtiapine 25 milliGRAM(s) Oral three times a day  sodium chloride 0.9%. 1000 milliLiter(s) (75 mL/Hr) IV Continuous <Continuous>  traZODone 150 milliGRAM(s) Oral daily    MEDICATIONS  (PRN):  acetaminophen     Tablet .. 650 milliGRAM(s) Oral every 6 hours PRN Temp greater or equal to 38C (100.4F), Mild Pain (1 - 3), Moderate Pain (4 - 6)  melatonin 3 milliGRAM(s) Oral at bedtime PRN Insomnia          PHYSICAL EXAM:  GENERAL: frail  CHEST/LUNG: Clear to percussion bilaterally; No rales, rhonchi, wheezing, or rubs  HEART: Regular rate and rhythm; No murmurs, rubs, or gallops  ABDOMEN: Soft, Nontender, Nondistended; Bowel sounds present  EXTREMITIES:  edema +    Care Discussed with Consultants/Other Providers [ ] YES  [ ] NO

## 2023-09-05 NOTE — HOSPITAL COURSE
Patient started on Steroid treatment and became headache and pain free. CT finding was unremarkable. Seen by Ophthalmology, vascular surgery and Rheumatology concerning for giant cell arteritis. Vascular surgery will schedule her biopsy and patient asked to continue with 60mg prednisone.      Winlevi Pregnancy And Lactation Text: This medication is considered safe during pregnancy and breastfeeding.

## 2023-09-12 NOTE — PROGRESS NOTES
[facility]   Physical Therapy Treatment Note      Name: Buck Ibarra  Clinic Number: [id number]    Therapy Diagnosis:   Encounter Diagnosis   Name Primary?    Impaired functional mobility, balance, gait, and endurance Yes       Physician: Ericka Cortez MD    Visit Date: 9/12/2023    Physician: Ericka Cortez MD     Physician Orders: PT Eval and Treat   Medical Diagnosis from Referral:   I69.30 (ICD-10-CM) - History of cerebrovascular accident (CVA) with residual deficit   R29.898 (ICD-10-CM) - Bilateral leg weakness   R42 (ICD-10-CM) - Dizziness      Evaluation Date: 7/27/2023  Authorization Period Expiration: 9/21/23  Plan of Care Expiration: 9/21/23  Progress Note Due: 8/27/23  Visit # / Visits authorized: 6/ 23  FOTO: 2/3     Precautions: Standard, Diabetes, Fall, and blood pressure     Time In: 1255   Time Out: 1340   Total Billable Time: 45  minutes      PTA Visit #: 0/5       Subjective     Pt reports: she came early because her family members had appointments to attend.  She was compliant with her home program.  Response to previous treatment: no problems  Functional change: ongoing    Pain: 0/10  Location: N/A     Objective      Objective Measures updated at progress report unless specified.     Vitals at start of session:  BP:172/96, HR 76    Treatment     Sabrina received the treatments listed below:      therapeutic exercises to develop strength, endurance, ROM, flexibility, posture, and core stabilization for 8 minutes including:        X 8 min on SCI-FIT stepper, level 2.0, for B UE/LE muscular and CV endurance    Vitals post stepper:   SpO2 97 % and HR 78        neuromuscular re-education activities to improve: Balance, Coordination, Kinesthetic, Sense, Proprioception, and Posture for 37 minutes. The following activities were included:    At ballet bar:    1 x 10 B LE forward/backward step ups/downs on foam fitter, no UE support, CGA to slight min A  1 x 10 alternate single limb step  "overs on foam fitter, no UE support, min A  1 x 10 B alternate cone taps while standing on foam fitter, no UE support, min A    BP: 152/92, 101 HR    Inside the parallel bars:    On wooden rocker board:  2 x 30" working board in A/P directions, no UE support, min A  2 x 30" working board in M/L directions, no UE support, min A    On foam pad:  2 x 30" static standing, no UE support, eyes open, CGA  2 x 30" static standing, no UE support, eyes closed, CGA to slight min A    2 x 30" B alternate tandem stance, no UE support, mostly min A with occasional mod A~ pt consistently leans to L side    Other: pt required about 3-4 seated rest breaks to complete the above balance tasks.    therapeutic activities to improve functional performance for 0  minutes, including:      gait training to improve functional mobility and safety for 0  minutes, including:        Patient Education and Home Exercises       Education provided:   - HEP  -9/12/23: PT provided pt a schedule slip for the month of October.    Written Home Exercises Provided: yes. Exercises were reviewed and Sabrina was able to demonstrate them prior to the end of the session.  Sabrina demonstrated good  understanding of the education provided. See EMR under Patient Instructions for exercises provided during therapy sessions    Assessment     Sabrina tolerated today's session well, despite a fairly high BP recording at the start of treatment. She denied any headache or ill feeling, so PT continued session and took blood pressure again about 15 minutes later. The second reading, while still elevated, was much improved from the first. Most of today's session was focused on improvement of pt's standing balance in various conditions of observation. We began at the ballet bar but moved into the parallel bars once there was space. Sabrina struggled most with SLS activities and tandem standing. She consistently leaned to L during tandem standing trials, regardless of which foot was placed " anteriorly. Pt required several seated rest breaks to complete her balance activities, and once sat down on her rollator seat without engaging the brakes. This is primary therapist's first encounter with pt since her evaluation, and she displayed some unsafe movements at times. Cont with plan of care.     Sabrina Is progressing well towards her goals.   Pt prognosis is Fair.     Pt will continue to benefit from skilled outpatient physical therapy to address the deficits listed in the problem list box on initial evaluation, provide pt/family education and to maximize pt's level of independence in the home and community environment.     Pt's spiritual, cultural and educational needs considered and pt agreeable to plan of care and goals.     Anticipated barriers to physical therapy: pt does not drive; blood pressure; diabetes  Goals:  Short Term Goals: 4 weeks   Pt will be issued first installment of HEP and report at least partial compliance. Met 8/17/23  Pt to complete mCTSIB testing and PT to establish appropriate goals Ongoing, 8/29/23 (able to complete conditions 1 and 2)  Pt to improve her 30 second chair rise score to 6-7 repetitions to demonstrate improved LE functional strength and muscular endurance Met 8/29/23  Pt to improve her TUG score to 24 seconds or < with rollator for improved household ambulation and decreased fall risk Met 8/29/23  Pt to improve her SSWS to 0.55 m/sec with rollator for improved community ambulation and decreased fall risk Ongoing, 8/29/23  Pt to improve at least 4 LE muscles tested by 1/3 grade for improved stability and balance when ambulating Met 8/29/23     Long Term Goals: 8 weeks   Pt will improve her FOTO intake score to 38 % or < to demonstrate improved self-perception of deficits Ongoing, 8/29/23  Pt to improve her 30 second chair rise score to 7-8 repetitions to demonstrate improved LE functional strength and muscular endurance Ongoing, 8/29/23  Pt to improve her TUG score to  22 seconds or < with rollator for improved household ambulation and decreased fall risk Met 8/29/23  Pt to improve her SSWS to 0.60 m/sec with rollator for improved community ambulation and decreased fall risk Ongoing, 8/29/23  Pt to improve at least 6 LE muscles tested by 1/3 grade for improved stability and balance when ambulating Met 8/29/23  Pt to be at least partially compliant with finalized HEP to help maintain any potential gains realized in PT Ongoing, 8/29/23     New 8/29/23:  Pt to improve her TUG score to 14 seconds or < without rollator for improved household ambulation and decreased fall risk   Pt to improve at least 6 LE muscles tested by 1/2 grade for improved stability and balance when ambulating      Plan     Cont with strengthening, endurance, balance and functional mobility.    Wayne Middleton, PT   9/12/2023

## 2023-09-18 NOTE — PROGRESS NOTES
[facility]   Physical Therapy Treatment Note /Plan of Care Reassessment     Name: Buck Ibarra  Clinic Number: [id number]    Therapy Diagnosis:   Encounter Diagnosis   Name Primary?    Impaired functional mobility, balance, gait, and endurance Yes         Physician: Ericka Cortez MD    Visit Date: 9/19/2023    Physician: Ericka Cortez MD     Physician Orders: PT Eval and Treat   Medical Diagnosis from Referral:   I69.30 (ICD-10-CM) - History of cerebrovascular accident (CVA) with residual deficit   R29.898 (ICD-10-CM) - Bilateral leg weakness   R42 (ICD-10-CM) - Dizziness      Evaluation Date: 7/27/2023  Authorization Period Expiration: 9/21/23  Plan of Care Expiration: 9/21/23  Updated Plan of Care Expiration: 11/2/23  Progress Note Due: 8/27/23  Visit # / Visits authorized: 7/ 23(+eval)  FOTO: 2/3     Precautions: Standard, Diabetes, Fall, and blood pressure     Time In: 1255   Time Out: 1343    Total Billable Time: 48   minutes      PTA Visit #: 0/5       Subjective     Pt reports: her daughter dropped her off today. She states she woke up with a headache this morning.  She was compliant with her home program.  Response to previous treatment: no problems  Functional change: ongoing    Pain: 8/10  Location: headache     Objective      Objective Measures updated at progress report unless specified.  See below:    Vitals at start of session:  BP:172/101, HR 92    Vitals at 1315:  144/88, HR 93         Lower Extremity Strength~ tested with pt seated in gold chair  Right LE eval  8/29/23 9/19/23 Left LE eval  8/29/23 9/19/23   Hip flexion:  3/5 3-/5 (unable to move through full available ROM) 3/5 Hip flexion: 3/5 3-/5 (unable to move through full available ROM) 3+/5   Knee extension: 4+/5 4+/5 5/5 Knee extension: 4+/5 4+/5 4+/5   Knee flexion: 4/5 4/5 4/5 Knee flexion: 3+/5 4/5 3+/5   Ankle dorsiflexion:  3-/5 3/5 3+/5 Ankle dorsiflexion: 3-/5 3-/5 3-/5   Ankle plantarflexion:  3+/5 3+/5 3+/5  Ankle plantarflexion: 3-/5 3/5 3+/5   Hip abduction: 4+/5 4+/5 4/5 Hip abduction: 3+/5 4/5 4/5   Hip adduction: 4/5 4/5 4/5 Hip adduction 3+/5 4/5 4-/5   Hip extension: 3+/5 4/5 4/5 Hip extension: 3/5 4/5 4-/5           Evaluation 8/25/23 9/19/23   Single Limb Stance R LE NT  (<10 sec = HIGH FALL RISK) 1 sec 1 sec   Single Limb Stance L LE NT  (<10 sec = HIGH FALL RISK) 4 sec 2 sec   30 second Chair Rise 5 completed with arms 6 reps completed no hands 8 completed with arms   5 times sit to stand NT 25 seconds NT   TUG 26 seconds with rollator 15 seconds no rollator 19 seconds with rollator   Self selected walking speed 0.50 m/sec (6m/12s) 0.46 m/sec (6m/13.14s) no rollator 0.75 m/sec (6m/8s) with rollator   Fast walking speed 0.67 m/sec (6m/9s) 0.55 m/sec (6m/10.88s) no rollator 1.0 m/sec (6m/6s) with rollator      Postural control: Eval  MCTSIB:  1. Eyes Open/feet together/Firm: 30 seconds, P~ min sway~ stopped remainder of test due to pt's complaints of feeling dizzy  2. Eyes Closed/feet together/Firm: NT  3. Eyes Open/feet together/Foam: NT  4. Eyes Closed/feet together/Foam: NT     Postural control: 8/29/23  MCTSIB:  1. Eyes Open/feet together/Firm: 30 seconds, no sway  2. Eyes Closed/feet together/Firm: 30 seconds, min sway, increased dizziness following  3. Eyes Open/feet together/Foam: NT due to dizziness and pt feeling hot; required increased seated rest break following condition 2  4. Eyes Closed/feet together/Foam: NT due to dizziness and pt feeling hot; required increased seated rest break following condition 2      Postural control: 9/19/23  MCTSIB:  1. Eyes Open/feet together/Firm: 30 seconds, slight sway  2. Eyes Closed/feet together/Firm: 30 seconds, min sway  3. Eyes Open/feet together/Foam: 30 seconds, min sway  4. Eyes Closed/feet together/Foam: 30 seconds, mod sway            Treatment     Bennett received the treatments listed below:      therapeutic exercises to develop strength, endurance, ROM,  flexibility, posture, and core stabilization for 16 minutes including:  (Includes above tests and measures + time to take FOTO survey)      X 5 min on SCI-FIT stepper, level 1.0, for B UE/LE muscular and CV endurance    Vitals post stepper:   SpO2 94 % and         neuromuscular re-education activities to improve: Balance, Coordination, Kinesthetic, Sense, Proprioception, and Posture for 16 minutes. The following activities were included:  (Includes above tests and measures)      therapeutic activities to improve functional performance for 16  minutes, including:  (Includes above tests and measures)    gait training to improve functional mobility and safety for 0  minutes, including:        Patient Education and Home Exercises       Education provided:   - HEP  -9/12/23: PT provided pt a schedule slip for the month of October.    Written Home Exercises Provided: yes. Exercises were reviewed and Sabrina was able to demonstrate them prior to the end of the session.  Sabrina demonstrated good  understanding of the education provided. See EMR under Patient Instructions for exercises provided during therapy sessions    Assessment     Sabrina tolerated today's session well for her updated plan of care reassessment. She arrived with elevated blood pressure reading, so PT assessed LE MMT and had pt perform FOTO survey to begin. A second blood pressure was taken and was much more conducive to perform other tests and measures which require increased physical effort. She demonstrated improved scores for R LE knee extension and ankle dorsiflexion. Her L LE demonstrated improved scores for hip flexion and ankle plantarflexion. Her scores for 30 second chair rise, TUG, SSWS and FSWS all increased from the previous assessment. It should be noted that pt did use her rollator for the walking tests. With regard to balance assessment, pt was finally able to complete all 4 conditions of the mCTSIB, though she displayed some degree of sway  during each one. Also noteworthy today is pt's difficulty following some commands during testing. PT does feel that therapy sessions are helping pt improve, so recommend an extension of 6 weeks to the current plan of care(11/2/23). See below for most recent comments regarding goal achievement and any revisions made.    Sabrina Is progressing well towards her goals.   Pt prognosis is Fair.     Pt will continue to benefit from skilled outpatient physical therapy to address the deficits listed in the problem list box on initial evaluation, provide pt/family education and to maximize pt's level of independence in the home and community environment.     Pt's spiritual, cultural and educational needs considered and pt agreeable to plan of care and goals.     Anticipated barriers to physical therapy: pt does not drive; blood pressure; diabetes  Goals:  Short Term Goals: 4 weeks   Pt will be issued first installment of HEP and report at least partial compliance. Met 8/17/23  Pt to complete mCTSIB testing and PT to establish appropriate goals Ongoing, 8/29/23 (able to complete conditions 1 and 2), MET, 9/19/23  Pt to improve her 30 second chair rise score to 6-7 repetitions to demonstrate improved LE functional strength and muscular endurance Met 8/29/23  Pt to improve her TUG score to 24 seconds or < with rollator for improved household ambulation and decreased fall risk Met 8/29/23  Pt to improve her SSWS to 0.55 m/sec with rollator for improved community ambulation and decreased fall risk Ongoing, 8/29/23, MET, 9/19/23  Pt to improve at least 4 LE muscles tested by 1/3 grade for improved stability and balance when ambulating Met 8/29/23     Long Term Goals: 8 weeks   Pt will improve her FOTO intake score to 38 % or < to demonstrate improved self-perception of deficits Ongoing, 8/29/23  Pt to improve her 30 second chair rise score to 7-8 repetitions to demonstrate improved LE functional strength and muscular endurance Ongoing,  8/29/23, MET, 9/19/23  Pt to improve her TUG score to 22 seconds or < with rollator for improved household ambulation and decreased fall risk Met 8/29/23  Pt to improve her SSWS to 0.60 m/sec with rollator for improved community ambulation and decreased fall risk Ongoing, 8/29/23, MET, 9/19/23  Pt to improve at least 6 LE muscles tested by 1/3 grade for improved stability and balance when ambulating Met 8/29/23  Pt to be at least partially compliant with finalized HEP to help maintain any potential gains realized in PT Ongoing, 8/29/23     New 8/29/23:  Pt to improve her TUG score to 14 seconds or < without rollator for improved household ambulation and decreased fall risk~ongoing   Pt to improve at least 6 LE muscles tested by 1/2 grade for improved stability and balance when ambulating~ongoing      Plan     Continue outpatient physical therapy 2 x weekly under updated Plan of Care, 9/19/23 to 11/2/23, with treatment to include: pt education, HEP, therapeutic exercises, neuromuscular re-education/balance exercises, therapeutic activities, joint mobilizations, and modalities PRN, to work towards established goals. Pt may be seen by PTA to carry out plan of care.         Cont with strengthening, endurance, balance and functional mobility.    Wayne Middleton, PT   9/19/2023

## 2023-09-19 NOTE — PLAN OF CARE
[facility]   Physical Therapy Treatment Note /Plan of Care Reassessment     Name: Buck Ibarra  Clinic Number: [id number]    Therapy Diagnosis:   Encounter Diagnosis   Name Primary?    Impaired functional mobility, balance, gait, and endurance Yes         Physician: Ericka Cortez MD    Visit Date: 9/19/2023    Physician: Ericka Cortez MD     Physician Orders: PT Eval and Treat   Medical Diagnosis from Referral:   I69.30 (ICD-10-CM) - History of cerebrovascular accident (CVA) with residual deficit   R29.898 (ICD-10-CM) - Bilateral leg weakness   R42 (ICD-10-CM) - Dizziness      Evaluation Date: 7/27/2023  Authorization Period Expiration: 9/21/23  Plan of Care Expiration: 9/21/23  Updated Plan of Care Expiration: 11/2/23  Progress Note Due: 8/27/23  Visit # / Visits authorized: 7/ 23(+eval)  FOTO: 2/3     Precautions: Standard, Diabetes, Fall, and blood pressure     Time In: 1255   Time Out: 1343    Total Billable Time: 48   minutes      PTA Visit #: 0/5       Subjective     Pt reports: her daughter dropped her off today. She states she woke up with a headache this morning.  She was compliant with her home program.  Response to previous treatment: no problems  Functional change: ongoing    Pain: 8/10  Location: headache     Objective      Objective Measures updated at progress report unless specified.  See below:    Vitals at start of session:  BP:172/101, HR 92    Vitals at 1315:  144/88, HR 93         Lower Extremity Strength~ tested with pt seated in gold chair  Right LE eval  8/29/23 9/19/23 Left LE eval  8/29/23 9/19/23   Hip flexion:  3/5 3-/5 (unable to move through full available ROM) 3/5 Hip flexion: 3/5 3-/5 (unable to move through full available ROM) 3+/5   Knee extension: 4+/5 4+/5 5/5 Knee extension: 4+/5 4+/5 4+/5   Knee flexion: 4/5 4/5 4/5 Knee flexion: 3+/5 4/5 3+/5   Ankle dorsiflexion:  3-/5 3/5 3+/5 Ankle dorsiflexion: 3-/5 3-/5 3-/5   Ankle plantarflexion:  3+/5 3+/5 3+/5  Ankle plantarflexion: 3-/5 3/5 3+/5   Hip abduction: 4+/5 4+/5 4/5 Hip abduction: 3+/5 4/5 4/5   Hip adduction: 4/5 4/5 4/5 Hip adduction 3+/5 4/5 4-/5   Hip extension: 3+/5 4/5 4/5 Hip extension: 3/5 4/5 4-/5           Evaluation 8/25/23 9/19/23   Single Limb Stance R LE NT  (<10 sec = HIGH FALL RISK) 1 sec 1 sec   Single Limb Stance L LE NT  (<10 sec = HIGH FALL RISK) 4 sec 2 sec   30 second Chair Rise 5 completed with arms 6 reps completed no hands 8 completed with arms   5 times sit to stand NT 25 seconds NT   TUG 26 seconds with rollator 15 seconds no rollator 19 seconds with rollator   Self selected walking speed 0.50 m/sec (6m/12s) 0.46 m/sec (6m/13.14s) no rollator 0.75 m/sec (6m/8s) with rollator   Fast walking speed 0.67 m/sec (6m/9s) 0.55 m/sec (6m/10.88s) no rollator 1.0 m/sec (6m/6s) with rollator      Postural control: Eval  MCTSIB:  1. Eyes Open/feet together/Firm: 30 seconds, P~ min sway~ stopped remainder of test due to pt's complaints of feeling dizzy  2. Eyes Closed/feet together/Firm: NT  3. Eyes Open/feet together/Foam: NT  4. Eyes Closed/feet together/Foam: NT     Postural control: 8/29/23  MCTSIB:  1. Eyes Open/feet together/Firm: 30 seconds, no sway  2. Eyes Closed/feet together/Firm: 30 seconds, min sway, increased dizziness following  3. Eyes Open/feet together/Foam: NT due to dizziness and pt feeling hot; required increased seated rest break following condition 2  4. Eyes Closed/feet together/Foam: NT due to dizziness and pt feeling hot; required increased seated rest break following condition 2      Postural control: 9/19/23  MCTSIB:  1. Eyes Open/feet together/Firm: 30 seconds, slight sway  2. Eyes Closed/feet together/Firm: 30 seconds, min sway  3. Eyes Open/feet together/Foam: 30 seconds, min sway  4. Eyes Closed/feet together/Foam: 30 seconds, mod sway            Treatment     Bennett received the treatments listed below:      therapeutic exercises to develop strength, endurance, ROM,  flexibility, posture, and core stabilization for 16 minutes including:  (Includes above tests and measures + time to take FOTO survey)      X 5 min on SCI-FIT stepper, level 1.0, for B UE/LE muscular and CV endurance    Vitals post stepper:   SpO2 94 % and         neuromuscular re-education activities to improve: Balance, Coordination, Kinesthetic, Sense, Proprioception, and Posture for 16 minutes. The following activities were included:  (Includes above tests and measures)      therapeutic activities to improve functional performance for 16  minutes, including:  (Includes above tests and measures)    gait training to improve functional mobility and safety for 0  minutes, including:        Patient Education and Home Exercises       Education provided:   - HEP  -9/12/23: PT provided pt a schedule slip for the month of October.    Written Home Exercises Provided: yes. Exercises were reviewed and Sabrina was able to demonstrate them prior to the end of the session.  Sabrina demonstrated good  understanding of the education provided. See EMR under Patient Instructions for exercises provided during therapy sessions    Assessment     Sabrina tolerated today's session well for her updated plan of care reassessment. She arrived with elevated blood pressure reading, so PT assessed LE MMT and had pt perform FOTO survey to begin. A second blood pressure was taken and was much more conducive to perform other tests and measures which require increased physical effort. She demonstrated improved scores for R LE knee extension and ankle dorsiflexion. Her L LE demonstrated improved scores for hip flexion and ankle plantarflexion. Her scores for 30 second chair rise, TUG, SSWS and FSWS all increased from the previous assessment. It should be noted that pt did use her rollator for the walking tests. With regard to balance assessment, pt was finally able to complete all 4 conditions of the mCTSIB, though she displayed some degree of sway  during each one. Also noteworthy today is pt's difficulty following some commands during testing. PT does feel that therapy sessions are helping pt improve, so recommend an extension of 6 weeks to the current plan of care(11/2/23). See below for most recent comments regarding goal achievement and any revisions made.    Sabrina Is progressing well towards her goals.   Pt prognosis is Fair.     Pt will continue to benefit from skilled outpatient physical therapy to address the deficits listed in the problem list box on initial evaluation, provide pt/family education and to maximize pt's level of independence in the home and community environment.     Pt's spiritual, cultural and educational needs considered and pt agreeable to plan of care and goals.     Anticipated barriers to physical therapy: pt does not drive; blood pressure; diabetes  Goals:  Short Term Goals: 4 weeks   Pt will be issued first installment of HEP and report at least partial compliance. Met 8/17/23  Pt to complete mCTSIB testing and PT to establish appropriate goals Ongoing, 8/29/23 (able to complete conditions 1 and 2), MET, 9/19/23  Pt to improve her 30 second chair rise score to 6-7 repetitions to demonstrate improved LE functional strength and muscular endurance Met 8/29/23  Pt to improve her TUG score to 24 seconds or < with rollator for improved household ambulation and decreased fall risk Met 8/29/23  Pt to improve her SSWS to 0.55 m/sec with rollator for improved community ambulation and decreased fall risk Ongoing, 8/29/23, MET, 9/19/23  Pt to improve at least 4 LE muscles tested by 1/3 grade for improved stability and balance when ambulating Met 8/29/23     Long Term Goals: 8 weeks   Pt will improve her FOTO intake score to 38 % or < to demonstrate improved self-perception of deficits Ongoing, 8/29/23  Pt to improve her 30 second chair rise score to 7-8 repetitions to demonstrate improved LE functional strength and muscular endurance Ongoing,  8/29/23, MET, 9/19/23  Pt to improve her TUG score to 22 seconds or < with rollator for improved household ambulation and decreased fall risk Met 8/29/23  Pt to improve her SSWS to 0.60 m/sec with rollator for improved community ambulation and decreased fall risk Ongoing, 8/29/23, MET, 9/19/23  Pt to improve at least 6 LE muscles tested by 1/3 grade for improved stability and balance when ambulating Met 8/29/23  Pt to be at least partially compliant with finalized HEP to help maintain any potential gains realized in PT Ongoing, 8/29/23     New 8/29/23:  Pt to improve her TUG score to 14 seconds or < without rollator for improved household ambulation and decreased fall risk~ongoing   Pt to improve at least 6 LE muscles tested by 1/2 grade for improved stability and balance when ambulating~ongoing      Plan     Continue outpatient physical therapy 2 x weekly under updated Plan of Care, 9/19/23 to 11/2/23, with treatment to include: pt education, HEP, therapeutic exercises, neuromuscular re-education/balance exercises, therapeutic activities, joint mobilizations, and modalities PRN, to work towards established goals. Pt may be seen by PTA to carry out plan of care.         Cont with strengthening, endurance, balance and functional mobility.    Wayne Middleton, PT   9/19/2023

## 2023-09-21 NOTE — PROGRESS NOTES
"[facility]   Physical Therapy Treatment Note /Plan of Care Reassessment     Name: Buck Ibarra  Clinic Number: [id number]    Therapy Diagnosis:   Encounter Diagnosis   Name Primary?    Impaired functional mobility, balance, gait, and endurance Yes     Physician: Ericka Cortez MD    Visit Date: 9/21/2023     Physician Orders: PT Eval and Treat   Medical Diagnosis from Referral:   I69.30 (ICD-10-CM) - History of cerebrovascular accident (CVA) with residual deficit   R29.898 (ICD-10-CM) - Bilateral leg weakness   R42 (ICD-10-CM) - Dizziness   Evaluation Date: 7/27/2023  Authorization Period Expiration: 9/21/23  Plan of Care Expiration: 9/21/23  Updated Plan of Care Expiration: 11/2/23  Progress Note Due: 8/27/23  Visit # / Visits authorized: 8/23(+eval)  FOTO: 2/3     Precautions: Standard, Diabetes, Fall, and blood pressure     Time In: 1344   Time Out: 1415    Total Billable Time: 31 minutes      PTA Visit #: 0/5       Subjective     Pt reports: She is feeling a little tired today. States that she was running late because she had to wait on her daughter.    She was compliant with her home program.  Response to previous treatment: no problems  Functional change: ongoing    Pain: 4/10  Location: both thighs     Objective      Objective Measures updated on 9/19/23.     Vitals at start of session: 175/93 mmHg, HR: 92 bpm    Treatment     Sabrina received the treatments listed below:      therapeutic exercises to develop strength, endurance, ROM, flexibility, posture, and core stabilization for 7 minutes including:  (Includes above tests and measures + time to take FOTO survey)    X 5 min on SCI-FIT stepper, level 1.0, for B UE/LE muscular and CV endurance  Hamstring and calf stretch with right lower extremity propped on 8" step 2 x30"    neuromuscular re-education activities to improve: Balance, Coordination, Kinesthetic, Sense, Proprioception, and Posture for 24 minutes. The following activities " "were included:    Static standing on foam fitter, eyes open, no UE support x30"  Static standing on foam fitter, eyes closed, no UE support 2 x30"  Vitals checked followin/91 mmHg HR: 72 bpm  Step ups to foam fitter, no UE support, x10 leading with each LE  Modified SLS with CL limb on 8" step, x30" leading with each foot  Progressed to chest press with 5# u-weight and performed additional 30" with each LE     therapeutic activities to improve functional performance for 00  minutes, including:    NP    gait training to improve functional mobility and safety for 00  minutes, including:    NP    Patient Education and Home Exercises       Education provided:   - HEP  -23: PT provided pt a schedule slip for the month of October.    Written Home Exercises Provided: yes. Exercises were reviewed and Sabrina was able to demonstrate them prior to the end of the session.  Sabrina demonstrated good  understanding of the education provided. See EMR under Patient Instructions for exercises provided during therapy sessions    Assessment     Sabrina tolerated today's session well however with limited time available during the session due to late arrival. She presented with elevated BP which lowered during the session following the SciFit and static standing balance. She reported some achiness in her right ankle which limited her tolerance to standing; reported mild relief following seated stretching. She continues to be appropriate for skilled physical therapy services at this time to address her remaining functional deficits.     Sabrina Is progressing well towards her goals.   Pt prognosis is Fair.     Pt will continue to benefit from skilled outpatient physical therapy to address the deficits listed in the problem list box on initial evaluation, provide pt/family education and to maximize pt's level of independence in the home and community environment.     Pt's spiritual, cultural and educational needs considered and pt agreeable " to plan of care and goals.     Anticipated barriers to physical therapy: pt does not drive; blood pressure; diabetes  Goals:  Short Term Goals: 4 weeks   Pt will be issued first installment of HEP and report at least partial compliance. Met 8/17/23  Pt to complete mCTSIB testing and PT to establish appropriate goals Ongoing, 8/29/23 (able to complete conditions 1 and 2), MET, 9/19/23  Pt to improve her 30 second chair rise score to 6-7 repetitions to demonstrate improved LE functional strength and muscular endurance Met 8/29/23  Pt to improve her TUG score to 24 seconds or < with rollator for improved household ambulation and decreased fall risk Met 8/29/23  Pt to improve her SSWS to 0.55 m/sec with rollator for improved community ambulation and decreased fall risk Ongoing, 8/29/23, MET, 9/19/23  Pt to improve at least 4 LE muscles tested by 1/3 grade for improved stability and balance when ambulating Met 8/29/23     Long Term Goals: 8 weeks   Pt will improve her FOTO intake score to 38 % or < to demonstrate improved self-perception of deficits Ongoing, 8/29/23  Pt to improve her 30 second chair rise score to 7-8 repetitions to demonstrate improved LE functional strength and muscular endurance Ongoing, 8/29/23, MET, 9/19/23  Pt to improve her TUG score to 22 seconds or < with rollator for improved household ambulation and decreased fall risk Met 8/29/23  Pt to improve her SSWS to 0.60 m/sec with rollator for improved community ambulation and decreased fall risk Ongoing, 8/29/23, MET, 9/19/23  Pt to improve at least 6 LE muscles tested by 1/3 grade for improved stability and balance when ambulating Met 8/29/23  Pt to be at least partially compliant with finalized HEP to help maintain any potential gains realized in PT Ongoing, 8/29/23     New 8/29/23:  Pt to improve her TUG score to 14 seconds or < without rollator for improved household ambulation and decreased fall risk~ongoing   Pt to improve at least 6 LE muscles  tested by 1/2 grade for improved stability and balance when ambulating~ongoing      Plan     Continue outpatient physical therapy 2 x weekly under updated Plan of Care, 9/19/23 to 11/2/23, with treatment to include: pt education, HEP, therapeutic exercises, neuromuscular re-education/balance exercises, therapeutic activities, joint mobilizations, and modalities PRN, to work towards established goals. Pt may be seen by PTA to carry out plan of care.     Cont with strengthening, endurance, balance and functional mobility.    Anya Tyson, PT   9/21/2023

## 2023-09-28 NOTE — PROGRESS NOTES
"  [facility]   Physical Therapy Treatment Note /Plan of Care Reassessment     Name: Buck Ibarra  Clinic Number: [id number]    Therapy Diagnosis:   Encounter Diagnosis   Name Primary?    Impaired functional mobility, balance, gait, and endurance Yes     Physician: Ericka Cortez MD    Visit Date: 2023     Physician Orders: PT Eval and Treat   Medical Diagnosis from Referral:   I69.30 (ICD-10-CM) - History of cerebrovascular accident (CVA) with residual deficit   R29.898 (ICD-10-CM) - Bilateral leg weakness   R42 (ICD-10-CM) - Dizziness   Evaluation Date: 2023  Authorization Period Expiration: 23  Plan of Care Expiration: 23  Updated Plan of Care Expiration: 23  Progress Note Due: 10/19/23  Visit # / Visits authorized: (+eval)  FOTO: 2/3     Precautions: Standard, Diabetes, Fall, and blood pressure     Time In: 1330   Time Out: 1410    Total Billable Time: 40 minutes      PTA Visit #: 0/5     Subjective     Pt reports: "I'm feeling much better than I did on Tuesday, I almost fell over the coffee table." Pt states that she was getting ready to come to therapy and then got light headed, dizzy and almost passed out. She canceled her appointment that day. Endorses no complaints at the start of the session today.     She was compliant with her home program.  Response to previous treatment: no problems  Functional change: ongoing    Pain: 4/10  Location: both thighs     Objective      Objective Measures updated on 23.     Vitals at start of session: 189/96 mmHg, HR: 88 bpm    Treatment     Sabrina received the treatments listed below:      therapeutic exercises to develop strength, endurance, ROM, flexibility, posture, and core stabilization for 11 minutes including:  (Includes above tests and measures + time to take FOTO survey)    X 8 min on SCI-FIT stepper, level 2.0, for B UE/LE muscular and CV endurance  Vitals followin/74 mmHg, HR: 83 bpm  Calf stretch on " "incline 3 x30"      neuromuscular re-education activities to improve: Balance, Coordination, Kinesthetic, Sense, Proprioception, and Posture for 26 minutes. The following activities were included:    Static standing on foam fitter, eyes closed, no UE support 3 x30"  Static standing on foam fitter, chest press, red med ball, 2 x15  Static standing on foam fitter, front raises, red med ball, 2 x15  Forward walking over hurdles, step to pattern, touchdown support as needed, x2 laps  Pt reports 8/10 in right ankle following    therapeutic activities to improve functional performance for 3  minutes, including:    Quick assessment of right ankle pain:  PT notes swelling near lateral malleolus, tenderness to palpation, and increased pain with weight bearing  Recommended patient try to ice right ankle for 15-20 minutes at a time and elevate it when sitting or laying down.     gait training to improve functional mobility and safety for 00  minutes, including:    NP    Patient Education and Home Exercises       Education provided:   - HEP  -9/12/23: PT provided pt a schedule slip for the month of October.    Written Home Exercises Provided: yes. Exercises were reviewed and Sabrina was able to demonstrate them prior to the end of the session.  Sabrina demonstrated good  understanding of the education provided. See EMR under Patient Instructions for exercises provided during therapy sessions    Assessment     Sabrina tolerated today's session fairly well with multiple seated rest breaks during the session and reports of increased pain in her right ankle by the end of the session. Presented with hypertension initially however her pressure responded well following the SciFit. She performed well with static balance activities, showing good reactions with vision eliminated and external perturbations. She showed limited tolerance to standing and weightbearing on her right ankle which limited her performance with dynamic balance activities. PT " recommended pt try ice and elevation to address swelling and pain and told her to alert her PCP or medical team if pain and swelling persists. She continues to be appropriate for skilled physical therapy services at this time to address her remaining functional deficits.     Sabrina Is progressing well towards her goals.   Pt prognosis is Fair.     Pt will continue to benefit from skilled outpatient physical therapy to address the deficits listed in the problem list box on initial evaluation, provide pt/family education and to maximize pt's level of independence in the home and community environment.     Pt's spiritual, cultural and educational needs considered and pt agreeable to plan of care and goals.     Anticipated barriers to physical therapy: pt does not drive; blood pressure; diabetes  Goals:  Short Term Goals: 4 weeks   Pt will be issued first installment of HEP and report at least partial compliance. Met 8/17/23  Pt to complete mCTSIB testing and PT to establish appropriate goals Ongoing, 8/29/23 (able to complete conditions 1 and 2), MET, 9/19/23  Pt to improve her 30 second chair rise score to 6-7 repetitions to demonstrate improved LE functional strength and muscular endurance Met 8/29/23  Pt to improve her TUG score to 24 seconds or < with rollator for improved household ambulation and decreased fall risk Met 8/29/23  Pt to improve her SSWS to 0.55 m/sec with rollator for improved community ambulation and decreased fall risk Ongoing, 8/29/23, MET, 9/19/23  Pt to improve at least 4 LE muscles tested by 1/3 grade for improved stability and balance when ambulating Met 8/29/23     Long Term Goals: 8 weeks   Pt will improve her FOTO intake score to 38 % or < to demonstrate improved self-perception of deficits Ongoing, 8/29/23  Pt to improve her 30 second chair rise score to 7-8 repetitions to demonstrate improved LE functional strength and muscular endurance Ongoing, 8/29/23, MET, 9/19/23  Pt to improve her TUG  score to 22 seconds or < with rollator for improved household ambulation and decreased fall risk Met 8/29/23  Pt to improve her SSWS to 0.60 m/sec with rollator for improved community ambulation and decreased fall risk Ongoing, 8/29/23, MET, 9/19/23  Pt to improve at least 6 LE muscles tested by 1/3 grade for improved stability and balance when ambulating Met 8/29/23  Pt to be at least partially compliant with finalized HEP to help maintain any potential gains realized in PT Ongoing, 8/29/23     New 8/29/23:  Pt to improve her TUG score to 14 seconds or < without rollator for improved household ambulation and decreased fall risk~ongoing   Pt to improve at least 6 LE muscles tested by 1/2 grade for improved stability and balance when ambulating~ongoing      Plan     Continue outpatient physical therapy 2 x weekly under updated Plan of Care, 9/19/23 to 11/2/23, with treatment to include: pt education, HEP, therapeutic exercises, neuromuscular re-education/balance exercises, therapeutic activities, joint mobilizations, and modalities PRN, to work towards established goals. Pt may be seen by PTA to carry out plan of care.     Cont with strengthening, endurance, balance and functional mobility.    Anya Tyson, PT   9/28/2023

## 2023-10-02 NOTE — PROGRESS NOTES
[facility]   Physical Therapy Treatment Note      Name: Buck Ibarra  Clinic Number: [id number]    Therapy Diagnosis:   Encounter Diagnosis   Name Primary?    Impaired functional mobility, balance, gait, and endurance Yes           Physician: Ericka Cortez MD    Visit Date: 10/3/2023    Physician: Ericka Cortez MD     Physician Orders: PT Eval and Treat   Medical Diagnosis from Referral:   I69.30 (ICD-10-CM) - History of cerebrovascular accident (CVA) with residual deficit   R29.898 (ICD-10-CM) - Bilateral leg weakness   R42 (ICD-10-CM) - Dizziness      Evaluation Date: 7/27/2023  Authorization Period Expiration: 9/21/23  Plan of Care Expiration: 9/21/23  Updated Plan of Care Expiration: 11/2/23  Progress Note Due: 8/27/23  Visit # / Visits authorized: 10/ 23(+eval)  FOTO: 2/3     Precautions: Standard, Diabetes, Fall, and blood pressure     Time In: 1300    Time Out: 1345     Total Billable Time: 45    minutes      PTA Visit #: 0/5       Subjective     Pt reports: she got her exercise bike the other day and has used it a couple of times.  She was compliant with her home program.  Response to previous treatment: no problems  Functional change: ongoing    Pain: 8/10  Location: R calf at start of session and pt later complained of R foot    Objective      Objective Measures updated at progress report unless specified.  See below:    Vitals at start of session:  BP:175/102, HR 87~ R UE  /89, HR 88 L UE    Vitals at 1315:  167/82, HR 88~ R UE    Vitals at 1323:  168/86, HR 88~ L UE      Treatment     Sabrina received the treatments listed below:      therapeutic exercises to develop strength, endurance, ROM, flexibility, posture, and core stabilization for 20 minutes including:        X 5 min(trial 1) on SCI-FIT stepper, level 1.0, for B UE/LE muscular and CV endurance    Vitals post stepper:   SpO2 97 % and HR 94    X 5 min(trial 2) on SCI-FIT stepper, level 1.0, for B UE/LE muscular and CV  "endurance    Vitals post stepper:   SpO2 96 % and HR 91    Seated in gold chair:  1 x 10 B hip flexion marches with 2 # ankle weights applied  1 x 10 B LAQ with 2 # ankle weights applied    neuromuscular re-education activities to improve: Balance, Coordination, Kinesthetic, Sense, Proprioception, and Posture for 10 minutes. The following activities were included:    Inside parallel bars:     1 x 10 B LE forward/backward step ups/downs on foam fitter, no UE support, CGA to slight min A  1 x 10 alternate single limb step overs on foam fitter, no UE support, CGA to slight min A  2 x 30" static standing on foam fitter, narrow base of support, eyes closed, no UE support, CGA to slight min A      Other: pt required about 2 seated rest breaks to complete the above balance tasks. Pt then requested to discontinue any additional balance training due to R foot pain. PT assessed R foot and found no swelling, discoloration or signs of injury.      Patient participated in dynamic functional therapeutic activities to improve functional performance for 15 minutes. Including:       Time needed to record vitals throughout session and frequent sit<> stand transfers from gold chair during brief rest periods.          gait training to improve functional mobility and safety for 0  minutes, including:        Patient Education and Home Exercises       Education provided:   - HEP  -9/12/23: PT provided pt a schedule slip for the month of October.  -10/3/23: PT recommended that pt schedule the appropriate MD appointments to address her issues with R leg and foot pain.    Written Home Exercises Provided: yes. Exercises were reviewed and Sabrina was able to demonstrate them prior to the end of the session.  Sabrina demonstrated good  understanding of the education provided. See EMR under Patient Instructions for exercises provided during therapy sessions    Assessment     Sabrina tolerated today's session fairly. She continues to present with quite " elevated blood pressure, which tends to moderate as the session progresses. She was most limited by R foot pain and this prevented us from performing much balance training. PT would like to spend most of pt's session here, but she continues to demonstrate limited endurance, issues with blood pressure and most recently, complaints of pain to her R calf and foot. Pt has been fairly consistent with her attendance at therapy sessions, but she is very challenging to progress due to the above issues. She also has mild difficulty following some commands at times. PT and team will do their best to progress pt to tolerance with strength training, balance and endurance training. Cont with plan of care.     Sabrina Is progressing well towards her goals.   Pt prognosis is Fair.     Pt will continue to benefit from skilled outpatient physical therapy to address the deficits listed in the problem list box on initial evaluation, provide pt/family education and to maximize pt's level of independence in the home and community environment.     Pt's spiritual, cultural and educational needs considered and pt agreeable to plan of care and goals.     Anticipated barriers to physical therapy: pt does not drive; blood pressure; diabetes  Goals:  Short Term Goals: 4 weeks   Pt will be issued first installment of HEP and report at least partial compliance. Met 8/17/23  Pt to complete mCTSIB testing and PT to establish appropriate goals Ongoing, 8/29/23 (able to complete conditions 1 and 2), MET, 9/19/23  Pt to improve her 30 second chair rise score to 6-7 repetitions to demonstrate improved LE functional strength and muscular endurance Met 8/29/23  Pt to improve her TUG score to 24 seconds or < with rollator for improved household ambulation and decreased fall risk Met 8/29/23  Pt to improve her SSWS to 0.55 m/sec with rollator for improved community ambulation and decreased fall risk Ongoing, 8/29/23, MET, 9/19/23  Pt to improve at least 4 LE  muscles tested by 1/3 grade for improved stability and balance when ambulating Met 8/29/23     Long Term Goals: 8 weeks   Pt will improve her FOTO intake score to 38 % or < to demonstrate improved self-perception of deficits Ongoing, 8/29/23  Pt to improve her 30 second chair rise score to 7-8 repetitions to demonstrate improved LE functional strength and muscular endurance Ongoing, 8/29/23, MET, 9/19/23  Pt to improve her TUG score to 22 seconds or < with rollator for improved household ambulation and decreased fall risk Met 8/29/23  Pt to improve her SSWS to 0.60 m/sec with rollator for improved community ambulation and decreased fall risk Ongoing, 8/29/23, MET, 9/19/23  Pt to improve at least 6 LE muscles tested by 1/3 grade for improved stability and balance when ambulating Met 8/29/23  Pt to be at least partially compliant with finalized HEP to help maintain any potential gains realized in PT Ongoing, 8/29/23     New 8/29/23:  Pt to improve her TUG score to 14 seconds or < without rollator for improved household ambulation and decreased fall risk~ongoing   Pt to improve at least 6 LE muscles tested by 1/2 grade for improved stability and balance when ambulating~ongoing      Plan       Continue with strengthening, endurance, balance and functional mobility.    Wayne Middleton, PT   10/3/2023

## 2023-10-04 NOTE — PROGRESS NOTES
"[facility]   Physical Therapy Treatment Note      Name: Buck Ibarra  Clinic Number: [id number]    Therapy Diagnosis:   Encounter Diagnosis   Name Primary?    Impaired functional mobility, balance, gait, and endurance Yes           Physician: Ericka Cortez MD    Visit Date: 10/4/2023    Physician: Ericka Cortez MD     Physician Orders: PT Eval and Treat   Medical Diagnosis from Referral:   I69.30 (ICD-10-CM) - History of cerebrovascular accident (CVA) with residual deficit   R29.898 (ICD-10-CM) - Bilateral leg weakness   R42 (ICD-10-CM) - Dizziness      Evaluation Date: 7/27/2023  Authorization Period Expiration: 9/21/23  Plan of Care Expiration: 9/21/23  Updated Plan of Care Expiration: 11/2/23  Progress Note Due: 8/27/23  Visit # / Visits authorized: 11/ 23(+eval)  FOTO: 2/3     Precautions: Standard, Diabetes, Fall, and blood pressure     Time In: 1515    Time Out: 1553   Total Billable Time: 38  minutes      PTA Visit #: 1/5       Subjective     Pt reports: " Both of my legs are hurting me today." " I woke up dizzy this morning."   She was compliant with her home program.  Response to previous treatment: no problems  Functional change: ongoing    Pain: 8/10  Location: Both legs ( thighs to feet)    Objective      Objective Measures updated at progress report unless specified.  See below:      Treatment     Sabrina received the treatments listed below:      therapeutic exercises to develop strength, endurance, ROM, flexibility, posture, and core stabilization for 10 minutes including:      X 5 min(trial 1) on SCI-FIT stepper, level 1.0, for B UE/LE muscular and CV endurance    BP: 139/73,     X 5 min(trial 2) on SCI-FIT stepper, level 1.0, for B UE/LE muscular and CV endurance      neuromuscular re-education activities to improve: Balance, Coordination, Kinesthetic, Sense, Proprioception, and Posture for 28 minutes. The following activities were included:    Inside parallel bars:  X 6 " laps of forward tandem ambulation stepping over 5 orange cones with 1 UE support.  VC's to not circumduct  X 6 laps of side stepping over 5 orange cones with 1 UE support    Airex foam pad   X 30 sec of static standing with WBOS, eyes opened and no UE support  2 x 30 sec of static standing with WBOS, eyes closed and no UE support    Patient participated in dynamic functional therapeutic activities to improve functional performance for  minutes. Including:           gait training to improve functional mobility and safety for 0  minutes, including:        Patient Education and Home Exercises       Education provided:   - HEP  -9/12/23: PT provided pt a schedule slip for the month of October.  -10/3/23: PT recommended that pt schedule the appropriate MD appointments to address her issues with R leg and foot pain.    Written Home Exercises Provided: yes. Exercises were reviewed and Sabrina was able to demonstrate them prior to the end of the session.  Sabrina demonstrated good  understanding of the education provided. See EMR under Patient Instructions for exercises provided during therapy sessions    Assessment     Sabrina tolerated her tx session fairly today.  Sabrina focused on endurance and asked to perform some balance today even though her right foot was still bothering her.  Sabrina did have some dizziness today during the tx session and required multiple sitting rest breaks.  Sabrina's blood pressure was within normal limits the entire tx session.      Sabrina Is progressing well towards her goals.   Pt prognosis is Fair.     Pt will continue to benefit from skilled outpatient physical therapy to address the deficits listed in the problem list box on initial evaluation, provide pt/family education and to maximize pt's level of independence in the home and community environment.     Pt's spiritual, cultural and educational needs considered and pt agreeable to plan of care and goals.     Anticipated barriers to physical therapy: pt does not  drive; blood pressure; diabetes  Goals:  Short Term Goals: 4 weeks   Pt will be issued first installment of HEP and report at least partial compliance. Met 8/17/23  Pt to complete mCTSIB testing and PT to establish appropriate goals Ongoing, 8/29/23 (able to complete conditions 1 and 2), MET, 9/19/23  Pt to improve her 30 second chair rise score to 6-7 repetitions to demonstrate improved LE functional strength and muscular endurance Met 8/29/23  Pt to improve her TUG score to 24 seconds or < with rollator for improved household ambulation and decreased fall risk Met 8/29/23  Pt to improve her SSWS to 0.55 m/sec with rollator for improved community ambulation and decreased fall risk Ongoing, 8/29/23, MET, 9/19/23  Pt to improve at least 4 LE muscles tested by 1/3 grade for improved stability and balance when ambulating Met 8/29/23     Long Term Goals: 8 weeks   Pt will improve her FOTO intake score to 38 % or < to demonstrate improved self-perception of deficits Ongoing, 8/29/23  Pt to improve her 30 second chair rise score to 7-8 repetitions to demonstrate improved LE functional strength and muscular endurance Ongoing, 8/29/23, MET, 9/19/23  Pt to improve her TUG score to 22 seconds or < with rollator for improved household ambulation and decreased fall risk Met 8/29/23  Pt to improve her SSWS to 0.60 m/sec with rollator for improved community ambulation and decreased fall risk Ongoing, 8/29/23, MET, 9/19/23  Pt to improve at least 6 LE muscles tested by 1/3 grade for improved stability and balance when ambulating Met 8/29/23  Pt to be at least partially compliant with finalized HEP to help maintain any potential gains realized in PT Ongoing, 8/29/23     New 8/29/23:  Pt to improve her TUG score to 14 seconds or < without rollator for improved household ambulation and decreased fall risk~ongoing   Pt to improve at least 6 LE muscles tested by 1/2 grade for improved stability and balance when  ambulating~ongoing      Plan       Continue with strengthening, endurance, balance and functional mobility.    Ama Zuniga, PTA   10/4/2023

## 2023-10-05 PROBLEM — I63.9 CVA (CEREBRAL VASCULAR ACCIDENT): Status: ACTIVE | Noted: 2023-01-01

## 2023-10-05 NOTE — PROGRESS NOTES
CHIEF COMPLAINT: Type 2 Diabetes     HPI: Mrs. Buck Ibarra is a 77 y.o. female who was diagnosed with Type 2 DM x > 8 years ago.  Past Medical History:   Diagnosis Date    Allergy     Cataract     Diabetes mellitus type II     Gastritis     with gastric ulcer    GERD (gastroesophageal reflux disease)     H. pylori infection     History of hepatitis C, SVR as of 8-2016 8/25/2015    Harvoni 12 wks completed.  SVR(12) as of 8/4/16 SVR(24) as of 10-     Hypertension     Osteopenia     Type 2 diabetes mellitus with diabetic polyneuropathy      Last seen by me in august 2022  +PAD PTA femoral artery placed 6/2022- pt will like f/u with cards.  C/o palpations intermittently   2/2023- CVA, right sided weakness, using walker  Feeling dizzy spells, lightheadedness   mg/dl  A1c elevated, overdue for labs.  Forgot dexcom  g6.   Has PT right now  Has c/o balance.   Lab Results   Component Value Date    HGBA1C 11.6 (H) 06/28/2023     Dietary habits:  3 meals  11a, 2p, 7-8p      H/o severe hypoglycemia 40 mg/dl  +insomnia  +PN, nephropathy  [place] : supplier for dexcom g6     Medical necessity for cgm    On MDI injections 4 x a day  Testing 4 x a day  Patient is willing and able to use the device  Demonstrated an understanding of the technology and is motivated to use CGM  Patient expected to adhere to a comprehensive diabetes treatment plan and patient has adequate medical supervision  Patient experiences multiple impaired awareness of hypoglycemia (hypoglycemia unawareness)    Patient is willing and able to use the device  Demonstrated an understanding of the technology and is motivated to use CGM  Patient expected to adhere to a comprehensive diabetes treatment plan and patient has adequate medical supervision  Patient experiences multiple impaired awareness of hypoglycemia (hypoglycemia unawareness)    Retired . Has children (5), 16 grandchildren, babysits grandchildren---age  2, 8, 11, has 10  "great grandchildren.  Helping with [employer]     PREVIOUS DIABETES MEDICATIONS TRIED  novolog  tresiba  Trulicity  vgo--did not feel right on it.  victoza   invokana-SEs /dehydration episode in 2017  Metformin- gi  ozempic     CURRENT DIABETIC MEDS: ozempic 2 mg weekly,  tresiba 20 units qhs-off for weeks, humalog 6-6-6 units ac w/ scale   Self adjustment per scale   180-230+2, etc     Diabetes Management Status    Statin: Taking  ACE/ARB: Taking    Screening or Prevention Patient's value Goal Complete/Controlled?   HgA1C Testing and Control   Lab Results   Component Value Date    HGBA1C 11.6 (H) 06/28/2023      Annually/Less than 8% No   Lipid profile : 06/28/2023 Annually Yes   LDL control Lab Results   Component Value Date    LDLCALC 50.2 (L) 06/28/2023    Annually/Less than 100 mg/dl  Yes   Nephropathy screening Lab Results   Component Value Date    LABMICR 897.0 06/28/2023     Lab Results   Component Value Date    PROTEINUA 2+ (A) 02/26/2023    Annually No   Blood pressure BP Readings from Last 1 Encounters:   10/05/23 (!) 142/82    Less than 140/90 Yes   Dilated retinal exam : 03/10/2022 Annually Yes   Foot exam   : 04/27/2023 Annually Yes       REVIEW OF SYSTEMS  General: no weakness, fatigue, +weight loss 24# from 2/2023   Eyes: no double or blurred vision, eye pain, or redness   Cardiovascular: no chest pain, +palpitations, edema, or murmurs.   Respiratory: no cough or dyspnea.   GI: no heartburn, nausea, +diarrhea w/ metformin-ok on lower dose; good appetite.   Skin: no rashes, dryness, itching, or reactions at insulin injection sites.  Neuro: + numbness, tingling, tremors,+ vertigo. +insomnia, +balance issues   Endocrine: no polyuria, polydipsia, polyphagia, heat or cold intolerance. +hair loss    Vital Signs  BP (!) 142/82 (BP Location: Left arm, Patient Position: Sitting, BP Method: Large (Manual))   Pulse 92   Ht 5' 7" (1.702 m)   Wt 88.6 kg (195 lb 5.2 oz)   SpO2 97%   BMI 30.59 kg/m² "     Hemoglobin A1C   Date Value Ref Range Status   06/28/2023 11.6 (H) 4.0 - 5.6 % Final     Comment:     ADA Screening Guidelines:  5.7-6.4%  Consistent with prediabetes  >or=6.5%  Consistent with diabetes    High levels of fetal hemoglobin interfere with the HbA1C  assay. Heterozygous hemoglobin variants (HbS, HgC, etc)do  not significantly interfere with this assay.   However, presence of multiple variants may affect accuracy.     02/23/2023 7.9 (H) 4.0 - 5.6 % Final     Comment:     ADA Screening Guidelines:  5.7-6.4%  Consistent with prediabetes  >or=6.5%  Consistent with diabetes    High levels of fetal hemoglobin interfere with the HbA1C  assay. Heterozygous hemoglobin variants (HbS, HgC, etc)do  not significantly interfere with this assay.   However, presence of multiple variants may affect accuracy.     07/28/2022 8.1 (H) 4.0 - 5.6 % Final     Comment:     ADA Screening Guidelines:  5.7-6.4%  Consistent with prediabetes  >or=6.5%  Consistent with diabetes    High levels of fetal hemoglobin interfere with the HbA1C  assay. Heterozygous hemoglobin variants (HbS, HgC, etc)do  not significantly interfere with this assay.   However, presence of multiple variants may affect accuracy.          Chemistry        Component Value Date/Time     (L) 06/28/2023 1445    K 3.7 06/28/2023 1445    CL 97 06/28/2023 1445    CO2 28 06/28/2023 1445    BUN 11 06/28/2023 1445    CREATININE 1.3 06/28/2023 1445     (HH) 06/28/2023 1445        Component Value Date/Time    CALCIUM 9.3 06/28/2023 1445    ALKPHOS 125 06/28/2023 1445    AST 16 06/28/2023 1445    ALT 21 06/28/2023 1445    BILITOT 0.3 06/28/2023 1445    ESTGFRAFRICA >60.0 05/12/2022 1040    EGFRNONAA 55.6 (A) 05/12/2022 1040           Lab Results   Component Value Date    TSH 1.161 06/28/2023      Lab Results   Component Value Date    CHOL 109 (L) 06/28/2023    CHOL 205 (H) 02/27/2023    CHOL 172 08/18/2022     Lab Results   Component Value Date    HDL 40  06/28/2023    HDL 46 02/27/2023    HDL 44 08/18/2022     Lab Results   Component Value Date    LDLCALC 50.2 (L) 06/28/2023    LDLCALC 138.2 02/27/2023    LDLCALC 111.0 08/18/2022     Lab Results   Component Value Date    TRIG 94 06/28/2023    TRIG 104 02/27/2023    TRIG 85 08/18/2022     Lab Results   Component Value Date    CHOLHDL 36.7 06/28/2023    CHOLHDL 22.4 02/27/2023    CHOLHDL 25.6 08/18/2022       PHYSICAL EXAMINATION  Constitutional: Appears well, no distress  Neck: Supple, trachea midline.   Respiratory: no wheezes, even and unlabored.  Cardiovascular: RRR; (+) DP pulses; no edema.   Lymph: deferred   Skin: warm and dry; no injection site reactions, + acanthosis nigracans observed.  Neuro:patient alert and cooperative, normal affect; steady gait.    Diabetes Foot Exam:  Deferred     Assessment/Plan  1. Type 2 diabetes mellitus with diabetic polyneuropathy, with long-term current use of insulin  Hemoglobin A1C    Hemoglobin A1C    Ambulatory referral/consult to Optometry      2. Type 2 diabetes mellitus with hyperglycemia, with long-term current use of insulin        3. Stage 3a chronic kidney disease        4. Hyperlipidemia LDL goal <70        5. Obesity (BMI 30-39.9)        6. PAD (peripheral artery disease)  Ambulatory referral/consult to Cardiology      7. NICM (nonischemic cardiomyopathy)        8. Atherosclerotic heart disease of native coronary artery with other forms of angina pectoris        9. Essential hypertension        10. Former smoker        11. Impaired functional mobility, balance, gait, and endurance        12. Cerebrovascular accident (CVA), unspecified mechanism          1-12. Follow up in 3 months w/ me   F/u with Dr. Cortez or internal med   Continue regimen above  Bring dexcom g6  to office visits  A1c goal less than 7.5%  Send antivert 25 mg tid prn   F/ u with vascular, podiatry   Has refills, pen needles  Body mass index is 30.59 kg/m².  May increase insulin resistance  A1c  today  A1c in 3 months   Bp near goal   F/u with cards   Has loss weight in the past 7 months

## 2023-10-05 NOTE — Clinical Note
A1c today Pt forgot dexcom  Haven't seen her since last aug Loss 24# after the cva  Working with PT, some residual issues

## 2023-10-05 NOTE — PATIENT INSTRUCTIONS
Follow up in 3 months   A1c today  A1c in 3 months   Optometry 2023  Influenza hd today   Dr. Cortez appt needed /internal medicine within next week to 4 months     Tresiba 20 units at night   Humalog 6 units before meals  Scale use 180-230+2, etc  Ozempic 2 mg weekly   Dexcom-bring  each visit     Goal  no higher than 180/200     Lab Results   Component Value Date    HGBA1C 11.6 (H) 06/28/2023     Goal less than 8%  [email or web address]  [employer] clarita  [employer] clarita  [email or web address]   [employer] clarita

## 2023-10-06 NOTE — PROGRESS NOTES
PT/PTA met face to face to discuss pt's treatment plan and progress towards established goals.  Continue with current PT POC with focus on endurance, balance, standing and functional mobility.  Patient will be seen by physical therapist at least every sixth treatment or 30 days, whichever occurs first.    Ama Zuniga, PTA  10/06/2023

## 2023-10-09 NOTE — TELEPHONE ENCOUNTER
----- Message from ARNOLD Sawyer, SENAITP sent at 10/6/2023  5:16 PM CDT -----  Regarding: a1c 10%  Hi!  Pt had cva  Gap of not seeing her  A1c is 10%  Has dexcom  Let her know please  Thanks  Graeme

## 2023-10-17 NOTE — PROGRESS NOTES
Documentation Only/ No Show      Patient: Buck Ibarra  Date of Session: 10/17/2023  Diagnosis: No diagnosis found.  MRN: [id number]  Buck Ibarra did not attend his/her scheduled therapy appointment today. Sabrina did not call to cancel nor reschedule.  Next appointment is scheduled for 10/19/23 will follow up with patient at that time. No charges have been posted today.       Ama Zuniga, PTA  10/17/2023

## 2023-10-17 NOTE — PROGRESS NOTES
[facility] Established Patient Visit    Reason for Visit:  F/u liver mass    PCP: Ericka Cortez    HPI:  This is a 73 y.o. female here for f/u of liver mass. She was last seen in hepatology clinic in 12/2015 by Darcie Masters PA-C. She had her hepatitis C successfully treated with 12 weeks of Harvoni with SVR 12 on 8/4/16. She has had no findings of cirrhosis prior to her hep C treatment. She had a liver biopsy on 7/13/2010 at [facility] that showed chronic portal hepatitis consistent with chronic hepatitis C with mild interface activity and mild periportal fibrosis, no stage given. Fibroscan in 12/2015 = F0-F1. Labs post hep C treatment continue to show normal liver enzymes and liver functioning except for borderline low albumin. Plts normal to elevated. She has a subcentimeter liver mass that was initially seen in 2016 that we have continued to follow. U/s today shows this remains stable. Mass has been unable to be characterized on previous CT's d/t small size. AFP nl. She feels well and denies symptoms of advanced chronic liver disease including jaundice, dark urine, hematemesis, melena, slowed mentation, abdominal distention.        ROS:   GENERAL: Denies fever, chills, weight loss/gain, fatigue  HEENT: Denies headaches, dizziness, vision/hearing changes  CARDIOVASCULAR: Denies chest pain, palpitations, or edema  RESPIRATORY: Denies dyspnea, cough  GI: Denies abdominal pain, rectal bleeding, nausea, vomiting. No change in bowel pattern or color, (+) foul smelling burps  : Denies dysuria, hematuria   SKIN: Denies rash, itching   NEURO: Denies confusion, memory loss, or mood changes  PSYCH: Denies depression or anxiety  HEME/LYMPH: Denies easy bruising or bleeding      PMHX:  has a past medical history of Allergy, Cataract, Diabetes mellitus type II, Gastritis, GERD (gastroesophageal reflux disease), H. pylori infection, Hepatitis C, Hyperlipidemia, Hypertension, Osteopenia, and Type 2 diabetes mellitus with  diabetic polyneuropathy.    PSHX:  has a past surgical history that includes Hysterectomy; Liver biopsy; Upper gastrointestinal endoscopy; Colonoscopy; Oophorectomy; and COLONOSCOPY (N/A, 1/28/2016).    The patient's social and family histories were reviewed by me and updated in the appropriate section of the electronic medical record.    Review of patient's allergies indicates:   Allergen Reactions    Erythromycin Anaphylaxis    Erythropoietin analogues Anaphylaxis    Pegasys [peginterferon ruben-2a] Anaphylaxis    Lisinopril Hives       Current Outpatient Medications on File Prior to Visit   Medication Sig Dispense Refill    atorvastatin (LIPITOR) 80 MG tablet Take 1 tablet (80 mg total) by mouth once daily. DOSE CHANGE 90 tablet 3    candesartan (ATACAND) 8 MG tablet Take 1 tablet (8 mg total) by mouth once daily. 90 tablet 3    ciclopirox (PENLAC) 8 % Soln Apply topically nightly. 6.6 mL 11    clotrimazole-betamethasone 1-0.05% (LOTRISONE) cream Apply topically 2 (two) times daily. 45 g 1    dulaglutide (TRULICITY) 1.5 mg/0.5 mL PnIj Inject 1.5 mg into the skin once weekly. 2 mL 6    esomeprazole (NEXIUM) 40 MG capsule Take one capsule by mouth daily as needed for acid reflux 30 capsule 3    fluocinonide (LIDEX) 0.05 % external solution AAA scalp qd prn pruritus 60 mL 1    gabapentin (NEURONTIN) 300 MG capsule TAKE ONE CAPSULE BY MOUTH EVERY EVENING 90 capsule 3    hydrochlorothiazide (HYDRODIURIL) 25 MG tablet TAKE ONE TABLET BY MOUTH ONCE DAILY 90 tablet 3    insulin degludec (TRESIBA FLEXTOUCH U-100) 100 unit/mL (3 mL) InPn Inject 20 Units into the skin every evening. 15 mL 3    ketoconazole (NIZORAL) 2 % shampoo Wash hair with medicated shampoo at least 2x/week - let sit on scalp at least 5 minutes prior to rinsing 120 mL 5    metFORMIN (GLUCOPHAGE-XR) 500 MG 24 hr tablet Take 1 tablet (500 mg total) by mouth 2 (two) times daily with meals. 180 tablet 3    aspirin (ECOTRIN) 81 MG EC tablet  "Take 1 tablet (81 mg total) by mouth once daily. 30 tablet 3    blood sugar diagnostic Strp Bid testing 200 strip 11    influenza (FLUZONE HIGH-DOSE) 180 mcg/0.5 mL vaccine Inject 0.5 mLs into the muscle. 0.5 mL 0    lancets 33 gauge Misc 1 lancet by Misc.(Non-Drug; Combo Route) route 2 (two) times daily. 200 each 11    pen needle, diabetic (BD ULTRA-FINE MILAN PEN NEEDLES) 32 gauge x 5/32" Ndle Uses 1 time daily, on insulin injections 90 each 4    [DISCONTINUED] canagliflozin (INVOKANA) 100 mg Tab Take 1 tablet (100 mg total) by mouth once daily. 30 tablet 6     No current facility-administered medications on file prior to visit.          Objective Findings:    PHYSICAL EXAM:   Friendly Black or  female, in no acute distress; alert and oriented to person, place and time  VITALS: BP (!) 177/84 (BP Location: Right arm, Patient Position: Sitting, BP Method: Medium (Automatic))   Pulse 78   Temp 97.8 °F (36.6 °C) (Oral)   Resp 18   Ht 5' 5" (1.651 m)   Wt 91.9 kg (202 lb 9.6 oz)   SpO2 99%   BMI 33.71 kg/m²   HENT: Normocephalic, without obvious abnormality. Oral mucosa pink and moist. Dentition good.  EYES: Sclerae anicteric.   NECK: Supple  CARDIOVASCULAR: Regular rate and rhythm. No murmurs.  RESPIRATORY: Normal respiratory effort. BBS CTA. No wheezes or crackles.  GI: Soft, non-tender, non-distended. No hepatosplenomegaly. No masses palpable. No ascites.  EXTREMITIES:  No clubbing, cyanosis or edema.  SKIN: Warm and dry. No jaundice. No rashes noted to exposed skin. No telangectasias noted. No palmar erythema.  NEURO:  Normal gate.   PSYCH:  Memory intact. Thought and speech pattern appropriate. Behavior normal. No depression or anxiety noted.    Labs:  Lab Results   Component Value Date    WBC 8.97 11/29/2018    HGB 14.5 11/29/2018    HCT 47.3 11/29/2018     11/29/2018    CHOL 118 (L) 03/15/2018    TRIG 88 03/15/2018    HDL 35 (L) 03/15/2018     06/07/2018    K 4.5 " 06/07/2018    CREATININE 0.9 06/07/2018    ALT 25 06/07/2018    AST 16 06/07/2018    ALKPHOS 111 06/07/2018    BILITOT 0.4 06/07/2018    ALBUMIN 3.4 (L) 06/07/2018    INR 0.9 10/09/2015    AFP 1.7 03/15/2018     ABD U/S 11/29/18  FINDINGS:  The visualized portion of the pancreas is unremarkable.    The liver is normal in size measuring 14.2 cm.  The liver demonstrates uniform echotexture.  Stable minimally complex left hepatic cyst with thin-walled septation re-identified measuring 1.1 x 1.2 x 0.8 cm.  Stable nonspecific solid hyperechoic lesion re-identified in the right hepatic lobe measuring 0.8 x 0.7 x 0.8 cm.  Hemangioma can have this appearance.    The gallbladder is unremarkable with no evidence of calculi.  The common duct is not dilated, measuring 3 mm.  The gallbladder wall is not thickened.  There is no sonographic Angel sign.  No dilated intrahepatic radicles are seen.  No pericholecystic fluid.    The spleen is normal in size measuring 8.1 x 4.3 cm with a homogeneous echotexture.    There is no evidence of ascites.      Impression       Stable nonspecific subcentimeter hyperechoic focus in the right hepatic lobe.  Recommend continued ultrasound surveillance.    Stable minimally complex left hepatic lobe cyst.         ASSESSMENT:  73 y.o. Black or  female with:  1.  Liver lesion, stable again on u/s today  -- first seen on u/s 8/11/16. Unable to be characterized on CT. Has remained stable since then  -- u/s today - Stable nonspecific solid hyperechoic lesion re-identified in the right hepatic lobe measuring 0.8 x 0.7 x 0.8 cm.  Hemangioma can have this appearance.  -- AFP nl  2. History of hepatitis C, s/p successful treatment with 12 weeks of Harvoni with SVR 12 as of 8/2016  -- no cirrhosis or advanced fibrosis on workup prior to hep C treatment  -- liver biopsy 7/2010 at [facility] - mild periportal fibrosis  -- Fibroscan 12/2015 = F0-F1      EDUCATION:   Discussed hep C is cured. Still  cannot donate blood. Hep C Ab will likely remain (+) for life. Can get re-infected if she were to get re-exposed as treatment conveys no immunity.       PLAN:  1. Reassured pt that liver lesion remains stable which suggests benign etiology. Will plan to repeat u/s in 1 year again and if lesion remains stable, recommend no further surveillance imaging  2. MRI likely to not be of utility due to small size  3. No f/u if lesion stable again next year    Thank you for allowing me to participate in the care of Buck Zavala, NP-C    mottled

## 2023-10-24 PROBLEM — R55 SYNCOPE: Status: ACTIVE | Noted: 2023-01-01

## 2023-10-24 PROBLEM — Z86.73 HISTORY OF CVA (CEREBROVASCULAR ACCIDENT): Status: ACTIVE | Noted: 2023-01-01

## 2023-10-24 NOTE — PLAN OF CARE
[facility] - Emergency Dept  Initial Discharge Assessment       Primary Care Provider: Ericka Cortez MD    Admission Diagnosis: No admission diagnoses are documented for this encounter.    Admission Date: 10/24/2023  Expected Discharge Date:     Pt stated she uses a walker with 4 wheels to assist with ambulation and is independent with her ADL's and does not require assistance.      Pt stated she does not require any post acute services on d/c and can d/c with no needs when ready.    Transition of Care Barriers: (P) None    Payor: [employer] MEDICARE / Plan: [employer] SECURE COMPLETE / Product Type: Medicare Advantage /     Extended Emergency Contact Information  Primary Emergency Contact: ChantelangOlga   [place]  Mobile Phone: [phone]  Relation: Daughter    Discharge Plan A: (P) Home  Discharge Plan B: (P) Home      [facility]  [address]  [facility] [address]  Phone: [phone] Fax: [phone]      Initial Assessment (most recent)       Adult Discharge Assessment - 10/24/23 1729          Discharge Assessment    Assessment Type Discharge Planning Assessment (P)      Confirmed/corrected address, phone number and insurance Yes (P)      Confirmed Demographics Correct on Facesheet (P)      Source of Information patient (P)      Communicated AMADO with patient/caregiver Yes (P)      People in Home child(annabelle), adult (P)      Facility Arrived From: home (P)      Do you expect to return to your current living situation? Yes (P)      Do you have help at home or someone to help you manage your care at home? No (P)      Prior to hospitilization cognitive status: Alert/Oriented;No Deficits (P)      Current cognitive status: Alert/Oriented;No Deficits (P)      Walking or Climbing Stairs ambulation difficulty, requires equipment (P)      Mobility Management walker 4 wheels (P)      Home Accessibility wheelchair accessible (P)      Home Layout Able to live on 1st floor  (P)      Equipment Currently Used at Home walker, rolling;blood pressure machine;shower chair (P)      Patient currently being followed by outpatient case management? No (P)      Do you currently have service(s) that help you manage your care at home? No (P)      Do you have any problems affording any of your prescribed medications? No (P)      Is the patient taking medications as prescribed? yes (P)      Who is going to help you get home at discharge? family (P)      How do you get to doctors appointments? family or friend will provide (P)      Are you on dialysis? No (P)      Do you take coumadin? No (P)      DME Needed Upon Discharge  none (P)      Discharge Plan discussed with: Patient (P)      Transition of Care Barriers None (P)      Discharge Plan A Home (P)      Discharge Plan B Home (P)         Physical Activity    On average, how many days per week do you engage in moderate to strenuous exercise (like a brisk walk)? 0 days (P)      On average, how many minutes do you engage in exercise at this level? 0 min (P)         Financial Resource Strain    How hard is it for you to pay for the very basics like food, housing, medical care, and heating? Not very hard (P)         Housing Stability    In the last 12 months, was there a time when you were not able to pay the mortgage or rent on time? No (P)      In the last 12 months, was there a time when you did not have a steady place to sleep or slept in a shelter (including now)? No (P)         Transportation Needs    In the past 12 months, has lack of transportation kept you from medical appointments or from getting medications? No (P)      In the past 12 months, has lack of transportation kept you from meetings, work, or from getting things needed for daily living? No (P)         Food Insecurity    Within the past 12 months, you worried that your food would run out before you got the money to buy more. Never true (P)      Within the past 12 months, the food you  bought just didn't last and you didn't have money to get more. Never true (P)         Social Connections    In a typical week, how many times do you talk on the phone with family, friends, or neighbors? More than three times a week (P)      How often do you get together with friends or relatives? More than three times a week (P)      How often do you attend Zoroastrian or Sabianism services? More than 4 times per year (P)      Do you belong to any clubs or organizations such as Zoroastrian groups, unions, fraternal or athletic groups, or school groups? No (P)      How often do you attend meetings of the clubs or organizations you belong to? Never (P)         Alcohol Use    Q1: How often do you have a drink containing alcohol? Never (P)      Q2: How many drinks containing alcohol do you have on a typical day when you are drinking? Patient does not drink (P)      Q3: How often do you have six or more drinks on one occasion? Never (P)         OTHER    Name(s) of People in Home adult daughter Olga (P)                       Cynthia Hardwick CD, MSW, LMSW, RSW   Case Management  [facility]  Email: [email or web address]

## 2023-10-24 NOTE — SUBJECTIVE & OBJECTIVE
Past Medical History:   Diagnosis Date    Allergy     Cataract     Diabetes mellitus type II     Gastritis     with gastric ulcer    GERD (gastroesophageal reflux disease)     H. pylori infection     History of hepatitis C, SVR as of 8-2016 8/25/2015    Harvoni 12 wks completed.  SVR(12) as of 8/4/16 SVR(24) as of 10-     Hypertension     Osteopenia     Type 2 diabetes mellitus with diabetic polyneuropathy      Past Surgical History:   Procedure Laterality Date    COLONOSCOPY      COLONOSCOPY N/A 1/28/2016    Procedure: COLONOSCOPY;  Surgeon: Emeka Ahn MD;  [facility]  Service: Endoscopy;  Laterality: N/A;    COLONOSCOPY N/A 8/8/2019    Procedure: COLONOSCOPY;  Surgeon: Susan Dia MD;  [facility]  Service: Endoscopy;  Laterality: N/A;  appt confirmed-rb    HYSTERECTOMY      LIVER BIOPSY      OOPHORECTOMY      PERIPHERAL ANGIOGRAPHY Left 5/5/2021    Procedure: Peripheral angiography;  Surgeon: Randolph Toribio MD;  Location: [facility] CATH LAB;  Service: Cardiology;  Laterality: Left;    PERIPHERAL ANGIOGRAPHY N/A 6/21/2021    Procedure: Peripheral angiography;  Surgeon: Randolph Toribio MD;  Location: [facility] CATH LAB;  Service: Cardiology;  Laterality: N/A;    UPPER GASTROINTESTINAL ENDOSCOPY       COMPLETED  Family History   Problem Relation Age of Onset    Heart disease Mother     Diabetes Mother     Hypertension Mother     Hyperlipidemia Mother     Heart disease Father     Cancer Sister         breast cancer    Diabetes Sister     Cancer Sister 70        colon CA    Diabetes Sister     Breast cancer Sister     Diabetes Sister     Glaucoma Neg Hx     Melanoma Neg Hx     Cirrhosis Neg Hx     Psoriasis Neg Hx     Lupus Neg Hx      Social History     Tobacco Use    Smoking status: Former     Current packs/day: 0.00     Average packs/day: 0.3 packs/day for 48.0 years (12.0 ttl pk-yrs)     Types: Cigarettes     Start date: 3/28/1973     Quit date:  3/28/2021     Years since quittin.5    Smokeless tobacco: Never   Substance Use Topics    Alcohol use: No     Comment: special occasions    Drug use: No     Review of patient's allergies indicates:   Allergen Reactions    Amlodipine Swelling    Erythromycin Anaphylaxis    Erythropoietin analogues Anaphylaxis    Pegasys [peginterferon ruben-2a] Anaphylaxis    Lisinopril Hives       Medications: I have reviewed the current medication administration record.    (Not in a hospital admission)      Review of Systems   Neurological:  Positive for syncope and weakness.   Psychiatric/Behavioral:  Positive for confusion.      Objective:     Vital Signs (Most Recent):  Temp: 98.1 °F (36.7 °C) (10/24/23 1316)  Pulse: 74 (10/24/23 1400)  Resp: (!) 21 (10/24/23 1400)  BP: 125/64 (10/24/23 1400)  SpO2: 99 % (10/24/23 1400)    Vital Signs Range (Last 24H):  Temp:  [98.1 °F (36.7 °C)]   Pulse:  [74-84]   Resp:  [20-21]   BP: ()/(51-64)   SpO2:  [98 %-99 %]        Physical Exam  Constitutional:       General: She is not in acute distress.  Eyes:      Extraocular Movements: Extraocular movements intact.      Pupils: Pupils are equal, round, and reactive to light.   Pulmonary:      Effort: Pulmonary effort is normal. No respiratory distress.   Abdominal:      General: Abdomen is flat. There is no distension.   Musculoskeletal:         General: Normal range of motion.      Right lower leg: No edema.      Left lower leg: No edema.   Skin:     General: Skin is warm and dry.   Neurological:      Mental Status: She is alert.   Psychiatric:         Mood and Affect: Mood normal.         Behavior: Behavior normal.              Neurological Exam:   LOC: alert  Attention Span: Good   Language: No aphasia  Articulation: No dysarthria  Orientation: Person, Place, Time   Visual Fields: Full  EOM (CN III, IV, VI): Full/intact  Pupils (CN II, III): PERRL  Facial Sensation (CN V): Normal  Facial Movement (CN VII): Symmetric facial expression   "  Motor: Arm left  Normal 5/5  Leg left  Normal 5/5  Arm right  Normal 5/5  Leg right Normal 5/5  Cerebellum: No evidence of appendicular or axial ataxia  Sensation: intact to light touch  Tone: Normal tone throughout      Laboratory:  CMP: No results for input(s): "GLUCOSE", "CALCIUM", "ALBUMIN", "PROT", "NA", "K", "CO2", "CL", "BUN", "CREATININE", "ALKPHOS", "ALT", "AST", "BILITOT" in the last 24 hours.  CBC: No results for input(s): "WBC", "RBC", "HGB", "HCT", "PLT", "MCV", "MCH", "MCHC" in the last 168 hours.  Lipid Panel: No results for input(s): "CHOL", "LDLCALC", "HDL", "TRIG" in the last 168 hours.  Coagulation:   Recent Labs   Lab 10/24/23  1328   INR 1.1     Hgb A1C: No results for input(s): "HGBA1C" in the last 168 hours.  TSH: No results for input(s): "TSH" in the last 168 hours.    Diagnostic Results:      Brain imaging:  CTH: No acute intracranial process with chronic ischemic changes    Vessel Imaging:  CTA: No significant stenosis at the carotid bifurcations by NASCET criteria.  Question right carotid web with adjacent thrombus, similar to the prior CTA versus soft atherosclerotic plaque.    Cardiac Evaluation:   Echo 2/27/23:   The left ventricle is normal in size with concentric remodeling and normal systolic function. The estimated ejection fraction is 55%.  Normal right ventricular size with normal right ventricular systolic function.  Indeterminate left ventricular diastolic function.  Severe left atrial enlargement.  The estimated PA systolic pressure is 16 mmHg.  Normal central venous pressure (3 mmHg).  "

## 2023-10-24 NOTE — ED PROVIDER NOTES
Encounter Date: 10/24/2023       History     Chief Complaint   Patient presents with    Extremity Weakness     Pt found passed out by family this morning. Pt lethargic and weak. Pt history previous CVA with right-sided weakness.     Pt is a 77 year old female with PMHx significant for DM, HTN, and CVA who presents for evaluation of altered mental status, which occurred today. Pt was noted to be in her normal state of health during a conversation with her daughter at 12 PM. Upon arriving to the pt's home, the daughter noted she was difficult to arouse and confused which prompted presentation to the ED. No fever, chills, chest pain, shortness of breath, nausea, or vomiting.     The history is provided by the patient, medical records and a relative.     Review of patient's allergies indicates:   Allergen Reactions    Amlodipine Swelling    Erythromycin Anaphylaxis    Erythropoietin analogues Anaphylaxis    Pegasys [peginterferon ruben-2a] Anaphylaxis    Lisinopril Hives     Past Medical History:   Diagnosis Date    Allergy     Cataract     Diabetes mellitus type II     Gastritis     with gastric ulcer    GERD (gastroesophageal reflux disease)     H. pylori infection     History of hepatitis C, SVR as of 8-2016 8/25/2015    Harvoni 12 wks completed.  SVR(12) as of 8/4/16 SVR(24) as of 10-     Hypertension     Osteopenia     Type 2 diabetes mellitus with diabetic polyneuropathy      Past Surgical History:   Procedure Laterality Date    COLONOSCOPY      COLONOSCOPY N/A 1/28/2016    Procedure: COLONOSCOPY;  Surgeon: Emeka Ahn MD;  Location: [address]);  Service: Endoscopy;  Laterality: N/A;    COLONOSCOPY N/A 8/8/2019    Procedure: COLONOSCOPY;  Surgeon: Susan Dia MD;  Location: [facility] ([address] [place] [address]);  Service: Endoscopy;  Laterality: N/A;  appt confirmed-rb    HYSTERECTOMY      LIVER BIOPSY      OOPHORECTOMY      PERIPHERAL ANGIOGRAPHY Left 5/5/2021    Procedure: Peripheral angiography;   Surgeon: Randolph Toribio MD;  Location: [facility] CATH LAB;  Service: Cardiology;  Laterality: Left;    PERIPHERAL ANGIOGRAPHY N/A 2021    Procedure: Peripheral angiography;  Surgeon: Randolph Toribio MD;  Location: [facility] CATH LAB;  Service: Cardiology;  Laterality: N/A;    UPPER GASTROINTESTINAL ENDOSCOPY       Family History   Problem Relation Age of Onset    Heart disease Mother     Diabetes Mother     Hypertension Mother     Hyperlipidemia Mother     Heart disease Father     Cancer Sister         breast cancer    Diabetes Sister     Cancer Sister 70        colon CA    Diabetes Sister     Breast cancer Sister     Diabetes Sister     Glaucoma Neg Hx     Melanoma Neg Hx     Cirrhosis Neg Hx     Psoriasis Neg Hx     Lupus Neg Hx      Social History     Tobacco Use    Smoking status: Former     Current packs/day: 0.00     Average packs/day: 0.3 packs/day for 48.0 years (12.0 ttl pk-yrs)     Types: Cigarettes     Start date: 3/28/1973     Quit date: 3/28/2021     Years since quittin.5    Smokeless tobacco: Never   Substance Use Topics    Alcohol use: No     Comment: special occasions    Drug use: No     Review of Systems    Physical Exam     Initial Vitals [10/24/23 1316]   BP Pulse Resp Temp SpO2   (!) 70/51 84 20 98.1 °F (36.7 °C) 98 %      MAP       --         Physical Exam    Nursing note and vitals reviewed.  Constitutional: She appears well-developed and well-nourished. No distress.   HENT:   Head: Normocephalic and atraumatic.   Mouth/Throat: Oropharynx is clear and moist.   Eyes: EOM are normal. Pupils are equal, round, and reactive to light.   Neck: Neck supple. No tracheal deviation present.   Cardiovascular:  Normal rate, regular rhythm and intact distal pulses.           No murmur heard.  Pulmonary/Chest: Breath sounds normal. No stridor. No respiratory distress. She has no wheezes. She has no rales.   Abdominal: Abdomen is soft. She exhibits no distension. There is no abdominal tenderness.    Musculoskeletal:         General: No edema. Normal range of motion.      Cervical back: Neck supple.     Neurological: She is alert and oriented to person, place, and time. She has normal strength. No sensory deficit.   Skin: Skin is warm and dry. Capillary refill takes less than 2 seconds.         ED Course   Procedures  Labs Reviewed   CBC W/ AUTO DIFFERENTIAL - Abnormal; Notable for the following components:       Result Value    MCHC 31.4 (*)     RDW 14.7 (*)     Lymph % 49.7 (*)     All other components within normal limits    Narrative:     ADD ON TROP PER HARRY LAY RN PER NOBLE SANDERS MD ORDER#   [id number] @  10/24/2023  15:43    COMPREHENSIVE METABOLIC PANEL - Abnormal; Notable for the following components:    CO2 22 (*)     Glucose 209 (*)     Albumin 2.9 (*)     eGFR 52.4 (*)     All other components within normal limits    Narrative:     Add-on BNP to [id number] per Noble Sanders MD on  10/24/2023  17:07     ADD ON TROP PER HARRY LAY RN PER NOBLE SANDERS MD ORDER#   [id number] @  10/24/2023  15:43    LIPID PANEL - Abnormal; Notable for the following components:    Cholesterol 220 (*)     HDL 39 (*)     HDL/Cholesterol Ratio 17.7 (*)     Total Cholesterol/HDL Ratio 5.6 (*)     All other components within normal limits    Narrative:     Add-on BNP to [id number] per Noble Sanders MD on  10/24/2023  17:07     ADD ON TROP PER HARRY LAY RN PER NOBLE SANDERS MD ORDER#   [id number] @  10/24/2023  15:43    URINALYSIS, REFLEX TO URINE CULTURE - Abnormal; Notable for the following components:    Specific Gravity, UA >1.030 (*)     Protein, UA 1+ (*)     Glucose, UA Trace (*)     All other components within normal limits    Narrative:     Specimen Source->Urine   POCT GLUCOSE - Abnormal; Notable for the following components:    POCT Glucose 210 (*)     All other components within normal limits   POCT GLUCOSE - Abnormal; Notable for the following components:    POCT Glucose 171 (*)      All other components within normal limits   PROTIME-INR   TSH    Narrative:     Add-on BNP to [id number] per Noble Seo MD on  10/24/2023  17:07     ADD ON TROP PER HARRY LAY RN PER NOBLE SEO MD ORDER#   [id number] @  10/24/2023  15:43    BETA - HYDROXYBUTYRATE, SERUM   TROPONIN I   B-TYPE NATRIURETIC PEPTIDE   URINALYSIS MICROSCOPIC    Narrative:     Specimen Source->Urine   TROPONIN I    Narrative:     Add-on BNP to [id number] per Noble Seo MD on  10/24/2023  17:07     ADD ON TROP PER HARRY LAY RN PER NOBLE SEO MD ORDER#   [id number] @  10/24/2023  15:43    B-TYPE NATRIURETIC PEPTIDE   POCT GLUCOSE, HAND-HELD DEVICE   ISTAT CREATININE   ISTAT PROCEDURE          Imaging Results              X-Ray Chest AP Portable (Final result)  Result time 10/24/23 16:18:22      Final result by Chris Thakkar MD (10/24/23 16:18:22)                   Impression:      As above.      Electronically signed by: Chris Thakkar MD  Date:    10/24/2023  Time:    16:18               Narrative:    EXAMINATION:  XR CHEST AP PORTABLE    CLINICAL HISTORY:  Syncope and collapse    TECHNIQUE:  Single frontal view of the chest was performed.    COMPARISON:  02/26/2023    FINDINGS:  No consolidation, pleural effusion or pneumothorax.  Cardiac silhouette is prominent, stable.                                       CTA STROKE MULTI-PHASE (Final result)  Result time 10/24/23 14:06:16      Final result by Ming Powers MD (10/24/23 14:06:16)                   Impression:      No acute intracranial process with chronic ischemic changes.    These findings were communicated to Dr. Seo at 2 14:00 on 10/24/2023    CTA:    No significant stenosis at the carotid bifurcations by NASCET criteria.  Question right carotid web with adjacent thrombus, similar to the prior CTA versus soft atherosclerotic plaque.    The vertebral arteries are  patent without advanced stenosis.    No major branch stenosis/ acute  occlusion at the [id number].    Stable 5 mm left lung apex may also represent a focus of fibrosis.  One year follow-up in a high risk patient to be considered.      Electronically signed by: Ming Powers  Date:    10/24/2023  Time:    14:06               Narrative:    EXAMINATION:  CTA STROKE MULTI-PHASE    CLINICAL HISTORY:  Neuro deficit, acute, stroke suspected;    TECHNIQUE:  Non contrast low dose axial images were obtained thought the head. CT angiogram was performed from the level of the domitila to the top of the head following the IV administration of 100mL of Omnipaque 350.   Sagittal and coronal reconstructions and maximum intensity projection reconstructions were performed. Arterial stenosis percentages are based on NASCET measurement criteria.  Two additional phases of immediate post-contrast CTA images were performed through the head alone.    3D reformats were created on an independent workstation to evaluate the [id number]    COMPARISON:  02/27/2023    FINDINGS:  There is mild volume loss again identified.  No hydrocephalus mass effect intracranial hemorrhage or acute territorial infarct.  Chronic right basal ganglia and left thalamic lacunar infarcts again noted.  Decreased attenuation within the periventricular white matter is nonspecific but may reflect moderate chronic small vessel ischemic change, similar to the prior study.    CTA:    There are calcifications but no advanced stenosis at the origin of the vessels from the aortic arch with no advanced stenosis at the origin of the vertebral arteries from the subclavian arteries with the right noted to be dominant.  Mild atherosclerotic narrowing of the left vertebral artery in the proximal cervical region is noted.    There is soft tissue at the right carotid bulb that may potentially represent a carotid web, similar to the prior study versus is soft atherosclerotic plaque.  No significant stenosis bilaterally by is identified by  NASCET criteria with a small calcified plaque again noted at the left carotid bifurcation.    Intracranially there is no evidence of acute major branch does stent stenosis/occlusion.  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  No aneurysm.    The venous sinuses are patent.    No soft tissue mass is identified in the neck other than for enlargement of the left greater than right lobes of the thyroid gland nonspecific but may reflect underlying goiter formation with some mild displacement of the trachea to the right.  Stable 5 mm nodule at the left lung apex, nonspecific but may represent an area of fibrotic change                                       Medications   sodium chloride 0.9% bolus 1,000 mL 1,000 mL (0 mLs Intravenous Stopped 10/24/23 0295)   iohexoL (OMNIPAQUE 350) injection 100 mL (100 mLs Intravenous Given 10/24/23 1337)     Medical Decision Making  Pt noted to have right sided weakness and left sided gaze upon triage. BP initially noted in the 70s systolic. Repeat BP 140s. Stroke code activated. CTA without evidence of large vessel occlusion. Pt with improving mentation and strength following normalization of blood pressure. No focal deficits at this time. Stroke neuro evaluated the pt in the ED, low suspicion for stroke as etiology of pt's presentation. Syncope work up initiated. Concern for seizure vs syncope. Pt signed out to incoming team pending lab work and imaging with likely admit    Amount and/or Complexity of Data Reviewed  Labs: ordered.  Radiology: ordered.    Risk  Prescription drug management.               ED Course as of 10/24/23 1753   Tue Oct 24, 2023   1341 I saw the patient in triage and on arrival.  Patient presented with her daughter for episode of being found down.  She has a history right-sided hemiparesis secondary to stroke.  Daughter thought she was more weak than at baseline.  She had some aphasia.  Intra she was found to be hypotensive.  We immediately brought her  to the room where systolic blood pressure corrected itself the 140 while lying down.  Proceeded with stroke protocol however or other etiologies are considered including dysrhythmia hypovolemia causing recrudescence of her prior stroke symptoms anemia sepsis.  My independent interpretation of the CT of the head at 1:43 p.m. shows no findings of acute hemorrhage. [JS]   1410 Patient is seen by Neurology.  The patient is not a tPA candidate nor is she an embolectomy candidate.  CTA does show some webbing near the bifurcation of the carotid.  Repeat neurologic exam shows that she is neurologically intact here at this time.NIH stroke scale as of 2:14 p.m. is 0.   [JS]   1415 Overall impression this time as patient had a syncopal episode and less likely a CVA.  Will get orthostatics given that she was hypotensive at triage.  Will continue with further workup for other etiologies of syncope [JS]      ED Course User Index  [JS] Calvin Seo MD                    Clinical Impression:   Final diagnoses:  [I63.9] Stroke  [R55] Syncope        ED Disposition Condition    Observation                 Luis Enrique Romero Jr., MD  Resident  10/24/23 3646

## 2023-10-24 NOTE — PROVIDER PROGRESS NOTES - EMERGENCY DEPT.
Encounter Date: 10/24/2023    ED Physician Progress Notes          Physician Note:   Signout Note  I received signout from the previous providers.      Chief complaint: AMS     Per sign out and chart review:  Buck Ibarra is a 77 y.o. female, PMH significant for DM, HTN, and CVA who presents for evaluation of altered mental status. Pt noted to have right sided weakness and left sided gaze upon triage. BP initially noted in the 70s systolic. Repeat BP 140s. Stroke code activated. CTA without evidence of large vessel occlusion. Pt with improving mentation and strength following normalization of blood pressure. No focal deficits at this time.      Pt signed out to me pending: Labs, CXR     Update/ Disposition: CXR without acute pathology. Discussed with HM who agree to admit for seizure vs syncope.      Patient, caregiver and/or family understands the plan and verbalized agreement. All questions answered.      Diagnostic Impression:    Syncope    Francisca France MD  [facility], PGY1

## 2023-10-24 NOTE — HPI
77 year old female with PMH of T2DM, HLD, HTN, CKD 3, NICM, PAD, CAD, and prior CVA (R cerebellar) who presents from home for decreased responsiveness. Per family they spoke to the patient on the phone around 12 noon and she was normal. The family member then went to the patients house to pick her up for therapy. Family noticed that the patient was completely unresponsive and they weren't able to arouse her. They called 911 but were waiting too long for them, so they put her in the car and brought her to the hospital. At that time she was not able to stand due to weakness.At baseline patient is verbal, non-aphasic, walks on her own, and is able to complete all ADLs independently.  While in triage patient was noted to have BP 70/51. She also had RSW was unable to speak, so a stroke code was called.

## 2023-10-24 NOTE — ED TRIAGE NOTES
Patient comes into the emergency department by POV with complaints of weakness. Per family member, Patient states that she found her on the ground and unresponsive. Family member tried to stand the pt up and states the pt was too weak to stand. Had their neighbor place her in the car. Patient denies dizziness, vision changes, CP, SOB, tingling and numbness.

## 2023-10-24 NOTE — ASSESSMENT & PLAN NOTE
77 year old female PMH of T2DM, HLD, HTN, CKD 3, NICM, PAD, CAD, and prior CVA (R cerebellar) who presents from home for decreased responsiveness and weakness. While in triage patient was noted to have RSW and was not speaking. BP 70/51. Stroke code called. CTA done with no occlusion, possible R carotid web. CTH no acute abnormality. Patient initially lethargic on exam however improved to baseline once CT/CTA were completed. NIHSS 0. Likely related to syncopal versus seizure event, also consider TIA.     Recommendations:  -given patient has returned to baseline no need for further imaging or intervention from a vascular neurology standpoint  -further workup/dispo per ED

## 2023-10-24 NOTE — PROGRESS NOTES
Pt did not show up for her 1pm Physical Therapy appointment.  Upon chart review, pt is in the ED for Stroke related symptoms.    No charges posted and evaluating PT will be informed of visit.  Ama Zuniga, PTA  10/24/2023

## 2023-10-25 NOTE — HOSPITAL COURSE
Pt admitted to [facility] and remained stable overnight and into 10/25. BP control optimized. 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 Echo without acute findings. Pt deemed appropriate for discharge on updated antihypertensive regimen and will get a Holter monitor; SW assisting with HH. Plan discussed with pt, who was agreeable and amenable; medications were discussed and reviewed, outpatient follow-up scheduled, ER precautions were given, all questions were answered to the pt's satisfaction, and Ms. Ibarra was subsequently discharged.

## 2023-10-25 NOTE — PLAN OF CARE
[facility]  Discharge Final Note    Primary Care Provider: Ericka Cortez MD    Expected Discharge Date: 10/25/2023    Patient discharged to home via personal transportation.     Patient's bedside nurse and patient notified of the above.      Final Discharge Note (most recent)       Final Note - 10/25/23 1548          Final Note    Assessment Type Final Discharge Note (P)      Anticipated Discharge Disposition Home-Health Care Svc (P)         Post-Acute Status    Post-Acute Authorization Home Health (P)      Home Health Status Set-up Complete/Auth obtained (P)                      Important Message from Medicare             Contact Info       Ericka Cortez MD   Specialty: Internal Medicine   Relationship: PCP - General    [address]  [place] [address]   Phone: [phone]       Next Steps: Schedule an appointment as soon as possible for a visit    Instructions: Message sent for nurse to call and schedule hospital follow up appointment; however, if you do not hear from someone within 48 hours contact the number listed.            Future Appointments   Date Time Provider Department Center   10/26/2023  2:15 PM Wayne Middleton, PT [facility]   10/26/2023  3:00 PM HOLTER, EKG [facility]   10/31/2023  1:00 PM Wayne Middleton, PT [facility]   11/2/2023  2:15 PM Wayne Middleton, PT [facility]   12/29/2023 11:00 AM LAB, APPOINTMENT [facility] IM [facility]   1/5/2024  1:00 PM Graeme Rivera, APRN, FNP [facility]       SW scheduled post-discharge follow-up appointment and information added to AVS.     Pt has been accepted with [facility] and PHN has approved authorization.      Allison Casillas LMSW  [facility]  Ext. [phone]

## 2023-10-25 NOTE — TELEPHONE ENCOUNTER
Eugenia Mondragon [facility] Staff  Caller: [facility]/Coretta/ call pt @[phone] (Today,  1:24 PM)  Patient needs a Hosp follow up appt with their PCP only.       When is the next available appointment:  ?     Symptoms:  Syncope     Discharge date:10/25     Needs to be seen by:11/1     Would you prefer an answer via [employer]?: No, Would like a return call       Comments:

## 2023-10-25 NOTE — ASSESSMENT & PLAN NOTE
- Unclear etiology. Continues to be Orthostatic despite hypertension while suppine. Potentially dehydration vs dysautonomia vs adrenal insuff vs other  - IVFs  - Orthostatics qshift  - siezure precautions  - holding home amlodipine, candesartan and scheduled hydralazine  - continue home coreg  - prn hydralazine  - am cortisol  - fall precautions  - ECHO pending  - monitor tele  - further management pending clinical course and future study review

## 2023-10-25 NOTE — PLAN OF CARE
Problem: Adult Inpatient Plan of Care  Goal: Plan of Care Review  Outcome: Met  Goal: Patient-Specific Goal (Individualized)  Outcome: Met  Goal: Absence of Hospital-Acquired Illness or Injury  Outcome: Met  Goal: Optimal Comfort and Wellbeing  Outcome: Met  Goal: Readiness for Transition of Care  Outcome: Met     Problem: Diabetes Comorbidity  Goal: Blood Glucose Level Within Targeted Range  Outcome: Met     Problem: Fall Injury Risk  Goal: Absence of Fall and Fall-Related Injury  Outcome: Met     Problem: Infection  Goal: Absence of Infection Signs and Symptoms  Outcome: Met     Problem: Hypertension Comorbidity  Goal: Blood Pressure in Desired Range  Outcome: Met     Problem: Adjustment to Stroke  Goal: Optimal Adjustment to Stroke  Outcome: Met     Problem: BADL (Basic Activities of Daily Living) Impairment (Stroke)  Goal: Optimal Safe BADL Performance  Outcome: Met     Problem: Bowel Management (Stroke)  Goal: Effective Bowel Elimination/Continence  Outcome: Met     Problem: Cognitive Impairment (Stroke)  Goal: Optimal Cognitive Function  Outcome: Met     Problem: Communication Impairment (Stroke)  Goal: Effective Communication Skills  Outcome: Met     Problem: IADL (Instrumental Activities of Daily Living) Impairment (Stroke)  Goal: Optimal Safe IADL Performance  Outcome: Met     Problem: Functional Mobility Impairment (Stroke)  Goal: Optimal Mobility [place] and Safety  Outcome: Met     Problem: Movement and Motor Control Impairment (Stroke Rehabilitation)  Goal: Optimal Movement and Motor Control  Outcome: Met     Problem: Sensory Perceptual Impairment (Stroke)  Goal: Optimal Sensory Perceptual Status  Outcome: Met     Problem: Sexuality and Intimacy (Stroke Rehabilitation)  Goal: Maintains Intimacy/Sexual Expression  Outcome: Met     Problem: Spasticity Management (Stroke)  Goal: Effective Spasticity Management  Outcome: Met     Problem: Eating and Swallowing Impairment (Stroke)  Goal: Optimal Oral  Motor and Swallow Ability  Outcome: Met     Problem: Bladder Management (Stroke)  Goal: Effective Urinary Elimination/Continence  Outcome: Met      English

## 2023-10-25 NOTE — HPI
Pt is a 77 year old female with PMHx significant for DM, HTN, and CVA who presents for evaluation of altered mental status, which occurred today. Pt was noted to be in her normal state of health during a conversation with her daughter at 12 PM. Upon arriving to the pt's home, the daughter noted she was difficult to arouse and confused which prompted presentation to the ED. No fever, chills, chest pain, shortness of breath, nausea, or vomiting.     In the ED patient afebrile and saturating well on room air. Vascular Neuro consulted for initial concern of stroke. CTA stroke without acute findings. Patient initially hypotensive with SBP in the 70's. Quickly improved with IVFs and patient mentation returned to baseline though does continue to report confusion and anxiety related to incident. CMP, CBC, and UA largely unremarkable. Patient remains signficantly Orthostatic. Admitted to the care of medicine for further evaluation and management.

## 2023-10-25 NOTE — NURSING
Pt /88. MD notified of new BP and informed that PRN hyrdralazine and scheduled carvedilol already given.   MD informed RN that new PRN bp meds put in and stated to give labetalol PRN and scheduled BP meds.

## 2023-10-25 NOTE — H&P
[facility] Observation [address] [place] [address] Medicine  History & Physical    Patient Name: Buck Ibarra  MRN: [id number]  Patient Class: OP- Observation  Admission Date: 10/24/2023  Attending Physician: Sachin Arroyo MD   Primary Care Provider: Ericka Cortez MD         Patient information was obtained from patient, past medical records and ER records.     Subjective:     Principal Problem:Syncope    Chief Complaint:   Chief Complaint   Patient presents with    Extremity Weakness     Pt found passed out by family this morning. Pt lethargic and weak. Pt history previous CVA with right-sided weakness.        HPI: Pt is a 77 year old female with PMHx significant for DM, HTN, and CVA who presents for evaluation of altered mental status, which occurred today. Pt was noted to be in her normal state of health during a conversation with her daughter at 12 PM. Upon arriving to the pt's home, the daughter noted she was difficult to arouse and confused which prompted presentation to the ED. No fever, chills, chest pain, shortness of breath, nausea, or vomiting.     In the ED patient afebrile and saturating well on room air. Vascular Neuro consulted for initial concern of stroke. CTA stroke without acute findings. Patient initially hypotensive with SBP in the 70's. Quickly improved with IVFs and patient mentation returned to baseline though does continue to report confusion and anxiety related to incident. CMP, CBC, and UA largely unremarkable. Patient remains signficantly Orthostatic. Admitted to the care of medicine for further evaluation and management.       Past Medical History:   Diagnosis Date    Allergy     Cataract     Diabetes mellitus type II     Gastritis     with gastric ulcer    GERD (gastroesophageal reflux disease)     H. pylori infection     History of hepatitis C, SVR as of 8-2016 8/25/2015    Harvoni 12 wks completed.  SVR(12) as of 8/4/16 SVR(24) as of 10-     Hypertension     Osteopenia      Type 2 diabetes mellitus with diabetic polyneuropathy        Past Surgical History:   Procedure Laterality Date    COLONOSCOPY      COLONOSCOPY N/A 1/28/2016    Procedure: COLONOSCOPY;  Surgeon: Emeka Ahn MD;  Location: [facility] ENDO (4TH FLR);  Service: Endoscopy;  Laterality: N/A;    COLONOSCOPY N/A 8/8/2019    Procedure: COLONOSCOPY;  Surgeon: Susan Dia MD;  Location: [facility] ENDO (4TH FLR);  Service: Endoscopy;  Laterality: N/A;  appt confirmed-rb    HYSTERECTOMY      LIVER BIOPSY      OOPHORECTOMY      PERIPHERAL ANGIOGRAPHY Left 5/5/2021    Procedure: Peripheral angiography;  Surgeon: Randolph Toribio MD;  Location: [facility] CATH LAB;  Service: Cardiology;  Laterality: Left;    PERIPHERAL ANGIOGRAPHY N/A 6/21/2021    Procedure: Peripheral angiography;  Surgeon: Randolph Toribio MD;  Location: [facility] CATH LAB;  Service: Cardiology;  Laterality: N/A;    UPPER GASTROINTESTINAL ENDOSCOPY         Review of patient's allergies indicates:   Allergen Reactions    Amlodipine Swelling    Erythromycin Anaphylaxis    Erythropoietin analogues Anaphylaxis    Pegasys [peginterferon ruben-2a] Anaphylaxis    Lisinopril Hives       No current facility-administered medications on file prior to encounter.     Current Outpatient Medications on File Prior to Encounter   Medication Sig    amLODIPine (NORVASC) 10 MG tablet Take one tablet by mouth every morning    aspirin (ECOTRIN) 81 MG EC tablet Take 1 tablet (81 mg total) by mouth once daily.    blood-glucose meter,continuous (DEXCOM G6 ) Misc Use as directed.    blood-glucose sensor (DEXCOM G6 SENSOR) Rashida Change every 10 days. 90 day e 11.65    blood-glucose transmitter (DEXCOM G6 TRANSMITTER) Rashida Change every 3 months.    candesartan (ATACAND) 32 MG tablet Take 1 tablet (32 mg total) by mouth once daily.    carvediloL (COREG) 6.25 MG tablet Take 1 tablet (6.25 mg total) by mouth in the morning and 1 tablet (6.25 mg total) before bedtime..  "   clopidogreL (PLAVIX) 75 mg tablet Take 1 tablet (75 mg total) by mouth once daily.    doxepin (SINEQUAN) 10 MG capsule Take 1 capsule (10 mg total) by mouth nightly for 90 days.    gabapentin (NEURONTIN) 300 MG capsule Take 1 capsule (300 mg total) by mouth in the morning and 1 capsule (300 mg total) before bedtime.    hydrALAZINE (APRESOLINE) 50 MG tablet Take 1 tablet (50 mg total) by mouth every 8 (eight) hours. Check BP before taking. If top number <110, do not take medicine.    insulin degludec (TRESIBA FLEXTOUCH U-200) 200 unit/mL (3 mL) insulin pen Inject 20 units into the skin once nightly.    insulin lispro (HUMALOG KWIKPEN INSULIN) 100 unit/mL pen Inject 6 units before meals plus scale 180-230+2, 231-280+4, 281-330+6, 331-380+8, >380+10. Max daily 48 units.    meclizine (ANTIVERT) 25 mg tablet Take 1 tablet (25 mg total) by mouth 3 (three) times daily as needed for Dizziness.    pantoprazole (PROTONIX) 40 MG tablet Take 1 tablet (40 mg total) by mouth in the morning for 90 days.    pen needle, diabetic (BD ULTRA-FINE MILAN PEN NEEDLE) 32 gauge x 5/32" Ndle Use as directed 3 times daily with insulin pens (Patient taking differently: Use as directed 3 times daily with insulin pens)    rosuvastatin (CRESTOR) 40 MG Tab Take 1 tablet (40 mg total) by mouth every evening.    semaglutide (OZEMPIC) 2 mg/dose (8 mg/3 mL) PnIj Inject 2 mg into the skin every 7 days.    [DISCONTINUED] insulin aspart U-100 (NOVOLOG FLEXPEN U-100 INSULIN) 100 unit/mL (3 mL) InPn pen Inject 10 units w/ largest meal plus scale 180-230+2, 231-280+4, 281-330+6, 331-380+8, >380+10. Max daily 50 units.     Family History       Problem Relation (Age of Onset)    Breast cancer Sister    Cancer Sister, Sister (70)    Diabetes Mother, Sister, Sister, Sister    Heart disease Mother, Father    Hyperlipidemia Mother    Hypertension Mother          Tobacco Use    Smoking status: Former     Current packs/day: 0.00     Average " packs/day: 0.3 packs/day for 48.0 years (12.0 ttl pk-yrs)     Types: Cigarettes     Start date: 3/28/1973     Quit date: 3/28/2021     Years since quittin.5    Smokeless tobacco: Never   Substance and Sexual Activity    Alcohol use: No     Comment: special occasions    Drug use: No    Sexual activity: Yes     Partners: Male     Review of Systems   Constitutional:  Negative for chills, fatigue and fever.   HENT:  Negative for sore throat and trouble swallowing.    Eyes:  Negative for photophobia and visual disturbance.   Respiratory:  Negative for cough, shortness of breath and wheezing.    Cardiovascular:  Negative for chest pain, palpitations and leg swelling.   Gastrointestinal:  Negative for abdominal distention, abdominal pain, diarrhea, nausea and vomiting.   Genitourinary:  Negative for dysuria and hematuria.   Musculoskeletal:  Negative for neck pain and neck stiffness.   Skin:  Negative for rash and wound.   Neurological:  Positive for syncope. Negative for seizures, weakness, numbness and headaches.   Psychiatric/Behavioral:  Positive for confusion. Negative for decreased concentration. The patient is nervous/anxious.      Objective:     Vital Signs (Most Recent):  Temp: 98 °F (36.7 °C) (10/24/23 2144)  Pulse: 72 (10/24/23 2303)  Resp: 18 (10/24/23 2144)  BP: (!) 203/91 (10/24/23 2144)  SpO2: 96 % (10/24/23 2144) Vital Signs (24h Range):  Temp:  [98 °F (36.7 °C)-98.5 °F (36.9 °C)] 98 °F (36.7 °C)  Pulse:  [] 72  Resp:  [15-22] 18  SpO2:  [96 %-100 %] 96 %  BP: ()/() 203/91     Weight: 88 kg (194 lb 0.1 oz)  Body mass index is 30.39 kg/m².     Physical Exam  Constitutional:       General: She is not in acute distress.     Appearance: She is not toxic-appearing or diaphoretic.   HENT:      Head: Normocephalic and atraumatic.      Nose: Nose normal.   Eyes:      General: No scleral icterus.     Extraocular Movements: Extraocular movements intact.      Pupils: Pupils are equal, round,  and reactive to light.   Cardiovascular:      Rate and Rhythm: Normal rate and regular rhythm.   Pulmonary:      Effort: Pulmonary effort is normal. No respiratory distress.      Breath sounds: No wheezing or rales.   Abdominal:      General: Abdomen is flat. There is no distension.      Palpations: Abdomen is soft.      Tenderness: There is no abdominal tenderness. There is no guarding.   Musculoskeletal:         General: Normal range of motion.      Cervical back: Normal range of motion and neck supple. No rigidity.      Right lower leg: No edema.      Left lower leg: No edema.   Skin:     General: Skin is warm and dry.   Neurological:      General: No focal deficit present.      Mental Status: She is alert and oriented to person, place, and time.      Cranial Nerves: No cranial nerve deficit.   Psychiatric:         Mood and Affect: Mood normal.         Behavior: Behavior normal.              CRANIAL NERVES     CN III, IV, VI   Pupils are equal, round, and reactive to light.       Significant Labs: All pertinent labs within the past 24 hours have been reviewed.  CBC:   Recent Labs   Lab 10/24/23  1423   WBC 6.80   HGB 14.5   HCT 46.2        CMP:   Recent Labs   Lab 10/24/23  1423      K 3.5      CO2 22*   *   BUN 20   CREATININE 1.1   CALCIUM 9.3   PROT 6.4   ALBUMIN 2.9*   BILITOT 0.3   ALKPHOS 90   AST 10   ALT 12   ANIONGAP 11     Cardiac Markers:   Recent Labs   Lab 10/24/23  1423   BNP 25     Troponin:   Recent Labs   Lab 10/24/23  1423   TROPONINI 0.012     Urine Studies:   Recent Labs   Lab 10/24/23  1519   COLORU Yellow   APPEARANCEUA Clear   PHUR 7.0   SPECGRAV >1.030*   PROTEINUA 1+*   GLUCUA Trace*   KETONESU Negative   BILIRUBINUA Negative   OCCULTUA Negative   NITRITE Negative   LEUKOCYTESUR Negative   RBCUA 1   WBCUA 3   BACTERIA Rare   SQUAMEPITHEL 9   HYALINECASTS 0       Significant Imaging: I have reviewed all pertinent imaging results/findings within the past 24  hours.    Assessment/Plan:     * Syncope  - Unclear etiology. Continues to be Orthostatic despite hypertension while suppine. Potentially dehydration vs dysautonomia vs adrenal insuff vs other  - IVFs  - Orthostatics qshift  - siezure precautions  - holding home amlodipine, candesartan and scheduled hydralazine  - continue home coreg  - prn hydralazine  - am cortisol  - fall precautions  - ECHO pending  - monitor tele  - further management pending clinical course and future study review      History of CVA (cerebrovascular accident)  - continue home statin and DAPT      Type 2 diabetes mellitus with diabetic polyneuropathy, with long-term current use of insulin  - Basal insulin 12 units HS  ;  Takes 25 units HS at home  - SSI  - hypoglycemic protocol    Essential hypertension  - holding home amlodipine and candesartan and scheduled hydralazine while working up syncope as above  - continue home coreg  - hydralazine prn        VTE Risk Mitigation (From admission, onward)         Ordered     IP VTE HIGH RISK PATIENT  Once         10/24/23 2215     Place sequential compression device  Until discontinued         10/24/23 2215                   On 10/24/2023, patient should be placed in hospital observation services under my care.            Porfirio Rojas MD  Department of Hospital Medicine  [facility] - [facility] 11H    COMPLETED  Family History   Problem Relation Age of Onset    Heart disease Mother     Diabetes Mother     Hypertension Mother     Hyperlipidemia Mother     Heart disease Father     Cancer Sister         breast cancer    Diabetes Sister     Cancer Sister 70        colon CA    Diabetes Sister     Breast cancer Sister     Diabetes Sister     Glaucoma Neg Hx     Melanoma Neg Hx     Cirrhosis Neg Hx     Psoriasis Neg Hx     Lupus Neg Hx

## 2023-10-25 NOTE — PROGRESS NOTES
Food & Nutrition Education    Diet Education: A1C    Time Spent: 10 minutes    Learners: Patient     Nutrition Education provided with handouts: Meal Planning Using the Plate Method, CHO Counting for People with Diabetes    Comments: Discussed importance of choosing healthy carbohydrates and to control the amount of carbohydrates you eat at each meal for blood sugar management. Reminded patient not to skip meals. Encouraged intake of fresh, unprocessed foods. Reviewed limiting sugary beverages such as penny, juice, and punch. Food labels, CHO counting, and recommended CHO intake reviewed. Educated patient on meal planning and eating out. Patient is aware of A1C >9% and what the lab value means. Caregiver agrees to make diet changes to comply with diabetic diet. All questions/concerns were addressed.    All questions and concerns answered. Dietitian's contact information provided.    Follow-Up: Yes    Please Re-consult as needed    Thanks!    Shasta Brar, MS, RD, LDN

## 2023-10-25 NOTE — PLAN OF CARE
10/25/23 1328   Post-Acute Status   Post-Acute Authorization Home Health   Home Health Status Set-up Complete/Auth obtained     SW received notification that [facility] .    UPDATE    SW faxed  authorization request to N for review and approval.      Allison Casillas LMSW  [facility]  Ext. [phone]

## 2023-10-25 NOTE — ASSESSMENT & PLAN NOTE
- holding home amlodipine and candesartan and scheduled hydralazine while working up syncope as above  - continue home coreg  - hydralazine prn

## 2023-10-25 NOTE — PLAN OF CARE
10/25/23 1320   Post-Acute Status   Post-Acute Authorization Home Health   Home Health Status Referrals Sent     Pt is expected to discharge home with home health. SW sent referrals via [facility] and is waiting for an accepting agency.    FADY will continue to follow up.      Allison Casillas LMSW  [facility]  Ext. [phone]   Mena Thakkar is a 60 year old female presenting with f/u for Hypothyroidism due to Hashimoto's thyroiditis    Medications reviewed and updated.  Denies known Latex allergy or symptoms of Latex sensitivity.  Social History     Tobacco Use   Smoking Status Every Day   • Packs/day: 1.00   • Years: 39.00   • Pack years: 39.00   • Types: Cigarettes   • Start date: 11/1/1981   Smokeless Tobacco Never   Tobacco Comments    12/4/17 Recently started Buproprion tried Chantex     Patient would like communication of their results via:        Cell Phone:   Telephone Information:   Mobile [phone]     Okay to leave a message containing results? Yes

## 2023-10-25 NOTE — PLAN OF CARE
Problem: Adult Inpatient Plan of Care  Goal: Plan of Care Review  Outcome: Ongoing, Progressing  Goal: Patient-Specific Goal (Individualized)  Outcome: Ongoing, Progressing  Goal: Absence of Hospital-Acquired Illness or Injury  Outcome: Ongoing, Progressing  Goal: Optimal Comfort and Wellbeing  Outcome: Ongoing, Progressing  Goal: Readiness for Transition of Care  Outcome: Ongoing, Progressing     Problem: Diabetes Comorbidity  Goal: Blood Glucose Level Within Targeted Range  Outcome: Ongoing, Progressing     Problem: Fall Injury Risk  Goal: Absence of Fall and Fall-Related Injury  Outcome: Ongoing, Progressing     Problem: Infection  Goal: Absence of Infection Signs and Symptoms  Outcome: Ongoing, Progressing     Problem: Hypertension Comorbidity  Goal: Blood Pressure in Desired Range  Outcome: Ongoing, Progressing     Problem: Adjustment to Stroke  Goal: Optimal Adjustment to Stroke  Outcome: Ongoing, Progressing     Problem: BADL (Basic Activities of Daily Living) Impairment (Stroke)  Goal: Optimal Safe BADL Performance  Outcome: Ongoing, Progressing     Problem: Bowel Management (Stroke)  Goal: Effective Bowel Elimination/Continence  Outcome: Ongoing, Progressing     Problem: Cognitive Impairment (Stroke)  Goal: Optimal Cognitive Function  Outcome: Ongoing, Progressing     Problem: Communication Impairment (Stroke)  Goal: Effective Communication Skills  Outcome: Ongoing, Progressing     Problem: IADL (Instrumental Activities of Daily Living) Impairment (Stroke)  Goal: Optimal Safe IADL Performance  Outcome: Ongoing, Progressing     Problem: Functional Mobility Impairment (Stroke)  Goal: Optimal Mobility [place] and Safety  Outcome: Ongoing, Progressing     Problem: Movement and Motor Control Impairment (Stroke Rehabilitation)  Goal: Optimal Movement and Motor Control  Outcome: Ongoing, Progressing     Problem: Sensory Perceptual Impairment (Stroke)  Goal: Optimal Sensory Perceptual Status  Outcome:  Ongoing, Progressing     Problem: Sexuality and Intimacy (Stroke Rehabilitation)  Goal: Maintains Intimacy/Sexual Expression  Outcome: Ongoing, Progressing     Problem: Spasticity Management (Stroke)  Goal: Effective Spasticity Management  Outcome: Ongoing, Progressing     Problem: Eating and Swallowing Impairment (Stroke)  Goal: Optimal Oral Motor and Swallow Ability  Outcome: Ongoing, Progressing     Problem: Bladder Management (Stroke)  Goal: Effective Urinary Elimination/Continence  Outcome: Ongoing, Progressing

## 2023-10-25 NOTE — DISCHARGE SUMMARY
[facility]jas - Observation [address] [place] [address] Medicine  Discharge Summary      Patient Name: Buck Ibarra  MRN: [id number]  ASH: [id number]  Patient Class: OP- Observation  Admission Date: 10/24/2023  Hospital Length of Stay: 0 days  Discharge Date and Time:  10/25/2023 1:06 PM  Attending Physician: Sachin Arroyo MD   Discharging Provider: Sachin Arroyo MD  Primary Care Provider: Ericka Cortez MD  [facility] Medicine Team: [facility] Sachin Arroyo MD  Primary Care Team: [facility]    HPI:   Pt is a 77 year old female with PMHx significant for DM, HTN, and CVA who presents for evaluation of altered mental status, which occurred today. Pt was noted to be in her normal state of health during a conversation with her daughter at 12 PM. Upon arriving to the pt's home, the daughter noted she was difficult to arouse and confused which prompted presentation to the ED. No fever, chills, chest pain, shortness of breath, nausea, or vomiting.     In the ED patient afebrile and saturating well on room air. Vascular Neuro consulted for initial concern of stroke. CTA stroke without acute findings. Patient initially hypotensive with SBP in the 70's. Quickly improved with IVFs and patient mentation returned to baseline though does continue to report confusion and anxiety related to incident. CMP, CBC, and UA largely unremarkable. Patient remains signficantly Orthostatic. Admitted to the care of medicine for further evaluation and management.       * No surgery found *      Hospital Course:   Pt admitted to [facility] and remained stable overnight and into 10/25. BP control optimized. 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 Echo without acute findings. Pt deemed appropriate for discharge on updated antihypertensive regimen and will get a Holter monitor; SW assisting with HH. Plan discussed with pt, who was agreeable and amenable;  medications were discussed and reviewed, outpatient follow-up scheduled, ER precautions were given, all questions were answered to the pt's satisfaction, and Ms. Ibarra was subsequently discharged.         Goals of Care Treatment Preferences:  Code Status: Full Code    Health care agent: Olga Solis care agent number: [phone]    Living Will: Yes              Consults:   Consults (From admission, onward)        Status Ordering Provider     Inpatient consult to Vascular (Stroke) Neurology  Once        Provider:  (Not yet assigned)    NOBLE Quiñonez          No new Assessment & Plan notes have been filed under this hospital service since the last note was generated.  Service: Hospital Medicine    Final Active Diagnoses:    Diagnosis Date Noted POA    PRINCIPAL PROBLEM:  Syncope [R55] 10/24/2023 Yes    History of CVA (cerebrovascular accident) [Z86.73] 10/24/2023 Not Applicable    Type 2 diabetes mellitus with diabetic polyneuropathy, with long-term current use of insulin [E11.42, Z79.4] 01/21/2016 Not Applicable     Chronic    Essential hypertension [I10] 07/22/2015 Yes      Problems Resolved During this Admission:       Discharged Condition: stable    Disposition: Home or Self Care    Follow Up:   Follow-up Information     Ericka Cortez MD. Schedule an appointment as soon as possible for a visit.    Specialty: Internal Medicine  Contact information:  [address] [address] [phone]                       Patient Instructions:      Ambulatory referral/consult to Internal Medicine   Standing Status: Future   Referral Priority: Routine Referral Type: Consultation   Referral Reason: Specialty Services Required   Requested Specialty: Internal Medicine   Number of Visits Requested: 1     Diet Cardiac     Diet diabetic     Notify your health care provider if you experience any of the following:  increased confusion or weakness     Notify your health care provider if you experience any of  "the following:  persistent dizziness, light-headedness, or visual disturbances     Notify your health care provider if you experience any of the following:  worsening rash     Notify your health care provider if you experience any of the following:  severe persistent headache     Notify your health care provider if you experience any of the following:  difficulty breathing or increased cough     Notify your health care provider if you experience any of the following:  severe uncontrolled pain     Notify your health care provider if you experience any of the following:  persistent nausea and vomiting or diarrhea     Notify your health care provider if you experience any of the following:  temperature >100.4     Holter monitor - 48 hour   Standing Status: Future Standing Exp. Date: 10/25/24     Order Specific Question Answer Comments   Release to patient Immediate      Activity as tolerated       Significant Diagnostic Studies: Labs: All labs within the past 24 hours have been reviewed    Pending Diagnostic Studies:     None         Medications:  Reconciled Home Medications:      Medication List      START taking these medications    NIFEdipine 30 MG (OSM) 24 hr tablet  Commonly known as: PROCARDIA-XL  Take 1 tablet (30 mg total) by mouth once daily.  Start taking on: October 26, 2023        CONTINUE taking these medications    aspirin 81 MG EC tablet  Commonly known as: ECOTRIN  Take 1 tablet (81 mg total) by mouth once daily.     BD ULTRA-FINE MILAN PEN NEEDLE 32 gauge x 5/32" Ndle  Generic drug: pen needle, diabetic  Use as directed 3 times daily with insulin pens     candesartan 32 MG tablet  Commonly known as: ATACAND  Take 1 tablet (32 mg total) by mouth once daily.     carvediloL 6.25 MG tablet  Commonly known as: COREG  Take 1 tablet (6.25 mg total) by mouth in the morning and 1 tablet (6.25 mg total) before bedtime..     clopidogreL 75 mg tablet  Commonly known as: PLAVIX  Take 1 tablet (75 mg total) by mouth " once daily.     DEXCOM G6  Misc  Generic drug: blood-glucose meter,continuous  Use as directed.     DEXCOM G6 SENSOR Rashida  Generic drug: blood-glucose sensor  Change every 10 days. 90 day e 11.65     DEXCOM G6 TRANSMITTER Rashida  Generic drug: blood-glucose transmitter  Change every 3 months.     doxepin 10 MG capsule  Commonly known as: SINEQUAN  Take 1 capsule (10 mg total) by mouth nightly for 90 days.     gabapentin 300 MG capsule  Commonly known as: NEURONTIN  Take 1 capsule (300 mg total) by mouth in the morning and 1 capsule (300 mg total) before bedtime.     HumaLOG KwikPen Insulin 100 unit/mL pen  Generic drug: insulin lispro  Inject 6 units before meals plus scale 180-230+2, 231-280+4, 281-330+6, 331-380+8, >380+10. Max daily 48 units.     hydrALAZINE 50 MG tablet  Commonly known as: APRESOLINE  Take 1 tablet (50 mg total) by mouth every 8 (eight) hours. Check BP before taking. If top number <110, do not take medicine.     meclizine 25 mg tablet  Commonly known as: ANTIVERT  Take 1 tablet (25 mg total) by mouth 3 (three) times daily as needed for Dizziness.     OZEMPIC 2 mg/dose (8 mg/3 mL) Pnij  Generic drug: semaglutide  Inject 2 mg into the skin every 7 days.     pantoprazole 40 MG tablet  Commonly known as: PROTONIX  Take 1 tablet (40 mg total) by mouth in the morning for 90 days.     rosuvastatin 40 MG Tab  Commonly known as: CRESTOR  Take 1 tablet (40 mg total) by mouth every evening.     TRESIBA FLEXTOUCH U-200 200 unit/mL (3 mL) insulin pen  Generic drug: insulin degludec  Inject 20 units into the skin once nightly.        STOP taking these medications    amLODIPine 10 MG tablet  Commonly known as: NORVASC            Indwelling Lines/Drains at time of discharge:   Lines/Drains/Airways     None                 Time spent on the discharge of patient: 35 minutes         Sachin Arroyo MD  Attending Physician  Medical Director - [facility] Observation Unit  Department of [facility]  Medicine  10/25/2023

## 2023-10-25 NOTE — SUBJECTIVE & OBJECTIVE
Past Medical History:   Diagnosis Date    Allergy     Cataract     Diabetes mellitus type II     Gastritis     with gastric ulcer    GERD (gastroesophageal reflux disease)     H. pylori infection     History of hepatitis C, SVR as of 8-2016 8/25/2015    Harvoni 12 wks completed.  SVR(12) as of 8/4/16 SVR(24) as of 10-     Hypertension     Osteopenia     Type 2 diabetes mellitus with diabetic polyneuropathy        Past Surgical History:   Procedure Laterality Date    COLONOSCOPY      COLONOSCOPY N/A 1/28/2016    Procedure: COLONOSCOPY;  Surgeon: Emeka Ahn MD;  [facility]  Service: Endoscopy;  Laterality: N/A;    COLONOSCOPY N/A 8/8/2019    Procedure: COLONOSCOPY;  Surgeon: Susan Dia MD;  [facility]  Service: Endoscopy;  Laterality: N/A;  appt confirmed-rb    HYSTERECTOMY      LIVER BIOPSY      OOPHORECTOMY      PERIPHERAL ANGIOGRAPHY Left 5/5/2021    Procedure: Peripheral angiography;  Surgeon: Randolph Toribio MD;  Location: [facility] CATH LAB;  Service: Cardiology;  Laterality: Left;    PERIPHERAL ANGIOGRAPHY N/A 6/21/2021    Procedure: Peripheral angiography;  Surgeon: Randolph Toribio MD;  Location: [facility] CATH LAB;  Service: Cardiology;  Laterality: N/A;    UPPER GASTROINTESTINAL ENDOSCOPY         Review of patient's allergies indicates:   Allergen Reactions    Amlodipine Swelling    Erythromycin Anaphylaxis    Erythropoietin analogues Anaphylaxis    Pegasys [peginterferon ruben-2a] Anaphylaxis    Lisinopril Hives       No current facility-administered medications on file prior to encounter.     Current Outpatient Medications on File Prior to Encounter   Medication Sig    amLODIPine (NORVASC) 10 MG tablet Take one tablet by mouth every morning    aspirin (ECOTRIN) 81 MG EC tablet Take 1 tablet (81 mg total) by mouth once daily.    blood-glucose meter,continuous (DEXCOM G6 ) Misc Use as directed.    blood-glucose sensor (DEXCOM G6 SENSOR) Rashida  "Change every 10 days. 90 day e 11.65    blood-glucose transmitter (DEXCOM G6 TRANSMITTER) Rashida Change every 3 months.    candesartan (ATACAND) 32 MG tablet Take 1 tablet (32 mg total) by mouth once daily.    carvediloL (COREG) 6.25 MG tablet Take 1 tablet (6.25 mg total) by mouth in the morning and 1 tablet (6.25 mg total) before bedtime..    clopidogreL (PLAVIX) 75 mg tablet Take 1 tablet (75 mg total) by mouth once daily.    doxepin (SINEQUAN) 10 MG capsule Take 1 capsule (10 mg total) by mouth nightly for 90 days.    gabapentin (NEURONTIN) 300 MG capsule Take 1 capsule (300 mg total) by mouth in the morning and 1 capsule (300 mg total) before bedtime.    hydrALAZINE (APRESOLINE) 50 MG tablet Take 1 tablet (50 mg total) by mouth every 8 (eight) hours. Check BP before taking. If top number <110, do not take medicine.    insulin degludec (TRESIBA FLEXTOUCH U-200) 200 unit/mL (3 mL) insulin pen Inject 20 units into the skin once nightly.    insulin lispro (HUMALOG KWIKPEN INSULIN) 100 unit/mL pen Inject 6 units before meals plus scale 180-230+2, 231-280+4, 281-330+6, 331-380+8, >380+10. Max daily 48 units.    meclizine (ANTIVERT) 25 mg tablet Take 1 tablet (25 mg total) by mouth 3 (three) times daily as needed for Dizziness.    pantoprazole (PROTONIX) 40 MG tablet Take 1 tablet (40 mg total) by mouth in the morning for 90 days.    pen needle, diabetic (BD ULTRA-FINE MILAN PEN NEEDLE) 32 gauge x 5/32" Ndle Use as directed 3 times daily with insulin pens (Patient taking differently: Use as directed 3 times daily with insulin pens)    rosuvastatin (CRESTOR) 40 MG Tab Take 1 tablet (40 mg total) by mouth every evening.    semaglutide (OZEMPIC) 2 mg/dose (8 mg/3 mL) PnIj Inject 2 mg into the skin every 7 days.    [DISCONTINUED] insulin aspart U-100 (NOVOLOG FLEXPEN U-100 INSULIN) 100 unit/mL (3 mL) InPn pen Inject 10 units w/ largest meal plus scale 180-230+2, 231-280+4, 281-330+6, 331-380+8, >380+10. Max daily 50 units. "     Family History       Problem Relation (Age of Onset)    Breast cancer Sister    Cancer Sister, Sister (70)    Diabetes Mother, Sister, Sister, Sister    Heart disease Mother, Father    Hyperlipidemia Mother    Hypertension Mother          Tobacco Use    Smoking status: Former     Current packs/day: 0.00     Average packs/day: 0.3 packs/day for 48.0 years (12.0 ttl pk-yrs)     Types: Cigarettes     Start date: 3/28/1973     Quit date: 3/28/2021     Years since quittin.5    Smokeless tobacco: Never   Substance and Sexual Activity    Alcohol use: No     Comment: special occasions    Drug use: No    Sexual activity: Yes     Partners: Male     Review of Systems   Constitutional:  Negative for chills, fatigue and fever.   HENT:  Negative for sore throat and trouble swallowing.    Eyes:  Negative for photophobia and visual disturbance.   Respiratory:  Negative for cough, shortness of breath and wheezing.    Cardiovascular:  Negative for chest pain, palpitations and leg swelling.   Gastrointestinal:  Negative for abdominal distention, abdominal pain, diarrhea, nausea and vomiting.   Genitourinary:  Negative for dysuria and hematuria.   Musculoskeletal:  Negative for neck pain and neck stiffness.   Skin:  Negative for rash and wound.   Neurological:  Positive for syncope. Negative for seizures, weakness, numbness and headaches.   Psychiatric/Behavioral:  Positive for confusion. Negative for decreased concentration. The patient is nervous/anxious.      Objective:     Vital Signs (Most Recent):  Temp: 98 °F (36.7 °C) (10/24/23 2144)  Pulse: 72 (10/24/23 2303)  Resp: 18 (10/24/23 2144)  BP: (!) 203/91 (10/24/23 2144)  SpO2: 96 % (10/24/23 2144) Vital Signs (24h Range):  Temp:  [98 °F (36.7 °C)-98.5 °F (36.9 °C)] 98 °F (36.7 °C)  Pulse:  [] 72  Resp:  [15-22] 18  SpO2:  [96 %-100 %] 96 %  BP: ()/() 203     Weight: 88 kg (194 lb 0.1 oz)  Body mass index is 30.39 kg/m².     Physical  Exam  Constitutional:       General: She is not in acute distress.     Appearance: She is not toxic-appearing or diaphoretic.   HENT:      Head: Normocephalic and atraumatic.      Nose: Nose normal.   Eyes:      General: No scleral icterus.     Extraocular Movements: Extraocular movements intact.      Pupils: Pupils are equal, round, and reactive to light.   Cardiovascular:      Rate and Rhythm: Normal rate and regular rhythm.   Pulmonary:      Effort: Pulmonary effort is normal. No respiratory distress.      Breath sounds: No wheezing or rales.   Abdominal:      General: Abdomen is flat. There is no distension.      Palpations: Abdomen is soft.      Tenderness: There is no abdominal tenderness. There is no guarding.   Musculoskeletal:         General: Normal range of motion.      Cervical back: Normal range of motion and neck supple. No rigidity.      Right lower leg: No edema.      Left lower leg: No edema.   Skin:     General: Skin is warm and dry.   Neurological:      General: No focal deficit present.      Mental Status: She is alert and oriented to person, place, and time.      Cranial Nerves: No cranial nerve deficit.   Psychiatric:         Mood and Affect: Mood normal.         Behavior: Behavior normal.              CRANIAL NERVES     CN III, IV, VI   Pupils are equal, round, and reactive to light.       Significant Labs: All pertinent labs within the past 24 hours have been reviewed.  CBC:   Recent Labs   Lab 10/24/23  1423   WBC 6.80   HGB 14.5   HCT 46.2        CMP:   Recent Labs   Lab 10/24/23  1423      K 3.5      CO2 22*   *   BUN 20   CREATININE 1.1   CALCIUM 9.3   PROT 6.4   ALBUMIN 2.9*   BILITOT 0.3   ALKPHOS 90   AST 10   ALT 12   ANIONGAP 11     Cardiac Markers:   Recent Labs   Lab 10/24/23  1423   BNP 25     Troponin:   Recent Labs   Lab 10/24/23  1423   TROPONINI 0.012     Urine Studies:   Recent Labs   Lab 10/24/23  1519   COLORU Yellow   APPEARANCEUA Clear   PHUR 7.0    SPECGRAV >1.030*   PROTEINUA 1+*   GLUCUA Trace*   KETONESU Negative   BILIRUBINUA Negative   OCCULTUA Negative   NITRITE Negative   LEUKOCYTESUR Negative   RBCUA 1   WBCUA 3   BACTERIA Rare   SQUAMEPITHEL 9   HYALINECASTS 0       Significant Imaging: I have reviewed all pertinent imaging results/findings within the past 24 hours.

## 2023-10-25 NOTE — NURSING
Nurses Note -- 4 Eyes      10/24/2023   10:02 PM      Skin assessed during: Admit      [x] No Altered Skin Integrity Present    [x]Prevention Measures Documented      [] Yes- Altered Skin Integrity Present or Discovered   [] LDA Added if Not in Epic (Describe Wound)   [] New Altered Skin Integrity was Present on Admit and Documented in LDA   [] Wound Image Taken    Wound Care Consulted? No    Attending Nurse:  ELVIN Newby RN/Staff Member:  [place]

## 2023-10-25 NOTE — PLAN OF CARE
"Levothyroxine 50MCG  Last Written Prescription Date:  09/25/2019  Last Fill Quantity: 30,  # refills: 0   Last office visit: 10/4/2019 with prescribing provider:  Yes    Future Office Visit:   Next 5 appointments (look out 90 days)    Dec 03, 2019 10:20 AM CST  PHYSICAL with Tatiana Juan MD  [facility] ([facility]) [address] [address]  [phone]         Omeprazole 20MG  Last Written Prescription Date:  12/04/2018  Last Fill Quantity: 90,  # refills: 3   Last office visit: 10/4/2019 with prescribing provider:  Yes    Future Office Visit:   Next 5 appointments (look out 90 days)    Dec 03, 2019 10:20 AM CST  PHYSICAL with Tatiana Juan MD  [facility] ([facility] [address] [address]  [phone]         Requested Prescriptions   Pending Prescriptions Disp Refills     omeprazole (PRILOSEC) 20 MG DR capsule [Pharmacy Med Name: OMEPRAZOLE 20MG CAP] 30 capsule      Sig: TAKE 1 CAPSULE BY MOUTH DAILY (30-60 MINUTE(S) BEFORE A MEAL)       PPI Protocol Passed - 10/29/2019  1:13 PM        Passed - Not on Clopidogrel (unless Pantoprazole ordered)        Passed - No diagnosis of osteoporosis on record        Passed - Recent (12 mo) or future (30 days) visit within the authorizing provider's specialty     Patient has had an office visit with the authorizing provider or a provider within the authorizing providers department within the previous 12 mos or has a future within next 30 days. See \"Patient Info\" tab in inbasket, or \"Choose Columns\" in Meds & Orders section of the refill encounter.              Passed - Medication is active on med list        Passed - Patient is age 18 or older        levothyroxine (SYNTHROID/LEVOTHROID) 50 MCG tablet [Pharmacy Med Name: LEVOTHYROXINE 50MCG TAB] 30 tablet 0     Sig: TAKE 1 TABLET BY MOUTH EVERY MORNING       Thyroid Protocol Passed - 10/29/2019  1:13 PM        Passed - " [facility] - Observation 11[facility]      HOME HEALTH ORDERS  FACE TO FACE ENCOUNTER    Patient Name: Buck Ibarra  YOB: 1945    PCP: Ericka Cortez MD   PCP Address: [address] / [place] [address]  PCP Phone Number: [phone]  PCP Fax: [phone]    Encounter Date: 10/24/23    Admit to Home Health    Diagnoses:  Active Hospital Problems    Diagnosis  POA    *Syncope [R55]  Yes    History of CVA (cerebrovascular accident) [Z86.73]  Not Applicable    Type 2 diabetes mellitus with diabetic polyneuropathy, with long-term current use of insulin [E11.42, Z79.4]  Not Applicable     Chronic    Essential hypertension [I10]  Yes      Resolved Hospital Problems   No resolved problems to display.       Follow Up Appointments:  Future Appointments   Date Time Provider Department Center   10/26/2023  2:15 PM Wayne Middleton, PT [facility]   10/31/2023  1:00 PM Wayne Middleton, PT [facility]   11/2/2023  2:15 PM Wayne Middleton, PT [facility]   12/29/2023 11:00 AM LAB, APPOINTMENT [facility] IM Rob [facility]   1/5/2024  1:00 PM Graeme Rivera APRN, SE[facility]P [facility] Bor[facility]       Allergies:  Review of patient's allergies indicates:   Allergen Reactions    Amlodipine Swelling    Erythromycin Anaphylaxis    Erythropoietin analogues Anaphylaxis    Pegasys [peginterferon ruben-2a] Anaphylaxis    Lisinopril Hives       Medications: Review discharge medications with patient and family and provide education.    Current Facility-Administered Medications   Medication Dose Route Frequency Provider Last Rate Last Admin    acetaminophen tablet 1,000 mg  1,000 mg Oral Q8H PRN Porfirio Rojas MD        albuterol inhaler 2 puff  2 puff Inhalation Q6H PRN Porfirio Rojas MD        aluminum-magnesium hydroxide-simethicone 200-200-20 mg/5 mL suspension 30 mL  30 mL Oral QID PRN Porfirio Rojas MD        aspirin EC tablet 81 mg  81 mg Oral Daily Porfirio Rojas,  "Patient is 12 years or older        Passed - Recent (12 mo) or future (30 days) visit within the authorizing provider's specialty     Patient has had an office visit with the authorizing provider or a provider within the authorizing providers department within the previous 12 mos or has a future within next 30 days. See \"Patient Info\" tab in inbasket, or \"Choose Columns\" in Meds & Orders section of the refill encounter.              Passed - Medication is active on med list        Passed - Normal TSH on file in past 12 months     Recent Labs   Lab Test 10/18/19  1300   TSH 1.05                " MD   81 mg at 10/25/23 1009    atorvastatin tablet 40 mg  40 mg Oral Daily Porfirio Rojas MD   40 mg at 10/25/23 1009    carvediloL tablet 6.25 mg  6.25 mg Oral BID Porfirio Rojas MD   6.25 mg at 10/25/23 1022    clopidogreL tablet 75 mg  75 mg Oral Daily Porfirio Rojas MD   75 mg at 10/25/23 1008    dextrose 10% bolus 125 mL 125 mL  12.5 g Intravenous PRN Porfirio Rojas MD        dextrose 10% bolus 250 mL 250 mL  25 g Intravenous PRN Porfirio Rojas MD        doxepin capsule 10 mg  10 mg Oral QHS Porfirio Rojas MD   10 mg at 10/24/23 2055    gabapentin capsule 100 mg  100 mg Oral BID Porfirio Rojas MD   100 mg at 10/25/23 1009    glucagon (human recombinant) injection 1 mg  1 mg Intramuscular PRN Porfirio Rojas MD        glucose chewable tablet 16 g  16 g Oral PRN Porfirio Rojas MD        glucose chewable tablet 24 g  24 g Oral PRN Porfirio Rojas MD        hydrALAZINE injection 10 mg  10 mg Intravenous Q6H PRN Sachin Arroyo MD        insulin aspart U-100 pen 0-10 Units  0-10 Units Subcutaneous QID (AC + HS) PRN Porfirio Rojas MD   2 Units at 10/25/23 1146    insulin detemir U-100 (Levemir) pen 12 Units  12 Units Subcutaneous QHS Porfirio Rojas MD   12 Units at 10/24/23 2016    labetalol 20 mg/4 mL (5 mg/mL) IV syring  20 mg Intravenous Q6H PRN Sachin Arroyo MD   20 mg at 10/25/23 0817    melatonin tablet 6 mg  6 mg Oral Nightly PRN Porfirio Rojas MD        naloxone 0.4 mg/mL injection 0.02 mg  0.02 mg Intravenous PRN Porfirio Rojas MD        NIFEdipine 24 hr tablet 30 mg  30 mg Oral Daily Sachin Arroyo MD   30 mg at 10/25/23 0817    ondansetron injection 4 mg  4 mg Intravenous Q8H PRN Porfirio Rojas MD        pantoprazole EC tablet 40 mg  40 mg Oral Daily Porfirio Rojas MD   40 mg at 10/25/23 1009    sodium chloride 0.9% flush 10 mL  10 mL Intravenous Q12H PRN Porfirio Rojas MD         Current Discharge Medication List         START taking these medications    Details   NIFEdipine (PROCARDIA-XL) 30 MG (OSM) 24 hr tablet Take 1 tablet (30 mg total) by mouth once daily.  Qty: 90 tablet, Refills: 0    Comments: .           CONTINUE these medications which have NOT CHANGED    Details   aspirin (ECOTRIN) 81 MG EC tablet Take 1 tablet (81 mg total) by mouth once daily.  Qty: 90 tablet, Refills: 3    Associated Diagnoses: PAD (peripheral artery disease); Hyperlipidemia, unspecified hyperlipidemia type      blood-glucose meter,continuous (DEXCOM G6 ) Misc Use as directed.  Qty: 1 each, Refills: 0      blood-glucose sensor (DEXCOM G6 SENSOR) Rashida Change every 10 days. 90 day e 11.65  Qty: 9 each, Refills: 3      blood-glucose transmitter (DEXCOM G6 TRANSMITTER) Rashida Change every 3 months.  Qty: 1 each, Refills: 3      candesartan (ATACAND) 32 MG tablet Take 1 tablet (32 mg total) by mouth once daily.  Qty: 90 tablet, Refills: 3    Associated Diagnoses: Benign essential hypertension      carvediloL (COREG) 6.25 MG tablet Take 1 tablet (6.25 mg total) by mouth in the morning and 1 tablet (6.25 mg total) before bedtime..  Qty: 180 tablet, Refills: 3    Comments: .      clopidogreL (PLAVIX) 75 mg tablet Take 1 tablet (75 mg total) by mouth once daily.  Qty: 90 tablet, Refills: 3      doxepin (SINEQUAN) 10 MG capsule Take 1 capsule (10 mg total) by mouth nightly for 90 days.  Qty: 90 capsule, Refills: 3      gabapentin (NEURONTIN) 300 MG capsule Take 1 capsule (300 mg total) by mouth in the morning and 1 capsule (300 mg total) before bedtime.  Qty: 180 capsule, Refills: 3      hydrALAZINE (APRESOLINE) 50 MG tablet Take 1 tablet (50 mg total) by mouth every 8 (eight) hours. Check BP before taking. If top number <110, do not take medicine.  Qty: 270 tablet, Refills: 3    Comments: .      insulin degludec (TRESIBA FLEXTOUCH U-200) 200 unit/mL (3 mL) insulin pen Inject 20 units into the skin once nightly.  Qty: 3 pen, Refills: 6     "Associated Diagnoses: Diabetes mellitus type 2 in obese      insulin lispro (HUMALOG KWIKPEN INSULIN) 100 unit/mL pen Inject 6 units before meals plus scale 180-230+2, 231-280+4, 281-330+6, 331-380+8, >380+10. Max daily 48 units.  Qty: 15 mL, Refills: 2      meclizine (ANTIVERT) 25 mg tablet Take 1 tablet (25 mg total) by mouth 3 (three) times daily as needed for Dizziness.  Qty: 90 tablet, Refills: 0      pantoprazole (PROTONIX) 40 MG tablet Take 1 tablet (40 mg total) by mouth in the morning for 90 days.  Qty: 90 tablet, Refills: 3      pen needle, diabetic (BD ULTRA-FINE MILAN PEN NEEDLE) 32 gauge x 5/32" Ndle Use as directed 3 times daily with insulin pens  Qty: 300 each, Refills: 3    Associated Diagnoses: Type 2 diabetes mellitus with hyperglycemia, with long-term current use of insulin      rosuvastatin (CRESTOR) 40 MG Tab Take 1 tablet (40 mg total) by mouth every evening.  Qty: 90 tablet, Refills: 3    Associated Diagnoses: Hyperlipidemia associated with type 2 diabetes mellitus; History of CVA (cerebrovascular accident)      semaglutide (OZEMPIC) 2 mg/dose (8 mg/3 mL) PnIj Inject 2 mg into the skin every 7 days.  Qty: 3 mL, Refills: 11    Associated Diagnoses: Type 2 diabetes mellitus with hyperglycemia, with long-term current use of insulin           STOP taking these medications       amLODIPine (NORVASC) 10 MG tablet Comments:   Reason for Stopping:                 I have seen and examined this patient within the last 30 days. My clinical findings that support the need for the home health skilled services and home bound status are the following:no   Weakness/numbness causing balance and gait disturbance due to Weakness/Debility making it taxing to leave home.  Requiring assistive device to leave home due to unsteady gait caused by  Weakness/Debility.     Diet:   cardiac diet and diabetic diet 2000 calorie    Labs:  Report Lab results to PCP.    Referrals/ Consults  Physical Therapy to evaluate and treat. " Evaluate for home safety and equipment needs; Establish/upgrade home exercise program. Perform / instruct on therapeutic exercises, gait training, transfer training, and Range of Motion.  Occupational Therapy to evaluate and treat. Evaluate home environment for safety and equipment needs. Perform/Instruct on transfers, ADL training, ROM, and therapeutic exercises.  Aide to provide assistance with personal care, ADLs, and vital signs.    Activities:   activity as tolerated    Nursing:   Agency to admit patient within 24 hours of hospital discharge unless specified on physician order or at patient request    SN to complete comprehensive assessment including routine vital signs. Instruct on disease process and s/s of complications to report to MD. Review/verify medication list sent home with the patient at time of discharge  and instruct patient/caregiver as needed. Frequency may be adjusted depending on start of care date.     Skilled nurse to perform up to 3 visits PRN for symptoms related to diagnosis    Notify MD if SBP > 160 or < 90; DBP > 90 or < 50; HR > 120 or < 50; Temp > 101; O2 < 88%    Ok to schedule additional visits based on staff availability and patient request on consecutive days within the home health episode.    When multiple disciplines ordered:    Start of Care occurs on Sunday - Wednesday schedule remaining discipline evaluations as ordered on separate consecutive days following the start of care.    Thursday SOC -schedule subsequent evaluations Friday and Monday the following week.     Friday - Saturday SOC - schedule subsequent discipline evaluations on consecutive days starting Monday of the following week.    For all post-discharge communication and subsequent orders please contact patient's primary care physician.     Miscellaneous   Diabetic Care:   SN to perform and educate Diabetic management with blood glucose monitoring:, Fingerstick blood sugar AC and HS, and Report CBG < 60 or > 350 to  physician.    Home Health Aide:  Nursing Three times weekly, Physical Therapy Three times weekly, Occupational Therapy Three times weekly, and Home Health Aide Three times weekly    Wound Care Orders  no    I certify that this patient is confined to her home and needs intermittent skilled nursing care, physical therapy, and occupational therapy.        Sachin Arroyo MD  Attending Physician  Medical Director - [facility]  Department of Hospital Medicine  10/25/2023

## 2023-10-26 NOTE — PROGRESS NOTES
C3 nurse spoke with Buck Ibarra for a TCC post hospital discharge follow up call. The patient has a scheduled HOS appointment with Candace Burt NP on 11/02/23 @ 0800.

## 2023-10-26 NOTE — PROGRESS NOTES
Pt did not attend her scheduled 2:15 PM appointment today. PT will call pt tomorrow to check on her status, as she has not attended the past 3 sessions. She was recently hospitalized for syncope and altered mental status. No charges posted today.    Wayne Middleton, PT  10/26/2023

## 2023-10-26 NOTE — TELEPHONE ENCOUNTER
@ 1016 call to [facility], waiting on authorization. Ordered PT, OT, aide and nurse.

## 2023-10-27 NOTE — TELEPHONE ENCOUNTER
PT called pt today since she has missed her past 3 therapy sessions. She was recently admitted to hospital for one day due to syncope. Pt states she is feeling better but still is experiencing some pain in her legs and occasional dizziness. PT informed pt that she has 2 sessions remaining, if she is interested in attending them( 10/31/23 and 11/2/23.) Pt stated she would call on Monday to let us know if she plans on attending the 10/31/23 appointment.    Wayne Middleton, PT  10/27/2023

## 2023-10-27 NOTE — TELEPHONE ENCOUNTER
Clementina Norton, RN   Registered Nurse  Progress Notes  Signed  Encounter Date:  10/26/2023                                       C3 nurse spoke with Buck Ibarra for a TCC post hospital discharge follow up call. The patient has a scheduled [facility] appointment with Candace Burt NP on 11/02/23 @ 0800.

## 2023-11-01 PROBLEM — F17.210 TOBACCO DEPENDENCE DUE TO CIGARETTES: Status: ACTIVE | Noted: 2023-01-01

## 2023-11-01 NOTE — TELEPHONE ENCOUNTER
Pt is confused as to which meds she is supposed to take. Advised of appt, and to bring all meds to appt. Pt stated she had all of them in a bag to go to appt.    [facility]. Stated she will be ready at 8:30 am since drivers come btw 8:30-:8:45.       Did speak w / HH nurse earlier and blood pressure had gone down some, but pt was confused about her meds. Appt suggested.

## 2023-11-01 NOTE — TELEPHONE ENCOUNTER
----- Message from Stacy Hedrick sent at 11/1/2023  2:49 PM CDT -----  Contact: [phone]  Rajwinder with home health is calling she states the pts blood pressure is running high today is 171/96 pt states she has been taking her meds and she is complaining of a headache please give return call ASAP

## 2023-11-01 NOTE — ASSESSMENT & PLAN NOTE
Currently resolved.  Follow up with Cardiology/Holter Monitor on 10/25/23.  Continue current medications.  Continue Home Health for vital signs/teaching.

## 2023-11-01 NOTE — ASSESSMENT & PLAN NOTE
Chronic, unwilling to quit.  Negative effects of tobacco on body systems discussed with patient at length today, encouraged cessation, patient not willing to quit at this time.

## 2023-11-01 NOTE — PROGRESS NOTES
[facility]  Transitional Care Management (TCM) Home Visit    Encounter Provider: Tori Otero   PCP: Ericka Cortez MD  Consult Requested By: No ref. provider found  Admit Date: 10/24/23   IP Discharge Date: 10/25/23  Hospital Length of Stay:RRHLOS@ 1 day  Days since discharge (from IP or SNF): 5 days   [facility] Contact Note: 10/26/23  Hospital Diagnosis: No admission diagnoses are documented for this encounter.     HISTORY OF PRESENT ILLNESS      Patient ID: Buck Ibarra is a 77 y.o. female was recently admitted to the hospital, this is their TCM encounter.    Hospital Course Synopsis:  Admit: 10/24/23    Discharge: 10/25/23    Pt is a 77 year old female with PMHx significant for DM, HTN, and CVA who presents for evaluation of altered mental status, which occurred today. Pt was noted to be in her normal state of health during a conversation with her daughter at 12 PM. Upon arriving to the pt's home, the daughter noted she was difficult to arouse and confused which prompted presentation to the ED. No fever, chills, chest pain, shortness of breath, nausea, or vomiting.      In the ED patient afebrile and saturating well on room air. Vascular Neuro consulted for initial concern of stroke. CTA stroke without acute findings. Patient initially hypotensive with SBP in the 70's. Quickly improved with IVFs and patient mentation returned to baseline though does continue to report confusion and anxiety related to incident. CMP, CBC, and UA largely unremarkable. Patient remains signficantly Orthostatic. Admitted to the care of medicine for further evaluation and management.   Hospital Course:   Pt admitted to [facility] and remained stable overnight and into 10/25. BP control optimized. 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 Echo without acute findings. Pt deemed appropriate for discharge on updated  "antihypertensive regimen and will get a Holter monitor; SW assisting with HH. Plan discussed with pt, who was agreeable and amenable; medications were discussed and reviewed, outpatient follow-up scheduled, ER precautions were given, all questions were answered to the pt's satisfaction, and Ms. Ibarra was subsequently discharged.    With this at home hospital follow up visit today patient is found sitting on her sofa watching a soap opera. Adult daughter with significant disability present as well. Patient had forgotten this NP was coming today-confirmed with patient yesterday. She appears to be in no distress, easily distracted. Has ashtray sitting next to her full of cigarette buts. Endorses smoking 1/2 PPD cigarettes "for my nerves" and denies desire to quit smoking despite recommendations. During assessment patient calls her pharmacy to find out where her new glucose meter sensors are coming in. States she doesn't know what her current blood glucose levels are because she doesn't have anymore sensors. Reports still taking Tresiba 20 units nightly but not taking Humalog SS tid because she doesn't know what her blood glucose levels are. Denies any s/s hypoglycemia. Reports appetite is "good". Denies any symptoms of syncope as she had on recent hospitalization.          Vital signs stable during visit. No distress noted. [facility] contact information provided to patient and left in home.    DECISION MAKING TODAY       Assessment & Plan:  1. Tobacco dependence due to cigarettes [F17.210]  Assessment & Plan:  Chronic, unwilling to quit.  Negative effects of tobacco on body systems discussed with patient at length today, encouraged cessation, patient not willing to quit at this time.       2. Syncope, unspecified syncope type  Assessment & Plan:  Currently resolved.  Follow up with Cardiology/Holter Monitor on 10/25/23.  Continue current medications.  Continue Home Health for vital signs/teaching.        3. " "Essential hypertension  Assessment & Plan:  Chronic, currently stable.  Continue antihypertensives on hospital discharge paperwork-including newly started nifedipine.   Follow up with PCP/Cardiology.            Medication List on Discharge:     Medication List            Accurate as of October 30, 2023 11:59 PM. If you have any questions, ask your nurse or doctor.                CONTINUE taking these medications      aspirin 81 MG EC tablet  Commonly known as: ECOTRIN  Take 1 tablet (81 mg total) by mouth once daily.     BD ULTRA-FINE MILAN PEN NEEDLE 32 gauge x 5/32" Ndle  Generic drug: pen needle, diabetic  Use as directed 3 times daily with insulin pens     candesartan 32 MG tablet  Commonly known as: ATACAND  Take 1 tablet (32 mg total) by mouth once daily.     carvediloL 6.25 MG tablet  Commonly known as: COREG  Take 1 tablet (6.25 mg total) by mouth in the morning and 1 tablet (6.25 mg total) before bedtime..     clopidogreL 75 mg tablet  Commonly known as: PLAVIX  Take 1 tablet (75 mg total) by mouth once daily.     DEXCOM G6  Misc  Generic drug: blood-glucose meter,continuous  Use as directed.     DEXCOM G6 SENSOR Rashida  Generic drug: blood-glucose sensor  Change every 10 days. 90 day e 11.65     DEXCOM G6 TRANSMITTER Rashida  Generic drug: blood-glucose transmitter  Change every 3 months.     doxepin 10 MG capsule  Commonly known as: SINEQUAN  Take 1 capsule (10 mg total) by mouth nightly for 90 days.     gabapentin 300 MG capsule  Commonly known as: NEURONTIN  Take 1 capsule (300 mg total) by mouth in the morning and 1 capsule (300 mg total) before bedtime.     HumaLOG KwikPen Insulin 100 unit/mL pen  Generic drug: insulin lispro  Inject 6 units before meals plus scale 180-230+2, 231-280+4, 281-330+6, 331-380+8, >380+10. Max daily 48 units.     hydrALAZINE 50 MG tablet  Commonly known as: APRESOLINE  Take 1 tablet (50 mg total) by mouth every 8 (eight) hours. Check BP before taking. If top number <110, " do not take medicine.     meclizine 25 mg tablet  Commonly known as: ANTIVERT  Take 1 tablet (25 mg total) by mouth 3 (three) times daily as needed for Dizziness.     NIFEdipine 30 MG (OSM) 24 hr tablet  Commonly known as: PROCARDIA-XL  Take 1 tablet (30 mg total) by mouth once daily.     OZEMPIC 2 mg/dose (8 mg/3 mL) Pnij  Generic drug: semaglutide  Inject 2 mg into the skin every 7 days.     pantoprazole 40 MG tablet  Commonly known as: PROTONIX  Take 1 tablet (40 mg total) by mouth in the morning for 90 days.     rosuvastatin 40 MG Tab  Commonly known as: CRESTOR  Take 1 tablet (40 mg total) by mouth every evening.     TRESIBA FLEXTOUCH U-200 200 unit/mL (3 mL) insulin pen  Generic drug: insulin degludec  Inject 20 units into the skin once nightly.              Medication Reconciliation:  Were medications changed on discharge? Yes  Were medications in the home? Yes  Is the patient taking the medications as directed? Yes  Does the patient understand the medications and changes? Yes  Does updated med list accurately reflects meds patient is currently taking? Yes    ENVIRONMENT OF CARE      Family and/or Caregiver present at visit?  Yes  Name of Caregiver: disabled daughter  History provided by: patient    Advance Care Planning   Advanced Care Planning Status:  Patient has had an ACP conversation  Living Will: Yes  Power of : Yes  [place]: No    Does Caregiver have HCPoA: Yes  Changes today: none       Impression upon entering the home:  Physical Dwelling: apartment/condo   Appearance of home environment: cleaniness: clean, walking pathways: clear, lighting: adequate, and home structure: sound structure  Functional Status: independent  Mobility: ambulatory  Nutritional access: adequate intake and access  Home Health: Yes,  Agency [facility]    DME/Supplies: rolling walker and dexcom glucometer/supplies     Diagnostic tests reviewed/disposition: No diagnosic tests pending after this  hospitalization.  Disease/illness education:  syncope  Establishment or re-establishment of referral orders for community resources: No other necessary community resources.   Discussion with other health care providers: No discussion with other health care providers necessary.   Does patient have a PCP at [facility]? Yes   Repatriation plan with PCP? follow-up with PCP within 30d   Does patient have an ostomy (ileostomy, colostomy, suprapubic catheter, nephrostomy tube, tracheostomy, PEG tube, pleurex catheter, cholecystostomy, etc)? No  Were BPAs reviewed? Yes    Social History     Socioeconomic History    Marital status: Single   Tobacco Use    Smoking status: Every Day     Current packs/day: 0.50     Average packs/day: 0.3 packs/day for 50.6 years (13.3 ttl pk-yrs)     Types: Cigarettes     Start date: 3/28/1973     Last attempt to quit: 3/28/2021    Smokeless tobacco: Never   Substance and Sexual Activity    Alcohol use: No     Comment: special occasions    Drug use: No    Sexual activity: Yes     Partners: Male   Other Topics Concern    Are you pregnant or think you may be? No    Breast-feeding No   Social History Narrative    Lives with daughter and grandson. No assistance with ADLs and still driving.     Social Determinants of Health     Financial Resource Strain: Low Risk  (10/24/2023)    Overall Financial Resource Strain (CARDIA)     Difficulty of Paying Living Expenses: Not very hard   Food Insecurity: No Food Insecurity (10/24/2023)    Hunger Vital Sign     Worried About Running Out of Food in the Last Year: Never true     Ran Out of Food in the Last Year: Never true   Transportation Needs: No Transportation Needs (10/24/2023)    PRAPARE - Transportation     Lack of Transportation (Medical): No     Lack of Transportation (Non-Medical): No   Physical Activity: Inactive (10/24/2023)    Exercise Vital Sign     Days of Exercise per Week: 0 days     Minutes of Exercise per Session: 0 min   Stress: No Stress Concern  Present (4/14/2022)    [facility] of Occupational Health - Occupational Stress Questionnaire     Feeling of Stress : Not at all   Social Connections: Unknown (10/24/2023)    Social Connection and Isolation Panel [NHANES]     Frequency of Communication with Friends and Family: More than three times a week     Frequency of Social Gatherings with Friends and Family: More than three times a week     Attends [employer] Services: More than 4 times per year     Active Member of Clubs or Organizations: No     Attends Club or Organization Meetings: Never   Housing Stability: Unknown (10/24/2023)    Housing Stability Vital Sign     Unable to Pay for Housing in the Last Year: No     Unstable Housing in the Last Year: No         OBJECTIVE:     Vital Signs:  Vitals:    10/30/23 1200   BP: (!) 140/78   Pulse: 88   Resp: 20   Temp: 98.7 °F (37.1 °C)       Review of Systems   Constitutional: Negative.    HENT: Negative.     Eyes: Negative.    Respiratory: Negative.     Cardiovascular: Negative.    Gastrointestinal: Negative.    Endocrine: Negative.    Genitourinary: Negative.    Musculoskeletal: Negative.    Neurological: Negative.         No syncope since hospitalization 10/24/23   Hematological: Negative.    Psychiatric/Behavioral: Negative.         Physical Exam:  Physical Exam  Constitutional:       General: She is not in acute distress.     Appearance: Normal appearance. She is normal weight. She is not ill-appearing.   HENT:      Head: Normocephalic and atraumatic.      Right Ear: External ear normal.      Left Ear: External ear normal.      Nose: Nose normal.      Mouth/Throat:      Mouth: Mucous membranes are moist.      Pharynx: Oropharynx is clear.   Eyes:      Extraocular Movements: Extraocular movements intact.      Conjunctiva/sclera: Conjunctivae normal.      Pupils: Pupils are equal, round, and reactive to light.   Cardiovascular:      Rate and Rhythm: Normal rate and regular rhythm.      Pulses: Normal pulses.       Heart sounds: Normal heart sounds.   Pulmonary:      Effort: Pulmonary effort is normal. No respiratory distress.      Breath sounds: Normal breath sounds. No wheezing, rhonchi or rales.   Abdominal:      General: Bowel sounds are normal. There is no distension.      Palpations: Abdomen is soft. There is no mass.      Tenderness: There is no abdominal tenderness.   Musculoskeletal:         General: No swelling. Normal range of motion.      Cervical back: Neck supple.   Skin:     General: Skin is warm and dry.      Capillary Refill: Capillary refill takes 2 to 3 seconds.   Neurological:      Mental Status: She is alert and oriented to person, place, and time.      Motor: No weakness.      Gait: Gait abnormal.   Psychiatric:         Mood and Affect: Mood normal.         Behavior: Behavior normal.         Thought Content: Thought content normal.         Judgment: Judgment normal.      Comments: Seems forgetful, distracted easily, disengaged at times       INSTRUCTIONS FOR PATIENT:     Scheduled Follow-up, Appts Reviewed with Modifications if Needed: Yes  Future Appointments   Date Time Provider Department Center   11/2/2023  9:30 AM Ciera-Rosemary Dang NP [facility]   11/2/2023  2:15 PM Wayne Middleton, PT V[facility] OP [facility]   12/29/2023 11:00 AM LAB, APPOINTMENT [facility]   1/5/2024  1:00 PM Graeme Rivera, ARNOLD, SENAITP [facility]       Signature: Tori Otero NP    Transition of Care Visit:  I have reviewed and updated the history and problem list.  I have reconciled the medication list.  I have discussed the hospitalization and current medical issues, prognosis and plans with the patient/family.     4 minutes of 60 minute visit spent discussing/educating on tobacco cessation importance. Counseling offered as well as nicotine patches but patient declined.

## 2023-11-01 NOTE — ASSESSMENT & PLAN NOTE
Chronic, currently stable.  Continue antihypertensives on hospital discharge paperwork-including newly started nifedipine.   Follow up with PCP/Cardiology.

## 2023-11-02 NOTE — PROGRESS NOTES
OUTPATIENT PHYSICAL THERAPY DISCHARGE SUMMARY     Name: Buck Ibarra  Clinic Number: [id number]    Diagnosis:   I69.30 (ICD-10-CM) - History of cerebrovascular accident (CVA) with residual deficit   R29.898 (ICD-10-CM) - Bilateral leg weakness   R42 (ICD-10-CM) - Dizziness     Physician: Ericka Cortez MD  Treatment Orders: PT Eval and Treat  Past Medical History:   Diagnosis Date    Allergy     Cataract     Diabetes mellitus type II     Gastritis     with gastric ulcer    GERD (gastroesophageal reflux disease)     H. pylori infection     History of hepatitis C, SVR as of 8-2016 8/25/2015    Harvoni 12 wks completed.  SVR(12) as of 8/4/16 SVR(24) as of 10-     Hypertension     Osteopenia     Type 2 diabetes mellitus with diabetic polyneuropathy        Initial visit: 7/27/23  Date of Last visit: 10/4/23  Date of Discharge Note:  11/2/23  Total Visits Received: 12  Missed Visits: 5 no show and 6 cancels  ASSESSMENT   Status Towards Goals Met:       See below for summary of goal achievement as of pt's last visit on 10/4/23: Pt met all STGs but only some LTGs.      Goals:  Short Term Goals: 4 weeks   Pt will be issued first installment of HEP and report at least partial compliance. Met 8/17/23  Pt to complete mCTSIB testing and PT to establish appropriate goals Ongoing, 8/29/23 (able to complete conditions 1 and 2), MET, 9/19/23  Pt to improve her 30 second chair rise score to 6-7 repetitions to demonstrate improved LE functional strength and muscular endurance Met 8/29/23  Pt to improve her TUG score to 24 seconds or < with rollator for improved household ambulation and decreased fall risk Met 8/29/23  Pt to improve her SSWS to 0.55 m/sec with rollator for improved community ambulation and decreased fall risk Ongoing, 8/29/23, MET, 9/19/23  Pt to improve at least 4 LE muscles tested by 1/3 grade for improved stability and balance when ambulating Met 8/29/23     Long Term Goals: 8 weeks   Pt will improve her  FOTO intake score to 38 % or < to demonstrate improved self-perception of deficits Ongoing, 23  Pt to improve her 30 second chair rise score to 7-8 repetitions to demonstrate improved LE functional strength and muscular endurance Ongoing, 23, MET, 23  Pt to improve her TUG score to 22 seconds or < with rollator for improved household ambulation and decreased fall risk Met 23  Pt to improve her SSWS to 0.60 m/sec with rollator for improved community ambulation and decreased fall risk Ongoing, 23, MET, 23  Pt to improve at least 6 LE muscles tested by 1/3 grade for improved stability and balance when ambulating Met 23  Pt to be at least partially compliant with finalized HEP to help maintain any potential gains realized in PT Ongoing, 23     New 23:  Pt to improve her TUG score to 14 seconds or < without rollator for improved household ambulation and decreased fall risk~ongoing   Pt to improve at least 6 LE muscles tested by 1/2 grade for improved stability and balance when ambulating~ongoing    Goals Not achieved and why:   Pt did not meet some LTGs due to non-compliance with attendance, mainly due to continued issues with high blood pressure, dizziness and pain to LEs. Pt saw internal medicine NP today.      Discharge reason : Non-Compliance with attendance, Pt has not re-scheduled further follow-up sessions and POC has     PLAN   This patient is discharged from Outpatient Physical Therapy Services.     Wayne Middleton, PT  2023

## 2023-11-02 NOTE — PROGRESS NOTES
"INTERNAL MEDICINE URGENT CARE NOTE    CHIEF COMPLAINT     Chief Complaint   Patient presents with    Hypertension    Medication Problem     Review if meds are still needed        HPI     Buck Ibarra is a 77 y.o. female who presents for an urgent visit today.    Home health nurse with concerns of elevated blood pressure yesterday. Reading 171/96 despite taking home medications.  Home medications include procardia, candesartan, carvedilol, and hydralazine.  Yesterday patient complained of HA.  Denies chest pain, shortness of breath, visual disturbances, or dizziness.   When speaking with on-call RN yesterday, patient expressed confusion about which medication she is supposed to be taking.   Checking blood pressure at home every day. Reports averages at home 140-150s/70-80s.   Occasionally blood pressure will "jazmin rocket"  Denies any symptoms today. HA resolved. Feels normal self.  States takes medications daily as prescribed.    Brought medications with her to appointment including carvediol, candesartan, amlodipine, chlorthalidone, and hydralazine.  Multiple pills left in bottle, most prescriptions filled in early 06/2023.  24 hour diet recall includes [employer] for lunch yesterday and chicken ramen for dinner.    Recent work-up for CVA in ER following unwitnessed syncopal episode on 10/24/2023    Medical Decision Making  Pt noted to have right sided weakness and left sided gaze upon triage. BP initially noted in the 70s systolic. Repeat BP 140s. Stroke code activated. CTA without evidence of large vessel occlusion. Pt with improving mentation and strength following normalization of blood pressure. No focal deficits at this time. Stroke neuro evaluated the pt in the ED, low suspicion for stroke as etiology of pt's presentation. Syncope work up initiated. Concern for seizure vs syncope. Pt signed out to incoming team pending lab work and imaging with likely admit.    Hospital Course:   Pt admitted to [facility] and " remained stable overnight and into 10/25. BP control optimized. 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 Echo without acute findings. Pt deemed appropriate for discharge on updated antihypertensive regimen and will get a Holter monitor; SW assisting with HH. Plan discussed with pt, who was agreeable and amenable; medications were discussed and reviewed, outpatient follow-up scheduled, ER precautions were given, all questions were answered to the pt's satisfaction, and Ms. Ibarra was subsequently discharged.    Holter Monitor Results    Conclusion         The predominant rhythm is sinus.     Sinus rhythm  No sustained arrhythmias  No symptoms reported        Past Medical History:  Past Medical History:   Diagnosis Date    Allergy     Cataract     Diabetes mellitus type II     Gastritis     with gastric ulcer    GERD (gastroesophageal reflux disease)     H. pylori infection     History of hepatitis C, SVR as of 8-2016 8/25/2015    Harvoni 12 wks completed.  SVR(12) as of 8/4/16 SVR(24) as of 10-     Hypertension     Osteopenia     Type 2 diabetes mellitus with diabetic polyneuropathy        Home Medications:  Prior to Admission medications    Medication Sig Start Date End Date Taking? Authorizing Provider   aspirin (ECOTRIN) 81 MG EC tablet Take 1 tablet (81 mg total) by mouth once daily. 8/18/22 11/1/23  Ericka Cortez MD   blood-glucose meter,continuous (DEXCOM G6 ) Misc Use as directed. 1/19/21   Graeme Rivera, APRN, FNP   blood-glucose sensor (DEXCOM G6 SENSOR) Rashida Change every 10 days. 90 day e 11.65 9/8/22   Leonor Parks NP   blood-glucose transmitter (DEXCOM G6 TRANSMITTER) Rashida Change every 3 months. 9/8/22   Leonor Parks NP   candesartan (ATACAND) 32 MG tablet Take 1 tablet (32 mg total) by mouth once daily. 6/12/23 6/11/24  Ericka Cortez MD   carvediloL (COREG) 6.25 MG tablet Take 1 tablet  "(6.25 mg total) by mouth in the morning and 1 tablet (6.25 mg total) before bedtime.. 6/12/23   Ericka Cortez MD   clopidogreL (PLAVIX) 75 mg tablet Take 1 tablet (75 mg total) by mouth once daily. 6/12/23   Ericka Cortez MD   doxepin (SINEQUAN) 10 MG capsule Take 1 capsule (10 mg total) by mouth nightly for 90 days. 6/12/23   Ericka Cortez MD   gabapentin (NEURONTIN) 300 MG capsule Take 1 capsule (300 mg total) by mouth in the morning and 1 capsule (300 mg total) before bedtime. 6/12/23   Ericka Cortez MD   hydrALAZINE (APRESOLINE) 50 MG tablet Take 1 tablet (50 mg total) by mouth every 8 (eight) hours. Check BP before taking. If top number <110, do not take medicine. 6/12/23   Ericka Cortez MD   insulin degludec (TRESIBA FLEXTOUCH U-200) 200 unit/mL (3 mL) insulin pen Inject 20 units into the skin once nightly. 8/4/22   Graeme Rivera, ARNOLD, SENAITP   insulin lispro (HUMALOG KWIKPEN INSULIN) 100 unit/mL pen Inject 6 units before meals plus scale 180-230+2, 231-280+4, 281-330+6, 331-380+8, >380+10. Max daily 48 units. 10/4/22   Leonor Parks, KEYLA   meclizine (ANTIVERT) 25 mg tablet Take 1 tablet (25 mg total) by mouth 3 (three) times daily as needed for Dizziness. 10/5/23   Graeme Rivera, ARNOLD, FNP   NIFEdipine (PROCARDIA-XL) 30 MG (OSM) 24 hr tablet Take 1 tablet (30 mg total) by mouth once daily. 10/26/23 10/25/24  Sachin Arroyo MD   pantoprazole (PROTONIX) 40 MG tablet Take 1 tablet (40 mg total) by mouth in the morning for 90 days. 6/12/23   Ericka Cortez MD   pen needle, diabetic (BD ULTRA-FINE MILAN PEN NEEDLE) 32 gauge x 5/32" Ndle Use as directed 3 times daily with insulin pens  Patient taking differently: Use as directed 3 times daily with insulin pens 3/2/22   Graeme Rivera, APRN, FNP   rosuvastatin (CRESTOR) 40 MG Tab Take 1 tablet (40 mg total) by mouth every evening. 7/25/23 7/24/24  Ericka Cortez MD   semaglutide (OZEMPIC) 2 mg/dose (8 mg/3 mL) PnIj Inject 2 mg into the skin every 7 days. 7/25/23 7/24/24  Ericka Cortez MD "   insulin aspart U-100 (NOVOLOG FLEXPEN U-100 INSULIN) 100 unit/mL (3 mL) InPn pen Inject 10 units w/ largest meal plus scale 180-230+2, 231-280+4, 281-330+6, 331-380+8, >380+10. Max daily 50 units. 9/18/20 2/27/21  Graeme Rivera, APRN, FNP       Review of Systems:  Review of Systems   Constitutional:  Negative for chills, diaphoresis, fatigue and fever.   HENT:  Negative for hearing loss, postnasal drip, rhinorrhea, sinus pressure, sinus pain and trouble swallowing.    Eyes:  Negative for visual disturbance.   Respiratory:  Negative for cough, chest tightness, shortness of breath and wheezing.    Cardiovascular:  Negative for chest pain, palpitations and leg swelling.   Gastrointestinal:  Negative for abdominal pain, constipation, diarrhea, nausea and vomiting.   Genitourinary:  Negative for difficulty urinating, dysuria, frequency and urgency.   Musculoskeletal:  Negative for arthralgias, back pain, gait problem, joint swelling and myalgias.   Skin:  Negative for rash and wound.   Neurological:  Positive for headaches. Negative for dizziness, syncope, weakness and light-headedness.   Psychiatric/Behavioral:  Negative for dysphoric mood, sleep disturbance and suicidal ideas.        Health Maintainence:   Immunizations:  Health Maintenance         Date Due Completion Date    RSV Vaccine (Age 60+) (1 - 1-dose 60+ series) Never done ---    Shingles Vaccine (3 of 3) 12/30/2021 11/4/2021    Eye Exam 03/10/2023 3/10/2022    COVID-19 Vaccine (7 - 2023-24 season) 09/01/2023 1/19/2023    Hemoglobin A1c 01/24/2024 10/24/2023    Foot Exam 04/27/2024 4/27/2023 (Done)    Override on 4/27/2023: Done    Override on 9/17/2020: Done    Override on 8/9/2018: Done    Override on 3/15/2018: Done    Override on 1/21/2016: Done    Diabetes Urine Screening 06/28/2024 6/28/2023    Colorectal Cancer Screening 08/08/2024 8/8/2019    DEXA Scan 10/06/2024 10/6/2022    Lipid Panel 10/24/2024 10/24/2023    TETANUS VACCINE 07/22/2025  "7/22/2015             PHYSICAL EXAM     /62 (BP Location: Left arm, Patient Position: Sitting, BP Method: Medium (Manual))   Pulse 95   Ht 5' 7" (1.702 m)   Wt 88.1 kg (194 lb 3.6 oz)   SpO2 99%   BMI 30.42 kg/m²     Physical Exam  Constitutional:       General: She is not in acute distress.     Appearance: Normal appearance. She is obese. She is not ill-appearing or toxic-appearing.   HENT:      Head: Normocephalic and atraumatic.      Right Ear: Tympanic membrane, ear canal and external ear normal.      Left Ear: Tympanic membrane, ear canal and external ear normal.      Nose: Nose normal.      Mouth/Throat:      Mouth: Mucous membranes are moist.      Pharynx: Oropharynx is clear.   Eyes:      Extraocular Movements: Extraocular movements intact.      Conjunctiva/sclera: Conjunctivae normal.      Pupils: Pupils are equal, round, and reactive to light.   Cardiovascular:      Rate and Rhythm: Normal rate and regular rhythm.      Pulses: Normal pulses.      Heart sounds: Normal heart sounds. No murmur heard.     No friction rub. No gallop.   Pulmonary:      Effort: Pulmonary effort is normal. No respiratory distress.      Breath sounds: Normal breath sounds. No stridor. No wheezing.   Abdominal:      General: Abdomen is flat. Bowel sounds are normal. There is no distension.      Palpations: Abdomen is soft.      Tenderness: There is no abdominal tenderness. There is no guarding.   Musculoskeletal:         General: No swelling, tenderness or deformity. Normal range of motion.      Cervical back: Normal range of motion and neck supple.   Skin:     General: Skin is warm and dry.      Capillary Refill: Capillary refill takes less than 2 seconds.   Neurological:      General: No focal deficit present.      Mental Status: She is alert and oriented to person, place, and time. Mental status is at baseline.      GCS: GCS eye subscore is 4. GCS verbal subscore is 5. GCS motor subscore is 6.   Psychiatric:         " Mood and Affect: Mood normal.         Behavior: Behavior normal.         Thought Content: Thought content normal.         Judgment: Judgment normal.       LABS     Lab Results   Component Value Date    HGBA1C 9.5 (H) 10/24/2023     CMP  Sodium   Date Value Ref Range Status   10/25/2023 141 136 - 145 mmol/L Final     Potassium   Date Value Ref Range Status   10/25/2023 3.0 (L) 3.5 - 5.1 mmol/L Final     Chloride   Date Value Ref Range Status   10/25/2023 111 (H) 95 - 110 mmol/L Final     CO2   Date Value Ref Range Status   10/25/2023 20 (L) 23 - 29 mmol/L Final     Glucose   Date Value Ref Range Status   10/25/2023 77 70 - 110 mg/dL Final     BUN   Date Value Ref Range Status   10/25/2023 14 8 - 23 mg/dL Final     Creatinine   Date Value Ref Range Status   10/25/2023 0.8 0.5 - 1.4 mg/dL Final     Calcium   Date Value Ref Range Status   10/25/2023 8.8 8.7 - 10.5 mg/dL Final     Total Protein   Date Value Ref Range Status   10/25/2023 5.7 (L) 6.0 - 8.4 g/dL Final     Albumin   Date Value Ref Range Status   10/25/2023 2.6 (L) 3.5 - 5.2 g/dL Final     Total Bilirubin   Date Value Ref Range Status   10/25/2023 0.2 0.1 - 1.0 mg/dL Final     Comment:     For infants and newborns, interpretation of results should be based  on gestational age, weight and in agreement with clinical  observations.    Premature Infant recommended reference ranges:  Up to 24 hours.............<8.0 mg/dL  Up to 48 hours............<12.0 mg/dL  3-5 days..................<15.0 mg/dL  6-29 days.................<15.0 mg/dL       Alkaline Phosphatase   Date Value Ref Range Status   10/25/2023 73 55 - 135 U/L Final     AST   Date Value Ref Range Status   10/25/2023 11 10 - 40 U/L Final     ALT   Date Value Ref Range Status   10/25/2023 9 (L) 10 - 44 U/L Final     Anion Gap   Date Value Ref Range Status   10/25/2023 10 8 - 16 mmol/L Final     eGFR if    Date Value Ref Range Status   05/12/2022 >60.0 >60 mL/min/1.73 m^2 Final     eGFR if  non    Date Value Ref Range Status   05/12/2022 55.6 (A) >60 mL/min/1.73 m^2 Final     Comment:     Calculation used to obtain the estimated glomerular filtration  rate (eGFR) is the CKD-EPI equation.        Lab Results   Component Value Date    WBC 8.16 10/25/2023    HGB 13.7 10/25/2023    HCT 42.8 10/25/2023    MCV 88 10/25/2023     10/25/2023     Lab Results   Component Value Date    CHOL 220 (H) 10/24/2023    CHOL 109 (L) 06/28/2023    CHOL 205 (H) 02/27/2023     Lab Results   Component Value Date    HDL 39 (L) 10/24/2023    HDL 40 06/28/2023    HDL 46 02/27/2023     Lab Results   Component Value Date    LDLCALC 157.4 10/24/2023    LDLCALC 50.2 (L) 06/28/2023    LDLCALC 138.2 02/27/2023     Lab Results   Component Value Date    TRIG 118 10/24/2023    TRIG 94 06/28/2023    TRIG 104 02/27/2023     Lab Results   Component Value Date    CHOLHDL 17.7 (L) 10/24/2023    CHOLHDL 36.7 06/28/2023    CHOLHDL 22.4 02/27/2023     Lab Results   Component Value Date    TSH 2.314 10/24/2023       ASSESSMENT/PLAN     Buck Ibarra is a 77 y.o. female     Essential hypertension- at goal. Will cont current meds. Discussed medication compliance and is willing to start pill packing      Bilateral carotid artery disease, unspecified type- stable. Will cont plavix and crestor     Atherosclerotic heart disease of native coronary artery with other forms of angina pectoris- stable. Will cont plavix and crestor     Aortic atherosclerosis- stable. Will cont plavix and crestor     NICM (nonischemic cardiomyopathy)- stable. Will cont plavix and crestor and coreg     PAD (peripheral artery disease)- stable. Will cont plavix and crestor     History of CVA (cerebrovascular accident)- stable. Will cont plavix and crestor and coreg     Type 2 diabetes mellitus with hyperglycemia, with long-term current use of insulin- stable. Will cont current meds. F/u with I. Rivera.     Type 2 diabetes mellitus with diabetic  polyneuropathy, with long-term current use of insulinstable. Will cont current meds. F/u with I. Rivera.     Multinodular goiter- stable. Will montior     Obesity (BMI 30-39.9)    Osteopenia, unspecified location      Follow up with PCP     Patient education provided from [facility]. Patient was counseled on when and how to seek emergent care.       Rosemary FLORES, ARNOLD, FNP-c   Department of Internal Medicine - [facility]  9:04 AM

## 2023-11-26 PROBLEM — F17.200 CURRENT EVERY DAY SMOKER: Status: ACTIVE | Noted: 2023-01-01

## 2023-11-26 PROBLEM — I63.312 CEREBROVASCULAR ACCIDENT (CVA) DUE TO THROMBOSIS OF LEFT MIDDLE CEREBRAL ARTERY: Status: ACTIVE | Noted: 2023-01-01

## 2023-11-26 PROBLEM — G81.91 HEMIPARESIS, RIGHT: Status: ACTIVE | Noted: 2023-01-01

## 2023-11-27 PROBLEM — M25.571 ACUTE RIGHT ANKLE PAIN: Status: ACTIVE | Noted: 2023-01-01

## 2023-11-27 PROBLEM — E66.811 CLASS 1 OBESITY DUE TO EXCESS CALORIES WITH SERIOUS COMORBIDITY AND BODY MASS INDEX (BMI) OF 31.0 TO 31.9 IN ADULT: Status: ACTIVE | Noted: 2023-01-01

## 2023-11-27 PROBLEM — E66.09 CLASS 1 OBESITY DUE TO EXCESS CALORIES WITH SERIOUS COMORBIDITY AND BODY MASS INDEX (BMI) OF 31.0 TO 31.9 IN ADULT: Status: ACTIVE | Noted: 2023-01-01

## 2023-11-27 NOTE — PT/OT/SLP EVAL
Physical Therapy Co-Evaluation and Co-Treatment with OT    Patient Name:  Buck Ibarra   MRN:  [id number]    Recent Surgery: * No surgery found *      Patient required co-tx with OT secondary to need for multiple set of skilled hands to provide safest therapy and best outcomes.      Recommendations:     Discharge Recommendations:   High Intensity Therapy    Discharge Equipment Recommendations: to be determined by next level of care   Barriers to discharge: Increased level of assist    Highest Level of Mobility: Supine to sit   Assistance Required: Max(A)X2 persons    Assessment:     Buck Ibarra is a 78 y.o. female admitted with a medical diagnosis of Cerebrovascular accident (CVA) due to thrombosis of left middle cerebral artery. She presents with the following impairments/functional limitations:  weakness, impaired endurance, impaired self care skills, impaired functional mobility, gait instability, impaired balance, pain, decreased safety awareness, decreased lower extremity function, decreased upper extremity function, impaired coordination    Pt met with HOB and agreeable to PT session. Pt reports her PLOF is (I) with functional mobility and ADLs using no DME. Currently, pt requires max(a)X2 persons for supine to sit. She was very limited by lethargy on exam and required max cues to maintain her eyes open. Unable to obtain an accurate MMT due to poor command following secondary to lethargy. Pt's R ankle was very tender to palpation. On exam, she has edema present to lateral aspect of R ankle and has limited ankle DF/PF ROM. Writing therapist notified MD. Standing deferred due to pt lethargy and awaiting R ankle x-ray.    Pt would benefit from continued skilled acute PT 4x/wk to address above listed functional deficits, provide patient/caregiver education, reduce fall risk, and maximize (I) and safety with functional mobility.     Rehab Prognosis: Good; patient would benefit from acute skilled PT  "services to address these deficits and reach maximum level of function.      Plan:     During this hospitalization, patient to be seen 4 x/week to address the identified rehab impairments via gait training, therapeutic activities, therapeutic exercises, neuromuscular re-education and progress toward the following goals:    Plan of Care Expires:  12/27/23    This plan of care has been discussed with the patient/caregiver, who was included in its development and is in agreement with the identified goals and treatment plan.     Subjective     Communicated with RN prior to session.  Patient agreeable to participate.     Chief Complaint: L MCA CVA   Patient/Family Comments/goals: None stated    Pain/Comfort:  Pain Rating 1:  (unrated)  Location - Side 1: Right  Location - Orientation 1: generalized  Location 1: ankle  Pain Addressed 1: Nurse notified  Pain Rating Post-Intervention 1:  (unrated)    Patients cultural, spiritual, Christian conflicts given the current situation: no    Patient's living environment is as follows:  Living Environment: Pt lives with daughter, son-in-law, and their children in 2  with 0 CHUCK. Bedroom and bathroom downstairs.   Prior Level of Function: independent with mobility and ADLs  DME used: none  DME owned (not currently used):  rollator  Upon discharge, patient will have assistance from: Family    Objective:     Patient found HOB elevated with bed alarm, telemetry  upon PT entry to room.    General Precautions: Standard, aspiration, fall   Orthopedic Precautions:N/A   Braces: N/A   BP (!) 203/84 (BP Location: Right arm, Patient Position: Lying)   Pulse (!) 56   Temp 98.5 °F (36.9 °C) (Oral)   Resp 19   Ht 5' 7" (1.702 m)   Wt 90 kg (198 lb 6.6 oz)   SpO2 99%   Breastfeeding No   BMI 31.08 kg/m²   Oxygen Device:  room air      Exams:    Cognition:  Patient is oriented to Person, Place, Time, Situation  Increased time due to lethargy  Command following: limited due to lethargy "   Insight to deficits/safety awareness: impaired    Edema: Edema present to R lateral ankle- MD notified    Tone: None present    Postural examination/scapula alignment: Rounded shoulder    Lower Extremity Range of Motion:  Right Lower Extremity: WNL  Left Lower Extremity: WNL    Lower Extremity Strength  BRIA due to poor command following in sitting. Pt falling asleep at EOB despite max cues from therapist to maintain alertness     Sensation:   Light touch sensation: Intact BLEs    Functional Mobility:    Bed Mobility:  Supine to Sit: Maximum Assistance and 2 persons  on L side of bed  Sit to Supine: Maximum Assistance and 2 persons  Scooting anteriorly to EOB to plant feet on floor: Maximum Assistance    Transfers:   Sit to Stand Transfer: Activity did not occur - see assessment              Gait:  Deferred    Balance:  Static Sit:   Initially min(A), then became max(A)  at EOB   R posterior lean      Therapeutic Activities/Exercises     Patient assisted with functional mobility as noted above  Discussed at length benefits of PT as well as d/c recommendations.   Patient instructed to reposition in bed at least every 2 hours to reduce the risk of skin breakdown during hospital stay.  Patient educated on the importance of early mobility, OOB to prevent functional decline during hospital stay  Patient was instructed to utilize staff assistance for mobility/transfers.  Patient is appropriate to transfer with dependence to medichair via drawsheet and RN/PCT assist  Patient educated on PT POC and role of PT in acute care  White board updated to include patient's safest level of mobility with staff assistance, RN also updated    AM-PAC 6 CLICK MOBILITY  Turning over in bed (including adjusting bedclothes, sheets and blankets)?: 2  Sitting down on and standing up from a chair with arms (e.g., wheelchair, bedside commode, etc.): 2  Moving from lying on back to sitting on the side of the bed?: 2  Moving to and from a bed to  a chair (including a wheelchair)?: 2  Need to walk in hospital room?: 2  Climbing 3-5 steps with a railing?: 1  Basic Mobility Total Score: 11      Patient left HOB elevated with all lines intact, call button in reach, bed alarm on, and RN notified.      History/Goals:     PAST MEDICAL HISTORY:  Past Medical History:   Diagnosis Date    Allergy     Cataract     Current every day smoker     Diabetes mellitus type II     Gastritis     with gastric ulcer    GERD (gastroesophageal reflux disease)     H. pylori infection     History of hepatitis C, SVR as of 8-2016 08/25/2015    Harvoni 12 wks completed.  SVR(12) as of 8/4/16 SVR(24) as of 10-     Hypertension     Osteopenia     Type 2 diabetes mellitus with diabetic polyneuropathy        Past Surgical History:   Procedure Laterality Date    COLONOSCOPY      COLONOSCOPY N/A 1/28/2016    Procedure: COLONOSCOPY;  Surgeon: Emeka Ahn MD;  Location: [facility] ([address] [place] [address]);  Service: Endoscopy;  Laterality: N/A;    COLONOSCOPY N/A 8/8/2019    Procedure: COLONOSCOPY;  Surgeon: Susan Dia MD;  Location: [facility] ([address] [place] [address]);  Service: Endoscopy;  Laterality: N/A;  appt confirmed-rb    HYSTERECTOMY      LIVER BIOPSY      OOPHORECTOMY      PERIPHERAL ANGIOGRAPHY Left 5/5/2021    Procedure: Peripheral angiography;  Surgeon: Randolph Toribio MD;  Location: [facility] CATH LAB;  Service: Cardiology;  Laterality: Left;    PERIPHERAL ANGIOGRAPHY N/A 6/21/2021    Procedure: Peripheral angiography;  Surgeon: Randolph Toribio MD;  Location: [facility] CATH LAB;  Service: Cardiology;  Laterality: N/A;    UPPER GASTROINTESTINAL ENDOSCOPY         GOALS:   Multidisciplinary Problems       Physical Therapy Goals          Problem: Physical Therapy    Goal Priority Disciplines Outcome Goal Variances Interventions   Physical Therapy Goal     PT, PT/OT Ongoing, Progressing     Description: Goals to be met by: 12/11/23     Patient will increase functional independence with mobility by  performin. Supine to sit with MInimal Assistance  2. Sit to supine with MInimal Assistance  3. Sit to stand transfer with Minimal Assistance  4. Bed to chair transfer with Minimal Assistance using LRAD as needed  5. Gait  x 50 feet with Minimal Assistance using LRAD as needed.   6. Lower extremity exercise program x10 reps per handout, with assistance as needed    *Will add stair goal as appropriate                          Time Tracking:     PT Received On: 23  PT Start Time: 0820     PT Stop Time: 0845  PT Total Time (min): 25 min     Billable Minutes: Evaluation 15 and Therapeutic Activity 10      Dolly Fraser, PT  2023  Pager# [phone]

## 2023-11-27 NOTE — ED PROVIDER NOTES
Chief Complaint   Weakness (Increased right sided weakness and slurred speech since Thursday. Stating was out of town when they noticed the increasing symptoms. Also stating the bp has been high since them also. Upon arrival to er on attempt to exit vehicle pt  slide to ground per family and er staff had to assist with lifting patient into wheelchiar)      History Of Present Illness   Buck Ibarra is a 78 y.o. female presenting with right-sided weakness and slurred speech that started 3 days ago.  Most of the family was out of town and the family member watching her did not realize that she needs to go to the hospital.  She is also been having trouble with her blood pressure being elevated.  Family states she has had a stroke in the past as well.    Independent Historian: Family provided most history    Review of patient's allergies indicates:   Allergen Reactions    Amlodipine Swelling    Erythromycin Anaphylaxis    Erythropoietin analogues Anaphylaxis    Pegasys [peginterferon ruben-2a] Anaphylaxis    Lisinopril Hives       No current facility-administered medications on file prior to encounter.     Current Outpatient Medications on File Prior to Encounter   Medication Sig Dispense Refill    aspirin (ECOTRIN) 81 MG EC tablet Take 1 tablet (81 mg total) by mouth once daily. 90 tablet 3    blood-glucose meter,continuous (DEXCOM G6 ) Misc Use as directed. 1 each 0    blood-glucose sensor (DEXCOM G6 SENSOR) Rashida Change every 10 days. 90 day e 11.65 9 each 3    blood-glucose transmitter (DEXCOM G6 TRANSMITTER) Rashida Change every 3 months. 1 each 3    candesartan (ATACAND) 32 MG tablet Take 1 tablet (32 mg total) by mouth once daily. 90 tablet 3    carvediloL (COREG) 6.25 MG tablet Take 1 tablet (6.25 mg total) by mouth in the morning and 1 tablet (6.25 mg total) before bedtime.. 180 tablet 3    clopidogreL (PLAVIX) 75 mg tablet Take 1 tablet (75 mg total) by mouth once daily. 90 tablet 3    doxepin  "(SINEQUAN) 10 MG capsule Take 1 capsule (10 mg total) by mouth nightly for 90 days. 90 capsule 3    gabapentin (NEURONTIN) 300 MG capsule Take 1 capsule (300 mg total) by mouth in the morning and 1 capsule (300 mg total) before bedtime. 180 capsule 3    hydrALAZINE (APRESOLINE) 50 MG tablet Take 1 tablet (50 mg total) by mouth every 8 (eight) hours. Check BP before taking. If top number <110, do not take medicine. 270 tablet 3    insulin degludec (TRESIBA FLEXTOUCH U-200) 200 unit/mL (3 mL) insulin pen Inject 20 units into the skin once nightly. 3 pen 6    insulin lispro (HUMALOG KWIKPEN INSULIN) 100 unit/mL pen Inject 6 units before meals plus scale 180-230+2, 231-280+4, 281-330+6, 331-380+8, >380+10. Max daily 48 units. 15 mL 2    meclizine (ANTIVERT) 25 mg tablet Take 1 tablet (25 mg total) by mouth 3 (three) times daily as needed for Dizziness. 90 tablet 0    NIFEdipine (PROCARDIA-XL) 30 MG (OSM) 24 hr tablet Take 1 tablet (30 mg total) by mouth once daily. 90 tablet 0    pantoprazole (PROTONIX) 40 MG tablet Take 1 tablet (40 mg total) by mouth in the morning for 90 days. 90 tablet 3    pen needle, diabetic (BD ULTRA-FINE MILAN PEN NEEDLE) 32 gauge x 5/32" Ndle Use as directed 3 times daily with insulin pens (Patient taking differently: Use as directed 3 times daily with insulin pens) 300 each 3    rosuvastatin (CRESTOR) 40 MG Tab Take 1 tablet (40 mg total) by mouth every evening. 90 tablet 3    semaglutide (OZEMPIC) 2 mg/dose (8 mg/3 mL) PnIj Inject 2 mg into the skin every 7 days. 3 mL 11    [DISCONTINUED] insulin aspart U-100 (NOVOLOG FLEXPEN U-100 INSULIN) 100 unit/mL (3 mL) InPn pen Inject 10 units w/ largest meal plus scale 180-230+2, 231-280+4, 281-330+6, 331-380+8, >380+10. Max daily 50 units. 30 mL 1       Past History   As per HPI and below:  Past Medical History:   Diagnosis Date    Allergy     Cataract     Current every day smoker     Diabetes mellitus type II     Gastritis     with gastric ulcer    " GERD (gastroesophageal reflux disease)     H. pylori infection     History of hepatitis C, SVR as of 8-2016 08/25/2015    Harvoni 12 wks completed.  SVR(12) as of 8/4/16 SVR(24) as of 10-     Hypertension     Osteopenia     Type 2 diabetes mellitus with diabetic polyneuropathy      Past Surgical History:   Procedure Laterality Date    COLONOSCOPY      COLONOSCOPY N/A 1/28/2016    Procedure: COLONOSCOPY;  Surgeon: Emeka Ahn MD;  Location: [address]);  Service: Endoscopy;  Laterality: N/A;    COLONOSCOPY N/A 8/8/2019    Procedure: COLONOSCOPY;  Surgeon: Susan Dia MD;  Location: [facility] ENDO ([address] [place] [address]);  Service: Endoscopy;  Laterality: N/A;  appt confirmed-rb    HYSTERECTOMY      LIVER BIOPSY      OOPHORECTOMY      PERIPHERAL ANGIOGRAPHY Left 5/5/2021    Procedure: Peripheral angiography;  Surgeon: Randolph Toribio MD;  Location: [facility] CATH LAB;  Service: Cardiology;  Laterality: Left;    PERIPHERAL ANGIOGRAPHY N/A 6/21/2021    Procedure: Peripheral angiography;  Surgeon: Randolph Toribio MD;  Location: [facility] CATH LAB;  Service: Cardiology;  Laterality: N/A;    UPPER GASTROINTESTINAL ENDOSCOPY         Social History     Socioeconomic History    Marital status: Single   Tobacco Use    Smoking status: Every Day     Current packs/day: 0.50     Average packs/day: 0.3 packs/day for 50.7 years (13.3 ttl pk-yrs)     Types: Cigarettes     Start date: 3/28/1973     Last attempt to quit: 3/28/2021    Smokeless tobacco: Never   Substance and Sexual Activity    Alcohol use: No     Comment: special occasions    Drug use: No    Sexual activity: Yes     Partners: Male   Other Topics Concern    Are you pregnant or think you may be? No    Breast-feeding No   Social History Narrative    Lives with daughter and grandson. No assistance with ADLs and still driving.     Social Determinants of Health     Financial Resource Strain: Low Risk  (10/24/2023)    Overall Financial Resource Strain (CARDIA)      Difficulty of Paying Living Expenses: Not very hard   Food Insecurity: No Food Insecurity (10/24/2023)    Hunger Vital Sign     Worried About Running Out of Food in the Last Year: Never true     Ran Out of Food in the Last Year: Never true   Transportation Needs: No Transportation Needs (10/24/2023)    PRAPARE - Transportation     Lack of Transportation (Medical): No     Lack of Transportation (Non-Medical): No   Physical Activity: Inactive (10/24/2023)    Exercise Vital Sign     Days of Exercise per Week: 0 days     Minutes of Exercise per Session: 0 min   Stress: No Stress Concern Present (4/14/2022)    Citizen of [place] [employer] - Occupational Stress Questionnaire     Feeling of Stress : Not at all   Social Connections: Unknown (10/24/2023)    Social Connection and Isolation Panel [NHANES]     Frequency of Communication with Friends and Family: More than three times a week     Frequency of Social Gatherings with Friends and Family: More than three times a week     Attends [employer] Services: More than 4 times per year     Active Member of Clubs or Organizations: No     Attends Club or Organization Meetings: Never   Housing Stability: Unknown (10/24/2023)    Housing Stability Vital Sign     Unable to Pay for Housing in the Last Year: No     Unstable Housing in the Last Year: No       Family History   Problem Relation Age of Onset    Heart disease Mother     Diabetes Mother     Hypertension Mother     Hyperlipidemia Mother     Heart disease Father     Cancer Sister         breast cancer    Diabetes Sister     Cancer Sister 70        colon CA    Diabetes Sister     Breast cancer Sister     Diabetes Sister     Glaucoma Neg Hx     Melanoma Neg Hx     Cirrhosis Neg Hx     Psoriasis Neg Hx     Lupus Neg Hx        Physical Exam     Vitals:    11/26/23 2105 11/26/23 2146 11/26/23 2233 11/26/23 2249   BP: (!) 197/89 136/83 (!) 237/99    Pulse: 97 74 74    Resp: 16 18 (!) 21    Temp:       SpO2: 99% 100% 100%     Weight:    88 kg (194 lb 0.1 oz)   Height:         Appearance: No acute distress.  Skin: No rashes seen.  Good turgor.  No abrasions.  No ecchymoses.  Eyes: No conjunctival injection.  ENT: Oropharynx clear.    Chest: Clear to auscultation bilaterally.  Good air movement.  No wheezes.  No rhonchi.  Cardiovascular: Regular rate and rhythm.  No murmurs. No gallops. No rubs.  Abdomen: Soft.  Not distended.  Nontender.  No guarding.  No rebound.  Musculoskeletal: Good range of motion all joints.  No deformities.  Neck supple.  No meningismus.  Neurologic: 5/5 LUE, 4/5 RUE with pronator drift.  2/5 LLE, 1/5 RLE; both effort constrained.  Right facial droop.  Slurred speech.  Mental Status:  Alert and oriented x 3.  Appropriate, conversant.      Initial MDM   Focal weakness and slurred speech suggestive of CVA, started 3 days ago.  Not a TNK or EVT candidate.  Subacute stroke order panel used.  Discussed with vascular neurology, who recommend MRI as well.          Medications Given     Medications   aspirin EC tablet 81 mg (has no administration in time range)   clopidogreL tablet 75 mg (has no administration in time range)   doxepin capsule 10 mg (has no administration in time range)   gabapentin capsule 300 mg (300 mg Oral Given 11/26/23 2258)   insulin detemir U-100 (Levemir) pen 10 Units (has no administration in time range)   NIFEdipine 24 hr tablet 30 mg (has no administration in time range)   pantoprazole EC tablet 40 mg (has no administration in time range)   atorvastatin tablet 40 mg (has no administration in time range)   sodium chloride 0.9% flush 10 mL (has no administration in time range)   sodium chloride 0.9% bolus 500 mL 500 mL (has no administration in time range)   labetaloL injection 10 mg (has no administration in time range)   enoxaparin injection 30 mg (30 mg Subcutaneous Given 11/26/23 2258)   acetaminophen tablet 650 mg (has no administration in time range)   ondansetron injection 4 mg (has no  administration in time range)   senna-docusate 8.6-50 mg per tablet 1 tablet (has no administration in time range)   glucose chewable tablet 16 g (has no administration in time range)   glucose chewable tablet 24 g (has no administration in time range)   glucagon (human recombinant) injection 1 mg (has no administration in time range)   insulin aspart U-100 pen 0-5 Units (has no administration in time range)   dextrose 10% bolus 125 mL 125 mL (has no administration in time range)   dextrose 10% bolus 250 mL 250 mL (has no administration in time range)   hydrALAZINE tablet 50 mg (50 mg Oral Given 11/26/23 2246)   carvediloL tablet 6.25 mg (6.25 mg Oral Given 11/26/23 2247)       Results and Course     Labs Reviewed   CBC W/ AUTO DIFFERENTIAL - Abnormal; Notable for the following components:       Result Value    MCHC 31.4 (*)     RDW 14.7 (*)     All other components within normal limits   COMPREHENSIVE METABOLIC PANEL - Abnormal; Notable for the following components:    CO2 22 (*)     Glucose 411 (*)     BUN 31 (*)     Creatinine 2.1 (*)     Albumin 2.9 (*)     eGFR 24.0 (*)     All other components within normal limits   LIPID PANEL - Abnormal; Notable for the following components:    HDL 37 (*)     All other components within normal limits   PROTIME-INR   TSH   HEMOGLOBIN A1C       Imaging Results               MRI Brain Without Contrast (Final result)  Result time 11/26/23 22:21:25      Final result by Fuad Cunningham MD (11/26/23 22:21:25)                   Impression:      Acute infarct involving the left internal capsule/centrum semiovale.    Foci of increased susceptibility in the right precentral gyrus and right paracentral parietal lobe, suggestive of small micro hemorrhages, new from MRI 02/26/2023.    Remote appearing infarcts of the bilateral basal ganglia and left thalamus.    Other stable findings are detailed above.    This report was flagged in Epic as abnormal.    Electronically signed by resident:  Lucia Maribel  Date:    11/26/2023  Time:    21:43    Electronically signed by: Fuad Cunningham MD  Date:    11/26/2023  Time:    22:21               Narrative:    EXAMINATION:  MRI BRAIN WITHOUT CONTRAST    CLINICAL HISTORY:  Stroke, follow up;    TECHNIQUE:  Multiplanar multisequence MR imaging of the brain was performed without intravenous contrast.    COMPARISON:  MRI 02/26/2023, CT 10/24/2023, 11/26/2023.    FINDINGS:  Restricted diffusion centered about the left internal capsule and centrum semiovale, compatible with acute infarct.  No significant associated mass effect or midline shift.   Focus of increased susceptibility in the right precentral gyrus and right paracentral parietal lobe, new from MRI 02/26/2023, suggestive of small hemorrhages.  Few additional remote microhemorrhages which are unchanged from MRI 02/26/2023.  Remote infarct in the left thalamus and bilateral basal ganglia.    Mild generalized cerebral volume loss with compensatory prominence of the ventricles and sulci.  There is patchy FLAIR/T2 signal hyperintensity through the supratentorial white matter which is nonspecific but may represent chronic microvascular ischemic changes.  Ventricles and sulci are stable in size.  No evidence of acute hydrocephalus.    No extra-axial blood or fluid collections.    Normal vascular flow voids are preserved.    Bone marrow signal intensity is normal.                                        CT Head Without Contrast (Final result)  Result time 11/26/23 21:27:14      Final result by Fuad Cunningham MD (11/26/23 21:27:14)                   Impression:      Age indeterminate infarcts in the bilateral basal ganglia and left internal capsule/corona radiata, new from prior.  Recommend further evaluation with MRI.    New punctate hyperdensity in the right precentral gyrus.  This can be followed up MR imaging.    Other stable findings are detailed above.    This report was flagged in Epic as  abnormal.    Electronically signed by resident: Lucia López  Date:    11/26/2023  Time:    20:58    Electronically signed by: Fuad Cunningham MD  Date:    11/26/2023  Time:    21:27               Narrative:    EXAMINATION:  CT HEAD WITHOUT CONTRAST    CLINICAL HISTORY:  Neuro deficit, acute, stroke suspected;    TECHNIQUE:  Low dose axial CT images obtained throughout the head without the use of intravenous contrast.  Axial, sagittal and coronal reconstructions were performed.    COMPARISON:  CTA 10/24/2023, 02/27/2023, MRI 02/26/2023    FINDINGS:  Compared to CT 10/24/2023, there are new foci of hypoattenuation in the bilateral basal ganglia and left internal capsule/corona radiata, concerning for age indeterminate infarcts.  No significant associated mass effect or midline shift.  No evidence of acute intracranial hemorrhage.    Remote infarct in the left thalamus.  Mild generalized cerebral volume loss with compensatory prominence of the ventricles and sulci.  There is patchy hypoattenuation through the supratentorial white matter which is nonspecific but may represent chronic microvascular ischemic changes.  There is a new punctate hyperdensity in the right precentral gyrus.    Ventricles and sulci are stable in size.  No evidence of acute hydrocephalus.    No extra-axial blood or fluid collections.    No displaced calvarial fracture.    The mastoid air cells and visualized paranasal sinuses are essentially clear.                                      ED Course as of 11/26/23 2259   Sun Nov 26, 2023 2101 WBC: 8.35 [DC]   2101 Hemoglobin: 12.6 [DC]   2101 Platelet Count: 400 [DC]   2109 INR: 1.0 [DC]   2216 Creatinine(!): 2.1  0.8 one month ago [DC]   2216 Glucose(!): 411 [DC]   2225 Discussed with vascular neurology [DC]      ED Course User Index  [DC] Wayne White MD       Critical Care   Date: 11/26/2023  Performed by: Wayne White MD   Authorized by: Wayne White MD    Total critical care time  (exclusive of procedural time) : 30 minutes  Critical care was necessary to treat or prevent imminent or life-threatening deterioration of the following conditions:  acute CVA          MDM, Impression and Plan   78 y.o. female with acute CVA on MRI.  Vascular Neurology to admit.         Final diagnoses:  [R29.818] Acute focal neurological deficit  [I63.9] Cerebrovascular accident (CVA), unspecified mechanism (Primary)        ED Disposition Condition    Admit                   Wayne White MD  11/26/23 8901

## 2023-11-27 NOTE — CONSULTS
[facility] - Neurosurgery ([facility])  Endocrinology  Diabetes Consult Note    Consult Requested by: Eriberto Breaux MD   Reason for admit: Cerebrovascular accident (CVA) due to thrombosis of left middle cerebral artery    HISTORY OF PRESENT ILLNESS:  Reason for Consult: Management of T2DM, Hyperglycemia     Surgical Procedure and Date: N/A    Diabetes diagnosis year: > 10 years ago     Home Diabetes Medications:  Tresiba 20 units nightly, Humalog 6 units TID with meals plus scale (180-230 +2), Ozmepic 2 mg every 7 days    Lab Results   Component Value Date    HGBA1C 9.6 (H) 11/26/2023       How often checking glucose at home? Dexcom G6   BG readings on regimen: BRIA  Hypoglycemia on the regimen?  BRIA  Missed doses on regimen?  BRIA    Diabetes Complications include:     Hyperglycemia    Complicating diabetes co morbidities:   HTN, HLD, Obesity, previous CVA       HPI:   Patient is a 78 y.o. female with a diagnosis of HTN, T2DM, HLD, Obesity presented with 3 day history of right sided weakness and dysarthria. Found to have L MCA infarct. No thrombolytics or intervention as out of the window.  Etiology probable small vessel disease with co-morbidities. Endocrinology consulted for management of T2DM.              Interval HPI:   Overnight events: Remains on neuro floor. BG above goal ranges on current Q insulin regimen. Creatinine 1.7. Diet diabetic 2000 Calorie; Thin; Standard Tray    Eating:   <25%  Nausea: No  Hypoglycemia and intervention: No  Fever: No  TPN and/or TF: No  If yes, type of TF/TPN and rate: n/a    PMH, PSH, FH, SH updated and reviewed     ROS:  Constitutional: Negative for weight changes.  Eyes: Negative for visual disturbance.  Respiratory: Negative for cough.   Cardiovascular: Negative for chest pain.  Gastrointestinal: Negative for nausea.  Endocrine: Negative for polyuria, polydipsia.  Musculoskeletal: Negative for back pain.  Skin: Negative for rash.  Neurological: Positive for speech difficulty,  weakness   Psychiatric/Behavioral: Negative for depression.    Review of Systems    Current Medications and/or Treatments Impacting Glycemic Control  Immunotherapy:    Immunosuppressants       None          Steroids:   Hormones (From admission, onward)      None          Pressors:    Autonomic Drugs (From admission, onward)      None          Hyperglycemia/Diabetes Medications:   Antihyperglycemics (From admission, onward)      Start     Stop Route Frequency Ordered    11/27/23 1130  insulin aspart U-100 pen 6 Units         -- SubQ 3 times daily with meals 11/27/23 0942    11/27/23 0900  insulin detemir U-100 (Levemir) pen 10 Units         -- SubQ Daily 11/26/23 2246    11/26/23 2347  insulin aspart U-100 pen 0-5 Units         -- SubQ Before meals & nightly PRN 11/26/23 2251             PHYSICAL EXAMINATION:  Vitals:    11/27/23 0900   BP: (!) 152/71   Pulse: (!) 59   Resp:    Temp:      Body mass index is 31.08 kg/m².     Physical Exam   Constitutional: Well developed, well nourished,  obese, NAD.  ENT: External ears no masses with nose patent; normal hearing.  Neck: Supple; trachea midline.  Cardiovascular: Normal rate and regular rhythm  Lungs: Normal effort; lungs anterior bilaterally clear to auscultation.  Abdomen: Soft, no masses, no hernias.  MS: No clubbing or cyanosis of nails noted; unable to assess gait.  Skin: No rashes, lesions, or ulcers; no nodules. Injection sites are ok. No lipo hypertropthy or atrophy.  Psychiatric:  BRIA   Neurological: She is alert. Weakness present.   Foot: Nails in good condition, no amputations noted.      Labs Reviewed and Include   Recent Labs   Lab 11/27/23  0530   *   CALCIUM 9.0   ALBUMIN 2.5*   PROT 6.1      K 3.1*   CO2 20*      BUN 27*   CREATININE 1.7*   ALKPHOS 102   ALT 21   AST 21   BILITOT 0.3     Lab Results   Component Value Date    WBC 8.54 11/27/2023    HGB 11.9 (L) 11/27/2023    HCT 37.8 11/27/2023    MCV 86 11/27/2023     11/27/2023  "    Recent Labs   Lab 11/26/23 2041   TSH 1.766     Lab Results   Component Value Date    HGBA1C 9.6 (H) 11/26/2023       Nutritional status:   Body mass index is 31.08 kg/m².  Lab Results   Component Value Date    ALBUMIN 2.5 (L) 11/27/2023    ALBUMIN 2.9 (L) 11/26/2023    ALBUMIN 2.6 (L) 10/25/2023     No results found for: "PREALBUMIN"    Estimated Creatinine Clearance: 32.4 mL/min (A) (based on SCr of 1.7 mg/dL (H)).    Accu-Checks  Recent Labs     11/27/23  0021 11/27/23  0756   POCTGLUCOSE 339* 204*        ASSESSMENT and PLAN    Neuro  * Cerebrovascular accident (CVA) due to thrombosis of left middle cerebral artery  Optimize BG control        Cardiac/Vascular  Hyperlipidemia LDL goal <70  May increase insulin resistance.         Endocrine  Obesity (BMI 30-39.9)  Body mass index is 31.08 kg/m².  May increase insulin resistance.         Type 2 diabetes mellitus with hyperglycemia, with long-term current use of insulin  BG goal 140-180    Increase Levemir to 14 units daily (0.3 u/kg dosing)  Start Novolog 5 units TID with meals (HOLD if patient eating < 25% or BG < 100)  Low Dose Correction Scale   BG monitoring ac/hs    ** Please call Endocrine for any BG related issues **      Discharge plans: TBD    Lab Results   Component Value Date    HGBA1C 9.6 (H) 11/26/2023                 Plan discussed with patient, family, and RN at bedside.     Bryanna Lauren NP  Endocrinology  [facility] - Neurosurgery ([facility])  "

## 2023-11-27 NOTE — ED TRIAGE NOTES
Buck Ibarra, a 78 y.o. female presents to the ED w/ complaint of increased right sided weakness and slurred speech since thanksgiving day. Pt daughter at bedside reports she was out of town and her niece was staying with her and did not notice a change. Pt normally independently ambulatory but now having more difficulty walking. Family at bedside also endorses slurred speech. Pt reports hx of stroke, not currently on blood thinners.     Triage note:  Chief Complaint   Patient presents with    Weakness     Increased right sided weakness and slurred speech since Thursday. Stating was out of town when they noticed the increasing symptoms. Also stating the bp has been high since them also. Upon arrival to er on attempt to exit vehicle pt  slide to ground per family and er staff had to assist with lifting patient into wheelchiar     Review of patient's allergies indicates:   Allergen Reactions    Amlodipine Swelling    Erythromycin Anaphylaxis    Erythropoietin analogues Anaphylaxis    Pegasys [peginterferon ruben-2a] Anaphylaxis    Lisinopril Hives     Past Medical History:   Diagnosis Date    Allergy     Cataract     Diabetes mellitus type II     Gastritis     with gastric ulcer    GERD (gastroesophageal reflux disease)     H. pylori infection     History of hepatitis C, SVR as of 8-2016 8/25/2015    Harvoni 12 wks completed.  SVR(12) as of 8/4/16 SVR(24) as of 10-     Hypertension     Osteopenia     Type 2 diabetes mellitus with diabetic polyneuropathy

## 2023-11-27 NOTE — SUBJECTIVE & OBJECTIVE
Past Medical History:   Diagnosis Date    Allergy     Cataract     Current every day smoker     Diabetes mellitus type II     Gastritis     with gastric ulcer    GERD (gastroesophageal reflux disease)     H. pylori infection     History of hepatitis C, SVR as of 8-2016 08/25/2015    Harvoni 12 wks completed.  SVR(12) as of 8/4/16 SVR(24) as of 10-     Hypertension     Osteopenia     Type 2 diabetes mellitus with diabetic polyneuropathy      Past Surgical History:   Procedure Laterality Date    COLONOSCOPY      COLONOSCOPY N/A 1/28/2016    Procedure: COLONOSCOPY;  Surgeon: Emeka Ahn MD;  Location: [address]);  Service: Endoscopy;  Laterality: N/A;    COLONOSCOPY N/A 8/8/2019    Procedure: COLONOSCOPY;  Surgeon: Susan Dia MD;  Location: [address]);  Service: Endoscopy;  Laterality: N/A;  appt confirmed-rb    HYSTERECTOMY      LIVER BIOPSY      OOPHORECTOMY      PERIPHERAL ANGIOGRAPHY Left 5/5/2021    Procedure: Peripheral angiography;  Surgeon: Randolph Toribio MD;  Location: [facility] CATH LAB;  Service: Cardiology;  Laterality: Left;    PERIPHERAL ANGIOGRAPHY N/A 6/21/2021    Procedure: Peripheral angiography;  Surgeon: Randolph Toribio MD;  Location: [facility] CATH LAB;  Service: Cardiology;  Laterality: N/A;    UPPER GASTROINTESTINAL ENDOSCOPY         Social History     Tobacco Use    Smoking status: Every Day     Current packs/day: 0.50     Average packs/day: 0.3 packs/day for 50.7 years (13.3 ttl pk-yrs)     Types: Cigarettes     Start date: 3/28/1973     Last attempt to quit: 3/28/2021    Smokeless tobacco: Never   Substance Use Topics    Alcohol use: No     Comment: special occasions    Drug use: No     Review of patient's allergies indicates:   Allergen Reactions    Amlodipine Swelling    Erythromycin Anaphylaxis    Erythropoietin analogues Anaphylaxis    Pegasys [peginterferon ruben-2a] Anaphylaxis    Lisinopril Hives       Medications: I have reviewed the current  medication administration record.    (Not in a hospital admission)      Review of Systems   Constitutional:  Negative for chills and fever.   HENT:  Negative for ear discharge and ear pain.    Eyes:  Negative for pain and redness.   Respiratory:  Negative for cough and shortness of breath.    Cardiovascular:  Negative for chest pain and leg swelling.   Gastrointestinal:  Negative for abdominal distention and abdominal pain.   Endocrine: Negative for polydipsia and polyphagia.   Genitourinary:  Negative for dyspareunia and dysuria.   Musculoskeletal:  Positive for arthralgias. Negative for back pain.   Skin:  Negative for rash and wound.   Allergic/Immunologic: Negative for environmental allergies and food allergies.   Neurological:  Positive for speech difficulty, weakness and numbness.     Objective:     Vital Signs (Most Recent):  Temp: 98.5 °F (36.9 °C) (11/26/23 2000)  Pulse: 74 (11/26/23 2233)  Resp: (!) 21 (11/26/23 2233)  BP: (!) 237/99 (11/26/23 2233)  SpO2: 100 % (11/26/23 2233)    Vital Signs Range (Last 24H):  Temp:  [98.5 °F (36.9 °C)]   Pulse:  []   Resp:  [16-21]   BP: (136-237)/(72-99)   SpO2:  [97 %-100 %]        Physical Exam  Vitals and nursing note reviewed.   Constitutional:       Appearance: Normal appearance. She is obese.   Eyes:      Extraocular Movements: Extraocular movements intact.      Conjunctiva/sclera: Conjunctivae normal.      Pupils: Pupils are equal, round, and reactive to light.   Cardiovascular:      Rate and Rhythm: Normal rate and regular rhythm.   Abdominal:      General: Abdomen is flat. Bowel sounds are normal.      Palpations: Abdomen is soft.   Musculoskeletal:         General: Normal range of motion.      Cervical back: Normal range of motion.   Skin:     General: Skin is warm and dry.   Neurological:      Mental Status: She is alert and oriented to person, place, and time.      Sensory: Sensory deficit present.      Motor: Weakness present.   Psychiatric:          "Mood and Affect: Mood normal.         Behavior: Behavior normal.              Neurological Exam:   LOC: alert  Attention Span: Good   Language: No aphasia  Articulation: Dysarthria  Orientation: Person, Place, Time   Visual Fields: Full  EOM (CN III, IV, VI): Full/intact  Pupils (CN II, III): PERRL  Facial Sensation (CN V): Normal  Facial Movement (CN VII): Symmetric facial expression    Gag Reflex: present  Reflexes: flexor plantar responses bilaterally  Motor: Arm left  Normal 5/5  Leg left  Normal 5/5  Arm right  Paresis: 3/5  Leg right Paresis: 3/5  Cerebellum: No evidence of appendicular or axial ataxia  Sensation: Scott-hypoesthesia right  Tone: Normal tone throughout      Laboratory:  CMP:   Recent Labs   Lab 11/26/23 2041   CALCIUM 9.4   ALBUMIN 2.9*   PROT 7.0      K 3.6   CO2 22*      BUN 31*   CREATININE 2.1*   ALKPHOS 120   ALT 24   AST 23   BILITOT 0.3     CBC:   Recent Labs   Lab 11/26/23 2041   WBC 8.35   RBC 4.65   HGB 12.6   HCT 40.1      MCV 86   MCH 27.1   MCHC 31.4*     Lipid Panel:   Recent Labs   Lab 11/26/23 2041   CHOL 137   LDLCALC 79.2   HDL 37*   TRIG 104     Coagulation:   Recent Labs   Lab 11/26/23 2041   INR 1.0     Hgb A1C: No results for input(s): "HGBA1C" in the last 168 hours.  TSH:   Recent Labs   Lab 11/26/23 2041   TSH 1.766       Diagnostic Results:      Brain imaging:  CT head w/o contrast 11-26-23 results:  Age indeterminate infarcts in the bilateral basal ganglia and left internal capsule/corona radiata, new from prior.  Recommend further evaluation with MRI.     New punctate hyperdensity in the right precentral gyrus.  This can be followed up MR imaging.    MRI brain w/o contrast 11-26-23 results:    Acute infarct involving the left internal capsule/centrum semiovale.     Foci of increased susceptibility in the right precentral gyrus and right paracentral parietal lobe, suggestive of small micro hemorrhages, new from MRI 02/26/2023.     Remote " appearing infarcts of the bilateral basal ganglia and left thalamus.    Vessel Imaging:      Cardiac Evaluation:   EKG 11-26-23 results:  Normal sinus rhythm Voltage criteria for left ventricular hypertrophy Nonspecific T wave abnormality Prolonged QT Abnormal ECG When compared with ECG of 24-OCT-2023 13:52, Premature supraventricular complexes are no longer Present

## 2023-11-27 NOTE — ASSESSMENT & PLAN NOTE
77 y/o female with 3 day history of right sided weakness and dysarthria with new L MCA infarct. No thrombolytics or intervention as out of the window.  Etiology probable small vessel disease with co-morbidities    Antithrombotics: DAPT    Statins: Lipitor 40 mg daily    Aggressive risk factor modification: HTN, Smoking, DM, HLD, Diet, Exercise, Obesity     Rehab efforts: The patient has been evaluated by a stroke team provider and the therapy needs have been fully considered based off the presenting complaints and exam findings. The following therapy evaluations are needed: PT evaluate and treat, OT evaluate and treat, SLP evaluate and treat, PM&R evaluate for appropriate placement    Diagnostics ordered/pending: HgbA1C to assess blood glucose levels, TTE to assess cardiac function/status     VTE prophylaxis: Enoxaparin (CrCl < 30 ml/min) 30 mg SQ every 24 hours  Mechanical prophylaxis: Place SCDs    BP parameters: Infarct: No intervention, SBP <220

## 2023-11-27 NOTE — PLAN OF CARE
Problem: Adjustment to Illness (Stroke, Ischemic/Transient Ischemic Attack)  Goal: Optimal Coping  Outcome: Ongoing, Progressing  Intervention: Support Psychosocial Response to Stroke  Flowsheets (Taken 11/27/2023 0432)  Supportive Measures:   active listening utilized   positive reinforcement provided   relaxation techniques promoted   self-care encouraged     Problem: Cerebral Tissue Perfusion (Stroke, Ischemic/Transient Ischemic Attack)  Goal: Optimal Cerebral Tissue Perfusion  Outcome: Ongoing, Progressing  Intervention: Protect and Optimize Cerebral Perfusion  Flowsheets (Taken 11/27/2023 0432)  Sensory Stimulation Regulation: quiet environment promoted  Cerebral Perfusion Promotion: blood pressure monitored     Problem: Swallowing Impairment (Stroke, Ischemic/Transient Ischemic Attack)  Goal: Optimal Eating and Swallowing without Aspiration  Outcome: Ongoing, Progressing  Intervention: Optimize Eating and Swallowing  Flowsheets (Taken 11/27/2023 0432)  Aspiration Precautions:   awake/alert before oral intake   oral hygiene care promoted     Problem: Urinary Elimination Impaired (Stroke, Ischemic/Transient Ischemic Attack)  Goal: Effective Urinary Elimination  Outcome: Ongoing, Progressing  Intervention: Promote Effective Bladder Elimination  Flowsheets (Taken 11/27/2023 0432)  Urinary Elimination Promotion:   absorbent pad/diaper use encouraged   toileting device within reach     Problem: Fall Injury Risk  Goal: Absence of Fall and Fall-Related Injury  Outcome: Ongoing, Progressing  Intervention: Identify and Manage Contributors  Flowsheets (Taken 11/27/2023 0432)  Self-Care Promotion:   independence encouraged   BADL personal objects within reach   safe use of adaptive equipment encouraged  Medication Review/Management: medications reviewed     Problem: Adult Inpatient Plan of Care  Goal: Absence of Hospital-Acquired Illness or Injury  Outcome: Ongoing, Progressing  Intervention: Identify and Manage Fall  Risk  Flowsheets (Taken 11/27/2023 0432)  Safety Promotion/Fall Prevention:   assistive device/personal item within reach   bed alarm set   Fall Risk reviewed with patient/family   side rails raised x 2   instructed to call staff for mobility  Intervention: Prevent and Manage VTE (Venous Thromboembolism) Risk  Flowsheets (Taken 11/27/2023 0432)  VTE Prevention/Management: remove, assess skin, and reapply sequential compression device     Problem: Diabetes Comorbidity  Goal: Blood Glucose Level Within Targeted Range  Outcome: Ongoing, Progressing  Intervention: Monitor and Manage Glycemia  Flowsheets (Taken 11/27/2023 0432)  Glycemic Management:   blood glucose monitored   supplemental insulin given

## 2023-11-27 NOTE — PLAN OF CARE
[facility] (Hospital)  Initial Discharge Assessment       Primary Care Provider: Ericka Cortez MD    Admission Diagnosis: Acute ischemic left MCA stroke [I63.512]  Acute focal neurological deficit [R29.818]  Cerebrovascular accident (CVA), unspecified mechanism [I63.9]    Admission Date: 11/26/2023  Expected Discharge Date:     Transition of Care Barriers: (P) None    Payor: [employer]GED MEDICARE / Plan: [employer] SECURE COMPLETE / Product Type: Medicare Advantage /     Extended Emergency Contact Information  Primary Emergency Contact: Olga Ibarra   [place]  Mobile Phone: [phone]  Relation: Daughter    Discharge Plan A: (P) Rehab  Discharge Plan B: (P) Home Health      [facility]  [address]  [facility] [address]  Phone: [phone] Fax: [phone]      Initial Assessment (most recent)       Adult Discharge Assessment - 11/27/23 1453          Discharge Assessment    Assessment Type Discharge Planning Assessment (P)      Confirmed/corrected address, phone number and insurance Yes (P)      Confirmed Demographics Correct on Facesheet (P)      Source of Information family;patient (P)      When was your last doctors appointment? 02/16/23 (P)      Communicated AMADO with patient/caregiver Date not available/Unable to determine (P)      Reason For Admission CVA (P)      People in Home child(annabelle), adult;grandchild(annabelle) (P)      Do you expect to return to your current living situation? Yes (P)      Do you have help at home or someone to help you manage your care at home? Yes (P)      Who are your caregiver(s) and their phone number(s)? allegra Espinoza [phone] (P)      Prior to hospitilization cognitive status: Unable to Assess (P)      Current cognitive status: Alert/Oriented (P)      Walking or Climbing Stairs ambulation difficulty, requires equipment (P)      Mobility Management Rolling Walker (P)      Home Accessibility wheelchair accessible  (P)      Home Layout Able to live on 1st floor (P)      Equipment Currently Used at Home walker, rolling (P)      Readmission within 30 days? No (P)      Patient currently being followed by outpatient case management? No (P)      Do you currently have service(s) that help you manage your care at home? No (P)      Do you take prescription medications? Yes (P)      Do you have prescription coverage? Yes (P)      Coverage PHN (P)      Do you have any problems affording any of your prescribed medications? No (P)      Is the patient taking medications as prescribed? yes (P)      Who is going to help you get home at discharge? daughter Olga (P)      How do you get to doctors appointments? family or friend will provide (P)      Are you on dialysis? No (P)      Do you take coumadin? No (P)      DME Needed Upon Discharge  wheelchair (P)      Discharge Plan discussed with: Patient;Adult children (P)      Transition of Care Barriers None (P)      Discharge Plan A Rehab (P)      Discharge Plan B Home Health (P)         Physical Activity    On average, how many days per week do you engage in moderate to strenuous exercise (like a brisk walk)? 0 days (P)      On average, how many minutes do you engage in exercise at this level? 0 min (P)         Financial Resource Strain    How hard is it for you to pay for the very basics like food, housing, medical care, and heating? Not hard at all (P)         Housing Stability    In the last 12 months, was there a time when you were not able to pay the mortgage or rent on time? No (P)      In the last 12 months, was there a time when you did not have a steady place to sleep or slept in a shelter (including now)? No (P)         Transportation Needs    In the past 12 months, has lack of transportation kept you from medical appointments or from getting medications? No (P)      In the past 12 months, has lack of transportation kept you from meetings, work, or from getting things needed for daily  living? No (P)         Food Insecurity    Within the past 12 months, you worried that your food would run out before you got the money to buy more. Never true (P)      Within the past 12 months, the food you bought just didn't last and you didn't have money to get more. Never true (P)         Stress    Do you feel stress - tense, restless, nervous, or anxious, or unable to sleep at night because your mind is troubled all the time - these days? Only a little (P)         Social Connections    In a typical week, how many times do you talk on the phone with family, friends, or neighbors? More than three times a week (P)      How often do you get together with friends or relatives? More than three times a week (P)      How often do you attend Religion or Hoahaoism services? More than 4 times per year (P)      Do you belong to any clubs or organizations such as Religion groups, unions, fraternal or athletic groups, or school groups? Yes (P)      How often do you attend meetings of the clubs or organizations you belong to? More than 4 times per year (P)      Are you , , , , never , or living with a partner?  (P)         Alcohol Use    Q1: How often do you have a drink containing alcohol? 2-4 times a month (P)      Q2: How many drinks containing alcohol do you have on a typical day when you are drinking? 1 or 2 (P)      Q3: How often do you have six or more drinks on one occasion? Never (P)         OTHER    Name(s) of People in Home daughter Olga and her  and children live upstairs (P)                       SW met with patient and her dtr Olga at bedside.  Patient lives in a 2 story home with her dtr, son in law and their children.  She lives on the first floor.  Patient has a RW at home and does not have any HH services.  Patient is not on HD or Coumadin.  Dispo pending therapy recs.  Dtr has stated that if rehab is recommended, she would like her mother to go back to  [facility].  SW will continue to follow.      Riana Barnes, BELLE  [facility]  [phone]

## 2023-11-27 NOTE — PROGRESS NOTES
Pharmacist Renal Dose Adjustment Note    Buck Ibarra is a 78 y.o. female being treated with enoxaparin 40 mg every 24 hours for VTE ppx     Patient Data:    Vital Signs (Most Recent):  Temp: 98.5 °F (36.9 °C) (11/26/23 2000)  Pulse: 74 (11/26/23 2233)  Resp: (!) 21 (11/26/23 2233)  BP: (!) 237/99 (11/26/23 2233)  SpO2: 100 % (11/26/23 2233) Vital Signs (72h Range):  Temp:  [98.5 °F (36.9 °C)]   Pulse:  []   Resp:  [16-21]   BP: (136-237)/(72-99)   SpO2:  [97 %-100 %]      Recent Labs   Lab 11/26/23 2041   CREATININE 2.1*     Serum creatinine: 2.1 mg/dL (H) 11/26/23 2041  Estimated creatinine clearance: 26 mL/min (A)    Adjusted to   Enoxaparin 30 mg every 24 hour, per pharmacy protocol    Pharmacist's Name: Marc Neal  Pharmacist's Extension: [phone]

## 2023-11-27 NOTE — HPI
79 y/o female with 3 day history of right sided weakness and dysarthria. Most family out of town and person with her did not realize she needed to come into the ED.  She presented to the ED today and when trying to get her out of the car she was lowered to the ground. Assistance was needed to get her up into WC and bring into the ED.    She has right sided weakness, slight sensory loss and dysarthria.  CT scan no acute process seen  MRI reveals Acute infarct involving the left internal capsule/centrum semiovale.     Risk factors HTN, HLP, DM, obesity    Off note patient had Holter monitor on 10-25-23 and no arrhythmias seen

## 2023-11-27 NOTE — HPI
Reason for Consult: Management of T2DM, Hyperglycemia     Surgical Procedure and Date: N/A    Diabetes diagnosis year: > 10 years ago     Home Diabetes Medications:  Tresiba 20 units nightly, Humalog 6 units TID with meals plus scale (180-230 +2), Ozmepic 2 mg every 7 days    Lab Results   Component Value Date    HGBA1C 9.6 (H) 11/26/2023       How often checking glucose at home? Dexcom G6   BG readings on regimen: BRIA  Hypoglycemia on the regimen?  BRIA  Missed doses on regimen?  BRIA    Diabetes Complications include:     Hyperglycemia    Complicating diabetes co morbidities:   HTN, HLD, Obesity, previous CVA       HPI:   Patient is a 78 y.o. female with a diagnosis of HTN, T2DM, HLD, Obesity presented with 3 day history of right sided weakness and dysarthria. Found to have L MCA infarct. No thrombolytics or intervention as out of the window.  Etiology probable small vessel disease with co-morbidities. Endocrinology consulted for management of T2DM.

## 2023-11-27 NOTE — ASSESSMENT & PLAN NOTE
Stroke risk factor  HgB A1C  Have dose long acting insulin and SSI will continue to monitor accuchecks and adjust as needed

## 2023-11-27 NOTE — SUBJECTIVE & OBJECTIVE
Interval HPI:   Overnight events: Remains on neuro floor. BG above goal ranges on current Q insulin regimen. Creatinine 1.7. Diet diabetic 2000 Calorie; Thin; Standard Tray    Eating:   <25%  Nausea: No  Hypoglycemia and intervention: No  Fever: No  TPN and/or TF: No  If yes, type of TF/TPN and rate: n/a    PMH, PSH, FH, SH updated and reviewed     ROS:  Constitutional: Negative for weight changes.  Eyes: Negative for visual disturbance.  Respiratory: Negative for cough.   Cardiovascular: Negative for chest pain.  Gastrointestinal: Negative for nausea.  Endocrine: Negative for polyuria, polydipsia.  Musculoskeletal: Negative for back pain.  Skin: Negative for rash.  Neurological: Positive for speech difficulty, weakness   Psychiatric/Behavioral: Negative for depression.    Review of Systems    Current Medications and/or Treatments Impacting Glycemic Control  Immunotherapy:    Immunosuppressants       None          Steroids:   Hormones (From admission, onward)      None          Pressors:    Autonomic Drugs (From admission, onward)      None          Hyperglycemia/Diabetes Medications:   Antihyperglycemics (From admission, onward)      Start     Stop Route Frequency Ordered    11/27/23 1130  insulin aspart U-100 pen 6 Units         -- SubQ 3 times daily with meals 11/27/23 0942    11/27/23 0900  insulin detemir U-100 (Levemir) pen 10 Units         -- SubQ Daily 11/26/23 2246    11/26/23 2347  insulin aspart U-100 pen 0-5 Units         -- SubQ Before meals & nightly PRN 11/26/23 2251             PHYSICAL EXAMINATION:  Vitals:    11/27/23 0900   BP: (!) 152/71   Pulse: (!) 59   Resp:    Temp:      Body mass index is 31.08 kg/m².     Physical Exam   Constitutional: Well developed, well nourished,  obese, NAD.  ENT: External ears no masses with nose patent; normal hearing.  Neck: Supple; trachea midline.  Cardiovascular: Normal rate and regular rhythm  Lungs: Normal effort; lungs anterior bilaterally clear to  auscultation.  Abdomen: Soft, no masses, no hernias.  MS: No clubbing or cyanosis of nails noted; unable to assess gait.  Skin: No rashes, lesions, or ulcers; no nodules. Injection sites are ok. No lipo hypertropthy or atrophy.  Psychiatric:  BRIA   Neurological: She is alert. Weakness present.   Foot: Nails in good condition, no amputations noted.

## 2023-11-27 NOTE — ASSESSMENT & PLAN NOTE
Stroke risk factor  HgB A1C 9.6, Endocrine consulted, recs as follows:  Levemir 14u Daily  Novolog 5u TIDAC  LDSSI  POCT Glucose AC/HS

## 2023-11-27 NOTE — PROGRESS NOTES
Pharmacist Renal Dose Adjustment Note    Buck Ibarra is a 78 y.o. female being treated with the medication enoxaparin    Patient Data:    Vital Signs (Most Recent):  Temp: 98.2 °F (36.8 °C) (11/27/23 0734)  Pulse: 74 (11/27/23 0734)  Resp: 19 (11/27/23 0734)  BP: (!) 239/100 (11/27/23 0734)  SpO2: 96 % (11/27/23 0734) Vital Signs (72h Range):  Temp:  [97.3 °F (36.3 °C)-98.5 °F (36.9 °C)]   Pulse:  []   Resp:  [16-21]   BP: (136-239)/()   SpO2:  [96 %-100 %]      Recent Labs   Lab 11/26/23 2041 11/27/23  0530   CREATININE 2.1* 1.7*     Serum creatinine: 1.7 mg/dL (H) 11/27/23 0530  Estimated creatinine clearance: 32.4 mL/min (A)    Enoxaparin 30 mg SQ q24h will be changed to 40 mg SQ q24h    Pharmacist's Name: Duy Larry, PharmD, BCPS   Pharmacist's Extension: [phone]

## 2023-11-27 NOTE — SUBJECTIVE & OBJECTIVE
Neurologic Chief Complaint: RSW, dysarthria    Subjective:     Interval History: Patient is seen for follow-up neurological assessment and treatment recommendations: TAMION. Exam stable. Pt reports R ankle pain, had difficulty working with PT. R ankle xray shows no fracture or dislocation.     HPI, Past Medical, Family, and Social History remains the same as documented in the initial encounter.     Review of Systems   Constitutional:  Negative for chills and fever.   HENT:  Negative for ear discharge and ear pain.    Eyes:  Negative for pain and redness.   Respiratory:  Negative for cough and shortness of breath.    Cardiovascular:  Negative for chest pain and leg swelling.   Gastrointestinal:  Negative for abdominal distention and abdominal pain.   Endocrine: Negative for polydipsia and polyphagia.   Genitourinary:  Negative for dyspareunia and dysuria.   Musculoskeletal:  Positive for arthralgias. Negative for back pain.   Skin:  Negative for rash and wound.   Allergic/Immunologic: Negative for environmental allergies and food allergies.   Neurological:  Positive for speech difficulty, weakness and numbness.     Scheduled Meds:   aspirin  81 mg Oral Daily    atorvastatin  40 mg Oral Daily    clopidogreL  75 mg Oral Daily    doxepin  10 mg Oral QHS    enoxparin  40 mg Subcutaneous Daily    gabapentin  300 mg Oral QHS    insulin aspart U-100  5 Units Subcutaneous TIDWM    [START ON 11/28/2023] insulin detemir U-100 (Levemir)  14 Units Subcutaneous Daily    NIFEdipine  30 mg Oral Daily    pantoprazole  40 mg Oral Daily    senna-docusate 8.6-50 mg  1 tablet Oral BID     Continuous Infusions:   sodium chloride 0.9%       PRN Meds:acetaminophen, dextrose 10%, dextrose 10%, glucagon (human recombinant), glucose, glucose, insulin aspart U-100, labetaloL, [START ON 11/28/2023] ondansetron, sodium chloride 0.9%, sodium chloride 0.9%    Objective:     Vital Signs (Most Recent):  Temp: 98.5 °F (36.9 °C) (11/27/23 1237)  Pulse:  (!) 56 (11/27/23 1237)  Resp: 19 (11/27/23 0734)  BP: (!) 203/84 (11/27/23 1237)  SpO2: 99 % (11/27/23 1237)  BP Location: Right arm    Vital Signs Range (Last 24H):  Temp:  [97.3 °F (36.3 °C)-98.5 °F (36.9 °C)]   Pulse:  []   Resp:  [16-21]   BP: (136-239)/()   SpO2:  [96 %-100 %]   BP Location: Right arm       Physical Exam  Vitals and nursing note reviewed.   Constitutional:       Appearance: Normal appearance. She is obese.   Eyes:      Extraocular Movements: Extraocular movements intact.      Conjunctiva/sclera: Conjunctivae normal.      Pupils: Pupils are equal, round, and reactive to light.   Cardiovascular:      Rate and Rhythm: Normal rate and regular rhythm.   Abdominal:      General: Abdomen is flat. Bowel sounds are normal.      Palpations: Abdomen is soft.   Musculoskeletal:         General: Normal range of motion.      Cervical back: Normal range of motion.   Skin:     General: Skin is warm and dry.   Neurological:      Mental Status: She is alert and oriented to person, place, and time.      Sensory: Sensory deficit present.      Motor: Weakness present.   Psychiatric:         Mood and Affect: Mood normal.         Behavior: Behavior normal.              Neurological Exam:   LOC: alert  Attention Span: Good   Language: No aphasia  Articulation: Dysarthria  Orientation: Person, Place, Time   Visual Fields: Full  EOM (CN III, IV, VI): Full/intact  Pupils (CN II, III): PERRL  Facial Sensation (CN V): Normal  Facial Movement (CN VII): Symmetric facial expression    Motor: Arm left  Normal 5/5  Leg left  Normal 5/5  Arm right  Paresis: 3/5  Leg right Paresis: 3/5    Cerebellum: No evidence of appendicular or axial ataxia  Sensation: Intact to light touch, temperature and vibration    Laboratory:  CMP:   Recent Labs   Lab 11/27/23  0530   CALCIUM 9.0   ALBUMIN 2.5*   PROT 6.1      K 3.1*   CO2 20*      BUN 27*   CREATININE 1.7*   ALKPHOS 102   ALT 21   AST 21   BILITOT 0.3     CBC:    Recent Labs   Lab 11/27/23  0530   WBC 8.54   RBC 4.39   HGB 11.9*   HCT 37.8      MCV 86   MCH 27.1   MCHC 31.5*     Hgb A1C:   Recent Labs   Lab 11/26/23  2041   HGBA1C 9.6*       Diagnostic Results     CT head w/o contrast 11-26-23 results:  Age indeterminate infarcts in the bilateral basal ganglia and left internal capsule/corona radiata, new from prior.  Recommend further evaluation with MRI.     New punctate hyperdensity in the right precentral gyrus.  This can be followed up MR imaging.     MRI brain w/o contrast 11-26-23 results:    Acute infarct involving the left internal capsule/centrum semiovale.     Foci of increased susceptibility in the right precentral gyrus and right paracentral parietal lobe, suggestive of small micro hemorrhages, new from MRI 02/26/2023.     Remote appearing infarcts of the bilateral basal ganglia and left thalamus.     Vessel Imaging:        Cardiac Evaluation:   EKG 11-26-23 results:  Normal sinus rhythm Voltage criteria for left ventricular hypertrophy Nonspecific T wave abnormality Prolonged QT Abnormal ECG When compared with ECG of 24-OCT-2023 13:52, Premature supraventricular complexes are no longer Present

## 2023-11-27 NOTE — NURSING
Nurses Note -- 4 Eyes      11/27/2023   1:05 AM      Skin assessed during: Admit      [x] No Altered Skin Integrity Present    [x]Prevention Measures Documented      [] Yes- Altered Skin Integrity Present or Discovered   [] LDA Added if Not in Epic (Describe Wound)   [] New Altered Skin Integrity was Present on Admit and Documented in LDA   [] Wound Image Taken    Wound Care Consulted? No    Attending Nurse:  Marily HOOD     Second RN/Staff Member:  Sherin HOOD

## 2023-11-27 NOTE — PROGRESS NOTES
Rob Borjas - Neurosurgery ([facility])  Vascular Neurology  Comprehensive Stroke Center  Progress Note    Assessment/Plan:     * Cerebrovascular accident (CVA) due to thrombosis of left middle cerebral artery  77 y/o female with 3 day history of right sided weakness and dysarthria with new L MCA infarct. No thrombolytics or intervention as out of the window.  Etiology probable small vessel disease with co-morbidities    Antithrombotics: DAPT    Statins: Lipitor 40 mg daily    Aggressive risk factor modification: HTN, Smoking, DM, HLD, Diet, Exercise, Obesity     Rehab efforts: The patient has been evaluated by a stroke team provider and the therapy needs have been fully considered based off the presenting complaints and exam findings. The following therapy evaluations are needed: PT evaluate and treat, OT evaluate and treat, SLP evaluate and treat, PM&R evaluate for appropriate placement    Diagnostics ordered/pending: HgbA1C to assess blood glucose levels, TTE to assess cardiac function/status     VTE prophylaxis: Enoxaparin (CrCl < 30 ml/min) 30 mg SQ every 24 hours  Mechanical prophylaxis: Place SCDs    BP parameters: Infarct: No intervention, SBP <220        Hemiparesis, right  Due to stroke  Aggressive therapy      Current every day smoker  Stroke risk factor   on smoking cessation    Obesity (BMI 30-39.9)  Stroke risk factor   on diet and exercise    Hyperlipidemia  Stroke risk factor  LDL 79.2  Lipitor 40 mg daily was on Crestor at home    Type 2 diabetes mellitus with hyperglycemia, with long-term current use of insulin  Stroke risk factor  HgB A1C 9.6, Endocrine consulted, recs as follows:  Levemir 14u Daily  Novolog 5u TIDAC  LDSSI  POCT Glucose AC/HS    Hypertension associated with diabetes  Stroke risk factor  SBP <220  Add some home meds now         11/27/2023 NAEON. Exam stable. Pt reports R ankle pain, had difficulty working with PT. R ankle xray shows no fracture or dislocation.    STROKE  DOCUMENTATION        NIH Scale:  1a. Level of Consciousness: 0-->Alert, keenly responsive  1b. LOC Questions: 0-->Answers both questions correctly  1c. LOC Commands: 0-->Performs both tasks correctly  2. Best Gaze: 0-->Normal  3. Visual: 0-->No visual loss  4. Facial Palsy: 0-->Normal symmetrical movements  5a. Motor Arm, Left: 1-->Drift, limb holds 90 (or 45) degrees, but drifts down before full 10 seconds, does not hit bed or other support  5b. Motor Arm, Right: 1-->Drift, limb holds 90 (or 45) degrees, but drifts down before full 10 secs, does not hit bed or other support  6a. Motor Leg, Left: 1-->Drift, leg falls by the end of the 5-sec period but does not hit bed  6b. Motor Leg, Right: 3-->No effort against gravity, leg falls to bed immediately  7. Limb Ataxia: 0-->Absent  8. Sensory: 1-->Mild-to-moderate sensory loss, patient feels pinprick is less sharp or is dull on the affected side, or there is a loss of superficial pain with pinprick, but patient is aware of being touched  9. Best Language: 0-->No aphasia, normal  10. Dysarthria: 1-->Mild-to-moderate dysarthria, patient slurs at least some words and, at worst, can be understood with some difficulty  11. Extinction and Inattention (formerly Neglect): 0-->No abnormality  Total (NIH Stroke Scale): 8       Modified Glynn Score: 1  Callahan Coma Scale:    ABCD2 Score:    [id number] Score:   HAS -BLED Score:   ICH Score:   Hunt & Hutton Classification:      Hemorrhagic change of an Ischemic Stroke: Does this patient have an ischemic stroke with hemorrhagic changes? No     Neurologic Chief Complaint: RSW, dysarthria    Subjective:     Interval History: Patient is seen for follow-up neurological assessment and treatment recommendations: MAKI. Exam stable. Pt reports R ankle pain, had difficulty working with PT. R ankle xray shows no fracture or dislocation.     HPI, Past Medical, Family, and Social History remains the same as documented in the initial  encounter.     Review of Systems   Constitutional:  Negative for chills and fever.   HENT:  Negative for ear discharge and ear pain.    Eyes:  Negative for pain and redness.   Respiratory:  Negative for cough and shortness of breath.    Cardiovascular:  Negative for chest pain and leg swelling.   Gastrointestinal:  Negative for abdominal distention and abdominal pain.   Endocrine: Negative for polydipsia and polyphagia.   Genitourinary:  Negative for dyspareunia and dysuria.   Musculoskeletal:  Positive for arthralgias. Negative for back pain.   Skin:  Negative for rash and wound.   Allergic/Immunologic: Negative for environmental allergies and food allergies.   Neurological:  Positive for speech difficulty, weakness and numbness.     Scheduled Meds:   aspirin  81 mg Oral Daily    atorvastatin  40 mg Oral Daily    clopidogreL  75 mg Oral Daily    doxepin  10 mg Oral QHS    enoxparin  40 mg Subcutaneous Daily    gabapentin  300 mg Oral QHS    insulin aspart U-100  5 Units Subcutaneous TIDWM    [START ON 11/28/2023] insulin detemir U-100 (Levemir)  14 Units Subcutaneous Daily    NIFEdipine  30 mg Oral Daily    pantoprazole  40 mg Oral Daily    senna-docusate 8.6-50 mg  1 tablet Oral BID     Continuous Infusions:   sodium chloride 0.9%       PRN Meds:acetaminophen, dextrose 10%, dextrose 10%, glucagon (human recombinant), glucose, glucose, insulin aspart U-100, labetaloL, [START ON 11/28/2023] ondansetron, sodium chloride 0.9%, sodium chloride 0.9%    Objective:     Vital Signs (Most Recent):  Temp: 98.5 °F (36.9 °C) (11/27/23 1237)  Pulse: (!) 56 (11/27/23 1237)  Resp: 19 (11/27/23 0734)  BP: (!) 203/84 (11/27/23 1237)  SpO2: 99 % (11/27/23 1237)  BP Location: Right arm    Vital Signs Range (Last 24H):  Temp:  [97.3 °F (36.3 °C)-98.5 °F (36.9 °C)]   Pulse:  []   Resp:  [16-21]   BP: (136-239)/()   SpO2:  [96 %-100 %]   BP Location: Right arm       Physical Exam  Vitals and nursing note reviewed.    Constitutional:       Appearance: Normal appearance. She is obese.   Eyes:      Extraocular Movements: Extraocular movements intact.      Conjunctiva/sclera: Conjunctivae normal.      Pupils: Pupils are equal, round, and reactive to light.   Cardiovascular:      Rate and Rhythm: Normal rate and regular rhythm.   Abdominal:      General: Abdomen is flat. Bowel sounds are normal.      Palpations: Abdomen is soft.   Musculoskeletal:         Cervical back: Normal range of motion.      Comments: R ankle swollen, warm with severe tenderness to palpation and with passive ROM. Active ROM limited 2/2 pain.  Skin:     General: Skin is warm and dry.   Neurological:      Mental Status: She is alert and oriented to person, place, and time.      Sensory: Sensory deficit present.      Motor: Weakness present.   Psychiatric:         Mood and Affect: Mood normal.         Behavior: Behavior normal.              Neurological Exam:   LOC: alert  Attention Span: Good   Language: No aphasia  Articulation: Dysarthria  Orientation: Person, Place, Time   Visual Fields: Full  EOM (CN III, IV, VI): Full/intact  Pupils (CN II, III): PERRL  Facial Sensation (CN V): Normal  Facial Movement (CN VII): Symmetric facial expression    Motor: Arm left  Normal 5/5  Leg left  Normal 5/5  Arm right  Paresis: 3/5  Leg right Paresis: 3/5    Cerebellum: No evidence of appendicular or axial ataxia  Sensation: Intact to light touch, temperature and vibration    Laboratory:  CMP:   Recent Labs   Lab 11/27/23  0530   CALCIUM 9.0   ALBUMIN 2.5*   PROT 6.1      K 3.1*   CO2 20*      BUN 27*   CREATININE 1.7*   ALKPHOS 102   ALT 21   AST 21   BILITOT 0.3     CBC:   Recent Labs   Lab 11/27/23  0530   WBC 8.54   RBC 4.39   HGB 11.9*   HCT 37.8      MCV 86   MCH 27.1   MCHC 31.5*     Hgb A1C:   Recent Labs   Lab 11/26/23  2041   HGBA1C 9.6*       Diagnostic Results     CT head w/o contrast 11-26-23 results:  Age indeterminate infarcts in the  bilateral basal ganglia and left internal capsule/corona radiata, new from prior.  Recommend further evaluation with MRI.     New punctate hyperdensity in the right precentral gyrus.  This can be followed up MR imaging.     MRI brain w/o contrast 11-26-23 results:    Acute infarct involving the left internal capsule/centrum semiovale.     Foci of increased susceptibility in the right precentral gyrus and right paracentral parietal lobe, suggestive of small micro hemorrhages, new from MRI 02/26/2023.     Remote appearing infarcts of the bilateral basal ganglia and left thalamus.     Vessel Imaging:        Cardiac Evaluation:   EKG 11-26-23 results:  Normal sinus rhythm Voltage criteria for left ventricular hypertrophy Nonspecific T wave abnormality Prolonged QT Abnormal ECG When compared with ECG of 24-OCT-2023 13:52, Premature supraventricular complexes are no longer Present    Ming Fay MD  [facility]  Department of Vascular Neurology   [facility] Neurosurgery [facility])

## 2023-11-27 NOTE — HOSPITAL COURSE
11/27/2023 NAEON. Exam stable. Pt reports R ankle pain, had difficulty working with PT. R ankle xray shows no fracture or dislocation.  11/28/2023 NAEON. Exam stable. Ortho consulted for R ankle pain w/ elevated ESR/CRP, joint aspirated and steroids injected. Not concerned for septic joint at this time, aspirate negative for crystals. Glucose imp. ELIZABETH imp.  11/29/2023 NAEON. SBP intermittently in 200s-230s, asymptomatic. Resumed home coreg. ELIZABETH improving. R ankle pain improving. Pending auth for IPR.  11/30/2023 NAEON. SBP 160s-200s. Increased home nifedipine. Able to work with PT today. IPR auth denied following peer to peer. Pending placement.  12/1/2023 patient aphasic and not following commands this morning, CTA without LVO. Patient re-examined later and speech improved. -190 this morning, increased nifedipine. PRN oxy ordered to be given prior to working with PT/OT for R ankle pain  12/02/2023 NAEON. SBP 160s-190s past 24 hours. Required PRN antihypertensive meds. Neuro exam stable.  12/03/2023 NAEON . SBP 130s-160s past 24 hours. No PRN antihypertensives administered. Neuro exam stable.  12/04/2023 NAEO, neuro exam stable. Coreg increased for better BP control. Stable for discharge to rehab facility

## 2023-11-27 NOTE — PLAN OF CARE
OT evaluation completed. OT POC and goals established.     Problem: Occupational Therapy  Goal: Occupational Therapy Goal  Description: Goals to be met by: 12/11/2023     Patient will increase functional independence with ADLs by performing:    UE Dressing with Minimal Assistance.  Grooming while EOB with Minimal Assistance.  Supine to sit with Minimal Assistance.  Stand pivot transfers with Minimal Assistance.    Outcome: Ongoing, Progressing

## 2023-11-27 NOTE — PLAN OF CARE
Problem: SLP  Goal: SLP Goal  Description: Speech Language Pathology Goals  Goals expected to be met by 12/4/23    1.Pt will tolerate least restrictive diet without overt S/S aspiration, MOD I  2. Educate Pt and family on aspiration precautions and SLP POC  3. Pt will participate in full speech, language and cognitive evaluation to determine ongoing POC needs    Outcome: Ongoing, Progressing     SLP Evaluation initiated. Pt presents with waxing/waning alertness.  Pt with mild oral stasis post swallow with trials observed at the bedside. REC: Level VI soft and bite sized textures, thin liquids, medications one at a time, provided vital signs stable, alert/awake, attentive, one bite/sip at a time, 1:1 supervision with all PO and strict aspiration precautions.  ST to continue to follow.     11/27/2023

## 2023-11-27 NOTE — H&P
[facility] - Emergency Dept  Vascular Neurology  [facility]  History & Physical    Inpatient consult to Vascular (Stroke) Neurology  Consult performed by: Coral Mccabe NP  Consult ordered by: Wayne White MD  Reason for consult: L MCA stroke        Assessment/Plan:     Patient is a 78 y.o. year old female with:    * Cerebrovascular accident (CVA) due to thrombosis of left middle cerebral artery  79 y/o female with 3 day history of right sided weakness and dysarthria with new L MCA infarct. No thrombolytics or intervention as out of the window.  Etiology probable small vessel disease with co-morbidities    Antithrombotics: DAPT    Statins: Lipitor 40 mg daily    Aggressive risk factor modification: HTN, Smoking, DM, HLD, Diet, Exercise, Obesity     Rehab efforts: The patient has been evaluated by a stroke team provider and the therapy needs have been fully considered based off the presenting complaints and exam findings. The following therapy evaluations are needed: PT evaluate and treat, OT evaluate and treat, SLP evaluate and treat, PM&R evaluate for appropriate placement    Diagnostics ordered/pending: HgbA1C to assess blood glucose levels, TTE to assess cardiac function/status     VTE prophylaxis: Enoxaparin (CrCl < 30 ml/min) 30 mg SQ every 24 hours  Mechanical prophylaxis: Place SCDs    BP parameters: Infarct: No intervention, SBP <220        Type 2 diabetes mellitus with hyperglycemia, with long-term current use of insulin  Stroke risk factor  HgB A1C  Have dose long acting insulin and SSI will continue to monitor accuchecks and adjust as needed    Hypertension associated with diabetes  Stroke risk factor  SBP <220  Add some home meds now    Hyperlipidemia LDL goal <70  Stroke risk factor  LDL 79.2  Lipitor 40 mg daily was on Crestor at home    Hemiparesis, right  Due to stroke  Aggressive therapy      Obesity (BMI 30-39.9)  Stroke risk factor   on diet and  exercise    Current every day smoker  Stroke risk factor   on smoking cessation        STROKE DOCUMENTATION          NIH Scale:  1a. Level of Consciousness: 0-->Alert, keenly responsive  1b. LOC Questions: 0-->Answers both questions correctly  1c. LOC Commands: 0-->Performs both tasks correctly  2. Best Gaze: 0-->Normal  3. Visual: 0-->No visual loss  4. Facial Palsy: 0-->Normal symmetrical movements  5a. Motor Arm, Left: 0-->No drift, limb holds 90 (or 45) degrees for full 10 secs  5b. Motor Arm, Right: 1-->Drift, limb holds 90 (or 45) degrees, but drifts down before full 10 secs, does not hit bed or other support  6a. Motor Leg, Left: 1-->Drift, leg falls by the end of the 5-sec period but does not hit bed  6b. Motor Leg, Right: 3-->No effort against gravity, leg falls to bed immediately  7. Limb Ataxia: 0-->Absent  8. Sensory: 1-->Mild-to-moderate sensory loss, patient feels pinprick is less sharp or is dull on the affected side, or there is a loss of superficial pain with pinprick, but patient is aware of being touched  9. Best Language: 0-->No aphasia, normal  10. Dysarthria: 1-->Mild-to-moderate dysarthria, patient slurs at least some words and, at worst, can be understood with some difficulty  11. Extinction and Inattention (formerly Neglect): 0-->No abnormality  Total (NIH Stroke Scale): 7     Modified Westerlo Score: 1  Cherelle Coma Scale:15   ABCD2 Score:    [id number] Score:   HAS -BLED Score:   ICH Score:   Hunt & Hutton Classification:      Thrombolysis Candidate? No, Out of window - Symptom onset > 4.5 hours    Delays to Thrombolysis?  Not Applicable    Interventional Revascularization Candidate?   Is the patient eligible for mechanical endovascular reperfusion (PAUL)?   NO out of window    Delays to Thrombectomy? Not Applicable    Hemorrhagic change of an Ischemic Stroke: Does this patient have an ischemic stroke with hemorrhagic changes? No         Subjective:     History of Present Illness:  78  y/o female with 3 day history of right sided weakness and dysarthria. Most family out of town and person with her did not realize she needed to come into the ED.  She presented to the ED today and when trying to get her out of the car she was lowered to the ground. Assistance was needed to get her up into WC and bring into the ED.    She has right sided weakness, slight sensory loss and dysarthria.  CT scan no acute process seen  MRI reveals Acute infarct involving the left internal capsule/centrum semiovale.     Risk factors HTN, HLP, DM, obesity    Off note patient had Holter monitor on 10-25-23 and no arrhythmias seen           Past Medical History:   Diagnosis Date    Allergy     Cataract     Current every day smoker     Diabetes mellitus type II     Gastritis     with gastric ulcer    GERD (gastroesophageal reflux disease)     H. pylori infection     History of hepatitis C, SVR as of 8-2016 08/25/2015    Harvoni 12 wks completed.  SVR(12) as of 8/4/16 SVR(24) as of 10-     Hypertension     Osteopenia     Type 2 diabetes mellitus with diabetic polyneuropathy      Past Surgical History:   Procedure Laterality Date    COLONOSCOPY      COLONOSCOPY N/A 1/28/2016    Procedure: COLONOSCOPY;  Surgeon: Emeka Ahn MD;  Location: [facility] ([address] [place] [address]);  Service: Endoscopy;  Laterality: N/A;    COLONOSCOPY N/A 8/8/2019    Procedure: COLONOSCOPY;  Surgeon: Susan Dia MD;  Location: [address]);  Service: Endoscopy;  Laterality: N/A;  appt confirmed-rb    HYSTERECTOMY      LIVER BIOPSY      OOPHORECTOMY      PERIPHERAL ANGIOGRAPHY Left 5/5/2021    Procedure: Peripheral angiography;  Surgeon: Randolph Toribio MD;  Location: [facility] CATH LAB;  Service: Cardiology;  Laterality: Left;    PERIPHERAL ANGIOGRAPHY N/A 6/21/2021    Procedure: Peripheral angiography;  Surgeon: Randolph Toribio MD;  Location: [facility] CATH LAB;  Service: Cardiology;  Laterality: N/A;    UPPER GASTROINTESTINAL ENDOSCOPY          Social History     Tobacco Use    Smoking status: Every Day     Current packs/day: 0.50     Average packs/day: 0.3 packs/day for 50.7 years (13.3 ttl pk-yrs)     Types: Cigarettes     Start date: 3/28/1973     Last attempt to quit: 3/28/2021    Smokeless tobacco: Never   Substance Use Topics    Alcohol use: No     Comment: special occasions    Drug use: No     Review of patient's allergies indicates:   Allergen Reactions    Amlodipine Swelling    Erythromycin Anaphylaxis    Erythropoietin analogues Anaphylaxis    Pegasys [peginterferon ruben-2a] Anaphylaxis    Lisinopril Hives       Medications: I have reviewed the current medication administration record.    (Not in a hospital admission)      Review of Systems   Constitutional:  Negative for chills and fever.   HENT:  Negative for ear discharge and ear pain.    Eyes:  Negative for pain and redness.   Respiratory:  Negative for cough and shortness of breath.    Cardiovascular:  Negative for chest pain and leg swelling.   Gastrointestinal:  Negative for abdominal distention and abdominal pain.   Endocrine: Negative for polydipsia and polyphagia.   Genitourinary:  Negative for dyspareunia and dysuria.   Musculoskeletal:  Positive for arthralgias. Negative for back pain.   Skin:  Negative for rash and wound.   Allergic/Immunologic: Negative for environmental allergies and food allergies.   Neurological:  Positive for speech difficulty, weakness and numbness.     Objective:     Vital Signs (Most Recent):  Temp: 98.5 °F (36.9 °C) (11/26/23 2000)  Pulse: 74 (11/26/23 2233)  Resp: (!) 21 (11/26/23 2233)  BP: (!) 237/99 (11/26/23 2233)  SpO2: 100 % (11/26/23 2233)    Vital Signs Range (Last 24H):  Temp:  [98.5 °F (36.9 °C)]   Pulse:  []   Resp:  [16-21]   BP: (136-237)/(72-99)   SpO2:  [97 %-100 %]        Physical Exam  Vitals and nursing note reviewed.   Constitutional:       Appearance: Normal appearance. She is obese.   Eyes:      Extraocular Movements:  "Extraocular movements intact.      Conjunctiva/sclera: Conjunctivae normal.      Pupils: Pupils are equal, round, and reactive to light.   Cardiovascular:      Rate and Rhythm: Normal rate and regular rhythm.   Abdominal:      General: Abdomen is flat. Bowel sounds are normal.      Palpations: Abdomen is soft.   Musculoskeletal:         General: Normal range of motion.      Cervical back: Normal range of motion.   Skin:     General: Skin is warm and dry.   Neurological:      Mental Status: She is alert and oriented to person, place, and time.      Sensory: Sensory deficit present.      Motor: Weakness present.   Psychiatric:         Mood and Affect: Mood normal.         Behavior: Behavior normal.              Neurological Exam:   LOC: alert  Attention Span: Good   Language: No aphasia  Articulation: Dysarthria  Orientation: Person, Place, Time   Visual Fields: Full  EOM (CN III, IV, VI): Full/intact  Pupils (CN II, III): PERRL  Facial Sensation (CN V): Normal  Facial Movement (CN VII): Symmetric facial expression    Gag Reflex: present  Reflexes: flexor plantar responses bilaterally  Motor: Arm left  Normal 5/5  Leg left  Normal 5/5  Arm right  Paresis: 3/5  Leg right Paresis: 3/5  Cerebellum: No evidence of appendicular or axial ataxia  Sensation: Scott-hypoesthesia right  Tone: Normal tone throughout      Laboratory:  CMP:   Recent Labs   Lab 11/26/23 2041   CALCIUM 9.4   ALBUMIN 2.9*   PROT 7.0      K 3.6   CO2 22*      BUN 31*   CREATININE 2.1*   ALKPHOS 120   ALT 24   AST 23   BILITOT 0.3     CBC:   Recent Labs   Lab 11/26/23 2041   WBC 8.35   RBC 4.65   HGB 12.6   HCT 40.1      MCV 86   MCH 27.1   MCHC 31.4*     Lipid Panel:   Recent Labs   Lab 11/26/23 2041   CHOL 137   LDLCALC 79.2   HDL 37*   TRIG 104     Coagulation:   Recent Labs   Lab 11/26/23 2041   INR 1.0     Hgb A1C: No results for input(s): "HGBA1C" in the last 168 hours.  TSH:   Recent Labs   Lab 11/26/23 2041   TSH 1.766 "       Diagnostic Results:      Brain imaging:  CT head w/o contrast 11-26-23 results:  Age indeterminate infarcts in the bilateral basal ganglia and left internal capsule/corona radiata, new from prior.  Recommend further evaluation with MRI.     New punctate hyperdensity in the right precentral gyrus.  This can be followed up MR imaging.    MRI brain w/o contrast 11-26-23 results:    Acute infarct involving the left internal capsule/centrum semiovale.     Foci of increased susceptibility in the right precentral gyrus and right paracentral parietal lobe, suggestive of small micro hemorrhages, new from MRI 02/26/2023.     Remote appearing infarcts of the bilateral basal ganglia and left thalamus.    Vessel Imaging:      Cardiac Evaluation:   EKG 11-26-23 results:  Normal sinus rhythm Voltage criteria for left ventricular hypertrophy Nonspecific T wave abnormality Prolonged QT Abnormal ECG When compared with ECG of 24-OCT-2023 13:52, Premature supraventricular complexes are no longer Present          Coral Mccabe NP  [facility]  Department of Vascular Neurology   [facility] - Emergency Dept

## 2023-11-27 NOTE — PT/OT/SLP EVAL
Occupational Therapy   Evaluation and Tx    Name: Buck Ibarra  MRN: [id number]  Admitting Diagnosis: Cerebrovascular accident (CVA) due to thrombosis of left middle cerebral artery  Recent Surgery: * No surgery found *      Recommendations:     Discharge Recommendations: High Intensity Therapy  Discharge Equipment Recommendations:  to be determined by next level of care  Barriers to discharge:  Other (Comment) (increased skilled (A) required)    Assessment:     Buck Ibarra is a 78 y.o. female with a medical diagnosis of Cerebrovascular accident (CVA) due to thrombosis of left middle cerebral artery.  She presents with the following performance deficits affecting function: weakness, impaired endurance, impaired self care skills, impaired functional mobility, gait instability, impaired balance, decreased upper extremity function, decreased lower extremity function, decreased safety awareness, pain, impaired coordination.    Pt presents lethargic and required maximal verbal and tactile stimulation to remain alert throughout session. However, pt reports being (I) with all mobility and ADLs PTA. Today she required increased (A) for all bed and functional mobility 2/2 aforementioned deficits and extreme R ankle pain with palpation and edema present laterally (standing deferred this date. RN and MD notified). Pt would benefit from continued skilled acute OT services in order to maximize (I) and with ADLs and functional mobility to ensure safe return to PLOF in the least restrictive environment. OT recommending high intensity therapy once pt is medically appropriate for d/c.       Rehab Prognosis: Good; patient would benefit from acute skilled OT services to address these deficits and reach maximum level of function.       Plan:     Patient to be seen 4 x/week to address the above listed problems via self-care/home management, therapeutic activities, therapeutic exercises, neuromuscular re-education  Plan of Care  "Expires: 12/11/23  Plan of Care Reviewed with: patient    Subjective   "Ow"   Chief Complaint: R ankle pain with movement/palpation   Patient/Family Comments/goals: None stated     Occupational Profile:  Living Environment: Pt lives with daughter, son in law, and their 2 children in a 2 SH with 0 CHUCK. However, bed and bath on ground level.   Previous level of function: (I) PLOF   Roles and Routines: Limited eval   Equipment Used at Home: none, rollator   Assistance upon Discharge: Family     Pain/Comfort:  Pain Rating 1: other (see comments) (not rated)  Location - Side 1: Right  Location - Orientation 1: generalized  Location 1: ankle  Pain Addressed 1: Nurse notified  Pain Rating Post-Intervention 1: other (see comments) (not rated)    Patients cultural, spiritual, Rastafarian conflicts given the current situation: no    Objective:     Communicated with: RN prior to session.  Patient found HOB elevated with bed alarm, telemetry upon OT entry to room.    General Precautions: Standard, aspiration, fall  Orthopedic Precautions: N/A  Braces: N/A  Respiratory Status: Room air    Occupational Performance:    Bed Mobility:    Patient completed Scooting/Bridging with maximal assistance  Patient completed Supine to Sit with maximal assistance and 2 persons  Patient completed Sit to Supine with maximal assistance and 2 persons    Functional Mobility/Transfers:  Functional Mobility: Deferred 2/2 R ankle     Activities of Daily Living:  Grooming: total assistance for facial hygiene while sitting EOB for alertness   Lower Body Dressing: total assistance for donning socks, however limited by R ankle pain     Cognitive/Visual Perceptual:  Cognitive/Psychosocial Skills:     -       Oriented to: Person, Place, Time, and Situation   -       Follows Commands/attention:limited by lethargy  Visual/Perceptual:      -WFL      Physical Exam:  Balance:    -           Static sitting balance: Min-Max A with gradual R posterior lean "   - Static standing balance: Deferred   - Dynamic standing balance:  Deferred   Postural examination/scapula alignment:    -       Rounded shoulders  -       Forward head  Edema:  R ankle   Sensation: Limited by lethargy   Upper Extremity Range of Motion: WFL   Upper Extremity Strength: Limited by lethargy    Strength: Limited by lethargy     AMPAC 6 Click ADL:  AMPAC Total Score: 12    Treatment & Education:   Pt  educated on:   Role of OT, POC, and d/c planning.   Safe transfer techniques and proper body mechanics for fall prevention and improved independence with functional transfers   Importance of OOB activities to increase endurance and tolerance for increased participation in daily ADLs.   Utilizing the call bell to request for assistance with all functional mobility to ensure safety during hospital stay.      Pt verbalized understanding and all questions were addressed within the scope of OT.       Patient left HOB elevated with all lines intact, call button in reach, bed alarm on, and RN notified    GOALS:   Multidisciplinary Problems       Occupational Therapy Goals          Problem: Occupational Therapy    Goal Priority Disciplines Outcome Interventions   Occupational Therapy Goal     OT, PT/OT Ongoing, Progressing    Description: Goals to be met by: 12/11/2023     Patient will increase functional independence with ADLs by performing:    UE Dressing with Minimal Assistance.  Grooming while EOB with Minimal Assistance.  Supine to sit with Minimal Assistance.  Stand pivot transfers with Minimal Assistance.                         History:     Past Medical History:   Diagnosis Date    Allergy     Cataract     Current every day smoker     Diabetes mellitus type II     Gastritis     with gastric ulcer    GERD (gastroesophageal reflux disease)     H. pylori infection     History of hepatitis C, SVR as of 8-2016 08/25/2015    Harvoni 12 wks completed.  SVR(12) as of 8/4/16 SVR(24) as of 10-      Hypertension     Osteopenia     Type 2 diabetes mellitus with diabetic polyneuropathy          Past Surgical History:   Procedure Laterality Date    COLONOSCOPY      COLONOSCOPY N/A 1/28/2016    Procedure: COLONOSCOPY;  Surgeon: Emeka Ahn MD;  Location: [address]);  Service: Endoscopy;  Laterality: N/A;    COLONOSCOPY N/A 8/8/2019    Procedure: COLONOSCOPY;  Surgeon: Susan Dia MD;  Location: [address]);  Service: Endoscopy;  Laterality: N/A;  appt confirmed-rb    HYSTERECTOMY      LIVER BIOPSY      OOPHORECTOMY      PERIPHERAL ANGIOGRAPHY Left 5/5/2021    Procedure: Peripheral angiography;  Surgeon: Randolph Toribio MD;  Location: [facility] CATH LAB;  Service: Cardiology;  Laterality: Left;    PERIPHERAL ANGIOGRAPHY N/A 6/21/2021    Procedure: Peripheral angiography;  Surgeon: Randolph Toribio MD;  Location: [facility] CATH LAB;  Service: Cardiology;  Laterality: N/A;    UPPER GASTROINTESTINAL ENDOSCOPY         Time Tracking:     OT Date of Treatment: 11/27/23  OT Start Time: 0820  OT Stop Time: 0847  OT Total Time (min): 27 min    Billable Minutes:Evaluation 15  Neuromuscular Re-education 10    11/27/2023   Co-evaluation/treatment performed due to patient's multiple deficits requiring two skilled therapists to appropriately and safely assess patient's strength, endurance, functional mobility, and ADL performance while facilitating functional tasks in addition to accommodating for patient's activity tolerance and medical acuity.

## 2023-11-27 NOTE — PT/OT/SLP EVAL
Speech Language Pathology Evaluation  Bedside Swallow    Patient Name:  Buck Ibarra   MRN:  [id number]  Admitting Diagnosis: Cerebrovascular accident (CVA) due to thrombosis of left middle cerebral artery    Recommendations:                 General Recommendations:  Dysphagia therapy, Speech language evaluation, and Cognitive-linguistic evaluation  Diet recommendations:  Soft & Bite Sized Diet - IDDSI Level 6, Thin   Aspiration Precautions:  Only when vital signs stable, only when alert and attentive, 1 bite/sip at a time, Assistance with meals, Eliminate distractions, Feed only when awake/alert, HOB to 90 degrees, Meds whole 1 at a time, Monitor for s/s of aspiration, Small bites/sips, and Strict aspiration precautions Continue to monitor for signs and symptoms of aspiration and discontinue oral feeding should you notice any of the following: watery eyes, reddened facial area, wet vocal quality, increased work of breathing, change in respiratory status, increased congestion, coughing, fever and/or change in level of alertness  General Precautions: Standard, aspiration, fall  Communication strategies:  provide increased time to answer and go to room if call light pushed    Assessment:     Buck Ibarra is a 78 y.o. female with an SLP diagnosis of Dysphagia.  She presents with risk of aspiration due to waxing/waning alertness.  ST to continue to follow.     History:     Past Medical History:   Diagnosis Date    Allergy     Cataract     Current every day smoker     Diabetes mellitus type II     Gastritis     with gastric ulcer    GERD (gastroesophageal reflux disease)     H. pylori infection     History of hepatitis C, SVR as of 8-2016 08/25/2015    Harvoni 12 wks completed.  SVR(12) as of 8/4/16 SVR(24) as of 10-     Hypertension     Osteopenia     Type 2 diabetes mellitus with diabetic polyneuropathy        Past Surgical History:   Procedure Laterality Date    COLONOSCOPY      COLONOSCOPY N/A  "1/28/2016    Procedure: COLONOSCOPY;  Surgeon: Emeka Ahn MD;  Location: [facility] ENDO (4TH FLR);  Service: Endoscopy;  Laterality: N/A;    COLONOSCOPY N/A 8/8/2019    Procedure: COLONOSCOPY;  Surgeon: Susan Dia MD;  Location: [facility] ENDO (4TH FLR);  Service: Endoscopy;  Laterality: N/A;  appt confirmed-rb    HYSTERECTOMY      LIVER BIOPSY      OOPHORECTOMY      PERIPHERAL ANGIOGRAPHY Left 5/5/2021    Procedure: Peripheral angiography;  Surgeon: Randolph Toribio MD;  Location: [facility] CATH LAB;  Service: Cardiology;  Laterality: Left;    PERIPHERAL ANGIOGRAPHY N/A 6/21/2021    Procedure: Peripheral angiography;  Surgeon: Randolph Toribio MD;  Location: [facility] CATH LAB;  Service: Cardiology;  Laterality: N/A;    UPPER GASTROINTESTINAL ENDOSCOPY         Social History: Patient lives with family.    Prior Intubation HX:  none this admission    Modified Barium Swallow: none prior at this facility    Chest X-Rays: none recent    Prior diet: regular textures, thin liquids per Daughter at the bedside.    Occupation/hobbies/homemaking: Independent with ADLs, Daughter reported Pt had some assistance with meal prep prior to admit.    Subjective     SLP reviewed Pt with RN prior to ST attempts  Pt presents somnolent upon initial attempt, more alert upon later attempt  She explains, "November"     Pain/Comfort:  Pain Rating 1: 0/10  Pain Addressed 1: Nurse notified  Pain Rating Post-Intervention 1: 0/10    Respiratory Status: Room air    Objective:     Oral Musculature Evaluation  Oral Musculature: general weakness  Dentition: upper dentures  Mucosal Quality: adequate  Mandibular Strength and Mobility: WNL  Oral Labial Strength and Mobility: WNL  Lingual Strength and Mobility: WNL  Volitional Swallow: elicited  Voice Prior to PO Intake: clear, low intensity    Bedside Swallow Eval:   Consistencies Assessed:  Thin liquids : straw sips x3  Solids bites os salad, baked apples x7      Oral Phase:   Lingual residue, mild " with solids x2    Pharyngeal Phase:   no overt clinical signs/symptoms of aspiration    Compensatory Strategies  Single bites/sips, liquid wash     Treatment: Pt found asleep, partially reclined in bed with call light in reach. Daughter at the bedside. Pt in deep sleep state and slow to wake provided max cues. RN aware. Pt easily fell back asleep across attempts to initiate evaluation. SLP educated Family on SLP role, aspiration precautions and need for ongoing assessment as feasible. Pt did not demonstrate understanding. RN notified and aware.  Upon later attempt, Pt found upright and alert in bed eating lunch meal tray for Regular textures and thin liquids fed by daughter. Patient was oriented x3 to person, place and HEATH. She answered 'no' across most yes/no questions. RN in room to provide medications during session. No overt S/S aspiration with bites/sips observed. Pt with mild lingual stasis x2 following bites of solids (salad.) Patient with moderately fast rate of bites/sips and benefited from daughter assisting with bites/sips and cutting solids into smaller bites.  SLP educated Pt and Daughter on definition and risk of aspiration, aspiration precautions, and ongoing SLP POC. No questions noted. EKG tech entered into the room and remained at bedside with Pt. Session discontinued 2/2 testing (EKG.)      Goals:   Multidisciplinary Problems       SLP Goals          Problem: SLP    Goal Priority Disciplines Outcome   SLP Goal     SLP Ongoing, Progressing   Description: Speech Language Pathology Goals  Goals expected to be met by 12/4/23    1.Pt will tolerate least restrictive diet without overt S/S aspiration, MOD I  2. Educate Pt and family on aspiration precautions and SLP POC  3. Pt will participate in full speech, language and cognitive evaluation to determine ongoing POC needs                         Plan:     Patient to be seen:  4 x/week   Plan of Care expires:  12/27/23  Plan of Care reviewed with:   patient   SLP Follow-Up:  Yes       Discharge recommendations:        Time Tracking:     SLP Treatment Date:   11/27/23  Speech Start Time:  1101/ 1326  Speech Stop Time:  1110/ 1341     Speech Total Time (min):  9 min/ 15 min     Billable Minutes: Eval Swallow and Oral Function 9 and Self Care/Home Management Training 15    11/27/2023

## 2023-11-27 NOTE — PLAN OF CARE
POC established and functional mobility goals were created to help pt return to PLOF. Will be reassessed as appropriate to measure pt progress.    Problem: Physical Therapy  Goal: Physical Therapy Goal  Description: Goals to be met by: 23     Patient will increase functional independence with mobility by performin. Supine to sit with MInimal Assistance  2. Sit to supine with MInimal Assistance  3. Sit to stand transfer with Minimal Assistance  4. Bed to chair transfer with Minimal Assistance using LRAD as needed  5. Gait  x 50 feet with Minimal Assistance using LRAD as needed.   6. Lower extremity exercise program x10 reps per handout, with assistance as needed    *Will add stair goal as appropriate     Outcome: Ongoing, Progressing

## 2023-11-27 NOTE — ASSESSMENT & PLAN NOTE
BG goal 140-180    Increase Levemir to 14 units daily (0.3 u/kg dosing)  Start Novolog 5 units TID with meals (HOLD if patient eating < 25% or BG < 100)  Low Dose Correction Scale   BG monitoring ac/hs    ** Please call Endocrine for any BG related issues **      Discharge plans: TBD    Lab Results   Component Value Date    HGBA1C 9.6 (H) 11/26/2023

## 2023-11-28 NOTE — HOSPITAL COURSE
Per chart review,    PT-11/27    Bed Mobility:  Supine to Sit: Maximum Assistance and 2 persons  on L side of bed  Sit to Supine: Maximum Assistance and 2 persons  Scooting anteriorly to EOB to plant feet on floor: Maximum Assistance     Transfers:   Sit to Stand Transfer: Activity did not occur - see assessment              Gait:  Deferred.    OT-11/27    Bed Mobility:    Patient completed Scooting/Bridging with maximal assistance  Patient completed Supine to Sit with maximal assistance and 2 persons  Patient completed Sit to Supine with maximal assistance and 2 persons     Functional Mobility/Transfers:  Functional Mobility: Deferred 2/2 R ankle      Activities of Daily Living:  Grooming: total assistance for facial hygiene while sitting EOB for alertness   Lower Body Dressing: total assistance for donning socks, however limited by R ankle pain

## 2023-11-28 NOTE — PT/OT/SLP PROGRESS
Occupational Therapy   Treatment    Name: Buck Ibarra  MRN: [id number]  Admitting Diagnosis:  Cerebrovascular accident (CVA) due to thrombosis of left middle cerebral artery       Recommendations:     Discharge Recommendations: High Intensity Therapy  Discharge Equipment Recommendations:  to be determined by next level of care  Barriers to discharge:  Other (Comment) (increased skilled (A) Required)    Assessment:     Buck Ibarra is a 78 y.o. female with a medical diagnosis of Cerebrovascular accident (CVA) due to thrombosis of left middle cerebral artery.  She presents with the following performance deficits affecting function are weakness, impaired endurance, impaired self care skills, impaired functional mobility, gait instability, impaired balance, decreased upper extremity function, decreased lower extremity function, decreased safety awareness, pain, decreased ROM. Pt tolerated session well today which focused on improving functional tolerance, further assessing mobility, sitting balance, and seated ADLs with assistance. She demo improved alertness throughout, however continues to be hindered by R ankle pain. She was able to tolerate EOB sitting but demo a R anterior lean requiring cues for awareness and correcting back to midline. Pt tolerated sit to stand trial with assistance and returned to sitting 2/2 increased R ankle pain and R lateral lean. Full linen change provided 2/2 pt found soiled in urine and was able to participate in UBD with assistance while sitting EOB. Pt was left with R ankle elevated in bed and pt and daughter were educated on various gentle ROM exercises pt can perform outside of therapy to promote improved mobility. Pt would benefit from continued skilled acute OT services in order to maximize (I) and with ADLs and functional mobility to ensure safe return to PLOF in the least restrictive environment. OT recommending high intensity therapy once pt is medically appropriate for  "d/c.       Rehab Prognosis:  Good; patient would benefit from acute skilled OT services to address these deficits and reach maximum level of function.       Plan:     Patient to be seen 4 x/week to address the above listed problems via self-care/home management, therapeutic activities, therapeutic exercises, neuromuscular re-education  Plan of Care Expires: 12/11/23  Plan of Care Reviewed with: patient, daughter    Subjective   "It hurts"  Chief Complaint: R ankle pain   Patient/Family Comments/goals:   Pain/Comfort:  Pain Rating 1: 8/10  Location - Side 1: Right  Location - Orientation 1: generalized  Location 1: ankle  Pain Addressed 1: Reposition, Pre-medicate for activity, Distraction, Cessation of Activity  Pain Rating Post-Intervention 1: 8/10    Objective:     Communicated with: RN prior to session.  Patient found HOB elevated with bed alarm, telemetry upon OT entry to room.    General Precautions: Standard, aspiration, fall    Orthopedic Precautions:N/A  Braces: N/A  Respiratory Status: Room air     Occupational Performance:     Bed Mobility:    Patient completed Supine to Sit with moderate assistance  Patient completed Sit to Supine with moderate assistance   Anterior scooting towards EOB Max A     Functional Mobility/Transfers:  Patient completed Sit <> Stand Transfer with moderate assistance and of 2 persons  with  hand-held assist   Functional Mobility: Pt able to tolerate ~10 sec on static standing before returning to sitting EOB. Pt demo a R lateral lean and increased R ankle pain with WB.     Activities of Daily Living:  Upper Body Dressing: moderate assistance for dof/donning gown while sitting EOB   Lower Body Dressing: maximal assistance to don socks   Toileting: total assistance for pericare 2/2 pt found soiled       AMPAC 6 Click ADL: 12    Treatment & Education:   Pt sat EOB ~15 min with SBA-Mod A for balance. Pt demo a R lateral lean requiring assistance to return to midline.   Pt and daughter " educated on:   Role of OT, POC, and d/c planning.   Safe transfer techniques and proper body mechanics for fall prevention and improved independence with functional transfers   Various therex pt can perform outside of therapy session to increase functional endurance and strength for overall improved independence in occupations of choice.   Importance of OOB activities to increase endurance and tolerance for increased participation in daily ADLs.   Utilizing the call bell to request for assistance with all functional mobility to ensure safety during hospital stay.      Pt and family verbalized understanding and all questions were addressed within the scope of OT.    Patient left HOB elevated with all lines intact, call button in reach, bed alarm on, RN notified, and daughter present    GOALS:   Multidisciplinary Problems       Occupational Therapy Goals          Problem: Occupational Therapy    Goal Priority Disciplines Outcome Interventions   Occupational Therapy Goal     OT, PT/OT Ongoing, Progressing    Description: Goals to be met by: 12/11/2023     Patient will increase functional independence with ADLs by performing:    UE Dressing with Minimal Assistance.  Grooming while EOB with Minimal Assistance.  Supine to sit with Minimal Assistance.  Stand pivot transfers with Minimal Assistance.                         Time Tracking:     OT Date of Treatment: 11/28/23  OT Start Time: 1044  OT Stop Time: 1117  OT Total Time (min): 33 min    Billable Minutes:Self Care/Home Management 17  Neuromuscular Re-education 15    OT/PREETHI: OT          11/28/2023  Co-treatment performed due to patient's multiple deficits requiring two skilled therapists to appropriately and safely assess and advance patient's strength, endurance, functional mobility, and ADL performance while facilitating functional tasks in addition to accommodating for patient's activity tolerance and medical acuity.

## 2023-11-28 NOTE — PLAN OF CARE
Problem: Adjustment to Illness (Stroke, Ischemic/Transient Ischemic Attack)  Goal: Optimal Coping  Outcome: Ongoing, Progressing  Intervention: Support Psychosocial Response to Stroke  Flowsheets (Taken 11/28/2023 0429)  Supportive Measures:   active listening utilized   positive reinforcement provided   self-care encouraged  Family/Support System Care:   involvement promoted   presence promoted     Problem: Cerebral Tissue Perfusion (Stroke, Ischemic/Transient Ischemic Attack)  Goal: Optimal Cerebral Tissue Perfusion  Outcome: Ongoing, Progressing  Intervention: Protect and Optimize Cerebral Perfusion  Flowsheets (Taken 11/28/2023 0429)  Sensory Stimulation Regulation: quiet environment promoted  Cerebral Perfusion Promotion: blood pressure monitored     Problem: Fall Injury Risk  Goal: Absence of Fall and Fall-Related Injury  Outcome: Ongoing, Progressing  Intervention: Identify and Manage Contributors  Flowsheets (Taken 11/28/2023 0429)  Self-Care Promotion:   independence encouraged   BADL personal objects within reach   BADL personal routines maintained   meal set-up provided  Medication Review/Management: medications reviewed     Problem: Adult Inpatient Plan of Care  Goal: Absence of Hospital-Acquired Illness or Injury  Outcome: Ongoing, Progressing  Intervention: Identify and Manage Fall Risk  Flowsheets (Taken 11/28/2023 0429)  Safety Promotion/Fall Prevention:   assistive device/personal item within reach   bed alarm set   Fall Risk signage in place   Fall Risk reviewed with patient/family   toileting scheduled   instructed to call staff for mobility   muscle strengthening facilitated  Intervention: Prevent and Manage VTE (Venous Thromboembolism) Risk  Flowsheets (Taken 11/28/2023 0429)  VTE Prevention/Management:   remove, assess skin, and reapply sequential compression device   ROM (passive) performed   ROM (active) performed     Problem: Diabetes Comorbidity  Goal: Blood Glucose Level Within Targeted  Range  Outcome: Ongoing, Progressing  Intervention: Monitor and Manage Glycemia  Flowsheets (Taken 11/28/2023 8589)  Glycemic Management:   blood glucose monitored   supplemental insulin given

## 2023-11-28 NOTE — ASSESSMENT & PLAN NOTE
77 y/o female with 3 day history of right sided weakness and dysarthria with new L MCA infarct. No thrombolytics or intervention as out of the window.  Etiology probable small vessel disease with co-morbidities. Medically ready, pending placement.    Antithrombotics: DAPT    Statins: Lipitor 40 mg daily    Aggressive risk factor modification: HTN, Smoking, DM, HLD, Diet, Exercise, Obesity     Rehab efforts: The patient has been evaluated by a stroke team provider and the therapy needs have been fully considered based off the presenting complaints and exam findings. The following therapy evaluations are needed: PT evaluate and treat, OT evaluate and treat, SLP evaluate and treat, PM&R evaluate for appropriate placement    Diagnostics ordered/pending: None     VTE prophylaxis: Enoxaparin (CrCl < 30 ml/min) 30 mg SQ every 24 hours  Mechanical prophylaxis: Place SCDs    BP parameters: Infarct: No intervention, SBP <220

## 2023-11-28 NOTE — ASSESSMENT & PLAN NOTE
Stroke risk factor  HgB A1C 9.6, Endocrine consulted, recs as follows:  Levemir 16u Daily  Novolog 5u TIDAC (HOLD if patient eating < 25% or BG < 100)   LDSSI  POCT Glucose AC/HS

## 2023-11-28 NOTE — SUBJECTIVE & OBJECTIVE
Past Medical History:   Diagnosis Date    Allergy     Cataract     Current every day smoker     Diabetes mellitus type II     Gastritis     with gastric ulcer    GERD (gastroesophageal reflux disease)     H. pylori infection     History of hepatitis C, SVR as of 8-2016 08/25/2015    Harvoni 12 wks completed.  SVR(12) as of 8/4/16 SVR(24) as of 10-     Hypertension     Osteopenia     Type 2 diabetes mellitus with diabetic polyneuropathy        Past Surgical History:   Procedure Laterality Date    COLONOSCOPY      COLONOSCOPY N/A 1/28/2016    Procedure: COLONOSCOPY;  Surgeon: Emeka Ahn MD;  Location: [address]);  Service: Endoscopy;  Laterality: N/A;    COLONOSCOPY N/A 8/8/2019    Procedure: COLONOSCOPY;  Surgeon: Susan Dia MD;  Location: [address]);  Service: Endoscopy;  Laterality: N/A;  appt confirmed-rb    HYSTERECTOMY      LIVER BIOPSY      OOPHORECTOMY      PERIPHERAL ANGIOGRAPHY Left 5/5/2021    Procedure: Peripheral angiography;  Surgeon: Randolph Toribio MD;  Location: [facility] CATH LAB;  Service: Cardiology;  Laterality: Left;    PERIPHERAL ANGIOGRAPHY N/A 6/21/2021    Procedure: Peripheral angiography;  Surgeon: Randolph Toribio MD;  Location: [facility] CATH LAB;  Service: Cardiology;  Laterality: N/A;    UPPER GASTROINTESTINAL ENDOSCOPY         Review of patient's allergies indicates:   Allergen Reactions    Amlodipine Swelling    Erythromycin Anaphylaxis    Erythropoietin analogues Anaphylaxis    Pegasys [peginterferon ruben-2a] Anaphylaxis    Lisinopril Hives       Current Facility-Administered Medications   Medication    0.9%  NaCl infusion    acetaminophen tablet 650 mg    aspirin EC tablet 81 mg    atorvastatin tablet 40 mg    clopidogreL tablet 75 mg    dextrose 10% bolus 125 mL 125 mL    dextrose 10% bolus 250 mL 250 mL    doxepin capsule 10 mg    enoxaparin injection 40 mg    gabapentin capsule 300 mg    glucagon (human recombinant) injection 1 mg    glucose  chewable tablet 16 g    glucose chewable tablet 24 g    insulin aspart U-100 pen 0-5 Units    insulin aspart U-100 pen 5 Units    [START ON 11/28/2023] insulin detemir U-100 (Levemir) pen 14 Units    labetalol 20 mg/4 mL (5 mg/mL) IV syring    NIFEdipine 24 hr tablet 30 mg    [START ON 11/28/2023] ondansetron injection 4 mg    pantoprazole EC tablet 40 mg    senna-docusate 8.6-50 mg per tablet 1 tablet    sodium chloride 0.9% bolus 500 mL 500 mL    sodium chloride 0.9% flush 10 mL     Family History       Problem Relation (Age of Onset)    Breast cancer Sister    Cancer Sister, Sister (70)    Diabetes Mother, Sister, Sister, Sister    Heart disease Mother, Father    Hyperlipidemia Mother    Hypertension Mother          Tobacco Use    Smoking status: Every Day     Current packs/day: 0.50     Average packs/day: 0.3 packs/day for 50.7 years (13.3 ttl pk-yrs)     Types: Cigarettes     Start date: 3/28/1973     Last attempt to quit: 3/28/2021    Smokeless tobacco: Never   Substance and Sexual Activity    Alcohol use: No     Comment: special occasions    Drug use: No    Sexual activity: Yes     Partners: Male     ROS  Constitutional: negative for fevers  Eyes: negative visual changes  ENT: negative for hearing loss  Respiratory: negative for dyspnea  Cardiovascular: negative for chest pain  Gastrointestinal: negative for abdominal pain  Genitourinary: negative for dysuria  Neurological: negative for headaches  Behavioral/Psych: negative for hallucinations  Endocrine: negative for temperature intolerance    Objective:     Vital Signs (Most Recent):  Temp: 98.1 °F (36.7 °C) (11/27/23 2000)  Pulse: 66 (11/27/23 2249)  Resp: 16 (11/27/23 2000)  BP: (!) 157/70 (11/27/23 2000)  SpO2: 96 % (11/27/23 2000) Vital Signs (24h Range):  Temp:  [97.3 °F (36.3 °C)-99 °F (37.2 °C)] 98.1 °F (36.7 °C)  Pulse:  [56-95] 66  Resp:  [16-19] 16  SpO2:  [96 %-100 %] 96 %  BP: (143-239)/() 157/70     Weight: 90 kg (198 lb 6.6 oz)  Height:  "5' 7" (170.2 cm)  Body mass index is 31.08 kg/m².    No intake or output data in the 24 hours ending 11/27/23 0765     Ortho/SPM Exam  General:  no acute distress, appears stated age   Neuro: alert and oriented x3  Psych: normal mood  Head: normocephalic, atraumatic.  Eyes: no scleral icterus  Mouth: moist mucous membranes  Cardiovascular: extremities warm and well perfused  Lungs: breathing comfortably, equal chest rise bilat  Skin: clean, dry, intact (any exceptions noted in below musculoskeletal exam)    MSK:  RUE:  - Skin intact throughout, no open wounds  - No swelling  - No ecchymosis, erythema, or signs of cellulitis  - NonTTP throughout  - AROM and PROM of the shoulder, elbow, wrist, and hand intact without pain  - Axillary/AIN/PIN/Radial/Median/Ulnar Nerves assessed in isolation without deficit  - SILT throughout  - Compartments soft  - Radial artery palpated   - Capillary Refill <3s    LUE:  - Skin intact throughout, no open wounds  - No swelling  - No ecchymosis, erythema, or signs of cellulitis  - NonTTP throughout  - AROM and PROM of the shoulder, elbow, wrist, and hand intact without pain  - Axillary/AIN/PIN/Radial/Median/Ulnar Nerves assessed in isolation without deficit  - SILT throughout  - Compartments soft  - Radial artery palpated   - Capillary Refill <3s    RLE:  - Skin intact throughout, no open wounds  - Mild swelling and bruising lateral ankle  - No ecchymosis, erythema, or signs of cellulitis  - Very TTP throughout ankle joint  - AROM and PROM of the hip, knee,  and foot intact without pain  - Pain with any ROM of R ankle  - TA/EHL/Gastroc/FHL assessed in isolation without deficit  - SILT throughout  - Compartments soft  - DP and PT palpated  - Capillary Refill <3s  - Negative [facility]  - Negative [facility]    LLE:  - Skin intact throughout, no open wounds  - No swelling  - No ecchymosis, erythema, or signs of cellulitis  - NonTTP throughout  - AROM and PROM of the hip, knee, ankle, and " foot intact without pain  - TA/EHL/Gastroc/FHL assessed in isolation without deficit  - SILT throughout  - Compartments soft  - DP and PT palpated  - Capillary Refill <3s  - Negative [facility]  - Negative [facility]       Significant Labs: All pertinent labs within the past 24 hours have been reviewed.    Significant Imaging: I have reviewed and interpreted all pertinent imaging results/findings.  XR R ankle: no fractures or dislocations

## 2023-11-28 NOTE — ASSESSMENT & PLAN NOTE
Buck Ibarra is a 78 y.o. female admitted for stroke who has been having right ankle pain for a few days since daughter helped patient up from the floor and foot caught under dresser. CRP elevated to 42, ESR 84. Mild swelling and bruising. Very TTP around ankle joint. Ankle aspirated given tenderness and elevated inflammatory markers (see procedure note). No evidence of septic arthritis at this time. Crystals still pending.     - Recommend ice, elevation and gentle ROM exercises  - No acute orthopaedic intervention warranted  - We will continue to monitor cultures    Joint Fluid Analysis:  WBC: 5000  Segs: 87%  Crystals: pending  Gram Stain: negative  Cultures pending, will continue to follow     Recent Labs   Lab 11/27/23  0530   WBC 8.54   RBC 4.39   HGB 11.9*   HCT 37.8      MCV 86   MCH 27.1   MCHC 31.5*       Recent Labs   Lab 11/27/23  1624   SEDRATE 85*   CRP 42.2*         Procedure Note:  After consent was obtained, using sterile technique the right ankle was prepped and the joint was entered from from the anteromedial approach. 3 ml's of bloody colored fluid was withdrawn and sent for analysis and culture.  Steroid 40 mg and 2 ml plain Lidocaine was then injected and the needle withdrawn.  The procedure was well tolerated.  The patient is asked to continue to rest the ankle for a few more days before resuming regular activities.  It may be more painful for the first 1-2 days.  Watch for fever, or increased swelling or persistent pain in ankle.

## 2023-11-28 NOTE — PROGRESS NOTES
"Rob Borjas - Neurosurgery ([facility])  Endocrinology  Progress Note    Admit Date: 11/26/2023     Reason for Consult: Management of T2DM, Hyperglycemia     Surgical Procedure and Date: N/A    Diabetes diagnosis year: > 10 years ago     Home Diabetes Medications:  Tresiba 20 units nightly, Humalog 6 units TID with meals plus scale (180-230 +2), Ozmepic 2 mg every 7 days    Lab Results   Component Value Date    HGBA1C 9.6 (H) 11/26/2023       How often checking glucose at home? Dexcom G6   BG readings on regimen: BRIA  Hypoglycemia on the regimen?  BRIA  Missed doses on regimen?  BRIA    Diabetes Complications include:     Hyperglycemia    Complicating diabetes co morbidities:   HTN, HLD, Obesity, previous CVA       HPI:   Patient is a 78 y.o. female with a diagnosis of HTN, T2DM, HLD, Obesity presented with 3 day history of right sided weakness and dysarthria. Found to have L MCA infarct. No thrombolytics or intervention as out of the window.  Etiology probable small vessel disease with co-morbidities. Endocrinology consulted for management of T2DM.              Interval HPI:   Overnight events: Remains on neuro floor. BG above goal ranges on current SQ insulin regimen. Creatinine 1.5. Diet NPO    Eating:   NPO  Nausea: No  Hypoglycemia and intervention: No  Fever: No  TPN and/or TF: No  If yes, type of TF/TPN and rate: n/a    BP (!) 186/83   Pulse 62   Temp 98.7 °F (37.1 °C) (Oral)   Resp 16   Ht 5' 7" (1.702 m)   Wt 90 kg (198 lb 6.6 oz)   SpO2 96%   Breastfeeding No   BMI 31.08 kg/m²     Labs Reviewed and Include    Recent Labs   Lab 11/28/23  0513   *   CALCIUM 9.0   ALBUMIN 2.4*   PROT 6.2      K 3.4*   CO2 23      BUN 25*   CREATININE 1.5*   ALKPHOS 96   ALT 20   AST 21   BILITOT 0.3     Lab Results   Component Value Date    WBC 8.29 11/28/2023    HGB 11.7 (L) 11/28/2023    HCT 36.9 (L) 11/28/2023    MCV 85 11/28/2023     11/28/2023     Recent Labs   Lab 11/26/23 2041   TSH 1.766 " "    Lab Results   Component Value Date    HGBA1C 9.6 (H) 11/26/2023       Nutritional status:   Body mass index is 31.08 kg/m².  Lab Results   Component Value Date    ALBUMIN 2.4 (L) 11/28/2023    ALBUMIN 2.5 (L) 11/27/2023    ALBUMIN 2.9 (L) 11/26/2023     No results found for: "PREALBUMIN"    Estimated Creatinine Clearance: 36.8 mL/min (A) (based on SCr of 1.5 mg/dL (H)).    Accu-Checks  Recent Labs     11/27/23  0021 11/27/23  0756 11/27/23  1811 11/27/23  2101 11/28/23  0802   POCTGLUCOSE 339* 204* 202* 214* 187*       Current Medications and/or Treatments Impacting Glycemic Control  Immunotherapy:    Immunosuppressants       None          Steroids:   Hormones (From admission, onward)      None          Pressors:    Autonomic Drugs (From admission, onward)      None          Hyperglycemia/Diabetes Medications:   Antihyperglycemics (From admission, onward)      Start     Stop Route Frequency Ordered    11/28/23 0900  insulin detemir U-100 (Levemir) pen 16 Units         -- SubQ Daily 11/28/23 0840    11/27/23 1130  insulin aspart U-100 pen 5 Units         -- SubQ 3 times daily with meals 11/27/23 1057    11/26/23 2347  insulin aspart U-100 pen 0-5 Units         -- SubQ Before meals & nightly PRN 11/26/23 2251            ASSESSMENT and PLAN    Neuro  * Cerebrovascular accident (CVA) due to thrombosis of left middle cerebral artery  Optimize BG control        Cardiac/Vascular  Hyperlipidemia  May increase insulin resistance.         Endocrine  Obesity (BMI 30-39.9)  Body mass index is 31.08 kg/m².  May increase insulin resistance.         Type 2 diabetes mellitus with hyperglycemia, with long-term current use of insulin  BG goal 140-180    Increase Levemir to 16 units daily (0.4 u/kg dosing) 20% reduction from home dose  Novolog 5 units TID with meals (HOLD if patient eating < 25% or BG < 100)  Low Dose Correction Scale   BG monitoring ac/hs    ** Please call Endocrine for any BG related issues **      Discharge " plans: TBD    Lab Results   Component Value Date    HGBA1C 9.6 (H) 11/26/2023                 Bryanna Lauren NP  Endocrinology  [facility] ([facility])

## 2023-11-28 NOTE — CONSULTS
Rob Borjas - Neurosurgery ([facility])  Physical Medicine & Rehab  Consult Note    Patient Name: Buck Ibarra  MRN: [id number]  Admission Date: 11/26/2023  Hospital Length of Stay: 2 days  Attending Physician: Eriberto Breaux MD   Consults  Subjective:     Principal Problem: Cerebrovascular accident (CVA) due to thrombosis of left middle cerebral artery    HPI: Per chart review, 79 y/o female with 3 day history of right sided weakness and dysarthria with new L MCA infarct. No thrombolytics or intervention as out of the window.  Etiology likely small vessel disease with co-morbidities. PM &R was consulted to evaluate pt for IPR placement.          Functional History: Patient lives  with sister.  Prior to admission, Pt was I with ADLs and mobility.  DME: None.      Hospital Course: Per chart review,    PT-11/27    Bed Mobility:  Supine to Sit: Maximum Assistance and 2 persons  on L side of bed  Sit to Supine: Maximum Assistance and 2 persons  Scooting anteriorly to EOB to plant feet on floor: Maximum Assistance     Transfers:   Sit to Stand Transfer: Activity did not occur - see assessment              Gait:  Deferred.    OT-11/27    Bed Mobility:    Patient completed Scooting/Bridging with maximal assistance  Patient completed Supine to Sit with maximal assistance and 2 persons  Patient completed Sit to Supine with maximal assistance and 2 persons     Functional Mobility/Transfers:  Functional Mobility: Deferred 2/2 R ankle      Activities of Daily Living:  Grooming: total assistance for facial hygiene while sitting EOB for alertness   Lower Body Dressing: total assistance for donning socks, however limited by R ankle pain     Past Medical History:   Diagnosis Date    Allergy     Cataract     Current every day smoker     Diabetes mellitus type II     Gastritis     with gastric ulcer    GERD (gastroesophageal reflux disease)     H. pylori infection     History of hepatitis C, SVR as of 8-2016 08/25/2015    Harvoni  12 wks completed.  SVR(12) as of 8/4/16 SVR(24) as of 10-     Hypertension     Osteopenia     Type 2 diabetes mellitus with diabetic polyneuropathy      Past Surgical History:   Procedure Laterality Date    COLONOSCOPY      COLONOSCOPY N/A 1/28/2016    Procedure: COLONOSCOPY;  Surgeon: Emeka Ahn MD;  Location: [facility] ENDO (4TH FLR);  Service: Endoscopy;  Laterality: N/A;    COLONOSCOPY N/A 8/8/2019    Procedure: COLONOSCOPY;  Surgeon: Susan Dia MD;  Location: [facility] ENDO (4TH FLR);  Service: Endoscopy;  Laterality: N/A;  appt confirmed-rb    HYSTERECTOMY      LIVER BIOPSY      OOPHORECTOMY      PERIPHERAL ANGIOGRAPHY Left 5/5/2021    Procedure: Peripheral angiography;  Surgeon: Randolph Toribio MD;  Location: [facility] CATH LAB;  Service: Cardiology;  Laterality: Left;    PERIPHERAL ANGIOGRAPHY N/A 6/21/2021    Procedure: Peripheral angiography;  Surgeon: Randolph Toribio MD;  Location: [facility] CATH LAB;  Service: Cardiology;  Laterality: N/A;    UPPER GASTROINTESTINAL ENDOSCOPY       Review of patient's allergies indicates:   Allergen Reactions    Amlodipine Swelling    Erythromycin Anaphylaxis    Erythropoietin analogues Anaphylaxis    Pegasys [peginterferon ruben-2a] Anaphylaxis    Lisinopril Hives       Scheduled Medications:    aspirin  81 mg Oral Daily    atorvastatin  40 mg Oral Daily    clopidogreL  75 mg Oral Daily    doxepin  10 mg Oral QHS    enoxparin  40 mg Subcutaneous Daily    gabapentin  300 mg Oral QHS    insulin aspart U-100  5 Units Subcutaneous TIDWM    insulin detemir U-100 (Levemir)  16 Units Subcutaneous Daily    NIFEdipine  30 mg Oral Daily    pantoprazole  40 mg Oral Daily    senna-docusate 8.6-50 mg  1 tablet Oral BID    valsartan  320 mg Oral Daily       PRN Medications: acetaminophen, dextrose 10%, dextrose 10%, glucagon (human recombinant), glucose, glucose, insulin aspart U-100, labetaloL, ondansetron, sodium chloride 0.9%, sodium chloride 0.9%    Family History        Problem Relation (Age of Onset)    Breast cancer Sister    Cancer Sister, Sister (70)    Diabetes Mother, Sister, Sister, Sister    Heart disease Mother, Father    Hyperlipidemia Mother    Hypertension Mother          Tobacco Use    Smoking status: Every Day     Current packs/day: 0.50     Average packs/day: 0.3 packs/day for 50.7 years (13.3 ttl pk-yrs)     Types: Cigarettes     Start date: 3/28/1973     Last attempt to quit: 3/28/2021    Smokeless tobacco: Never   Substance and Sexual Activity    Alcohol use: No     Comment: special occasions    Drug use: No    Sexual activity: Yes     Partners: Male     Review of Systems   Constitutional:  Positive for activity change.   Respiratory:  Negative for shortness of breath.    Neurological:  Positive for speech difficulty and weakness.   Psychiatric/Behavioral:  Positive for decreased concentration.      Objective:     Vital Signs (Most Recent):  Temp: 97.2 °F (36.2 °C) (11/28/23 1146)  Pulse: 62 (11/28/23 1159)  Resp: 18 (11/28/23 1146)  BP: (!) 197/90 (11/28/23 1146)  SpO2: 100 % (11/28/23 1146)    Vital Signs (24h Range):  Temp:  [97.2 °F (36.2 °C)-99 °F (37.2 °C)] 97.2 °F (36.2 °C)  Pulse:  [56-95] 62  Resp:  [16-18] 18  SpO2:  [95 %-100 %] 100 %  BP: (157-205)/(70-90) 197/90     Body mass index is 31.08 kg/m².     Physical Exam  Vitals and nursing note reviewed.   Constitutional:       General: She is awake.      Appearance: Normal appearance.   HENT:      Head: Normocephalic and atraumatic.   Eyes:      Extraocular Movements: Extraocular movements intact.   Pulmonary:      Effort: Pulmonary effort is normal.   Abdominal:      Palpations: Abdomen is soft.   Musculoskeletal:      Cervical back: Normal range of motion and neck supple.      Comments: Left sided weakness. RLE limited ROM due to pain   Skin:     General: Skin is warm and dry.      Capillary Refill: Capillary refill takes 2 to 3 seconds.   Neurological:      General: No focal deficit present.       Mental Status: She is alert and oriented to person, place, and time.      GCS: GCS eye subscore is 4. GCS verbal subscore is 5. GCS motor subscore is 6.      Motor: Weakness present.      Coordination: Coordination abnormal. Impaired rapid alternating movements.      Gait: Gait abnormal.   Psychiatric:         Behavior: Behavior is cooperative.          NEUROLOGICAL EXAMINATION:     MENTAL STATUS   Oriented to person, place, and time.       Diagnostic Results: Labs: Reviewed  Assessment/Plan:     * Cerebrovascular accident (CVA) due to thrombosis of left middle cerebral artery  MRI brain-Acute infarct involving the left internal capsule/centrum semiovale. Foci of increased susceptibility in the right precentral gyrus and right paracentral parietal lobe, suggestive of small micro hemorrhages, new from MRI 02/26/2023.Remote appearing infarcts of the bilateral basal ganglia and left thalamus.  -No TNK or thrombectomy as OOW.  -TTE pending.  -PT/OT rec for high intensity therapies. Pt and family amenable.         Acute right ankle pain  -Aspiration as per ortho. No concerns for septic joint.  -Per ortho, conservative management with ICE, elevation, gentle ROM exercises.    Hemiparesis, right  PT/OT.    Current every day smoker  - on cessation when appropriate.    Hyperlipidemia  -Continue statin.    Type 2 diabetes mellitus with hyperglycemia, with long-term current use of insulin  -SSI while inpatient.     Hypertension associated with diabetes  -Permissive HTN due to CVA. Monitor BP closely.    PM&R Recommendation:     At this time, the PM&R team has reviewed this patient's ongoing medical case including inpatient diagnosis, medical history, clinical examination, labs, vitals, current social and functional history to provide the post-acute recommendation as follows:     RECOMMENDATIONS: inpatient rehabilitation due to fair to good potential for improvement with therapies and need of physician oversight for  management of ongoing active medical issues, once medically stable.       The patient will be admitted for comprehensive interdisciplinary inpatient rehabilitation to address the impairments due to her medical diagnosis of Left MCA CVA. The patient will benefit from an inpatient rehabilitation program to promote functional recovery, implement compensatory strategies and will undergo assessment for needs for durable medical equipment for safe discharge to the community. This patient will benefit from a coordinated interdisciplinary rehabilitation program services that require close monitoring and treatment with 24-hour rehabilitative nursing and physical/occupational/speech therapies for 3 hours/day for 5 days/week. This interdisciplinary program will be performed under the direction of a physiatrist.      We will continue to follow.       Thank you for your consult.     Carmen Berry NP  Department of Physical Medicine & Rehab  [facility] Neurosurgery [facility])

## 2023-11-28 NOTE — SUBJECTIVE & OBJECTIVE
Neurologic Chief Complaint: RSW, dysarthria    Subjective:     Interval History: Patient is seen for follow-up neurological assessment and treatment recommendations: NAEON. Exam stable. Ortho consulted for R ankle pain w/ elevated ESR/CRP, joint aspirated and steroids injected. Not concerned for septic joint at this time, aspirate negative for crystals. Glucose imp. ELIZABETH imp.    HPI, Past Medical, Family, and Social History remains the same as documented in the initial encounter.     Review of Systems   Constitutional:  Negative for chills and fever.   HENT:  Negative for ear discharge and ear pain.    Eyes:  Negative for pain and redness.   Respiratory:  Negative for cough and shortness of breath.    Cardiovascular:  Negative for chest pain and leg swelling.   Gastrointestinal:  Negative for abdominal distention and abdominal pain.   Endocrine: Negative for polydipsia and polyphagia.   Genitourinary:  Negative for dyspareunia and dysuria.   Musculoskeletal:  Positive for arthralgias. Negative for back pain.   Skin:  Negative for rash and wound.   Allergic/Immunologic: Negative for environmental allergies and food allergies.   Neurological:  Positive for speech difficulty, weakness and numbness.     Scheduled Meds:   aspirin  81 mg Oral Daily    atorvastatin  40 mg Oral Daily    clopidogreL  75 mg Oral Daily    doxepin  10 mg Oral QHS    enoxparin  40 mg Subcutaneous Daily    gabapentin  300 mg Oral QHS    insulin aspart U-100  5 Units Subcutaneous TIDWM    insulin detemir U-100 (Levemir)  14 Units Subcutaneous Daily    NIFEdipine  30 mg Oral Daily    pantoprazole  40 mg Oral Daily    potassium bicarbonate  40 mEq Oral Once    senna-docusate 8.6-50 mg  1 tablet Oral BID     Continuous Infusions:   sodium chloride 0.9% 75 mL/hr at 11/27/23 1929    sodium chloride 0.9%       PRN Meds:acetaminophen, dextrose 10%, dextrose 10%, glucagon (human recombinant), glucose, glucose, insulin aspart U-100, labetaloL, ondansetron,  sodium chloride 0.9%, sodium chloride 0.9%    Objective:     Vital Signs (Most Recent):  Temp: 98.7 °F (37.1 °C) (11/28/23 0400)  Pulse: 62 (11/28/23 0400)  Resp: 16 (11/28/23 0400)  BP: (!) 186/83 (11/28/23 0400)  SpO2: 96 % (11/28/23 0400)  BP Location: Left arm    Vital Signs Range (Last 24H):  Temp:  [98.1 °F (36.7 °C)-99 °F (37.2 °C)]   Pulse:  [56-95]   Resp:  [16]   BP: (152-203)/(70-84)   SpO2:  [95 %-99 %]   BP Location: Left arm       Physical Exam  Vitals and nursing note reviewed.   Constitutional:       Appearance: Normal appearance. She is obese.   Eyes:      Extraocular Movements: Extraocular movements intact.      Conjunctiva/sclera: Conjunctivae normal.      Pupils: Pupils are equal, round, and reactive to light.   Cardiovascular:      Rate and Rhythm: Normal rate and regular rhythm.   Abdominal:      General: Abdomen is flat. Bowel sounds are normal.      Palpations: Abdomen is soft.   Musculoskeletal:      Cervical back: Normal range of motion.      Comments:  R ankle swollen, warm with severe tenderness to palpation and with passive ROM. Active ROM limited 2/2 pain.   Skin:     General: Skin is warm and dry.   Neurological:      Mental Status: She is alert and oriented to person, place, and time.      Sensory: Sensory deficit present.      Motor: Weakness present.   Psychiatric:         Mood and Affect: Mood normal.         Behavior: Behavior normal.              Neurological Exam:   LOC: alert  Attention Span: poor  Language: No aphasia  Articulation: Dysarthria  Orientation: Not oriented to place  Visual Fields: Full  EOM (CN III, IV, VI): Full/intact  Facial Movement (CN VII): Symmetric facial expression    Motor: Arm left  Normal 5/5  Leg left  Normal 5/5  Arm right  Paresis: 4/5  Leg right Paresis: 3/5 pain limits exam    Laboratory:  CMP:   Recent Labs   Lab 11/28/23  0513   CALCIUM 9.0   ALBUMIN 2.4*   PROT 6.2      K 3.4*   CO2 23      BUN 25*   CREATININE 1.5*   ALKPHOS 96    ALT 20   AST 21   BILITOT 0.3     CBC:   Recent Labs   Lab 11/28/23  0513   WBC 8.29   RBC 4.32   HGB 11.7*   HCT 36.9*      MCV 85   MCH 27.1   MCHC 31.7*       Diagnostic Results     Brain Imaging   CT head w/o contrast 11-26-23 results:  Age indeterminate infarcts in the bilateral basal ganglia and left internal capsule/corona radiata, new from prior.  Recommend further evaluation with MRI.     New punctate hyperdensity in the right precentral gyrus.  This can be followed up MR imaging.     MRI brain w/o contrast 11-26-23 results:     Acute infarct involving the left internal capsule/centrum semiovale.     Foci of increased susceptibility in the right precentral gyrus and right paracentral parietal lobe, suggestive of small micro hemorrhages, new from MRI 02/26/2023.     Remote appearing infarcts of the bilateral basal ganglia and left thalamus.        Cardiac Evaluation:   EKG 11-26-23 results:  Normal sinus rhythm Voltage criteria for left ventricular hypertrophy Nonspecific T wave abnormality Prolonged QT Abnormal ECG When compared with ECG of 24-OCT-2023 13:52, Premature supraventricular complexes are no longer Present

## 2023-11-28 NOTE — PROGRESS NOTES
Rob Borjas - Neurosurgery ([facility])  Vascular Neurology  Comprehensive Stroke Center  Progress Note    Assessment/Plan:     * Cerebrovascular accident (CVA) due to thrombosis of left middle cerebral artery  77 y/o female with 3 day history of right sided weakness and dysarthria with new L MCA infarct. No thrombolytics or intervention as out of the window.  Etiology probable small vessel disease with co-morbidities. Medically ready, pending placement.    Antithrombotics: DAPT    Statins: Lipitor 40 mg daily    Aggressive risk factor modification: HTN, Smoking, DM, HLD, Diet, Exercise, Obesity     Rehab efforts: The patient has been evaluated by a stroke team provider and the therapy needs have been fully considered based off the presenting complaints and exam findings. The following therapy evaluations are needed: PT evaluate and treat, OT evaluate and treat, SLP evaluate and treat, PM&R evaluate for appropriate placement    Diagnostics ordered/pending: None     VTE prophylaxis: Enoxaparin (CrCl < 30 ml/min) 30 mg SQ every 24 hours  Mechanical prophylaxis: Place SCDs    BP parameters: Infarct: No intervention, SBP <220        Acute right ankle pain  R ankle xray negative for fracture/dislocation  ESR 84, CRP 42  Ortho consulted, aspirated joint, not concerned for septic joint at this time, aspirate negative for crystals, recommendations as follows:  - Ice, elevation and gentle ROM exercises  - No acute orthopaedic intervention warranted  - Continue to monitor cultures      Hemiparesis, right  Due to stroke  Aggressive therapy      Current every day smoker  Stroke risk factor   on smoking cessation    Obesity (BMI 30-39.9)  Stroke risk factor   on diet and exercise    Hyperlipidemia  Stroke risk factor  LDL 79.2  Lipitor 40 mg daily was on Crestor at home    Type 2 diabetes mellitus with hyperglycemia, with long-term current use of insulin  Stroke risk factor  HgB A1C 9.6, Endocrine consulted, recs as  follows:  Levemir 16u Daily  Novolog 5u TIDAC (HOLD if patient eating < 25% or BG < 100)   LDSSI  POCT Glucose AC/HS    Hypertension associated with diabetes  Stroke risk factor  SBP <220  Restart home ARB (formulary sub Valsartan 320)  Nifedipine 30mg         11/27/2023 NAEON. Exam stable. Pt reports R ankle pain, had difficulty working with PT. R ankle xray shows no fracture or dislocation.  11/28/2023 NAEON. Exam stable. Ortho consulted for R ankle pain w/ elevated ESR/CRP, joint aspirated and steroids injected. Not concerned for septic joint at this time, aspirate negative for crystals. Glucose imp. ELIZABETH imp.    STROKE DOCUMENTATION        NIH Scale:  1a. Level of Consciousness: 0-->Alert, keenly responsive  1b. LOC Questions: 0-->Answers both questions correctly  1c. LOC Commands: 0-->Performs both tasks correctly  2. Best Gaze: 0-->Normal  3. Visual: 0-->No visual loss  4. Facial Palsy: 0-->Normal symmetrical movements  5a. Motor Arm, Left: 1-->Drift, limb holds 90 (or 45) degrees, but drifts down before full 10 seconds, does not hit bed or other support  5b. Motor Arm, Right: 1-->Drift, limb holds 90 (or 45) degrees, but drifts down before full 10 secs, does not hit bed or other support  6a. Motor Leg, Left: 1-->Drift, leg falls by the end of the 5-sec period but does not hit bed  6b. Motor Leg, Right: 3-->No effort against gravity, leg falls to bed immediately  7. Limb Ataxia: 0-->Absent  8. Sensory: 1-->Mild-to-moderate sensory loss, patient feels pinprick is less sharp or is dull on the affected side, or there is a loss of superficial pain with pinprick, but patient is aware of being touched  9. Best Language: 0-->No aphasia, normal  10. Dysarthria: 1-->Mild-to-moderate dysarthria, patient slurs at least some words and, at worst, can be understood with some difficulty  11. Extinction and Inattention (formerly Neglect): 0-->No abnormality  Total (NIH Stroke Scale): 8       Modified Natalia Score: 1  Cherelle  Coma Scale:    ABCD2 Score:    [id number] Score:   HAS -BLED Score:   ICH Score:   Hunt & Hutton Classification:      Hemorrhagic change of an Ischemic Stroke: Does this patient have an ischemic stroke with hemorrhagic changes? No     Neurologic Chief Complaint: RSW, dysarthria    Subjective:     Interval History: Patient is seen for follow-up neurological assessment and treatment recommendations: DONYEON. Exam stable. Ortho consulted for R ankle pain w/ elevated ESR/CRP, joint aspirated and steroids injected. Not concerned for septic joint at this time, aspirate negative for crystals. Glucose imp. ELIZABETH imp.    HPI, Past Medical, Family, and Social History remains the same as documented in the initial encounter.     Review of Systems   Constitutional:  Negative for chills and fever.   HENT:  Negative for ear discharge and ear pain.    Eyes:  Negative for pain and redness.   Respiratory:  Negative for cough and shortness of breath.    Cardiovascular:  Negative for chest pain and leg swelling.   Gastrointestinal:  Negative for abdominal distention and abdominal pain.   Endocrine: Negative for polydipsia and polyphagia.   Genitourinary:  Negative for dyspareunia and dysuria.   Musculoskeletal:  Positive for arthralgias. Negative for back pain.   Skin:  Negative for rash and wound.   Allergic/Immunologic: Negative for environmental allergies and food allergies.   Neurological:  Positive for speech difficulty, weakness and numbness.     Scheduled Meds:   aspirin  81 mg Oral Daily    atorvastatin  40 mg Oral Daily    clopidogreL  75 mg Oral Daily    doxepin  10 mg Oral QHS    enoxparin  40 mg Subcutaneous Daily    gabapentin  300 mg Oral QHS    insulin aspart U-100  5 Units Subcutaneous TIDWM    insulin detemir U-100 (Levemir)  14 Units Subcutaneous Daily    NIFEdipine  30 mg Oral Daily    pantoprazole  40 mg Oral Daily    potassium bicarbonate  40 mEq Oral Once    senna-docusate 8.6-50 mg  1 tablet Oral BID     Continuous  Infusions:   sodium chloride 0.9% 75 mL/hr at 11/27/23 1929    sodium chloride 0.9%       PRN Meds:acetaminophen, dextrose 10%, dextrose 10%, glucagon (human recombinant), glucose, glucose, insulin aspart U-100, labetaloL, ondansetron, sodium chloride 0.9%, sodium chloride 0.9%    Objective:     Vital Signs (Most Recent):  Temp: 98.7 °F (37.1 °C) (11/28/23 0400)  Pulse: 62 (11/28/23 0400)  Resp: 16 (11/28/23 0400)  BP: (!) 186/83 (11/28/23 0400)  SpO2: 96 % (11/28/23 0400)  BP Location: Left arm    Vital Signs Range (Last 24H):  Temp:  [98.1 °F (36.7 °C)-99 °F (37.2 °C)]   Pulse:  [56-95]   Resp:  [16]   BP: (152-203)/(70-84)   SpO2:  [95 %-99 %]   BP Location: Left arm       Physical Exam  Vitals and nursing note reviewed.   Constitutional:       Appearance: Normal appearance. She is obese.   Eyes:      Extraocular Movements: Extraocular movements intact.      Conjunctiva/sclera: Conjunctivae normal.      Pupils: Pupils are equal, round, and reactive to light.   Cardiovascular:      Rate and Rhythm: Normal rate and regular rhythm.   Abdominal:      General: Abdomen is flat. Bowel sounds are normal.      Palpations: Abdomen is soft.   Musculoskeletal:      Cervical back: Normal range of motion.      Comments:  R ankle swollen, warm with severe tenderness to palpation and with passive ROM. Active ROM limited 2/2 pain.   Skin:     General: Skin is warm and dry.   Neurological:      Mental Status: She is alert and oriented to person, place, and time.      Sensory: Sensory deficit present.      Motor: Weakness present.   Psychiatric:         Mood and Affect: Mood normal.         Behavior: Behavior normal.              Neurological Exam:   LOC: alert  Attention Span: poor  Language: No aphasia  Articulation: Dysarthria  Orientation: Not oriented to place  Visual Fields: Full  EOM (CN III, IV, VI): Full/intact  Facial Movement (CN VII): Symmetric facial expression    Motor: Arm left  Normal 5/5  Leg left  Normal 5/5  Arm  right  Paresis: 4/5  Leg right Paresis: 3/5 pain limits exam    Laboratory:  CMP:   Recent Labs   Lab 11/28/23  0513   CALCIUM 9.0   ALBUMIN 2.4*   PROT 6.2      K 3.4*   CO2 23      BUN 25*   CREATININE 1.5*   ALKPHOS 96   ALT 20   AST 21   BILITOT 0.3     CBC:   Recent Labs   Lab 11/28/23  0513   WBC 8.29   RBC 4.32   HGB 11.7*   HCT 36.9*      MCV 85   MCH 27.1   MCHC 31.7*       Diagnostic Results     Brain Imaging   CT head w/o contrast 11-26-23 results:  Age indeterminate infarcts in the bilateral basal ganglia and left internal capsule/corona radiata, new from prior.  Recommend further evaluation with MRI.     New punctate hyperdensity in the right precentral gyrus.  This can be followed up MR imaging.     MRI brain w/o contrast 11-26-23 results:     Acute infarct involving the left internal capsule/centrum semiovale.     Foci of increased susceptibility in the right precentral gyrus and right paracentral parietal lobe, suggestive of small micro hemorrhages, new from MRI 02/26/2023.     Remote appearing infarcts of the bilateral basal ganglia and left thalamus.        Cardiac Evaluation:   EKG 11-26-23 results:  Normal sinus rhythm Voltage criteria for left ventricular hypertrophy Nonspecific T wave abnormality Prolonged QT Abnormal ECG When compared with ECG of 24-OCT-2023 13:52, Premature supraventricular complexes are no longer Present    Ming Fay MD  Comprehensive Stroke Center  Department of Vascular Neurology   [facility])

## 2023-11-28 NOTE — PLAN OF CARE
Problem: Adjustment to Illness (Stroke, Ischemic/Transient Ischemic Attack)  Goal: Optimal Coping  Outcome: Ongoing, Progressing     Problem: Bowel Elimination Impaired (Stroke, Ischemic/Transient Ischemic Attack)  Goal: Effective Bowel Elimination  Outcome: Ongoing, Progressing     Problem: Cerebral Tissue Perfusion (Stroke, Ischemic/Transient Ischemic Attack)  Goal: Optimal Cerebral Tissue Perfusion  Outcome: Ongoing, Progressing     Problem: Cognitive Impairment (Stroke, Ischemic/Transient Ischemic Attack)  Goal: Optimal Cognitive Function  Outcome: Ongoing, Progressing     Problem: Communication Impairment (Stroke, Ischemic/Transient Ischemic Attack)  Goal: Improved Communication Skills  Outcome: Ongoing, Progressing     Problem: Functional Ability Impaired (Stroke, Ischemic/Transient Ischemic Attack)  Goal: Optimal Functional Ability  Outcome: Ongoing, Progressing     Problem: Respiratory Compromise (Stroke, Ischemic/Transient Ischemic Attack)  Goal: Effective Oxygenation and Ventilation  Outcome: Ongoing, Progressing     Problem: Sensorimotor Impairment (Stroke, Ischemic/Transient Ischemic Attack)  Goal: Improved Sensorimotor Function  Outcome: Ongoing, Progressing     Problem: Swallowing Impairment (Stroke, Ischemic/Transient Ischemic Attack)  Goal: Optimal Eating and Swallowing without Aspiration  Outcome: Ongoing, Progressing     Problem: Urinary Elimination Impaired (Stroke, Ischemic/Transient Ischemic Attack)  Goal: Effective Urinary Elimination  Outcome: Ongoing, Progressing     Problem: Fall Injury Risk  Goal: Absence of Fall and Fall-Related Injury  Outcome: Ongoing, Progressing     Problem: Adult Inpatient Plan of Care  Goal: Plan of Care Review  Outcome: Ongoing, Progressing  Goal: Patient-Specific Goal (Individualized)  Outcome: Ongoing, Progressing  Goal: Absence of Hospital-Acquired Illness or Injury  Outcome: Ongoing, Progressing  Goal: Optimal Comfort and Wellbeing  Outcome: Ongoing,  Progressing  Goal: Readiness for Transition of Care  Outcome: Ongoing, Progressing     Problem: Infection  Goal: Absence of Infection Signs and Symptoms  Outcome: Ongoing, Progressing     Problem: Diabetes Comorbidity  Goal: Blood Glucose Level Within Targeted Range  Outcome: Ongoing, Progressing     Problem: Skin Injury Risk Increased  Goal: Skin Health and Integrity  Outcome: Ongoing, Progressing

## 2023-11-28 NOTE — CONSULTS
Rob Borjas - Neurosurgery ([facility])  Orthopedics  Consult Note    Patient Name: Buck Ibarra  MRN: [id number]  Admission Date: 11/26/2023  Hospital Length of Stay: 1 days  Attending Provider: Eriberto Breaux MD  Primary Care Provider: Ericka Cortez MD    Inpatient consult to Orthopedics  Consult performed by: SUDHIR Little MD  Consult ordered by: Kayleigh Solares PA-C        Subjective:     Principal Problem:Cerebrovascular accident (CVA) due to thrombosis of left middle cerebral artery    Chief Complaint:   Chief Complaint   Patient presents with    Weakness     Increased right sided weakness and slurred speech since Thursday. Stating was out of town when they noticed the increasing symptoms. Also stating the bp has been high since them also. Upon arrival to er on attempt to exit vehicle pt  slide to ground per family and er staff had to assist with lifting patient into wheelchiar        HPI: Buck Ibarra is a 78 y.o. female with PMH significant for recent CVA with slurred speech, HLD, HTN, DM  presenting with right ankle pain. Patient recently fell onto and when daughter went to help patient up, her foot was caught under dresser. She had immediate right ankle pain. The patient endorses prior hx of falls. Patient denies numbness and tingling. Denies any other musculoskeletal pain or injuries. No known history of prior right ankle injury or surgery.     Past Medical History:   Diagnosis Date    Allergy     Cataract     Current every day smoker     Diabetes mellitus type II     Gastritis     with gastric ulcer    GERD (gastroesophageal reflux disease)     H. pylori infection     History of hepatitis C, SVR as of 8-2016 08/25/2015    Harvoni 12 wks completed.  SVR(12) as of 8/4/16 SVR(24) as of 10-     Hypertension     Osteopenia     Type 2 diabetes mellitus with diabetic polyneuropathy        Past Surgical History:   Procedure Laterality Date    COLONOSCOPY      COLONOSCOPY N/A 1/28/2016    Procedure:  COLONOSCOPY;  Surgeon: Emeka Ahn MD;  Location: [facility] ENDO (4TH FLR);  Service: Endoscopy;  Laterality: N/A;    COLONOSCOPY N/A 8/8/2019    Procedure: COLONOSCOPY;  Surgeon: Susan Dia MD;  Location: [facility] ENDO (4TH FLR);  Service: Endoscopy;  Laterality: N/A;  appt confirmed-rb    HYSTERECTOMY      LIVER BIOPSY      OOPHORECTOMY      PERIPHERAL ANGIOGRAPHY Left 5/5/2021    Procedure: Peripheral angiography;  Surgeon: Randolph Toribio MD;  Location: [facility] CATH LAB;  Service: Cardiology;  Laterality: Left;    PERIPHERAL ANGIOGRAPHY N/A 6/21/2021    Procedure: Peripheral angiography;  Surgeon: Randolph Toribio MD;  Location: [facility] CATH LAB;  Service: Cardiology;  Laterality: N/A;    UPPER GASTROINTESTINAL ENDOSCOPY         Review of patient's allergies indicates:   Allergen Reactions    Amlodipine Swelling    Erythromycin Anaphylaxis    Erythropoietin analogues Anaphylaxis    Pegasys [peginterferon ruben-2a] Anaphylaxis    Lisinopril Hives       Current Facility-Administered Medications   Medication    0.9%  NaCl infusion    acetaminophen tablet 650 mg    aspirin EC tablet 81 mg    atorvastatin tablet 40 mg    clopidogreL tablet 75 mg    dextrose 10% bolus 125 mL 125 mL    dextrose 10% bolus 250 mL 250 mL    doxepin capsule 10 mg    enoxaparin injection 40 mg    gabapentin capsule 300 mg    glucagon (human recombinant) injection 1 mg    glucose chewable tablet 16 g    glucose chewable tablet 24 g    insulin aspart U-100 pen 0-5 Units    insulin aspart U-100 pen 5 Units    [START ON 11/28/2023] insulin detemir U-100 (Levemir) pen 14 Units    labetalol 20 mg/4 mL (5 mg/mL) IV syring    NIFEdipine 24 hr tablet 30 mg    [START ON 11/28/2023] ondansetron injection 4 mg    pantoprazole EC tablet 40 mg    senna-docusate 8.6-50 mg per tablet 1 tablet    sodium chloride 0.9% bolus 500 mL 500 mL    sodium chloride 0.9% flush 10 mL     Family History       Problem Relation (Age of Onset)    Breast cancer Sister  "   Cancer Sister, Sister (70)    Diabetes Mother, Sister, Sister, Sister    Heart disease Mother, Father    Hyperlipidemia Mother    Hypertension Mother          Tobacco Use    Smoking status: Every Day     Current packs/day: 0.50     Average packs/day: 0.3 packs/day for 50.7 years (13.3 ttl pk-yrs)     Types: Cigarettes     Start date: 3/28/1973     Last attempt to quit: 3/28/2021    Smokeless tobacco: Never   Substance and Sexual Activity    Alcohol use: No     Comment: special occasions    Drug use: No    Sexual activity: Yes     Partners: Male     ROS  Constitutional: negative for fevers  Eyes: negative visual changes  ENT: negative for hearing loss  Respiratory: negative for dyspnea  Cardiovascular: negative for chest pain  Gastrointestinal: negative for abdominal pain  Genitourinary: negative for dysuria  Neurological: negative for headaches  Behavioral/Psych: negative for hallucinations  Endocrine: negative for temperature intolerance    Objective:     Vital Signs (Most Recent):  Temp: 98.1 °F (36.7 °C) (11/27/23 2000)  Pulse: 66 (11/27/23 2249)  Resp: 16 (11/27/23 2000)  BP: (!) 157/70 (11/27/23 2000)  SpO2: 96 % (11/27/23 2000) Vital Signs (24h Range):  Temp:  [97.3 °F (36.3 °C)-99 °F (37.2 °C)] 98.1 °F (36.7 °C)  Pulse:  [56-95] 66  Resp:  [16-19] 16  SpO2:  [96 %-100 %] 96 %  BP: (143-239)/() 157/70     Weight: 90 kg (198 lb 6.6 oz)  Height: 5' 7" (170.2 cm)  Body mass index is 31.08 kg/m².    No intake or output data in the 24 hours ending 11/27/23 2255     Ortho/SPM Exam  General:  no acute distress, appears stated age   Neuro: alert and oriented x3  Psych: normal mood  Head: normocephalic, atraumatic.  Eyes: no scleral icterus  Mouth: moist mucous membranes  Cardiovascular: extremities warm and well perfused  Lungs: breathing comfortably, equal chest rise bilat  Skin: clean, dry, intact (any exceptions noted in below musculoskeletal exam)    MSK:  RUE:  - Skin intact throughout, no open wounds  - " No swelling  - No ecchymosis, erythema, or signs of cellulitis  - NonTTP throughout  - AROM and PROM of the shoulder, elbow, wrist, and hand intact without pain  - Axillary/AIN/PIN/Radial/Median/Ulnar Nerves assessed in isolation without deficit  - SILT throughout  - Compartments soft  - Radial artery palpated   - Capillary Refill <3s    LUE:  - Skin intact throughout, no open wounds  - No swelling  - No ecchymosis, erythema, or signs of cellulitis  - NonTTP throughout  - AROM and PROM of the shoulder, elbow, wrist, and hand intact without pain  - Axillary/AIN/PIN/Radial/Median/Ulnar Nerves assessed in isolation without deficit  - SILT throughout  - Compartments soft  - Radial artery palpated   - Capillary Refill <3s    RLE:  - Skin intact throughout, no open wounds  - Mild swelling and bruising lateral ankle  - No ecchymosis, erythema, or signs of cellulitis  - Very TTP throughout ankle joint  - AROM and PROM of the hip, knee,  and foot intact without pain  - Pain with any ROM of R ankle  - TA/EHL/Gastroc/FHL assessed in isolation without deficit  - SILT throughout  - Compartments soft  - DP and PT palpated  - Capillary Refill <3s  - Negative Log roll  - [facility]    LLE:  - Skin intact throughout, no open wounds  - No swelling  - No ecchymosis, erythema, or signs of cellulitis  - NonTTP throughout  - AROM and PROM of the hip, knee, ankle, and foot intact without pain  - TA/EHL/Gastroc/FHL assessed in isolation without deficit  - SILT throughout  - Compartments soft  - DP and PT palpated  - Capillary Refill <3s  - Negative Log roll  - Negative [place]       Significant Labs: All pertinent labs within the past 24 hours have been reviewed.    Significant Imaging: I have reviewed and interpreted all pertinent imaging results/findings.  XR R ankle: no fractures or dislocations  Assessment/Plan:     Acute right ankle pain  Buck Ibarra is a 78 y.o. female admitted for stroke who has been having  right ankle pain for a few days since daughter helped patient up from the floor and foot caught under dresser. CRP elevated to 42, ESR 84. Mild swelling and bruising. Very TTP around ankle joint. Ankle aspirated given tenderness and elevated inflammatory markers (see procedure note). No evidence of septic arthritis at this time. Crystals still pending.     - Recommend ice, elevation and gentle ROM exercises  - No acute orthopaedic intervention warranted  - We will continue to monitor cultures    Joint Fluid Analysis:  WBC: 5000  Segs: 87%  Crystals: pending  Gram Stain: negative  Cultures pending, will continue to follow     Recent Labs   Lab 11/27/23  0530   WBC 8.54   RBC 4.39   HGB 11.9*   HCT 37.8      MCV 86   MCH 27.1   MCHC 31.5*       Recent Labs   Lab 11/27/23  1624   SEDRATE 85*   CRP 42.2*         Procedure Note:  After consent was obtained, using sterile technique the right ankle was prepped and the joint was entered from from the anteromedial approach. 3 ml's of bloody colored fluid was withdrawn and sent for analysis and culture.  Steroid 40 mg and 2 ml plain Lidocaine was then injected and the needle withdrawn.  The procedure was well tolerated.  The patient is asked to continue to rest the ankle for a few more days before resuming regular activities.  It may be more painful for the first 1-2 days.  Watch for fever, or increased swelling or persistent pain in ankle.           SUDHIR Little MD  Orthopedics  Rob emilia - Neurosurgery ([facility])

## 2023-11-28 NOTE — ASSESSMENT & PLAN NOTE
MRI brain-Acute infarct involving the left internal capsule/centrum semiovale. Foci of increased susceptibility in the right precentral gyrus and right paracentral parietal lobe, suggestive of small micro hemorrhages, new from MRI 02/26/2023.Remote appearing infarcts of the bilateral basal ganglia and left thalamus.  -No TNK or thrombectomy as OOW.  -TTE pending.  -PT/OT rec for high intensity therapies. Pt and family amenable.

## 2023-11-28 NOTE — NURSING
Nurses Note -- 4 Eyes      11/27/2023   7:45 PM      Skin assessed during: Q Shift Change      [x] No Altered Skin Integrity Present    []Prevention Measures Documented      [] Yes- Altered Skin Integrity Present or Discovered   [] LDA Added if Not in Epic (Describe Wound)   [] New Altered Skin Integrity was Present on Admit and Documented in LDA   [] Wound Image Taken    Wound Care Consulted? No    Attending Nurse:  María Newby RN/Staff Member:  scot HOOD

## 2023-11-28 NOTE — SUBJECTIVE & OBJECTIVE
Past Medical History:   Diagnosis Date    Allergy     Cataract     Current every day smoker     Diabetes mellitus type II     Gastritis     with gastric ulcer    GERD (gastroesophageal reflux disease)     H. pylori infection     History of hepatitis C, SVR as of 8-2016 08/25/2015    Harvoni 12 wks completed.  SVR(12) as of 8/4/16 SVR(24) as of 10-     Hypertension     Osteopenia     Type 2 diabetes mellitus with diabetic polyneuropathy      Past Surgical History:   Procedure Laterality Date    COLONOSCOPY      COLONOSCOPY N/A 1/28/2016    Procedure: COLONOSCOPY;  Surgeon: Emeka Ahn MD;  Location: [address]);  Service: Endoscopy;  Laterality: N/A;    COLONOSCOPY N/A 8/8/2019    Procedure: COLONOSCOPY;  Surgeon: Susan Dia MD;  Location: [address]);  Service: Endoscopy;  Laterality: N/A;  appt confirmed-rb    HYSTERECTOMY      LIVER BIOPSY      OOPHORECTOMY      PERIPHERAL ANGIOGRAPHY Left 5/5/2021    Procedure: Peripheral angiography;  Surgeon: Randolph Toribio MD;  Location: [facility] CATH [facility];  Service: Cardiology;  Laterality: Left;    PERIPHERAL ANGIOGRAPHY N/A 6/21/2021    Procedure: Peripheral angiography;  Surgeon: Randolph Toribio MD;  Location: [facility] CATH LAB;  Service: Cardiology;  Laterality: N/A;    UPPER GASTROINTESTINAL ENDOSCOPY       Review of patient's allergies indicates:   Allergen Reactions    Amlodipine Swelling    Erythromycin Anaphylaxis    Erythropoietin analogues Anaphylaxis    Pegasys [peginterferon ruben-2a] Anaphylaxis    Lisinopril Hives       Scheduled Medications:    aspirin  81 mg Oral Daily    atorvastatin  40 mg Oral Daily    clopidogreL  75 mg Oral Daily    doxepin  10 mg Oral QHS    enoxparin  40 mg Subcutaneous Daily    gabapentin  300 mg Oral QHS    insulin aspart U-100  5 Units Subcutaneous TIDWM    insulin detemir U-100 (Levemir)  16 Units Subcutaneous Daily    NIFEdipine  30 mg Oral Daily    pantoprazole  40 mg Oral Daily     senna-docusate 8.6-50 mg  1 tablet Oral BID    valsartan  320 mg Oral Daily       PRN Medications: acetaminophen, dextrose 10%, dextrose 10%, glucagon (human recombinant), glucose, glucose, insulin aspart U-100, labetaloL, ondansetron, sodium chloride 0.9%, sodium chloride 0.9%    Family History       Problem Relation (Age of Onset)    Breast cancer Sister    Cancer Sister, Sister (70)    Diabetes Mother, Sister, Sister, Sister    Heart disease Mother, Father    Hyperlipidemia Mother    Hypertension Mother          Tobacco Use    Smoking status: Every Day     Current packs/day: 0.50     Average packs/day: 0.3 packs/day for 50.7 years (13.3 ttl pk-yrs)     Types: Cigarettes     Start date: 3/28/1973     Last attempt to quit: 3/28/2021    Smokeless tobacco: Never   Substance and Sexual Activity    Alcohol use: No     Comment: special occasions    Drug use: No    Sexual activity: Yes     Partners: Male     Review of Systems   Constitutional:  Positive for activity change.   Respiratory:  Negative for shortness of breath.    Neurological:  Positive for speech difficulty and weakness.   Psychiatric/Behavioral:  Positive for decreased concentration.      Objective:     Vital Signs (Most Recent):  Temp: 97.2 °F (36.2 °C) (11/28/23 1146)  Pulse: 62 (11/28/23 1159)  Resp: 18 (11/28/23 1146)  BP: (!) 197/90 (11/28/23 1146)  SpO2: 100 % (11/28/23 1146)    Vital Signs (24h Range):  Temp:  [97.2 °F (36.2 °C)-99 °F (37.2 °C)] 97.2 °F (36.2 °C)  Pulse:  [56-95] 62  Resp:  [16-18] 18  SpO2:  [95 %-100 %] 100 %  BP: (157-205)/(70-90) 197/90     Body mass index is 31.08 kg/m².     Physical Exam  Vitals and nursing note reviewed.   Constitutional:       General: She is awake.      Appearance: Normal appearance.   HENT:      Head: Normocephalic and atraumatic.   Eyes:      Extraocular Movements: Extraocular movements intact.   Pulmonary:      Effort: Pulmonary effort is normal.   Abdominal:      Palpations: Abdomen is soft.    Musculoskeletal:      Cervical back: Normal range of motion and neck supple.      Comments: Left sided weakness. RLE limited ROM due to pain   Skin:     General: Skin is warm and dry.      Capillary Refill: Capillary refill takes 2 to 3 seconds.   Neurological:      General: No focal deficit present.      Mental Status: She is alert and oriented to person, place, and time.      GCS: GCS eye subscore is 4. GCS verbal subscore is 5. GCS motor subscore is 6.      Motor: Weakness present.      Coordination: Coordination abnormal. Impaired rapid alternating movements.      Gait: Gait abnormal.   Psychiatric:         Behavior: Behavior is cooperative.          NEUROLOGICAL EXAMINATION:     MENTAL STATUS   Oriented to person, place, and time.       Diagnostic Results: Labs: Reviewed

## 2023-11-28 NOTE — ASSESSMENT & PLAN NOTE
-Aspiration as per ortho. No concerns for septic joint.  -Per ortho, conservative management with ICE, elevation, gentle ROM exercises.

## 2023-11-28 NOTE — PT/OT/SLP EVAL
Speech Language Pathology Evaluation  Cognitive Communication    Patient Name:  Buck Ibarra   MRN:  [id number]  Admitting Diagnosis: Cerebrovascular accident (CVA) due to thrombosis of left middle cerebral artery    Recommendations:     Recommendations:                General Recommendations:  Dysphagia therapy and Cognitive-linguistic therapy  Diet recommendations:  Regular Diet - IDDSI Level 7, Thin liquids - IDDSI Level 0   Aspiration Precautions: Meds whole 1 at a time, Monitor for s/s of aspiration, and Standard aspiration precautions   General Precautions: Standard, aspiration, fall  Communication strategies:  none    Assessment:     Buck Ibarra is a 78 y.o. female with an SLP diagnosis of Dysphagia and Cognitive-Linguistic Impairment.    History:     Past Medical History:   Diagnosis Date    Allergy     Cataract     Current every day smoker     Diabetes mellitus type II     Gastritis     with gastric ulcer    GERD (gastroesophageal reflux disease)     H. pylori infection     History of hepatitis C, SVR as of 8-2016 08/25/2015    Harvoni 12 wks completed.  SVR(12) as of 8/4/16 SVR(24) as of 10-     Hypertension     Osteopenia     Type 2 diabetes mellitus with diabetic polyneuropathy        Past Surgical History:   Procedure Laterality Date    COLONOSCOPY      COLONOSCOPY N/A 1/28/2016    Procedure: COLONOSCOPY;  Surgeon: Emeka Ahn MD;  Location: [address]);  Service: Endoscopy;  Laterality: N/A;    COLONOSCOPY N/A 8/8/2019    Procedure: COLONOSCOPY;  Surgeon: Susan Dia MD;  Location: [address]);  Service: Endoscopy;  Laterality: N/A;  appt confirmed-rb    HYSTERECTOMY      LIVER BIOPSY      OOPHORECTOMY      PERIPHERAL ANGIOGRAPHY Left 5/5/2021    Procedure: Peripheral angiography;  Surgeon: Randolph Toribio MD;  Location: [facility] CATH LAB;  Service: Cardiology;  Laterality: Left;    PERIPHERAL ANGIOGRAPHY N/A 6/21/2021    Procedure: Peripheral angiography;   Surgeon: Randolph Toribio MD;  Location: [facility] CATH LAB;  Service: Cardiology;  Laterality: N/A;    UPPER GASTROINTESTINAL ENDOSCOPY         Social History: Patient lives with daughter who provides assistance.    Prior Intubation HX:  none on file     Modified Barium Swallow: none on file    Chest X-Rays: none this admission.     Prior diet: regular thins.      Subjective     Pt seen with family at the bedside.   Patient goals: none were stated     Pain/Comfort:  Pain Rating 1: 0/10    Respiratory Status: Room air    Objective:     Cognitive Status:    Pt was alert and oriented to self and location, however was not oriented to month year and situation despite max verbal cues. Pt with noted difficulty when comparing/contrasting 2 items. Pt was able to complete convergent naming tasks in 1/2 trials INDEP.  Pt with no attention deficits noted throughout the session. Ongoing assessment of memory, sequencing and problem solving warranted.      Receptive Language:   Comprehension:      Questions Simple yes/no 2/2 INDEP  Complex yes/no 2/2 INDEP  Commands  One step 2/2 INDEP  two step basic commands 2/2 INDEP  multistep basic commands 1/1 INDEP        Pragmatics:    Pt with appropriate eye contact and topic initiation noted.    Expressive Language:  Verbal:    Naming of common objects in 3/3 trials INDEP  Conversational speech without noted expressive communication deficits        Motor Speech:  No apraxia and dysarthria noted throughout the session.     Voice:   WFL    Visual-Spatial:  TBA    Reading:   TBA      Written Expression:   TBA    Treatment: Education provided on SLP role and recommendations including safe swallow precautions and ongoing SLP POC.  Pt and family verbalizing understanding.     Pt accepting PO trials of solid cracker and sips of thin liquids with timely AP bolus transit and no signs of aspiration. Would recommend upgrade regular diet and thin liquids. Whiteboard updated. SLP will continue to  follow.     Goals:   Multidisciplinary Problems       SLP Goals          Problem: SLP    Goal Priority Disciplines Outcome   SLP Goal     SLP Ongoing, Progressing   Description: Speech Language Pathology Goals  Goals expected to be met by 12/4/23    1.Pt will tolerate least restrictive diet without overt S/S aspiration, MOD I  2. Educate Pt and family on aspiration precautions and SLP POC  3. Pt will participate in full speech, language and cognitive evaluation to determine ongoing POC needs MET 11/28  4. Pt will compare/contrast 2 items with 90% accuracy with minimal verbal cues.   5. Pt will orient x4 with minimal verbal cues.   6. Pt will participate in high level cognitive linguistic diagnostic tx to further guide SLP POC.                          Plan:   Patient to be seen:  4 x/week   Plan of Care expires:  12/27/23  Plan of Care reviewed with:  patient, family   SLP Follow-Up:  Yes       Discharge recommendations:  Therapy Intensity Recommendations at Discharge: High Intensity Therapy   Barriers to Discharge:  None    Time Tracking:     SLP Treatment Date:   11/28/23  Speech Start Time:  0937  Speech Stop Time:  0953     Speech Total Time (min):  16 min    Billable Minutes: Eval 8  and Treatment Swallowing Dysfunction 8    11/28/2023

## 2023-11-28 NOTE — HPI
Buck Ibarra is a 78 y.o. female with PMH significant for recent CVA with slurred speech, HLD, HTN, DM  presenting with right ankle pain. Patient recently fell onto and when daughter went to help patient up, her foot was caught under dresser. She had immediate right ankle pain. The patient endorses prior hx of falls. Patient denies numbness and tingling. Denies any other musculoskeletal pain or injuries. No known history of prior right ankle injury or surgery.

## 2023-11-28 NOTE — NURSING
Patient resting in room at this time, all alarms and safety features active, plan of care discussed with patient and family when available. All meds given and tolerated by patient unless otherwise specified.  All questions and concerned answered. All personal items needed in reach, call light in reach as well. Hourly purposeful rounding noted and explained to family and patient. No further concerns at this time will cont with current plan of care.    SBP 200s, notified team to check on current parameters    New oral BP med added    NPO removed and diet ordered    Refused am levemir, will recheck during lunch, team notified     No further concerns at this time   Will cont with current plan of care

## 2023-11-28 NOTE — PT/OT/SLP PROGRESS
Physical Therapy Co-Treatment with OT    Patient Name:  Buck Ibarra   MRN:  [id number]    Recent Surgery: * No surgery found *      Patient required co-tx with OT secondary to need for multiple set of skilled hands to provide safest therapy and best outcomes.      Recommendations:     Discharge Recommendations:    High Intensity Therapy  Discharge Equipment Recommendations: walker, rolling, bedside commode   Barriers to discharge: Increased level of assist    Highest Level of Mobility: STS  Assistance Required: Mod(A)x2 to rise, max(A)X2 to maintain    Assessment:     Buck Ibarra is a 78 y.o. female admitted with a medical diagnosis of Cerebrovascular accident (CVA) due to thrombosis of left middle cerebral artery.    Pt met with HOB elevated and agreeable to PT treatment. Today's PT treatment focus was on transfer training to improve function. Pt continues to be limited by RSW and R ankle pain during session today. She was able to stand with mod(A)X2 persons, but c/o increased R ankle pain and was unable to ambulate. She fatigued rapidly in standing and required max(A)X2 persons to maintain position.     Pt is progressing towards acute PT goals appropriately and continues to benefit from acute PT sessions.     Rehab Prognosis: Good; patient would benefit from acute skilled PT services to address these deficits and reach maximum level of function.      Plan:     During this hospitalization, patient to be seen 4 x/week to address the identified rehab impairments via gait training, therapeutic activities, therapeutic exercises, neuromuscular re-education and progress toward the following goals:    Plan of Care Expires:  12/27/23    This plan of care has been discussed with the patient/caregiver, who was included in its development and is in agreement with the identified goals and treatment plan.     Subjective     Communicated with RN prior to session.  Patient agreeable to participate.  "    Pain/Comfort:  Pain Rating 1: 8/10  Location - Side 1: Right  Location - Orientation 1: generalized  Location 1: ankle  Pain Addressed 1: Reposition, Distraction, Cessation of Activity  Pain Rating Post-Intervention 1: 8/10    Chief Complaint: L MCA CVA   Patient/Family Comments/goals: "My ankle hurts"      Objective:     Patient found HOB elevated with bed alarm, telemetry  upon PT entry to room.    General Precautions: Standard, aspiration, fall   Orthopedic Precautions:N/A   Braces: N/A         Exams:    Cognition:  Patient is oriented to Person, Place, Time, Situation  Follows one-step commands, delayed response time  Insight to deficits/safety awareness: impaired    Functional Mobility:    Bed Mobility:  Supine to Sit: Moderate Assistance on R side of bed  Sit to Supine: Maximum Assistance  Scooting anteriorly to EOB to plant feet on floor: Maximum Assistance    Transfers:   Sit to Stand Transfer: Moderate Assistance and 2 persons   from EOB with RW   Mod(A)x2 to rise, max(A)x2 to maintain position  R-sided posterior lean  Pt c/o increased R ankle pain in standing             Gait:  Patient unable at this time    Balance:  Static Sit:   Initially SBA, then mod(A)  at EOB   R-sided posterior lean  Static Stand:   Max(A)x2 persons  with Rolling walker      Therapeutic Activities/Exercises     Patient assisted with functional mobility as noted above  Weight shifts performed in standing  Patient instructed to perform gentle AROM exercises of R ankle and to keep elevated  PT elevated pt's ankle with pillows  Patient educated on the importance of early mobility to prevent functional decline during hospital stay  Patient was instructed to utilize staff assistance for mobility/transfers.  Patient is appropriate to transfer with dependence to medichair via drawsheet and RN/PCT assist  Patient educated on PT POC and role of PT in acute care  White board updated regarding patient's safest level of mobility with staff " assistance, RN also updated.     AM-PAC 6 CLICK MOBILITY  Turning over in bed (including adjusting bedclothes, sheets and blankets)?: 3  Sitting down on and standing up from a chair with arms (e.g., wheelchair, bedside commode, etc.): 2  Moving from lying on back to sitting on the side of the bed?: 2  Moving to and from a bed to a chair (including a wheelchair)?: 2  Need to walk in hospital room?: 2  Climbing 3-5 steps with a railing?: 1  Basic Mobility Total Score: 12     Patient left HOB elevated with all lines intact, call button in reach, bed alarm on, and RN notified. Pt's daughter present        History/Goals:     PAST MEDICAL HISTORY:  Past Medical History:   Diagnosis Date    Allergy     Cataract     Current every day smoker     Diabetes mellitus type II     Gastritis     with gastric ulcer    GERD (gastroesophageal reflux disease)     H. pylori infection     History of hepatitis C, SVR as of 8-2016 08/25/2015    Harvoni 12 wks completed.  SVR(12) as of 8/4/16 SVR(24) as of 10-     Hypertension     Osteopenia     Type 2 diabetes mellitus with diabetic polyneuropathy        Past Surgical History:   Procedure Laterality Date    COLONOSCOPY      COLONOSCOPY N/A 1/28/2016    Procedure: COLONOSCOPY;  Surgeon: Emeka Ahn MD;  Location: [facility] ([address] [place] [address]);  Service: Endoscopy;  Laterality: N/A;    COLONOSCOPY N/A 8/8/2019    Procedure: COLONOSCOPY;  Surgeon: Susan Dia MD;  Location: [address]);  Service: Endoscopy;  Laterality: N/A;  appt confirmed-rb    HYSTERECTOMY      LIVER BIOPSY      OOPHORECTOMY      PERIPHERAL ANGIOGRAPHY Left 5/5/2021    Procedure: Peripheral angiography;  Surgeon: Randolph Toribio MD;  Location: [facility] CATH LAB;  Service: Cardiology;  Laterality: Left;    PERIPHERAL ANGIOGRAPHY N/A 6/21/2021    Procedure: Peripheral angiography;  Surgeon: Randolph Toribio MD;  Location: [facility] CATH LAB;  Service: Cardiology;  Laterality: N/A;    UPPER GASTROINTESTINAL  ENDOSCOPY         GOALS:   Multidisciplinary Problems       Physical Therapy Goals          Problem: Physical Therapy    Goal Priority Disciplines Outcome Goal Variances Interventions   Physical Therapy Goal     PT, PT/OT Ongoing, Progressing     Description: Goals to be met by: 23     Patient will increase functional independence with mobility by performin. Supine to sit with MInimal Assistance  2. Sit to supine with MInimal Assistance  3. Sit to stand transfer with Minimal Assistance  4. Bed to chair transfer with Minimal Assistance using LRAD as needed  5. Gait  x 50 feet with Minimal Assistance using LRAD as needed.   6. Lower extremity exercise program x10 reps per handout, with assistance as needed    *Will add stair goal as appropriate                          Time Tracking:     PT Received On: 23  PT Start Time: 1044     PT Stop Time: 1116  PT Total Time (min): 32 min     Billable Minutes: Therapeutic Activity 15 and Therapeutic Exercise 17      Dolly Fraser, PT  2023  Pager# [phone]

## 2023-11-28 NOTE — HPI
Per chart review, 79 y/o female with 3 day history of right sided weakness and dysarthria with new L MCA infarct. No thrombolytics or intervention as out of the window.  Etiology likely small vessel disease with co-morbidities. PM &R was consulted to evaluate pt for IPR placement.          Functional History: Patient lives  with sister.  Prior to admission, Pt was I with ADLs and mobility.  DME: None.

## 2023-11-28 NOTE — ASSESSMENT & PLAN NOTE
R ankle xray negative for fracture/dislocation  ESR 84, CRP 42  Ortho consulted, aspirated joint, not concerned for septic joint at this time, aspirate negative for crystals, recommendations as follows:  - Ice, elevation and gentle ROM exercises  - No acute orthopaedic intervention warranted  - Continue to monitor cultures

## 2023-11-28 NOTE — ASSESSMENT & PLAN NOTE
BG goal 140-180    Increase Levemir to 16 units daily (0.4 u/kg dosing) 20% reduction from home dose  Novolog 5 units TID with meals (HOLD if patient eating < 25% or BG < 100)  Low Dose Correction Scale   BG monitoring ac/hs    ** Please call Endocrine for any BG related issues **      Discharge plans: TBD    Lab Results   Component Value Date    HGBA1C 9.6 (H) 11/26/2023

## 2023-11-28 NOTE — SUBJECTIVE & OBJECTIVE
"Interval HPI:   Overnight events: Remains on neuro floor. BG above goal ranges on current SQ insulin regimen. Creatinine 1.5. Diet NPO    Eating:   NPO  Nausea: No  Hypoglycemia and intervention: No  Fever: No  TPN and/or TF: No  If yes, type of TF/TPN and rate: n/a    BP (!) 186/83   Pulse 62   Temp 98.7 °F (37.1 °C) (Oral)   Resp 16   Ht 5' 7" (1.702 m)   Wt 90 kg (198 lb 6.6 oz)   SpO2 96%   Breastfeeding No   BMI 31.08 kg/m²     Labs Reviewed and Include    Recent Labs   Lab 11/28/23  0513   *   CALCIUM 9.0   ALBUMIN 2.4*   PROT 6.2      K 3.4*   CO2 23      BUN 25*   CREATININE 1.5*   ALKPHOS 96   ALT 20   AST 21   BILITOT 0.3     Lab Results   Component Value Date    WBC 8.29 11/28/2023    HGB 11.7 (L) 11/28/2023    HCT 36.9 (L) 11/28/2023    MCV 85 11/28/2023     11/28/2023     Recent Labs   Lab 11/26/23 2041   TSH 1.766     Lab Results   Component Value Date    HGBA1C 9.6 (H) 11/26/2023       Nutritional status:   Body mass index is 31.08 kg/m².  Lab Results   Component Value Date    ALBUMIN 2.4 (L) 11/28/2023    ALBUMIN 2.5 (L) 11/27/2023    ALBUMIN 2.9 (L) 11/26/2023     No results found for: "PREALBUMIN"    Estimated Creatinine Clearance: 36.8 mL/min (A) (based on SCr of 1.5 mg/dL (H)).    Accu-Checks  Recent Labs     11/27/23  0021 11/27/23  0756 11/27/23  1811 11/27/23  2101 11/28/23  0802   POCTGLUCOSE 339* 204* 202* 214* 187*       Current Medications and/or Treatments Impacting Glycemic Control  Immunotherapy:    Immunosuppressants       None          Steroids:   Hormones (From admission, onward)      None          Pressors:    Autonomic Drugs (From admission, onward)      None          Hyperglycemia/Diabetes Medications:   Antihyperglycemics (From admission, onward)      Start     Stop Route Frequency Ordered    11/28/23 0900  insulin detemir U-100 (Levemir) pen 16 Units         -- SubQ Daily 11/28/23 0840    11/27/23 1130  insulin aspart U-100 pen 5 Units         " -- SubQ 3 times daily with meals 11/27/23 1057    11/26/23 2347  insulin aspart U-100 pen 0-5 Units         -- SubQ Before meals & nightly PRN 11/26/23 4063

## 2023-11-29 NOTE — ASSESSMENT & PLAN NOTE
R ankle xray negative for fracture/dislocation  ESR 84, CRP 42  Ortho consulted, aspirated joint, not concerned for septic joint at this time, aspirate negative for crystals, recommendations as follows:  - Ice, elevation and gentle ROM exercises  - No acute orthopaedic intervention warranted  - Continue to monitor cultures    - Improving on exam

## 2023-11-29 NOTE — PT/OT/SLP PROGRESS
Physical Therapy      Patient Name:  Buck Ibarra   MRN:  [id number]    Patient not seen today secondary to pt eating lunch at 1:10PM and therapy unable to return this date.  Will follow-up next scheduled day..

## 2023-11-29 NOTE — SUBJECTIVE & OBJECTIVE
"Interval HPI:   Overnight events: Remains on nuero floor. BG reasonably well controlled on current SQ insulin regimen. Diet diabetic 2000 Calorie; Thin    Eatin%  Nausea: No  Hypoglycemia and intervention: No  Fever: No  TPN and/or TF: No  If yes, type of TF/TPN and rate: n/a    BP (!) 212/91 (Patient Position: Lying)   Pulse 66   Temp 97.7 °F (36.5 °C) (Oral)   Resp 18   Ht 5' 7" (1.702 m)   Wt 90 kg (198 lb 6.6 oz)   SpO2 99%   Breastfeeding No   BMI 31.08 kg/m²     Labs Reviewed and Include    Recent Labs   Lab 23  0435   *   CALCIUM 9.7   ALBUMIN 2.9*   PROT 7.3      K 3.6   CO2 21*      BUN 20   CREATININE 1.1   ALKPHOS 105   ALT 23   AST 21   BILITOT 0.5     Lab Results   Component Value Date    WBC 9.39 2023    HGB 13.8 2023    HCT 43.3 2023    MCV 85 2023     2023     Recent Labs   Lab 23   TSH 1.766     Lab Results   Component Value Date    HGBA1C 9.6 (H) 2023       Nutritional status:   Body mass index is 31.08 kg/m².  Lab Results   Component Value Date    ALBUMIN 2.9 (L) 2023    ALBUMIN 2.4 (L) 2023    ALBUMIN 2.5 (L) 2023     No results found for: "PREALBUMIN"    Estimated Creatinine Clearance: 50.1 mL/min (based on SCr of 1.1 mg/dL).    Accu-Checks  Recent Labs     23  0021 23  0756 23  1811 23  2101 23  0802 23  1145 23  1728 23  2128 23  0820   POCTGLUCOSE 339* 204* 202* 214* 187* 255* 199* 185* 148*       Current Medications and/or Treatments Impacting Glycemic Control  Immunotherapy:    Immunosuppressants       None          Steroids:   Hormones (From admission, onward)      None          Pressors:    Autonomic Drugs (From admission, onward)      None          Hyperglycemia/Diabetes Medications:   Antihyperglycemics (From admission, onward)      Start     Stop Route Frequency Ordered    23 0900  insulin detemir U-100 (Levemir) " pen 16 Units         -- SubQ Daily 11/28/23 0840    11/27/23 1130  insulin aspart U-100 pen 5 Units         -- SubQ 3 times daily with meals 11/27/23 1057    11/26/23 2347  insulin aspart U-100 pen 0-5 Units         -- SubQ Before meals & nightly PRN 11/26/23 8423

## 2023-11-29 NOTE — PT/OT/SLP PROGRESS
"Speech Language Pathology Treatment    Patient Name:  Buck Ibarra   MRN:  [id number]  Admitting Diagnosis: Cerebrovascular accident (CVA) due to thrombosis of left middle cerebral artery    Recommendations:                 General Recommendations:  Cognitive-linguistic therapy  Diet recommendations:  Regular, Liquid Diet Level: Thin   Aspiration Precautions: HOB to 90 degrees and Standard aspiration precautions   General Precautions: Standard, aspiration, fall  Communication strategies:  provide increased time to answer    Assessment:     Buck Ibarra is a 78 y.o. female with an SLP diagnosis of Cognitive-Linguistic Impairment.    Subjective     "I think I am at 90 %" per pt.  Patient goals: home     Pain/Comfort:  Pain Rating 1: 0/10  Pain Rating Post-Intervention 1: 0/10    Respiratory Status: Room air    Objective:     Has the patient been evaluated by SLP for swallowing?   Yes  Keep patient NPO? No   Current Respiratory Status:        Pt. Seen at bedside and was alert though did report fatigue/poor night's rest.  She was oriented to place, andra and time as well as time of day.  Attention to task was poor with delays in responding noted on all tasks with need for repetition to elicited responses.  Eye contact was poor with flat affect and poor initiation.  Pt. Verbalized poor insight to deficits/situation with decreased recall of recent events.  Pt. Required max cues to name 3 category members on 5/8 trials and did endorse difficulty finding words.  She named category member described given mod cues with 80% accuracy.  Ongoing education provided re role of slp and rehab plan of care.       Goals:   Multidisciplinary Problems       SLP Goals          Problem: SLP    Goal Priority Disciplines Outcome   SLP Goal     SLP Ongoing, Progressing   Description: Speech Language Pathology Goals  Goals expected to be met by 12/4/23    1.Pt will tolerate least restrictive diet without overt S/S aspiration, MOD I  2. " Educate Pt and family on aspiration precautions and SLP POC  3. Pt will participate in full speech, language and cognitive evaluation to determine ongoing POC needs MET 11/28  4. Pt will compare/contrast 2 items with 90% accuracy with minimal verbal cues.   5. Pt will orient x4 with minimal verbal cues.   6. Pt will participate in high level cognitive linguistic diagnostic tx to further guide SLP POC.                          Plan:     Patient to be seen:  5 x/week   Plan of Care expires:  12/27/23  Plan of Care reviewed with:  patient, daughter   SLP Follow-Up:  Yes       Discharge recommendations:  High Intensity Therapy   Barriers to Discharge:  Decreased Care Giver Support    Time Tracking:     SLP Treatment Date:   11/29/23  Speech Start Time:  1050  Speech Stop Time:  1110     Speech Total Time (min):  20 min    Billable Minutes: Speech Therapy Individual 12 and Self Care/Home Management Training 8    11/29/2023

## 2023-11-29 NOTE — PLAN OF CARE
Problem: Swallowing Impairment (Stroke, Ischemic/Transient Ischemic Attack)  Goal: Optimal Eating and Swallowing without Aspiration  Outcome: Ongoing, Progressing     Problem: Fall Injury Risk  Goal: Absence of Fall and Fall-Related Injury  Outcome: Ongoing, Progressing

## 2023-11-29 NOTE — ASSESSMENT & PLAN NOTE
Stroke risk factor  SBP <180  Restart Home Coreg  home ARB (formulary sub Valsartan 320)  Nifedipine 30mg  PRN Hydralaine

## 2023-11-29 NOTE — NURSING
.Nurses Note -- 4 Eyes      11/29/2023   5:13 AM      Skin assessed during: Daily Assessment      [x] No Altered Skin Integrity Present    []Prevention Measures Documented        Wound Care Consulted? No    Attending Nurse:  Yanni Newby RN/Staff Member: Merari

## 2023-11-29 NOTE — NURSING
Patient resting in room at this time, all alarms and safety features active, plan of care discussed with patient and family when available. All meds given and tolerated by patient unless otherwise specified.  All questions and concerned answered. All personal items needed in reach, call light in reach as well. Hourly purposeful rounding noted and explained to family and patient. No further concerns at this time will cont with current plan of care.    Medications adjusted to SBP    Will cont with current plan of care

## 2023-11-29 NOTE — ASSESSMENT & PLAN NOTE
77 y/o female with 3 day history of right sided weakness and dysarthria with new L MCA infarct. No thrombolytics or intervention as out of the window.  Etiology probable small vessel disease with co-morbidities. Medically ready, pending placement.    Antithrombotics: DAPT    Statins: Lipitor 40 mg daily    Aggressive risk factor modification: HTN, Smoking, DM, HLD, Diet, Exercise, Obesity     Rehab efforts: The patient has been evaluated by a stroke team provider and the therapy needs have been fully considered based off the presenting complaints and exam findings. The following therapy evaluations are needed: PT evaluate and treat, OT evaluate and treat, SLP evaluate and treat, PM&R evaluate for appropriate placement    Diagnostics ordered/pending: None     VTE prophylaxis: Enoxaparin (CrCl < 30 ml/min) 30 mg SQ every 24 hours  Mechanical prophylaxis: Place SCDs    BP parameters: Infarct: No intervention, 5 days post symptom onset SBP<180

## 2023-11-29 NOTE — PT/OT/SLP PROGRESS
Occupational Therapy      Patient Name:  Buck Ibarra   MRN:  [id number]    Patient not seen today secondary to patient eating lunch upon OT attempt; patient requesting OT return at later time . Will follow-up as appropriate.    11/29/2023

## 2023-11-29 NOTE — PLAN OF CARE
Problem: Adjustment to Illness (Stroke, Ischemic/Transient Ischemic Attack)  Goal: Optimal Coping  Outcome: Ongoing, Progressing     Problem: Bowel Elimination Impaired (Stroke, Ischemic/Transient Ischemic Attack)  Goal: Effective Bowel Elimination  Outcome: Ongoing, Progressing     Problem: Swallowing Impairment (Stroke, Ischemic/Transient Ischemic Attack)  Goal: Optimal Eating and Swallowing without Aspiration  Outcome: Ongoing, Progressing     Problem: Fall Injury Risk  Goal: Absence of Fall and Fall-Related Injury  Outcome: Ongoing, Progressing     Problem: Adult Inpatient Plan of Care  Goal: Plan of Care Review  Outcome: Ongoing, Progressing  Goal: Patient-Specific Goal (Individualized)  Outcome: Ongoing, Progressing     Problem: Infection  Goal: Absence of Infection Signs and Symptoms  Outcome: Ongoing, Progressing     Problem: Diabetes Comorbidity  Goal: Blood Glucose Level Within Targeted Range  Outcome: Ongoing, Progressing

## 2023-11-29 NOTE — ASSESSMENT & PLAN NOTE
R ankle xray negative for fracture/dislocation  ESR 84, CRP 42  Ortho consulted, aspirated joint, not concerned for septic joint at this time, aspirate negative for crystals, recommendations as follows:  - Ice, elevation and gentle ROM exercises  - No acute orthopaedic intervention warranted  - Continue to monitor cultures    - Improving on exam, able to work with PT   - Consider outpatient ortho f/u on discharge

## 2023-11-29 NOTE — SUBJECTIVE & OBJECTIVE
Neurologic Chief Complaint: RSW, dysarthria    Subjective:     Interval History: Patient is seen for follow-up neurological assessment and treatment recommendations: NAEON. SBP intermittently in 200s-230s, asymptomatic. Resumed home coreg. ELIZABETH improving. R ankle pain improving. Pending auth for IPR.     HPI, Past Medical, Family, and Social History remains the same as documented in the initial encounter.     Review of Systems   Constitutional:  Negative for chills and fever.   HENT:  Negative for ear discharge and ear pain.    Eyes:  Negative for pain and redness.   Respiratory:  Negative for cough and shortness of breath.    Cardiovascular:  Negative for chest pain and leg swelling.   Gastrointestinal:  Negative for abdominal distention and abdominal pain.   Endocrine: Negative for polydipsia and polyphagia.   Genitourinary:  Negative for dyspareunia and dysuria.   Musculoskeletal:  Positive for arthralgias. Negative for back pain.   Skin:  Negative for rash and wound.   Allergic/Immunologic: Negative for environmental allergies and food allergies.   Neurological:  Positive for speech difficulty, weakness and numbness.     Scheduled Meds:   aspirin  81 mg Oral Daily    atorvastatin  40 mg Oral Daily    clopidogreL  75 mg Oral Daily    doxepin  10 mg Oral QHS    enoxparin  40 mg Subcutaneous Daily    gabapentin  300 mg Oral QHS    insulin aspart U-100  5 Units Subcutaneous TIDWM    insulin detemir U-100 (Levemir)  16 Units Subcutaneous Daily    NIFEdipine  30 mg Oral Daily    pantoprazole  40 mg Oral Daily    senna-docusate 8.6-50 mg  1 tablet Oral BID    valsartan  320 mg Oral Daily     Continuous Infusions:   sodium chloride 0.9%       PRN Meds:acetaminophen, dextrose 10%, dextrose 10%, glucagon (human recombinant), glucose, glucose, hydrALAZINE, insulin aspart U-100, ondansetron, sodium chloride 0.9%, sodium chloride 0.9%    Objective:     Vital Signs (Most Recent):  Temp: 98.3 °F (36.8 °C) (11/29/23  0327)  Pulse: 77 (11/29/23 0435)  Resp: 18 (11/29/23 0327)  BP: (!) 184/86 (11/29/23 0610)  SpO2: 100 % (11/29/23 0327)  BP Location: Left arm    Vital Signs Range (Last 24H):  Temp:  [97.2 °F (36.2 °C)-98.3 °F (36.8 °C)]   Pulse:  [62-95]   Resp:  [16-18]   BP: (178-236)/()   SpO2:  [98 %-100 %]   BP Location: Left arm       Physical Exam  Vitals and nursing note reviewed.   Constitutional:       Appearance: Normal appearance. She is obese.   Eyes:      Extraocular Movements: Extraocular movements intact.      Conjunctiva/sclera: Conjunctivae normal.      Pupils: Pupils are equal, round, and reactive to light.   Cardiovascular:      Rate and Rhythm: Normal rate and regular rhythm.   Abdominal:      General: Abdomen is flat. Bowel sounds are normal.      Palpations: Abdomen is soft.   Musculoskeletal:      Cervical back: Normal range of motion.      Comments:  R ankle swollen, warm with severe tenderness to palpation and with passive ROM. Active ROM limited 2/2 pain.   Skin:     General: Skin is warm and dry.   Neurological:      Mental Status: She is alert and oriented to person, place, and time.      Sensory: Sensory deficit present.      Motor: Weakness present.   Psychiatric:         Mood and Affect: Mood normal.         Behavior: Behavior normal.              Neurological Exam:   LOC: alert  Attention Span: Good   Language: No aphasia  Articulation: Dysarthria  Orientation: Person, Place, Time   Visual Fields: Full  EOM (CN III, IV, VI): Full/intact  Facial Movement (CN VII): Symmetric facial expression    Motor: Arm left  Normal 5/5  Leg left  Normal 5/5  Arm right  Normal 5/5  Leg right Paresis: 4/5 pain limits exam    Laboratory:  CMP:   Recent Labs   Lab 11/29/23 0435   CALCIUM 9.7   ALBUMIN 2.9*   PROT 7.3      K 3.6   CO2 21*      BUN 20   CREATININE 1.1   ALKPHOS 105   ALT 23   AST 21   BILITOT 0.5     CBC:   Recent Labs   Lab 11/29/23 0435   WBC 9.39   RBC 5.10   HGB 13.8   HCT 43.3       MCV 85   MCH 27.1   MCHC 31.9*       Diagnostic Results     Brain Imaging   CT head w/o contrast 11-26-23 results:  Age indeterminate infarcts in the bilateral basal ganglia and left internal capsule/corona radiata, new from prior.  Recommend further evaluation with MRI.     New punctate hyperdensity in the right precentral gyrus.  This can be followed up MR imaging.     MRI brain w/o contrast 11-26-23 results:     Acute infarct involving the left internal capsule/centrum semiovale.     Foci of increased susceptibility in the right precentral gyrus and right paracentral parietal lobe, suggestive of small micro hemorrhages, new from MRI 02/26/2023.     Remote appearing infarcts of the bilateral basal ganglia and left thalamus.        Cardiac Evaluation:   EKG 11-26-23 results:  Normal sinus rhythm Voltage criteria for left ventricular hypertrophy Nonspecific T wave abnormality Prolonged QT Abnormal ECG When compared with ECG of 24-OCT-2023 13:52, Premature supraventricular complexes are no longer Present

## 2023-11-29 NOTE — PROGRESS NOTES
"[facility] - Neurosurgery ([facility])  Endocrinology  Progress Note    Admit Date: 2023     Reason for Consult: Management of T2DM, Hyperglycemia     Surgical Procedure and Date: N/A    Diabetes diagnosis year: > 10 years ago     Home Diabetes Medications:  Tresiba 20 units nightly, Humalog 6 units TID with meals plus scale (180-230 +2), Ozmepic 2 mg every 7 days    Lab Results   Component Value Date    HGBA1C 9.6 (H) 2023       How often checking glucose at home? Dexcom G6   BG readings on regimen: BRIA  Hypoglycemia on the regimen?  BRIA  Missed doses on regimen?  BRIA    Diabetes Complications include:     Hyperglycemia    Complicating diabetes co morbidities:   HTN, HLD, Obesity, previous CVA       HPI:   Patient is a 78 y.o. female with a diagnosis of HTN, T2DM, HLD, Obesity presented with 3 day history of right sided weakness and dysarthria. Found to have L MCA infarct. No thrombolytics or intervention as out of the window.  Etiology probable small vessel disease with co-morbidities. Endocrinology consulted for management of T2DM.              Interval HPI:   Overnight events: Remains on nuero floor. BG reasonably well controlled on current SQ insulin regimen. Diet diabetic 2000 Calorie; Thin    Eatin%  Nausea: No  Hypoglycemia and intervention: No  Fever: No  TPN and/or TF: No  If yes, type of TF/TPN and rate: n/a    BP (!) 212/91 (Patient Position: Lying)   Pulse 66   Temp 97.7 °F (36.5 °C) (Oral)   Resp 18   Ht 5' 7" (1.702 m)   Wt 90 kg (198 lb 6.6 oz)   SpO2 99%   Breastfeeding No   BMI 31.08 kg/m²     Labs Reviewed and Include    Recent Labs   Lab 23  0435   *   CALCIUM 9.7   ALBUMIN 2.9*   PROT 7.3      K 3.6   CO2 21*      BUN 20   CREATININE 1.1   ALKPHOS 105   ALT 23   AST 21   BILITOT 0.5     Lab Results   Component Value Date    WBC 9.39 2023    HGB 13.8 2023    HCT 43.3 2023    MCV 85 2023     2023     Recent Labs " "  Lab 11/26/23 2041   TSH 1.766     Lab Results   Component Value Date    HGBA1C 9.6 (H) 11/26/2023       Nutritional status:   Body mass index is 31.08 kg/m².  Lab Results   Component Value Date    ALBUMIN 2.9 (L) 11/29/2023    ALBUMIN 2.4 (L) 11/28/2023    ALBUMIN 2.5 (L) 11/27/2023     No results found for: "PREALBUMIN"    Estimated Creatinine Clearance: 50.1 mL/min (based on SCr of 1.1 mg/dL).    Accu-Checks  Recent Labs     11/27/23  0021 11/27/23  0756 11/27/23  1811 11/27/23  2101 11/28/23  0802 11/28/23  1145 11/28/23  1728 11/28/23  2128 11/29/23  0820   POCTGLUCOSE 339* 204* 202* 214* 187* 255* 199* 185* 148*       Current Medications and/or Treatments Impacting Glycemic Control  Immunotherapy:    Immunosuppressants       None          Steroids:   Hormones (From admission, onward)      None          Pressors:    Autonomic Drugs (From admission, onward)      None          Hyperglycemia/Diabetes Medications:   Antihyperglycemics (From admission, onward)      Start     Stop Route Frequency Ordered    11/28/23 0900  insulin detemir U-100 (Levemir) pen 16 Units         -- SubQ Daily 11/28/23 0840    11/27/23 1130  insulin aspart U-100 pen 5 Units         -- SubQ 3 times daily with meals 11/27/23 1057    11/26/23 2347  insulin aspart U-100 pen 0-5 Units         -- SubQ Before meals & nightly PRN 11/26/23 2251            ASSESSMENT and PLAN    Neuro  * Cerebrovascular accident (CVA) due to thrombosis of left middle cerebral artery  Optimize BG control        Cardiac/Vascular  Hyperlipidemia  May increase insulin resistance.         Endocrine  Obesity (BMI 30-39.9)  Body mass index is 31.08 kg/m².  May increase insulin resistance.         Type 2 diabetes mellitus with hyperglycemia, with long-term current use of insulin  BG goal 140-180    Continue Levemir 16 units daily (0.4 u/kg dosing) Fasting BG within goal ranges   Novolog 5 units TID with meals (HOLD if patient eating < 25% or BG < 100)  Low Dose Correction " Scale   BG monitoring ac/hs    ** Please call Endocrine for any BG related issues **      Discharge plans: TBD    Lab Results   Component Value Date    HGBA1C 9.6 (H) 11/26/2023                 Bryanna Lauren NP  Endocrinology  [facility] - Neurosurgery ([facility])

## 2023-11-29 NOTE — ASSESSMENT & PLAN NOTE
BG goal 140-180    Continue Levemir 16 units daily (0.4 u/kg dosing) Fasting BG within goal ranges   Novolog 5 units TID with meals (HOLD if patient eating < 25% or BG < 100)  Low Dose Correction Scale   BG monitoring ac/hs    ** Please call Endocrine for any BG related issues **      Discharge plans: TBD    Lab Results   Component Value Date    HGBA1C 9.6 (H) 11/26/2023            no

## 2023-11-29 NOTE — PROGRESS NOTES
Rob Borjas - Neurosurgery ([facility])  Vascular Neurology  Comprehensive Stroke Center  Progress Note    Assessment/Plan:     * Cerebrovascular accident (CVA) due to thrombosis of left middle cerebral artery  79 y/o female with 3 day history of right sided weakness and dysarthria with new L MCA infarct. No thrombolytics or intervention as out of the window.  Etiology probable small vessel disease with co-morbidities. Medically ready, pending placement.    Antithrombotics: DAPT    Statins: Lipitor 40 mg daily    Aggressive risk factor modification: HTN, Smoking, DM, HLD, Diet, Exercise, Obesity     Rehab efforts: The patient has been evaluated by a stroke team provider and the therapy needs have been fully considered based off the presenting complaints and exam findings. The following therapy evaluations are needed: PT evaluate and treat, OT evaluate and treat, SLP evaluate and treat, PM&R evaluate for appropriate placement    Diagnostics ordered/pending: None     VTE prophylaxis: Enoxaparin (CrCl < 30 ml/min) 30 mg SQ every 24 hours  Mechanical prophylaxis: Place SCDs    BP parameters: Infarct: No intervention, 5 days post symptom onset SBP<180        Acute right ankle pain  R ankle xray negative for fracture/dislocation  ESR 84, CRP 42  Ortho consulted, aspirated joint, not concerned for septic joint at this time, aspirate negative for crystals, recommendations as follows:  - Ice, elevation and gentle ROM exercises  - No acute orthopaedic intervention warranted  - Continue to monitor cultures    - Improving on exam      Hemiparesis, right  Due to stroke  Aggressive therapy      Current every day smoker  Stroke risk factor   on smoking cessation    Obesity (BMI 30-39.9)  Stroke risk factor   on diet and exercise    Hyperlipidemia  Stroke risk factor  LDL 79.2  Lipitor 40 mg daily was on Crestor at home    Type 2 diabetes mellitus with hyperglycemia, with long-term current use of insulin  Stroke risk  factor  HgB A1C 9.6, Endocrine consulted, recs as follows:  Levemir 16u Daily  Novolog 5u TIDAC (HOLD if patient eating < 25% or BG < 100)   LDSSI  POCT Glucose AC/HS    Hypertension associated with diabetes  Stroke risk factor  SBP <180  Restart Home Coreg  home ARB (formulary sub Valsartan 320)  Nifedipine 30mg  PRN Hydralaine         11/27/2023 NAEON. Exam stable. Pt reports R ankle pain, had difficulty working with PT. R ankle xray shows no fracture or dislocation.  11/28/2023 NAEON. Exam stable. Ortho consulted for R ankle pain w/ elevated ESR/CRP, joint aspirated and steroids injected. Not concerned for septic joint at this time, aspirate negative for crystals. Glucose imp. ELIZABETH imp.  11/29/2023 NAEON. SBP intermittently in 200s-230s, asymptomatic. Resumed home coreg. ELIZABETH improving. R ankle pain improving. Pending auth for IPR.    STROKE DOCUMENTATION        NIH Scale:  1a. Level of Consciousness: 0-->Alert, keenly responsive  1b. LOC Questions: 0-->Answers both questions correctly  1c. LOC Commands: 0-->Performs both tasks correctly  2. Best Gaze: 0-->Normal  3. Visual: 0-->No visual loss  4. Facial Palsy: 0-->Normal symmetrical movements  5a. Motor Arm, Left: 0-->No drift, limb holds 90 (or 45) degrees for full 10 secs  5b. Motor Arm, Right: 0-->No drift, limb holds 90 (or 45) degrees for full 10 secs  6a. Motor Leg, Left: 1-->Drift, leg falls by the end of the 5-sec period but does not hit bed  6b. Motor Leg, Right: 2-->Some effort against gravity, leg falls to bed by 5 secs, but has some effort against gravity  7. Limb Ataxia: 0-->Absent  8. Sensory: 1-->Mild-to-moderate sensory loss, patient feels pinprick is less sharp or is dull on the affected side, or there is a loss of superficial pain with pinprick, but patient is aware of being touched  9. Best Language: 0-->No aphasia, normal  10. Dysarthria: 1-->Mild-to-moderate dysarthria, patient slurs at least some words and, at worst, can be understood with  some difficulty  11. Extinction and Inattention (formerly Neglect): 0-->No abnormality  Total (NIH Stroke Scale): 5       Modified Natalia Score: 1  Cherelle Coma Scale:    ABCD2 Score:    [id number] Score:   HAS -BLED Score:   ICH Score:   Hunt & Hutton Classification:      Hemorrhagic change of an Ischemic Stroke: Does this patient have an ischemic stroke with hemorrhagic changes? No     Neurologic Chief Complaint: RSW, dysarthria    Subjective:     Interval History: Patient is seen for follow-up neurological assessment and treatment recommendations: NAEON. SBP intermittently in 200s-230s, asymptomatic. Resumed home coreg. ELIZABETH improving. R ankle pain improving. Pending auth for IPR.     HPI, Past Medical, Family, and Social History remains the same as documented in the initial encounter.     Review of Systems   Constitutional:  Negative for chills and fever.   HENT:  Negative for ear discharge and ear pain.    Eyes:  Negative for pain and redness.   Respiratory:  Negative for cough and shortness of breath.    Cardiovascular:  Negative for chest pain and leg swelling.   Gastrointestinal:  Negative for abdominal distention and abdominal pain.   Endocrine: Negative for polydipsia and polyphagia.   Genitourinary:  Negative for dyspareunia and dysuria.   Musculoskeletal:  Positive for arthralgias. Negative for back pain.   Skin:  Negative for rash and wound.   Allergic/Immunologic: Negative for environmental allergies and food allergies.   Neurological:  Positive for speech difficulty, weakness and numbness.     Scheduled Meds:   aspirin  81 mg Oral Daily    atorvastatin  40 mg Oral Daily    clopidogreL  75 mg Oral Daily    doxepin  10 mg Oral QHS    enoxparin  40 mg Subcutaneous Daily    gabapentin  300 mg Oral QHS    insulin aspart U-100  5 Units Subcutaneous TIDWM    insulin detemir U-100 (Levemir)  16 Units Subcutaneous Daily    NIFEdipine  30 mg Oral Daily    pantoprazole  40 mg Oral Daily    senna-docusate 8.6-50  mg  1 tablet Oral BID    valsartan  320 mg Oral Daily     Continuous Infusions:   sodium chloride 0.9%       PRN Meds:acetaminophen, dextrose 10%, dextrose 10%, glucagon (human recombinant), glucose, glucose, hydrALAZINE, insulin aspart U-100, ondansetron, sodium chloride 0.9%, sodium chloride 0.9%    Objective:     Vital Signs (Most Recent):  Temp: 98.3 °F (36.8 °C) (11/29/23 0327)  Pulse: 77 (11/29/23 0435)  Resp: 18 (11/29/23 0327)  BP: (!) 184/86 (11/29/23 0610)  SpO2: 100 % (11/29/23 0327)  BP Location: Left arm    Vital Signs Range (Last 24H):  Temp:  [97.2 °F (36.2 °C)-98.3 °F (36.8 °C)]   Pulse:  [62-95]   Resp:  [16-18]   BP: (178-236)/()   SpO2:  [98 %-100 %]   BP Location: Left arm       Physical Exam  Vitals and nursing note reviewed.   Constitutional:       Appearance: Normal appearance. She is obese.   Eyes:      Extraocular Movements: Extraocular movements intact.      Conjunctiva/sclera: Conjunctivae normal.      Pupils: Pupils are equal, round, and reactive to light.   Cardiovascular:      Rate and Rhythm: Normal rate and regular rhythm.   Abdominal:      General: Abdomen is flat. Bowel sounds are normal.      Palpations: Abdomen is soft.   Musculoskeletal:      Cervical back: Normal range of motion.      Comments:  R ankle swollen, warm with severe tenderness to palpation and with passive ROM. Active ROM limited 2/2 pain.   Skin:     General: Skin is warm and dry.   Neurological:      Mental Status: She is alert and oriented to person, place, and time.      Sensory: Sensory deficit present.      Motor: Weakness present.   Psychiatric:         Mood and Affect: Mood normal.         Behavior: Behavior normal.              Neurological Exam:   LOC: alert  Attention Span: Good   Language: No aphasia  Articulation: Dysarthria  Orientation: Person, Place, Time   Visual Fields: Full  EOM (CN III, IV, VI): Full/intact  Facial Movement (CN VII): Symmetric facial expression    Motor: Arm left  Normal  5/5  Leg left  Normal 5/5  Arm right  Normal 5/5  Leg right Paresis: 4/5 pain limits exam    Laboratory:  CMP:   Recent Labs   Lab 11/29/23  0435   CALCIUM 9.7   ALBUMIN 2.9*   PROT 7.3      K 3.6   CO2 21*      BUN 20   CREATININE 1.1   ALKPHOS 105   ALT 23   AST 21   BILITOT 0.5     CBC:   Recent Labs   Lab 11/29/23  0435   WBC 9.39   RBC 5.10   HGB 13.8   HCT 43.3      MCV 85   MCH 27.1   MCHC 31.9*       Diagnostic Results     Brain Imaging   CT head w/o contrast 11-26-23 results:  Age indeterminate infarcts in the bilateral basal ganglia and left internal capsule/corona radiata, new from prior.  Recommend further evaluation with MRI.     New punctate hyperdensity in the right precentral gyrus.  This can be followed up MR imaging.     MRI brain w/o contrast 11-26-23 results:     Acute infarct involving the left internal capsule/centrum semiovale.     Foci of increased susceptibility in the right precentral gyrus and right paracentral parietal lobe, suggestive of small micro hemorrhages, new from MRI 02/26/2023.     Remote appearing infarcts of the bilateral basal ganglia and left thalamus.        Cardiac Evaluation:   EKG 11-26-23 results:  Normal sinus rhythm Voltage criteria for left ventricular hypertrophy Nonspecific T wave abnormality Prolonged QT Abnormal ECG When compared with ECG of 24-OCT-2023 13:52, Premature supraventricular complexes are no longer Present    Ming Fay MD  Comprehensive Stroke Center  Department of Vascular Neurology   [facility] \[facility] [place] [facility]\"")

## 2023-11-29 NOTE — PLAN OF CARE
11/29/23 0843   Post-Acute Status   Post-Acute Authorization Placement   Post-Acute Placement Status Pending payor review/awaiting authorization (if required)   Coverage PHN   Discharge Delays (!) Payor Issues   Discharge Plan   Discharge Plan A Rehab   Discharge Plan B Home Health;Home with family     [facility] has accepted patient and submitted for auth.      Discharge Plan A and Plan B have been determined by review of patient's clinical status, future medical and therapeutic needs, and coverage/benefits for post-acute care in coordination with multidisciplinary team members.      2:52 PM  FADY received notification from Shira at [facility] [phone]. They are requesting a P2P for the patient.  FADY provided her with the DNP's name and contact info.  FADY will continue to follow.      Riana Barnes LMSW  [facility]  [phone]

## 2023-11-30 NOTE — ASSESSMENT & PLAN NOTE
79 y/o female with 3 day history of right sided weakness and dysarthria with new L MCA infarct. No thrombolytics or intervention as out of the window.  Etiology probable small vessel disease with co-morbidities. Progressing with therapy. Medically ready, pending placement.    Antithrombotics: DAPT    Statins: Lipitor 40 mg daily    Aggressive risk factor modification: HTN, Smoking, DM, HLD, Diet, Exercise, Obesity     Rehab efforts: The patient has been evaluated by a stroke team provider and the therapy needs have been fully considered based off the presenting complaints and exam findings. The following therapy evaluations are needed: PT evaluate and treat, OT evaluate and treat, SLP evaluate and treat, PM&R evaluate for appropriate placement    Diagnostics ordered/pending: None     VTE prophylaxis: Enoxaparin (CrCl < 30 ml/min) 30 mg SQ every 24 hours  Mechanical prophylaxis: Place SCDs    BP parameters: Infarct: No intervention, SBP<180

## 2023-11-30 NOTE — ASSESSMENT & PLAN NOTE
Stroke risk factor  HgB A1C 9.6, Endocrine consulted, recs as follows:  Levemir 12u Daily  Novolog 3u TIDAC (HOLD if patient eating < 25% or BG < 100)   LDSSI  POCT Glucose AC/HS

## 2023-11-30 NOTE — PLAN OF CARE
FADY received phone call from Shira at [facility].  She stated that their provider was unable to reach the DNP given to them yesterday and that the number was busy.      FADY staffed in AM rounds and Dr. Fay agreeable to complete P2P.      FADY provided Shira with his name and contact info and she will forward it to their provider.  FADY will continue to follow.      Riana Barnes, BELLE  [facility]  [phone]

## 2023-11-30 NOTE — ASSESSMENT & PLAN NOTE
BG goal 140-180  T2DM.  L MCA infarct.  BG stable, and mostly below goal.  Will decrease regimen    Decrease Levemir 12 units daily (0.4 u/kg dosing) Fasting BG within goal ranges   Decrease Novolog 3 units TID with meals (HOLD if patient eating < 25% or BG < 100)  Low Dose Correction Scale   BG monitoring ac/hs    ** Please call Endocrine for any BG related issues **      Discharge plans: TBD    Lab Results   Component Value Date    HGBA1C 9.6 (H) 11/26/2023

## 2023-11-30 NOTE — PLAN OF CARE
11/30/23 1507   Post-Acute Status   Post-Acute Authorization Placement   Post-Acute Placement Status Patient List Provided   Coverage PHN   Discharge Delays (!) Payor Issues   Discharge Plan   Discharge Plan A Rehab   Discharge Plan B Skilled Nursing Facility     PHN denied rehab intially and also following a P2P.  FADY met with patient and her dtr Olga at bedside to discuss and provided a PHN list for skilled nursing facilities.  FADY did reach out to [facility], but they will not have a bed till late next week.  Olga currently on the phone with PHN.  FADY will get choices from her.      FADY also sent Olga's FMLA ppw to disability office and provided her with the phone number to follow up.      Discharge Plan A and Plan B have been determined by review of patient's clinical status, future medical and therapeutic needs, and coverage/benefits for post-acute care in coordination with multidisciplinary team members.    Riana Barnes, BELLE  [facility]  [phone]

## 2023-11-30 NOTE — CONSULTS
NIAS consulted for PVA; 20g to LAC obtained by nurse, +flush/ +bl return, adequate access at this time

## 2023-11-30 NOTE — NURSING
.Nurses Note -- 4 Eyes      11/29/2023   1900      Skin assessed during: Q Shift Change    No changes from previous skin assessment; see flowsheet.         Wound Care Consulted? No    Attending Nurse:  Yanni Newby RN/Staff Member:  Merari

## 2023-11-30 NOTE — PROGRESS NOTES
"Rob Borjas - Neurosurgery ([facility])  Endocrinology  Progress Note    Admit Date: 2023     Reason for Consult: Management of T2DM, Hyperglycemia     Surgical Procedure and Date: N/A    Diabetes diagnosis year: > 10 years ago     Home Diabetes Medications:  Tresiba 20 units nightly, Humalog 6 units TID with meals plus scale (180-230 +2), Ozmepic 2 mg every 7 days    Lab Results   Component Value Date    HGBA1C 9.6 (H) 2023       How often checking glucose at home? Dexcom G6   BG readings on regimen: BRIA  Hypoglycemia on the regimen?  BRIA  Missed doses on regimen?  BRIA    Diabetes Complications include:     Hyperglycemia    Complicating diabetes co morbidities:   HTN, HLD, Obesity, previous CVA       HPI:   Patient is a 78 y.o. female with a diagnosis of HTN, T2DM, HLD, Obesity presented with 3 day history of right sided weakness and dysarthria. Found to have L MCA infarct. No thrombolytics or intervention as out of the window.  Etiology probable small vessel disease with co-morbidities. Endocrinology consulted for management of T2DM.              Interval HPI:   No acute events overnight. Patient in room 949/949 A. Blood glucose stable. BG at and below goal on current insulin regimen (SSI, prandial, and basal insulin ). Steroid use- None .      Renal function- Normal   Vasopressors-  None     Diet diabetic 2000 Calorie; Thin     Eatin%  Nausea: No  Hypoglycemia and intervention: No  Fever: No  TPN and/or TF: No    BP (!) 164/70 (BP Location: Left arm, Patient Position: Lying)   Pulse 66   Temp 97.8 °F (36.6 °C) (Oral)   Resp 16   Ht 5' 7" (1.702 m)   Wt 90 kg (198 lb 6.6 oz)   SpO2 98%   Breastfeeding No   BMI 31.08 kg/m²     Labs Reviewed and Include    Recent Labs   Lab 23  0545      CALCIUM 9.1   ALBUMIN 2.5*   PROT 6.2      K 3.6   CO2 16*      BUN 29*   CREATININE 1.2   ALKPHOS 107   ALT 18   AST 20   BILITOT 0.3     Lab Results   Component Value Date    WBC " "9.57 11/30/2023    HGB 12.1 11/30/2023    HCT 37.3 11/30/2023    MCV 86 11/30/2023     11/30/2023     Recent Labs   Lab 11/26/23 2041   TSH 1.766     Lab Results   Component Value Date    HGBA1C 9.6 (H) 11/26/2023       Nutritional status:   Body mass index is 31.08 kg/m².  Lab Results   Component Value Date    ALBUMIN 2.5 (L) 11/30/2023    ALBUMIN 2.9 (L) 11/29/2023    ALBUMIN 2.4 (L) 11/28/2023     No results found for: "PREALBUMIN"    Estimated Creatinine Clearance: 46 mL/min (based on SCr of 1.2 mg/dL).    Accu-Checks  Recent Labs     11/27/23  2101 11/28/23  0802 11/28/23  1145 11/28/23  1728 11/28/23  2128 11/29/23  0820 11/29/23  1224 11/29/23  1617 11/29/23  2130 11/30/23  0804   POCTGLUCOSE 214* 187* 255* 199* 185* 148* 110 79 106 109       Current Medications and/or Treatments Impacting Glycemic Control  Immunotherapy:    Immunosuppressants       None          Steroids:   Hormones (From admission, onward)      None          Pressors:    Autonomic Drugs (From admission, onward)      None          Hyperglycemia/Diabetes Medications:   Antihyperglycemics (From admission, onward)      Start     Stop Route Frequency Ordered    11/28/23 0900  insulin detemir U-100 (Levemir) pen 16 Units         -- SubQ Daily 11/28/23 0840    11/27/23 1130  insulin aspart U-100 pen 5 Units         -- SubQ 3 times daily with meals 11/27/23 1057    11/26/23 2347  insulin aspart U-100 pen 0-5 Units         -- SubQ Before meals & nightly PRN 11/26/23 2251            ASSESSMENT and PLAN    Neuro  * Cerebrovascular accident (CVA) due to thrombosis of left middle cerebral artery  Optimize BG control        Cardiac/Vascular  Hyperlipidemia  May increase insulin resistance.         Endocrine  Obesity (BMI 30-39.9)  Body mass index is 31.08 kg/m².  May increase insulin resistance.         Type 2 diabetes mellitus with hyperglycemia, with long-term current use of insulin  BG goal 140-180  T2DM.  L MCA infarct.  BG stable, and mostly " below goal.  Will decrease regimen    Decrease Levemir 12 units daily (0.4 u/kg dosing) Fasting BG within goal ranges   Decrease Novolog 3 units TID with meals (HOLD if patient eating < 25% or BG < 100)  Low Dose Correction Scale   BG monitoring ac/hs    ** Please call Endocrine for any BG related issues **      Discharge plans: TBD    Lab Results   Component Value Date    HGBA1C 9.6 (H) 11/26/2023                 Jaiden Chang PA-C  Endocrinology  [facility] - Neurosurgery ([facility])

## 2023-11-30 NOTE — PLAN OF CARE
Problem: Adjustment to Illness (Stroke, Ischemic/Transient Ischemic Attack)  Goal: Optimal Coping  Outcome: Ongoing, Progressing     Problem: Cognitive Impairment (Stroke, Ischemic/Transient Ischemic Attack)  Goal: Optimal Cognitive Function  Outcome: Ongoing, Progressing     Problem: Swallowing Impairment (Stroke, Ischemic/Transient Ischemic Attack)  Goal: Optimal Eating and Swallowing without Aspiration  Outcome: Ongoing, Progressing     Problem: Fall Injury Risk  Goal: Absence of Fall and Fall-Related Injury  Outcome: Ongoing, Progressing     Problem: Adult Inpatient Plan of Care  Goal: Plan of Care Review  Outcome: Ongoing, Progressing     Problem: Infection  Goal: Absence of Infection Signs and Symptoms  Outcome: Ongoing, Progressing     Problem: Diabetes Comorbidity  Goal: Blood Glucose Level Within Targeted Range  Outcome: Ongoing, Progressing

## 2023-11-30 NOTE — PT/OT/SLP PROGRESS
Speech Language Pathology      Buck Ibarra  MRN: [id number]    SLP attempted to see Patient for therapy. Patient not seen today secondary to RN hold (elevated blood pressure.) ST to continue to monitor and follow up per ST POC.      11/30/2023

## 2023-11-30 NOTE — PLAN OF CARE
CHW met with patient/family at bedside. Patient experience rounding completed and reviewed the following.     Do you know your discharge plan? Yes or No,    If yes, what is the plan?   Yes Rehab    Have you discussed your needs and preferences with your SW/CM?  Yes    If you are discharging home, do you have help at home?  Yes    Do you think you will need help additional at home at discharge?   No     Do you currently have difficulty keeping up with bills, affording medicine or buying food?   No    Assigned SW/CM notified of any patient/family needs or concerns. Appropriate resources provided to address patient's needs.   Riana Hayward W  Case Management  [phone]

## 2023-11-30 NOTE — ASSESSMENT & PLAN NOTE
Stroke risk factor  SBP <180  Home Coreg  Home ARB (formulary sub Valsartan 320)  Increased Nifedipine 60mg  PRN Hydralaine, labetalol

## 2023-11-30 NOTE — PROGRESS NOTES
Rob Borjas - Neurosurgery ([facility])  Vascular Neurology  Comprehensive Stroke Center  Progress Note    Assessment/Plan:     * Cerebrovascular accident (CVA) due to thrombosis of left middle cerebral artery  79 y/o female with 3 day history of right sided weakness and dysarthria with new L MCA infarct. No thrombolytics or intervention as out of the window.  Etiology probable small vessel disease with co-morbidities. Progressing with therapy. Medically ready, pending placement.    Antithrombotics: DAPT    Statins: Lipitor 40 mg daily    Aggressive risk factor modification: HTN, Smoking, DM, HLD, Diet, Exercise, Obesity     Rehab efforts: The patient has been evaluated by a stroke team provider and the therapy needs have been fully considered based off the presenting complaints and exam findings. The following therapy evaluations are needed: PT evaluate and treat, OT evaluate and treat, SLP evaluate and treat, PM&R evaluate for appropriate placement    Diagnostics ordered/pending: None     VTE prophylaxis: Enoxaparin (CrCl < 30 ml/min) 30 mg SQ every 24 hours  Mechanical prophylaxis: Place SCDs    BP parameters: Infarct: No intervention, SBP<180        Acute right ankle pain  R ankle xray negative for fracture/dislocation  ESR 84, CRP 42  Ortho consulted, aspirated joint, not concerned for septic joint at this time, aspirate negative for crystals, recommendations as follows:  - Ice, elevation and gentle ROM exercises  - No acute orthopaedic intervention warranted  - Continue to monitor cultures    - Improving on exam, able to work with PT   - Consider outpatient ortho f/u on discharge      Hemiparesis, right  Due to stroke  Aggressive therapy      Current every day smoker  Stroke risk factor   on smoking cessation    Obesity (BMI 30-39.9)  Stroke risk factor   on diet and exercise    Hyperlipidemia  Stroke risk factor  LDL 79.2  Lipitor 40 mg daily was on Crestor at home    Type 2 diabetes mellitus with  hyperglycemia, with long-term current use of insulin  Stroke risk factor  HgB A1C 9.6, Endocrine consulted, recs as follows:  Levemir 12u Daily  Novolog 3u TIDAC (HOLD if patient eating < 25% or BG < 100)   LDSSI  POCT Glucose AC/HS    Hypertension associated with diabetes  Stroke risk factor  SBP <180  Home Coreg  Home ARB (formulary sub Valsartan 320)  Increased Nifedipine 60mg  PRN Hydralaine, labetalol         11/27/2023 NAEON. Exam stable. Pt reports R ankle pain, had difficulty working with PT. R ankle xray shows no fracture or dislocation.  11/28/2023 NAEON. Exam stable. Ortho consulted for R ankle pain w/ elevated ESR/CRP, joint aspirated and steroids injected. Not concerned for septic joint at this time, aspirate negative for crystals. Glucose imp. ELIZABETH imp.  11/29/2023 NAEON. SBP intermittently in 200s-230s, asymptomatic. Resumed home coreg. ELIZABETH improving. R ankle pain improving. Pending auth for IPR.  11/30/2023 NAEON. SBP 160s-200s. Increased home nifedipine. Able to work with PT today. IPR auth denied following peer to peer. Pending placement.  .    STROKE DOCUMENTATION        NIH Scale:  1a. Level of Consciousness: 0-->Alert, keenly responsive  1b. LOC Questions: 0-->Answers both questions correctly  1c. LOC Commands: 0-->Performs both tasks correctly  2. Best Gaze: 0-->Normal  3. Visual: 0-->No visual loss  4. Facial Palsy: 0-->Normal symmetrical movements  5a. Motor Arm, Left: 0-->No drift, limb holds 90 (or 45) degrees for full 10 secs  5b. Motor Arm, Right: 0-->No drift, limb holds 90 (or 45) degrees for full 10 secs  6a. Motor Leg, Left: 1-->Drift, leg falls by the end of the 5-sec period but does not hit bed  6b. Motor Leg, Right: 2-->Some effort against gravity, leg falls to bed by 5 secs, but has some effort against gravity  7. Limb Ataxia: 0-->Absent  8. Sensory: 1-->Mild-to-moderate sensory loss, patient feels pinprick is less sharp or is dull on the affected side, or there is a loss of  superficial pain with pinprick, but patient is aware of being touched  9. Best Language: 0-->No aphasia, normal  10. Dysarthria: 1-->Mild-to-moderate dysarthria, patient slurs at least some words and, at worst, can be understood with some difficulty  11. Extinction and Inattention (formerly Neglect): 0-->No abnormality  Total (NIH Stroke Scale): 5       Modified Natalia Score: 1  Cherelle Coma Scale:    ABCD2 Score:    [id number] Score:   HAS -BLED Score:   ICH Score:   Hunt & Hutton Classification:      Hemorrhagic change of an Ischemic Stroke: Does this patient have an ischemic stroke with hemorrhagic changes? No     Neurologic Chief Complaint: RSW, dysarthria    Subjective:     Interval History: Patient is seen for follow-up neurological assessment and treatment recommendations:  NAEON. SBP 160s-200s. Increased home nifedipine. Able to work with PT today. IPR auth denied following peer to peer. Pending placement.     HPI, Past Medical, Family, and Social History remains the same as documented in the initial encounter.     Review of Systems   Constitutional:  Negative for chills and fever.   HENT:  Negative for ear discharge and ear pain.    Eyes:  Negative for pain and redness.   Respiratory:  Negative for cough and shortness of breath.    Cardiovascular:  Negative for chest pain and leg swelling.   Gastrointestinal:  Negative for abdominal distention and abdominal pain.   Endocrine: Negative for polydipsia and polyphagia.   Genitourinary:  Negative for dyspareunia and dysuria.   Musculoskeletal:  Positive for arthralgias. Negative for back pain.   Skin:  Negative for rash and wound.   Allergic/Immunologic: Negative for environmental allergies and food allergies.   Neurological:  Positive for speech difficulty, weakness and numbness.     Scheduled Meds:   aspirin  81 mg Oral Daily    atorvastatin  40 mg Oral Daily    carvediloL  6.25 mg Oral BID    clopidogreL  75 mg Oral Daily    doxepin  10 mg Oral QHS     enoxparin  40 mg Subcutaneous Daily    gabapentin  300 mg Oral BID    insulin aspart U-100  3 Units Subcutaneous TIDWM    insulin detemir U-100 (Levemir)  12 Units Subcutaneous Daily    NIFEdipine  30 mg Oral Once    [START ON 12/1/2023] NIFEdipine  60 mg Oral Daily    pantoprazole  40 mg Oral Daily    senna-docusate 8.6-50 mg  1 tablet Oral BID    valsartan  320 mg Oral Daily     Continuous Infusions:   sodium chloride 0.9%       PRN Meds:acetaminophen, dextrose 10%, dextrose 10%, glucagon (human recombinant), glucose, glucose, hydrALAZINE, insulin aspart U-100, labetalol, ondansetron, sodium chloride 0.9%, sodium chloride 0.9%    Objective:     Vital Signs (Most Recent):  Temp: 97.9 °F (36.6 °C) (11/30/23 1226)  Pulse: 68 (11/30/23 1226)  Resp: 20 (11/30/23 1226)  BP: (!) 206/78 (11/30/23 1226)  SpO2: 100 % (11/30/23 1226)  BP Location: Left arm    Vital Signs Range (Last 24H):  Temp:  [96.8 °F (36 °C)-98.2 °F (36.8 °C)]   Pulse:  [66-82]   Resp:  [16-20]   BP: (160-206)/(70-89)   SpO2:  [98 %-100 %]   BP Location: Left arm       Physical Exam  Vitals and nursing note reviewed.   Constitutional:       Appearance: Normal appearance. She is obese.   Eyes:      Extraocular Movements: Extraocular movements intact.      Conjunctiva/sclera: Conjunctivae normal.      Pupils: Pupils are equal, round, and reactive to light.   Cardiovascular:      Rate and Rhythm: Normal rate and regular rhythm.   Abdominal:      General: Abdomen is flat. Bowel sounds are normal.      Palpations: Abdomen is soft.   Musculoskeletal:      Cervical back: Normal range of motion.      Comments:  R ankle swelling/tenderness and pain with passive ROM improving.    Skin:     General: Skin is warm and dry.   Neurological:      Mental Status: She is alert and oriented to person, place, and time.      Sensory: Sensory deficit present.      Motor: Weakness present.   Psychiatric:         Mood and Affect: Mood normal.         Behavior: Behavior normal.               Neurological Exam:   LOC: alert  Attention Span: Good   Language: No aphasia  Articulation: Dysarthria  Orientation: Person, Place, Time   Visual Fields: Full  EOM (CN III, IV, VI): Full/intact  Facial Movement (CN VII): Symmetric facial expression    Motor: Arm left  Normal 5/5  Leg left  Normal 5/5  Arm right  Normal 5/5  Leg right Paresis: 4/5 pain limits exam    Laboratory:  CMP:   Recent Labs   Lab 11/30/23  0545   CALCIUM 9.1   ALBUMIN 2.5*   PROT 6.2      K 3.6   CO2 16*      BUN 29*   CREATININE 1.2   ALKPHOS 107   ALT 18   AST 20   BILITOT 0.3     CBC:   Recent Labs   Lab 11/30/23  0545   WBC 9.57   RBC 4.35   HGB 12.1   HCT 37.3      MCV 86   MCH 27.8   MCHC 32.4       Diagnostic Results     Brain Imaging   CT head w/o contrast 11-26-23 results:  Age indeterminate infarcts in the bilateral basal ganglia and left internal capsule/corona radiata, new from prior.  Recommend further evaluation with MRI.     New punctate hyperdensity in the right precentral gyrus.  This can be followed up MR imaging.     MRI brain w/o contrast 11-26-23 results:     Acute infarct involving the left internal capsule/centrum semiovale.     Foci of increased susceptibility in the right precentral gyrus and right paracentral parietal lobe, suggestive of small micro hemorrhages, new from MRI 02/26/2023.     Remote appearing infarcts of the bilateral basal ganglia and left thalamus.        Cardiac Evaluation:   EKG 11-26-23 results:  Normal sinus rhythm Voltage criteria for left ventricular hypertrophy Nonspecific T wave abnormality Prolonged QT Abnormal ECG When compared with ECG of 24-OCT-2023 13:52, Premature supraventricular complexes are no longer Present    Ming Fay MD  Comprehensive Stroke Center  Department of Vascular Neurology   [facility] ([facility])

## 2023-11-30 NOTE — PT/OT/SLP PROGRESS
Occupational Therapy   Treatment    Name: Buck Ibarra  MRN: [id number]  Admitting Diagnosis:  Cerebrovascular accident (CVA) due to thrombosis of left middle cerebral artery       Recommendations:     Discharge Recommendations: High Intensity Therapy  Discharge Equipment Recommendations:  walker, rolling, bedside commode  Barriers to discharge:  Other (Comment) (increased skilled (A) required)    Assessment:     Buck Ibarra is a 78 y.o. female with a medical diagnosis of Cerebrovascular accident (CVA) due to thrombosis of left middle cerebral artery.  She presents with the following performance deficits affecting function are weakness, impaired endurance, impaired self care skills, impaired functional mobility, gait instability, impaired balance, decreased coordination, decreased upper extremity function, decreased lower extremity function, decreased safety awareness, pain. Pt demo improved functional tolerance to today's session which focused on improving functional mobility and EOB ADL participation. She was able to participate in various sit to stand trials with better tolerance 2/2 CAM boot at bedside improving pt's tolerance with WB through RLE. She demo improved postural control in sitting, however continues to demo gradual lean with fatigue, requiring assistance to return to midline. Pt required increased time to initiate tasks and required frequent cueing for initiation throughout. She is making good progress with therapy and would benefit from continued skilled acute OT services in order to maximize (I) and with ADLs and functional mobility to ensure safe return to PLOF in the least restrictive environment. OT recommending high intensity therapy once pt is medically appropriate for d/c.       Rehab Prognosis:  Good; patient would benefit from acute skilled OT services to address these deficits and reach maximum level of function.       Plan:     Patient to be seen 4 x/week to address the above  "listed problems via self-care/home management, therapeutic activities, therapeutic exercises, neuromuscular re-education  Plan of Care Expires: 12/11/23  Plan of Care Reviewed with: patient, son    Subjective   "I got a boot"  Chief Complaint: R ankle pain   Patient/Family Comments/goals: Son present at beginning and end of session  Pain/Comfort:  Pain Rating 1: other (see comments) (not rated)  Location - Side 1: Right  Location - Orientation 1: generalized  Location 1: ankle  Pain Addressed 1: Distraction, Reposition  Pain Rating Post-Intervention 1: other (see comments) (unrated)    Objective:     Communicated with: RN prior to session.  Patient found HOB elevated with bed alarm, telemetry, peripheral IV upon OT entry to room.    General Precautions: Standard, aspiration, fall    Orthopedic Precautions:N/A  Braces: N/A  Respiratory Status: Room air     Occupational Performance:     Bed Mobility:    Patient completed Scooting/Bridging with minimum assistance  Patient completed Supine to Sit with maximal assistance  Patient completed Sit to Supine with maximal assistance     Functional Mobility/Transfers:  Patient completed x2 Sit <> Stand Transfer with minimum assistance and of 2 persons  with  rolling walker   Functional Mobility: Pt required Max A x2 for taking 2 steps forward/backwards. Pt had maximal difficulty with clearing BLEs to advance step 2/2 pain and poor motor planning.     Activities of Daily Living:  Feeding:  set-up assistance to eat breakfast  Grooming: stand by assistance for safety with performing facial hygiene while sitting EOB   Upper Body Dressing: moderate assistance to don/doff gown      [facility] 6 Click ADL: 13    Treatment & Education:  Pt sat EOB for ~15 min with SBA-Min A for correcting R anterior lean with fatigue.    Pt and son educated on:   Role of OT, POC, and d/c planning.   Safe transfer techniques and proper body mechanics for fall prevention and improved independence with " functional transfers   Importance of OOB activities to increase endurance and tolerance for increased participation in daily ADLs.   Utilizing the call bell to request for assistance with all functional mobility to ensure safety during hospital stay.      Pt and family verbalized understanding and all questions were addressed within the scope of OT.     Patient left HOB elevated with all lines intact, call button in reach, bed alarm on, RN notified, and son present    GOALS:   Multidisciplinary Problems       Occupational Therapy Goals          Problem: Occupational Therapy    Goal Priority Disciplines Outcome Interventions   Occupational Therapy Goal     OT, PT/OT Ongoing, Progressing    Description: Goals to be met by: 12/11/2023     Patient will increase functional independence with ADLs by performing:    UE Dressing with Minimal Assistance.  Grooming while EOB with Minimal Assistance.  Supine to sit with Minimal Assistance.  Stand pivot transfers with Minimal Assistance.                         Time Tracking:     OT Date of Treatment: 11/30/23  OT Start Time: 0822  OT Stop Time: 0854  OT Total Time (min): 32 min    Billable Minutes:Self Care/Home Management 12  Therapeutic Activity 20    OT/PREETHI: OT          11/30/2023  Co-treatment performed due to patient's multiple deficits requiring two skilled therapists to appropriately and safely assess patient's strength, endurance, functional mobility, and ADL performance while facilitating functional tasks in addition to accommodating for patient's activity tolerance and medical acuity.

## 2023-11-30 NOTE — SUBJECTIVE & OBJECTIVE
Neurologic Chief Complaint: RSW, dysarthria    Subjective:     Interval History: Patient is seen for follow-up neurological assessment and treatment recommendations:  NAEON. SBP 160s-200s. Increased home nifedipine. Able to work with PT today. IPR auth denied following peer to peer. Pending placement.     HPI, Past Medical, Family, and Social History remains the same as documented in the initial encounter.     Review of Systems   Constitutional:  Negative for chills and fever.   HENT:  Negative for ear discharge and ear pain.    Eyes:  Negative for pain and redness.   Respiratory:  Negative for cough and shortness of breath.    Cardiovascular:  Negative for chest pain and leg swelling.   Gastrointestinal:  Negative for abdominal distention and abdominal pain.   Endocrine: Negative for polydipsia and polyphagia.   Genitourinary:  Negative for dyspareunia and dysuria.   Musculoskeletal:  Positive for arthralgias. Negative for back pain.   Skin:  Negative for rash and wound.   Allergic/Immunologic: Negative for environmental allergies and food allergies.   Neurological:  Positive for speech difficulty, weakness and numbness.     Scheduled Meds:   aspirin  81 mg Oral Daily    atorvastatin  40 mg Oral Daily    carvediloL  6.25 mg Oral BID    clopidogreL  75 mg Oral Daily    doxepin  10 mg Oral QHS    enoxparin  40 mg Subcutaneous Daily    gabapentin  300 mg Oral BID    insulin aspart U-100  3 Units Subcutaneous TIDWM    insulin detemir U-100 (Levemir)  12 Units Subcutaneous Daily    NIFEdipine  30 mg Oral Once    [START ON 12/1/2023] NIFEdipine  60 mg Oral Daily    pantoprazole  40 mg Oral Daily    senna-docusate 8.6-50 mg  1 tablet Oral BID    valsartan  320 mg Oral Daily     Continuous Infusions:   sodium chloride 0.9%       PRN Meds:acetaminophen, dextrose 10%, dextrose 10%, glucagon (human recombinant), glucose, glucose, hydrALAZINE, insulin aspart U-100, labetalol, ondansetron, sodium chloride 0.9%, sodium chloride  0.9%    Objective:     Vital Signs (Most Recent):  Temp: 97.9 °F (36.6 °C) (11/30/23 1226)  Pulse: 68 (11/30/23 1226)  Resp: 20 (11/30/23 1226)  BP: (!) 206/78 (11/30/23 1226)  SpO2: 100 % (11/30/23 1226)  BP Location: Left arm    Vital Signs Range (Last 24H):  Temp:  [96.8 °F (36 °C)-98.2 °F (36.8 °C)]   Pulse:  [66-82]   Resp:  [16-20]   BP: (160-206)/(70-89)   SpO2:  [98 %-100 %]   BP Location: Left arm       Physical Exam  Vitals and nursing note reviewed.   Constitutional:       Appearance: Normal appearance. She is obese.   Eyes:      Extraocular Movements: Extraocular movements intact.      Conjunctiva/sclera: Conjunctivae normal.      Pupils: Pupils are equal, round, and reactive to light.   Cardiovascular:      Rate and Rhythm: Normal rate and regular rhythm.   Abdominal:      General: Abdomen is flat. Bowel sounds are normal.      Palpations: Abdomen is soft.   Musculoskeletal:      Cervical back: Normal range of motion.      Comments:  R ankle swelling/tenderness and pain with passive ROM improving.    Skin:     General: Skin is warm and dry.   Neurological:      Mental Status: She is alert and oriented to person, place, and time.      Sensory: Sensory deficit present.      Motor: Weakness present.   Psychiatric:         Mood and Affect: Mood normal.         Behavior: Behavior normal.              Neurological Exam:   LOC: alert  Attention Span: Good   Language: No aphasia  Articulation: Dysarthria  Orientation: Person, Place, Time   Visual Fields: Full  EOM (CN III, IV, VI): Full/intact  Facial Movement (CN VII): Symmetric facial expression    Motor: Arm left  Normal 5/5  Leg left  Normal 5/5  Arm right  Normal 5/5  Leg right Paresis: 4/5 pain limits exam    Laboratory:  CMP:   Recent Labs   Lab 11/30/23  0514   CALCIUM 9.1   ALBUMIN 2.5*   PROT 6.2      K 3.6   CO2 16*      BUN 29*   CREATININE 1.2   ALKPHOS 107   ALT 18   AST 20   BILITOT 0.3     CBC:   Recent Labs   Lab 11/30/23  0570    WBC 9.57   RBC 4.35   HGB 12.1   HCT 37.3      MCV 86   MCH 27.8   MCHC 32.4       Diagnostic Results     Brain Imaging   CT head w/o contrast 11-26-23 results:  Age indeterminate infarcts in the bilateral basal ganglia and left internal capsule/corona radiata, new from prior.  Recommend further evaluation with MRI.     New punctate hyperdensity in the right precentral gyrus.  This can be followed up MR imaging.     MRI brain w/o contrast 11-26-23 results:     Acute infarct involving the left internal capsule/centrum semiovale.     Foci of increased susceptibility in the right precentral gyrus and right paracentral parietal lobe, suggestive of small micro hemorrhages, new from MRI 02/26/2023.     Remote appearing infarcts of the bilateral basal ganglia and left thalamus.        Cardiac Evaluation:   EKG 11-26-23 results:  Normal sinus rhythm Voltage criteria for left ventricular hypertrophy Nonspecific T wave abnormality Prolonged QT Abnormal ECG When compared with ECG of 24-OCT-2023 13:52, Premature supraventricular complexes are no longer Present

## 2023-11-30 NOTE — PT/OT/SLP PROGRESS
Physical Therapy Treatment    Patient Name:  Buck Ibarra   MRN:  [id number]    Recent Surgery: * No surgery found *      Recommendations:     Discharge Recommendations:   High Intensity Therapy  Discharge Equipment Recommendations: walker, rolling, bedside commode   Barriers to discharge: Increased level of assist    Highest Level of Mobility: Gait 1 step FW and BW w/ L LE  Assistance Required: Max(A)X2 persons w/ RW    Assessment:     Buck Ibarra is a 78 y.o. female admitted with a medical diagnosis of Cerebrovascular accident (CVA) due to thrombosis of left middle cerebral artery.    Pt met with HOB elevated, son present and agreeable to PT treatment. Today's PT treatment focus was on standing tolerance and gait training to improve function. Pt has a flat affect today and has delayed responses to commands. She continues to be limited by R ankle pain in WB, but reports some improvement with R LE boot.     Pt is progressing towards acute PT goals appropriately and continues to benefit from acute PT sessions.     Rehab Prognosis: Good; patient would benefit from acute skilled PT services to address these deficits and reach maximum level of function.      Plan:     During this hospitalization, patient to be seen 4 x/week to address the identified rehab impairments via gait training, therapeutic activities, therapeutic exercises, neuromuscular re-education and progress toward the following goals:    Plan of Care Expires:  12/27/23    This plan of care has been discussed with the patient/caregiver, who was included in its development and is in agreement with the identified goals and treatment plan.     Subjective     Communicated with RN prior to session.  Patient agreeable to participate.     Pain/Comfort:  Pain Rating 1:  (unrated)  Location - Side 1: Right  Location - Orientation 1: generalized  Location 1: ankle  Pain Addressed 1: Distraction, Reposition  Pain Rating Post-Intervention 1:   (unrated)    Chief Complaint: L MCA CVA   Patient/Family Comments/goals: None stated      Objective:     Patient found HOB elevated with bed alarm, telemetry, peripheral IV  upon PT entry to room.    General Precautions: Standard, aspiration, fall   Orthopedic Precautions:N/A   Braces: R boot        Exams:    Cognition:  Patient is oriented to Person, Place, Time, Situation  Follows one-step commands  Insight to deficits/safety awareness: impaired    Functional Mobility:    Bed Mobility:  Supine to Sit: Maximum Assistance on L side of bed  Sit to Supine: Maximum Assistance\  Scooting anteriorly to EOB to plant feet on floor: Maximum Assistance    Transfers:   Sit to Stand Transfer: Minimal Assistance and 2 persons   from EOB with RW   R-sided lean             Gait:  Patient received gait training 1 step FW and BW with L LE with Maximum Assistance and 2 persons  and rolling walker  Gait Assessment: unsteady gait, narrow base of support, flexed posture, and decreased alyx  Gait Pattern Observed: Step-to  Comments: All lines remained intact throughout ambulation trial, gait belt utilized. Attempted to ambulate with R LE, pt unable to advance LE despite assist with weight shift and verbal cues. Pt limited by a significant R-sided lean and flexed posture    Balance:  Static Sit:   SBA-min(A)  at EOB  R lean  Static Stand:   Min Assist with Rolling walker  Dynamic Stand:  Max(A)x2  with Rolling walker      Therapeutic Activities/Exercises     Patient assisted with functional mobility as noted above  PT instructed pt to perform AROM R ankle as tolerated. PT elevated R ankle and provided ice to pt. Ice not placed directly on pt's skin  Patient educated on the importance of early mobility to prevent functional decline during hospital stay  Patient was instructed to utilize staff assistance for mobility/transfers.  Patient is appropriate to transfer with dependence to [facility] via drawsheet and RN/PCT assist  Patient  educated on PT POC and role of PT in acute care  White board updated regarding patient's safest level of mobility with staff assistance, RN also updated.     AM-PAC 6 CLICK MOBILITY  Turning over in bed (including adjusting bedclothes, sheets and blankets)?: 3  Sitting down on and standing up from a chair with arms (e.g., wheelchair, bedside commode, etc.): 2  Moving from lying on back to sitting on the side of the bed?: 2  Moving to and from a bed to a chair (including a wheelchair)?: 2  Need to walk in hospital room?: 2  Climbing 3-5 steps with a railing?: 1  Basic Mobility Total Score: 12     Patient left HOB elevated with all lines intact, call button in reach, bed alarm on, and RN notified. Son present        History/Goals:     PAST MEDICAL HISTORY:  Past Medical History:   Diagnosis Date    Allergy     Cataract     Current every day smoker     Diabetes mellitus type II     Gastritis     with gastric ulcer    GERD (gastroesophageal reflux disease)     H. pylori infection     History of hepatitis C, SVR as of 8-2016 08/25/2015    Harvoni 12 wks completed.  SVR(12) as of 8/4/16 SVR(24) as of 10-     Hypertension     Osteopenia     Type 2 diabetes mellitus with diabetic polyneuropathy        Past Surgical History:   Procedure Laterality Date    COLONOSCOPY      COLONOSCOPY N/A 1/28/2016    Procedure: COLONOSCOPY;  Surgeon: Emeka Ahn MD;  Location: [address]);  Service: Endoscopy;  Laterality: N/A;    COLONOSCOPY N/A 8/8/2019    Procedure: COLONOSCOPY;  Surgeon: Susan Dia MD;  Location: [facility] ENDO [address]);  Service: Endoscopy;  Laterality: N/A;  appt confirmed-rb    HYSTERECTOMY      LIVER BIOPSY      OOPHORECTOMY      PERIPHERAL ANGIOGRAPHY Left 5/5/2021    Procedure: Peripheral angiography;  Surgeon: Randolph Toribio MD;  Location: [facility] CATH LAB;  Service: Cardiology;  Laterality: Left;    PERIPHERAL ANGIOGRAPHY N/A 6/21/2021    Procedure: Peripheral angiography;  Surgeon:  Randolph Toribio MD;  Location: [facility] CATH LAB;  Service: Cardiology;  Laterality: N/A;    UPPER GASTROINTESTINAL ENDOSCOPY         GOALS:   Multidisciplinary Problems       Physical Therapy Goals          Problem: Physical Therapy    Goal Priority Disciplines Outcome Goal Variances Interventions   Physical Therapy Goal     PT, PT/OT Ongoing, Progressing     Description: Goals to be met by: 23     Patient will increase functional independence with mobility by performin. Supine to sit with MInimal Assistance  2. Sit to supine with MInimal Assistance  3. Sit to stand transfer with Minimal Assistance  4. Bed to chair transfer with Minimal Assistance using LRAD as needed  5. Gait  x 50 feet with Minimal Assistance using LRAD as needed.   6. Lower extremity exercise program x10 reps per handout, with assistance as needed    *Will add stair goal as appropriate                          Time Tracking:     PT Received On: 23  PT Start Time: 822     PT Stop Time: 08  PT Total Time (min): 31 min     Billable Minutes: Therapeutic Exercise 15 and Neuromuscular Re-education 16      Dolly Fraser, PT  2023  Pager# [phone]

## 2023-11-30 NOTE — SUBJECTIVE & OBJECTIVE
"Interval HPI:   No acute events overnight. Patient in room 949/949 A. Blood glucose stable. BG at and below goal on current insulin regimen (SSI, prandial, and basal insulin ). Steroid use- None .      Renal function- Normal   Vasopressors-  None     Diet diabetic 2000 Calorie; Thin     Eatin%  Nausea: No  Hypoglycemia and intervention: No  Fever: No  TPN and/or TF: No    BP (!) 164/70 (BP Location: Left arm, Patient Position: Lying)   Pulse 66   Temp 97.8 °F (36.6 °C) (Oral)   Resp 16   Ht 5' 7" (1.702 m)   Wt 90 kg (198 lb 6.6 oz)   SpO2 98%   Breastfeeding No   BMI 31.08 kg/m²     Labs Reviewed and Include    Recent Labs   Lab 23  0545      CALCIUM 9.1   ALBUMIN 2.5*   PROT 6.2      K 3.6   CO2 16*      BUN 29*   CREATININE 1.2   ALKPHOS 107   ALT 18   AST 20   BILITOT 0.3     Lab Results   Component Value Date    WBC 9.57 2023    HGB 12.1 2023    HCT 37.3 2023    MCV 86 2023     2023     Recent Labs   Lab 23  204   TSH 1.766     Lab Results   Component Value Date    HGBA1C 9.6 (H) 2023       Nutritional status:   Body mass index is 31.08 kg/m².  Lab Results   Component Value Date    ALBUMIN 2.5 (L) 2023    ALBUMIN 2.9 (L) 2023    ALBUMIN 2.4 (L) 2023     No results found for: "PREALBUMIN"    Estimated Creatinine Clearance: 46 mL/min (based on SCr of 1.2 mg/dL).    Accu-Checks  Recent Labs     23  2101 23  0802 23  1145 23  1728 23  2128 23  0820 23  1224 23  1617 23  2130 23  0804   POCTGLUCOSE 214* 187* 255* 199* 185* 148* 110 79 106 109       Current Medications and/or Treatments Impacting Glycemic Control  Immunotherapy:    Immunosuppressants       None          Steroids:   Hormones (From admission, onward)      None          Pressors:    Autonomic Drugs (From admission, onward)      None          Hyperglycemia/Diabetes Medications: "   Antihyperglycemics (From admission, onward)      Start     Stop Route Frequency Ordered    11/28/23 0900  insulin detemir U-100 (Levemir) pen 16 Units         -- SubQ Daily 11/28/23 0840    11/27/23 1130  insulin aspart U-100 pen 5 Units         -- SubQ 3 times daily with meals 11/27/23 1057    11/26/23 2347  insulin aspart U-100 pen 0-5 Units         -- SubQ Before meals & nightly PRN 11/26/23 6801

## 2023-12-01 NOTE — PLAN OF CARE
Problem: Adjustment to Illness (Stroke, Ischemic/Transient Ischemic Attack)  Goal: Optimal Coping  Outcome: Ongoing, Progressing     Problem: Bowel Elimination Impaired (Stroke, Ischemic/Transient Ischemic Attack)  Goal: Effective Bowel Elimination  Outcome: Ongoing, Progressing     Problem: Cerebral Tissue Perfusion (Stroke, Ischemic/Transient Ischemic Attack)  Goal: Optimal Cerebral Tissue Perfusion  Outcome: Ongoing, Progressing     Problem: Cognitive Impairment (Stroke, Ischemic/Transient Ischemic Attack)  Goal: Optimal Cognitive Function  Outcome: Ongoing, Progressing     Problem: Communication Impairment (Stroke, Ischemic/Transient Ischemic Attack)  Goal: Improved Communication Skills  Outcome: Ongoing, Progressing     Problem: Functional Ability Impaired (Stroke, Ischemic/Transient Ischemic Attack)  Goal: Optimal Functional Ability  Outcome: Ongoing, Progressing     Problem: Sensorimotor Impairment (Stroke, Ischemic/Transient Ischemic Attack)  Goal: Improved Sensorimotor Function  Outcome: Ongoing, Progressing     Problem: Fall Injury Risk  Goal: Absence of Fall and Fall-Related Injury  Outcome: Ongoing, Progressing   POC and meds reviewed, patient is awake and alert, had  PRN BP med for elevated BP.Med was effective.Bed is il low position, bed rails up X2, call light within reach and bed alarm on. Daughter is at bedside, visiting.Will continue to monitor.

## 2023-12-01 NOTE — PLAN OF CARE
12/01/23 1137   Post-Acute Status   Post-Acute Authorization Placement   Post-Acute Placement Status Pending payor review/awaiting authorization (if required)   Coverage PHN   Discharge Delays (!) Payor Issues   Discharge Plan   Discharge Plan A Rehab   Discharge Plan B Skilled Nursing Facility     Patient's dtr Olga has chosen [facility] and they have accepted.  SW submitted auth request to PHN.  Facility will reach out to Olga to complete ppw.      Discharge Plan A and Plan B have been determined by review of patient's clinical status, future medical and therapeutic needs, and coverage/benefits for post-acute care in coordination with multidisciplinary team members.    Riana Barnes, BELLE  [facility]  [phone]

## 2023-12-01 NOTE — PT/OT/SLP PROGRESS
Physical Therapy Treatment    Patient Name:  Buck Ibarra   MRN:  [id number]    Recent Surgery: * No surgery found *      Recommendations:     Discharge Recommendations:    High Intensity Therapy  Discharge Equipment Recommendations: walker, rolling, bedside commode   Barriers to discharge: Increased level of assist    Highest Level of Mobility: STS  Assistance Required: Mod(A)X2 w/ RW    Assessment:     Buck Ibarra is a 78 y.o. female admitted with a medical diagnosis of Cerebrovascular accident (CVA) due to thrombosis of left middle cerebral artery.    Pt met with HOB elevated, daughter present and agreeable to PT treatment. Today's PT treatment focus was on continued transfer training to improve function. Pt continues to be limited by R ankle pain despite being pre-medicated prior to therapy session. She was able to remain standing today for ~30 seconds to complete standing ADLs, but was unable to ambulate today due to fatigue and R LE pain. Attempted and additional STS t/f today, but pt required total(A)X2 persons for transfer and was mildly lethargic. She is a high fall risk at this time.     Pt is progressing towards acute PT goals appropriately and continues to benefit from acute PT sessions.     Rehab Prognosis: Good; patient would benefit from acute skilled PT services to address these deficits and reach maximum level of function.      Plan:     During this hospitalization, patient to be seen 4 x/week to address the identified rehab impairments via gait training, therapeutic activities, therapeutic exercises, neuromuscular re-education and progress toward the following goals:    Plan of Care Expires:  12/27/23    This plan of care has been discussed with the patient/caregiver, who was included in its development and is in agreement with the identified goals and treatment plan.     Subjective     Communicated with RN prior to session.  Patient agreeable to participate.     Pain/Comfort:  Pain  Rating 1: 4/10  Location - Side 1: Right  Location - Orientation 1: generalized  Location 1: ankle  Pain Addressed 1: Reposition, Distraction  Pain Rating Post-Intervention 1:  (unrated)    Chief Complaint: L MCA CVA   Patient/Family Comments/goals: None stated      Objective:     Patient found HOB elevated with bed alarm, telemetry, peripheral IV  upon PT entry to room.    General Precautions: Standard, fall   Orthopedic Precautions:N/A   Braces:  (boot)         Exams:    Cognition:  Patient is oriented to Person, Place, Time, Situation  Follows one-step commands  Insight to deficits/safety awareness: impaired    Functional Mobility:    Bed Mobility:  Supine to Sit: Maximum Assistance on L side of bed  Sit to Supine: Maximum Assistance  Scooting anteriorly to EOB to plant feet on floor: Total Assistance    Transfers:   Sit to Stand Transfer:  Trial 1: Moderate Assistance and 2 persons   from EOB with RW  Pt able to remain standing for ~30 secs, R sided lean  Trial 2: Total(A)X2 persons from EOB w/ HHAx2  Poor initiation, unable to complete full stand             Gait:  Deferred due to pt fatigue and R ankle pain    Balance:  Static Sit:   Initially SBA, then max(A) w/ a R anterior lean  at EOB  Static Stand:   Max-total(A)X2  with Rolling walker      Therapeutic Activities/Exercises     Patient assisted with functional mobility as noted above  PT provided ice to pt's R ankle and elevated it after session  Instructed to continue R ankle AROM exs  Patient educated on the importance of early mobility to prevent functional decline during hospital stay  Patient was instructed to utilize staff assistance for mobility/transfers.  [facility]  Patient educated on PT POC and role of PT in acute care  White board updated regarding patient's safest level of mobility with staff assistance, RN also updated.     AM-PAC 6 CLICK MOBILITY  Turning  over in bed (including adjusting bedclothes, sheets and blankets)?: 3  Sitting down on and standing up from a chair with arms (e.g., wheelchair, bedside commode, etc.): 2  Moving from lying on back to sitting on the side of the bed?: 2  Moving to and from a bed to a chair (including a wheelchair)?: 2  Need to walk in hospital room?: 2  Climbing 3-5 steps with a railing?: 1  Basic Mobility Total Score: 12     Patient left HOB elevated with all lines intact, call button in reach, bed alarm on, and RN notified.        History/Goals:     PAST MEDICAL HISTORY:  Past Medical History:   Diagnosis Date    Allergy     Cataract     Current every day smoker     Diabetes mellitus type II     Gastritis     with gastric ulcer    GERD (gastroesophageal reflux disease)     H. pylori infection     History of hepatitis C, SVR as of 8-2016 08/25/2015    Harvoni 12 wks completed.  SVR(12) as of 8/4/16 SVR(24) as of 10-     Hypertension     Osteopenia     Type 2 diabetes mellitus with diabetic polyneuropathy        Past Surgical History:   Procedure Laterality Date    COLONOSCOPY      COLONOSCOPY N/A 1/28/2016    Procedure: COLONOSCOPY;  Surgeon: Emeka Ahn MD;  Location: [facility] ([address] [place] [address]);  Service: Endoscopy;  Laterality: N/A;    COLONOSCOPY N/A 8/8/2019    Procedure: COLONOSCOPY;  Surgeon: Susan Dia MD;  Location: [address]);  Service: Endoscopy;  Laterality: N/A;  appt confirmed-rb    HYSTERECTOMY      LIVER BIOPSY      OOPHORECTOMY      PERIPHERAL ANGIOGRAPHY Left 5/5/2021    Procedure: Peripheral angiography;  Surgeon: Randolph Toribio MD;  Location: [facility] CATH LAB;  Service: Cardiology;  Laterality: Left;    PERIPHERAL ANGIOGRAPHY N/A 6/21/2021    Procedure: Peripheral angiography;  Surgeon: Randolph Toribio MD;  Location: [facility] CATH LAB;  Service: Cardiology;  Laterality: N/A;    UPPER GASTROINTESTINAL ENDOSCOPY         GOALS:   Multidisciplinary Problems       Physical Therapy Goals           Problem: Physical Therapy    Goal Priority Disciplines Outcome Goal Variances Interventions   Physical Therapy Goal     PT, PT/OT Ongoing, Progressing     Description: Goals to be met by: 23     Patient will increase functional independence with mobility by performin. Supine to sit with MInimal Assistance  2. Sit to supine with MInimal Assistance  3. Sit to stand transfer with Minimal Assistance  4. Bed to chair transfer with Minimal Assistance using LRAD as needed  5. Gait  x 50 feet with Minimal Assistance using LRAD as needed.   6. Lower extremity exercise program x10 reps per handout, with assistance as needed    *Will add stair goal as appropriate                          Time Tracking:     PT Received On: 23  PT Start Time: 1351     PT Stop Time: 1421  PT Total Time (min): 30 min     Billable Minutes: Therapeutic Activity 15 and Therapeutic Exercise 15      Dolly Fraser, PT  2023  Pager# [phone]

## 2023-12-01 NOTE — PLAN OF CARE
[facility]  Encounter Date: 2023  8:28 PM    Discharge Date No discharge date for patient encounter.   Hospital Account: [id number]    MRN: [id number]   Guarantor: SUKI MALDONADO   Contact Serial #: [id number]         ENCOUNTER             Patient Class: Inpatient   Unit: [facility]*   Hospital Service: Vascular Neurology   Bed: 949 A   Admitting Provider: Eriberto Breaux Md   Referring Physician: Self, Aaareferral   Attending Provider: Shasta Maier Md   Adm Diagnosis: Acute ischemic left MCA *      PATIENT                 Name: SUKI MALDONADO : 1945 (78 yrs)   Address: [address] [place] [address] Sex: Female   City: [place] [address]       Primary Care Provider: Ericka Cortez MD         Primary Phone: [phone]   EMERGENCY CONTACT   Contact Name Legal Guardian? Relationship to Patient Home Phone Work Phone Mobile Phone   1. StaceyOlga  2. *No Contact Specified*      Daughter              [phone]      GUARANTOR                  Guarantor: SUKI MALDONADO       : 1945   Address: [address] [place] [address]    Sex: [place] [address] Guarantor  Type: P/F   Relation to Patient: Self         Home Phone: [phone]   Guarantor ID: [id number]         Work Phone:     GUARANTOR EMPLOYER     Employer: Retired           Status: RETIRED      COVERAGE          PRIMARY INSURANCE   Payor / Plan: [employer] COMPLETE       Group Number: [id number]       Subscriber Name: SUKI MALDONADO Subscriber : 1945   Subscriber ID: [id number] Pat. Rel. to Subscriber: Self   Insurance Address: [address]  [place] [address]       SECONDARY INSURANCE   Payor / Plan: MEDICAID/MEDICAID [facility]       Group Number: [id number]       Subscriber Name: SUKI MALDONADO Subscriber : 1945   Subscriber ID: [id number] Pat. Rel. to Subscriber: Self   Insurance Address: [address] [address]  [place]  [address]          Contact Serial # [id number])         2023    Chart ID ([id number])                  Inpatient consult to SNF [[id number]] (Order [id number])  Consult  Date and Time: 2023 11:30 AM Department: [facility] Rel By: Farzaneh Barnes LMSW (auto-released) Authorizing: Shasta Maier MD     Order Information    Order Date/Time Release Date/Time Start Date/Time End Date/Time   23 11:29 AM 23 11:30 AM 23 11:30 AM 23 11:30 AM     Order Details    Frequency Duration Priority Order Class   Once 1  occurrence Routine Hospital Performed     Original Order    Ordered On Ordered By    2023 11:29 AM Farzaneh Barnes LMSW               Inpatient consult to SNF: Patient Communication     Not Released  Not seen     Order Questions    Question Answer   Reason for Consult? therapy                      Collection Information          Consult Order Info    ID Description Priority Start Date Start Time   [id number] Inpatient consult to SNF Routine 2023 11:30 AM   Provider Specialty Referred to   ______________________________________ _____________________________________                       Cosign Order Info    Action Created on Order Mode Entered by Responsible Provider Signed by Signed on   Ordering 23 1124 Telephone with readback Farzaneh Barnes LMSW Dunn, Lauren E., MD             Patient Information    Patient Name  Buck Ibarra Legal Sex  Female   1945 [facility]         Additional Information    Associated Reports   View Parent Encounter   Priority and Order Details         Order Provider Info        Office phone Pager E-mail   Ordering User Farzaneh Barnes LMSW -- -- [email or web address]   Authorizing Provider  Shasta Maier MD  [phone] [email or web address]   Attending Provider  Shasta Maier MD  [phone] [email or web address]       Inpatient consult to SNF  [[id number]]    Awaiting signature from: Shasta Maier MD Status: Active   Mode: Ordering in Telephone with readback mode Communicated by: Farzaneh Barnes LMSW   Ordering user: Farzaneh Barnes LMSW 12/01/23 1129 Ordering provider: Shasta Maier MD   Authorized by: Shasta Maier MD Ordering mode: Telephone with readback     Questionnaire    Question Answer   Reason for Consult? therapy

## 2023-12-01 NOTE — ASSESSMENT & PLAN NOTE
R ankle xray negative for fracture/dislocation  ESR 84, CRP 42  Ortho consulted, aspirated joint, not concerned for septic joint at this time, aspirate negative for crystals, recommendations as follows:  - Ice, elevation and gentle ROM exercises  - No acute orthopaedic intervention warranted  - Continue to monitor cultures  - boot at bedside  -prn oxy 5 mg when working with therapy    - Improving on exam, able to work with PT   - Consider outpatient ortho f/u on discharge

## 2023-12-01 NOTE — PLAN OF CARE
LOCET was completed and PASRR was uploaded to [facility].        Chaka Hayward [facility]  Case Management  [phone]

## 2023-12-01 NOTE — NURSING
.Nurses Note -- 4 Eyes      11/30/2023   1900      Skin assessed during: Q Shift Change      [x] No Altered Skin Integrity Present    []Prevention Measures Documented          Attending Nurse:  Yanni Newby RN/Staff Member:  Collette, RN

## 2023-12-01 NOTE — ASSESSMENT & PLAN NOTE
77 y/o female with 3 day history of right sided weakness and dysarthria with new L MCA infarct. No thrombolytics or intervention as out of the window.  Etiology likely small vessel disease with co-morbidities. Progressing with therapy. Medically ready, pending placement.    Antithrombotics: DAPT    Statins: Lipitor 80 mg daily    Aggressive risk factor modification: HTN, Smoking, DM, HLD, Diet, Exercise, Obesity     Rehab efforts: The patient has been evaluated by a stroke team provider and the therapy needs have been fully considered based off the presenting complaints and exam findings. The following therapy evaluations are needed: PT evaluate and treat, OT evaluate and treat, SLP evaluate and treat, PM&R evaluate for appropriate placement    Diagnostics ordered/pending: None     VTE prophylaxis: Enoxaparin (CrCl < 30 ml/min) 30 mg SQ every 24 hours  Mechanical prophylaxis: Place SCDs    BP parameters: Infarct: No intervention, SBP<180

## 2023-12-01 NOTE — CARE UPDATE
-Glucose Goal 140-180    -A1C:   Hemoglobin A1C   Date Value Ref Range Status   11/26/2023 9.6 (H) 4.0 - 5.6 % Final     Comment:     ADA Screening Guidelines:  5.7-6.4%  Consistent with prediabetes  >or=6.5%  Consistent with diabetes    High levels of fetal hemoglobin interfere with the HbA1C  assay. Heterozygous hemoglobin variants (HbS, HgC, etc)do  not significantly interfere with this assay.   However, presence of multiple variants may affect accuracy.           -HOME REGIMEN: Tresiba 20 units nightly, Humalog 6 units TID with meals plus scale (180-230 +2), Ozmepic 2 mg every 7 days     -GLUCOSE TREND FOR THE PAST 24HRS:   Recent Labs   Lab 11/29/23  2130 11/30/23  0804 11/30/23  1144 11/30/23  1634 11/30/23  2048 12/01/23  0808   POCTGLUCOSE 106 109 141* 128* 142* 120*         -NO HYPOGYCEMIAS NOTED     - Diet  Diet diabetic 2000 Calorie; Thin    T2DM.  L MCA infarct.  BG stable.  Will continue on current regimen     Continue Levemir 12 units daily (0.4 u/kg dosing) Fasting BG within goal ranges   Continue Novolog 3 units TID with meals (HOLD if patient eating < 25% or BG < 100)  Low Dose Correction Scale   BG monitoring ac/hs     ** Please call Endocrine for any BG related issues **

## 2023-12-01 NOTE — PLAN OF CARE
12/01/23 0851   Post-Acute Status   Post-Acute Authorization Placement   Post-Acute Placement Status Referrals Sent   Coverage PHN   Discharge Delays (!) Post-Acute Set-up   Discharge Plan   Discharge Plan A Skilled Nursing Facility   Discharge Plan B Home Health     Met with paula Espinoza to review discharge recommendation of SNF and is agreeable to plan    Patient/family provided list of facilities in-network with patient's payor plan. Providers that are owned, operated, or affiliated with [facility] are included on the list.     Notified that referral sent to below listed facilities from in-network list based on proximity to home/family support:   1.[facility]  2. [facility]  3.[facility]  4. [facility]  5. (can send more than 5) 8 more faciliites......;.    Patient/family instructed to identify preference.    Preferred Facility: (if more than 1, listed in order of descending preference)  1.    If an additional preferred facility not listed above is identified, additional referral to be sent. If above facilities unable to accept, will send additional referrals to in-network providers.       Riana Barnes, FADY  [facility]  [phone]

## 2023-12-01 NOTE — SUBJECTIVE & OBJECTIVE
Neurologic Chief Complaint: RSW, dysarthria    Subjective:     Interval History: Patient is seen for follow-up neurological assessment and treatment recommendations:  patient aphasic and not following commands this morning, CTA without LVO. Patient re-examined later and speech improved. -190 this morning, increased nifedipine. PRN oxy ordered to be given prior to working with PT/OT for R ankle pain    HPI, Past Medical, Family, and Social History remains the same as documented in the initial encounter.     Review of Systems   Constitutional:  Negative for fever.   Musculoskeletal:  Positive for arthralgias.   Skin:  Negative for rash.   Neurological:  Positive for facial asymmetry, speech difficulty and weakness. Negative for numbness.     Scheduled Meds:   aspirin  81 mg Oral Daily    atorvastatin  40 mg Oral Daily    carvediloL  6.25 mg Oral BID    clopidogreL  75 mg Oral Daily    doxepin  10 mg Oral QHS    enoxparin  40 mg Subcutaneous Daily    gabapentin  300 mg Oral BID    insulin aspart U-100  3 Units Subcutaneous TIDWM    insulin detemir U-100 (Levemir)  12 Units Subcutaneous Daily    NIFEdipine  90 mg Oral Daily    pantoprazole  40 mg Oral Daily    senna-docusate 8.6-50 mg  1 tablet Oral BID    sodium chloride 0.9%  500 mL Intravenous Once    valsartan  320 mg Oral Daily     Continuous Infusions:   sodium chloride 0.9%       PRN Meds:acetaminophen, dextrose 10%, dextrose 10%, glucagon (human recombinant), glucose, glucose, hydrALAZINE, insulin aspart U-100, labetalol, ondansetron, oxyCODONE, sodium chloride 0.9%, sodium chloride 0.9%    Objective:     Vital Signs (Most Recent):  Temp: 98.3 °F (36.8 °C) (12/01/23 0816)  Pulse: 66 (12/01/23 0831)  Resp: 18 (12/01/23 0816)  BP: (!) 191/86 (12/01/23 0831)  SpO2: 99 % (12/01/23 0831)  BP Location: Right arm    Vital Signs Range (Last 24H):  Temp:  [97.9 °F (36.6 °C)-98.6 °F (37 °C)]   Pulse:  [61-94]   Resp:  [16-20]   BP: (142-206)/(72-88)   SpO2:  [97  %-100 %]   BP Location: Right arm       Physical Exam  Vitals reviewed.   Constitutional:       General: She is not in acute distress.  HENT:      Head: Normocephalic and atraumatic.   Cardiovascular:      Rate and Rhythm: Normal rate.   Musculoskeletal:      Cervical back: Normal range of motion.      Comments: slight R ankle swelling  TTP  tenderness with passive ROM   Skin:     General: Skin is warm and dry.   Neurological:      Mental Status: She is alert and oriented to person, place, and time.              Neurological Exam:   LOC: alert  Attention Span: Good   Language: No aphasia  Articulation: Dysarthria  Orientation: Person, Place, Time   Visual Fields: Full  EOM (CN III, IV, VI): Full/intact  Facial Movement (CN VII): Lower facial weakness on the Right  Motor: Arm left  Normal 5/5  Leg left  Paresis: 2/5  Arm right  Paresis: 4/5  Leg right Paresis: 2/5  Sensation: Intact to light touch, temperature and vibration  Tone: Normal tone throughout     Laboratory:  CMP:   Recent Labs   Lab 12/01/23  0434   CALCIUM 8.6*   ALBUMIN 2.4*   PROT 6.1      K 3.7   CO2 16*      BUN 30*   CREATININE 1.1   ALKPHOS 90   ALT 17   AST 18   BILITOT 0.3       CBC:   Recent Labs   Lab 12/01/23  0434   WBC 8.61   RBC 4.15   HGB 11.4*   HCT 35.3*      MCV 85   MCH 27.5   MCHC 32.3         Diagnostic Results     Brain Imaging   CTA Head and Neck 12/1/23  Impression:     Redemonstration the subacute left corona radiata/basal ganglia infarct.     No acute vascular abnormality.  No high-grade stenosis or major vessel occlusion.     Multinodular enlarged thyroid.  Evaluation with thyroid ultrasound on a nonemergent basis recommended.    CT head w/o contrast 11-26-23 results:  Age indeterminate infarcts in the bilateral basal ganglia and left internal capsule/corona radiata, new from prior.  Recommend further evaluation with MRI.     New punctate hyperdensity in the right precentral gyrus.  This can be followed up MR  imaging.     MRI brain w/o contrast 11-26-23 results:     Acute infarct involving the left internal capsule/centrum semiovale.     Foci of increased susceptibility in the right precentral gyrus and right paracentral parietal lobe, suggestive of small micro hemorrhages, new from MRI 02/26/2023.     Remote appearing infarcts of the bilateral basal ganglia and left thalamus.     CTA Multiphase 10/24/23  Impression:     No acute intracranial process with chronic ischemic changes.     These findings were communicated to Dr. eSo at 2 14:00 on 10/24/2023     CTA:     No significant stenosis at the carotid bifurcations by NASCET criteria.  Question right carotid web with adjacent thrombus, similar to the prior CTA versus soft atherosclerotic plaque.     The vertebral arteries are  patent without advanced stenosis.     No major branch stenosis/ acute occlusion at the [id number].     Stable 5 mm left lung apex may also represent a focus of fibrosis.  One year follow-up in a high risk patient to be considered.         Cardiac Evaluation:   EKG 11-26-23 results:  Normal sinus rhythm Voltage criteria for left ventricular hypertrophy Nonspecific T wave abnormality Prolonged QT Abnormal ECG When compared with ECG of 24-OCT-2023 13:52, Premature supraventricular complexes are no longer Present

## 2023-12-01 NOTE — PROGRESS NOTES
Rob Borjas - Neurosurgery ([facility])  Vascular Neurology  Comprehensive Stroke Center  Progress Note    Assessment/Plan:     * Cerebrovascular accident (CVA) due to thrombosis of left middle cerebral artery  77 y/o female with 3 day history of right sided weakness and dysarthria with new L MCA infarct. No thrombolytics or intervention as out of the window.  Etiology likely small vessel disease with co-morbidities. Progressing with therapy. Medically ready, pending placement.    Antithrombotics: DAPT    Statins: Lipitor 80 mg daily    Aggressive risk factor modification: HTN, Smoking, DM, HLD, Diet, Exercise, Obesity     Rehab efforts: The patient has been evaluated by a stroke team provider and the therapy needs have been fully considered based off the presenting complaints and exam findings. The following therapy evaluations are needed: PT evaluate and treat, OT evaluate and treat, SLP evaluate and treat, PM&R evaluate for appropriate placement    Diagnostics ordered/pending: None     VTE prophylaxis: Enoxaparin (CrCl < 30 ml/min) 30 mg SQ every 24 hours  Mechanical prophylaxis: Place SCDs    BP parameters: Infarct: No intervention, SBP<180        Acute right ankle pain  R ankle xray negative for fracture/dislocation  ESR 84, CRP 42  Ortho consulted, aspirated joint, not concerned for septic joint at this time, aspirate negative for crystals, recommendations as follows:  - Ice, elevation and gentle ROM exercises  - No acute orthopaedic intervention warranted  - Continue to monitor cultures  - boot at bedside  -prn oxy 5 mg when working with therapy    - Improving on exam, able to work with PT   - Consider outpatient ortho f/u on discharge      Hemiparesis, right  Due to stroke  Aggressive therapy      Current every day smoker  Stroke risk factor   on smoking cessation    Obesity (BMI 30-39.9)  Stroke risk factor   on diet and exercise    Hyperlipidemia  Stroke risk factor  LDL 79.2  Lipitor 80 mg daily,  resume home crestor at discharge    Type 2 diabetes mellitus with hyperglycemia, with long-term current use of insulin  Stroke risk factor  HgB A1C 9.6, Endocrine consulted, recs as follows:  Levemir 12u Daily  Novolog 3u TIDAC (HOLD if patient eating < 25% or BG < 100)   LDSSI  POCT Glucose AC/HS    Hypertension associated with diabetes  Stroke risk factor  SBP <180  Home Coreg  Home ARB (formulary sub Valsartan 320)  Increased Nifedipine 90mg  PRN Hydralaine, labetalol         11/27/2023 NAEON. Exam stable. Pt reports R ankle pain, had difficulty working with PT. R ankle xray shows no fracture or dislocation.  11/28/2023 NAEON. Exam stable. Ortho consulted for R ankle pain w/ elevated ESR/CRP, joint aspirated and steroids injected. Not concerned for septic joint at this time, aspirate negative for crystals. Glucose imp. ELIZABETH imp.  11/29/2023 NAEON. SBP intermittently in 200s-230s, asymptomatic. Resumed home coreg. ELIZABETH improving. R ankle pain improving. Pending auth for IPR.  11/30/2023 NAEON. SBP 160s-200s. Increased home nifedipine. Able to work with PT today. IPR auth denied following peer to peer. Pending placement.  12/1/2023 patient aphasic and not following commands this morning, CTA without LVO. Patient re-examined later and speech improved. -190 this morning, increased nifedipine. PRN oxy ordered to be given prior to working with PT/OT for R ankle pain  .    STROKE DOCUMENTATION        NIH Scale:  1a. Level of Consciousness: 0-->Alert, keenly responsive  1b. LOC Questions: 1-->Answers one question correctly  1c. LOC Commands: 0-->Performs both tasks correctly  2. Best Gaze: 0-->Normal  3. Visual: 0-->No visual loss  4. Facial Palsy: 1-->Minor paralysis (flattened nasolabial fold, asymmetry on smiling)  5a. Motor Arm, Left: 0-->No drift, limb holds 90 (or 45) degrees for full 10 secs  5b. Motor Arm, Right: 1-->Drift, limb holds 90 (or 45) degrees, but drifts down before full 10 secs, does not hit bed  or other support  6a. Motor Leg, Left: 3-->No effort against gravity, leg falls to bed immediately  6b. Motor Leg, Right: 3-->No effort against gravity, leg falls to bed immediately  7. Limb Ataxia: 0-->Absent  8. Sensory: 0-->Normal, no sensory loss  9. Best Language: 0-->No aphasia, normal  10. Dysarthria: 1-->Mild-to-moderate dysarthria, patient slurs at least some words and, at worst, can be understood with some difficulty  11. Extinction and Inattention (formerly Neglect): 0-->No abnormality  Total (NIH Stroke Scale): 10       Modified Hartselle Score: 1  Cherelle Coma Scale:    ABCD2 Score:    [id number] Score:   HAS -BLED Score:   ICH Score:   Hunt & Hutton Classification:      Hemorrhagic change of an Ischemic Stroke: Does this patient have an ischemic stroke with hemorrhagic changes? No     Neurologic Chief Complaint: RSW, dysarthria    Subjective:     Interval History: Patient is seen for follow-up neurological assessment and treatment recommendations:  patient aphasic and not following commands this morning, CTA without LVO. Patient re-examined later and speech improved. -190 this morning, increased nifedipine. PRN oxy ordered to be given prior to working with PT/OT for R ankle pain    HPI, Past Medical, Family, and Social History remains the same as documented in the initial encounter.     Review of Systems   Constitutional:  Negative for fever.   Musculoskeletal:  Positive for arthralgias.   Skin:  Negative for rash.   Neurological:  Positive for facial asymmetry, speech difficulty and weakness. Negative for numbness.     Scheduled Meds:   aspirin  81 mg Oral Daily    atorvastatin  40 mg Oral Daily    carvediloL  6.25 mg Oral BID    clopidogreL  75 mg Oral Daily    doxepin  10 mg Oral QHS    enoxparin  40 mg Subcutaneous Daily    gabapentin  300 mg Oral BID    insulin aspart U-100  3 Units Subcutaneous TIDWM    insulin detemir U-100 (Levemir)  12 Units Subcutaneous Daily    NIFEdipine  90 mg Oral  Daily    pantoprazole  40 mg Oral Daily    senna-docusate 8.6-50 mg  1 tablet Oral BID    sodium chloride 0.9%  500 mL Intravenous Once    valsartan  320 mg Oral Daily     Continuous Infusions:   sodium chloride 0.9%       PRN Meds:acetaminophen, dextrose 10%, dextrose 10%, glucagon (human recombinant), glucose, glucose, hydrALAZINE, insulin aspart U-100, labetalol, ondansetron, oxyCODONE, sodium chloride 0.9%, sodium chloride 0.9%    Objective:     Vital Signs (Most Recent):  Temp: 98.3 °F (36.8 °C) (12/01/23 0816)  Pulse: 66 (12/01/23 0831)  Resp: 18 (12/01/23 0816)  BP: (!) 191/86 (12/01/23 0831)  SpO2: 99 % (12/01/23 0831)  BP Location: Right arm    Vital Signs Range (Last 24H):  Temp:  [97.9 °F (36.6 °C)-98.6 °F (37 °C)]   Pulse:  [61-94]   Resp:  [16-20]   BP: (142-206)/(72-88)   SpO2:  [97 %-100 %]   BP Location: Right arm       Physical Exam  Vitals reviewed.   Constitutional:       General: She is not in acute distress.  HENT:      Head: Normocephalic and atraumatic.   Cardiovascular:      Rate and Rhythm: Normal rate.   Musculoskeletal:      Cervical back: Normal range of motion.      Comments: slight R ankle swelling  TTP  tenderness with passive ROM   Skin:     General: Skin is warm and dry.   Neurological:      Mental Status: She is alert and oriented to person, place, and time.              Neurological Exam:   LOC: alert  Attention Span: Good   Language: No aphasia  Articulation: Dysarthria  Orientation: Person, Place, Time   Visual Fields: Full  EOM (CN III, IV, VI): Full/intact  Facial Movement (CN VII): Lower facial weakness on the Right  Motor: Arm left  Normal 5/5  Leg left  Paresis: 2/5  Arm right  Paresis: 4/5  Leg right Paresis: 2/5  Sensation: Intact to light touch, temperature and vibration  Tone: Normal tone throughout     Laboratory:  CMP:   Recent Labs   Lab 12/01/23  0434   CALCIUM 8.6*   ALBUMIN 2.4*   PROT 6.1      K 3.7   CO2 16*      BUN 30*   CREATININE 1.1   ALKPHOS 90    ALT 17   AST 18   BILITOT 0.3       CBC:   Recent Labs   Lab 12/01/23  0434   WBC 8.61   RBC 4.15   HGB 11.4*   HCT 35.3*      MCV 85   MCH 27.5   MCHC 32.3         Diagnostic Results     Brain Imaging   CTA Head and Neck 12/1/23  Impression:     Redemonstration the subacute left corona radiata/basal ganglia infarct.     No acute vascular abnormality.  No high-grade stenosis or major vessel occlusion.     Multinodular enlarged thyroid.  Evaluation with thyroid ultrasound on a nonemergent basis recommended.    CT head w/o contrast 11-26-23 results:  Age indeterminate infarcts in the bilateral basal ganglia and left internal capsule/corona radiata, new from prior.  Recommend further evaluation with MRI.     New punctate hyperdensity in the right precentral gyrus.  This can be followed up MR imaging.     MRI brain w/o contrast 11-26-23 results:     Acute infarct involving the left internal capsule/centrum semiovale.     Foci of increased susceptibility in the right precentral gyrus and right paracentral parietal lobe, suggestive of small micro hemorrhages, new from MRI 02/26/2023.     Remote appearing infarcts of the bilateral basal ganglia and left thalamus.     CTA Multiphase 10/24/23  Impression:     No acute intracranial process with chronic ischemic changes.     These findings were communicated to Dr. Seo at 2 14:00 on 10/24/2023     CTA:     No significant stenosis at the carotid bifurcations by NASCET criteria.  Question right carotid web with adjacent thrombus, similar to the prior CTA versus soft atherosclerotic plaque.     The vertebral arteries are  patent without advanced stenosis.     No major branch stenosis/ acute occlusion at the [id number].     Stable 5 mm left lung apex may also represent a focus of fibrosis.  One year follow-up in a high risk patient to be considered.         Cardiac Evaluation:   EKG 11-26-23 results:  Normal sinus rhythm Voltage criteria for left ventricular  hypertrophy Nonspecific T wave abnormality Prolonged QT Abnormal ECG When compared with ECG of 24-OCT-2023 13:52, Premature supraventricular complexes are no longer Present    Roxane Wright PA-C  Comprehensive Stroke Center  Department of Vascular Neurology   [facility])

## 2023-12-01 NOTE — PHYSICIAN QUERY
aPT Name: Buck Ibarra  MR #: [id number]     DOCUMENTATION CLARIFICATION     CDS/: Elysia Mehta RN, CDS               Contact information: [email or web address]  This form is a permanent document in the medical record.    Query Date: December 1, 2023    By submitting this query, we are merely seeking further clarification of documentation to reflect the severity of illness of your patient. Please utilize your independent clinical judgment when addressing the question(s) below.    The Medical Record reflects the following:     Indicators   Supporting Clinical Findings Location in Medical Record   x Lab Value(s) Potassium 3.6 3.1 (L) 3.4 (L) 3.6    Labs 11/26-11/29       x Treatment/Medication --potassium bicarbonate disintegrating tablet 40 mEq PO x 1 (11/28)  --potassium bicarbonate disintegrating tablet 30 mEq PO q 2h (11/27 x 1)   MAR    Other       Provider, please specify the diagnosis or diagnoses that correspond(s) to the above indicators. Andrew all that apply:    [ x  ] Hypokalemia   [   ] Other electrolyte disturbance (please specify): __________   [   ]  Clinically Undetermined       Please document in your progress notes daily for the duration of treatment until resolved, and include in your discharge summary.

## 2023-12-01 NOTE — PT/OT/SLP PROGRESS
Occupational Therapy   Co-Treatment with Physical Therapy    Name: Buck Ibarra  MRN: [id number]  Admitting Diagnosis:  Cerebrovascular accident (CVA) due to thrombosis of left middle cerebral artery       Recommendations:     Discharge Recommendations: High Intensity Therapy  Discharge Equipment Recommendations:  walker, rolling, bedside commode  Barriers to discharge:   (increased skilled assistance required)    Assessment:     Buck Ibarra is a 78 y.o. female with a medical diagnosis of Cerebrovascular accident (CVA) due to thrombosis of left middle cerebral artery.  She presents with the following performance deficits affecting function are weakness, impaired endurance, impaired self care skills, impaired functional mobility, decreased coordination, impaired cognition, impaired balance, decreased lower extremity function, decreased upper extremity function, gait instability, decreased ROM, pain, decreased safety awareness, impaired coordination. Patient received alert with increased lethargy as therapy progress affecting functional mobility progression today. Patient would benefit from continued OT services to address deficits and progress towards goals. Continue OT POC.    Rehab Prognosis:  Good; patient would benefit from acute skilled OT services to address these deficits and reach maximum level of function.       Plan:     Patient to be seen 4 x/week to address the above listed problems via self-care/home management, therapeutic activities, therapeutic exercises, neuromuscular re-education  Plan of Care Expires: 12/11/23  Plan of Care Reviewed with: patient    Subjective     Chief Complaint: c/o ankle pain  Patient/Family Comments/goals: none stated; family member left start of therapy  Pain/Comfort:  Pain Rating 1: 4/10  Location - Side 1: Right  Location - Orientation 1: generalized  Location 1: ankle  Pain Addressed 1: Reposition, Distraction  Pain Rating Post-Intervention 1:   (unrated)    Objective:     Communicated with: nurse dago and OTR prior to session.  Patient found HOB elevated with bed alarm, telemetry upon OT entry to room.  A client care conference was completed by the OTR and the ESQUIVEL prior to treatment by the ESQUIVEL to discuss the patient's POC and current status.    General Precautions: Standard, fall, aspiration    Orthopedic Precautions:N/A  Braces:  (CAM boot R LE)  Respiratory Status: Room air     Occupational Performance:     Bed Mobility:    Patient completed Scooting anteriorly to plant B feet on floor with total assistance  Patient completed Supine to Sit with maximal assistance and increased time for mobility 2/2 weakness and max cues for sequencing  Patient completed Sit to Supine with maximal assistance     Functional Mobility/Transfers:  Patient completed Sit <> Stand Transfer with moderate assistance and of 2 persons  with  rolling walker , tolerated ~30 sec  2nd trial: Total(A)x2 using B HHA, B knees blocked  Incomplete stand, poor initiation  Functional Mobility: Static sitting balance EOB with SBA regressing to Max(A) with R anterolateral lean noted    Activities of Daily Living:  Feeding:  SET-UP HOB elevated  Grooming: contact guard assistance standing from EOB (Max(A) required for dynamic standing balance)  Lower Body Dressing: total assistance don Boot EOB      AMPAC 6 Click ADL: 11    Treatment & Education:  Patient educated on OT POC, goals, current progress, and importance of OOB activity. Dynamic/static standing activity with focus on core activation, weight shifting, and muscular endurance for improved postural control and activity tolerance required for increased (I) in ADL tasks. Addressed all patient questions/concerns within ESQUIVEL scope of practice. Co-treatment performed with PT due to patient's complexity and benefit of 2 skilled therapists to facilitate functional and safe occupational performance, accommodate patient's activity tolerance,  and maximize patient's participation in therapy.     Patient left HOB elevated with all lines intact, call button in reach, bed alarm on, and nurse notified    GOALS:   Multidisciplinary Problems       Occupational Therapy Goals          Problem: Occupational Therapy    Goal Priority Disciplines Outcome Interventions   Occupational Therapy Goal     OT, PT/OT Ongoing, Progressing    Description: Goals to be met by: 12/11/2023     Patient will increase functional independence with ADLs by performing:    UE Dressing with Minimal Assistance.  Grooming while EOB with Minimal Assistance.  Supine to sit with Minimal Assistance.  Stand pivot transfers with Minimal Assistance.                         Time Tracking:     OT Date of Treatment: 12/01/23  OT Start Time: 1351  OT Stop Time: 1421  OT Total Time (min): 30 min    Billable Minutes:Self Care/Home Management 15  Neuromuscular Re-education 15    OT/PREETHI: PREETHI     Number of PREETHI visits since last OT visit: 1    12/1/2023

## 2023-12-01 NOTE — PT/OT/SLP PROGRESS
"Speech Language Pathology Treatment    Patient Name:  Buck Ibarra   MRN:  [id number]  Admitting Diagnosis: Cerebrovascular accident (CVA) due to thrombosis of left middle cerebral artery    Recommendations:                 General Recommendations:  Dysphagia therapy, Speech/language therapy, and Cognitive-linguistic therapy  Diet recommendations:  Regular, Liquid Diet Level: Thin   Aspiration Precautions:  Only when awake/alert/attentive and vital signs stable, 1 bite/sip at a time, Avoid talking while eating, Check for pocketing/oral residue, Eliminate distractions, Feed only when awake/alert, Frequent oral care, HOB to 90 degrees, Monitor for s/s of aspiration, Remain upright 30 minutes post meal, Small bites/sips, and Strict aspiration precautions   General Precautions: Standard, aspiration, fall  Communication strategies:  provide increased time to answer and go to room if call light pushed    Assessment:     Buck Ibarra is a 78 y.o. female with an SLP diagnosis of Cognitive-Linguistic Impairment.  She presents with waxing/waning alertness. RN aware.     Subjective     SLP reviewed Patient with RN, RN cleared for therapy across SLP attempts  Pt presents calm  She explains, "The pressure"   Daughter explains, "They gave her some medicine so she is in and out"     Pain/Comfort:  Pain Rating 1: 0/10    Respiratory Status: Room air    Objective:     Has the patient been evaluated by SLP for swallowing?   Yes  Keep patient NPO? No   Current Respiratory Status:    Room air     Pt found upright in bed eating lunch meal tray (regular textures, thin liquids) on bedside table in front of her.  Patient with friends and family at the bedside. She endorsed an appetite. Her voice was clear with adequate intensity. She denied difficulty with meals or medications. Daughter at the bedside endorsed Patient's report. Patient seen with self-fed bites of fruit x5. No overt S/S aspiration with bites of solids observed. SLP " reviewed ongoing aspiration precautions and plan to re-attempt later service day for additional language therapy. Daughter asked about PT/OT scheduled for the day, SLP explained PT/OT plan to see Patient later service day. No additional questions noted. Pt with partial demonstration of understanding.   Upon later attempt, Patient found upright, awake in bed with partially completed meal tray on table in front of Patient. Daughter in the room. Patient seen with self-fed sips thin liquids x2. She was oriented to person and place. She did not recall HEATH for immediate recall provided mod verbal cues. She demonstrate decreased eye contact with delayed response time t/o conversational tasks.  Patient with waxing/waning alertness and decreased eye contact as session continued. Daughter and RN confirmed Patient recently medicated. SLP reviewed ongoing SLP POC and safety precautions. No additional questions. Patient with minimal verbal understanding.  Session discontinued secondary to persistent lethargy. Findings reviewed with RN upon SLP exit.     Goals:   Multidisciplinary Problems       SLP Goals          Problem: SLP    Goal Priority Disciplines Outcome   SLP Goal     SLP Ongoing, Progressing   Description: Speech Language Pathology Goals  Goals expected to be met by 12/4/23    1.Pt will tolerate least restrictive diet without overt S/S aspiration, MOD I  2. Educate Pt and family on aspiration precautions and SLP POC  3. Pt will participate in full speech, language and cognitive evaluation to determine ongoing POC needs MET 11/28  4. Pt will compare/contrast 2 items with 90% accuracy with minimal verbal cues.   5. Pt will orient x4 with minimal verbal cues.   6. Pt will participate in high level cognitive linguistic diagnostic tx to further guide SLP POC.                          Plan:     Patient to be seen:  5 x/week   Plan of Care expires:  12/27/23  Plan of Care reviewed with:  patient, daughter   SLP Follow-Up:   Yes       Discharge recommendations:  High Intensity Therapy     Time Tracking:     SLP Treatment Date:   12/01/23  Speech Start Time:  1231/15:48   Speech Stop Time:  1241/  15:56   Speech Total Time (min):  10 min/6 min  +Pt seen across two sessions 2/2 eating lunch upon initial attempt   Billable Minutes: Speech Therapy Individual 6 and Treatment Swallowing Dysfunction 10    12/01/2023

## 2023-12-01 NOTE — ASSESSMENT & PLAN NOTE
Stroke risk factor  SBP <180  Home Coreg  Home ARB (formulary sub Valsartan 320)  Increased Nifedipine 90mg  PRN Hydralaine, labetalol

## 2023-12-02 NOTE — PLAN OF CARE
Problem: Adjustment to Illness (Stroke, Ischemic/Transient Ischemic Attack)  Goal: Optimal Coping  Outcome: Ongoing, Progressing     Problem: Bowel Elimination Impaired (Stroke, Ischemic/Transient Ischemic Attack)  Goal: Effective Bowel Elimination  Outcome: Ongoing, Progressing     Problem: Cerebral Tissue Perfusion (Stroke, Ischemic/Transient Ischemic Attack)  Goal: Optimal Cerebral Tissue Perfusion  Outcome: Ongoing, Progressing     Problem: Cognitive Impairment (Stroke, Ischemic/Transient Ischemic Attack)  Goal: Optimal Cognitive Function  Outcome: Ongoing, Progressing     Problem: Communication Impairment (Stroke, Ischemic/Transient Ischemic Attack)  Goal: Improved Communication Skills  Outcome: Ongoing, Progressing     Problem: Functional Ability Impaired (Stroke, Ischemic/Transient Ischemic Attack)  Goal: Optimal Functional Ability  Outcome: Ongoing, Progressing  POC and meds reviewed. Patient is awake and alert, had both PRN BP meds for elevated bp. MEDS EFFECTIVE.

## 2023-12-02 NOTE — PLAN OF CARE
Problem: Adjustment to Illness (Stroke, Ischemic/Transient Ischemic Attack)  Goal: Optimal Coping  Outcome: Ongoing, Progressing     Problem: Bowel Elimination Impaired (Stroke, Ischemic/Transient Ischemic Attack)  Goal: Effective Bowel Elimination  Outcome: Ongoing, Progressing     Problem: Cerebral Tissue Perfusion (Stroke, Ischemic/Transient Ischemic Attack)  Goal: Optimal Cerebral Tissue Perfusion  Outcome: Ongoing, Progressing     Problem: Cognitive Impairment (Stroke, Ischemic/Transient Ischemic Attack)  Goal: Optimal Cognitive Function  Outcome: Ongoing, Progressing     Problem: Communication Impairment (Stroke, Ischemic/Transient Ischemic Attack)  Goal: Improved Communication Skills  Outcome: Ongoing, Progressing     Problem: Functional Ability Impaired (Stroke, Ischemic/Transient Ischemic Attack)  Goal: Optimal Functional Ability  Outcome: Ongoing, Progressing     Problem: Respiratory Compromise (Stroke, Ischemic/Transient Ischemic Attack)  Goal: Effective Oxygenation and Ventilation  Outcome: Ongoing, Progressing     Problem: Sensorimotor Impairment (Stroke, Ischemic/Transient Ischemic Attack)  Goal: Improved Sensorimotor Function  Outcome: Ongoing, Progressing     Problem: Swallowing Impairment (Stroke, Ischemic/Transient Ischemic Attack)  Goal: Optimal Eating and Swallowing without Aspiration  Outcome: Ongoing, Progressing     Problem: Urinary Elimination Impaired (Stroke, Ischemic/Transient Ischemic Attack)  Goal: Effective Urinary Elimination  Outcome: Ongoing, Progressing     Problem: Fall Injury Risk  Goal: Absence of Fall and Fall-Related Injury  Outcome: Ongoing, Progressing     Problem: Adult Inpatient Plan of Care  Goal: Plan of Care Review  Outcome: Ongoing, Progressing  Goal: Absence of Hospital-Acquired Illness or Injury  Outcome: Ongoing, Progressing  Goal: Optimal Comfort and Wellbeing  Outcome: Ongoing, Progressing  Goal: Readiness for Transition of Care  Outcome: Ongoing, Progressing      Problem: Infection  Goal: Absence of Infection Signs and Symptoms  Outcome: Ongoing, Progressing     Problem: Diabetes Comorbidity  Goal: Blood Glucose Level Within Targeted Range  Outcome: Ongoing, Progressing     Problem: Skin Injury Risk Increased  Goal: Skin Health and Integrity  Outcome: Ongoing, Progressing     4 AM SBP= 194, checked again and was 189. Pt lethargic this AM and refused to take 0600 scheduled hydralazine. Stroke team notified and said it was ok to given PRN BP med. Labetalol given, SBP came down to 172. Ice applied to right ankle for right ankle pain. SNF pending.

## 2023-12-02 NOTE — ASSESSMENT & PLAN NOTE
Stroke risk factor  SBP <180  Home Coreg, consider increasing if SBP remains out of goal  Home ARB (formulary sub Valsartan 320)  Nifedipine 90mg  Added home scheduled Hydralazine 50mg TID  PRN Hydralaine, labetalol

## 2023-12-02 NOTE — CARE UPDATE
-Glucose Goal 140-180    -A1C:   Hemoglobin A1C   Date Value Ref Range Status   11/26/2023 9.6 (H) 4.0 - 5.6 % Final     Comment:     ADA Screening Guidelines:  5.7-6.4%  Consistent with prediabetes  >or=6.5%  Consistent with diabetes    High levels of fetal hemoglobin interfere with the HbA1C  assay. Heterozygous hemoglobin variants (HbS, HgC, etc)do  not significantly interfere with this assay.   However, presence of multiple variants may affect accuracy.           -HOME REGIMEN: Tresiba 20 units nightly, Humalog 6 units TID with meals plus scale (180-230 +2), Ozmepic 2 mg every 7 days     -GLUCOSE TREND FOR THE PAST 24HRS:   Recent Labs   Lab 11/30/23  1634 11/30/23  2048 12/01/23  0808 12/01/23  1121 12/01/23  1614 12/01/23  2116   POCTGLUCOSE 128* 142* 120* 113* 116* 124*           -NO HYPOGYCEMIAS NOTED     - Diet  Diet diabetic 2000 Calorie; Thin    T2DM.  L MCA infarct.  BG stable.  Fasting below goal.  Will decrease Levemir to 8 units daily     Decrease Levemir 8 units daily   Continue Novolog 3 units TID with meals (HOLD if patient eating < 25% or BG < 100)  Low Dose Correction Scale   BG monitoring ac/hs     ** Please call Endocrine for any BG related issues **

## 2023-12-02 NOTE — SUBJECTIVE & OBJECTIVE
Neurologic Chief Complaint: RSW, dysarthria    Subjective:     Interval History: Patient is seen for follow-up neurological assessment and treatment recommendations: NAEON. SBP 160s-190s past 24 hours. Required PRN antihypertensive meds. Neuro exam stable.    HPI, Past Medical, Family, and Social History remains the same as documented in the initial encounter.     Review of Systems   Constitutional:  Negative for fever.   Musculoskeletal:  Positive for arthralgias.   Skin:  Negative for rash.   Neurological:  Positive for facial asymmetry, speech difficulty and weakness. Negative for numbness.     Scheduled Meds:   aspirin  81 mg Oral Daily    atorvastatin  80 mg Oral Daily    carvediloL  6.25 mg Oral BID    clopidogreL  75 mg Oral Daily    doxepin  10 mg Oral QHS    enoxparin  40 mg Subcutaneous Daily    gabapentin  300 mg Oral BID    hydrALAZINE  50 mg Oral TID    insulin aspart U-100  3 Units Subcutaneous TIDWM    insulin detemir U-100 (Levemir)  8 Units Subcutaneous Daily    NIFEdipine  90 mg Oral Daily    pantoprazole  40 mg Oral Daily    senna-docusate 8.6-50 mg  1 tablet Oral BID    valsartan  320 mg Oral Daily     Continuous Infusions:   sodium chloride 0.9%       PRN Meds:acetaminophen, dextrose 10%, dextrose 10%, glucagon (human recombinant), glucose, glucose, hydrALAZINE, insulin aspart U-100, labetalol, ondansetron, oxyCODONE, sodium chloride 0.9%, sodium chloride 0.9%    Objective:     Vital Signs (Most Recent):  Temp: 98.2 °F (36.8 °C) (12/02/23 1236)  Pulse: 83 (12/02/23 1236)  Resp: 20 (12/02/23 1236)  BP: (!) 178/78 (12/02/23 1236)  SpO2: 99 % (12/02/23 1236)  BP Location: Left arm    Vital Signs Range (Last 24H):  Temp:  [97.5 °F (36.4 °C)-98.6 °F (37 °C)]   Pulse:  [62-83]   Resp:  [16-20]   BP: (161-194)/(71-83)   SpO2:  [95 %-100 %]   BP Location: Left arm       Physical Exam  Vitals reviewed.   Constitutional:       General: She is not in acute distress.  HENT:      Head: Normocephalic and  atraumatic.   Eyes:      Extraocular Movements: Extraocular movements intact.   Cardiovascular:      Rate and Rhythm: Normal rate.   Pulmonary:      Effort: Pulmonary effort is normal. No respiratory distress.   Abdominal:      General: There is no distension.   Musculoskeletal:         General: Tenderness present.      Cervical back: Normal range of motion.      Comments: slight R ankle swelling  TTP  tenderness with passive ROM   Skin:     General: Skin is warm and dry.   Neurological:      Mental Status: She is alert and oriented to person, place, and time.              Neurological Exam:   LOC: alert  Attention Span: Good   Language: No aphasia  Articulation: Dysarthria  Orientation: Person, Place, Time   Visual Fields: Full  EOM (CN III, IV, VI): Full/intact  Facial Movement (CN VII): Lower facial weakness on the Right  Motor: Arm left  Normal 5/5  Leg left  Paresis: 4/5  Arm right  Paresis: 4/5  Leg right Paresis: 3/5  Sensation: Intact to light touch, temperature and vibration    Laboratory:  CMP:   Recent Labs   Lab 12/02/23  0310   CALCIUM 8.8   ALBUMIN 2.4*   PROT 5.9*      K 3.8   CO2 18*      BUN 22   CREATININE 0.9   ALKPHOS 86   ALT 16   AST 19   BILITOT 0.3     CBC:   Recent Labs   Lab 12/02/23  0310   WBC 8.29   RBC 4.03   HGB 11.8*   HCT 34.7*      MCV 86   MCH 29.3   MCHC 34.0       Diagnostic Results     Brain Imaging   CTA Head and Neck 12/1/23  Impression:     Redemonstration the subacute left corona radiata/basal ganglia infarct.     No acute vascular abnormality.  No high-grade stenosis or major vessel occlusion.     Multinodular enlarged thyroid.  Evaluation with thyroid ultrasound on a nonemergent basis recommended.     CT head w/o contrast 11-26-23 results:  Age indeterminate infarcts in the bilateral basal ganglia and left internal capsule/corona radiata, new from prior.  Recommend further evaluation with MRI.     New punctate hyperdensity in the right precentral gyrus.   This can be followed up MR imaging.     MRI brain w/o contrast 11-26-23 results:     Acute infarct involving the left internal capsule/centrum semiovale.     Foci of increased susceptibility in the right precentral gyrus and right paracentral parietal lobe, suggestive of small micro hemorrhages, new from MRI 02/26/2023.     Remote appearing infarcts of the bilateral basal ganglia and left thalamus.     CTA Multiphase 10/24/23  Impression:     No acute intracranial process with chronic ischemic changes.     These findings were communicated to Dr. Seo at 2 14:00 on 10/24/2023     CTA:     No significant stenosis at the carotid bifurcations by NASCET criteria.  Question right carotid web with adjacent thrombus, similar to the prior CTA versus soft atherosclerotic plaque.     The vertebral arteries are  patent without advanced stenosis.     No major branch stenosis/ acute occlusion at the ujfult-so-Oyveyt.     Stable 5 mm left lung apex may also represent a focus of fibrosis.  One year follow-up in a high risk patient to be considered.        Cardiac Evaluation:   EKG 11-26-23 results:  Normal sinus rhythm Voltage criteria for left ventricular hypertrophy Nonspecific T wave abnormality Prolonged QT Abnormal ECG When compared with ECG of 24-OCT-2023 13:52, Premature supraventricular complexes are no longer Present

## 2023-12-02 NOTE — ASSESSMENT & PLAN NOTE
Stroke risk factor  HgB A1C 9.6, Endocrine consulted, recs as follows:  Levemir 8u Daily  Novolog 3u TIDAC (HOLD if patient eating < 25% or BG < 100)   LDSSI  POCT Glucose AC/HS

## 2023-12-02 NOTE — PLAN OF CARE
Problem: Cognitive Impairment (Stroke, Ischemic/Transient Ischemic Attack)  Goal: Optimal Cognitive Function  Outcome: Ongoing, Progressing     Problem: Communication Impairment (Stroke, Ischemic/Transient Ischemic Attack)  Goal: Improved Communication Skills  Outcome: Ongoing, Progressing     Problem: Adjustment to Illness (Stroke, Ischemic/Transient Ischemic Attack)  Goal: Optimal Coping  Outcome: Ongoing, Not Progressing     Problem: Bowel Elimination Impaired (Stroke, Ischemic/Transient Ischemic Attack)  Goal: Effective Bowel Elimination  Outcome: Ongoing, Not Progressing     Pt. Not actively participating in performing active range of motion.  Mostly sleeping, Aox2 self and place, but repeats 2024 for year and November for month.  Affect seems possibly grief/depression.  BP now within parameters with PO scheduled meds.  No BM today but passing gas, large incontinent episode with urine, purewick replaced and pt fully bathed

## 2023-12-02 NOTE — PROGRESS NOTES
Rob Borjas - Neurosurgery ([facility])  Vascular Neurology  Comprehensive Stroke Center  Progress Note    Assessment/Plan:     * Cerebrovascular accident (CVA) due to thrombosis of left middle cerebral artery  77 y/o female with 3 day history of right sided weakness and dysarthria with new L MCA infarct. No thrombolytics or intervention as out of the window.  Etiology likely small vessel disease with co-morbidities. Progressing with therapy. Medically ready, pending placement.    Antithrombotics: DAPT    Statins: Lipitor 80 mg daily    Aggressive risk factor modification: HTN, Smoking, DM, HLD, Diet, Exercise, Obesity     Rehab efforts: The patient has been evaluated by a stroke team provider and the therapy needs have been fully considered based off the presenting complaints and exam findings. The following therapy evaluations are needed: PT evaluate and treat, OT evaluate and treat, SLP evaluate and treat, PM&R evaluate for appropriate placement    Diagnostics ordered/pending: None     VTE prophylaxis: Enoxaparin (CrCl < 30 ml/min) 30 mg SQ every 24 hours  Mechanical prophylaxis: Place SCDs    BP parameters: Infarct: No intervention, SBP<180        Acute right ankle pain  R ankle xray negative for fracture/dislocation  ESR 84, CRP 42  Ortho consulted, aspirated joint, not concerned for septic joint at this time, aspirate negative for crystals, recommendations as follows:  - Ice, elevation and gentle ROM exercises  - No acute orthopaedic intervention warranted  - Continue to monitor cultures  - boot at bedside  -prn oxy 5 mg when working with therapy    - Improving on exam, able to work with PT   - Consider outpatient ortho f/u on discharge      Hemiparesis, right  Due to stroke  Aggressive therapy      Current every day smoker  Stroke risk factor   on smoking cessation    Obesity (BMI 30-39.9)  Stroke risk factor   on diet and exercise    Hyperlipidemia  Stroke risk factor  LDL 79.2  Lipitor 80 mg daily,  resume home crestor at discharge    Type 2 diabetes mellitus with hyperglycemia, with long-term current use of insulin  Stroke risk factor  HgB A1C 9.6, Endocrine consulted, recs as follows:  Levemir 8u Daily  Novolog 3u TIDAC (HOLD if patient eating < 25% or BG < 100)   LDSSI  POCT Glucose AC/HS    Hypertension associated with diabetes  Stroke risk factor  SBP <180  Home Coreg, consider increasing if SBP remains out of goal  Home ARB (formulary sub Valsartan 320)  Nifedipine 90mg  Added home scheduled Hydralazine 50mg TID  PRN Hydralaine, labetalol         11/27/2023 NAEON. Exam stable. Pt reports R ankle pain, had difficulty working with PT. R ankle xray shows no fracture or dislocation.  11/28/2023 NAEON. Exam stable. Ortho consulted for R ankle pain w/ elevated ESR/CRP, joint aspirated and steroids injected. Not concerned for septic joint at this time, aspirate negative for crystals. Glucose imp. ELIZABETH imp.  11/29/2023 NAEON. SBP intermittently in 200s-230s, asymptomatic. Resumed home coreg. ELIZABETH improving. R ankle pain improving. Pending auth for IPR.  11/30/2023 NAEON. SBP 160s-200s. Increased home nifedipine. Able to work with PT today. IPR auth denied following peer to peer. Pending placement.  12/1/2023 patient aphasic and not following commands this morning, CTA without LVO. Patient re-examined later and speech improved. -190 this morning, increased nifedipine. PRN oxy ordered to be given prior to working with PT/OT for R ankle pain  12/02/2023 NAEON. SBP 160s-190s past 24 hours. Required PRN antihypertensive meds. Neuro exam stable.    STROKE DOCUMENTATION        NIH Scale:  1a. Level of Consciousness: 0-->Alert, keenly responsive  1b. LOC Questions: 0-->Answers both questions correctly  1c. LOC Commands: 0-->Performs both tasks correctly  2. Best Gaze: 0-->Normal  3. Visual: 0-->No visual loss  4. Facial Palsy: 1-->Minor paralysis (flattened nasolabial fold, asymmetry on smiling)  5a. Motor Arm,  Left: 0-->No drift, limb holds 90 (or 45) degrees for full 10 secs  5b. Motor Arm, Right: 1-->Drift, limb holds 90 (or 45) degrees, but drifts down before full 10 secs, does not hit bed or other support  6a. Motor Leg, Left: 0-->No drift, leg holds 30 degree position for full 5 secs  6b. Motor Leg, Right: 3-->No effort against gravity, leg falls to bed immediately  7. Limb Ataxia: 0-->Absent  8. Sensory: 0-->Normal, no sensory loss  9. Best Language: 0-->No aphasia, normal  10. Dysarthria: 1-->Mild-to-moderate dysarthria, patient slurs at least some words and, at worst, can be understood with some difficulty  11. Extinction and Inattention (formerly Neglect): 0-->No abnormality  Total (NIH Stroke Scale): 6       Modified [place] Score: 1  Cherelle Coma Scale:    ABCD2 Score:    [id number] Score:   HAS -BLED Score:   ICH Score:   Hunt & Hutton Classification:      Hemorrhagic change of an Ischemic Stroke: Does this patient have an ischemic stroke with hemorrhagic changes? No     Neurologic Chief Complaint: RSW, dysarthria    Subjective:     Interval History: Patient is seen for follow-up neurological assessment and treatment recommendations: TAMION. SBP 160s-190s past 24 hours. Required PRN antihypertensive meds. Neuro exam stable.    HPI, Past Medical, Family, and Social History remains the same as documented in the initial encounter.     Review of Systems   Constitutional:  Negative for fever.   Musculoskeletal:  Positive for arthralgias.   Skin:  Negative for rash.   Neurological:  Positive for facial asymmetry, speech difficulty and weakness. Negative for numbness.     Scheduled Meds:   aspirin  81 mg Oral Daily    atorvastatin  80 mg Oral Daily    carvediloL  6.25 mg Oral BID    clopidogreL  75 mg Oral Daily    doxepin  10 mg Oral QHS    enoxparin  40 mg Subcutaneous Daily    gabapentin  300 mg Oral BID    hydrALAZINE  50 mg Oral TID    insulin aspart U-100  3 Units Subcutaneous TIDWM    insulin detemir U-100  (Levemir)  8 Units Subcutaneous Daily    NIFEdipine  90 mg Oral Daily    pantoprazole  40 mg Oral Daily    senna-docusate 8.6-50 mg  1 tablet Oral BID    valsartan  320 mg Oral Daily     Continuous Infusions:   sodium chloride 0.9%       PRN Meds:acetaminophen, dextrose 10%, dextrose 10%, glucagon (human recombinant), glucose, glucose, hydrALAZINE, insulin aspart U-100, labetalol, ondansetron, oxyCODONE, sodium chloride 0.9%, sodium chloride 0.9%    Objective:     Vital Signs (Most Recent):  Temp: 98.2 °F (36.8 °C) (12/02/23 1236)  Pulse: 83 (12/02/23 1236)  Resp: 20 (12/02/23 1236)  BP: (!) 178/78 (12/02/23 1236)  SpO2: 99 % (12/02/23 1236)  BP Location: Left arm    Vital Signs Range (Last 24H):  Temp:  [97.5 °F (36.4 °C)-98.6 °F (37 °C)]   Pulse:  [62-83]   Resp:  [16-20]   BP: (161-194)/(71-83)   SpO2:  [95 %-100 %]   BP Location: Left arm       Physical Exam  Vitals reviewed.   Constitutional:       General: She is not in acute distress.  HENT:      Head: Normocephalic and atraumatic.   Eyes:      Extraocular Movements: Extraocular movements intact.   Cardiovascular:      Rate and Rhythm: Normal rate.   Pulmonary:      Effort: Pulmonary effort is normal. No respiratory distress.   Abdominal:      General: There is no distension.   Musculoskeletal:         General: Tenderness present.      Cervical back: Normal range of motion.      Comments: slight R ankle swelling  TTP  tenderness with passive ROM   Skin:     General: Skin is warm and dry.   Neurological:      Mental Status: She is alert and oriented to person, place, and time.              Neurological Exam:   LOC: alert  Attention Span: Good   Language: No aphasia  Articulation: Dysarthria  Orientation: Person, Place, Time   Visual Fields: Full  EOM (CN III, IV, VI): Full/intact  Facial Movement (CN VII): Lower facial weakness on the Right  Motor: Arm left  Normal 5/5  Leg left  Paresis: 4/5  Arm right  Paresis: 4/5  Leg right Paresis: 3/5  Sensation: Intact  to light touch, temperature and vibration    Laboratory:  CMP:   Recent Labs   Lab 12/02/23  0310   CALCIUM 8.8   ALBUMIN 2.4*   PROT 5.9*      K 3.8   CO2 18*      BUN 22   CREATININE 0.9   ALKPHOS 86   ALT 16   AST 19   BILITOT 0.3     CBC:   Recent Labs   Lab 12/02/23  0310   WBC 8.29   RBC 4.03   HGB 11.8*   HCT 34.7*      MCV 86   MCH 29.3   MCHC 34.0       Diagnostic Results     Brain Imaging   CTA Head and Neck 12/1/23  Impression:     Redemonstration the subacute left corona radiata/basal ganglia infarct.     No acute vascular abnormality.  No high-grade stenosis or major vessel occlusion.     Multinodular enlarged thyroid.  Evaluation with thyroid ultrasound on a nonemergent basis recommended.     CT head w/o contrast 11-26-23 results:  Age indeterminate infarcts in the bilateral basal ganglia and left internal capsule/corona radiata, new from prior.  Recommend further evaluation with MRI.     New punctate hyperdensity in the right precentral gyrus.  This can be followed up MR imaging.     MRI brain w/o contrast 11-26-23 results:     Acute infarct involving the left internal capsule/centrum semiovale.     Foci of increased susceptibility in the right precentral gyrus and right paracentral parietal lobe, suggestive of small micro hemorrhages, new from MRI 02/26/2023.     Remote appearing infarcts of the bilateral basal ganglia and left thalamus.     CTA Multiphase 10/24/23  Impression:     No acute intracranial process with chronic ischemic changes.     These findings were communicated to Dr. Seo at 2 14:00 on 10/24/2023     CTA:     No significant stenosis at the carotid bifurcations by NASCET criteria.  Question right carotid web with adjacent thrombus, similar to the prior CTA versus soft atherosclerotic plaque.     The vertebral arteries are  patent without advanced stenosis.     No major branch stenosis/ acute occlusion at the [id number].     Stable 5 mm left lung apex may  also represent a focus of fibrosis.  One year follow-up in a high risk patient to be considered.        Cardiac Evaluation:   EKG 11-26-23 results:  Normal sinus rhythm Voltage criteria for left ventricular hypertrophy Nonspecific T wave abnormality Prolonged QT Abnormal ECG When compared with ECG of 24-OCT-2023 13:52, Premature supraventricular complexes are no longer Present    Ming Fay MD  [facility]  Department of Vascular Neurology   [facility] Neurosurgery [facility])

## 2023-12-03 NOTE — PROGRESS NOTES
Rob Borjas - Neurosurgery ([facility])  Vascular Neurology  Comprehensive Stroke Center  Progress Note    Assessment/Plan:     * Cerebrovascular accident (CVA) due to thrombosis of left middle cerebral artery  77 y/o female with 3 day history of right sided weakness and dysarthria with new L MCA infarct. No thrombolytics or intervention as out of the window.  Etiology likely small vessel disease with co-morbidities. Progressing with therapy. Medically ready, pending placement.    Antithrombotics: DAPT    Statins: Lipitor 80 mg daily    Aggressive risk factor modification: HTN, Smoking, DM, HLD, Diet, Exercise, Obesity     Rehab efforts: The patient has been evaluated by a stroke team provider and the therapy needs have been fully considered based off the presenting complaints and exam findings. The following therapy evaluations are needed: PT evaluate and treat, OT evaluate and treat, SLP evaluate and treat, PM&R evaluate for appropriate placement    Diagnostics ordered/pending: None     VTE prophylaxis: Enoxaparin (CrCl < 30 ml/min) 30 mg SQ every 24 hours  Mechanical prophylaxis: Place SCDs    BP parameters: Infarct: No intervention, SBP<180        Acute right ankle pain  R ankle xray negative for fracture/dislocation  ESR 84, CRP 42  Ortho consulted, aspirated joint, not concerned for septic joint at this time, aspirate negative for crystals, recommendations as follows:  - Ice, elevation and gentle ROM exercises  - No acute orthopaedic intervention warranted  - Continue to monitor cultures  - boot at bedside  -prn oxy 5 mg when working with therapy    - Improving on exam, able to work with PT   - Consider outpatient ortho f/u on discharge      Hemiparesis, right  Due to stroke  Aggressive therapy      Current every day smoker  Stroke risk factor   on smoking cessation    Obesity (BMI 30-39.9)  Stroke risk factor   on diet and exercise    Hyperlipidemia  Stroke risk factor  LDL 79.2  Lipitor 80 mg daily,  resume home crestor at discharge    Type 2 diabetes mellitus with hyperglycemia, with long-term current use of insulin  Stroke risk factor  HgB A1C 9.6, Endocrine consulted, recs as follows:  Levemir 8u Daily  Novolog 3u TIDAC (HOLD if patient eating < 25% or BG < 100)   LDSSI  POCT Glucose AC/HS    Hypertension associated with diabetes  Stroke risk factor  SBP <180  Increased Coreg to 12.5 BID  Home ARB (formulary sub Valsartan 320)  Nifedipine 90mg  Added home scheduled Hydralazine 50mg TID  PRN Hydralaine, labetalol         11/27/2023 NAEON. Exam stable. Pt reports R ankle pain, had difficulty working with PT. R ankle xray shows no fracture or dislocation.  11/28/2023 NAEON. Exam stable. Ortho consulted for R ankle pain w/ elevated ESR/CRP, joint aspirated and steroids injected. Not concerned for septic joint at this time, aspirate negative for crystals. Glucose imp. ELIZABETH imp.  11/29/2023 NAEON. SBP intermittently in 200s-230s, asymptomatic. Resumed home coreg. ELIZABETH improving. R ankle pain improving. Pending auth for IPR.  11/30/2023 NAEON. SBP 160s-200s. Increased home nifedipine. Able to work with PT today. IPR auth denied following peer to peer. Pending placement.  12/1/2023 patient aphasic and not following commands this morning, CTA without LVO. Patient re-examined later and speech improved. -190 this morning, increased nifedipine. PRN oxy ordered to be given prior to working with PT/OT for R ankle pain  12/02/2023 NAEON. SBP 160s-190s past 24 hours. Required PRN antihypertensive meds. Neuro exam stable.  12/03/2023 NAEON . SBP 130s-160s past 24 hours. No PRN antihypertensives administered. Neuro exam stable.    STROKE DOCUMENTATION        NIH Scale:  1a. Level of Consciousness: 0-->Alert, keenly responsive  1b. LOC Questions: 0-->Answers both questions correctly  1c. LOC Commands: 0-->Performs both tasks correctly  2. Best Gaze: 0-->Normal  3. Visual: 0-->No visual loss  4. Facial Palsy: 1-->Minor  paralysis (flattened nasolabial fold, asymmetry on smiling)  5a. Motor Arm, Left: 0-->No drift, limb holds 90 (or 45) degrees for full 10 secs  5b. Motor Arm, Right: 1-->Drift, limb holds 90 (or 45) degrees, but drifts down before full 10 secs, does not hit bed or other support  6a. Motor Leg, Left: 0-->No drift, leg holds 30 degree position for full 5 secs  6b. Motor Leg, Right: 3-->No effort against gravity, leg falls to bed immediately  7. Limb Ataxia: 0-->Absent  8. Sensory: 0-->Normal, no sensory loss  9. Best Language: 0-->No aphasia, normal  10. Dysarthria: 0-->Normal  11. Extinction and Inattention (formerly Neglect): 0-->No abnormality  Total (NIH Stroke Scale): 5       Modified [place] Score: 1  Cherelle Coma Scale:    ABCD2 Score:    [id number] Score:   HAS -BLED Score:   ICH Score:   Hunt & Hutton Classification:      Hemorrhagic change of an Ischemic Stroke: Does this patient have an ischemic stroke with hemorrhagic changes? No     Neurologic Chief Complaint: RSW, dysarthria    Subjective:     Interval History: Patient is seen for follow-up neurological assessment and treatment recommendations: NAEON . SBP 130s-160s past 24 hours. No PRN antihypertensives administered. Neuro exam stable.    HPI, Past Medical, Family, and Social History remains the same as documented in the initial encounter.     Review of Systems   Constitutional:  Negative for fever.   Musculoskeletal:  Positive for arthralgias.   Skin:  Negative for rash.   Neurological:  Positive for facial asymmetry, speech difficulty and weakness. Negative for numbness.     Scheduled Meds:   aspirin  81 mg Oral Daily    atorvastatin  80 mg Oral Daily    carvediloL  12.5 mg Oral BID    clopidogreL  75 mg Oral Daily    doxepin  10 mg Oral QHS    enoxparin  40 mg Subcutaneous Daily    gabapentin  300 mg Oral BID    hydrALAZINE  50 mg Oral TID    insulin aspart U-100  3 Units Subcutaneous TIDWM    insulin detemir U-100 (Levemir)  8 Units Subcutaneous  Daily    NIFEdipine  90 mg Oral Daily    pantoprazole  40 mg Oral Daily    senna-docusate 8.6-50 mg  1 tablet Oral BID    valsartan  320 mg Oral Daily     Continuous Infusions:   sodium chloride 0.9%       PRN Meds:acetaminophen, dextrose 10%, dextrose 10%, glucagon (human recombinant), glucose, glucose, hydrALAZINE, insulin aspart U-100, labetalol, ondansetron, oxyCODONE, sodium chloride 0.9%, sodium chloride 0.9%    Objective:     Vital Signs (Most Recent):  Temp: 97.9 °F (36.6 °C) (12/03/23 1223)  Pulse: 98 (12/03/23 1223)  Resp: 17 (12/03/23 1223)  BP: (!) 166/74 (12/03/23 1223)  SpO2: 99 % (12/03/23 1223)  BP Location: Right arm    Vital Signs Range (Last 24H):  Temp:  [97.9 °F (36.6 °C)-98.8 °F (37.1 °C)]   Pulse:  [63-98]   Resp:  [15-18]   BP: (137-166)/(62-76)   SpO2:  [98 %-99 %]   BP Location: Right arm       Physical Exam  Vitals reviewed.   Constitutional:       General: She is not in acute distress.  HENT:      Head: Normocephalic and atraumatic.   Eyes:      Extraocular Movements: Extraocular movements intact.   Cardiovascular:      Rate and Rhythm: Normal rate.   Pulmonary:      Effort: Pulmonary effort is normal. No respiratory distress.   Abdominal:      General: There is no distension.   Musculoskeletal:         General: Tenderness present.      Cervical back: Normal range of motion.      Comments: TTP  tenderness with passive ROM   Skin:     General: Skin is warm and dry.   Neurological:      Mental Status: She is alert and oriented to person, place, and time.              Neurological Exam:   LOC: alert  Attention Span: Good   Language: No aphasia  Articulation: No dysarthria  Orientation: Person, Place, Time   Visual Fields: Full  EOM (CN III, IV, VI): Full/intact  Facial Movement (CN VII): Lower facial weakness on the Right  Motor: Arm left  Normal 5/5  Leg left  Paresis: 4/5  Arm right  Paresis: 4/5  Leg right Paresis: 3/5    Laboratory:  CMP:   Recent Labs   Lab 12/03/23  0410   CALCIUM 8.9    ALBUMIN 2.5*   PROT 6.1      K 3.8   CO2 19*      BUN 23   CREATININE 1.0   ALKPHOS 84   ALT 18   AST 20   BILITOT 0.4     CBC:   Recent Labs   Lab 12/03/23  0410   WBC 8.79   RBC 4.30   HGB 11.7*   HCT 36.1*      MCV 84   MCH 27.2   MCHC 32.4       Diagnostic Results     Brain Imaging   CTA Head and Neck 12/1/23  Impression:  Redemonstration the subacute left corona radiata/basal ganglia infarct.  No acute vascular abnormality.  No high-grade stenosis or major vessel occlusion.  Multinodular enlarged thyroid.  Evaluation with thyroid ultrasound on a nonemergent basis recommended.     CT head w/o contrast 11-26-23 results:  Age indeterminate infarcts in the bilateral basal ganglia and left internal capsule/corona radiata, new from prior.  Recommend further evaluation with MRI.  New punctate hyperdensity in the right precentral gyrus.  This can be followed up MR imaging.     MRI brain w/o contrast 11-26-23 results:  Acute infarct involving the left internal capsule/centrum semiovale.  Foci of increased susceptibility in the right precentral gyrus and right paracentral parietal lobe, suggestive of small micro hemorrhages, new from MRI 02/26/2023.  Remote appearing infarcts of the bilateral basal ganglia and left thalamus.     CTA Multiphase 10/24/23  Impression:  No acute intracranial process with chronic ischemic changes.       CTA:  No significant stenosis at the carotid bifurcations by NASCET criteria.  Question right carotid web with adjacent thrombus, similar to the prior CTA versus soft atherosclerotic plaque.  The vertebral arteries are  patent without advanced stenosis.  No major branch stenosis/ acute occlusion at the [id number].  Stable 5 mm left lung apex may also represent a focus of fibrosis.  One year follow-up in a high risk patient to be considered.        Cardiac Evaluation:   EKG 11-26-23 results:  Normal sinus rhythm Voltage criteria for left ventricular hypertrophy  Nonspecific T wave abnormality Prolonged QT Abnormal ECG When compared with ECG of 24-OCT-2023 13:52, Premature supraventricular complexes are no longer Present    Ming Fay MD  Comprehensive Stroke Center  Department of Vascular Neurology   [facility])

## 2023-12-03 NOTE — SUBJECTIVE & OBJECTIVE
Neurologic Chief Complaint: RSW, dysarthria    Subjective:     Interval History: Patient is seen for follow-up neurological assessment and treatment recommendations: DONYEON . SBP 130s-160s past 24 hours. No PRN antihypertensives administered. Neuro exam stable.    HPI, Past Medical, Family, and Social History remains the same as documented in the initial encounter.     Review of Systems   Constitutional:  Negative for fever.   Musculoskeletal:  Positive for arthralgias.   Skin:  Negative for rash.   Neurological:  Positive for facial asymmetry, speech difficulty and weakness. Negative for numbness.     Scheduled Meds:   aspirin  81 mg Oral Daily    atorvastatin  80 mg Oral Daily    carvediloL  12.5 mg Oral BID    clopidogreL  75 mg Oral Daily    doxepin  10 mg Oral QHS    enoxparin  40 mg Subcutaneous Daily    gabapentin  300 mg Oral BID    hydrALAZINE  50 mg Oral TID    insulin aspart U-100  3 Units Subcutaneous TIDWM    insulin detemir U-100 (Levemir)  8 Units Subcutaneous Daily    NIFEdipine  90 mg Oral Daily    pantoprazole  40 mg Oral Daily    senna-docusate 8.6-50 mg  1 tablet Oral BID    valsartan  320 mg Oral Daily     Continuous Infusions:   sodium chloride 0.9%       PRN Meds:acetaminophen, dextrose 10%, dextrose 10%, glucagon (human recombinant), glucose, glucose, hydrALAZINE, insulin aspart U-100, labetalol, ondansetron, oxyCODONE, sodium chloride 0.9%, sodium chloride 0.9%    Objective:     Vital Signs (Most Recent):  Temp: 97.9 °F (36.6 °C) (12/03/23 1223)  Pulse: 98 (12/03/23 1223)  Resp: 17 (12/03/23 1223)  BP: (!) 166/74 (12/03/23 1223)  SpO2: 99 % (12/03/23 1223)  BP Location: Right arm    Vital Signs Range (Last 24H):  Temp:  [97.9 °F (36.6 °C)-98.8 °F (37.1 °C)]   Pulse:  [63-98]   Resp:  [15-18]   BP: (137-166)/(62-76)   SpO2:  [98 %-99 %]   BP Location: Right arm       Physical Exam  Vitals reviewed.   Constitutional:       General: She is not in acute distress.  HENT:      Head: Normocephalic  and atraumatic.   Eyes:      Extraocular Movements: Extraocular movements intact.   Cardiovascular:      Rate and Rhythm: Normal rate.   Pulmonary:      Effort: Pulmonary effort is normal. No respiratory distress.   Abdominal:      General: There is no distension.   Musculoskeletal:         General: Tenderness present.      Cervical back: Normal range of motion.      Comments: TTP  tenderness with passive ROM   Skin:     General: Skin is warm and dry.   Neurological:      Mental Status: She is alert and oriented to person, place, and time.              Neurological Exam:   LOC: alert  Attention Span: Good   Language: No aphasia  Articulation: No dysarthria  Orientation: Person, Place, Time   Visual Fields: Full  EOM (CN III, IV, VI): Full/intact  Facial Movement (CN VII): Lower facial weakness on the Right  Motor: Arm left  Normal 5/5  Leg left  Paresis: 4/5  Arm right  Paresis: 4/5  Leg right Paresis: 3/5    Laboratory:  CMP:   Recent Labs   Lab 12/03/23  0410   CALCIUM 8.9   ALBUMIN 2.5*   PROT 6.1      K 3.8   CO2 19*      BUN 23   CREATININE 1.0   ALKPHOS 84   ALT 18   AST 20   BILITOT 0.4     CBC:   Recent Labs   Lab 12/03/23  0410   WBC 8.79   RBC 4.30   HGB 11.7*   HCT 36.1*      MCV 84   MCH 27.2   MCHC 32.4       Diagnostic Results     Brain Imaging   CTA Head and Neck 12/1/23  Impression:  Redemonstration the subacute left corona radiata/basal ganglia infarct.  No acute vascular abnormality.  No high-grade stenosis or major vessel occlusion.  Multinodular enlarged thyroid.  Evaluation with thyroid ultrasound on a nonemergent basis recommended.     CT head w/o contrast 11-26-23 results:  Age indeterminate infarcts in the bilateral basal ganglia and left internal capsule/corona radiata, new from prior.  Recommend further evaluation with MRI.  New punctate hyperdensity in the right precentral gyrus.  This can be followed up MR imaging.     MRI brain w/o contrast 11-26-23 results:  Acute  infarct involving the left internal capsule/centrum semiovale.  Foci of increased susceptibility in the right precentral gyrus and right paracentral parietal lobe, suggestive of small micro hemorrhages, new from MRI 02/26/2023.  Remote appearing infarcts of the bilateral basal ganglia and left thalamus.     CTA Multiphase 10/24/23  Impression:  No acute intracranial process with chronic ischemic changes.       CTA:  No significant stenosis at the carotid bifurcations by NASCET criteria.  Question right carotid web with adjacent thrombus, similar to the prior CTA versus soft atherosclerotic plaque.  The vertebral arteries are  patent without advanced stenosis.  No major branch stenosis/ acute occlusion at the [id number].  Stable 5 mm left lung apex may also represent a focus of fibrosis.  One year follow-up in a high risk patient to be considered.        Cardiac Evaluation:   EKG 11-26-23 results:  Normal sinus rhythm Voltage criteria for left ventricular hypertrophy Nonspecific T wave abnormality Prolonged QT Abnormal ECG When compared with ECG of 24-OCT-2023 13:52, Premature supraventricular complexes are no longer Present

## 2023-12-03 NOTE — CARE UPDATE
-Glucose Goal 140-180    -A1C:   Hemoglobin A1C   Date Value Ref Range Status   11/26/2023 9.6 (H) 4.0 - 5.6 % Final     Comment:     ADA Screening Guidelines:  5.7-6.4%  Consistent with prediabetes  >or=6.5%  Consistent with diabetes    High levels of fetal hemoglobin interfere with the HbA1C  assay. Heterozygous hemoglobin variants (HbS, HgC, etc)do  not significantly interfere with this assay.   However, presence of multiple variants may affect accuracy.           -HOME REGIMEN: Tresiba 20 units nightly, Humalog 6 units TID with meals plus scale (180-230 +2), Ozmepic 2 mg every 7 days     -GLUCOSE TREND FOR THE PAST 24HRS:   Recent Labs   Lab 12/01/23  1121 12/01/23  1614 12/01/23  2116 12/02/23  1232 12/02/23  1523 12/02/23  2109   POCTGLUCOSE 113* 116* 124* 133* 188* 155*           -NO HYPOGYCEMIAS NOTED     - Diet  Diet diabetic Low Sodium,2gm; 2000 Calorie; Thin    T2DM.  L MCA infarct.  BG stable.  Will continue on current regimen.     Continue Levemir 8 units daily   Continue Novolog 3 units TID with meals (HOLD if patient eating < 25% or BG < 100)  Low Dose Correction Scale   BG monitoring ac/hs     ** Please call Endocrine for any BG related issues **

## 2023-12-03 NOTE — PLAN OF CARE
Problem: Adjustment to Illness (Stroke, Ischemic/Transient Ischemic Attack)  Goal: Optimal Coping  Outcome: Ongoing, Progressing     Problem: Bowel Elimination Impaired (Stroke, Ischemic/Transient Ischemic Attack)  Goal: Effective Bowel Elimination  Outcome: Ongoing, Progressing     Problem: Cerebral Tissue Perfusion (Stroke, Ischemic/Transient Ischemic Attack)  Goal: Optimal Cerebral Tissue Perfusion  Outcome: Ongoing, Progressing     Problem: Cognitive Impairment (Stroke, Ischemic/Transient Ischemic Attack)  Goal: Optimal Cognitive Function  Outcome: Ongoing, Progressing     Problem: Communication Impairment (Stroke, Ischemic/Transient Ischemic Attack)  Goal: Improved Communication Skills  Outcome: Ongoing, Progressing     Problem: Functional Ability Impaired (Stroke, Ischemic/Transient Ischemic Attack)  Goal: Optimal Functional Ability  Outcome: Ongoing, Progressing     Problem: Respiratory Compromise (Stroke, Ischemic/Transient Ischemic Attack)  Goal: Effective Oxygenation and Ventilation  Outcome: Ongoing, Progressing     Problem: Sensorimotor Impairment (Stroke, Ischemic/Transient Ischemic Attack)  Goal: Improved Sensorimotor Function  Outcome: Ongoing, Progressing     Problem: Swallowing Impairment (Stroke, Ischemic/Transient Ischemic Attack)  Goal: Optimal Eating and Swallowing without Aspiration  Outcome: Ongoing, Progressing     Problem: Urinary Elimination Impaired (Stroke, Ischemic/Transient Ischemic Attack)  Goal: Effective Urinary Elimination  Outcome: Ongoing, Progressing     Problem: Fall Injury Risk  Goal: Absence of Fall and Fall-Related Injury  Outcome: Ongoing, Progressing     Problem: Adult Inpatient Plan of Care  Goal: Plan of Care Review  Outcome: Ongoing, Progressing  Goal: Absence of Hospital-Acquired Illness or Injury  Outcome: Ongoing, Progressing  Goal: Optimal Comfort and Wellbeing  Outcome: Ongoing, Progressing  Goal: Readiness for Transition of Care  Outcome: Ongoing, Progressing      Problem: Infection  Goal: Absence of Infection Signs and Symptoms  Outcome: Ongoing, Progressing     Problem: Diabetes Comorbidity  Goal: Blood Glucose Level Within Targeted Range  Outcome: Ongoing, Progressing     Problem: Skin Injury Risk Increased  Goal: Skin Health and Integrity  Outcome: Ongoing, Progressing     BP within goal range (SBP<180). Blood glucose=155 last night. Some improvement in ankle pain. No acute events.

## 2023-12-03 NOTE — ASSESSMENT & PLAN NOTE
Stroke risk factor  SBP <180  Increased Coreg to 12.5 BID  Home ARB (formulary sub Valsartan 320)  Nifedipine 90mg  Added home scheduled Hydralazine 50mg TID  PRN Hydralaine, labetalol

## 2023-12-04 NOTE — PLAN OF CARE
[facility] (Hospital)  Discharge Final Note    Primary Care Provider: Ericka Cortez MD    Expected Discharge Date: 12/4/2023    Final Discharge Note (most recent)       Final Note - 12/04/23 1209          Final Note    Assessment Type Final Discharge Note     Anticipated Discharge Disposition Rehab Facility        Post-Acute Status    Post-Acute Authorization Placement     Post-Acute Placement Status Set-up Complete/Auth obtained     Coverage PHN (P)      Discharge Delays Payor Issues (P)                  SW received phone call from Elvi at [facility].  She stated that the family had appealed and that the decision had been overturned and patient now approved for rehab.  Elvi sent SW updated auth.  SW arranged admission for [facility].  RN given number for report and WC transport scheduled for 330pm.  Patient and family aware.      Riana Barnes LMSW [facility]  [phone]      Important Message from Medicare  Important Message from Medicare regarding Discharge Appeal Rights: Given to patient/caregiver, Explained to patient/caregiver, Signed/date by patient/caregiver     Date IMM was signed: 12/04/23  Time IMM was signed: 0932    Future Appointments   Date Time Provider Department Center   12/29/2023 11:00 AM LAB, APPOINTMENT [facility]   1/5/2024  1:00 PM Graeme Rivera, APRN, FNP [facility]

## 2023-12-04 NOTE — PT/OT/SLP PROGRESS
"Speech Language Pathology Treatment    Patient Name:  Buck Ibarra   MRN:  [id number]  Admitting Diagnosis: Cerebrovascular accident (CVA) due to thrombosis of left middle cerebral artery    Recommendations:                 General Recommendations:  Dysphagia therapy, Speech/language therapy, and Cognitive-linguistic therapy  Diet recommendations:  Regular, Liquid Diet Level: Thin   Aspiration Precautions: Only when awake/alert/attentive and vital signs stable, 1 bite/sip at a time, Avoid talking while eating, Check for pocketing/oral residue, Eliminate distractions, Feed only when awake/alert, Frequent oral care, HOB to 90 degrees, Monitor for s/s of aspiration, Remain upright 30 minutes post meal, Small bites/sips, and Strict aspiration precautions  Continue to monitor for signs and symptoms of aspiration and discontinue oral feeding should you notice any of the following: watery eyes, reddened facial area, wet vocal quality, increased work of breathing, change in respiratory status, increased congestion, coughing, fever and/or change in level of alertness.  General Precautions: Standard, aspiration, fall  Communication strategies:  provide increased time to answer and go to room if call light pushed    Assessment:     Buck Ibarra is a 78 y.o. female with an SLP diagnosis of Dysphagia, Dysarthria, and Cognitive-Linguistic Impairment.  She would benefit from ongoing, intensive ST upon d/c from acute to continue to improve speech, cognitive-linguistic skills and swallow for highest level of [place].     Subjective     SLP reviewed Pt with RN   Pt presents lethargic  She explains, "Sometimes it sounds slurred"     Pain/Comfort:  Pain Rating 1: 0/10    Respiratory Status: Room air    Objective:     Has the patient been evaluated by SLP for swallowing?   Yes  Keep patient NPO? No   Current Respiratory Status:    room air     Pt found upright in bed with RN and Daughter in the room. RN at the bedside " presenting Pt with whole medications and thin liquids. Pt with immediate cough x1 with self-fed bottle-edge sip thin liquids x1. Pt with trace R anterior loss thin liquids x1. Pt with mild oral holding with medications presented whole one at a time. Pt offered to trial medications embedded whole in puree; however, Pt declined puree. No overt S/S aspiration on subsequent whole medications and sips thin liquids x5 observed. SLP educated Pt on ongoing aspiration precautions, SLP role and plan to re-attempt later service day for speech and language therapy following RN care. No questions noted. RN remained with Pt upon SLP exit.   Upon later attempt, Pt found upright in bed eating lunch meal tray. Pt with flat affect and variable, at times delayed, response time. Her voice was mildly strained with adequate intensity. Pt with mild R anterior loss of self-fed bites of puree (apple sauce x8) observed. Pt aware of anterior loss and uses napkin to wipe face. She demonstrated mild word-finding difficulty as c/b hesitations and paraphasias (semantic) t/o conversational speech tasks. She was educated on ongoing aspiration precautions, compensatory strategies for speech, word-finding strategies and recommendations fo ongoing ST upon d/c from acute. No additional questions noted. She demonstrated partial understanding. RN entered into the room as SLP discontinued session.     Goals:   Multidisciplinary Problems       SLP Goals          Problem: SLP    Goal Priority Disciplines Outcome   SLP Goal     SLP Ongoing, Progressing   Description: Speech Language Pathology Goals  Goals expected to be met by 12/11/23    1.Pt will tolerate least restrictive diet without overt S/S aspiration, MOD I  2. Educate Pt and family on aspiration precautions and SLP POC  3. Pt will participate in full speech, language and cognitive evaluation to determine ongoing POC needs MET 11/28  4. Pt will compare/contrast 2 items with 90% accuracy with minimal  verbal cues.   5. Pt will orient x4 with minimal verbal cues.   6. Pt will participate in high level cognitive linguistic diagnostic tx to further guide SLP POC.                          Plan:     Patient to be seen:  5 x/week   Plan of Care expires:  12/27/23  Plan of Care reviewed with:  patient, daughter   SLP Follow-Up:  Yes       Discharge recommendations:  High Intensity Therapy       Time Tracking:     SLP Treatment Date:   12/04/23  Speech Start Time:  1008/ 15:06  Speech Stop Time:  1016  /15:18   Speech Total Time (min):  8 min/ 12 min    Billable Minutes: Speech Therapy Individual 8 and Treatment Swallowing Dysfunction 12    12/04/2023

## 2023-12-04 NOTE — ASSESSMENT & PLAN NOTE
BG goal 140-180  T2DM.  L MCA infarct.  BG stable, with some prandial elevations yesterday. Will increase mealtime regimen    Continue Levemir 8 units daily   Increase Novolog 4 units TID with meals (HOLD if patient eating < 25% or BG < 100)  Low Dose Correction Scale   BG monitoring ac/hs    ** Please call Endocrine for any BG related issues **      Discharge plans: TBD    Lab Results   Component Value Date    HGBA1C 9.6 (H) 11/26/2023

## 2023-12-04 NOTE — PLAN OF CARE
CHW met with patient/family at bedside. Patient experience rounding completed and reviewed the following.     Do you know your discharge plan? Yes or No,    If yes, what is the plan?  Yes Rehab     Have you discussed your needs and preferences with your SW/CM?  Yes     If you are discharging home, do you have help at home?  Yes    Do you think you will need help additional at home at discharge?  No     Do you currently have difficulty keeping up with bills, affording medicine or buying food?  No    Assigned SW/CM notified of any patient/family needs or concerns. Appropriate resources provided to address patient's needs.   Riana Hayward W  Case Management  [phone]

## 2023-12-04 NOTE — PROGRESS NOTES
Rob Borjas - Neurosurgery ([facility])  Vascular Neurology  Comprehensive Stroke Center  Progress Note    Assessment/Plan:     * Cerebrovascular accident (CVA) due to thrombosis of left middle cerebral artery  79 y/o female with 3 day history of right sided weakness and dysarthria with new L MCA infarct. No thrombolytics or intervention as out of the window.  MRI brain with acute L MCA territory infarct. CTA unremarkable. Recent TTE unremarkable. Etiology likely small vessel disease with co-morbidities.     NAEO, neuro exam stable. Coreg increased for better BP control. Stable for discharge to rehab facility     Antithrombotics: DAPT    Statins: Lipitor 80 mg daily    Aggressive risk factor modification: HTN, Smoking, DM, HLD, Diet, Exercise, Obesity     Rehab efforts: The patient has been evaluated by a stroke team provider and the therapy needs have been fully considered based off the presenting complaints and exam findings. The following therapy evaluations are needed: PT evaluate and treat, OT evaluate and treat, SLP evaluate and treat, PM&R evaluate for appropriate placement    Diagnostics ordered/pending: None     VTE prophylaxis: Enoxaparin (CrCl < 30 ml/min) 30 mg SQ every 24 hours  Mechanical prophylaxis: Place SCDs    BP parameters: Infarct: No intervention, SBP<180        Acute right ankle pain  R ankle xray negative for fracture/dislocation  ESR 84, CRP 42  Ortho consulted, aspirated joint, not concerned for septic joint at this time, aspirate negative for crystals, recommendations as follows:  - Ice, elevation and gentle ROM exercises  - No acute orthopaedic intervention warranted  - Continue to monitor cultures  - boot at bedside  -prn oxy 5 mg when working with therapy    - Improving on exam, able to work with PT   - Consider outpatient ortho f/u on discharge      Hemiparesis, right  Due to stroke  Aggressive therapy  Dispo recs for high intensity    Current every day smoker  Stroke risk factor    on smoking cessation    Obesity (BMI 30-39.9)  Stroke risk factor   on diet and exercise    Hyperlipidemia  Stroke risk factor  LDL 79.2  Lipitor 80 mg daily, resume home crestor at discharge    Type 2 diabetes mellitus with hyperglycemia, with long-term current use of insulin  Stroke risk factor  HgB A1C 9.6, Endocrine consulted, recs as follows:  Levemir 8u Daily  Novolog 3u TIDAC (HOLD if patient eating < 25% or BG < 100)   LDSSI  POCT Glucose AC/HS    Hypertension associated with diabetes  Stroke risk factor  SBP <180  Increased Coreg to 25mg BID  Home ARB (formulary sub Valsartan 320)  Nifedipine 90mg  Added home scheduled Hydralazine 50mg TID  PRN Hydralaine, labetalol         11/27/2023 NAEON. Exam stable. Pt reports R ankle pain, had difficulty working with PT. R ankle xray shows no fracture or dislocation.  11/28/2023 NAEON. Exam stable. Ortho consulted for R ankle pain w/ elevated ESR/CRP, joint aspirated and steroids injected. Not concerned for septic joint at this time, aspirate negative for crystals. Glucose imp. ELIZABETH imp.  11/29/2023 NAEON. SBP intermittently in 200s-230s, asymptomatic. Resumed home coreg. ELIZABETH improving. R ankle pain improving. Pending auth for IPR.  11/30/2023 NAEON. SBP 160s-200s. Increased home nifedipine. Able to work with PT today. IPR auth denied following peer to peer. Pending placement.  12/1/2023 patient aphasic and not following commands this morning, CTA without LVO. Patient re-examined later and speech improved. -190 this morning, increased nifedipine. PRN oxy ordered to be given prior to working with PT/OT for R ankle pain  12/02/2023 NAEON. SBP 160s-190s past 24 hours. Required PRN antihypertensive meds. Neuro exam stable.  12/03/2023 NAEON . SBP 130s-160s past 24 hours. No PRN antihypertensives administered. Neuro exam stable.  12/04/2023 NAEO, neuro exam stable. Coreg increased for better BP control. Stable for discharge to rehab facility      STROKE  DOCUMENTATION        NIH Scale:  1a. Level of Consciousness: 0-->Alert, keenly responsive  1b. LOC Questions: 0-->Answers both questions correctly  1c. LOC Commands: 0-->Performs both tasks correctly  2. Best Gaze: 0-->Normal  3. Visual: 0-->No visual loss  4. Facial Palsy: 1-->Minor paralysis (flattened nasolabial fold, asymmetry on smiling)  5a. Motor Arm, Left: 0-->No drift, limb holds 90 (or 45) degrees for full 10 secs  5b. Motor Arm, Right: 1-->Drift, limb holds 90 (or 45) degrees, but drifts down before full 10 secs, does not hit bed or other support  6a. Motor Leg, Left: 1-->Drift, leg falls by the end of the 5-sec period but does not hit bed  6b. Motor Leg, Right: 2-->Some effort against gravity, leg falls to bed by 5 secs, but has some effort against gravity  7. Limb Ataxia: 0-->Absent  8. Sensory: 0-->Normal, no sensory loss  9. Best Language: 0-->No aphasia, normal  10. Dysarthria: 0-->Normal  11. Extinction and Inattention (formerly Neglect): 0-->No abnormality  Total (NIH Stroke Scale): 5       Modified Gibson Score: 4  Cherelle Coma Scale:    ABCD2 Score:    [id number] Score:   HAS -BLED Score:   ICH Score:   Hunt & Hutton Classification:      Hemorrhagic change of an Ischemic Stroke: Does this patient have an ischemic stroke with hemorrhagic changes? No     Neurologic Chief Complaint: RSW, dysarthria    Subjective:     Interval History: Patient is seen for follow-up neurological assessment and treatment recommendations:   NAEO, neuro exam stable. Coreg increased for better BP control. Stable for discharge to rehab facility    HPI, Past Medical, Family, and Social History remains the same as documented in the initial encounter.     Review of Systems   Constitutional:  Negative for fever.   Musculoskeletal:  Positive for arthralgias.   Skin:  Negative for rash.   Neurological:  Positive for facial asymmetry and weakness. Negative for speech difficulty and numbness.     Scheduled Meds:   aspirin  81 mg  Oral Daily    atorvastatin  80 mg Oral Daily    carvediloL  25 mg Oral BID    clopidogreL  75 mg Oral Daily    doxepin  10 mg Oral QHS    enoxparin  40 mg Subcutaneous Daily    gabapentin  300 mg Oral BID    hydrALAZINE  50 mg Oral TID    insulin aspart U-100  4 Units Subcutaneous TIDWM    insulin detemir U-100 (Levemir)  8 Units Subcutaneous Daily    NIFEdipine  90 mg Oral Daily    pantoprazole  40 mg Oral Daily    senna-docusate 8.6-50 mg  1 tablet Oral BID    valsartan  320 mg Oral Daily     Continuous Infusions:   sodium chloride 0.9%       PRN Meds:acetaminophen, dextrose 10%, dextrose 10%, glucagon (human recombinant), glucose, glucose, hydrALAZINE, insulin aspart U-100, labetalol, ondansetron, oxyCODONE, sodium chloride 0.9%, sodium chloride 0.9%    Objective:     Vital Signs (Most Recent):  Temp: 97.9 °F (36.6 °C) (12/04/23 1208)  Pulse: 71 (12/04/23 1208)  Resp: 20 (12/04/23 1208)  BP: (!) 197/82 (12/04/23 1208)  SpO2: 98 % (12/04/23 1208)  BP Location: Right arm    Vital Signs Range (Last 24H):  Temp:  [97.7 °F (36.5 °C)-98.5 °F (36.9 °C)]   Pulse:  [61-98]   Resp:  [16-20]   BP: (143-197)/(67-82)   SpO2:  [97 %-99 %]   BP Location: Right arm       Physical Exam  Vitals reviewed.   Constitutional:       General: She is not in acute distress.  HENT:      Head: Normocephalic and atraumatic.   Eyes:      Extraocular Movements: Extraocular movements intact.   Cardiovascular:      Rate and Rhythm: Normal rate.   Pulmonary:      Effort: Pulmonary effort is normal. No respiratory distress.   Abdominal:      General: There is no distension.   Musculoskeletal:         General: Tenderness present.      Cervical back: Normal range of motion.      Comments: TTP  tenderness with passive ROM   Skin:     General: Skin is warm and dry.   Neurological:      Mental Status: She is alert and oriented to person, place, and time.              Neurological Exam:   LOC: alert  Attention Span: Good   Language: No  aphasia  Articulation: No dysarthria  Orientation: Person, Place, Time   Visual Fields: Full  EOM (CN III, IV, VI): Full/intact  Facial Movement (CN VII): Lower facial weakness on the Right  Motor: Arm left  Normal 5/5  Leg left  Paresis: 4/5  Arm right  Paresis: 4/5  Leg right Paresis: 3/5    Laboratory:  CMP:   Recent Labs   Lab 12/04/23  0458   CALCIUM 8.9   ALBUMIN 2.6*   PROT 6.3      K 3.8   CO2 22*      BUN 25*   CREATININE 0.9   ALKPHOS 78   ALT 17   AST 17   BILITOT 0.4       CBC:   Recent Labs   Lab 12/04/23  0458   WBC 8.46   RBC 4.34   HGB 12.0   HCT 37.3      MCV 86   MCH 27.6   MCHC 32.2         Diagnostic Results     Brain Imaging   CTA Head and Neck 12/1/23  Impression:  Redemonstration the subacute left corona radiata/basal ganglia infarct.  No acute vascular abnormality.  No high-grade stenosis or major vessel occlusion.  Multinodular enlarged thyroid.  Evaluation with thyroid ultrasound on a nonemergent basis recommended.     CT head w/o contrast 11-26-23 results:  Age indeterminate infarcts in the bilateral basal ganglia and left internal capsule/corona radiata, new from prior.  Recommend further evaluation with MRI.  New punctate hyperdensity in the right precentral gyrus.  This can be followed up MR imaging.     MRI brain w/o contrast 11-26-23 results:  Acute infarct involving the left internal capsule/centrum semiovale.  Foci of increased susceptibility in the right precentral gyrus and right paracentral parietal lobe, suggestive of small micro hemorrhages, new from MRI 02/26/2023.  Remote appearing infarcts of the bilateral basal ganglia and left thalamus.     CTA Multiphase 10/24/23  Impression:  No acute intracranial process with chronic ischemic changes.       CTA:  No significant stenosis at the carotid bifurcations by NASCET criteria.  Question right carotid web with adjacent thrombus, similar to the prior CTA versus soft atherosclerotic plaque.  The vertebral arteries  are  patent without advanced stenosis.  No major branch stenosis/ acute occlusion at the toisej-kv-Mendil.  Stable 5 mm left lung apex may also represent a focus of fibrosis.  One year follow-up in a high risk patient to be considered.        Cardiac Evaluation:   TTE 10/25/2023    Left Ventricle: The left ventricle is normal in size. Increased wall thickness. There is concentric remodeling. Normal wall motion. There is normal systolic function with a visually estimated ejection fraction of 55 - 60%. There is indeterminate diastolic function.    Right Ventricle: Normal right ventricular cavity size. Wall thickness is normal. Right ventricle wall motion  is normal. Systolic function is normal.    IVC/SVC: Normal venous pressure at 3 mmHg.    EKG 11-26-23 results:  Normal sinus rhythm Voltage criteria for left ventricular hypertrophy Nonspecific T wave abnormality Prolonged QT Abnormal ECG When compared with ECG of 24-OCT-2023 13:52, Premature supraventricular complexes are no longer Present    Kayleigh Solares PA-C  Comprehensive Stroke Center  Department of Vascular Neurology   [facility])

## 2023-12-04 NOTE — PROGRESS NOTES
"Rob Borjas - Neurosurgery ([facility])  Endocrinology  Progress Note    Admit Date: 2023     Reason for Consult: Management of T2DM, Hyperglycemia     Surgical Procedure and Date: N/A    Diabetes diagnosis year: > 10 years ago     Home Diabetes Medications:  Tresiba 20 units nightly, Humalog 6 units TID with meals plus scale (180-230 +2), Ozmepic 2 mg every 7 days    Lab Results   Component Value Date    HGBA1C 9.6 (H) 2023       How often checking glucose at home? Dexcom G6   BG readings on regimen: BRIA  Hypoglycemia on the regimen?  BRIA  Missed doses on regimen?  BRIA    Diabetes Complications include:     Hyperglycemia    Complicating diabetes co morbidities:   HTN, HLD, Obesity, previous CVA       HPI:   Patient is a 78 y.o. female with a diagnosis of HTN, T2DM, HLD, Obesity presented with 3 day history of right sided weakness and dysarthria. Found to have L MCA infarct. No thrombolytics or intervention as out of the window.  Etiology probable small vessel disease with co-morbidities. Endocrinology consulted for management of T2DM.              Interval HPI:   No acute events overnight. Patient in room 949/949 A. Blood glucose stable. BG at and above goal on current insulin regimen (SSI, prandial, and basal insulin ). Steroid use- None .   Renal function- Normal   Vasopressors-  None      Diet diabetic 2000 Calorie; Thin      Eatin%  Nausea: No  Hypoglycemia and intervention: No  Fever: No  TPN and/or TF: No    BP (!) 143/67 (BP Location: Right arm, Patient Position: Lying)   Pulse 76   Temp 97.7 °F (36.5 °C) (Oral)   Resp 20   Ht 5' 7" (1.702 m)   Wt 90 kg (198 lb 6.6 oz)   SpO2 99%   Breastfeeding No   BMI 31.08 kg/m²     Labs Reviewed and Include    Recent Labs   Lab 23  0458   *   CALCIUM 8.9   ALBUMIN 2.6*   PROT 6.3      K 3.8   CO2 22*      BUN 25*   CREATININE 0.9   ALKPHOS 78   ALT 17   AST 17   BILITOT 0.4     Lab Results   Component Value Date    WBC " "8.46 12/04/2023    HGB 12.0 12/04/2023    HCT 37.3 12/04/2023    MCV 86 12/04/2023     12/04/2023     No results for input(s): "TSH", "FREET4" in the last 168 hours.  Lab Results   Component Value Date    HGBA1C 9.6 (H) 11/26/2023       Nutritional status:   Body mass index is 31.08 kg/m².  Lab Results   Component Value Date    ALBUMIN 2.6 (L) 12/04/2023    ALBUMIN 2.5 (L) 12/03/2023    ALBUMIN 2.4 (L) 12/02/2023     No results found for: "PREALBUMIN"    Estimated Creatinine Clearance: 61.3 mL/min (based on SCr of 0.9 mg/dL).    Accu-Checks  Recent Labs     12/01/23  0808 12/01/23  1121 12/01/23  1614 12/01/23  2116 12/02/23  1232 12/02/23  1523 12/02/23  2109 12/03/23  0822 12/03/23  1706 12/03/23 2004   POCTGLUCOSE 120* 113* 116* 124* 133* 188* 155* 151* 256* 242*       Current Medications and/or Treatments Impacting Glycemic Control  Immunotherapy:    Immunosuppressants       None          Steroids:   Hormones (From admission, onward)      None          Pressors:    Autonomic Drugs (From admission, onward)      None          Hyperglycemia/Diabetes Medications:   Antihyperglycemics (From admission, onward)      Start     Stop Route Frequency Ordered    12/02/23 0900  insulin detemir U-100 (Levemir) pen 8 Units         -- SubQ Daily 12/02/23 0835    11/30/23 1130  insulin aspart U-100 pen 3 Units         -- SubQ 3 times daily with meals 11/30/23 0814    11/26/23 2347  insulin aspart U-100 pen 0-5 Units         -- SubQ Before meals & nightly PRN 11/26/23 2251            ASSESSMENT and PLAN    Neuro  * Cerebrovascular accident (CVA) due to thrombosis of left middle cerebral artery  Optimize BG control        Cardiac/Vascular  Hyperlipidemia  May increase insulin resistance.         Endocrine  Obesity (BMI 30-39.9)  Body mass index is 31.08 kg/m².  May increase insulin resistance.         Type 2 diabetes mellitus with hyperglycemia, with long-term current use of insulin  BG goal 140-180  T2DM.  L MCA infarct.  " BG stable, with some prandial elevations yesterday. Will increase mealtime regimen    Continue Levemir 8 units daily   Increase Novolog 4 units TID with meals (HOLD if patient eating < 25% or BG < 100)  Low Dose Correction Scale   BG monitoring ac/hs    ** Please call Endocrine for any BG related issues **      Discharge plans: TBD    Lab Results   Component Value Date    HGBA1C 9.6 (H) 11/26/2023                 Jaiden Chang PA-C  Endocrinology  [facility] - Neurosurgery ([facility])

## 2023-12-04 NOTE — ASSESSMENT & PLAN NOTE
79 y/o female with 3 day history of right sided weakness and dysarthria with new L MCA infarct. No thrombolytics or intervention as out of the window.  MRI brain with acute L MCA territory infarct. CTA unremarkable. Recent TTE unremarkable. Etiology likely small vessel disease with co-morbidities.     NAEO, neuro exam stable. Coreg increased for better BP control. Stable for discharge to rehab facility     Antithrombotics: DAPT    Statins: Lipitor 80 mg daily    Aggressive risk factor modification: HTN, Smoking, DM, HLD, Diet, Exercise, Obesity     Rehab efforts: The patient has been evaluated by a stroke team provider and the therapy needs have been fully considered based off the presenting complaints and exam findings. The following therapy evaluations are needed: PT evaluate and treat, OT evaluate and treat, SLP evaluate and treat, PM&R evaluate for appropriate placement    Diagnostics ordered/pending: None     VTE prophylaxis: Enoxaparin (CrCl < 30 ml/min) 30 mg SQ every 24 hours  Mechanical prophylaxis: Place SCDs    BP parameters: Infarct: No intervention, SBP<180

## 2023-12-04 NOTE — SUBJECTIVE & OBJECTIVE
Neurologic Chief Complaint: RSW, dysarthria    Subjective:     Interval History: Patient is seen for follow-up neurological assessment and treatment recommendations:   NAEO, neuro exam stable. Coreg increased for better BP control. Stable for discharge to rehab facility    HPI, Past Medical, Family, and Social History remains the same as documented in the initial encounter.     Review of Systems   Constitutional:  Negative for fever.   Musculoskeletal:  Positive for arthralgias.   Skin:  Negative for rash.   Neurological:  Positive for facial asymmetry and weakness. Negative for speech difficulty and numbness.     Scheduled Meds:   aspirin  81 mg Oral Daily    atorvastatin  80 mg Oral Daily    carvediloL  25 mg Oral BID    clopidogreL  75 mg Oral Daily    doxepin  10 mg Oral QHS    enoxparin  40 mg Subcutaneous Daily    gabapentin  300 mg Oral BID    hydrALAZINE  50 mg Oral TID    insulin aspart U-100  4 Units Subcutaneous TIDWM    insulin detemir U-100 (Levemir)  8 Units Subcutaneous Daily    NIFEdipine  90 mg Oral Daily    pantoprazole  40 mg Oral Daily    senna-docusate 8.6-50 mg  1 tablet Oral BID    valsartan  320 mg Oral Daily     Continuous Infusions:   sodium chloride 0.9%       PRN Meds:acetaminophen, dextrose 10%, dextrose 10%, glucagon (human recombinant), glucose, glucose, hydrALAZINE, insulin aspart U-100, labetalol, ondansetron, oxyCODONE, sodium chloride 0.9%, sodium chloride 0.9%    Objective:     Vital Signs (Most Recent):  Temp: 97.9 °F (36.6 °C) (12/04/23 1208)  Pulse: 71 (12/04/23 1208)  Resp: 20 (12/04/23 1208)  BP: (!) 197/82 (12/04/23 1208)  SpO2: 98 % (12/04/23 1208)  BP Location: Right arm    Vital Signs Range (Last 24H):  Temp:  [97.7 °F (36.5 °C)-98.5 °F (36.9 °C)]   Pulse:  [61-98]   Resp:  [16-20]   BP: (143-197)/(67-82)   SpO2:  [97 %-99 %]   BP Location: Right arm       Physical Exam  Vitals reviewed.   Constitutional:       General: She is not in acute distress.  HENT:      Head:  Normocephalic and atraumatic.   Eyes:      Extraocular Movements: Extraocular movements intact.   Cardiovascular:      Rate and Rhythm: Normal rate.   Pulmonary:      Effort: Pulmonary effort is normal. No respiratory distress.   Abdominal:      General: There is no distension.   Musculoskeletal:         General: Tenderness present.      Cervical back: Normal range of motion.      Comments: TTP  tenderness with passive ROM   Skin:     General: Skin is warm and dry.   Neurological:      Mental Status: She is alert and oriented to person, place, and time.              Neurological Exam:   LOC: alert  Attention Span: Good   Language: No aphasia  Articulation: No dysarthria  Orientation: Person, Place, Time   Visual Fields: Full  EOM (CN III, IV, VI): Full/intact  Facial Movement (CN VII): Lower facial weakness on the Right  Motor: Arm left  Normal 5/5  Leg left  Paresis: 4/5  Arm right  Paresis: 4/5  Leg right Paresis: 3/5    Laboratory:  CMP:   Recent Labs   Lab 12/04/23  0458   CALCIUM 8.9   ALBUMIN 2.6*   PROT 6.3      K 3.8   CO2 22*      BUN 25*   CREATININE 0.9   ALKPHOS 78   ALT 17   AST 17   BILITOT 0.4       CBC:   Recent Labs   Lab 12/04/23  0458   WBC 8.46   RBC 4.34   HGB 12.0   HCT 37.3      MCV 86   MCH 27.6   MCHC 32.2         Diagnostic Results     Brain Imaging   CTA Head and Neck 12/1/23  Impression:  Redemonstration the subacute left corona radiata/basal ganglia infarct.  No acute vascular abnormality.  No high-grade stenosis or major vessel occlusion.  Multinodular enlarged thyroid.  Evaluation with thyroid ultrasound on a nonemergent basis recommended.     CT head w/o contrast 11-26-23 results:  Age indeterminate infarcts in the bilateral basal ganglia and left internal capsule/corona radiata, new from prior.  Recommend further evaluation with MRI.  New punctate hyperdensity in the right precentral gyrus.  This can be followed up MR imaging.     MRI brain w/o contrast 11-26-23  results:  Acute infarct involving the left internal capsule/centrum semiovale.  Foci of increased susceptibility in the right precentral gyrus and right paracentral parietal lobe, suggestive of small micro hemorrhages, new from MRI 02/26/2023.  Remote appearing infarcts of the bilateral basal ganglia and left thalamus.     CTA Multiphase 10/24/23  Impression:  No acute intracranial process with chronic ischemic changes.       CTA:  No significant stenosis at the carotid bifurcations by NASCET criteria.  Question right carotid web with adjacent thrombus, similar to the prior CTA versus soft atherosclerotic plaque.  The vertebral arteries are  patent without advanced stenosis.  No major branch stenosis/ acute occlusion at the [id number].  Stable 5 mm left lung apex may also represent a focus of fibrosis.  One year follow-up in a high risk patient to be considered.        Cardiac Evaluation:   TTE 10/25/2023    Left Ventricle: The left ventricle is normal in size. Increased wall thickness. There is concentric remodeling. Normal wall motion. There is normal systolic function with a visually estimated ejection fraction of 55 - 60%. There is indeterminate diastolic function.    Right Ventricle: Normal right ventricular cavity size. Wall thickness is normal. Right ventricle wall motion  is normal. Systolic function is normal.    IVC/SVC: Normal venous pressure at 3 mmHg.    EKG 11-26-23 results:  Normal sinus rhythm Voltage criteria for left ventricular hypertrophy Nonspecific T wave abnormality Prolonged QT Abnormal ECG When compared with ECG of 24-OCT-2023 13:52, Premature supraventricular complexes are no longer Present

## 2023-12-04 NOTE — NURSING
DISCHARGE NOTE    Pt discharging to [facility]. Report called to Elicia. Addressed all questions/concerns. IV and tele box removed. Transport scheduled for 1530. JUD sewell this time.    Chel Mcdaniels

## 2023-12-04 NOTE — PLAN OF CARE
Problem: Adjustment to Illness (Stroke, Ischemic/Transient Ischemic Attack)  Goal: Optimal Coping  Outcome: Ongoing, Progressing     Problem: Bowel Elimination Impaired (Stroke, Ischemic/Transient Ischemic Attack)  Goal: Effective Bowel Elimination  Outcome: Ongoing, Progressing     Problem: Cerebral Tissue Perfusion (Stroke, Ischemic/Transient Ischemic Attack)  Goal: Optimal Cerebral Tissue Perfusion  Outcome: Ongoing, Progressing     Problem: Cognitive Impairment (Stroke, Ischemic/Transient Ischemic Attack)  Goal: Optimal Cognitive Function  Outcome: Ongoing, Progressing     Problem: Communication Impairment (Stroke, Ischemic/Transient Ischemic Attack)  Goal: Improved Communication Skills  Outcome: Ongoing, Progressing     Problem: Functional Ability Impaired (Stroke, Ischemic/Transient Ischemic Attack)  Goal: Optimal Functional Ability  Outcome: Ongoing, Progressing     Problem: Respiratory Compromise (Stroke, Ischemic/Transient Ischemic Attack)  Goal: Effective Oxygenation and Ventilation  Outcome: Ongoing, Progressing     Problem: Sensorimotor Impairment (Stroke, Ischemic/Transient Ischemic Attack)  Goal: Improved Sensorimotor Function  Outcome: Ongoing, Progressing     Problem: Swallowing Impairment (Stroke, Ischemic/Transient Ischemic Attack)  Goal: Optimal Eating and Swallowing without Aspiration  Outcome: Ongoing, Progressing     Problem: Urinary Elimination Impaired (Stroke, Ischemic/Transient Ischemic Attack)  Goal: Effective Urinary Elimination  Outcome: Ongoing, Progressing     Problem: Fall Injury Risk  Goal: Absence of Fall and Fall-Related Injury  Outcome: Ongoing, Progressing     Problem: Adult Inpatient Plan of Care  Goal: Plan of Care Review  Outcome: Ongoing, Progressing  Goal: Patient-Specific Goal (Individualized)  Outcome: Ongoing, Progressing  Goal: Absence of Hospital-Acquired Illness or Injury  Outcome: Ongoing, Progressing  Goal: Optimal Comfort and Wellbeing  Outcome: Ongoing,  Progressing  Goal: Readiness for Transition of Care  Outcome: Ongoing, Progressing     Problem: Infection  Goal: Absence of Infection Signs and Symptoms  Outcome: Ongoing, Progressing     Problem: Diabetes Comorbidity  Goal: Blood Glucose Level Within Targeted Range  Outcome: Ongoing, Progressing     Problem: Skin Injury Risk Increased  Goal: Skin Health and Integrity  Outcome: Ongoing, Progressing     Problem: Pain Acute  Goal: Acceptable Pain Control and Functional Ability  Outcome: Ongoing, Progressing     Problem: Fatigue  Goal: Improved Activity Tolerance  Outcome: Ongoing, Progressing   Good hours noted this shift. Rested well and denied needs or questions after review of POC. Safety barriers remain intact and alarms audible.

## 2023-12-04 NOTE — SUBJECTIVE & OBJECTIVE
"Interval HPI:   No acute events overnight. Patient in room 949/949 A. Blood glucose stable. BG at and above goal on current insulin regimen (SSI, prandial, and basal insulin ). Steroid use- None .   Renal function- Normal   Vasopressors-  None      Diet diabetic 2000 Calorie; Thin      Eatin%  Nausea: No  Hypoglycemia and intervention: No  Fever: No  TPN and/or TF: No    BP (!) 143/67 (BP Location: Right arm, Patient Position: Lying)   Pulse 76   Temp 97.7 °F (36.5 °C) (Oral)   Resp 20   Ht 5' 7" (1.702 m)   Wt 90 kg (198 lb 6.6 oz)   SpO2 99%   Breastfeeding No   BMI 31.08 kg/m²     Labs Reviewed and Include    Recent Labs   Lab 23  0458   *   CALCIUM 8.9   ALBUMIN 2.6*   PROT 6.3      K 3.8   CO2 22*      BUN 25*   CREATININE 0.9   ALKPHOS 78   ALT 17   AST 17   BILITOT 0.4     Lab Results   Component Value Date    WBC 8.46 2023    HGB 12.0 2023    HCT 37.3 2023    MCV 86 2023     2023     No results for input(s): "TSH", "FREET4" in the last 168 hours.  Lab Results   Component Value Date    HGBA1C 9.6 (H) 2023       Nutritional status:   Body mass index is 31.08 kg/m².  Lab Results   Component Value Date    ALBUMIN 2.6 (L) 2023    ALBUMIN 2.5 (L) 2023    ALBUMIN 2.4 (L) 2023     No results found for: "PREALBUMIN"    Estimated Creatinine Clearance: 61.3 mL/min (based on SCr of 0.9 mg/dL).    Accu-Checks  Recent Labs     23  0808 23  1121 23  1614 23  2116 23  1232 23  1523 23  2109 23  0822 23  1706 23   POCTGLUCOSE 120* 113* 116* 124* 133* 188* 155* 151* 256* 242*       Current Medications and/or Treatments Impacting Glycemic Control  Immunotherapy:    Immunosuppressants       None          Steroids:   Hormones (From admission, onward)      None          Pressors:    Autonomic Drugs (From admission, onward)      None          Hyperglycemia/Diabetes " Medications:   Antihyperglycemics (From admission, onward)      Start     Stop Route Frequency Ordered    12/02/23 0900  insulin detemir U-100 (Levemir) pen 8 Units         -- SubQ Daily 12/02/23 0835    11/30/23 1130  insulin aspart U-100 pen 3 Units         -- SubQ 3 times daily with meals 11/30/23 0814    11/26/23 2347  insulin aspart U-100 pen 0-5 Units         -- SubQ Before meals & nightly PRN 11/26/23 0085

## 2023-12-04 NOTE — PLAN OF CARE
Problem: Communication Impairment (Stroke, Ischemic/Transient Ischemic Attack)  Goal: Improved Communication Skills  Outcome: Ongoing, Progressing     Problem: Respiratory Compromise (Stroke, Ischemic/Transient Ischemic Attack)  Goal: Effective Oxygenation and Ventilation  Outcome: Ongoing, Progressing     Problem: Adjustment to Illness (Stroke, Ischemic/Transient Ischemic Attack)  Goal: Optimal Coping  Outcome: Ongoing, Not Progressing     Problem: Cerebral Tissue Perfusion (Stroke, Ischemic/Transient Ischemic Attack)  Goal: Optimal Cerebral Tissue Perfusion  Outcome: Ongoing, Not Progressing     Problem: Functional Ability Impaired (Stroke, Ischemic/Transient Ischemic Attack)  Goal: Optimal Functional Ability  Outcome: Ongoing, Not Progressing     Pt. More alert mid-day moved to Aox4, attempted to transfer pt. To arm chair however, torso control decreased and pt. Unstable to transfer/  dangles with max assist for 5 mins.  BP within parameters, all other VS wnl.  Incontinent x2, no BM today even with PO laxative.   Able to lift and hold LLE with slight drift.    Pending transfer to rehab tomorrow

## 2023-12-04 NOTE — DISCHARGE SUMMARY
Rob Borjas - Neurosurgery ([facility])  Vascular Neurology  [facility] St[facility]  Discharge Summary     Summary:     Admit Date: 11/26/2023  8:28 PM    Discharge Date and Time: 12/4/2023  3:53 PM    Attending Physician: Michael Walsh MD     Discharge Provider: Kayleigh Solares PA-C    History of Present Illness:   77 y/o female with 3 day history of right sided weakness and dysarthria. Most family out of town and person with her did not realize she needed to come into the ED.  She presented to the ED today and when trying to get her out of the car she was lowered to the ground. Assistance was needed to get her up into WC and bring into the ED.    She has right sided weakness, slight sensory loss and dysarthria.  CT scan no acute process seen  MRI reveals Acute infarct involving the left internal capsule/centrum semiovale.     Risk factors HTN, HLP, DM, obesity    Off note patient had Holter monitor on 10-25-23 and no arrhythmias seen     Hospital Course (synopsis of major diagnoses, care, treatment, and services provided during the course of the hospital stay):   Ms. Buck Ibarra is a 78 y.o. female with significant PMH of HTN, HLD, DM, smoker that was admitted for further workup and management of acute L MCA stroke after presenting with 3 day history of RSW and dysarthria. Inpatient stroke eval completed during admission and workup significant for: MRI with L internal capsule infarct; CTA head/neck unremarkable; recent TTE unremarkable. Stroke etiology likely small vessel disease, discharged on stroke prevention with DAPT x 30 days (followed by ASA monotherapy) and atorvastatin 80mg. Hospital course complicated by R ankle pain for which patient was seen by ortho and joint aspiration not concerning for septic joint. Also noted to have A1c of 9.6 for which endocrinology was consulted for comanagement. During hospital stay patient also with SBP persistently above goal, home meds adjusted/titrated and BP  control improved prior to discharge. PT/OT/SLP evaluations completed and recommendations provided for dispo planning. Patient stable for discharge to Rehab facility.    Please see below for additional details of hospital stay as well as all appropriate medication changes, imaging results, and necessary follow-ups.      11/27/2023 NAEON. Exam stable. Pt reports R ankle pain, had difficulty working with PT. R ankle xray shows no fracture or dislocation.  11/28/2023 NAEON. Exam stable. Ortho consulted for R ankle pain w/ elevated ESR/CRP, joint aspirated and steroids injected. Not concerned for septic joint at this time, aspirate negative for crystals. Glucose imp. ELIZABETH imp.  11/29/2023 NAEON. SBP intermittently in 200s-230s, asymptomatic. Resumed home coreg. ELIZABETH improving. R ankle pain improving. Pending auth for IPR.  11/30/2023 NAEON. SBP 160s-200s. Increased home nifedipine. Able to work with PT today. IPR auth denied following peer to peer. Pending placement.  12/1/2023 patient aphasic and not following commands this morning, CTA without LVO. Patient re-examined later and speech improved. -190 this morning, increased nifedipine. PRN oxy ordered to be given prior to working with PT/OT for R ankle pain  12/02/2023 NAEON. SBP 160s-190s past 24 hours. Required PRN antihypertensive meds. Neuro exam stable.  12/03/2023 NAEON . SBP 130s-160s past 24 hours. No PRN antihypertensives administered. Neuro exam stable.  12/04/2023 NAEO, neuro exam stable. Coreg increased for better BP control. Stable for discharge to rehab facility      Goals of Care Treatment Preferences:  Code Status: Full Code    Health care agent: Olga  Gyros [facility] agent number: [phone]    Living Will: Yes              Stroke Etiology: Ischemic Small Vessel Disease ([place])    STROKE DOCUMENTATION         NIH Scale:  1a. Level of Consciousness: 0-->Alert, keenly responsive  1b. LOC Questions: 0-->Answers both questions correctly  1c. LOC  Commands: 0-->Performs both tasks correctly  2. Best Gaze: 0-->Normal  3. Visual: 0-->No visual loss  4. Facial Palsy: 1-->Minor paralysis (flattened nasolabial fold, asymmetry on smiling)  5a. Motor Arm, Left: 0-->No drift, limb holds 90 (or 45) degrees for full 10 secs  5b. Motor Arm, Right: 1-->Drift, limb holds 90 (or 45) degrees, but drifts down before full 10 secs, does not hit bed or other support  6a. Motor Leg, Left: 1-->Drift, leg falls by the end of the 5-sec period but does not hit bed  6b. Motor Leg, Right: 2-->Some effort against gravity, leg falls to bed by 5 secs, but has some effort against gravity  7. Limb Ataxia: 0-->Absent  8. Sensory: 0-->Normal, no sensory loss  9. Best Language: 0-->No aphasia, normal  10. Dysarthria: 0-->Normal  11. Extinction and Inattention (formerly Neglect): 0-->No abnormality  Total (NIH Stroke Scale): 5        Modified Hurricane Score: 4  [place] Coma Scale:    ABCD2 Score:    [id number] Score:   HAS -BLED Score:   ICH Score:   Hunt & Hutton Classification:       Assessment/Plan:     Diagnostic Results:    Brain/Vessel Imaging   CTA Head and Neck 12/1/23  Impression:  Redemonstration the subacute left corona radiata/basal ganglia infarct.  No acute vascular abnormality.  No high-grade stenosis or major vessel occlusion.  Multinodular enlarged thyroid.  Evaluation with thyroid ultrasound on a nonemergent basis recommended.    MRI brain w/o contrast 11/26/2023:  Impression:  Acute infarct involving the left internal capsule/centrum semiovale.  Foci of increased susceptibility in the right precentral gyrus and right paracentral parietal lobe, suggestive of small micro hemorrhages, new from MRI 02/26/2023.  Remote appearing infarcts of the bilateral basal ganglia and left thalamus.    CT head w/o contrast 11/26/2023  Impression:  Age indeterminate infarcts in the bilateral basal ganglia and left internal capsule/corona radiata, new from prior.  Recommend further evaluation  with MRI.  New punctate hyperdensity in the right precentral gyrus.  This can be followed up MR imaging.        Cardiac Evaluation:   TTE 10/25/2023    Left Ventricle: The left ventricle is normal in size. Increased wall thickness. There is concentric remodeling. Normal wall motion. There is normal systolic function with a visually estimated ejection fraction of 55 - 60%. There is indeterminate diastolic function.    Right Ventricle: Normal right ventricular cavity size. Wall thickness is normal. Right ventricle wall motion  is normal. Systolic function is normal.    IVC/SVC: Normal venous pressure at 3 mmHg.        Interventions: None    Complications: None    Disposition: Rehab Facility    Final Active Diagnoses:    Diagnosis Date Noted POA    PRINCIPAL PROBLEM:  Cerebrovascular accident (CVA) due to thrombosis of left middle cerebral artery [I63.312] 11/26/2023 Yes    Class 1 obesity due to excess calories with serious comorbidity and body mass index (BMI) of 31.0 to 31.9 in adult [E66.09, Z68.31] 11/27/2023 Not Applicable    Acute right ankle pain [M25.571] 11/27/2023 Yes    Current every day smoker [F17.200] 11/26/2023 Yes    Hemiparesis, right [G81.91] 11/26/2023 Yes    Obesity (BMI 30-39.9) [E66.9] 04/21/2016 Yes     Chronic    Stage 3a chronic kidney disease [N18.31] 01/21/2016 Yes    Hyperlipidemia [E78.5] 08/25/2015 Yes    Type 2 diabetes mellitus with hyperglycemia, with long-term current use of insulin [E11.65, Z79.4] 07/22/2015 Not Applicable     Chronic    Hypertension associated with diabetes [E11.59, I15.2] 12/19/2012 Yes      Problems Resolved During this Admission:     No new Assessment & Plan notes have been filed under this hospital service since the last note was generated.  Service: Vascular Neurology      Recommendations:     Post-discharge complication risks: Falls, Skin breakdown    Stroke Education given to: patient and family    Follow-up in Stroke Clinic in 4-6 weeks.     Discharge  Plan:  Antithrombotics: Aspirin 81mg, Clopidogrel 75mg  Statin: Atorvastatin 80mg  Aggresive risk factor modification:  Hypertension  Smoking  Diabetes  High Cholesterol  Diet  Exercise  Obesity    Follow Up:      Patient Instructions:      Ambulatory referral/consult to Vascular Neurology   Standing Status: Future   Referral Priority: Routine Referral Type: Consultation   Referral Reason: Specialty Services Required   Requested Specialty: Vascular Neurology   Number of Visits Requested: 1     Diet diabetic     Call 911 for any of the following:   Order Comments: Call 911  right away if any of the following warning signs come on suddenly, even if the symptoms only last for a few minutes. With stroke, timing is very important.   - Warning Signs of Stroke:  - Weakness: You may feel a sudden weakness, tingling or loss of feeling on one side of your face or body.  - Vision Problems: You may have sudden double vision or trouble seeing in one or both eyes.  - Speech Problems: You may have sudden trouble talking, slured speech, or problems understanding others.  - Headache: You may have sudden, severe headache.  - Movement Problems: You may experience dizziness, a feeling of spinning, a loss of balance, a feeling of falling or blackouts.     Activity as tolerated       Medications:  Reconciled Home Medications:      Medication List        START taking these medications      * insulin aspart U-100 100 unit/mL (3 mL) Inpn pen  Commonly known as: NovoLOG  Inject 0-5 Units into the skin before meals and at bedtime as needed (Hyperglycemia).     * insulin aspart U-100 100 unit/mL (3 mL) Inpn pen  Commonly known as: NovoLOG  Inject 4 Units into the skin 3 (three) times daily.     insulin detemir U-100 (Levemir) 100 unit/mL (3 mL) Inpn pen  Inject 8 Units into the skin once daily.  Start taking on: December 5, 2023  Replaces: TRESIBA FLEXTOUCH U-200 200 unit/mL (3 mL) insulin pen           * This list has 2 medication(s) that are  "the same as other medications prescribed for you. Read the directions carefully, and ask your doctor or other care provider to review them with you.                CHANGE how you take these medications      carvediloL 25 MG tablet  Commonly known as: COREG  Take 1 tablet (25 mg total) by mouth 2 (two) times daily.  What changed:   medication strength  how much to take  how to take this  when to take this     gabapentin 300 MG capsule  Commonly known as: NEURONTIN  Take 1 capsule (300 mg total) by mouth 2 (two) times daily.  What changed:   how much to take  how to take this  when to take this     hydrALAZINE 50 MG tablet  Commonly known as: APRESOLINE  Take 1 tablet (50 mg total) by mouth 3 (three) times daily.  What changed:   when to take this  additional instructions     NIFEdipine 90 MG (OSM) 24 hr tablet  Commonly known as: PROCARDIA-XL  Take 1 tablet (90 mg total) by mouth once daily.  Start taking on: December 5, 2023  What changed:   medication strength  how much to take     pantoprazole 40 MG tablet  Commonly known as: PROTONIX  Take 1 tablet (40 mg total) by mouth once daily.  Start taking on: December 5, 2023  What changed:   how much to take  when to take this            CONTINUE taking these medications      aspirin 81 MG EC tablet  Commonly known as: ECOTRIN  Take 1 tablet (81 mg total) by mouth once daily.     BD ULTRA-FINE MILAN PEN NEEDLE 32 gauge x 5/32" Ndle  Generic drug: pen needle, diabetic  Use as directed 3 times daily with insulin pens     candesartan 32 MG tablet  Commonly known as: ATACAND  Take 1 tablet (32 mg total) by mouth once daily.     clopidogreL 75 mg tablet  Commonly known as: PLAVIX  Take 1 tablet (75 mg total) by mouth once daily.     DEXCOM G6  Misc  Generic drug: blood-glucose meter,continuous  Use as directed.     DEXCOM G6 SENSOR Rashida  Generic drug: blood-glucose sensor  Change every 10 days. 90 day e 11.65     DEXCOM G6 TRANSMITTER Rashida  Generic drug: blood-glucose " transmitter  Change every 3 months.     doxepin 10 MG capsule  Commonly known as: SINEQUAN  Take 1 capsule (10 mg total) by mouth nightly for 90 days.     meclizine 25 mg tablet  Commonly known as: ANTIVERT  Take 1 tablet (25 mg total) by mouth 3 (three) times daily as needed for Dizziness.     OZEMPIC 2 mg/dose (8 mg/3 mL) Pnij  Generic drug: semaglutide  Inject 2 mg into the skin every 7 days.     rosuvastatin 40 MG Tab  Commonly known as: CRESTOR  Take 1 tablet (40 mg total) by mouth every evening.            STOP taking these medications      HumaLOG KwikPen Insulin 100 unit/mL pen  Generic drug: insulin lispro     TRESIBA FLEXTOUCH U-200 200 unit/mL (3 mL) insulin pen  Generic drug: insulin degludec  Replaced by: insulin detemir U-100 (Levemir) 100 unit/mL (3 mL) Inpn pen              Kayleigh Solares PA-C  Com[facility] St[facility] Center  Department of Vascular Neurology   [facility] Neurosurgery [facility])

## 2023-12-04 NOTE — NURSING
Attempted to call report to [facility]. Left voicemail. Will attempt to call again at a later time.    Chel Mcdaniels

## 2023-12-04 NOTE — PLAN OF CARE
[facility]    FACILITY TRANSFER ORDERS      Patient Name: Buck Ibarra  YOB: 1945    PCP: Ericka Cortez MD   PCP Address: [address] / [place] [address]  PCP Phone Number: [phone]  PCP Fax: [phone]    Encounter Date: 12/04/2023    Admit to: Rehab facility    Vital Signs:  Routine    Diagnoses:   Active Hospital Problems    Diagnosis  POA    *Cerebrovascular accident (CVA) due to thrombosis of left middle cerebral artery [I63.312]  Yes    Class 1 obesity due to excess calories with serious comorbidity and body mass index (BMI) of 31.0 to 31.9 in adult [E66.09, Z68.31]  Not Applicable    Acute right ankle pain [M25.571]  Yes    Current every day smoker [F17.200]  Yes    Hemiparesis, right [G81.91]  Yes    Obesity (BMI 30-39.9) [E66.9]  Yes     Chronic    Stage 3a chronic kidney disease [N18.31]  Yes    Hyperlipidemia [E78.5]  Yes    Type 2 diabetes mellitus with hyperglycemia, with long-term current use of insulin [E11.65, Z79.4]  Not Applicable     Chronic    Hypertension associated with diabetes [E11.59, I15.2]  Yes      Resolved Hospital Problems   No resolved problems to display.       Allergies:  Review of patient's allergies indicates:   Allergen Reactions    Amlodipine Swelling    Erythromycin Anaphylaxis    Erythropoietin analogues Anaphylaxis    Pegasys [peginterferon ruben-2a] Anaphylaxis    Lisinopril Hives       Diet: diabetic diet: 2000 calorie    Activities: Activity as tolerated    Goals of Care Treatment Preferences:  Code Status: Full Code    Health care agent: Olga  [facility] agent number: [phone]    Living Will: Yes              Nursing: per facility protocol     Labs:  per facility protocol     CONSULTS:    Physical Therapy to evaluate and treat. , Occupational Therapy to evaluate and treat., and Speech Therapy to evaluate and treat for Language, Swallowing, and Cognition.    MISCELLANEOUS CARE:  Diabetes Care:   SN to perform and educate  Diabetic management with blood glucose monitoring:, Fingerstick blood sugar AC and HS, and Report CBG < 60 or > 350 to physician.    WOUND CARE ORDERS  None    Medications: Review discharge medications with patient and family and provide education.      Current Discharge Medication List        START taking these medications    Details   !! insulin aspart U-100 (NOVOLOG) 100 unit/mL (3 mL) InPn pen Inject 0-5 Units into the skin before meals and at bedtime as needed (Hyperglycemia).  Refills: 0      !! insulin aspart U-100 (NOVOLOG) 100 unit/mL (3 mL) InPn pen Inject 4 Units into the skin 3 (three) times daily.  Refills: 0      insulin detemir U-100, Levemir, 100 unit/mL (3 mL) SubQ InPn pen Inject 8 Units into the skin once daily.  Refills: 0       !! - Potential duplicate medications found. Please discuss with provider.        CONTINUE these medications which have CHANGED    Details   carvediloL (COREG) 25 MG tablet Take 1 tablet (25 mg total) by mouth 2 (two) times daily.  Qty: 60 tablet, Refills: 11    Comments: .      gabapentin (NEURONTIN) 300 MG capsule Take 1 capsule (300 mg total) by mouth 2 (two) times daily.  Qty: 60 capsule, Refills: 11      hydrALAZINE (APRESOLINE) 50 MG tablet Take 1 tablet (50 mg total) by mouth 3 (three) times daily.  Qty: 90 tablet, Refills: 11    Comments: .      NIFEdipine (PROCARDIA-XL) 90 MG (OSM) 24 hr tablet Take 1 tablet (90 mg total) by mouth once daily.  Qty: 30 tablet, Refills: 11    Comments: .      pantoprazole (PROTONIX) 40 MG tablet Take 1 tablet (40 mg total) by mouth once daily.  Qty: 30 tablet, Refills: 11           CONTINUE these medications which have NOT CHANGED    Details   blood-glucose meter,continuous (DEXCOM G6 ) Misc Use as directed.  Qty: 1 each, Refills: 0      blood-glucose sensor (DEXCOM G6 SENSOR) Rashida Change every 10 days. 90 day e 11.65  Qty: 9 each, Refills: 3      blood-glucose transmitter (DEXCOM G6 TRANSMITTER) Rashida Change every 3  "months.  Qty: 1 each, Refills: 3      candesartan (ATACAND) 32 MG tablet Take 1 tablet (32 mg total) by mouth once daily.  Qty: 90 tablet, Refills: 3    Associated Diagnoses: Benign essential hypertension      clopidogreL (PLAVIX) 75 mg tablet Take 1 tablet (75 mg total) by mouth once daily.  Qty: 90 tablet, Refills: 3      doxepin (SINEQUAN) 10 MG capsule Take 1 capsule (10 mg total) by mouth nightly for 90 days.  Qty: 90 capsule, Refills: 3      meclizine (ANTIVERT) 25 mg tablet Take 1 tablet (25 mg total) by mouth 3 (three) times daily as needed for Dizziness.  Qty: 90 tablet, Refills: 0      pen needle, diabetic (BD ULTRA-FINE MILAN PEN NEEDLE) 32 gauge x 5/32" Ndle Use as directed 3 times daily with insulin pens  Qty: 300 each, Refills: 3    Associated Diagnoses: Type 2 diabetes mellitus with hyperglycemia, with long-term current use of insulin      rosuvastatin (CRESTOR) 40 MG Tab Take 1 tablet (40 mg total) by mouth every evening.  Qty: 90 tablet, Refills: 3    Associated Diagnoses: Hyperlipidemia associated with type 2 diabetes mellitus; History of CVA (cerebrovascular accident)      semaglutide (OZEMPIC) 2 mg/dose (8 mg/3 mL) PnIj Inject 2 mg into the skin every 7 days.  Qty: 3 mL, Refills: 11    Associated Diagnoses: Type 2 diabetes mellitus with hyperglycemia, with long-term current use of insulin      aspirin (ECOTRIN) 81 MG EC tablet Take 1 tablet (81 mg total) by mouth once daily.  Qty: 90 tablet, Refills: 3    Associated Diagnoses: PAD (peripheral artery disease); Hyperlipidemia, unspecified hyperlipidemia type           STOP taking these medications       insulin degludec (TRESIBA FLEXTOUCH U-200) 200 unit/mL (3 mL) insulin pen Comments:   Reason for Stopping:         insulin lispro (HUMALOG KWIKPEN INSULIN) 100 unit/mL pen Comments:   Reason for Stopping:                  Immunizations Administered as of 12/4/2023       Name Date Dose VIS Date Route Exp Date    COVID-19, MRNA, LN-S, PF (Pfizer) " (Purple Cap) 6/2/2022 12:03 PM 0.3 mL 9/22/2021 Intramuscular 10/31/2022    Site: Left deltoid     Given By: Sweta Lindsey LPN     : [employer]     Lot: [id number]     COVID-19, MRNA, LN-S, PF (Pfizer) (Purple Cap) 6/10/2021  8:59 AM 0.3 mL 12/12/2020 Intramuscular 8/31/2021    Site: Left deltoid     Given By: Quin Olivia     : [employer]     Lot: [id number]     COVID-19, MRNA, LN-S, PF (Pfizer) (Purple Cap) 5/20/2021  8:36 AM 0.3 mL 12/12/2020 Intramuscular 7/31/2021    Site: Left deltoid     Given By: Farzaneh Velazquez RN     : [employer]     Lot: [id number]             This patient has had both covid vaccinations    Some patients may experience side effects after vaccination.  These may include fever, headache, muscle or joint aches.  Most symptoms resolve with 24-48 hours and do not require urgent medical evaluation unless they persist for more than 72 hours or symptoms are concerning for an unrelated medical condition.          _________________________________  Kayleigh Solares PA-C  12/04/2023

## 2023-12-26 NOTE — TELEPHONE ENCOUNTER
----- Message from Jared Ladn MD sent at 12/26/2023  2:47 PM CST -----  D/c'd yesterday from inpatient rehab following a stroke.  She requires total assistance for mobility and ADLs.  She'll need hospital f/u but [facility] will likely be most appropriate.

## 2023-12-28 NOTE — TELEPHONE ENCOUNTER
Jared Land MD Clark, Yvonne, MA  Cc: Coral Fields RN; Catherine Starks MA  Caller: Unspecified (Yesterday,  1:47 PM)  We're currently working on getting one of the [facility] NPs to see her at home for the d/c f/u since she is so disabled following the stroke.

## 2023-12-29 NOTE — TELEPHONE ENCOUNTER
----- Message from Alysia Heller sent at 12/29/2023  2:28 PM CST -----  Contact: [phone]  Requesting an RX refill or new RX.  Is this a refill or new RX:   RX name and strength PEN NEEDLES FOR THE FLEX PEN AND GLUCOSE MONITOR   Is this a 30 day or 90 day RX:   Pharmacy name and phone #   [facility]  [address]  [place] [address]  Phone: [phone] Fax: [phone]

## 2023-12-29 NOTE — TELEPHONE ENCOUNTER
Pt daughter here to go over meds. Blood sugar machine is true metrix, and test strips are  since 2019.  They have lantus insulin, but no needles.   They have picked up Carvedilol and Nifedipine and have them in bottles    Patient had pill packs prior to hospital admit, daughter has been giving her these since hospital discharge    Old pill packs as follows    AM Pill Packs   1-Candesartan cilexetil 32mg  1-Clopidogrel 75 mg  1-Carvedilol 6.25 mg  1-Gabapentin 300 mg    PM Pill Packs   1-Carvedilol 6.25 mg   1-gabapentin 300 mg  1-doxepin 10 mg  1-rosuvastatin 40mg    Has 3 pill packs remaining, and states she is going to give those over the weekend.      Daughter states that she feels she can safely transport her mom to appt this coming Tuesday at 10:20.

## 2024-01-01 ENCOUNTER — PATIENT MESSAGE (OUTPATIENT)
Dept: PRIMARY CARE CLINIC | Facility: CLINIC | Age: 79
End: 2024-01-01
Payer: MEDICARE

## 2024-01-01 ENCOUNTER — OFFICE VISIT (OUTPATIENT)
Dept: PRIMARY CARE CLINIC | Facility: CLINIC | Age: 79
End: 2024-01-01
Payer: MEDICARE

## 2024-01-01 ENCOUNTER — TELEPHONE (OUTPATIENT)
Dept: PRIMARY CARE CLINIC | Facility: CLINIC | Age: 79
End: 2024-01-01
Payer: MEDICARE

## 2024-01-01 ENCOUNTER — HOSPITAL ENCOUNTER (INPATIENT)
Facility: HOSPITAL | Age: 79
LOS: 4 days | Discharge: HOSPICE | DRG: 871 | End: 2024-01-13
Attending: STUDENT IN AN ORGANIZED HEALTH CARE EDUCATION/TRAINING PROGRAM | Admitting: STUDENT IN AN ORGANIZED HEALTH CARE EDUCATION/TRAINING PROGRAM
Payer: MEDICARE

## 2024-01-01 ENCOUNTER — DOCUMENT SCAN (OUTPATIENT)
Dept: HOME HEALTH SERVICES | Facility: HOSPITAL | Age: 79
End: 2024-01-01
Payer: MEDICARE

## 2024-01-01 VITALS
OXYGEN SATURATION: 97 % | SYSTOLIC BLOOD PRESSURE: 144 MMHG | WEIGHT: 192 LBS | HEIGHT: 67 IN | TEMPERATURE: 98 F | DIASTOLIC BLOOD PRESSURE: 76 MMHG | RESPIRATION RATE: 18 BRPM | BODY MASS INDEX: 30.13 KG/M2 | HEART RATE: 56 BPM

## 2024-01-01 DIAGNOSIS — I73.9 PAD (PERIPHERAL ARTERY DISEASE): ICD-10-CM

## 2024-01-01 DIAGNOSIS — R31.9 URINARY TRACT INFECTION WITH HEMATURIA, SITE UNSPECIFIED: ICD-10-CM

## 2024-01-01 DIAGNOSIS — Z86.73 HISTORY OF CVA (CEREBROVASCULAR ACCIDENT): Primary | ICD-10-CM

## 2024-01-01 DIAGNOSIS — Z79.4 TYPE 2 DIABETES MELLITUS WITH DIABETIC POLYNEUROPATHY, WITH LONG-TERM CURRENT USE OF INSULIN: Primary | Chronic | ICD-10-CM

## 2024-01-01 DIAGNOSIS — R00.0 TACHYCARDIA: ICD-10-CM

## 2024-01-01 DIAGNOSIS — E11.42 TYPE 2 DIABETES MELLITUS WITH DIABETIC POLYNEUROPATHY, WITH LONG-TERM CURRENT USE OF INSULIN: Primary | Chronic | ICD-10-CM

## 2024-01-01 DIAGNOSIS — G93.40 ACUTE ENCEPHALOPATHY: ICD-10-CM

## 2024-01-01 DIAGNOSIS — R41.82 ALTERED MENTAL STATUS, UNSPECIFIED ALTERED MENTAL STATUS TYPE: Primary | ICD-10-CM

## 2024-01-01 DIAGNOSIS — G47.00 INSOMNIA, UNSPECIFIED TYPE: ICD-10-CM

## 2024-01-01 DIAGNOSIS — N39.0 URINARY TRACT INFECTION WITH HEMATURIA, SITE UNSPECIFIED: ICD-10-CM

## 2024-01-01 DIAGNOSIS — I70.269 ATHEROSCLEROTIC PERIPHERAL VASCULAR DISEASE WITH GANGRENE: ICD-10-CM

## 2024-01-01 DIAGNOSIS — R07.9 CHEST PAIN: ICD-10-CM

## 2024-01-01 LAB
ACID FAST MOD KINY STN SPEC: NORMAL
ALBUMIN SERPL BCP-MCNC: 1.8 G/DL (ref 3.5–5.2)
ALBUMIN SERPL BCP-MCNC: 2 G/DL (ref 3.5–5.2)
ALBUMIN SERPL BCP-MCNC: 2.1 G/DL (ref 3.5–5.2)
ALLENS TEST: ABNORMAL
ALLENS TEST: NORMAL
ALP SERPL-CCNC: 102 U/L (ref 55–135)
ALP SERPL-CCNC: 130 U/L (ref 55–135)
ALP SERPL-CCNC: 97 U/L (ref 55–135)
ALT SERPL W/O P-5'-P-CCNC: 40 U/L (ref 10–44)
ALT SERPL W/O P-5'-P-CCNC: 41 U/L (ref 10–44)
ALT SERPL W/O P-5'-P-CCNC: 43 U/L (ref 10–44)
AMMONIA PLAS-SCNC: 19 UMOL/L (ref 10–50)
AMPHET+METHAMPHET UR QL: NEGATIVE
ANION GAP SERPL CALC-SCNC: 10 MMOL/L (ref 8–16)
ANION GAP SERPL CALC-SCNC: 11 MMOL/L (ref 8–16)
ANION GAP SERPL CALC-SCNC: 12 MMOL/L (ref 8–16)
ANION GAP SERPL CALC-SCNC: 12 MMOL/L (ref 8–16)
ANION GAP SERPL CALC-SCNC: 13 MMOL/L (ref 8–16)
ANION GAP SERPL CALC-SCNC: 6 MMOL/L (ref 8–16)
ANION GAP SERPL CALC-SCNC: 7 MMOL/L (ref 8–16)
ANION GAP SERPL CALC-SCNC: 8 MMOL/L (ref 8–16)
ANION GAP SERPL CALC-SCNC: 9 MMOL/L (ref 8–16)
APAP SERPL-MCNC: <3 UG/ML (ref 10–20)
APTT PPP: 106.1 SEC (ref 21–32)
APTT PPP: 112.8 SEC (ref 21–32)
APTT PPP: 126.9 SEC (ref 21–32)
APTT PPP: 31 SEC (ref 21–32)
APTT PPP: 38.8 SEC (ref 21–32)
APTT PPP: 44.7 SEC (ref 21–32)
APTT PPP: 68.9 SEC (ref 21–32)
AST SERPL-CCNC: 47 U/L (ref 10–40)
AST SERPL-CCNC: 51 U/L (ref 10–40)
AST SERPL-CCNC: 58 U/L (ref 10–40)
B-OH-BUTYR BLD STRIP-SCNC: 0.9 MMOL/L (ref 0–0.5)
B-OH-BUTYR BLD STRIP-SCNC: 1.4 MMOL/L (ref 0–0.5)
BACTERIA #/AREA URNS AUTO: ABNORMAL /HPF
BACTERIA BLD CULT: NORMAL
BACTERIA BLD CULT: NORMAL
BACTERIA UR CULT: ABNORMAL
BARBITURATES UR QL SCN>200 NG/ML: NEGATIVE
BASOPHILS # BLD AUTO: 0.02 K/UL (ref 0–0.2)
BASOPHILS # BLD AUTO: 0.03 K/UL (ref 0–0.2)
BASOPHILS # BLD AUTO: 0.03 K/UL (ref 0–0.2)
BASOPHILS # BLD AUTO: 0.04 K/UL (ref 0–0.2)
BASOPHILS # BLD AUTO: 0.05 K/UL (ref 0–0.2)
BASOPHILS # BLD AUTO: 0.05 K/UL (ref 0–0.2)
BASOPHILS NFR BLD: 0.2 % (ref 0–1.9)
BASOPHILS NFR BLD: 0.3 % (ref 0–1.9)
BASOPHILS NFR BLD: 0.3 % (ref 0–1.9)
BENZODIAZ UR QL SCN>200 NG/ML: NEGATIVE
BILIRUB SERPL-MCNC: 0.3 MG/DL (ref 0.1–1)
BILIRUB SERPL-MCNC: 0.4 MG/DL (ref 0.1–1)
BILIRUB SERPL-MCNC: 0.5 MG/DL (ref 0.1–1)
BILIRUB UR QL STRIP: NEGATIVE
BUN SERPL-MCNC: 10 MG/DL (ref 8–23)
BUN SERPL-MCNC: 11 MG/DL (ref 8–23)
BUN SERPL-MCNC: 12 MG/DL (ref 8–23)
BUN SERPL-MCNC: 13 MG/DL (ref 8–23)
BUN SERPL-MCNC: 13 MG/DL (ref 8–23)
BUN SERPL-MCNC: 14 MG/DL (ref 8–23)
BUN SERPL-MCNC: 15 MG/DL (ref 8–23)
BUN SERPL-MCNC: 16 MG/DL (ref 8–23)
BUN SERPL-MCNC: 17 MG/DL (ref 8–23)
BUN SERPL-MCNC: 19 MG/DL (ref 8–23)
BUN SERPL-MCNC: 19 MG/DL (ref 8–23)
BUN SERPL-MCNC: 20 MG/DL (ref 8–23)
BUN SERPL-MCNC: 21 MG/DL (ref 8–23)
BUN SERPL-MCNC: 22 MG/DL (ref 8–23)
BUN SERPL-MCNC: 23 MG/DL (ref 8–23)
BUN SERPL-MCNC: 28 MG/DL (ref 8–23)
BUN SERPL-MCNC: 30 MG/DL (ref 8–23)
BUN SERPL-MCNC: 32 MG/DL (ref 6–30)
BZE UR QL SCN: NEGATIVE
CALCIUM SERPL-MCNC: 8.3 MG/DL (ref 8.7–10.5)
CALCIUM SERPL-MCNC: 8.4 MG/DL (ref 8.7–10.5)
CALCIUM SERPL-MCNC: 8.5 MG/DL (ref 8.7–10.5)
CALCIUM SERPL-MCNC: 8.6 MG/DL (ref 8.7–10.5)
CALCIUM SERPL-MCNC: 8.7 MG/DL (ref 8.7–10.5)
CALCIUM SERPL-MCNC: 8.7 MG/DL (ref 8.7–10.5)
CALCIUM SERPL-MCNC: 8.8 MG/DL (ref 8.7–10.5)
CALCIUM SERPL-MCNC: 8.9 MG/DL (ref 8.7–10.5)
CANNABINOIDS UR QL SCN: NEGATIVE
CHLORIDE SERPL-SCNC: 107 MMOL/L (ref 95–110)
CHLORIDE SERPL-SCNC: 108 MMOL/L (ref 95–110)
CHLORIDE SERPL-SCNC: 108 MMOL/L (ref 95–110)
CHLORIDE SERPL-SCNC: 110 MMOL/L (ref 95–110)
CHLORIDE SERPL-SCNC: 110 MMOL/L (ref 95–110)
CHLORIDE SERPL-SCNC: 111 MMOL/L (ref 95–110)
CHLORIDE SERPL-SCNC: 111 MMOL/L (ref 95–110)
CHLORIDE SERPL-SCNC: 112 MMOL/L (ref 95–110)
CHLORIDE SERPL-SCNC: 113 MMOL/L (ref 95–110)
CHLORIDE SERPL-SCNC: 114 MMOL/L (ref 95–110)
CLARITY UR REFRACT.AUTO: ABNORMAL
CO2 SERPL-SCNC: 15 MMOL/L (ref 23–29)
CO2 SERPL-SCNC: 16 MMOL/L (ref 23–29)
CO2 SERPL-SCNC: 17 MMOL/L (ref 23–29)
CO2 SERPL-SCNC: 18 MMOL/L (ref 23–29)
CO2 SERPL-SCNC: 18 MMOL/L (ref 23–29)
CO2 SERPL-SCNC: 19 MMOL/L (ref 23–29)
CO2 SERPL-SCNC: 20 MMOL/L (ref 23–29)
CO2 SERPL-SCNC: 21 MMOL/L (ref 23–29)
CO2 SERPL-SCNC: 21 MMOL/L (ref 23–29)
CO2 SERPL-SCNC: 22 MMOL/L (ref 23–29)
CO2 SERPL-SCNC: 22 MMOL/L (ref 23–29)
CO2 SERPL-SCNC: 23 MMOL/L (ref 23–29)
CO2 SERPL-SCNC: 24 MMOL/L (ref 23–29)
COLOR UR AUTO: YELLOW
CREAT SERPL-MCNC: 0.9 MG/DL (ref 0.5–1.4)
CREAT SERPL-MCNC: 1 MG/DL (ref 0.5–1.4)
CREAT SERPL-MCNC: 1.1 MG/DL (ref 0.5–1.4)
CREAT SERPL-MCNC: 1.2 MG/DL (ref 0.5–1.4)
CREAT SERPL-MCNC: 1.3 MG/DL (ref 0.5–1.4)
CREAT SERPL-MCNC: 1.5 MG/DL (ref 0.5–1.4)
CREAT SERPL-MCNC: 1.5 MG/DL (ref 0.5–1.4)
CREAT SERPL-MCNC: 1.6 MG/DL (ref 0.5–1.4)
CREAT UR-MCNC: 90 MG/DL (ref 15–325)
CRP SERPL-MCNC: 204.5 MG/L (ref 0–8.2)
DIFFERENTIAL METHOD BLD: ABNORMAL
EOSINOPHIL # BLD AUTO: 0 K/UL (ref 0–0.5)
EOSINOPHIL # BLD AUTO: 0 K/UL (ref 0–0.5)
EOSINOPHIL # BLD AUTO: 0.1 K/UL (ref 0–0.5)
EOSINOPHIL # BLD AUTO: 0.2 K/UL (ref 0–0.5)
EOSINOPHIL NFR BLD: 0.2 % (ref 0–8)
EOSINOPHIL NFR BLD: 0.2 % (ref 0–8)
EOSINOPHIL NFR BLD: 0.3 % (ref 0–8)
EOSINOPHIL NFR BLD: 0.5 % (ref 0–8)
EOSINOPHIL NFR BLD: 0.8 % (ref 0–8)
EOSINOPHIL NFR BLD: 1.4 % (ref 0–8)
ERYTHROCYTE [DISTWIDTH] IN BLOOD BY AUTOMATED COUNT: 15.3 % (ref 11.5–14.5)
ERYTHROCYTE [DISTWIDTH] IN BLOOD BY AUTOMATED COUNT: 15.4 % (ref 11.5–14.5)
ERYTHROCYTE [DISTWIDTH] IN BLOOD BY AUTOMATED COUNT: 15.5 % (ref 11.5–14.5)
ERYTHROCYTE [DISTWIDTH] IN BLOOD BY AUTOMATED COUNT: 15.5 % (ref 11.5–14.5)
ERYTHROCYTE [SEDIMENTATION RATE] IN BLOOD BY PHOTOMETRIC METHOD: 96 MM/HR (ref 0–36)
EST. GFR  (NO RACE VARIABLE): 33.2 ML/MIN/1.73 M^2
EST. GFR  (NO RACE VARIABLE): 35.9 ML/MIN/1.73 M^2
EST. GFR  (NO RACE VARIABLE): 42.6 ML/MIN/1.73 M^2
EST. GFR  (NO RACE VARIABLE): 46.9 ML/MIN/1.73 M^2
EST. GFR  (NO RACE VARIABLE): 52.1 ML/MIN/1.73 M^2
EST. GFR  (NO RACE VARIABLE): 58.4 ML/MIN/1.73 M^2
EST. GFR  (NO RACE VARIABLE): >60 ML/MIN/1.73 M^2
ESTIMATED AVG GLUCOSE: 206 MG/DL (ref 68–131)
ETHANOL SERPL-MCNC: <10 MG/DL
FOLATE SERPL-MCNC: 4.3 NG/ML (ref 4–24)
GLUCOSE SERPL-MCNC: 121 MG/DL (ref 70–110)
GLUCOSE SERPL-MCNC: 142 MG/DL (ref 70–110)
GLUCOSE SERPL-MCNC: 143 MG/DL (ref 70–110)
GLUCOSE SERPL-MCNC: 148 MG/DL (ref 70–110)
GLUCOSE SERPL-MCNC: 157 MG/DL (ref 70–110)
GLUCOSE SERPL-MCNC: 167 MG/DL (ref 70–110)
GLUCOSE SERPL-MCNC: 168 MG/DL (ref 70–110)
GLUCOSE SERPL-MCNC: 169 MG/DL (ref 70–110)
GLUCOSE SERPL-MCNC: 176 MG/DL (ref 70–110)
GLUCOSE SERPL-MCNC: 190 MG/DL (ref 70–110)
GLUCOSE SERPL-MCNC: 206 MG/DL (ref 70–110)
GLUCOSE SERPL-MCNC: 221 MG/DL (ref 70–110)
GLUCOSE SERPL-MCNC: 222 MG/DL (ref 70–110)
GLUCOSE SERPL-MCNC: 251 MG/DL (ref 70–110)
GLUCOSE SERPL-MCNC: 267 MG/DL (ref 70–110)
GLUCOSE SERPL-MCNC: 267 MG/DL (ref 70–110)
GLUCOSE SERPL-MCNC: 295 MG/DL (ref 70–110)
GLUCOSE SERPL-MCNC: 296 MG/DL (ref 70–110)
GLUCOSE SERPL-MCNC: 302 MG/DL (ref 70–110)
GLUCOSE SERPL-MCNC: 311 MG/DL (ref 70–110)
GLUCOSE SERPL-MCNC: 315 MG/DL (ref 70–110)
GLUCOSE SERPL-MCNC: 440 MG/DL (ref 70–110)
GLUCOSE SERPL-MCNC: 456 MG/DL (ref 70–110)
GLUCOSE SERPL-MCNC: 61 MG/DL (ref 70–110)
GLUCOSE SERPL-MCNC: 61 MG/DL (ref 70–110)
GLUCOSE SERPL-MCNC: 63 MG/DL (ref 70–110)
GLUCOSE UR QL STRIP: ABNORMAL
HAPTOGLOB SERPL-MCNC: 479 MG/DL (ref 30–250)
HBA1C MFR BLD: 8.8 % (ref 4–5.6)
HCO3 UR-SCNC: 21.6 MMOL/L (ref 24–28)
HCT VFR BLD AUTO: 29.5 % (ref 37–48.5)
HCT VFR BLD AUTO: 31.3 % (ref 37–48.5)
HCT VFR BLD AUTO: 31.7 % (ref 37–48.5)
HCT VFR BLD AUTO: 34.9 % (ref 37–48.5)
HCT VFR BLD AUTO: 35.8 % (ref 37–48.5)
HCT VFR BLD AUTO: 37.3 % (ref 37–48.5)
HCT VFR BLD CALC: 37 %PCV (ref 36–54)
HGB BLD-MCNC: 10.1 G/DL (ref 12–16)
HGB BLD-MCNC: 11 G/DL (ref 12–16)
HGB BLD-MCNC: 11.1 G/DL (ref 12–16)
HGB BLD-MCNC: 11.7 G/DL (ref 12–16)
HGB BLD-MCNC: 9.4 G/DL (ref 12–16)
HGB BLD-MCNC: 9.6 G/DL (ref 12–16)
HGB UR QL STRIP: ABNORMAL
HYALINE CASTS UR QL AUTO: 0 /LPF
IMM GRANULOCYTES # BLD AUTO: 0.12 K/UL (ref 0–0.04)
IMM GRANULOCYTES # BLD AUTO: 0.14 K/UL (ref 0–0.04)
IMM GRANULOCYTES # BLD AUTO: 0.18 K/UL (ref 0–0.04)
IMM GRANULOCYTES # BLD AUTO: 0.19 K/UL (ref 0–0.04)
IMM GRANULOCYTES # BLD AUTO: 0.21 K/UL (ref 0–0.04)
IMM GRANULOCYTES # BLD AUTO: 0.29 K/UL (ref 0–0.04)
IMM GRANULOCYTES NFR BLD AUTO: 0.9 % (ref 0–0.5)
IMM GRANULOCYTES NFR BLD AUTO: 0.9 % (ref 0–0.5)
IMM GRANULOCYTES NFR BLD AUTO: 1.2 % (ref 0–0.5)
IMM GRANULOCYTES NFR BLD AUTO: 1.2 % (ref 0–0.5)
IMM GRANULOCYTES NFR BLD AUTO: 1.3 % (ref 0–0.5)
IMM GRANULOCYTES NFR BLD AUTO: 1.6 % (ref 0–0.5)
INFLUENZA A, MOLECULAR: NEGATIVE
INFLUENZA B, MOLECULAR: NEGATIVE
INR PPP: 1.1 (ref 0.8–1.2)
KETONES UR QL STRIP: ABNORMAL
LACTATE SERPL-SCNC: 2.2 MMOL/L (ref 0.5–2.2)
LACTATE SERPL-SCNC: 2.2 MMOL/L (ref 0.5–2.2)
LACTATE SERPL-SCNC: 2.9 MMOL/L (ref 0.5–2.2)
LDH SERPL L TO P-CCNC: 0.84 MMOL/L (ref 0.5–2.2)
LDH SERPL L TO P-CCNC: 244 U/L (ref 110–260)
LEUKOCYTE ESTERASE UR QL STRIP: ABNORMAL
LYMPHOCYTES # BLD AUTO: 2.1 K/UL (ref 1–4.8)
LYMPHOCYTES # BLD AUTO: 2.4 K/UL (ref 1–4.8)
LYMPHOCYTES # BLD AUTO: 3 K/UL (ref 1–4.8)
LYMPHOCYTES # BLD AUTO: 3.2 K/UL (ref 1–4.8)
LYMPHOCYTES # BLD AUTO: 3.3 K/UL (ref 1–4.8)
LYMPHOCYTES # BLD AUTO: 3.7 K/UL (ref 1–4.8)
LYMPHOCYTES NFR BLD: 15.1 % (ref 18–48)
LYMPHOCYTES NFR BLD: 16.2 % (ref 18–48)
LYMPHOCYTES NFR BLD: 18.1 % (ref 18–48)
LYMPHOCYTES NFR BLD: 19.8 % (ref 18–48)
LYMPHOCYTES NFR BLD: 21 % (ref 18–48)
LYMPHOCYTES NFR BLD: 22.2 % (ref 18–48)
MAGNESIUM SERPL-MCNC: 1.5 MG/DL (ref 1.6–2.6)
MAGNESIUM SERPL-MCNC: 1.7 MG/DL (ref 1.6–2.6)
MAGNESIUM SERPL-MCNC: 1.7 MG/DL (ref 1.6–2.6)
MAGNESIUM SERPL-MCNC: 2 MG/DL (ref 1.6–2.6)
MAGNESIUM SERPL-MCNC: 2.1 MG/DL (ref 1.6–2.6)
MCH RBC QN AUTO: 27 PG (ref 27–31)
MCH RBC QN AUTO: 27.2 PG (ref 27–31)
MCH RBC QN AUTO: 27.3 PG (ref 27–31)
MCH RBC QN AUTO: 27.4 PG (ref 27–31)
MCH RBC QN AUTO: 27.5 PG (ref 27–31)
MCH RBC QN AUTO: 27.6 PG (ref 27–31)
MCHC RBC AUTO-ENTMCNC: 30.3 G/DL (ref 32–36)
MCHC RBC AUTO-ENTMCNC: 31 G/DL (ref 32–36)
MCHC RBC AUTO-ENTMCNC: 31.4 G/DL (ref 32–36)
MCHC RBC AUTO-ENTMCNC: 31.5 G/DL (ref 32–36)
MCHC RBC AUTO-ENTMCNC: 31.9 G/DL (ref 32–36)
MCHC RBC AUTO-ENTMCNC: 32.3 G/DL (ref 32–36)
MCV RBC AUTO: 85 FL (ref 82–98)
MCV RBC AUTO: 85 FL (ref 82–98)
MCV RBC AUTO: 87 FL (ref 82–98)
MCV RBC AUTO: 88 FL (ref 82–98)
MCV RBC AUTO: 88 FL (ref 82–98)
MCV RBC AUTO: 89 FL (ref 82–98)
METHADONE UR QL SCN>300 NG/ML: NEGATIVE
MICROSCOPIC COMMENT: ABNORMAL
MONOCYTES # BLD AUTO: 0.8 K/UL (ref 0.3–1)
MONOCYTES # BLD AUTO: 0.9 K/UL (ref 0.3–1)
MONOCYTES # BLD AUTO: 1 K/UL (ref 0.3–1)
MONOCYTES # BLD AUTO: 1.3 K/UL (ref 0.3–1)
MONOCYTES # BLD AUTO: 1.4 K/UL (ref 0.3–1)
MONOCYTES # BLD AUTO: 1.7 K/UL (ref 0.3–1)
MONOCYTES NFR BLD: 11.1 % (ref 4–15)
MONOCYTES NFR BLD: 5.1 % (ref 4–15)
MONOCYTES NFR BLD: 5.4 % (ref 4–15)
MONOCYTES NFR BLD: 7.7 % (ref 4–15)
MONOCYTES NFR BLD: 7.7 % (ref 4–15)
MONOCYTES NFR BLD: 8.2 % (ref 4–15)
MYCOBACTERIUM SPEC QL CULT: NORMAL
NEUTROPHILS # BLD AUTO: 10.3 K/UL (ref 1.8–7.7)
NEUTROPHILS # BLD AUTO: 11.3 K/UL (ref 1.8–7.7)
NEUTROPHILS # BLD AUTO: 11.6 K/UL (ref 1.8–7.7)
NEUTROPHILS # BLD AUTO: 12.1 K/UL (ref 1.8–7.7)
NEUTROPHILS # BLD AUTO: 12.7 K/UL (ref 1.8–7.7)
NEUTROPHILS # BLD AUTO: 9.7 K/UL (ref 1.8–7.7)
NEUTROPHILS NFR BLD: 67.5 % (ref 38–73)
NEUTROPHILS NFR BLD: 70.1 % (ref 38–73)
NEUTROPHILS NFR BLD: 71 % (ref 38–73)
NEUTROPHILS NFR BLD: 72 % (ref 38–73)
NEUTROPHILS NFR BLD: 74.8 % (ref 38–73)
NEUTROPHILS NFR BLD: 75.1 % (ref 38–73)
NITRITE UR QL STRIP: NEGATIVE
NRBC BLD-RTO: 0 /100 WBC
OPIATES UR QL SCN: NEGATIVE
OSMOLALITY SERPL: 327 MOSM/KG (ref 275–295)
PCO2 BLDA: 36.9 MMHG (ref 35–45)
PCP UR QL SCN>25 NG/ML: NEGATIVE
PH SMN: 7.38 [PH] (ref 7.35–7.45)
PH UR STRIP: 6 [PH] (ref 5–8)
PHOSPHATE SERPL-MCNC: 1 MG/DL (ref 2.7–4.5)
PHOSPHATE SERPL-MCNC: 1.5 MG/DL (ref 2.7–4.5)
PHOSPHATE SERPL-MCNC: 2.3 MG/DL (ref 2.7–4.5)
PHOSPHATE SERPL-MCNC: 3 MG/DL (ref 2.7–4.5)
PLATELET # BLD AUTO: 300 K/UL (ref 150–450)
PLATELET # BLD AUTO: 317 K/UL (ref 150–450)
PLATELET # BLD AUTO: 328 K/UL (ref 150–450)
PLATELET # BLD AUTO: 329 K/UL (ref 150–450)
PLATELET # BLD AUTO: 330 K/UL (ref 150–450)
PLATELET # BLD AUTO: 388 K/UL (ref 150–450)
PMV BLD AUTO: 11.9 FL (ref 9.2–12.9)
PMV BLD AUTO: 12.1 FL (ref 9.2–12.9)
PMV BLD AUTO: 12.2 FL (ref 9.2–12.9)
PMV BLD AUTO: 12.3 FL (ref 9.2–12.9)
PMV BLD AUTO: 12.3 FL (ref 9.2–12.9)
PMV BLD AUTO: 12.8 FL (ref 9.2–12.9)
PO2 BLDA: 19 MMHG (ref 40–60)
POC BE: -4 MMOL/L
POC IONIZED CALCIUM: 1.25 MMOL/L (ref 1.06–1.42)
POC SATURATED O2: 28 % (ref 95–100)
POC TCO2 (MEASURED): 27 MMOL/L (ref 23–27)
POC TCO2: 23 MMOL/L (ref 24–29)
POCT GLUCOSE: 110 MG/DL (ref 70–110)
POCT GLUCOSE: 113 MG/DL (ref 70–110)
POCT GLUCOSE: 114 MG/DL (ref 70–110)
POCT GLUCOSE: 127 MG/DL (ref 70–110)
POCT GLUCOSE: 130 MG/DL (ref 70–110)
POCT GLUCOSE: 134 MG/DL (ref 70–110)
POCT GLUCOSE: 138 MG/DL (ref 70–110)
POCT GLUCOSE: 145 MG/DL (ref 70–110)
POCT GLUCOSE: 151 MG/DL (ref 70–110)
POCT GLUCOSE: 151 MG/DL (ref 70–110)
POCT GLUCOSE: 154 MG/DL (ref 70–110)
POCT GLUCOSE: 158 MG/DL (ref 70–110)
POCT GLUCOSE: 161 MG/DL (ref 70–110)
POCT GLUCOSE: 166 MG/DL (ref 70–110)
POCT GLUCOSE: 168 MG/DL (ref 70–110)
POCT GLUCOSE: 169 MG/DL (ref 70–110)
POCT GLUCOSE: 173 MG/DL (ref 70–110)
POCT GLUCOSE: 186 MG/DL (ref 70–110)
POCT GLUCOSE: 187 MG/DL (ref 70–110)
POCT GLUCOSE: 201 MG/DL (ref 70–110)
POCT GLUCOSE: 212 MG/DL (ref 70–110)
POCT GLUCOSE: 213 MG/DL (ref 70–110)
POCT GLUCOSE: 219 MG/DL (ref 70–110)
POCT GLUCOSE: 228 MG/DL (ref 70–110)
POCT GLUCOSE: 230 MG/DL (ref 70–110)
POCT GLUCOSE: 234 MG/DL (ref 70–110)
POCT GLUCOSE: 245 MG/DL (ref 70–110)
POCT GLUCOSE: 255 MG/DL (ref 70–110)
POCT GLUCOSE: 259 MG/DL (ref 70–110)
POCT GLUCOSE: 272 MG/DL (ref 70–110)
POCT GLUCOSE: 274 MG/DL (ref 70–110)
POCT GLUCOSE: 276 MG/DL (ref 70–110)
POCT GLUCOSE: 287 MG/DL (ref 70–110)
POCT GLUCOSE: 288 MG/DL (ref 70–110)
POCT GLUCOSE: 301 MG/DL (ref 70–110)
POCT GLUCOSE: 308 MG/DL (ref 70–110)
POCT GLUCOSE: 326 MG/DL (ref 70–110)
POCT GLUCOSE: 332 MG/DL (ref 70–110)
POCT GLUCOSE: 336 MG/DL (ref 70–110)
POCT GLUCOSE: 432 MG/DL (ref 70–110)
POCT GLUCOSE: 440 MG/DL (ref 70–110)
POCT GLUCOSE: 457 MG/DL (ref 70–110)
POCT GLUCOSE: 77 MG/DL (ref 70–110)
POTASSIUM BLD-SCNC: 2.9 MMOL/L (ref 3.5–5.1)
POTASSIUM SERPL-SCNC: 2.7 MMOL/L (ref 3.5–5.1)
POTASSIUM SERPL-SCNC: 2.9 MMOL/L (ref 3.5–5.1)
POTASSIUM SERPL-SCNC: 3 MMOL/L (ref 3.5–5.1)
POTASSIUM SERPL-SCNC: 3.1 MMOL/L (ref 3.5–5.1)
POTASSIUM SERPL-SCNC: 3.1 MMOL/L (ref 3.5–5.1)
POTASSIUM SERPL-SCNC: 3.2 MMOL/L (ref 3.5–5.1)
POTASSIUM SERPL-SCNC: 3.2 MMOL/L (ref 3.5–5.1)
POTASSIUM SERPL-SCNC: 3.3 MMOL/L (ref 3.5–5.1)
POTASSIUM SERPL-SCNC: 3.3 MMOL/L (ref 3.5–5.1)
POTASSIUM SERPL-SCNC: 3.4 MMOL/L (ref 3.5–5.1)
POTASSIUM SERPL-SCNC: 3.5 MMOL/L (ref 3.5–5.1)
POTASSIUM SERPL-SCNC: 3.6 MMOL/L (ref 3.5–5.1)
POTASSIUM SERPL-SCNC: 3.8 MMOL/L (ref 3.5–5.1)
POTASSIUM SERPL-SCNC: 3.8 MMOL/L (ref 3.5–5.1)
POTASSIUM SERPL-SCNC: 4 MMOL/L (ref 3.5–5.1)
POTASSIUM SERPL-SCNC: 4.2 MMOL/L (ref 3.5–5.1)
POTASSIUM SERPL-SCNC: 4.3 MMOL/L (ref 3.5–5.1)
POTASSIUM SERPL-SCNC: 4.4 MMOL/L (ref 3.5–5.1)
POTASSIUM SERPL-SCNC: 4.5 MMOL/L (ref 3.5–5.1)
POTASSIUM SERPL-SCNC: 4.7 MMOL/L (ref 3.5–5.1)
PROT SERPL-MCNC: 5.9 G/DL (ref 6–8.4)
PROT SERPL-MCNC: 6.2 G/DL (ref 6–8.4)
PROT SERPL-MCNC: 6.4 G/DL (ref 6–8.4)
PROT UR QL STRIP: ABNORMAL
PROTHROMBIN TIME: 11.9 SEC (ref 9–12.5)
RBC # BLD AUTO: 3.46 M/UL (ref 4–5.4)
RBC # BLD AUTO: 3.55 M/UL (ref 4–5.4)
RBC # BLD AUTO: 3.67 M/UL (ref 4–5.4)
RBC # BLD AUTO: 4.01 M/UL (ref 4–5.4)
RBC # BLD AUTO: 4.06 M/UL (ref 4–5.4)
RBC # BLD AUTO: 4.24 M/UL (ref 4–5.4)
RBC #/AREA URNS AUTO: 19 /HPF (ref 0–4)
RETICS/RBC NFR AUTO: 1 % (ref 0.5–2.5)
SAMPLE: ABNORMAL
SAMPLE: ABNORMAL
SAMPLE: NORMAL
SARS-COV-2 RDRP RESP QL NAA+PROBE: NEGATIVE
SITE: ABNORMAL
SITE: NORMAL
SODIUM BLD-SCNC: 148 MMOL/L (ref 136–145)
SODIUM SERPL-SCNC: 136 MMOL/L (ref 136–145)
SODIUM SERPL-SCNC: 137 MMOL/L (ref 136–145)
SODIUM SERPL-SCNC: 139 MMOL/L (ref 136–145)
SODIUM SERPL-SCNC: 140 MMOL/L (ref 136–145)
SODIUM SERPL-SCNC: 141 MMOL/L (ref 136–145)
SODIUM SERPL-SCNC: 142 MMOL/L (ref 136–145)
SODIUM SERPL-SCNC: 143 MMOL/L (ref 136–145)
SODIUM SERPL-SCNC: 143 MMOL/L (ref 136–145)
SODIUM SERPL-SCNC: 145 MMOL/L (ref 136–145)
SODIUM SERPL-SCNC: 146 MMOL/L (ref 136–145)
SODIUM UR-SCNC: 50 MMOL/L (ref 20–250)
SP GR UR STRIP: 1.02 (ref 1–1.03)
SPECIMEN SOURCE: NORMAL
TOXICOLOGY INFORMATION: NORMAL
TSH SERPL DL<=0.005 MIU/L-ACNC: 0.75 UIU/ML (ref 0.4–4)
URN SPEC COLLECT METH UR: ABNORMAL
VANCOMYCIN SERPL-MCNC: 18.9 UG/ML
VANCOMYCIN SERPL-MCNC: 25.1 UG/ML
VIT B12 SERPL-MCNC: 672 PG/ML (ref 210–950)
WBC # BLD AUTO: 13 K/UL (ref 3.9–12.7)
WBC # BLD AUTO: 15.22 K/UL (ref 3.9–12.7)
WBC # BLD AUTO: 15.93 K/UL (ref 3.9–12.7)
WBC # BLD AUTO: 16.12 K/UL (ref 3.9–12.7)
WBC # BLD AUTO: 16.55 K/UL (ref 3.9–12.7)
WBC # BLD AUTO: 17.59 K/UL (ref 3.9–12.7)
WBC #/AREA URNS AUTO: >100 /HPF (ref 0–5)

## 2024-01-01 PROCEDURE — 93005 ELECTROCARDIOGRAM TRACING: CPT

## 2024-01-01 PROCEDURE — 84300 ASSAY OF URINE SODIUM: CPT | Performed by: EMERGENCY MEDICINE

## 2024-01-01 PROCEDURE — 96366 THER/PROPH/DIAG IV INF ADDON: CPT | Mod: 59

## 2024-01-01 PROCEDURE — 63600175 PHARM REV CODE 636 W HCPCS: Performed by: EMERGENCY MEDICINE

## 2024-01-01 PROCEDURE — 96376 TX/PRO/DX INJ SAME DRUG ADON: CPT

## 2024-01-01 PROCEDURE — 25000003 PHARM REV CODE 250

## 2024-01-01 PROCEDURE — 36415 COLL VENOUS BLD VENIPUNCTURE: CPT

## 2024-01-01 PROCEDURE — 80202 ASSAY OF VANCOMYCIN: CPT | Performed by: STUDENT IN AN ORGANIZED HEALTH CARE EDUCATION/TRAINING PROGRAM

## 2024-01-01 PROCEDURE — 36415 COLL VENOUS BLD VENIPUNCTURE: CPT | Mod: XB

## 2024-01-01 PROCEDURE — 85025 COMPLETE CBC W/AUTO DIFF WBC: CPT

## 2024-01-01 PROCEDURE — 36415 COLL VENOUS BLD VENIPUNCTURE: CPT | Mod: XB | Performed by: STUDENT IN AN ORGANIZED HEALTH CARE EDUCATION/TRAINING PROGRAM

## 2024-01-01 PROCEDURE — 63600175 PHARM REV CODE 636 W HCPCS

## 2024-01-01 PROCEDURE — 63600175 PHARM REV CODE 636 W HCPCS: Performed by: STUDENT IN AN ORGANIZED HEALTH CARE EDUCATION/TRAINING PROGRAM

## 2024-01-01 PROCEDURE — 97535 SELF CARE MNGMENT TRAINING: CPT

## 2024-01-01 PROCEDURE — 99900035 HC TECH TIME PER 15 MIN (STAT)

## 2024-01-01 PROCEDURE — 25000003 PHARM REV CODE 250: Performed by: STUDENT IN AN ORGANIZED HEALTH CARE EDUCATION/TRAINING PROGRAM

## 2024-01-01 PROCEDURE — 80053 COMPREHEN METABOLIC PANEL: CPT | Performed by: EMERGENCY MEDICINE

## 2024-01-01 PROCEDURE — 83605 ASSAY OF LACTIC ACID: CPT

## 2024-01-01 PROCEDURE — 80048 BASIC METABOLIC PNL TOTAL CA: CPT | Mod: XB | Performed by: STUDENT IN AN ORGANIZED HEALTH CARE EDUCATION/TRAINING PROGRAM

## 2024-01-01 PROCEDURE — 97112 NEUROMUSCULAR REEDUCATION: CPT

## 2024-01-01 PROCEDURE — 86140 C-REACTIVE PROTEIN: CPT | Performed by: STUDENT IN AN ORGANIZED HEALTH CARE EDUCATION/TRAINING PROGRAM

## 2024-01-01 PROCEDURE — 27000207 HC ISOLATION

## 2024-01-01 PROCEDURE — 83735 ASSAY OF MAGNESIUM: CPT

## 2024-01-01 PROCEDURE — 80048 BASIC METABOLIC PNL TOTAL CA: CPT | Mod: 91,XB

## 2024-01-01 PROCEDURE — 82803 BLOOD GASES ANY COMBINATION: CPT

## 2024-01-01 PROCEDURE — 97530 THERAPEUTIC ACTIVITIES: CPT

## 2024-01-01 PROCEDURE — 94761 N-INVAS EAR/PLS OXIMETRY MLT: CPT | Mod: XB

## 2024-01-01 PROCEDURE — 87040 BLOOD CULTURE FOR BACTERIA: CPT | Mod: 59 | Performed by: EMERGENCY MEDICINE

## 2024-01-01 PROCEDURE — 87077 CULTURE AEROBIC IDENTIFY: CPT | Performed by: EMERGENCY MEDICINE

## 2024-01-01 PROCEDURE — 97110 THERAPEUTIC EXERCISES: CPT

## 2024-01-01 PROCEDURE — 85652 RBC SED RATE AUTOMATED: CPT | Performed by: STUDENT IN AN ORGANIZED HEALTH CARE EDUCATION/TRAINING PROGRAM

## 2024-01-01 PROCEDURE — 82077 ASSAY SPEC XCP UR&BREATH IA: CPT | Performed by: STUDENT IN AN ORGANIZED HEALTH CARE EDUCATION/TRAINING PROGRAM

## 2024-01-01 PROCEDURE — 85045 AUTOMATED RETICULOCYTE COUNT: CPT

## 2024-01-01 PROCEDURE — 82746 ASSAY OF FOLIC ACID SERUM: CPT

## 2024-01-01 PROCEDURE — 99285 EMERGENCY DEPT VISIT HI MDM: CPT | Mod: 25

## 2024-01-01 PROCEDURE — 97162 PT EVAL MOD COMPLEX 30 MIN: CPT

## 2024-01-01 PROCEDURE — 20600001 HC STEP DOWN PRIVATE ROOM

## 2024-01-01 PROCEDURE — 99499 UNLISTED E&M SERVICE: CPT | Mod: 95,,, | Performed by: HOSPITALIST

## 2024-01-01 PROCEDURE — 82962 GLUCOSE BLOOD TEST: CPT

## 2024-01-01 PROCEDURE — 83010 ASSAY OF HAPTOGLOBIN QUANT: CPT

## 2024-01-01 PROCEDURE — 85730 THROMBOPLASTIN TIME PARTIAL: CPT

## 2024-01-01 PROCEDURE — 82140 ASSAY OF AMMONIA: CPT | Performed by: STUDENT IN AN ORGANIZED HEALTH CARE EDUCATION/TRAINING PROGRAM

## 2024-01-01 PROCEDURE — 83036 HEMOGLOBIN GLYCOSYLATED A1C: CPT | Performed by: EMERGENCY MEDICINE

## 2024-01-01 PROCEDURE — 84100 ASSAY OF PHOSPHORUS: CPT

## 2024-01-01 PROCEDURE — 87088 URINE BACTERIA CULTURE: CPT | Performed by: EMERGENCY MEDICINE

## 2024-01-01 PROCEDURE — 93010 ELECTROCARDIOGRAM REPORT: CPT | Mod: ,,, | Performed by: INTERNAL MEDICINE

## 2024-01-01 PROCEDURE — 96365 THER/PROPH/DIAG IV INF INIT: CPT

## 2024-01-01 PROCEDURE — 80143 DRUG ASSAY ACETAMINOPHEN: CPT | Performed by: STUDENT IN AN ORGANIZED HEALTH CARE EDUCATION/TRAINING PROGRAM

## 2024-01-01 PROCEDURE — 80048 BASIC METABOLIC PNL TOTAL CA: CPT | Mod: XB

## 2024-01-01 PROCEDURE — 95819 EEG AWAKE AND ASLEEP: CPT

## 2024-01-01 PROCEDURE — 85610 PROTHROMBIN TIME: CPT

## 2024-01-01 PROCEDURE — 85730 THROMBOPLASTIN TIME PARTIAL: CPT | Mod: 91 | Performed by: STUDENT IN AN ORGANIZED HEALTH CARE EDUCATION/TRAINING PROGRAM

## 2024-01-01 PROCEDURE — 80053 COMPREHEN METABOLIC PANEL: CPT

## 2024-01-01 PROCEDURE — 85025 COMPLETE CBC W/AUTO DIFF WBC: CPT | Mod: 91

## 2024-01-01 PROCEDURE — 83735 ASSAY OF MAGNESIUM: CPT | Performed by: EMERGENCY MEDICINE

## 2024-01-01 PROCEDURE — 85025 COMPLETE CBC W/AUTO DIFF WBC: CPT | Performed by: EMERGENCY MEDICINE

## 2024-01-01 PROCEDURE — 80047 BASIC METABLC PNL IONIZED CA: CPT

## 2024-01-01 PROCEDURE — 92526 ORAL FUNCTION THERAPY: CPT

## 2024-01-01 PROCEDURE — 96361 HYDRATE IV INFUSION ADD-ON: CPT

## 2024-01-01 PROCEDURE — 99223 1ST HOSP IP/OBS HIGH 75: CPT | Mod: ,,, | Performed by: SURGERY

## 2024-01-01 PROCEDURE — 94761 N-INVAS EAR/PLS OXIMETRY MLT: CPT

## 2024-01-01 PROCEDURE — S5010 5% DEXTROSE AND 0.45% SALINE: HCPCS

## 2024-01-01 PROCEDURE — 87186 SC STD MICRODIL/AGAR DIL: CPT | Performed by: EMERGENCY MEDICINE

## 2024-01-01 PROCEDURE — 80307 DRUG TEST PRSMV CHEM ANLYZR: CPT | Performed by: STUDENT IN AN ORGANIZED HEALTH CARE EDUCATION/TRAINING PROGRAM

## 2024-01-01 PROCEDURE — 82010 KETONE BODYS QUAN: CPT | Performed by: STUDENT IN AN ORGANIZED HEALTH CARE EDUCATION/TRAINING PROGRAM

## 2024-01-01 PROCEDURE — 82607 VITAMIN B-12: CPT

## 2024-01-01 PROCEDURE — 83930 ASSAY OF BLOOD OSMOLALITY: CPT | Performed by: STUDENT IN AN ORGANIZED HEALTH CARE EDUCATION/TRAINING PROGRAM

## 2024-01-01 PROCEDURE — 80048 BASIC METABOLIC PNL TOTAL CA: CPT | Mod: 91

## 2024-01-01 PROCEDURE — U0002 COVID-19 LAB TEST NON-CDC: HCPCS | Performed by: EMERGENCY MEDICINE

## 2024-01-01 PROCEDURE — 96367 TX/PROPH/DG ADDL SEQ IV INF: CPT

## 2024-01-01 PROCEDURE — 80048 BASIC METABOLIC PNL TOTAL CA: CPT

## 2024-01-01 PROCEDURE — 87502 INFLUENZA DNA AMP PROBE: CPT | Performed by: EMERGENCY MEDICINE

## 2024-01-01 PROCEDURE — 97165 OT EVAL LOW COMPLEX 30 MIN: CPT

## 2024-01-01 PROCEDURE — 87086 URINE CULTURE/COLONY COUNT: CPT | Performed by: EMERGENCY MEDICINE

## 2024-01-01 PROCEDURE — 84443 ASSAY THYROID STIM HORMONE: CPT | Performed by: STUDENT IN AN ORGANIZED HEALTH CARE EDUCATION/TRAINING PROGRAM

## 2024-01-01 PROCEDURE — 93922 UPR/L XTREMITY ART 2 LEVELS: CPT | Performed by: SURGERY

## 2024-01-01 PROCEDURE — 96368 THER/DIAG CONCURRENT INF: CPT | Mod: 59

## 2024-01-01 PROCEDURE — 85730 THROMBOPLASTIN TIME PARTIAL: CPT | Performed by: STUDENT IN AN ORGANIZED HEALTH CARE EDUCATION/TRAINING PROGRAM

## 2024-01-01 PROCEDURE — 83605 ASSAY OF LACTIC ACID: CPT | Performed by: STUDENT IN AN ORGANIZED HEALTH CARE EDUCATION/TRAINING PROGRAM

## 2024-01-01 PROCEDURE — 95819 EEG AWAKE AND ASLEEP: CPT | Mod: 26,,, | Performed by: PSYCHIATRY & NEUROLOGY

## 2024-01-01 PROCEDURE — 83615 LACTATE (LD) (LDH) ENZYME: CPT

## 2024-01-01 PROCEDURE — 92507 TX SP LANG VOICE COMM INDIV: CPT

## 2024-01-01 PROCEDURE — 81001 URINALYSIS AUTO W/SCOPE: CPT | Mod: XB | Performed by: EMERGENCY MEDICINE

## 2024-01-01 PROCEDURE — 96366 THER/PROPH/DIAG IV INF ADDON: CPT

## 2024-01-01 PROCEDURE — 92610 EVALUATE SWALLOWING FUNCTION: CPT

## 2024-01-01 RX ORDER — CARVEDILOL 12.5 MG/1
12.5 TABLET ORAL 2 TIMES DAILY
Status: DISCONTINUED | OUTPATIENT
Start: 2024-01-01 | End: 2024-01-01 | Stop reason: HOSPADM

## 2024-01-01 RX ORDER — PEN NEEDLE, DIABETIC 30 GX3/16"
NEEDLE, DISPOSABLE MISCELLANEOUS
Qty: 300 EACH | Refills: 3 | Status: SHIPPED | OUTPATIENT
Start: 2024-01-01

## 2024-01-01 RX ORDER — MAGNESIUM SULFATE HEPTAHYDRATE 40 MG/ML
2 INJECTION, SOLUTION INTRAVENOUS ONCE
Status: COMPLETED | OUTPATIENT
Start: 2024-01-01 | End: 2024-01-01

## 2024-01-01 RX ORDER — SODIUM CHLORIDE 0.9 % (FLUSH) 0.9 %
10 SYRINGE (ML) INJECTION
Status: DISCONTINUED | OUTPATIENT
Start: 2024-01-01 | End: 2024-01-01 | Stop reason: HOSPADM

## 2024-01-01 RX ORDER — POTASSIUM CHLORIDE 7.45 MG/ML
80 INJECTION INTRAVENOUS
Status: DISCONTINUED | OUTPATIENT
Start: 2024-01-01 | End: 2024-01-01

## 2024-01-01 RX ORDER — DEXTROSE MONOHYDRATE AND SODIUM CHLORIDE 5; .45 G/100ML; G/100ML
125 INJECTION, SOLUTION INTRAVENOUS CONTINUOUS PRN
Status: DISCONTINUED | OUTPATIENT
Start: 2024-01-01 | End: 2024-01-01 | Stop reason: HOSPADM

## 2024-01-01 RX ORDER — DEXTROSE MONOHYDRATE 100 MG/ML
INJECTION, SOLUTION INTRAVENOUS CONTINUOUS
Status: DISCONTINUED | OUTPATIENT
Start: 2024-01-01 | End: 2024-01-01

## 2024-01-01 RX ORDER — POTASSIUM CHLORIDE 7.45 MG/ML
10 INJECTION INTRAVENOUS
Status: DISCONTINUED | OUTPATIENT
Start: 2024-01-01 | End: 2024-01-01

## 2024-01-01 RX ORDER — DEXTROSE MONOHYDRATE 100 MG/ML
INJECTION, SOLUTION INTRAVENOUS
Status: DISCONTINUED | OUTPATIENT
Start: 2024-01-01 | End: 2024-01-01

## 2024-01-01 RX ORDER — IBUPROFEN 200 MG
24 TABLET ORAL
Status: DISCONTINUED | OUTPATIENT
Start: 2024-01-01 | End: 2024-01-01

## 2024-01-01 RX ORDER — DEXTROSE MONOHYDRATE, SODIUM CHLORIDE, AND POTASSIUM CHLORIDE 50; 1.49; 4.5 G/1000ML; G/1000ML; G/1000ML
INJECTION, SOLUTION INTRAVENOUS CONTINUOUS
Status: DISCONTINUED | OUTPATIENT
Start: 2024-01-01 | End: 2024-01-01

## 2024-01-01 RX ORDER — DEXTROSE MONOHYDRATE AND SODIUM CHLORIDE 5; .45 G/100ML; G/100ML
INJECTION, SOLUTION INTRAVENOUS CONTINUOUS
Status: DISCONTINUED | OUTPATIENT
Start: 2024-01-01 | End: 2024-01-01

## 2024-01-01 RX ORDER — PANTOPRAZOLE SODIUM 40 MG/1
40 TABLET, DELAYED RELEASE ORAL DAILY
Status: DISCONTINUED | OUTPATIENT
Start: 2024-01-01 | End: 2024-01-01 | Stop reason: HOSPADM

## 2024-01-01 RX ORDER — SODIUM CHLORIDE 0.9 % (FLUSH) 0.9 %
10 SYRINGE (ML) INJECTION EVERY 12 HOURS PRN
Status: DISCONTINUED | OUTPATIENT
Start: 2024-01-01 | End: 2024-01-01 | Stop reason: HOSPADM

## 2024-01-01 RX ORDER — POTASSIUM CHLORIDE 7.45 MG/ML
40 INJECTION INTRAVENOUS
Status: DISCONTINUED | OUTPATIENT
Start: 2024-01-01 | End: 2024-01-01

## 2024-01-01 RX ORDER — PANTOPRAZOLE SODIUM 40 MG/1
40 TABLET, DELAYED RELEASE ORAL DAILY PRN
Status: DISCONTINUED | OUTPATIENT
Start: 2024-01-01 | End: 2024-01-01

## 2024-01-01 RX ORDER — ASPIRIN 81 MG/1
81 TABLET ORAL DAILY
Status: DISCONTINUED | OUTPATIENT
Start: 2024-01-01 | End: 2024-01-01 | Stop reason: HOSPADM

## 2024-01-01 RX ORDER — SODIUM CHLORIDE, SODIUM LACTATE, POTASSIUM CHLORIDE, CALCIUM CHLORIDE 600; 310; 30; 20 MG/100ML; MG/100ML; MG/100ML; MG/100ML
INJECTION, SOLUTION INTRAVENOUS CONTINUOUS
Status: DISCONTINUED | OUTPATIENT
Start: 2024-01-01 | End: 2024-01-01

## 2024-01-01 RX ORDER — HEPARIN SODIUM,PORCINE/D5W 25000/250
0-40 INTRAVENOUS SOLUTION INTRAVENOUS CONTINUOUS
Status: DISCONTINUED | OUTPATIENT
Start: 2024-01-01 | End: 2024-01-01

## 2024-01-01 RX ORDER — SODIUM CHLORIDE AND POTASSIUM CHLORIDE 150; 900 MG/100ML; MG/100ML
INJECTION, SOLUTION INTRAVENOUS
Status: COMPLETED | OUTPATIENT
Start: 2024-01-01 | End: 2024-01-01

## 2024-01-01 RX ORDER — INSULIN ASPART 100 [IU]/ML
0-5 INJECTION, SOLUTION INTRAVENOUS; SUBCUTANEOUS
Status: DISCONTINUED | OUTPATIENT
Start: 2024-01-01 | End: 2024-01-01

## 2024-01-01 RX ORDER — POTASSIUM CHLORIDE 7.45 MG/ML
10 INJECTION INTRAVENOUS
Status: COMPLETED | OUTPATIENT
Start: 2024-01-01 | End: 2024-01-01

## 2024-01-01 RX ORDER — HYDROMORPHONE HYDROCHLORIDE 1 MG/ML
0.5 INJECTION, SOLUTION INTRAMUSCULAR; INTRAVENOUS; SUBCUTANEOUS EVERY 6 HOURS PRN
Status: DISCONTINUED | OUTPATIENT
Start: 2024-01-01 | End: 2024-01-01 | Stop reason: HOSPADM

## 2024-01-01 RX ORDER — ONDANSETRON 4 MG/1
4 TABLET, ORALLY DISINTEGRATING ORAL EVERY 8 HOURS PRN
Status: DISCONTINUED | OUTPATIENT
Start: 2024-01-01 | End: 2024-01-01

## 2024-01-01 RX ORDER — ACETAMINOPHEN 325 MG/1
650 TABLET ORAL EVERY 4 HOURS PRN
Status: DISCONTINUED | OUTPATIENT
Start: 2024-01-01 | End: 2024-01-01 | Stop reason: HOSPADM

## 2024-01-01 RX ORDER — HYDRALAZINE HYDROCHLORIDE 25 MG/1
25 TABLET, FILM COATED ORAL 3 TIMES DAILY PRN
Status: DISCONTINUED | OUTPATIENT
Start: 2024-01-01 | End: 2024-01-01

## 2024-01-01 RX ORDER — IBUPROFEN 200 MG
16 TABLET ORAL
Status: DISCONTINUED | OUTPATIENT
Start: 2024-01-01 | End: 2024-01-01

## 2024-01-01 RX ORDER — INSULIN ASPART 100 [IU]/ML
0-5 INJECTION, SOLUTION INTRAVENOUS; SUBCUTANEOUS EVERY 4 HOURS PRN
Status: DISCONTINUED | OUTPATIENT
Start: 2024-01-01 | End: 2024-01-01

## 2024-01-01 RX ORDER — POTASSIUM CHLORIDE 7.45 MG/ML
60 INJECTION INTRAVENOUS
Status: DISCONTINUED | OUTPATIENT
Start: 2024-01-01 | End: 2024-01-01

## 2024-01-01 RX ORDER — CLOPIDOGREL BISULFATE 75 MG/1
75 TABLET ORAL DAILY
Status: DISCONTINUED | OUTPATIENT
Start: 2024-01-01 | End: 2024-01-01

## 2024-01-01 RX ORDER — IBUPROFEN 200 MG
16 TABLET ORAL
Status: DISCONTINUED | OUTPATIENT
Start: 2024-01-01 | End: 2024-01-01 | Stop reason: HOSPADM

## 2024-01-01 RX ORDER — SODIUM CHLORIDE AND POTASSIUM CHLORIDE 150; 900 MG/100ML; MG/100ML
INJECTION, SOLUTION INTRAVENOUS CONTINUOUS
Status: DISCONTINUED | OUTPATIENT
Start: 2024-01-01 | End: 2024-01-01

## 2024-01-01 RX ORDER — SODIUM CHLORIDE 9 MG/ML
1000 INJECTION, SOLUTION INTRAVENOUS CONTINUOUS
Status: DISCONTINUED | OUTPATIENT
Start: 2024-01-01 | End: 2024-01-01

## 2024-01-01 RX ORDER — INSULIN ASPART 100 [IU]/ML
0-5 INJECTION, SOLUTION INTRAVENOUS; SUBCUTANEOUS EVERY 4 HOURS
Status: DISCONTINUED | OUTPATIENT
Start: 2024-01-01 | End: 2024-01-01

## 2024-01-01 RX ORDER — SODIUM CHLORIDE 9 MG/ML
125 INJECTION, SOLUTION INTRAVENOUS CONTINUOUS
Status: DISCONTINUED | OUTPATIENT
Start: 2024-01-01 | End: 2024-01-01

## 2024-01-01 RX ORDER — NALOXONE HCL 0.4 MG/ML
0.02 VIAL (ML) INJECTION
Status: DISCONTINUED | OUTPATIENT
Start: 2024-01-01 | End: 2024-01-01 | Stop reason: HOSPADM

## 2024-01-01 RX ORDER — GLUCAGON 1 MG
1 KIT INJECTION
Status: DISCONTINUED | OUTPATIENT
Start: 2024-01-01 | End: 2024-01-01 | Stop reason: HOSPADM

## 2024-01-01 RX ORDER — DEXTROSE MONOHYDRATE AND SODIUM CHLORIDE 5; .45 G/100ML; G/100ML
INJECTION, SOLUTION INTRAVENOUS CONTINUOUS PRN
Status: DISCONTINUED | OUTPATIENT
Start: 2024-01-01 | End: 2024-01-01

## 2024-01-01 RX ORDER — ACETAMINOPHEN 500 MG
1000 TABLET ORAL
Status: DISCONTINUED | OUTPATIENT
Start: 2024-01-01 | End: 2024-01-01

## 2024-01-01 RX ORDER — TALC
6 POWDER (GRAM) TOPICAL NIGHTLY PRN
Status: DISCONTINUED | OUTPATIENT
Start: 2024-01-01 | End: 2024-01-01 | Stop reason: HOSPADM

## 2024-01-01 RX ORDER — LANCETS 26 GAUGE
1 EACH MISCELLANEOUS 4 TIMES DAILY PRN
Qty: 300 EACH | Refills: 3 | OUTPATIENT
Start: 2024-01-01 | End: 2025-01-01

## 2024-01-01 RX ORDER — INSULIN PUMP SYRINGE, 3 ML
EACH MISCELLANEOUS
Qty: 1 EACH | Refills: 0 | OUTPATIENT
Start: 2024-01-01 | End: 2025-01-01

## 2024-01-01 RX ORDER — GLUCAGON 1 MG
1 KIT INJECTION
Status: DISCONTINUED | OUTPATIENT
Start: 2024-01-01 | End: 2024-01-01

## 2024-01-01 RX ORDER — ACETAMINOPHEN 325 MG/1
650 TABLET ORAL EVERY 6 HOURS PRN
Status: DISCONTINUED | OUTPATIENT
Start: 2024-01-01 | End: 2024-01-01

## 2024-01-01 RX ORDER — HEPARIN SODIUM 5000 [USP'U]/ML
5000 INJECTION, SOLUTION INTRAVENOUS; SUBCUTANEOUS EVERY 8 HOURS
Status: DISCONTINUED | OUTPATIENT
Start: 2024-01-01 | End: 2024-01-01

## 2024-01-01 RX ORDER — ONDANSETRON HYDROCHLORIDE 2 MG/ML
4 INJECTION, SOLUTION INTRAVENOUS EVERY 6 HOURS PRN
Status: DISCONTINUED | OUTPATIENT
Start: 2024-01-01 | End: 2024-01-01 | Stop reason: HOSPADM

## 2024-01-01 RX ORDER — IBUPROFEN 200 MG
24 TABLET ORAL
Status: DISCONTINUED | OUTPATIENT
Start: 2024-01-01 | End: 2024-01-01 | Stop reason: HOSPADM

## 2024-01-01 RX ORDER — ATORVASTATIN CALCIUM 40 MG/1
80 TABLET, FILM COATED ORAL DAILY
Status: DISCONTINUED | OUTPATIENT
Start: 2024-01-01 | End: 2024-01-01 | Stop reason: HOSPADM

## 2024-01-01 RX ORDER — INSULIN ASPART 100 [IU]/ML
0-5 INJECTION, SOLUTION INTRAVENOUS; SUBCUTANEOUS EVERY 4 HOURS PRN
Status: DISCONTINUED | OUTPATIENT
Start: 2024-01-01 | End: 2024-01-01 | Stop reason: HOSPADM

## 2024-01-01 RX ORDER — NIFEDIPINE 30 MG/1
90 TABLET, EXTENDED RELEASE ORAL DAILY
Status: DISCONTINUED | OUTPATIENT
Start: 2024-01-01 | End: 2024-01-01 | Stop reason: HOSPADM

## 2024-01-01 RX ADMIN — CARVEDILOL 12.5 MG: 12.5 TABLET, FILM COATED ORAL at 08:01

## 2024-01-01 RX ADMIN — POTASSIUM CHLORIDE 10 MEQ: 7.46 INJECTION, SOLUTION INTRAVENOUS at 10:01

## 2024-01-01 RX ADMIN — HEPARIN SODIUM 18 UNITS/KG/HR: 10000 INJECTION, SOLUTION INTRAVENOUS at 09:01

## 2024-01-01 RX ADMIN — VANCOMYCIN HYDROCHLORIDE 2000 MG: 5 INJECTION, POWDER, LYOPHILIZED, FOR SOLUTION INTRAVENOUS at 06:01

## 2024-01-01 RX ADMIN — NIFEDIPINE 90 MG: 30 TABLET, FILM COATED, EXTENDED RELEASE ORAL at 08:01

## 2024-01-01 RX ADMIN — HEPARIN SODIUM 12 UNITS/KG/HR: 10000 INJECTION, SOLUTION INTRAVENOUS at 06:01

## 2024-01-01 RX ADMIN — INSULIN ASPART 2 UNITS: 100 INJECTION, SOLUTION INTRAVENOUS; SUBCUTANEOUS at 02:01

## 2024-01-01 RX ADMIN — PIPERACILLIN SODIUM AND TAZOBACTAM SODIUM 4.5 G: 4; .5 INJECTION, POWDER, FOR SOLUTION INTRAVENOUS at 08:01

## 2024-01-01 RX ADMIN — INSULIN DETEMIR 8 UNITS: 100 INJECTION, SOLUTION SUBCUTANEOUS at 09:01

## 2024-01-01 RX ADMIN — POTASSIUM CHLORIDE 10 MEQ: 7.46 INJECTION, SOLUTION INTRAVENOUS at 09:01

## 2024-01-01 RX ADMIN — INSULIN ASPART 2 UNITS: 100 INJECTION, SOLUTION INTRAVENOUS; SUBCUTANEOUS at 09:01

## 2024-01-01 RX ADMIN — INSULIN ASPART 3 UNITS: 100 INJECTION, SOLUTION INTRAVENOUS; SUBCUTANEOUS at 06:01

## 2024-01-01 RX ADMIN — SODIUM PHOSPHATE, MONOBASIC, MONOHYDRATE AND SODIUM PHOSPHATE, DIBASIC, ANHYDROUS 20.01 MMOL: 142; 276 INJECTION, SOLUTION INTRAVENOUS at 11:01

## 2024-01-01 RX ADMIN — INSULIN ASPART 2 UNITS: 100 INJECTION, SOLUTION INTRAVENOUS; SUBCUTANEOUS at 08:01

## 2024-01-01 RX ADMIN — POTASSIUM CHLORIDE 10 MEQ: 7.46 INJECTION, SOLUTION INTRAVENOUS at 11:01

## 2024-01-01 RX ADMIN — ATORVASTATIN CALCIUM 80 MG: 40 TABLET, FILM COATED ORAL at 08:01

## 2024-01-01 RX ADMIN — HEPARIN SODIUM 16 UNITS/KG/HR: 10000 INJECTION, SOLUTION INTRAVENOUS at 12:01

## 2024-01-01 RX ADMIN — ACETAMINOPHEN 650 MG: 325 TABLET ORAL at 08:01

## 2024-01-01 RX ADMIN — SODIUM CHLORIDE, POTASSIUM CHLORIDE, SODIUM LACTATE AND CALCIUM CHLORIDE 1848 ML: 600; 310; 30; 20 INJECTION, SOLUTION INTRAVENOUS at 04:01

## 2024-01-01 RX ADMIN — SODIUM CHLORIDE AND POTASSIUM CHLORIDE: .9; .15 SOLUTION INTRAVENOUS at 06:01

## 2024-01-01 RX ADMIN — POTASSIUM CHLORIDE 10 MEQ: 7.46 INJECTION, SOLUTION INTRAVENOUS at 01:01

## 2024-01-01 RX ADMIN — HEPARIN SODIUM 5000 UNITS: 5000 INJECTION INTRAVENOUS; SUBCUTANEOUS at 05:01

## 2024-01-01 RX ADMIN — SODIUM CHLORIDE 1000 ML: 9 INJECTION, SOLUTION INTRAVENOUS at 01:01

## 2024-01-01 RX ADMIN — CEFTRIAXONE SODIUM 1 G: 1 INJECTION, POWDER, FOR SOLUTION INTRAMUSCULAR; INTRAVENOUS at 02:01

## 2024-01-01 RX ADMIN — NIFEDIPINE 90 MG: 30 TABLET, FILM COATED, EXTENDED RELEASE ORAL at 10:01

## 2024-01-01 RX ADMIN — INSULIN ASPART 2 UNITS: 100 INJECTION, SOLUTION INTRAVENOUS; SUBCUTANEOUS at 07:01

## 2024-01-01 RX ADMIN — INSULIN DETEMIR 12 UNITS: 100 INJECTION, SOLUTION SUBCUTANEOUS at 09:01

## 2024-01-01 RX ADMIN — POTASSIUM CHLORIDE 10 MEQ: 7.46 INJECTION, SOLUTION INTRAVENOUS at 02:01

## 2024-01-01 RX ADMIN — POTASSIUM CHLORIDE 10 MEQ: 7.46 INJECTION, SOLUTION INTRAVENOUS at 05:01

## 2024-01-01 RX ADMIN — CARVEDILOL 12.5 MG: 12.5 TABLET, FILM COATED ORAL at 09:01

## 2024-01-01 RX ADMIN — POTASSIUM CHLORIDE, DEXTROSE MONOHYDRATE AND SODIUM CHLORIDE: 150; 5; 450 INJECTION, SOLUTION INTRAVENOUS at 04:01

## 2024-01-01 RX ADMIN — HEPARIN SODIUM 5000 UNITS: 5000 INJECTION INTRAVENOUS; SUBCUTANEOUS at 09:01

## 2024-01-01 RX ADMIN — SODIUM CHLORIDE, POTASSIUM CHLORIDE, SODIUM LACTATE AND CALCIUM CHLORIDE: 600; 310; 30; 20 INJECTION, SOLUTION INTRAVENOUS at 03:01

## 2024-01-01 RX ADMIN — POTASSIUM CHLORIDE 10 MEQ: 7.46 INJECTION, SOLUTION INTRAVENOUS at 12:01

## 2024-01-01 RX ADMIN — INSULIN HUMAN 0.2 UNITS/KG/HR: 1 INJECTION, SOLUTION INTRAVENOUS at 05:01

## 2024-01-01 RX ADMIN — POTASSIUM CHLORIDE 10 MEQ: 7.46 INJECTION, SOLUTION INTRAVENOUS at 08:01

## 2024-01-01 RX ADMIN — INSULIN HUMAN 0.1 UNITS/KG/HR: 1 INJECTION, SOLUTION INTRAVENOUS at 10:01

## 2024-01-01 RX ADMIN — ASPIRIN 81 MG: 81 TABLET, COATED ORAL at 10:01

## 2024-01-01 RX ADMIN — POTASSIUM BICARBONATE 40 MEQ: 391 TABLET, EFFERVESCENT ORAL at 09:01

## 2024-01-01 RX ADMIN — DEXTROSE MONOHYDRATE: 100 INJECTION, SOLUTION INTRAVENOUS at 10:01

## 2024-01-01 RX ADMIN — PIPERACILLIN SODIUM AND TAZOBACTAM SODIUM 4.5 G: 4; .5 INJECTION, POWDER, FOR SOLUTION INTRAVENOUS at 11:01

## 2024-01-01 RX ADMIN — PIPERACILLIN SODIUM AND TAZOBACTAM SODIUM 4.5 G: 4; .5 INJECTION, POWDER, FOR SOLUTION INTRAVENOUS at 05:01

## 2024-01-01 RX ADMIN — PIPERACILLIN SODIUM AND TAZOBACTAM SODIUM 4.5 G: 4; .5 INJECTION, POWDER, FOR SOLUTION INTRAVENOUS at 02:01

## 2024-01-01 RX ADMIN — INSULIN ASPART 1 UNITS: 100 INJECTION, SOLUTION INTRAVENOUS; SUBCUTANEOUS at 12:01

## 2024-01-01 RX ADMIN — HYDROMORPHONE HYDROCHLORIDE 0.5 MG: 0.5 INJECTION, SOLUTION INTRAMUSCULAR; INTRAVENOUS; SUBCUTANEOUS at 04:01

## 2024-01-01 RX ADMIN — SODIUM CHLORIDE, POTASSIUM CHLORIDE, SODIUM LACTATE AND CALCIUM CHLORIDE 2694 ML: 600; 310; 30; 20 INJECTION, SOLUTION INTRAVENOUS at 04:01

## 2024-01-01 RX ADMIN — SODIUM CHLORIDE, POTASSIUM CHLORIDE, SODIUM LACTATE AND CALCIUM CHLORIDE 1000 ML: 600; 310; 30; 20 INJECTION, SOLUTION INTRAVENOUS at 04:01

## 2024-01-01 RX ADMIN — ATORVASTATIN CALCIUM 80 MG: 40 TABLET, FILM COATED ORAL at 10:01

## 2024-01-01 RX ADMIN — HEPARIN SODIUM 5000 UNITS: 5000 INJECTION INTRAVENOUS; SUBCUTANEOUS at 01:01

## 2024-01-01 RX ADMIN — CEFTRIAXONE SODIUM 1 G: 1 INJECTION, POWDER, FOR SOLUTION INTRAMUSCULAR; INTRAVENOUS at 03:01

## 2024-01-01 RX ADMIN — CARVEDILOL 12.5 MG: 12.5 TABLET, FILM COATED ORAL at 10:01

## 2024-01-01 RX ADMIN — SODIUM CHLORIDE AND POTASSIUM CHLORIDE: .9; .15 SOLUTION INTRAVENOUS at 09:01

## 2024-01-01 RX ADMIN — PANTOPRAZOLE SODIUM 40 MG: 40 TABLET, DELAYED RELEASE ORAL at 10:01

## 2024-01-01 RX ADMIN — DEXTROSE AND SODIUM CHLORIDE 125 ML/HR: 5; 450 INJECTION, SOLUTION INTRAVENOUS at 03:01

## 2024-01-01 RX ADMIN — PANTOPRAZOLE SODIUM 40 MG: 40 TABLET, DELAYED RELEASE ORAL at 08:01

## 2024-01-01 RX ADMIN — ASPIRIN 81 MG: 81 TABLET, COATED ORAL at 08:01

## 2024-01-01 RX ADMIN — VANCOMYCIN HYDROCHLORIDE 1750 MG: 500 INJECTION, POWDER, LYOPHILIZED, FOR SOLUTION INTRAVENOUS at 06:01

## 2024-01-01 RX ADMIN — POTASSIUM PHOSPHATE, MONOBASIC AND POTASSIUM PHOSPHATE, DIBASIC 30 MMOL: 224; 236 INJECTION, SOLUTION, CONCENTRATE INTRAVENOUS at 03:01

## 2024-01-01 RX ADMIN — INSULIN ASPART 1 UNITS: 100 INJECTION, SOLUTION INTRAVENOUS; SUBCUTANEOUS at 04:01

## 2024-01-01 RX ADMIN — ATORVASTATIN CALCIUM 80 MG: 40 TABLET, FILM COATED ORAL at 09:01

## 2024-01-01 RX ADMIN — DEXTROSE AND SODIUM CHLORIDE: 5; 450 INJECTION, SOLUTION INTRAVENOUS at 03:01

## 2024-01-01 RX ADMIN — PIPERACILLIN SODIUM AND TAZOBACTAM SODIUM 4.5 G: 4; .5 INJECTION, POWDER, FOR SOLUTION INTRAVENOUS at 03:01

## 2024-01-01 RX ADMIN — DEXTROSE MONOHYDRATE 125 ML: 100 INJECTION, SOLUTION INTRAVENOUS at 05:01

## 2024-01-01 RX ADMIN — MAGNESIUM SULFATE HEPTAHYDRATE 2 G: 40 INJECTION, SOLUTION INTRAVENOUS at 09:01

## 2024-01-01 RX ADMIN — HEPARIN SODIUM 5000 UNITS: 5000 INJECTION INTRAVENOUS; SUBCUTANEOUS at 02:01

## 2024-01-01 RX ADMIN — INSULIN ASPART 4 UNITS: 100 INJECTION, SOLUTION INTRAVENOUS; SUBCUTANEOUS at 12:01

## 2024-01-01 RX ADMIN — HYDRALAZINE HYDROCHLORIDE 25 MG: 25 TABLET, FILM COATED ORAL at 05:01

## 2024-01-03 NOTE — TELEPHONE ENCOUNTER
Returned the patient called to discuss  She might benefit to extend his sleep hours but she has a problem with consistently waking up every 2 hours still.  At this point she has tried doxepin in the past which did not help her so I would like her to try Xywav to help with her idiopathic hypersomnia and frequent awakening at night   Shayla, please call pt to schedule f/u appt w/ me in 8 weeks (4 weeks after she sees Rosemary). Thanks!

## 2024-01-08 PROBLEM — I63.9 CVA (CEREBRAL VASCULAR ACCIDENT): Status: RESOLVED | Noted: 2023-01-01 | Resolved: 2024-01-01

## 2024-01-08 NOTE — ED PROVIDER NOTES
Encounter Date: 1/8/2024       History     Chief Complaint   Patient presents with    Altered Mental Status     Arrived via ems from home with c/o altered mental status, baseline GCS 13 and nonverbal, for the last 2 days has been more tired and has had diarrhea     78-year-old female with PMH of multiple CVAs with residual right-sided deficits, hypertension, and diabetes presents with altered mental status.  Patient's unable to provide a comprehensive history, so history is obtained from her daughter who is bedside.  Patient's daughter states that she mainly came to the ER because the patient vomited earlier and she was told if the patient ever vomited, she needed to be evaluated.  However, she also notes that over the past 2 days the patient has been increasingly less alert and seems confused.  At baseline the patient is Kw[place] communicative and oriented.  No known fevers, cough, or new weakness.  She has had a few episodes of diarrhea.  Daughter states that after they are discharged last time they did not get all of the equipment that they needed to give the patient insulin so she thinks it might have to do with them.    The history is provided by a relative and a caregiver. The history is limited by the condition of the patient.     Review of patient's allergies indicates:   Allergen Reactions    Amlodipine Swelling    Erythromycin Anaphylaxis    Erythropoietin analogues Anaphylaxis    Pegasys [peginterferon ruben-2a] Anaphylaxis    Lisinopril Hives     Past Medical History:   Diagnosis Date    Allergy     Cataract     Current every day smoker     Diabetes mellitus type II     Gastritis     with gastric ulcer    GERD (gastroesophageal reflux disease)     H. pylori infection     History of hepatitis C, SVR as of 8-2016 08/25/2015    Harvoni 12 wks completed.  SVR(12) as of 8/4/16 SVR(24) as of 10-     Hypertension     Osteopenia     Type 2 diabetes mellitus with diabetic polyneuropathy      Past Surgical  History:   Procedure Laterality Date    COLONOSCOPY      COLONOSCOPY N/A 1/28/2016    Procedure: COLONOSCOPY;  Surgeon: Emeka Ahn MD;  Location: [facility] ENDO (4TH FLR);  Service: Endoscopy;  Laterality: N/A;    COLONOSCOPY N/A 8/8/2019    Procedure: COLONOSCOPY;  Surgeon: Susan Dia MD;  Location: [facility] ENDO (4TH FLR);  Service: Endoscopy;  Laterality: N/A;  appt confirmed-rb    HYSTERECTOMY      LIVER BIOPSY      OOPHORECTOMY      PERIPHERAL ANGIOGRAPHY Left 5/5/2021    Procedure: Peripheral angiography;  Surgeon: Randolph Toribio MD;  Location: [facility] CATH LAB;  Service: Cardiology;  Laterality: Left;    PERIPHERAL ANGIOGRAPHY N/A 6/21/2021    Procedure: Peripheral angiography;  Surgeon: Randolph Toribio MD;  Location: [facility] CATH LAB;  Service: Cardiology;  Laterality: N/A;    UPPER GASTROINTESTINAL ENDOSCOPY       Family History   Problem Relation Age of Onset    Heart disease Mother     Diabetes Mother     Hypertension Mother     Hyperlipidemia Mother     Heart disease Father     Cancer Sister         breast cancer    Diabetes Sister     Cancer Sister 70        colon CA    Diabetes Sister     Breast cancer Sister     Diabetes Sister     Glaucoma Neg Hx     Melanoma Neg Hx     Cirrhosis Neg Hx     Psoriasis Neg Hx     Lupus Neg Hx      Social History     Tobacco Use    Smoking status: Every Day     Current packs/day: 0.50     Average packs/day: 0.3 packs/day for 50.8 years (13.4 ttl pk-yrs)     Types: Cigarettes     Start date: 3/28/1973     Last attempt to quit: 3/28/2021    Smokeless tobacco: Never   Substance Use Topics    Alcohol use: No     Comment: special occasions    Drug use: No     Review of Systems    Physical Exam     Initial Vitals [01/08/24 1359]   BP Pulse Resp Temp SpO2   (!) 166/72 82 (!) 24 (!) 100.5 °F (38.1 °C) 100 %      MAP       --         Physical Exam    Nursing note and vitals reviewed.  Constitutional: She appears well-developed and well-nourished. She is not diaphoretic.  No distress.   HENT:   Head: Normocephalic and atraumatic.   Eyes: Conjunctivae and EOM are normal. Pupils are equal, round, and reactive to light.   Neck: Neck supple.   Normal range of motion.  Cardiovascular:  Normal rate, regular rhythm, normal heart sounds and intact distal pulses.           No murmur heard.  Pulmonary/Chest: Breath sounds normal. No respiratory distress. She has no wheezes. She has no rhonchi. She has no rales.   Abdominal: Abdomen is soft. She exhibits no distension. There is no abdominal tenderness. There is no rebound and no guarding.   Musculoskeletal:         General: No tenderness or edema.      Cervical back: Normal range of motion and neck supple.     Neurological: She is alert.   Oriented to self and opens eyes to voice.  Speech is difficult to understand.  Moving all extremities though generally weak   Skin: Skin is warm and dry. Capillary refill takes less than 2 seconds.         ED Course   Procedures  Labs Reviewed   CBC W/ AUTO DIFFERENTIAL - Abnormal; Notable for the following components:       Result Value    WBC 13.00 (*)     Hemoglobin 11.7 (*)     MCHC 31.4 (*)     RDW 15.3 (*)     Immature Granulocytes 0.9 (*)     Gran # (ANC) 9.7 (*)     Immature Grans (Abs) 0.12 (*)     Gran % 74.8 (*)     Lymph % 16.2 (*)     All other components within normal limits    Narrative:     add on mg per Ortiz Coates MD order# [id number] 01/08/2024 @ 16:43    COMPREHENSIVE METABOLIC PANEL - Abnormal; Notable for the following components:    Sodium 146 (*)     Potassium 3.0 (*)     Chloride 112 (*)     Glucose 311 (*)     BUN 30 (*)     Creatinine 1.5 (*)     Albumin 2.1 (*)     AST 51 (*)     eGFR 35.9 (*)     All other components within normal limits    Narrative:     add on mg per Ortiz Coates MD order# [id number] 01/08/2024 @ 16:43    URINALYSIS, REFLEX TO URINE CULTURE - Abnormal; Notable for the following components:    Protein, UA 2+ (*)     Glucose, UA Trace (*)     Ketones, UA  Trace (*)     Occult Blood UA 3+ (*)     Leukocytes, UA 3+ (*)     All other components within normal limits    Narrative:     Specimen Source->Urine   BETA - HYDROXYBUTYRATE, SERUM - Abnormal; Notable for the following components:    Beta-Hydroxybutyrate 0.9 (*)     All other components within normal limits   OSMOLALITY, SERUM - Abnormal; Notable for the following components:    Osmolality 327 (*)     All other components within normal limits    Narrative:     Yesy Schmitt RN acknowledged and accepted results on test(s) serum   osmolality via secure chat.  by [facility] [place] 01/08/2024 17:38   ACETAMINOPHEN LEVEL - Abnormal; Notable for the following components:    Acetaminophen (Tylenol), Serum <3.0 (*)     All other components within normal limits   BASIC METABOLIC PANEL - Abnormal; Notable for the following components:    Potassium 3.4 (*)     Glucose 456 (*)     BUN 28 (*)     Creatinine 1.6 (*)     eGFR 33.2 (*)     All other components within normal limits   URINALYSIS MICROSCOPIC - Abnormal; Notable for the following components:    RBC, UA 19 (*)     WBC, UA >100 (*)     Bacteria Many (*)     All other components within normal limits    Narrative:     Specimen Source->Urine   POCT GLUCOSE - Abnormal; Notable for the following components:    POCT Glucose 301 (*)     All other components within normal limits   ISTAT PROCEDURE - Abnormal; Notable for the following components:    POC Glucose 295 (*)     POC BUN 32 (*)     POC Creatinine 1.5 (*)     POC Sodium 148 (*)     POC Potassium 2.9 (*)     All other components within normal limits   ISTAT PROCEDURE - Abnormal; Notable for the following components:    POC PO2 19 (*)     POC HCO3 21.6 (*)     POC BE -4 (*)     POC TCO2 23 (*)     All other components within normal limits   POCT GLUCOSE MONITORING CONTINUOUS - Abnormal; Notable for the following components:    POC Glucose 440 (*)     All other components within normal limits   POCT GLUCOSE - Abnormal; Notable for the  following components:    POCT Glucose 440 (*)     All other components within normal limits   POCT GLUCOSE - Abnormal; Notable for the following components:    POCT Glucose 457 (*)     All other components within normal limits   POCT GLUCOSE - Abnormal; Notable for the following components:    POCT Glucose 432 (*)     All other components within normal limits   POCT GLUCOSE - Abnormal; Notable for the following components:    POCT Glucose 326 (*)     All other components within normal limits   CULTURE, BLOOD    Narrative:     Aerobic and anaerobic   CULTURE, BLOOD    Narrative:     Aerobic and anaerobic   INFLUENZA A & B BY MOLECULAR   CULTURE, URINE   CLOSTRIDIUM DIFFICILE   CULTURE, STOOL   SARS-COV-2 RNA AMPLIFICATION, QUAL   PROCALCITONIN   MAGNESIUM   MAGNESIUM    Narrative:     add on mg per Ortiz Coates MD order# [id number] 01/08/2024 @ 16:43    ALCOHOL,MEDICAL (ETHANOL)   AMMONIA   DRUG SCREEN PANEL, URINE EMERGENCY    Narrative:     Specimen Source->Urine   HEMOGLOBIN A1C   TSH   CREATININE, URINE, RANDOM   SODIUM, URINE, RANDOM   ISTAT LACTATE        ECG Results              EKG 12-lead (Final result)  Result time 01/09/24 12:29:27      Final result by Interface, Lab In [facility] (01/09/24 12:29:27)                   Narrative:    Test Reason : R00.0,    Vent. Rate : 080 BPM     Atrial Rate : 080 BPM     P-R Int : 142 ms          QRS Dur : 078 ms      QT Int : 398 ms       P-R-T Axes : 051 -07 270 degrees     QTc Int : 459 ms    Sinus rhythm with frequent Premature ventricular complexes  LVH with repolarization abnormality  Abnormal ECG  When compared with ECG of 27-NOV-2023 13:11,  Premature ventricular complexes are now Present    Confirmed by Henna WHITNEY, Andrew ([id number]) on 1/9/2024 12:29:15 PM    Referred By: [email or web address]   SELF           Confirmed By:Andrew Aguillon MD                                  Imaging Results              US Retroperitoneal Complete (Final result)  Result time 01/09/24 09:04:28      Final  result by Mateo Becker MD (01/09/24 09:04:28)                   Impression:      Chronic medical renal disease.  No hydronephrosis.    Layering debris within the urinary bladder.  Correlation is advised.      Electronically signed by: Mateo Becker  Date:    01/09/2024  Time:    09:04               Narrative:    EXAMINATION:  US RETROPERITONEAL COMPLETE    CLINICAL HISTORY:  ELIZABETH ?obstruction or other pathology;    TECHNIQUE:  Ultrasound of the kidneys and urinary bladder was performed including color flow and Doppler evaluation of the kidneys.    COMPARISON:  Ultrasound abdomen complete 11/11/2021, CTA abdomen and pelvis 05/09/2021.    FINDINGS:  Right kidney: The right kidney measures 9.4 cm. Increased echogenicity.  No cortical thinning. No loss of corticomedullary distinction. Resistive index measures 0.79.  No mass. No renal stone. No hydronephrosis.  There are 2 simple renal cysts, the largest measuring 2.2 x 2.2 x 2.1 cm.    Left kidney: The left kidney measures 9.9 cm. Increased echogenicity.  No cortical thinning. No loss of corticomedullary distinction. Resistive index measures 0.76.  No mass. No renal stone. No hydronephrosis.  Simple renal cyst measuring 1.3 x 0.7 x 1.0 cm.    Layering echogenic debris within the bladder.  No focal bladder wall thickening.    Splenic RI measures 0.69.                                       X-Ray Abdomen AP 1 View (Final result)  Result time 01/09/24 02:15:47      Final result by Rubio Brown MD (01/09/24 02:15:47)                   Impression:      Nonobstructive bowel gas pattern.      Electronically signed by: Rubio Brown MD  Date:    01/09/2024  Time:    02:15               Narrative:    EXAMINATION:  XR ABDOMEN AP 1 VIEW    CLINICAL HISTORY:  vomiting, exclude bowel obstruction;    TECHNIQUE:  Single AP View of the abdomen was performed.    COMPARISON:  None.    FINDINGS:  Multiple cardiac wires and other support devices overlie the abdomen.  Bowel gas  pattern is nonobstructive.  No large volume stool burden identified.  Atherosclerotic vascular calcifications noted.  Mild right convex scoliotic curvature of the lumbar spine.  Bones demonstrate mild degenerative changes.                                       MRI Brain Without Contrast (Final result)  Result time 01/09/24 00:02:10      Final result by Fuad Cunningham MD (01/09/24 00:02:10)                   Impression:      No MR evidence of acute infarction.    DWI signal abnormality within the left corona radiata and left thalamus, in keeping with subacute areas of infarctions.    Unchanged scattered foci of susceptibility weighted artifact in keeping with prior hemosiderin deposition.    Additional findings as above.    Electronically signed by resident: Sandy Coronel  Date:    01/08/2024  Time:    23:10    Electronically signed by: Fuad Cunningham MD  Date:    01/09/2024  Time:    00:02               Narrative:    EXAMINATION:  MRI BRAIN WITHOUT CONTRAST    CLINICAL HISTORY:  Neuro deficit, acute, stroke suspected.    TECHNIQUE:  Multiplanar multisequence MR imaging of the brain was performed without intravenous contrast.    COMPARISON:  CT scan of the head dated 01/08/2024    MRI dated 11/26/2023    FINDINGS:  The craniocervical junction is intact.  The sellar and parasellar structures are within normal limits.  The intracranial flow voids are within normal limits.    No acute diffusion-weighted signal abnormality is identified.  There is increased DWI signal within the left corona radiata and the left medial thalamus with no corresponding decreased signal on the ADC maps.    There is T2/FLAIR signal hyperintensities within the periventricular and subcortical white matter.  There are no extra-axial fluid collections.  There is unchanged appearance of susceptibility weighted artifact within the right parietal lobe and left occipital lobe.  No additional susceptibility weighted artifact is identified.    Skull/Extracranial  Contents (limited evaluation): Mastoid air cells and paranasal sinuses are essentially clear.No acute abnormality of extracranial soft tissue.    Bone marrow signal intensity is normal.                                       CT Head Without Contrast (Final result)  Result time 01/08/24 19:32:57      Final result by Eugenio Thomas MD (01/08/24 19:32:57)                   Impression:      1. No convincing acute intracranial abnormalities noting sequela of chronic microvascular ischemic change.  The extent of which could mask small focus of acute ischemia.  Correlation with current symptomatology advised.  2. Multiple remote infarcts involving the corona radiata, basal ganglia, and left brigid ezekiel.  3. Senescent change..      Electronically signed by: Eugenio Thomas MD  Date:    01/08/2024  Time:    19:32               Narrative:    EXAMINATION:  CT HEAD WITHOUT CONTRAST    CLINICAL HISTORY:  Mental status change, unknown cause;    TECHNIQUE:  Low dose axial images were obtained through the head.  Coronal and sagittal reformations were also performed. Contrast was not administered.    COMPARISON:  CTA head and neck 12/01/2023    FINDINGS:  There is motion artifact.    There is generalized cerebral volume loss.  There is hypoattenuation in a periventricular fashion, likely sequela of chronic microvascular ischemic change.There are a few more focal regions of low attenuation involving the bilateral corona radiata and basal ganglia suggesting sequela of prior infarct, similar in configuration to the previous examination.  There is a focus of low attenuation involving the inferomedial aspect of the left thalamus, also stable.  There is low-attenuation involving the left brigid ezekiel likely present on MRI 11/26/2023.  There is no evidence of acute major vascular territory infarct, hemorrhage, or mass.  There is no hydrocephalus.  There are no abnormal extra-axial fluid collections.  The paranasal sinuses and mastoid air  cells are clear, and there is no evidence of calvarial fracture.  The visualized soft tissues are unremarkable.                                       X-Ray Chest AP Portable (Final result)  Result time 01/08/24 18:01:20      Final result by Eugenio Thomas MD (01/08/24 18:01:20)                   Impression:      1. Interstitial findings may reflect edema noting accentuation by shallow inspiratory effort and habitus.  No large focal consolidation.      Electronically signed by: Eugenio Thomas MD  Date:    01/08/2024  Time:    18:01               Narrative:    EXAMINATION:  XR CHEST AP PORTABLE    CLINICAL HISTORY:  Sepsis;    TECHNIQUE:  Single frontal view of the chest was performed.    COMPARISON:  10/24/2023    FINDINGS:  The cardiomediastinal silhouette is prominent, similar in configuration to the previous exam noting calcification of the aorta.  Patient is rotated..  There is no pleural effusion.  The trachea is midline.  The lungs are symmetrically expanded bilaterally with coarse interstitial attenuation, accentuated by habitus and shallow inspiratory effort..  No large focal consolidation seen.  There is no pneumothorax.  The osseous structures are remarkable for degenerative change..                                       Medications   sodium chloride 0.9% flush 10 mL (has no administration in time range)   naloxone 0.4 mg/mL injection 0.02 mg (has no administration in time range)   glucose chewable tablet 16 g (has no administration in time range)   glucose chewable tablet 24 g (has no administration in time range)   glucagon (human recombinant) injection 1 mg (has no administration in time range)   aspirin EC tablet 81 mg (81 mg Oral Given 1/9/24 1000)   atorvastatin tablet 80 mg (80 mg Oral Given 1/9/24 1000)   NIFEdipine 24 hr tablet 90 mg (90 mg Oral Given 1/9/24 1000)   cefTRIAXone (ROCEPHIN) 1 g in dextrose 5 % in water (D5W) 100 mL IVPB (MB+) (0 g Intravenous Stopped 1/9/24 2786)   pantoprazole  EC tablet 40 mg (40 mg Oral Given 1/9/24 1000)   acetaminophen tablet 650 mg (has no administration in time range)   ondansetron disintegrating tablet 4 mg (has no administration in time range)   melatonin tablet 6 mg (has no administration in time range)   sodium chloride 0.9% flush 10 mL (has no administration in time range)   heparin (porcine) injection 5,000 Units (5,000 Units Subcutaneous Given 1/9/24 1344)   dextrose 10% bolus 125 mL 125 mL (0 mLs Intravenous Stopped 1/9/24 0551)   dextrose 10% bolus 250 mL 250 mL (has no administration in time range)   carvediloL tablet 12.5 mg (12.5 mg Oral Given 1/9/24 1000)   dextrose 5 % and 0.45 % NaCl infusion ( Intravenous Restarted 1/9/24 0445)   potassium bicarbonate disintegrating tablet 40 mEq (40 mEq Oral Given 1/9/24 0959)   potassium phosphate 30 mmol in dextrose 5 % (D5W) 500 mL infusion (has no administration in time range)   insulin detemir U-100 (Levemir) pen 8 Units (has no administration in time range)   insulin aspart U-100 pen 0-5 Units (has no administration in time range)   lactated ringers bolus 1,000 mL (has no administration in time range)   lactated ringers bolus 2,694 mL (0 mLs Intravenous Stopped 1/8/24 1649)   vancomycin 2 g in dextrose 5 % 500 mL IVPB (0 mg Intravenous Stopped 1/8/24 2126)   piperacillin-tazobactam (ZOSYN) 4.5 g in dextrose 5 % in water (D5W) 100 mL IVPB (MB+) (0 g Intravenous Stopped 1/8/24 1838)   lactated ringers bolus 1,848 mL (0 mLs Intravenous Stopped 1/8/24 1936)   potassium chloride 10 mEq in 100 mL IVPB (0 mEq Intravenous Stopped 1/8/24 1838)   potassium chloride 10 mEq in 100 mL IVPB (0 mEq Intravenous Stopped 1/9/24 0000)   0.9 % NaCl with KCl 20 mEq infusion ( Intravenous New Bag 1/8/24 2115)   potassium chloride 10 mEq in 100 mL IVPB (10 mEq Intravenous New Bag 1/9/24 1453)     Medical Decision Making  Differential diagnoses considered includes DKA, HHS, hyperglycemia, UTI, sepsis, pneumonia, electrolyte  abnormality, ELIZABETH, ICH, stroke    Sepsis workup initiated upon the patient's arrival.  Bedside glucose only 300, so HHS his less likely.  See ED course for additional information.    This patient does have evidence of infective focus  My overall impression is sepsis.  Source: Urinary Tract  Antibiotics given- Antibiotics (72h ago, onward)    None      Latest lactate reviewed-  Lab             01/08/24                       1625          POCLAC       0.84          Organ dysfunction indicated by Acute kidney injury    Fluid challenge Ideal Body Weight- The patient's ideal body weight is Ideal body weight: 61.6 kg (135 lb 12.9 oz) which will be used to calculate fluid bolus of 30 ml/kg for treatment of septic shock.      Post- resuscitation assessment Yes Perfusion exam was performed within 6 hours of septic shock presentation after bolus shows Adequate tissue perfusion assessed by non-invasive monitoring       Will Not start Pressors- Levophed for MAP of 65  Source control achieved by: antibiotics      Amount and/or Complexity of Data Reviewed  External Data Reviewed: radiology.     Details: Echo 10/25/23:  ·  Left Ventricle: The left ventricle is normal in size. Increased wall thickness. There is concentric remodeling. Normal wall motion. There is normal systolic function with a visually estimated ejection fraction of 55 - 60%. There is indeterminate diastolic function.  ·  Right Ventricle: Normal right ventricular cavity size. Wall thickness is normal. Right ventricle wall motion  is normal. Systolic function is normal.  ·  IVC/SVC: Normal venous pressure at 3 mmHg.    Labs: ordered. Decision-making details documented in ED Course.  Radiology: ordered and independent interpretation performed. Decision-making details documented in ED Course.  ECG/medicine tests: ordered and independent interpretation performed. Decision-making details documented in ED Course.    Risk  OTC drugs.  Prescription drug  management.  Decision regarding hospitalization.  Diagnosis or treatment significantly limited by social determinants of health.               ED Course as of 01/09/24 1504   Mon Jan 08, 2024   1713 EKG 12-lead  EKG independently interpreted by me shows sinus rhythm at a rate of 80 with PVCs, and no STEMI, compared to 11/27/23 [BD]   1953 CT Head Without Contrast  CT Head unremarkable without evidence of large-vessel infarct or intracranial hemorrhage   [BD]   1953 X-Ray Chest AP Portable  Chest x-ray independently interpreted by me shows no acute process such as pneumonia, pneumothorax, or pulmonary edema.    [BD]   2008 Comprehensive metabolic panel(!)  Significant for ELIZABETH with creatinine 1.5, up from normal baseline.  Patient receiving fluids.  Mild hypokalemia, which we are repleting IV. [BD]   2008 CBC auto differential(!)  Mild leukocytosis, concerning for possible infection.  Suspect UTI but UA still pending [BD]   2008 POCT Glucose(!): 301  Hyperglycemia [BD]   2008 ISTAT PROCEDURE(!!)  Normal VBG overall.  Patient does not have DKA [BD]   2008 Beta-Hydroxybutyrate(!): 0.9  Mildly elevated beta hydroxybutyrate [BD]   2008 Influenza A, Molecular: Negative [BD]   2008 SARS-CoV-2 RNA, Amplification, Qual: Negative [BD]   2049 Acetaminophen (Tylenol), Serum(!): <3.0 [BD]   2049 Alcohol, Serum: <10 [BD]   2049 Ammonia: 19  Normal [BD]   2110 Basic metabolic panel(!!)  Patient's repeat BNP she was improved potassium of 3.4 and worsening glucose at 456.  For the patient is not DKA, I still think she could have a component of HHS given her elevated osmolality, so I will start her on an insulin drip and give her normal saline in the with a potassium.    Discussed the case with Dr. Lehman, the [facility] ED attending who has assumed care.    Procedure: Critical Care  Please put in 90 minutes of critical care due to patient having a high risk of neurological failure.        [BD]      ED Course User Index  [BD] Jaxon  Ortiz SULLIVAN MD                             Clinical Impression:  Final diagnoses:  [R00.0] Tachycardia  [R41.82] Altered mental status, unspecified altered mental status type (Primary)  [G93.40] Acute encephalopathy  [N39.0, R31.9] Urinary tract infection with hematuria, site unspecified          ED Disposition Condition    Observation Stable                Ortiz Coates MD  01/09/24 9280

## 2024-01-08 NOTE — ED TRIAGE NOTES
Pt. Is a 78 y.o. female presenting to the ED via EMS from home. Per the pt. Daughter, she has become increasingly more lethargic over the past 2 days. Pt. Had one episode of vomiting today.

## 2024-01-08 NOTE — PROGRESS NOTES
Established Patient - Audio Only Telehealth Visit     The patient location is: home  The chief complaint leading to consultation is: hosp f/u  Visit type: Virtual visit with audio only (telephone)  Total time spent with patient:        The reason for the audio only service rather than synchronous audio and video virtual visit was related to technical difficulties or patient preference/necessity.     Each patient to whom I provide medical services by telemedicine is:  (1) informed of the relationship between the physician and patient and the respective role of any other health care provider with respect to management of the patient; and (2) notified that they may decline to receive medical services by telemedicine and may withdraw from such care at any time. Patient verbally consented to receive this service via voice-only telephone call.       HPI: 1st attempt 1322 got busy signal.  2nd & 3rd attempts at 1550 same result.     Assessment and plan:                          This service was not originating from a related E/M service provided within the previous 7 days nor will  to an E/M service or procedure within the next 24 hours or my soonest available appointment.  Prevailing standard of care was able to be met in this audio-only visit.

## 2024-01-09 PROBLEM — Z86.73 HISTORY OF CVA (CEREBROVASCULAR ACCIDENT): Chronic | Status: ACTIVE | Noted: 2023-01-01

## 2024-01-09 PROBLEM — G93.40 ACUTE ENCEPHALOPATHY: Status: ACTIVE | Noted: 2024-01-01

## 2024-01-09 PROBLEM — I73.89 HHS (HYPOTHENAR HAMMER SYNDROME): Status: ACTIVE | Noted: 2024-01-01

## 2024-01-09 PROBLEM — N17.9 AKI (ACUTE KIDNEY INJURY): Status: ACTIVE | Noted: 2024-01-01

## 2024-01-09 PROBLEM — I63.312 CEREBROVASCULAR ACCIDENT (CVA) DUE TO THROMBOSIS OF LEFT MIDDLE CEREBRAL ARTERY: Status: RESOLVED | Noted: 2023-01-01 | Resolved: 2024-01-01

## 2024-01-09 NOTE — ASSESSMENT & PLAN NOTE
Presenting with increased lethargy over the past 1 day  Recent stroke in late November 2023 which has caused residual right sided deficits and dysarthria  Daughter reports increased lethargy over the past 1 day; Pt has also had watery diarrhea for about 2 days with reduced PO intake    Mild leukocytosis  Ammonia WNL, tylenol, ethanol, UDS negative  UA concerning for infection, UCx pending; BCx pending  CXR without obvious consolidation, no O2 requirement  CT head and MRI brain without acute infarcts, both show remote infarcts     DDx: UTI vs bacteremia vs aspiration PNA vs diarrheal illness vs seizures vs related to hyperglycemia     Plan:  - continue ceftriaxone; no respiratory symptoms or focal consolidation on CXR so did not cover for aspiration PNA  - f/u UCx and BCx   - Brain imaging showing remote infarcts - likely recrudescence of stroke symptoms in setting of likely infection  - EEG  - check TSH, B12, folate   - stool culture and c diff   - NPO until SLP evaluation  - q4h neurochecks  - delirium precautions  - PT/OT evaluation, anticipate may need acute care placement

## 2024-01-09 NOTE — PROVIDER PROGRESS NOTES - EMERGENCY DEPT.
Imaging Results              MRI Brain Without Contrast (Final result)  Result time 01/09/24 00:02:10      Final result by Fuad Cunningham MD (01/09/24 00:02:10)                   Impression:      No MR evidence of acute infarction.    DWI signal abnormality within the left corona radiata and left thalamus, in keeping with subacute areas of infarctions.    Unchanged scattered foci of susceptibility weighted artifact in keeping with prior hemosiderin deposition.    Additional findings as above.    Electronically signed by resident: Sandy Coronel  Date:    01/08/2024  Time:    23:10    Electronically signed by: Fuad Cunningham MD  Date:    01/09/2024  Time:    00:02               Narrative:    EXAMINATION:  MRI BRAIN WITHOUT CONTRAST    CLINICAL HISTORY:  Neuro deficit, acute, stroke suspected.    TECHNIQUE:  Multiplanar multisequence MR imaging of the brain was performed without intravenous contrast.    COMPARISON:  CT scan of the head dated 01/08/2024    MRI dated 11/26/2023    FINDINGS:  The craniocervical junction is intact.  The sellar and parasellar structures are within normal limits.  The intracranial flow voids are within normal limits.    No acute diffusion-weighted signal abnormality is identified.  There is increased DWI signal within the left corona radiata and the left medial thalamus with no corresponding decreased signal on the ADC maps.    There is T2/FLAIR signal hyperintensities within the periventricular and subcortical white matter.  There are no extra-axial fluid collections.  There is unchanged appearance of susceptibility weighted artifact within the right parietal lobe and left occipital lobe.  No additional susceptibility weighted artifact is identified.    Skull/Extracranial Contents (limited evaluation): Mastoid air cells and paranasal sinuses are essentially clear.No acute abnormality of extracranial soft tissue.    Bone marrow signal intensity is normal.                                       CT Head  Without Contrast (Final result)  Result time 01/08/24 19:32:57      Final result by Eugenio Thomas MD (01/08/24 19:32:57)                   Impression:      1. No convincing acute intracranial abnormalities noting sequela of chronic microvascular ischemic change.  The extent of which could mask small focus of acute ischemia.  Correlation with current symptomatology advised.  2. Multiple remote infarcts involving the corona radiata, basal ganglia, and left brigid ezekiel.  3. Senescent change..      Electronically signed by: Eugenio Thomas MD  Date:    01/08/2024  Time:    19:32               Narrative:    EXAMINATION:  CT HEAD WITHOUT CONTRAST    CLINICAL HISTORY:  Mental status change, unknown cause;    TECHNIQUE:  Low dose axial images were obtained through the head.  Coronal and sagittal reformations were also performed. Contrast was not administered.    COMPARISON:  CTA head and neck 12/01/2023    FINDINGS:  There is motion artifact.    There is generalized cerebral volume loss.  There is hypoattenuation in a periventricular fashion, likely sequela of chronic microvascular ischemic change.There are a few more focal regions of low attenuation involving the bilateral corona radiata and basal ganglia suggesting sequela of prior infarct, similar in configuration to the previous examination.  There is a focus of low attenuation involving the inferomedial aspect of the left thalamus, also stable.  There is low-attenuation involving the left brigid ezekiel likely present on MRI 11/26/2023.  There is no evidence of acute major vascular territory infarct, hemorrhage, or mass.  There is no hydrocephalus.  There are no abnormal extra-axial fluid collections.  The paranasal sinuses and mastoid air cells are clear, and there is no evidence of calvarial fracture.  The visualized soft tissues are unremarkable.                                       X-Ray Chest AP Portable (Final result)  Result time 01/08/24 18:01:20      Final  result by Eugenio Thomas MD (01/08/24 18:01:20)                   Impression:      1. Interstitial findings may reflect edema noting accentuation by shallow inspiratory effort and habitus.  No large focal consolidation.      Electronically signed by: Eugenio Thomas MD  Date:    01/08/2024  Time:    18:01               Narrative:    EXAMINATION:  XR CHEST AP PORTABLE    CLINICAL HISTORY:  Sepsis;    TECHNIQUE:  Single frontal view of the chest was performed.    COMPARISON:  10/24/2023    FINDINGS:  The cardiomediastinal silhouette is prominent, similar in configuration to the previous exam noting calcification of the aorta.  Patient is rotated..  There is no pleural effusion.  The trachea is midline.  The lungs are symmetrically expanded bilaterally with coarse interstitial attenuation, accentuated by habitus and shallow inspiratory effort..  No large focal consolidation seen.  There is no pneumothorax.  The osseous structures are remarkable for degenerative change..                                       UA shows urinary tract infection.  MRI shows subacute infarct likely from her stroke at the end of November.  Patient admitted to Hospital Medicine.

## 2024-01-09 NOTE — PLAN OF CARE
Problem: Occupational Therapy  Goal: Occupational Therapy Goal  Description: Goals to be met by: 1/30/2024    Patient will increase functional independence with ADLs by performing:    UE Dressing with Stand-by Assistance.  Grooming while seated with Stand-by Assistance.  Sitting at edge of bed x10 minutes with Supervision.  Supine to sit with Contact Guard Assistance.  Upper extremity exercise program 2x10 reps, with supervision.    Outcome: Ongoing, Progressing   Patient's goals are set.

## 2024-01-09 NOTE — SUBJECTIVE & OBJECTIVE
Past Medical History:   Diagnosis Date    Allergy     Cataract     Current every day smoker     Diabetes mellitus type II     Gastritis     with gastric ulcer    GERD (gastroesophageal reflux disease)     H. pylori infection     History of hepatitis C, SVR as of 8-2016 08/25/2015    [facility] 12 wks completed.  SVR(12) as of 8/4/16 SVR(24) as of 10-     Hypertension     Osteopenia     Type 2 diabetes mellitus with diabetic polyneuropathy      Past Surgical History:   Procedure Laterality Date    COLONOSCOPY      COLONOSCOPY N/A 1/28/2016    Procedure: COLONOSCOPY;  Surgeon: Emeka Ahn MD;  Location: [address]);  Service: Endoscopy;  Laterality: N/A;    COLONOSCOPY N/A 8/8/2019    Procedure: COLONOSCOPY;  Surgeon: Susan Dia MD;  Location: [address]);  Service: Endoscopy;  Laterality: N/A;  appt confirmed-rb    HYSTERECTOMY      LIVER BIOPSY      OOPHORECTOMY      PERIPHERAL ANGIOGRAPHY Left 5/5/2021    Procedure: Peripheral angiography;  Surgeon: Randolph Toribio MD;  Location: [facility] CATH LAB;  Service: Cardiology;  Laterality: Left;    PERIPHERAL ANGIOGRAPHY N/A 6/21/2021    Procedure: Peripheral angiography;  Surgeon: Randolph Toribio MD;  Location: [facility] CATH LAB;  Service: Cardiology;  Laterality: N/A;    UPPER GASTROINTESTINAL ENDOSCOPY       Review of patient's allergies indicates:   Allergen Reactions    Amlodipine Swelling    Erythromycin Anaphylaxis    Erythropoietin analogues Anaphylaxis    Pegasys [peginterferon ruben-2a] Anaphylaxis    Lisinopril Hives     Current Facility-Administered Medications on File Prior to Encounter   Medication    [DISCONTINUED] GENERIC EXTERNAL MEDICATION    [DISCONTINUED] GENERIC EXTERNAL MEDICATION     Current Outpatient Medications on File Prior to Encounter   Medication Sig    aspirin (ECOTRIN) 81 MG EC tablet Take 1 tablet (81 mg total) by mouth once daily.    blood sugar diagnostic Strp 1 each by Misc.(Non-Drug; Combo Route) route  "4 (four) times daily as needed (blood glucose check).    blood-glucose meter kit Use as instructed    blood-glucose meter,continuous (DEXCOM G6 ) Misc Use as directed.    blood-glucose sensor (DEXCOM G6 SENSOR) Rashida Change every 10 days. 90 day e 11.65    blood-glucose transmitter (DEXCOM G6 TRANSMITTER) Rashida Change every 3 months.    candesartan (ATACAND) 32 MG tablet Take 1 tablet (32 mg total) by mouth once daily.    carvediloL (COREG) 12.5 MG tablet Take 1 tablet (12.5 mg total) by mouth in the morning and 1 tablet (12.5 mg total) in the evening.    clopidogreL (PLAVIX) 75 mg tablet Take 1 tablet (75 mg total) by mouth once daily.    doxepin (SINEQUAN) 10 MG capsule Take 1 capsule (10 mg total) by mouth nightly as needed (insomnia).    gabapentin (NEURONTIN) 300 MG capsule Take 1 capsule (300 mg total) by mouth 2 (two) times daily.    hydrALAZINE (APRESOLINE) 25 MG tablet Take 1 tablet (25 mg total) by mouth 3 (three) times daily as needed (systolic blood pressure greater than 160 mm/Hg).    insulin aspart U-100 (NOVOLOG) 100 unit/mL (3 mL) InPn pen Inject 0-5 Units into the skin before meals and at bedtime as needed (Hyperglycemia).    insulin aspart U-100 (NOVOLOG) 100 unit/mL (3 mL) InPn pen Inject 4 Units into the skin 3 (three) times daily.    insulin glargine (LANTUS) 100 unit/mL injection Inject 8 Units under the skin in the morning. Indications: Type 2 Diabetes.    insulin syringe-needle U-100 (BD INSULIN SYRINGE ULTRA-FINE) 0.5 mL 31 gauge x 5/16" Syrg 1 each by Misc.(Non-Drug; Combo Route) route 4 (four) times daily as needed (insulin administration).    lancing device with lancets Kit 1 each by Misc.(Non-Drug; Combo Route) route 4 (four) times daily as needed (blood glucose testing).    modafiniL (PROVIGIL) 100 MG Tab Take 1 tablet (100 mg total) by mouth in the morning.    NIFEdipine (PROCARDIA-XL) 90 MG (OSM) 24 hr tablet Take 1 tablet (90 mg total) by mouth once daily.    pantoprazole " "(PROTONIX) 40 MG tablet Take 1 tablet (40 mg total) by mouth daily as needed (heartburn or reflux).    pen needle, diabetic (BD ULTRA-FINE MILAN PEN NEEDLE) 32 gauge x 5/32" Ndle Use as directed with insulin pens    rosuvastatin (CRESTOR) 40 MG Tab Take 1 tablet (40 mg total) by mouth every evening.    semaglutide (OZEMPIC) 2 mg/dose (8 mg/3 mL) PnIj Inject 2 mg into the skin every 7 days.     Family History       Problem Relation (Age of Onset)    Breast cancer Sister    Cancer Sister, Sister (70)    Diabetes Mother, Sister, Sister, Sister    Heart disease Mother, Father    Hyperlipidemia Mother    Hypertension Mother          Tobacco Use    Smoking status: Every Day     Current packs/day: 0.50     Average packs/day: 0.3 packs/day for 50.8 years (13.4 ttl pk-yrs)     Types: Cigarettes     Start date: 3/28/1973     Last attempt to quit: 3/28/2021    Smokeless tobacco: Never   Substance and Sexual Activity    Alcohol use: No     Comment: special occasions    Drug use: No    Sexual activity: Yes     Partners: Male     Review of Systems   Unable to perform ROS: Mental status change     Objective:     Vital Signs (Most Recent):  Temp: 100.1 °F (37.8 °C) (01/08/24 1930)  Pulse: 78 (01/08/24 2251)  Resp: 20 (01/08/24 2251)  BP: (!) 174/72 (01/08/24 2251)  SpO2: 96 % (01/08/24 2251) Vital Signs (24h Range):  Temp:  [99.5 °F (37.5 °C)-100.5 °F (38.1 °C)] 100.1 °F (37.8 °C)  Pulse:  [76-88] 78  Resp:  [16-24] 20  SpO2:  [95 %-100 %] 96 %  BP: (135-193)/() 174/72     Weight: 89.8 kg (198 lb)  Body mass index is 31.01 kg/m².     Physical Exam  Vitals and nursing note reviewed.   Constitutional:       General: She is not in acute distress.     Appearance: She is ill-appearing.   HENT:      Head: Normocephalic and atraumatic.      Mouth/Throat:      Mouth: Mucous membranes are dry.   Eyes:      Extraocular Movements: Extraocular movements intact.      Pupils: Pupils are equal, round, and reactive to light.   Cardiovascular: " "     Rate and Rhythm: Normal rate and regular rhythm.      Pulses: Normal pulses.      Heart sounds: Normal heart sounds. No murmur heard.  Pulmonary:      Effort: Pulmonary effort is normal.      Breath sounds: Normal breath sounds. No wheezing, rhonchi or rales.   Abdominal:      General: There is no distension.      Palpations: Abdomen is soft.      Tenderness: There is abdominal tenderness (suprapubic). There is no right CVA tenderness, left CVA tenderness or guarding.      Comments: Reduced bowel sounds    Musculoskeletal:         General: No tenderness.      Right lower leg: No edema.      Left lower leg: No edema.   Skin:     General: Skin is warm and dry.      Capillary Refill: Capillary refill takes 2 to 3 seconds.   Neurological:      Mental Status: She is alert. She is disoriented.      GCS: GCS eye subscore is 4. GCS verbal subscore is 2. GCS motor subscore is 5.      Comments: Right sided deficits - doesn't move right leg but can move right arm              CRANIAL NERVES     CN III, IV, VI   Pupils are equal, round, and reactive to light.       Significant Labs: All pertinent labs within the past 24 hours have been reviewed.  Blood Culture:   Recent Labs   Lab 01/08/24  1628 01/08/24  1629   LABBLOO No Growth to date No Growth to date     CBC:   Recent Labs   Lab 01/08/24  1628 01/08/24  1645   WBC 13.00*  --    HGB 11.7*  --    HCT 37.3 37     --      CMP:   Recent Labs   Lab 01/08/24  1628 01/08/24 2006   * 143   K 3.0* 3.4*   * 108   CO2 23 24   * 456*   BUN 30* 28*   CREATININE 1.5* 1.6*   CALCIUM 8.9 8.7   PROT 6.4  --    ALBUMIN 2.1*  --    BILITOT 0.5  --    ALKPHOS 102  --    AST 51*  --    ALT 40  --    ANIONGAP 11 11     POCT Glucose:   Recent Labs   Lab 01/08/24  2252 01/09/24  0007 01/09/24  0127   POCTGLUCOSE 457* 432* 326*     Urine Culture: No results for input(s): "LABURIN" in the last 48 hours.  Urine Studies:   Recent Labs   Lab 01/08/24 2036   COLORU " Yellow   APPEARANCEUA Ex.Turbid   PHUR 6.0   SPECGRAV 1.020   PROTEINUA 2+*   GLUCUA Trace*   KETONESU Trace*   BILIRUBINUA Negative   OCCULTUA 3+*   NITRITE Negative   LEUKOCYTESUR 3+*   RBCUA 19*   WBCUA >100*   BACTERIA Many*   HYALINECASTS 0     Recent Lab Results  (Last 5 results in the past 24 hours)        01/09/24  0127   01/09/24  0007   01/08/24  2252   01/08/24  2036   01/08/24 2010        Benzodiazepines       Negative         Methadone metabolites       Negative         Phencyclidine       Negative         Acetaminophen (Tylenol), Serum               Alcohol, Serum               Ammonia               Amphetamine Screen, Ur       Negative         Anion Gap               Appearance, UA       Ex.Turbid         Bacteria, UA       Many         Barbiturate Screen, Ur       Negative         Bilirubin (UA)       Negative         BUN               Calcium               Chloride               CO2               Cocaine (Metab.)       Negative         Color, UA       Yellow         Creatinine               Creatinine, Urine       90.0         eGFR               Glucose               Glucose, UA       Trace         Hyaline Casts, UA       0         Ketones, UA       Trace         Leukocytes, UA       3+         Microscopic Comment       SEE COMMENT  Comment: Other formed elements not mentioned in the report are not   present in the microscopic examination.            NITRITE UA       Negative         Occult Blood UA       3+         Opiate Scrn, Ur       Negative         pH, UA       6.0         POC Glucose               POCT Glucose 326   432   457     440       Potassium               Protein, UA       2+  Comment: Recommend a 24 hour urine protein or a urine   protein/creatinine ratio if globulin induced proteinuria is  clinically suspected.           RBC, UA       19         Sodium               Specific Gravity, UA       1.020         Specimen UA       Urine, Catheterized         Marijuana (THC) Metabolite        Negative         Toxicology Information       SEE COMMENT  Comment: This screen includes the following classes of drugs at the listed   cut-off:    Benzodiazepines 200 ng/ml  Methadone 300 ng/ml  Cocaine metabolite 300 ng/ml  Opiates 300 ng/ml  Barbiturates 200 ng/ml  Amphetamines 1000 ng/ml  Marijuana metabs (THC) 50 ng/ml  Phencyclidine (PCP) 25 ng/ml    This is a screening test. If results do not correlate with clinical   presentation, then a confirmatory send out test is advised.     This report is intended for use in clinical monitoring and management   of   patients. It is not intended for use in employment related drug   testing.           WBC, UA       >100                                Significant Imaging: I have reviewed all pertinent imaging results/findings within the past 24 hours.

## 2024-01-09 NOTE — CLINICAL REVIEW
Sepsis Screen (most recent)       Sepsis Screen (IP) - 01/09/24 1212       Is the patient's history or complaint suggestive of a possible infection? Yes  -    Are there at least two of the following signs and symptoms present? Yes   [id number] -    Sepsis signs/symptoms - Hyper or Hypothermia Hyperthermia >100.4 or Hypothermia < 96.8  -    Sepsis signs/symptoms - WBC WBC < 4,000 or WBC > 12,000  -    Sepsis signs/symptoms - Altered Mental Status Altered Mental Status  -    Are any of the following organ dysfunction criteria present and not considered to be due to a chronic condition? Yes  -    Organ Dysfunction Criteria Lactate > 2.0  -    Initiate Sepsis Protocol No  -    Reason sepsis not considered Pt. receiving appropriate management  -              User Key  (r) = Recorded By, (t) = Taken By, (c) = Cosigned By      Initials Name    Sharon Navarro RN                    Glycopyrrolate Counseling:  I discussed with the patient the risks of glycopyrrolate including but not limited to skin rash, drowsiness, dry mouth, difficulty urinating, and blurred vision. Prednisone Counseling:  I discussed with the patient the risks of prolonged use of prednisone including but not limited to weight gain, insomnia, osteoporosis, mood changes, diabetes, susceptibility to infection, glaucoma and high blood pressure. In cases where prednisone use is prolonged, patients should be monitored with blood pressure checks, serum glucose levels and an eye exam.  Additionally, the patient may need to be placed on GI prophylaxis, PCP prophylaxis, and calcium and vitamin D supplementation and/or a bisphosphonate. The patient verbalized understanding of the proper use and the possible adverse effects of prednisone. All of the patient's questions and concerns were addressed. Birth Control Pills Counseling: Birth Control Pill Counseling: I discussed with the patient the potential side effects of OCPs including but not limited to increased risk of stroke, heart attack, thrombophlebitis, deep venous thrombosis, hepatic adenomas, breast changes, GI upset, headaches, and depression. The patient verbalized understanding of the proper use and possible adverse effects of OCPs. All of the patient's questions and concerns were addressed. Quinolones Pregnancy And Lactation Text: This medication is Pregnancy Category C and it isn't know if it is safe during pregnancy. It is also excreted in breast milk. Skyrizi Counseling: I discussed with the patient the risks of risankizumab-rzaa including but not limited to immunosuppression, and serious infections. The patient understands that monitoring is required including a PPD at baseline and must alert us or the primary physician if symptoms of infection or other concerning signs are noted. 5-Fu Counseling: 5-Fluorouracil Counseling:  I discussed with the patient the risks of 5-fluorouracil including but not limited to erythema, scaling, itching, weeping, crusting, and pain. Cosentyx Counseling:  I discussed with the patient the risks of Cosentyx including but not limited to worsening of Crohn's disease, immunosuppression, allergic reactions and infections. The patient understands that monitoring is required including a PPD at baseline and must alert us or the primary physician if symptoms of infection or other concerning signs are noted. Protopic Counseling: Patient may experience a mild burning sensation during topical application. Protopic is not approved in children less than 3years of age. There have been case reports of hematologic and skin malignancies in patients using topical calcineurin inhibitors although causality is questionable. Propranolol Pregnancy And Lactation Text: This medication is Pregnancy Category C and it isn't known if it is safe during pregnancy. It is excreted in breast milk. Quinolones Counseling:  I discussed with the patient the risks of fluoroquinolones including but not limited to GI upset, allergic reaction, drug rash, diarrhea, dizziness, photosensitivity, yeast infections, liver function test abnormalities, tendonitis/tendon rupture. Terbinafine Pregnancy And Lactation Text: This medication is Pregnancy Category B and is considered safe during pregnancy. It is also excreted in breast milk and breast feeding isn't recommended. Rifampin Counseling: I discussed with the patient the risks of rifampin including but not limited to liver damage, kidney damage, red-orange body fluids, nausea/vomiting and severe allergy. Opioid Counseling: I discussed with the patient the potential side effects of opioids including but not limited to addiction, altered mental status, and depression. I stressed avoiding alcohol, benzodiazepines, muscle relaxants and sleep aids unless specifically okayed by a physician. The patient verbalized understanding of the proper use and possible adverse effects of opioids. All of the patient's questions and concerns were addressed. They were instructed to flush the remaining pills down the toilet if they did not need them for pain. Tremfya Counseling: I discussed with the patient the risks of guselkumab including but not limited to immunosuppression, serious infections, worsening of inflammatory bowel disease and drug reactions. The patient understands that monitoring is required including a PPD at baseline and must alert us or the primary physician if symptoms of infection or other concerning signs are noted. Prednisone Pregnancy And Lactation Text: This medication is Pregnancy Category C and it isn't know if it is safe during pregnancy. This medication is excreted in breast milk. Glycopyrrolate Pregnancy And Lactation Text: This medication is Pregnancy Category B and is considered safe during pregnancy. It is unknown if it is excreted breast milk. Cosentyx Pregnancy And Lactation Text: This medication is Pregnancy Category B and is considered safe during pregnancy. It is unknown if this medication is excreted in breast milk. Skyrizi Pregnancy And Lactation Text: The risk during pregnancy and breastfeeding is uncertain with this medication. 5-Fu Pregnancy And Lactation Text: This medication is Pregnancy Category X and contraindicated in pregnancy and in women who may become pregnant. It is unknown if this medication is excreted in breast milk. Protopic Pregnancy And Lactation Text: This medication is Pregnancy Category C. It is unknown if this medication is excreted in breast milk when applied topically. Azithromycin Counseling:  I discussed with the patient the risks of azithromycin including but not limited to GI upset, allergic reaction, drug rash, diarrhea, and yeast infections. Spironolactone Counseling: Patient advised regarding risks of diarrhea, abdominal pain, hyperkalemia, birth defects (for female patients), liver toxicity and renal toxicity. The patient may need blood work to monitor liver and kidney function and potassium levels while on therapy. The patient verbalized understanding of the proper use and possible adverse effects of spironolactone. All of the patient's questions and concerns were addressed. Dupixent Counseling: I discussed with the patient the risks of dupilumab including but not limited to eye infection and irritation, cold sores, injection site reactions, worsening of asthma, allergic reactions and increased risk of parasitic infection. Live vaccines should be avoided while taking dupilumab. Dupilumab will also interact with certain medications such as warfarin and cyclosporine. The patient understands that monitoring is required and they must alert us or the primary physician if symptoms of infection or other concerning signs are noted. Rifampin Pregnancy And Lactation Text: This medication is Pregnancy Category C and it isn't know if it is safe during pregnancy. It is also excreted in breast milk and should not be used if you are breast feeding. Rhofade Counseling: Rhofade is a topical medication which can decrease superficial blood flow where applied. Side effects are uncommon and include stinging, redness and allergic reactions. Azithromycin Pregnancy And Lactation Text: This medication is considered safe during pregnancy and is also secreted in breast milk. Stelara Counseling:  I discussed with the patient the risks of ustekinumab including but not limited to immunosuppression, malignancy, posterior leukoencephalopathy syndrome, and serious infections. The patient understands that monitoring is required including a PPD at baseline and must alert us or the primary physician if symptoms of infection or other concerning signs are noted. Hydroxychloroquine Counseling:  I discussed with the patient that a baseline ophthalmologic exam is needed at the start of therapy and every year thereafter while on therapy. A CBC may also be warranted for monitoring. The side effects of this medication were discussed with the patient, including but not limited to agranulocytosis, aplastic anemia, seizures, rashes, retinopathy, and liver toxicity. Patient instructed to call the office should any adverse effect occur. The patient verbalized understanding of the proper use and possible adverse effects of Plaquenil. All the patient's questions and concerns were addressed. Opioid Pregnancy And Lactation Text: These medications can lead to premature delivery and should be avoided during pregnancy. These medications are also present in breast milk in small amounts. Cimetidine Counseling:  I discussed with the patient the risks of Cimetidine including but not limited to gynecomastia, headache, diarrhea, nausea, drowsiness, arrhythmias, pancreatitis, skin rashes, psychosis, bone marrow suppression and kidney toxicity. Birth Control Pills Pregnancy And Lactation Text: This medication should be avoided if pregnant and for the first 30 days post-partum. Drysol Counseling:  I discussed with the patient the risks of drysol/aluminum chloride including but not limited to skin rash, itching, irritation, burning. Taltz Counseling: I discussed with the patient the risks of ixekizumab including but not limited to immunosuppression, serious infections, worsening of inflammatory bowel disease and drug reactions. The patient understands that monitoring is required including a PPD at baseline and must alert us or the primary physician if symptoms of infection or other concerning signs are noted. Spironolactone Pregnancy And Lactation Text: This medication can cause feminization of the male fetus and should be avoided during pregnancy. The active metabolite is also found in breast milk. Doxepin Counseling:  Patient advised that the medication is sedating and not to drive a car after taking this medication. Patient informed of potential adverse effects including but not limited to dry mouth, urinary retention, and blurry vision. The patient verbalized understanding of the proper use and possible adverse effects of doxepin. All of the patient's questions and concerns were addressed. Solaraze Counseling:  I discussed with the patient the risks of Solaraze including but not limited to erythema, scaling, itching, weeping, crusting, and pain. Sarecycline Counseling: Patient advised regarding possible photosensitivity and discoloration of the teeth, skin, lips, tongue and gums. Patient instructed to avoid sunlight, if possible. When exposed to sunlight, patients should wear protective clothing, sunglasses, and sunscreen. The patient was instructed to call the office immediately if the following severe adverse effects occur:  hearing changes, easy bruising/bleeding, severe headache, or vision changes. The patient verbalized understanding of the proper use and possible adverse effects of sarecycline. All of the patient's questions and concerns were addressed. Xeljanz Counseling: Anup Abide Counseling: I discussed with the patient the risks of Lauree Abide therapy including increased risk of infection, liver issues, headache, diarrhea, or cold symptoms. Live vaccines should be avoided. They were instructed to call if they have any problems. Dupixent Pregnancy And Lactation Text: This medication likely crosses the placenta but the risk for the fetus is uncertain. This medication is excreted in breast milk. Acitretin Counseling:  I discussed with the patient the risks of acitretin including but not limited to hair loss, dry lips/skin/eyes, liver damage, hyperlipidemia, depression/suicidal ideation, photosensitivity. Serious rare side effects can include but are not limited to pancreatitis, pseudotumor cerebri, bony changes, clot formation/stroke/heart attack. Patient understands that alcohol is contraindicated since it can result in liver toxicity and significantly prolong the elimination of the drug by many years. Hydroxychloroquine Pregnancy And Lactation Text: This medication has been shown to cause fetal harm but it isn't assigned a Pregnancy Risk Category. There are small amounts excreted in breast milk. Bactrim Counseling:  I discussed with the patient the risks of sulfa antibiotics including but not limited to GI upset, allergic reaction, drug rash, diarrhea, dizziness, photosensitivity, and yeast infections. Rarely, more serious reactions can occur including but not limited to aplastic anemia, agranulocytosis, methemoglobinemia, blood dyscrasias, liver or kidney failure, lung infiltrates or desquamative/blistering drug rashes. Drysol Pregnancy And Lactation Text: This medication is considered safe during pregnancy and breast feeding. Rhofade Pregnancy And Lactation Text: This medication has not been assigned a Pregnancy Risk Category. It is unknown if the medication is excreted in breast milk. Solaraze Pregnancy And Lactation Text: This medication is Pregnancy Category B and is considered safe. There is some data to suggest avoiding during the third trimester. It is unknown if this medication is excreted in breast milk. Cephalexin Counseling: I counseled the patient regarding use of cephalexin as an antibiotic for prophylactic and/or therapeutic purposes. Cephalexin (commonly prescribed under brand name Keflex) is a cephalosporin antibiotic which is active against numerous classes of bacteria, including most skin bacteria. Side effects may include nausea, diarrhea, gastrointestinal upset, rash, hives, yeast infections, and in rare cases, hepatitis, kidney disease, seizures, fever, confusion, neurologic symptoms, and others. Patients with severe allergies to penicillin medications are cautioned that there is about a 10% incidence of cross-reactivity with cephalosporins. When possible, patients with penicillin allergies should use alternatives to cephalosporins for antibiotic therapy. Xelmeganz Pregnancy And Lactation Text: This medication is Pregnancy Category D and is not considered safe during pregnancy. The risk during breast feeding is also uncertain. SSKI Counseling:  I discussed with the patient the risks of SSKI including but not limited to thyroid abnormalities, metallic taste, GI upset, fever, headache, acne, arthralgias, paraesthesias, lymphadenopathy, easy bleeding, arrhythmias, and allergic reaction. Libtayo Counseling- I discussed with the patient the risks of Libtayo including but not limited to nausea, vomiting, diarrhea, and bone or muscle pain. The patient verbalized understanding of the proper use and possible adverse effects of Libtayo. All of the patient's questions and concerns were addressed. Sarecycline Pregnancy And Lactation Text: This medication is Pregnancy Category D and not consider safe during pregnancy. It is also excreted in breast milk. Elidel Counseling: Patient may experience a mild burning sensation during topical application. Elidel is not approved in children less than 3years of age. There have been case reports of hematologic and skin malignancies in patients using topical calcineurin inhibitors although causality is questionable. Enbrel Counseling:  I discussed with the patient the risks of etanercept including but not limited to myelosuppression, immunosuppression, autoimmune hepatitis, demyelinating diseases, lymphoma, and infections. The patient understands that monitoring is required including a PPD at baseline and must alert us or the primary physician if symptoms of infection or other concerning signs are noted. Arava Counseling:  Patient counseled regarding adverse effects of Arava including but not limited to nausea, vomiting, abnormalities in liver function tests. Patients may develop mouth sores, rash, diarrhea, and abnormalities in blood counts. The patient understands that monitoring is required including LFTs and blood counts. There is a rare possibility of scarring of the liver and lung problems that can occur when taking methotrexate. Persistent nausea, loss of appetite, pale stools, dark urine, cough, and shortness of breath should be reported immediately. Patient advised to discontinue Arava treatment and consult with a physician prior to attempting conception. The patient will have to undergo a treatment to eliminate Arava from the body prior to conception. Acitretin Pregnancy And Lactation Text: This medication is Pregnancy Category X and should not be given to women who are pregnant or may become pregnant in the future. This medication is excreted in breast milk. Bactrim Pregnancy And Lactation Text: This medication is Pregnancy Category D and is known to cause fetal risk. It is also excreted in breast milk. Humira Counseling:  I discussed with the patient the risks of adalimumab including but not limited to myelosuppression, immunosuppression, autoimmune hepatitis, demyelinating diseases, lymphoma, and serious infections. The patient understands that monitoring is required including a PPD at baseline and must alert us or the primary physician if symptoms of infection or other concerning signs are noted. Cephalexin Pregnancy And Lactation Text: This medication is Pregnancy Category B and considered safe during pregnancy. It is also excreted in breast milk but can be used safely for shorter doses. Eucrisa Counseling: Patient may experience a mild burning sensation during topical application. Bianka Glee is not approved in children less than 3years of age. Hydroxyzine Counseling: Patient advised that the medication is sedating and not to drive a car after taking this medication. Patient informed of potential adverse effects including but not limited to dry mouth, urinary retention, and blurry vision. The patient verbalized understanding of the proper use and possible adverse effects of hydroxyzine. All of the patient's questions and concerns were addressed. Topical Retinoid counseling:  Patient advised to apply a pea-sized amount only at bedtime and wait 30 minutes after washing their face before applying. If too drying, patient may add a non-comedogenic moisturizer. The patient verbalized understanding of the proper use and possible adverse effects of retinoids. All of the patient's questions and concerns were addressed. Libtayo Pregnancy And Lactation Text: This medication is contraindicated in pregnancy and when breast feeding. Tetracycline Counseling: Patient counseled regarding possible photosensitivity and increased risk for sunburn. Patient instructed to avoid sunlight, if possible. When exposed to sunlight, patients should wear protective clothing, sunglasses, and sunscreen. The patient was instructed to call the office immediately if the following severe adverse effects occur:  hearing changes, easy bruising/bleeding, severe headache, or vision changes. The patient verbalized understanding of the proper use and possible adverse effects of tetracycline. All of the patient's questions and concerns were addressed. Patient understands to avoid pregnancy while on therapy due to potential birth defects. Arava Pregnancy And Lactation Text: This medication is Pregnancy Category X and is absolutely contraindicated during pregnancy. It is unknown if it is excreted in breast milk. Xolair Counseling:  Patient informed of potential adverse effects including but not limited to fever, muscle aches, rash and allergic reactions. The patient verbalized understanding of the proper use and possible adverse effects of Xolair. All of the patient's questions and concerns were addressed. Sski Pregnancy And Lactation Text: This medication is Pregnancy Category D and isn't considered safe during pregnancy. It is excreted in breast milk. Bexarotene Counseling:  I discussed with the patient the risks of bexarotene including but not limited to hair loss, dry lips/skin/eyes, liver abnormalities, hyperlipidemia, pancreatitis, depression/suicidal ideation, photosensitivity, drug rash/allergic reactions, hypothyroidism, anemia, leukopenia, infection, cataracts, and teratogenicity. Patient understands that they will need regular blood tests to check lipid profile, liver function tests, white blood cell count, thyroid function tests and pregnancy test if applicable. Elidel Pregnancy And Lactation Text: This medication is Pregnancy Category C. It is unknown if this medication is excreted in breast milk. Doxepin Pregnancy And Lactation Text: This medication is Pregnancy Category C and it isn't known if it is safe during pregnancy. It is also excreted in breast milk and breast feeding isn't recommended. Eucrisa Pregnancy And Lactation Text: This medication has not been assigned a Pregnancy Risk Category but animal studies failed to show danger with the topical medication. It is unknown if the medication is excreted in breast milk. Clindamycin Counseling: I counseled the patient regarding use of clindamycin as an antibiotic for prophylactic and/or therapeutic purposes. Clindamycin is active against numerous classes of bacteria, including skin bacteria. Side effects may include nausea, diarrhea, gastrointestinal upset, rash, hives, yeast infections, and in rare cases, colitis. Niacinamide Pregnancy And Lactation Text: These medications are considered safe during pregnancy. Clofazimine Pregnancy And Lactation Text: This medication is Pregnancy Category C and isn't considered safe during pregnancy. It is excreted in breast milk. Hydroxyzine Pregnancy And Lactation Text: This medication is not safe during pregnancy and should not be taken. It is also excreted in breast milk and breast feeding isn't recommended. Thalidomide Counseling: I discussed with the patient the risks of thalidomide including but not limited to birth defects, anxiety, weakness, chest pain, dizziness, cough and severe allergy. Clofazimine Counseling:  I discussed with the patient the risks of clofazimine including but not limited to skin and eye pigmentation, liver damage, nausea/vomiting, gastrointestinal bleeding and allergy. Xolair Pregnancy And Lactation Text: This medication is Pregnancy Category B and is considered safe during pregnancy. This medication is excreted in breast milk. Bexarotene Pregnancy And Lactation Text: This medication is Pregnancy Category X and should not be given to women who are pregnant or may become pregnant. This medication should not be used if you are breast feeding. Niacinamide Counseling: I recommended taking niacin or niacinamide, also know as vitamin B3, twice daily. Recent evidence suggests that taking vitamin B3 (500 mg twice daily) can reduce the risk of actinic keratoses and non-melanoma skin cancers. Side effects of vitamin B3 include flushing and headache. Nsaids Counseling: NSAID Counseling: I discussed with the patient that NSAIDs should be taken with food. Prolonged use of NSAIDs can result in the development of stomach ulcers. Patient advised to stop taking NSAIDs if abdominal pain occurs. The patient verbalized understanding of the proper use and possible adverse effects of NSAIDs. All of the patient's questions and concerns were addressed. Azathioprine Counseling:  I discussed with the patient the risks of azathioprine including but not limited to myelosuppression, immunosuppression, hepatotoxicity, lymphoma, and infections. The patient understands that monitoring is required including baseline LFTs, Creatinine, possible TPMP genotyping and weekly CBCs for the first month and then every 2 weeks thereafter. The patient verbalized understanding of the proper use and possible adverse effects of azathioprine. All of the patient's questions and concerns were addressed. Isotretinoin Pregnancy And Lactation Text: This medication is Pregnancy Category X and is considered extremely dangerous during pregnancy. It is unknown if it is excreted in breast milk. Clindamycin Pregnancy And Lactation Text: This medication can be used in pregnancy if certain situations. Clindamycin is also present in breast milk. Hydroquinone Counseling:  Patient advised that medication may result in skin irritation, lightening (hypopigmentation), dryness, and burning. In the event of skin irritation, the patient was advised to reduce the amount of the drug applied or use it less frequently. Rarely, spots that are treated with hydroquinone can become darker (pseudoochronosis). Should this occur, patient instructed to stop medication and call the office. The patient verbalized understanding of the proper use and possible adverse effects of hydroquinone. All of the patient's questions and concerns were addressed. Ilumya Counseling: I discussed with the patient the risks of tildrakizumab including but not limited to immunosuppression, malignancy, posterior leukoencephalopathy syndrome, and serious infections. The patient understands that monitoring is required including a PPD at baseline and must alert us or the primary physician if symptoms of infection or other concerning signs are noted. Colchicine Counseling:  Patient counseled regarding adverse effects including but not limited to stomach upset (nausea, vomiting, stomach pain, or diarrhea). Patient instructed to limit alcohol consumption while taking this medication. Colchicine may reduce blood counts especially with prolonged use. The patient understands that monitoring of kidney function and blood counts may be required, especially at baseline. The patient verbalized understanding of the proper use and possible adverse effects of colchicine. All of the patient's questions and concerns were addressed. Tazorac Counseling:  Patient advised that medication is irritating and drying. Patient may need to apply sparingly and wash off after an hour before eventually leaving it on overnight. The patient verbalized understanding of the proper use and possible adverse effects of tazorac. All of the patient's questions and concerns were addressed. Isotretinoin Counseling: Patient should get monthly blood tests, not donate blood, not drive at night if vision affected, not share medication, and not undergo elective surgery for 6 months after tx completed. Side effects reviewed, pt to contact office should one occur. Doxycycline Counseling:  Patient counseled regarding possible photosensitivity and increased risk for sunburn. Patient instructed to avoid sunlight, if possible. When exposed to sunlight, patients should wear protective clothing, sunglasses, and sunscreen. The patient was instructed to call the office immediately if the following severe adverse effects occur:  hearing changes, easy bruising/bleeding, severe headache, or vision changes. The patient verbalized understanding of the proper use and possible adverse effects of doxycycline. All of the patient's questions and concerns were addressed. Azathioprine Pregnancy And Lactation Text: This medication is Pregnancy Category D and isn't considered safe during pregnancy. It is unknown if this medication is excreted in breast milk. High Dose Vitamin A Counseling: Side effects reviewed, pt to contact office should one occur. Tranexamic Acid Counseling:  Patient advised of the small risk of bleeding problems with tranexamic acid. They were also instructed to call if they developed any nausea, vomiting or diarrhea. All of the patient's questions and concerns were addressed. Fluconazole Counseling:  Patient counseled regarding adverse effects of fluconazole including but not limited to headache, diarrhea, nausea, upset stomach, liver function test abnormalities, taste disturbance, and stomach pain. There is a rare possibility of liver failure that can occur when taking fluconazole. The patient understands that monitoring of LFTs and kidney function test may be required, especially at baseline. The patient verbalized understanding of the proper use and possible adverse effects of fluconazole. All of the patient's questions and concerns were addressed. Tazorac Pregnancy And Lactation Text: This medication is not safe during pregnancy. It is unknown if this medication is excreted in breast milk. Albendazole Counseling:  I discussed with the patient the risks of albendazole including but not limited to cytopenia, kidney damage, nausea/vomiting and severe allergy. The patient understands that this medication is being used in an off-label manner. Griseofulvin Counseling:  I discussed with the patient the risks of griseofulvin including but not limited to photosensitivity, cytopenia, liver damage, nausea/vomiting and severe allergy. The patient understands that this medication is best absorbed when taken with a fatty meal (e.g., ice cream or french fries). Odomzo Counseling- I discussed with the patient the risks of Odomzo including but not limited to nausea, vomiting, diarrhea, constipation, weight loss, changes in the sense of taste, decreased appetite, muscle spasms, and hair loss. The patient verbalized understanding of the proper use and possible adverse effects of Odomzo. All of the patient's questions and concerns were addressed. Dapsone Counseling: I discussed with the patient the risks of dapsone including but not limited to hemolytic anemia, agranulocytosis, rashes, methemoglobinemia, kidney failure, peripheral neuropathy, headaches, GI upset, and liver toxicity. Patients who start dapsone require monitoring including baseline LFTs and weekly CBCs for the first month, then every month thereafter. The patient verbalized understanding of the proper use and possible adverse effects of dapsone. All of the patient's questions and concerns were addressed. Valtrex Counseling: I discussed with the patient the risks of valacyclovir including but not limited to kidney damage, nausea, vomiting and severe allergy. The patient understands that if the infection seems to be worsening or is not improving, they are to call. High Dose Vitamin A Pregnancy And Lactation Text: High dose vitamin A therapy is contraindicated during pregnancy and breast feeding. Infliximab Counseling:  I discussed with the patient the risks of infliximab including but not limited to myelosuppression, immunosuppression, autoimmune hepatitis, demyelinating diseases, lymphoma, and serious infections. The patient understands that monitoring is required including a PPD at baseline and must alert us or the primary physician if symptoms of infection or other concerning signs are noted. Albendazole Pregnancy And Lactation Text: This medication is Pregnancy Category C and it isn't known if it is safe during pregnancy. It is also excreted in breast milk. Doxycycline Pregnancy And Lactation Text: This medication is Pregnancy Category D and not consider safe during pregnancy. It is also excreted in breast milk but is considered safe for shorter treatment courses. Topical Clindamycin Counseling: Patient counseled that this medication may cause skin irritation or allergic reactions. In the event of skin irritation, the patient was advised to reduce the amount of the drug applied or use it less frequently. The patient verbalized understanding of the proper use and possible adverse effects of clindamycin. All of the patient's questions and concerns were addressed. Cellcept Counseling:  I discussed with the patient the risks of mycophenolate mofetil including but not limited to infection/immunosuppression, GI upset, hypokalemia, hypercholesterolemia, bone marrow suppression, lymphoproliferative disorders, malignancy, GI ulceration/bleed/perforation, colitis, interstitial lung disease, kidney failure, progressive multifocal leukoencephalopathy, and birth defects. The patient understands that monitoring is required including a baseline creatinine and regular CBC testing. In addition, patient must alert us immediately if symptoms of infection or other concerning signs are noted. Tranexamic Acid Pregnancy And Lactation Text: It is unknown if this medication is safe during pregnancy or breast feeding. Nsaids Pregnancy And Lactation Text: These medications are considered safe up to 30 weeks gestation. It is excreted in breast milk. Imiquimod Counseling:  I discussed with the patient the risks of imiquimod including but not limited to erythema, scaling, itching, weeping, crusting, and pain. Patient understands that the inflammatory response to imiquimod is variable from person to person and was educated regarded proper titration schedule. If flu-like symptoms develop, patient knows to discontinue the medication and contact us. Valtrex Pregnancy And Lactation Text: this medication is Pregnancy Category B and is considered safe during pregnancy. This medication is not directly found in breast milk but it's metabolite acyclovir is present. Griseofulvin Pregnancy And Lactation Text: This medication is Pregnancy Category X and is known to cause serious birth defects. It is unknown if this medication is excreted in breast milk but breast feeding should be avoided. Topical Sulfur Applications Counseling: Topical Sulfur Counseling: Patient counseled that this medication may cause skin irritation or allergic reactions. In the event of skin irritation, the patient was advised to reduce the amount of the drug applied or use it less frequently. The patient verbalized understanding of the proper use and possible adverse effects of topical sulfur application. All of the patient's questions and concerns were addressed. Dapsone Pregnancy And Lactation Text: This medication is Pregnancy Category C and is not considered safe during pregnancy or breast feeding. Ivermectin Counseling:  Patient instructed to take medication on an empty stomach with a full glass of water. Patient informed of potential adverse effects including but not limited to nausea, diarrhea, dizziness, itching, and swelling of the extremities or lymph nodes. The patient verbalized understanding of the proper use and possible adverse effects of ivermectin. All of the patient's questions and concerns were addressed. Erythromycin Counseling:  I discussed with the patient the risks of erythromycin including but not limited to GI upset, allergic reaction, drug rash, diarrhea, increase in liver enzymes, and yeast infections. Topical Clindamycin Pregnancy And Lactation Text: This medication is Pregnancy Category B and is considered safe during pregnancy. It is unknown if it is excreted in breast milk. Topical Sulfur Applications Pregnancy And Lactation Text: This medication is Pregnancy Category C and has an unknown safety profile during pregnancy. It is unknown if this topical medication is excreted in breast milk. Cyclophosphamide Counseling:  I discussed with the patient the risks of cyclophosphamide including but not limited to hair loss, hormonal abnormalities, decreased fertility, abdominal pain, diarrhea, nausea and vomiting, bone marrow suppression and infection. The patient understands that monitoring is required while taking this medication. Use Enhanced Medication Counseling?: No Otezla Counseling: Lonnie Gerold Counseling: The side effects of Lonnie Gerold were discussed with the patient, including but not limited to worsening or new depression, weight loss, diarrhea, nausea, upper respiratory tract infection, and headache. Patient instructed to call the office should any adverse effect occur. The patient verbalized understanding of the proper use and possible adverse effects of Otezla. All the patient's questions and concerns were addressed. Erythromycin Pregnancy And Lactation Text: This medication is Pregnancy Category B and is considered safe during pregnancy. It is also excreted in breast milk. Itraconazole Counseling:  I discussed with the patient the risks of itraconazole including but not limited to liver damage, nausea/vomiting, neuropathy, and severe allergy. The patient understands that this medication is best absorbed when taken with acidic beverages such as non-diet cola or ginger ale. The patient understands that monitoring is required including baseline LFTs and repeat LFTs at intervals. The patient understands that they are to contact us or the primary physician if concerning signs are noted. Minoxidil Counseling: Minoxidil is a topical medication which can increase blood flow where it is applied. It is uncertain how this medication increases hair growth. Side effects are uncommon and include stinging and allergic reactions. Erivedge Counseling- I discussed with the patient the risks of Erivedge including but not limited to nausea, vomiting, diarrhea, constipation, weight loss, changes in the sense of taste, decreased appetite, muscle spasms, and hair loss. The patient verbalized understanding of the proper use and possible adverse effects of Erivedge. All of the patient's questions and concerns were addressed. Rituxan Counseling:  I discussed with the patient the risks of Rituxan infusions. Side effects can include infusion reactions, severe drug rashes including mucocutaneous reactions, reactivation of latent hepatitis and other infections and rarely progressive multifocal leukoencephalopathy. All of the patient's questions and concerns were addressed. Detail Level: Detailed Benzoyl Peroxide Counseling: Patient counseled that medicine may cause skin irritation and bleach clothing. In the event of skin irritation, the patient was advised to reduce the amount of the drug applied or use it less frequently. The patient verbalized understanding of the proper use and possible adverse effects of benzoyl peroxide. All of the patient's questions and concerns were addressed. Siliq Counseling:  I discussed with the patient the risks of Siliq including but not limited to new or worsening depression, suicidal thoughts and behavior, immunosuppression, malignancy, posterior leukoencephalopathy syndrome, and serious infections. The patient understands that monitoring is required including a PPD at baseline and must alert us or the primary physician if symptoms of infection or other concerning signs are noted. There is also a special program designed to monitor depression which is required with Siliq. Mirvaso Counseling: Dominique Ravens is a topical medication which can decrease superficial blood flow where applied. Side effects are uncommon and include stinging, redness and allergic reactions. Otezla Pregnancy And Lactation Text: This medication is Pregnancy Category C and it isn't known if it is safe during pregnancy. It is unknown if it is excreted in breast milk. Cyclophosphamide Pregnancy And Lactation Text: This medication is Pregnancy Category D and it isn't considered safe during pregnancy. This medication is excreted in breast milk. Metronidazole Counseling:  I discussed with the patient the risks of metronidazole including but not limited to seizures, nausea/vomiting, a metallic taste in the mouth, nausea/vomiting and severe allergy. Benzoyl Peroxide Pregnancy And Lactation Text: This medication is Pregnancy Category C. It is unknown if benzoyl peroxide is excreted in breast milk. Rituxan Pregnancy And Lactation Text: This medication is Pregnancy Category C and it isn't know if it is safe during pregnancy. It is unknown if this medication is excreted in breast milk but similar antibodies are known to be excreted. Minocycline Counseling: Patient advised regarding possible photosensitivity and discoloration of the teeth, skin, lips, tongue and gums. Patient instructed to avoid sunlight, if possible. When exposed to sunlight, patients should wear protective clothing, sunglasses, and sunscreen. The patient was instructed to call the office immediately if the following severe adverse effects occur:  hearing changes, easy bruising/bleeding, severe headache, or vision changes. The patient verbalized understanding of the proper use and possible adverse effects of minocycline. All of the patient's questions and concerns were addressed. Finasteride Pregnancy And Lactation Text: This medication is absolutely contraindicated during pregnancy. It is unknown if it is excreted in breast milk. Ketoconazole Counseling:   Patient counseled regarding improving absorption with orange juice. Adverse effects include but are not limited to breast enlargement, headache, diarrhea, nausea, upset stomach, liver function test abnormalities, taste disturbance, and stomach pain. There is a rare possibility of liver failure that can occur when taking ketoconazole. The patient understands that monitoring of LFTs may be required, especially at baseline. The patient verbalized understanding of the proper use and possible adverse effects of ketoconazole. All of the patient's questions and concerns were addressed. Carac Counseling:  I discussed with the patient the risks of Carac including but not limited to erythema, scaling, itching, weeping, crusting, and pain. Wartpeel Counseling:  I discussed with the patient the risks of Wartpeel including but not limited to erythema, scaling, itching, weeping, crusting, and pain. Finasteride Counseling:  I discussed with the patient the risks of use of finasteride including but not limited to decreased libido, decreased ejaculate volume, gynecomastia, and depression. Women should not handle medication. All of the patient's questions and concerns were addressed. Metronidazole Pregnancy And Lactation Text: This medication is Pregnancy Category B and considered safe during pregnancy. It is also excreted in breast milk. Cyclosporine Counseling:  I discussed with the patient the risks of cyclosporine including but not limited to hypertension, gingival hyperplasia,myelosuppression, immunosuppression, liver damage, kidney damage, neurotoxicity, lymphoma, and serious infections. The patient understands that monitoring is required including baseline blood pressure, CBC, CMP, lipid panel and uric acid, and then 1-2 times monthly CMP and blood pressure. Oxybutynin Counseling:  I discussed with the patient the risks of oxybutynin including but not limited to skin rash, drowsiness, dry mouth, difficulty urinating, and blurred vision. Cimzia Counseling:  I discussed with the patient the risks of Cimzia including but not limited to immunosuppression, allergic reactions and infections. The patient understands that monitoring is required including a PPD at baseline and must alert us or the primary physician if symptoms of infection or other concerning signs are noted. Methotrexate Counseling:  Patient counseled regarding adverse effects of methotrexate including but not limited to nausea, vomiting, abnormalities in liver function tests. Patients may develop mouth sores, rash, diarrhea, and abnormalities in blood counts. The patient understands that monitoring is required including LFT's and blood counts. There is a rare possibility of scarring of the liver and lung problems that can occur when taking methotrexate. Persistent nausea, loss of appetite, pale stools, dark urine, cough, and shortness of breath should be reported immediately. Patient advised to discontinue methotrexate treatment at least three months before attempting to become pregnant. I discussed the need for folate supplements while taking methotrexate. These supplements can decrease side effects during methotrexate treatment. The patient verbalized understanding of the proper use and possible adverse effects of methotrexate. All of the patient's questions and concerns were addressed. Calcipotriene Counseling:  I discussed with the patient the risks of calcipotriene including but not limited to erythema, scaling, itching, and irritation. Picato Counseling:  I discussed with the patient the risks of Picato including but not limited to erythema, scaling, itching, weeping, crusting, and pain. Zyclara Counseling:  I discussed with the patient the risks of imiquimod including but not limited to erythema, scaling, itching, weeping, crusting, and pain. Patient understands that the inflammatory response to imiquimod is variable from person to person and was educated regarded proper titration schedule. If flu-like symptoms develop, patient knows to discontinue the medication and contact us. Simponi Counseling:  I discussed with the patient the risks of golimumab including but not limited to myelosuppression, immunosuppression, autoimmune hepatitis, demyelinating diseases, lymphoma, and serious infections. The patient understands that monitoring is required including a PPD at baseline and must alert us or the primary physician if symptoms of infection or other concerning signs are noted. Ketoconazole Pregnancy And Lactation Text: This medication is Pregnancy Category C and it isn't know if it is safe during pregnancy. It is also excreted in breast milk and breast feeding isn't recommended. Cimzia Pregnancy And Lactation Text: This medication crosses the placenta but can be considered safe in certain situations. Cimzia may be excreted in breast milk. Methotrexate Pregnancy And Lactation Text: This medication is Pregnancy Category X and is known to cause fetal harm. This medication is excreted in breast milk. Calcipotriene Pregnancy And Lactation Text: This medication has not been proven safe during pregnancy. It is unknown if this medication is excreted in breast milk. Terbinafine Counseling: Patient counseling regarding adverse effects of terbinafine including but not limited to headache, diarrhea, rash, upset stomach, liver function test abnormalities, itching, taste/smell disturbance, nausea, abdominal pain, and flatulence. There is a rare possibility of liver failure that can occur when taking terbinafine. The patient understands that a baseline LFT and kidney function test may be required. The patient verbalized understanding of the proper use and possible adverse effects of terbinafine. All of the patient's questions and concerns were addressed. Propranolol Counseling:  I discussed with the patient the risks of propranolol including but not limited to low heart rate, low blood pressure, low blood sugar, restlessness and increased cold sensitivity. They should call the office if they experience any of these side effects. Gabapentin Counseling: I discussed with the patient the risks of gabapentin including but not limited to dizziness, somnolence, fatigue and ataxia.

## 2024-01-09 NOTE — PT/OT/SLP EVAL
Speech Language Pathology Evaluation  Bedside Swallow    Patient Name:  Buck Ibarra   MRN:  [id number]  Admitting Diagnosis: Acute encephalopathy    Recommendations:                 General Recommendations:  Dysphagia therapy  Diet recommendations:  Minced & Moist Diet - IDDSI Level 5, Thin liquids - IDDSI Level 0   Aspiration Precautions: 1 bite/sip at a time, Alternating bites/sips, Assistance with meals, Feed only when awake/alert, HOB to 90 degrees, Meds crushed in puree, Small bites/sips, and Standard aspiration precautions   General Precautions: Standard, aspiration, fall  Communication strategies:  yes/no questions only, provide increased time to answer, and go to room if call light pushed    Assessment:     Buck Ibarra is a 78 y.o. female with an SLP diagnosis of Dysphagia complicated by AMS. SLP will continue to follow.     History:     Past Medical History:   Diagnosis Date    Allergy     Cataract     Current every day smoker     Diabetes mellitus type II     Gastritis     with gastric ulcer    GERD (gastroesophageal reflux disease)     H. pylori infection     History of hepatitis C, SVR as of 8-2016 08/25/2015    Harvoni 12 wks completed.  SVR(12) as of 8/4/16 SVR(24) as of 10-     Hypertension     Osteopenia     Type 2 diabetes mellitus with diabetic polyneuropathy      Past Surgical History:   Procedure Laterality Date    COLONOSCOPY      COLONOSCOPY N/A 1/28/2016    Procedure: COLONOSCOPY;  Surgeon: Emeka Ahn MD;  Location: [address]);  Service: Endoscopy;  Laterality: N/A;    COLONOSCOPY N/A 8/8/2019    Procedure: COLONOSCOPY;  Surgeon: Susan Dia MD;  Location: [address]);  Service: Endoscopy;  Laterality: N/A;  appt confirmed-rb    HYSTERECTOMY      LIVER BIOPSY      OOPHORECTOMY      PERIPHERAL ANGIOGRAPHY Left 5/5/2021    Procedure: Peripheral angiography;  Surgeon: Randolph Toribio MD;  Location: [facility] CATH LAB;  Service: Cardiology;   "Laterality: Left;    PERIPHERAL ANGIOGRAPHY N/A 6/21/2021    Procedure: Peripheral angiography;  Surgeon: Randolph Toribio MD;  Location: [facility] CATH LAB;  Service: Cardiology;  Laterality: N/A;    UPPER GASTROINTESTINAL ENDOSCOPY       Principal Problem:Acute encephalopathy     Chief Complaint:        Chief Complaint   Patient presents with    Altered Mental Status       Arrived via ems from home with c/o altered mental status, baseline GCS 13 and nonverbal, for the last 2 days has been more tired and has had diarrhea      HPI: "78 year old female with CVA with right hemiparesis, T2DM, HTN, GERD, hepatitis C s/p tx, CKD3a, multinodular goiter, HLD, PAD presenting with altered mental status. Unable to obtain history from Pt 2/2 altered mentation. Pt had a recent admission end of November 2023 for a L MCA stroke - presented after 3 days of right-sided weakness and dysarthria, found to have a stroke of the L internal capsule. Stroke etiology thought to be small vessel disease; discharged to rehab on DAPTx30 days followed by ASA monotherapy and statin. Phone call to daughter Olga - reports she brought her mother to hospital due to altered mentation. Noted she has been less responsive over the past 1 day. OT was at their house and also noted she was more lethargic. Also noted difficulty swallowing over the past 1 day, spitting out her medications/gagging. Hasn't noticed any coughing/spluttering after eating but also noticed reduced appetite. States her mother has been dysarthric since her stroke in November; and has residual right sided deficits. Denies fevers or cough. No vomiting but has had watery diarrhea for about 2 days. Denies recent antibiotic use. Daughter reports trouble getting her mother's insulin lately, thought her altered mentation could be blood glucose related. BGL ~400 when checked by EMS."     Prior Intubation HX:  none    Modified Barium Swallow: none    Chest X-Rays: 1/8:"Interstitial findings " "may reflect edema noting accentuation by shallow inspiratory effort and habitus.  No large focal consolidation. "    Prior diet: reg/thin    Subjective     Spoke with RN prior to session. Pt awake, though lethargic and minimally verbal. No family present.      Pain/Comfort:  Pain Rating 1: 0/10  Pain Rating Post-Intervention 1: 0/10    Respiratory Status: Room air    Objective:     Oral Musculature Evaluation  Oral Musculature: unable to assess due to poor participation/comprehension  Dentition: upper and lower dentures  Secretion Management: adequate  Mucosal Quality: adequate  Mandibular Strength and Mobility: WFL  Oral Labial Strength and Mobility: functional seal  Lingual Strength and Mobility: functional protrusion  Volitional Cough: unable to elicit  Volitional Swallow: unable to elicit  Voice Prior to PO Intake: low volume, clear    Bedside Swallow Eval:   Consistencies Assessed:  Thin liquids ice chip x3, tsp x3. 2 oz from cup rim & straw  Puree tsp x2 bites  Solids cracker x2 bites    Pill buried in puree x1  Pill whole x1    Oral Phase:   Prolonged mastication w/ regular solids w/ Oral residue  Mastication on left side only, loose dentures  Poor oral acceptance requiring encouragement & prompting   Slow oral transit time  Did not orally accept pill whole despite prompting- RN instructed to crush     Pharyngeal Phase:   delayed swallow initation  no overt clinical signs/symptoms of aspiration  no overt clinical signs/symptoms of pharyngeal dysphagia  Spontaneous double swallows    Compensatory Strategies  None, pt w/ poor commands following     Treatment: HOB raised and feed assist provided. Pt with poor commands following, AMS and poor acceptance requiring max encouragement and prompting to participate with minimal trials. Pt able to take pill whole buried in puree, though refused the remainder of medication. Recommend a level 5 minced and moist solids and thin liquid diet, meds crushed in puree and feed " assist. Education provided re: role of SLP, diet recs, swallow precs, s/s aspiration and POC.  Pt requires ongoing reinforcement. SLP will continue to follow.     Goals:   Multidisciplinary Problems       SLP Goals          Problem: SLP    Goal Priority Disciplines Outcome   SLP Goal     SLP Ongoing, Progressing   Description: Speech Language Pathology Goals  Goals expected to be met by 1/23    1. Pt will tolerate least restrictive PO diet without any overt s/sx of airway compromise.   2. Pt will complete cognitive-linguistic assessment to determine additional therapeutic needs.                                Plan:     Patient to be seen:  4x/wk   Plan of Care expires:  02/08/24  Plan of Care reviewed with:      SLP Follow-Up:  Yes       Discharge recommendations:   (tbd)   Barriers to Discharge:  Level of Skilled Assistance Needed      Time Tracking:     SLP Treatment Date:   01/09/24  Speech Start Time:  0944  Speech Stop Time:  1013     Speech Total Time (min):  29 min    Billable Minutes: Eval Swallow and Oral Function 17 and Self Care/Home Management Training 12    01/09/2024

## 2024-01-09 NOTE — ED NOTES
Telemetry Verification   Patient placed on Telemetry Box  Verified with War Room  Box # [id number]   Monitor Tech    Rate    Rhythm

## 2024-01-09 NOTE — ASSESSMENT & PLAN NOTE
Takes nifedipine, candesartan, and coreg at home  Hypertensive on presentation  Continue nifedipine and coreg  Hold ARB in setting of ELIZABETH  Monitor BP

## 2024-01-09 NOTE — ASSESSMENT & PLAN NOTE
Baseline sCr ~ 1.0  sCr on admission 1.5  UA concerning for infection  ELIZABETH could be 2/2 infection vs poor PO intake     Plan:  - f/u UCx  - start ceftriaxone  - urine lytes  - US retroperitoneal  - strict input/output  - renally dose adjust medications  - hold home ARB in setting of ELIZABETH

## 2024-01-09 NOTE — PT/OT/SLP EVAL
Occupational Therapy   Evaluation/Treatment    Name: Buck Ibarra  MRN: [id number]  Admitting Diagnosis: Acute encephalopathy  Recent Surgery: * No surgery found *      Recommendations:     Discharge Recommendations: Low Intensity Therapy  Discharge Equipment Recommendations:  none  Barriers to discharge:   (increased level of assistance needed at this time.)    Assessment:     Buck Ibarra is a 78 y.o. female with a medical diagnosis of Acute encephalopathy.  Performance deficits affecting function: weakness, impaired endurance, impaired self care skills, impaired functional mobility, impaired balance, gait instability, decreased upper extremity function, decreased lower extremity function, pain, impaired coordination, decreased safety awareness, impaired cognition, decreased coordination, impaired sensation.      Rehab Prognosis: Fair; patient would benefit from acute skilled OT services to address these deficits and reach maximum level of function.       Plan:     Patient to be seen 3 x/week to address the above listed problems via self-care/home management, therapeutic activities, therapeutic exercises  Plan of Care Expires: 02/09/24  Plan of Care Reviewed with: patient, daughter    Subjective     Chief Complaint: pain with touch and movement in R LE  Patient/Family Comments/goals: patient agreed to therapy    Occupational Profile:  Living Environment: Patient lives in a 2 story duplex with 1 CHUCK to enter. Patient is able to stay on the first floor. Patient daughter is handicap and stays on first floor too. Another daughter stays on second floor. Patient has a WIS with shower chair and no grab bar. Patient also has a tub shower combo. Patient was receiving spongebaths and assistance with ADLs prior to this admit. Patient was independent prior hospital admit in November and then went to rehab. Discharged home around [place] with [facility] and hospital bed. Patient also has a w/c.  Equipment Used at  Home: wheelchair, slide board, shower chair, hospital bed, lift device (patient owns the following equipment:)    Pain/Comfort:  Pain Rating 1:  (patient unable to rate)  Location - Side 1: Right  Location - Orientation 1: generalized  Location 1:  (leg especially foot)  Pain Addressed 1: Reposition, Distraction  Pain Rating Post-Intervention 1:  (patient unable to rate, but has a lot pain with touch and movement)    Patients cultural, spiritual, Orthodoxy conflicts given the current situation: no    Objective:     Communicated with: NSG prior to session.  Patient found HOB elevated with PureWick, telemetry, peripheral IV, R pressure relief boot upon OT entry to room.    General Precautions: Standard, fall  Orthopedic Precautions: N/A  Braces: N/A  Respiratory Status: Room air    Occupational Performance:    Bed Mobility:    Patient completed Scooting/Bridging with total assistance and 2 persons  Patient completed Supine to Sit with total assistance and 2 persons with HOB elevated  Patient completed Sit to Supine with total assistance and 2 persons with HOB flat  Attempted rolling L<>R, but patient total and limited by pain in R LE.   Extra time needed with mobility due to pain in R LE/foot    Functional Mobility/Transfers:  Not performed    Activities of Daily Living:  Grooming: minimum assistance Therapist placed washcloth in patient's hand and washed face with R UE with cues needed for mouth and forehead and then nose. Attempted L hand due to patient has a better grasp, but patient switched hands and used R hand again to continue with task.  Toileting: Patient noted to have a BM prior to therapist entry and nursing had just finished cleaning patient up.     Cognitive/Visual Perceptual:  Cognitive/Psychosocial Skills:     -       Oriented to: Person   -       Follows Commands/attention:Follows one-step commands  -       Communication: Patient did speak during session at times, but repeat things at times  especially during orientation questions.   -       Memory: Impaired  -       Safety awareness/insight to disability: impaired   -       Mood/Affect/Coping skills/emotional control: Appropriate to situation    Physical Exam:  Upper Extremity Range of Motion: Therapist was unable to get patient to raise UEs on command actively EOB  Upper Extremity Strength: unable to assess   Strength:    -       Right Upper Extremity: patient has a grasp, weaker than L grasp  -       Left Upper Extremity:  patient has a grasp    AMPAC 6 Click ADL:  AMPAC Total Score: 10    Treatment & Education:  Role of OT and POC  ADL retraining  Functional mobility training  Safety  Discharge planning  Importance EOB/OOB activity    Patient sat EOB with SBA<>min A for static sitting balance and CGA<>min A for dynamic sitting balance. Did work on trunk control when EOB.     Co-treatment performed due to patient's multiple deficits requiring two skilled therapists to appropriately and safely assess patient's strength and endurance while facilitating functional tasks in addition to accommodating for patient's activity tolerance.     Patient left HOB elevated with all lines intact, call button in reach, and all needs met. Nurse notified.    GOALS:   Multidisciplinary Problems       Occupational Therapy Goals          Problem: Occupational Therapy    Goal Priority Disciplines Outcome Interventions   Occupational Therapy Goal     OT, PT/OT Ongoing, Progressing    Description: Goals to be met by: 1/30/2024    Patient will increase functional independence with ADLs by performing:    UE Dressing with Stand-by Assistance.  Grooming while seated with Stand-by Assistance.  Sitting at edge of bed x10 minutes with Supervision.  Supine to sit with Contact Guard Assistance.  Upper extremity exercise program 2x10 reps, with supervision.                         History:     Past Medical History:   Diagnosis Date    Allergy     Cataract     Current every day  smoker     Diabetes mellitus type II     Gastritis     with gastric ulcer    GERD (gastroesophageal reflux disease)     H. pylori infection     History of hepatitis C, SVR as of 8-2016 08/25/2015    Harvoni 12 wks completed.  SVR(12) as of 8/4/16 SVR(24) as of 10-     Hypertension     Osteopenia     Type 2 diabetes mellitus with diabetic polyneuropathy          Past Surgical History:   Procedure Laterality Date    COLONOSCOPY      COLONOSCOPY N/A 1/28/2016    Procedure: COLONOSCOPY;  Surgeon: Emeka Ahn MD;  Location: [facility] ENDO ([address] [place] [address]);  Service: Endoscopy;  Laterality: N/A;    COLONOSCOPY N/A 8/8/2019    Procedure: COLONOSCOPY;  Surgeon: Susan Dia MD;  Location: [facility] ENDO ([address] [place] [address]);  Service: Endoscopy;  Laterality: N/A;  appt confirmed-rb    HYSTERECTOMY      LIVER BIOPSY      OOPHORECTOMY      PERIPHERAL ANGIOGRAPHY Left 5/5/2021    Procedure: Peripheral angiography;  Surgeon: Randolph Toribio MD;  Location: [facility] CATH LAB;  Service: Cardiology;  Laterality: Left;    PERIPHERAL ANGIOGRAPHY N/A 6/21/2021    Procedure: Peripheral angiography;  Surgeon: Randolph Toribio MD;  Location: [facility] CATH LAB;  Service: Cardiology;  Laterality: N/A;    UPPER GASTROINTESTINAL ENDOSCOPY         Time Tracking:     OT Date of Treatment: 01/09/24  OT Start Time: 1401  OT Stop Time: 1501  OT Total Time (min): 60 min    Billable Minutes:Evaluation 22  Therapeutic Activity 38    1/9/2024

## 2024-01-09 NOTE — ASSESSMENT & PLAN NOTE
Recent admission end of November 2023 for L MCA; presented with dysarthria and right sided deficits  Discharged to rehab and then went home with her daughter  Daughter reports she's been dysarthric since her stroke and that her speech hasn't improved, has continued to be slurred; also has residual right sided deficits worse in her right leg  CT head showing remote infarcts, MRI brain also showing subacute infarcts, nothing acute   ED spoke with vascular neurology who reviewed imaging, agreed no acute infarcts; recrudescence of stroke symptoms likely in setting of infection  Finished DAPT course end of December - continue aspirin monotherapy and statin  NPO until SLP evaluation of swallow  PT/OT evaluation

## 2024-01-09 NOTE — CONSULTS
[facility] - Telemetry Stepdown  Wound Care    Patient Name:  Buck Ibarra   MRN:  [id number]  Date: 1/9/2024  Diagnosis: Acute encephalopathy    History:     Past Medical History:   Diagnosis Date    Allergy     Cataract     Current every day smoker     Diabetes mellitus type II     Gastritis     with gastric ulcer    GERD (gastroesophageal reflux disease)     H. pylori infection     History of hepatitis C, SVR as of 8-2016 08/25/2015    Harvoni 12 wks completed.  SVR(12) as of 8/4/16 SVR(24) as of 10-     Hypertension     Osteopenia     Type 2 diabetes mellitus with diabetic polyneuropathy        Social History     Socioeconomic History    Marital status: Single   Tobacco Use    Smoking status: Every Day     Current packs/day: 0.50     Average packs/day: 0.3 packs/day for 50.8 years (13.4 ttl pk-yrs)     Types: Cigarettes     Start date: 3/28/1973     Last attempt to quit: 3/28/2021    Smokeless tobacco: Never   Substance and Sexual Activity    Alcohol use: No     Comment: special occasions    Drug use: No    Sexual activity: Yes     Partners: Male   Other Topics Concern    Are you pregnant or think you may be? No    Breast-feeding No   Social History Narrative    Lives with daughter and grandson. No assistance with ADLs and still driving.     Social Determinants of Health     Financial Resource Strain: Low Risk  (11/27/2023)    Overall Financial Resource Strain (CARDIA)     Difficulty of Paying Living Expenses: Not hard at all   Food Insecurity: No Food Insecurity (11/27/2023)    Hunger Vital Sign     Worried About Running Out of Food in the Last Year: Never true     Ran Out of Food in the Last Year: Never true   Transportation Needs: No Transportation Needs (11/27/2023)    PRAPARE - Transportation     Lack of Transportation (Medical): No     Lack of Transportation (Non-Medical): No   Physical Activity: Inactive (11/27/2023)    Exercise Vital Sign     Days of Exercise per Week: 0 days     Minutes of  Exercise per Session: 0 min   Stress: No Stress Concern Present (11/27/2023)    [employer] - Occupational Stress Questionnaire     Feeling of Stress : Only a little   Social Connections: Moderately Integrated (11/27/2023)    Social Connection and Isolation Panel [NHANES]     Frequency of Communication with Friends and Family: More than three times a week     Frequency of Social Gatherings with Friends and Family: More than three times a week     Attends Anabaptist Services: More than 4 times per year     Active Member of Clubs or Organizations: Yes     Attends Club or Organization Meetings: More than 4 times per year     Marital Status:    Housing Stability: Unknown (11/27/2023)    Housing Stability Vital Sign     Unable to Pay for Housing in the Last Year: No     Unstable Housing in the Last Year: No       Precautions:     Allergies as of 01/08/2024 - Reviewed 01/08/2024   Allergen Reaction Noted    Amlodipine Swelling 01/30/2020    Erythromycin Anaphylaxis 01/02/2013    Erythropoietin analogues Anaphylaxis 12/19/2012    Pegasys [peginterferon ruben-2a] Anaphylaxis 01/02/2013    Lisinopril Hives 12/19/2012       WOC Assessment Details/Treatment   Patient seen for wound care consultation to right heel.   Reviewed chart for this encounter.   See Flow Sheet for findings.      Per chart review. 78 year old female with CVA with right hemiparesis, T2DM, HTN, GERD, hepatitis C s/p tx, CKD3a, multinodular goiter, HLD, PAD presenting with altered mental status. Wound care noted right heel with Mölnlycke Z-Flex Heel boot on. Wound care noted intact black boggy skin to the right heel. CHG swab applied for antimicrobial properties. Left heel clear off loaded with pillow.       RECOMMENDATIONS:  - IP Skin Integrity   - Bedside nurse to perform wound care to right heel:  Apply Povidone Iodine to wound bed   Apply this Daily    Recommendations made to primary team for above plan via secured  chat. Wound Care to follow up. Orders placed.     Discussed POC with patient and primary RN.   See EMR for orders & patient education.  Discussed POC with primary team.  ERNIE Notified.    Nursing to continue care.  Nursing to maintain pressure injury prevention interventions.  Contact wound care for any further questions.         01/09/24 1100   WOCN Assessment   WOCN Total Time (mins) 30   Visit Date 01/09/24   Visit Time 1100   Consult Type New   WOCN Speciality Wound   Intervention assessed;changed;applied;chart review;coordination of care;orders   Teaching on-going        Altered Skin Integrity 01/08/24 1903 Right Heel Blister(s) Purple or maroon localized area of discolored intact skin or non-intact skin or a blood-filled blister.   Date First Assessed/Time First Assessed: 01/08/24 1903   Altered Skin Integrity Present on Admission - Did Patient arrive to the hospital with altered skin?: yes  Side: Right  Location: Heel  Is this injury device related?: No  Primary Wound Type: Bli...   Dressing Appearance Dry;Intact;Clean   Drainage Amount None   Appearance Intact;Black;Dry   Care Applied:;Antimicrobial agent   Dressing Change Due 01/10/24     01/09/2024

## 2024-01-09 NOTE — HPI
78 year old female with CVA with right hemiparesis, T2DM, HTN, GERD, hepatitis C s/p tx, CKD3a, multinodular goiter, HLD, PAD presenting with altered mental status. Unable to obtain history from Pt 2/2 altered mentation. Pt had a recent admission end of November 2023 for a L MCA stroke - presented after 3 days of right-sided weakness and dysarthria, found to have a stroke of the L internal capsule. Stroke etiology thought to be small vessel disease; discharged to rehab on DAPTx30 days followed by ASA monotherapy and statin. Phone call to daughter Olga - reports she brought her mother to hospital due to altered mentation. Noted she has been less responsive over the past 1 day. OT was at their house and also noted she was more lethargic. Also noted difficulty swallowing over the past 1 day, spitting out her medications/gagging. Hasn't noticed any coughing/spluttering after eating but also noticed reduced appetite. States her mother has been dysarthric since her stroke in November; and has residual right sided deficits. Denies fevers or cough. No vomiting but has had watery diarrhea for about 2 days. Denies recent antibiotic use. Daughter reports trouble getting her mother's insulin lately, thought her altered mentation could be blood glucose related. BGL ~400 when checked by EMS.     On arrival to ED, afebrile, HR ~ 80, /78, saturating 96% on RA. Hb 11.7 (baseline ~11-12), WBC 13, Plt 329. Na 143, K 3.4, Cl 108, bicarb 24, BUN 28, sCr 1.6 (baseline ~1.0), . Ammonia normal. Tylenol level negative, ethanol negative, UDS negative. Beta-hydroxy 0.9, pH normal. UA concerning for infective. EKG with NSR and PVCs. CXR without consoldiation. CT head showing remote infarcts. MRI brain showing subacute infarcts in L corona radiata and left thalamus. In ED, started on insulin gtt. Admit to hospital medicine for further evaluation and management.

## 2024-01-09 NOTE — H&P
[facility]jas - Telemetry [facility] Medicine  History & Physical    Patient Name: Buck Ibarra  MRN: [id number]  Patient Class: OP- Observation  Admission Date: 1/8/2024  Attending Physician: Cameron Stoddard, *   Primary Care Provider: Ericka Cortez MD    Patient information was obtained from relative(s), past medical records, and ER records.     Subjective:     Principal Problem:Acute encephalopathy    Chief Complaint:   Chief Complaint   Patient presents with    Altered Mental Status     Arrived via ems from home with c/o altered mental status, baseline GCS 13 and nonverbal, for the last 2 days has been more tired and has had diarrhea        HPI: 78 year old female with CVA with right hemiparesis, T2DM, HTN, GERD, hepatitis C s/p tx, CKD3a, multinodular goiter, HLD, PAD presenting with altered mental status. Unable to obtain history from Pt 2/2 altered mentation. Pt had a recent admission end of November 2023 for a L MCA stroke - presented after 3 days of right-sided weakness and dysarthria, found to have a stroke of the L internal capsule. Stroke etiology thought to be small vessel disease; discharged to rehab on DAPTx30 days followed by ASA monotherapy and statin. Phone call to daughter Olga - reports she brought her mother to hospital due to altered mentation. Noted she has been less responsive over the past 1 day. OT was at their house and also noted she was more lethargic. Also noted difficulty swallowing over the past 1 day, spitting out her medications/gagging. Hasn't noticed any coughing/spluttering after eating but also noticed reduced appetite. States her mother has been dysarthric since her stroke in November; and has residual right sided deficits. Denies fevers or cough. No vomiting but has had watery diarrhea for about 2 days. Denies recent antibiotic use. Daughter reports trouble getting her mother's insulin lately, thought her altered mentation could be blood glucose related. BGL ~400  when checked by EMS.     On arrival to ED, afebrile, HR ~ 80, /78, saturating 96% on RA. Hb 11.7 (baseline ~11-12), WBC 13, Plt 329. Na 143, K 3.4, Cl 108, bicarb 24, BUN 28, sCr 1.6 (baseline ~1.0), . Ammonia normal. Tylenol level negative, ethanol negative, UDS negative. Beta-hydroxy 0.9, pH normal. UA concerning for infective. EKG with NSR and PVCs. CXR without consoldiation. CT head showing remote infarcts. MRI brain showing subacute infarcts in L corona radiata and left thalamus. In ED, started on insulin gtt. Admit to hospital medicine for further evaluation and management.     Past Medical History:   Diagnosis Date    Allergy     Cataract     Current every day smoker     Diabetes mellitus type II     Gastritis     with gastric ulcer    GERD (gastroesophageal reflux disease)     H. pylori infection     History of hepatitis C, SVR as of 8-2016 08/25/2015    Harvoni 12 wks completed.  SVR(12) as of 8/4/16 SVR(24) as of 10-     Hypertension     Osteopenia     Type 2 diabetes mellitus with diabetic polyneuropathy      Past Surgical History:   Procedure Laterality Date    COLONOSCOPY      COLONOSCOPY N/A 1/28/2016    Procedure: COLONOSCOPY;  Surgeon: Emeka Ahn MD;  Location: [facility] ([address] [place] [address]);  Service: Endoscopy;  Laterality: N/A;    COLONOSCOPY N/A 8/8/2019    Procedure: COLONOSCOPY;  Surgeon: Susan Dia MD;  Location: [address]);  Service: Endoscopy;  Laterality: N/A;  appt confirmed-rb    HYSTERECTOMY      LIVER BIOPSY      OOPHORECTOMY      PERIPHERAL ANGIOGRAPHY Left 5/5/2021    Procedure: Peripheral angiography;  Surgeon: Randolph Toribio MD;  Location: [facility] CATH LAB;  Service: Cardiology;  Laterality: Left;    PERIPHERAL ANGIOGRAPHY N/A 6/21/2021    Procedure: Peripheral angiography;  Surgeon: Randolph Toribio MD;  Location: [facility] CATH LAB;  Service: Cardiology;  Laterality: N/A;    UPPER GASTROINTESTINAL ENDOSCOPY       Review of patient's allergies  indicates:   Allergen Reactions    Amlodipine Swelling    Erythromycin Anaphylaxis    Erythropoietin analogues Anaphylaxis    Pegasys [peginterferon ruben-2a] Anaphylaxis    Lisinopril Hives     Current Facility-Administered Medications on File Prior to Encounter   Medication    [DISCONTINUED] GENERIC EXTERNAL MEDICATION    [DISCONTINUED] GENERIC EXTERNAL MEDICATION     Current Outpatient Medications on File Prior to Encounter   Medication Sig    aspirin (ECOTRIN) 81 MG EC tablet Take 1 tablet (81 mg total) by mouth once daily.    blood sugar diagnostic Strp 1 each by Misc.(Non-Drug; Combo Route) route 4 (four) times daily as needed (blood glucose check).    blood-glucose meter kit Use as instructed    blood-glucose meter,continuous (DEXCOM G6 ) Misc Use as directed.    blood-glucose sensor (DEXCOM G6 SENSOR) Rashida Change every 10 days. 90 day e 11.65    blood-glucose transmitter (DEXCOM G6 TRANSMITTER) Rashida Change every 3 months.    candesartan (ATACAND) 32 MG tablet Take 1 tablet (32 mg total) by mouth once daily.    carvediloL (COREG) 12.5 MG tablet Take 1 tablet (12.5 mg total) by mouth in the morning and 1 tablet (12.5 mg total) in the evening.    clopidogreL (PLAVIX) 75 mg tablet Take 1 tablet (75 mg total) by mouth once daily.    doxepin (SINEQUAN) 10 MG capsule Take 1 capsule (10 mg total) by mouth nightly as needed (insomnia).    gabapentin (NEURONTIN) 300 MG capsule Take 1 capsule (300 mg total) by mouth 2 (two) times daily.    hydrALAZINE (APRESOLINE) 25 MG tablet Take 1 tablet (25 mg total) by mouth 3 (three) times daily as needed (systolic blood pressure greater than 160 mm/Hg).    insulin aspart U-100 (NOVOLOG) 100 unit/mL (3 mL) InPn pen Inject 0-5 Units into the skin before meals and at bedtime as needed (Hyperglycemia).    insulin aspart U-100 (NOVOLOG) 100 unit/mL (3 mL) InPn pen Inject 4 Units into the skin 3 (three) times daily.    insulin glargine (LANTUS) 100 unit/mL injection Inject 8  "Units under the skin in the morning. Indications: Type 2 Diabetes.    insulin syringe-needle U-100 (BD INSULIN SYRINGE ULTRA-FINE) 0.5 mL 31 gauge x 5/16" Syrg 1 each by Misc.(Non-Drug; Combo Route) route 4 (four) times daily as needed (insulin administration).    lancing device with lancets Kit 1 each by Misc.(Non-Drug; Combo Route) route 4 (four) times daily as needed (blood glucose testing).    modafiniL (PROVIGIL) 100 MG Tab Take 1 tablet (100 mg total) by mouth in the morning.    NIFEdipine (PROCARDIA-XL) 90 MG (OSM) 24 hr tablet Take 1 tablet (90 mg total) by mouth once daily.    pantoprazole (PROTONIX) 40 MG tablet Take 1 tablet (40 mg total) by mouth daily as needed (heartburn or reflux).    pen needle, diabetic (BD ULTRA-FINE MILAN PEN NEEDLE) 32 gauge x 5/32" Ndle Use as directed with insulin pens    rosuvastatin (CRESTOR) 40 MG Tab Take 1 tablet (40 mg total) by mouth every evening.    semaglutide (OZEMPIC) 2 mg/dose (8 mg/3 mL) PnIj Inject 2 mg into the skin every 7 days.     Family History       Problem Relation (Age of Onset)    Breast cancer Sister    Cancer Sister, Sister (70)    Diabetes Mother, Sister, Sister, Sister    Heart disease Mother, Father    Hyperlipidemia Mother    Hypertension Mother          Tobacco Use    Smoking status: Every Day     Current packs/day: 0.50     Average packs/day: 0.3 packs/day for 50.8 years (13.4 ttl pk-yrs)     Types: Cigarettes     Start date: 3/28/1973     Last attempt to quit: 3/28/2021    Smokeless tobacco: Never   Substance and Sexual Activity    Alcohol use: No     Comment: special occasions    Drug use: No    Sexual activity: Yes     Partners: Male     Review of Systems   Unable to perform ROS: Mental status change     Objective:     Vital Signs (Most Recent):  Temp: 100.1 °F (37.8 °C) (01/08/24 1930)  Pulse: 78 (01/08/24 2251)  Resp: 20 (01/08/24 2251)  BP: (!) 174/72 (01/08/24 2251)  SpO2: 96 % (01/08/24 2251) Vital Signs (24h Range):  Temp:  [99.5 °F (37.5 " °C)-100.5 °F (38.1 °C)] 100.1 °F (37.8 °C)  Pulse:  [76-88] 78  Resp:  [16-24] 20  SpO2:  [95 %-100 %] 96 %  BP: (135-193)/() 174/72     Weight: 89.8 kg (198 lb)  Body mass index is 31.01 kg/m².     Physical Exam  Vitals and nursing note reviewed.   Constitutional:       General: She is not in acute distress.     Appearance: She is ill-appearing.   HENT:      Head: Normocephalic and atraumatic.      Mouth/Throat:      Mouth: Mucous membranes are dry.   Eyes:      Extraocular Movements: Extraocular movements intact.      Pupils: Pupils are equal, round, and reactive to light.   Cardiovascular:      Rate and Rhythm: Normal rate and regular rhythm.      Pulses: Normal pulses.      Heart sounds: Normal heart sounds. No murmur heard.  Pulmonary:      Effort: Pulmonary effort is normal.      Breath sounds: Normal breath sounds. No wheezing, rhonchi or rales.   Abdominal:      General: There is no distension.      Palpations: Abdomen is soft.      Tenderness: There is abdominal tenderness (suprapubic). There is no right CVA tenderness, left CVA tenderness or guarding.      Comments: Reduced bowel sounds    Musculoskeletal:         General: No tenderness.      Right lower leg: No edema.      Left lower leg: No edema.   Skin:     General: Skin is warm and dry.      Capillary Refill: Capillary refill takes 2 to 3 seconds.   Neurological:      Mental Status: She is alert. She is disoriented.      GCS: GCS eye subscore is 4. GCS verbal subscore is 2. GCS motor subscore is 5.      Comments: Right sided deficits - doesn't move right leg but can move right arm              CRANIAL NERVES     CN III, IV, VI   Pupils are equal, round, and reactive to light.       Significant Labs: All pertinent labs within the past 24 hours have been reviewed.  Blood Culture:   Recent Labs   Lab 01/08/24  1628 01/08/24  1629   LABBLOO No Growth to date No Growth to date     CBC:   Recent Labs   Lab 01/08/24  1628 01/08/24  1645   WBC 13.00*   "--    HGB 11.7*  --    HCT 37.3 37     --      CMP:   Recent Labs   Lab 01/08/24  1628 01/08/24 2006   * 143   K 3.0* 3.4*   * 108   CO2 23 24   * 456*   BUN 30* 28*   CREATININE 1.5* 1.6*   CALCIUM 8.9 8.7   PROT 6.4  --    ALBUMIN 2.1*  --    BILITOT 0.5  --    ALKPHOS 102  --    AST 51*  --    ALT 40  --    ANIONGAP 11 11     POCT Glucose:   Recent Labs   Lab 01/08/24 2252 01/09/24  0007 01/09/24 0127   POCTGLUCOSE 457* 432* 326*     Urine Culture: No results for input(s): "LABURIN" in the last 48 hours.  Urine Studies:   Recent Labs   Lab 01/08/24 2036   COLORU Yellow   APPEARANCEUA Ex.Turbid   PHUR 6.0   SPECGRAV 1.020   PROTEINUA 2+*   GLUCUA Trace*   KETONESU Trace*   BILIRUBINUA Negative   OCCULTUA 3+*   NITRITE Negative   LEUKOCYTESUR 3+*   RBCUA 19*   WBCUA >100*   BACTERIA Many*   HYALINECASTS 0     Recent Lab Results  (Last 5 results in the past 24 hours)        01/09/24  0127   01/09/24  0007   01/08/24 2252 01/08/24 2036 01/08/24 2010        Benzodiazepines       Negative         Methadone metabolites       Negative         Phencyclidine       Negative         Acetaminophen (Tylenol), Serum               Alcohol, Serum               Ammonia               Amphetamine Screen, Ur       Negative         Anion Gap               Appearance, UA       Ex.Turbid         Bacteria, UA       Many         Barbiturate Screen, Ur       Negative         Bilirubin (UA)       Negative         BUN               Calcium               Chloride               CO2               Cocaine (Metab.)       Negative         Color, UA       Yellow         Creatinine               Creatinine, Urine       90.0         eGFR               Glucose               Glucose, UA       Trace         Hyaline Casts, UA       0         Ketones, UA       Trace         Leukocytes, UA       3+         Microscopic Comment       SEE COMMENT  Comment: Other formed elements not mentioned in the report are not   present " in the microscopic examination.            NITRITE UA       Negative         Occult Blood UA       3+         Opiate Scrn, Ur       Negative         pH, UA       6.0         POC Glucose               POCT Glucose 326   432   457     440       Potassium               Protein, UA       2+  Comment: Recommend a 24 hour urine protein or a urine   protein/creatinine ratio if globulin induced proteinuria is  clinically suspected.           RBC, UA       19         Sodium               Specific Gravity, UA       1.020         Specimen UA       Urine, Catheterized         Marijuana (THC) Metabolite       Negative         Toxicology Information       SEE COMMENT  Comment: This screen includes the following classes of drugs at the listed   cut-off:    Benzodiazepines 200 ng/ml  Methadone 300 ng/ml  Cocaine metabolite 300 ng/ml  Opiates 300 ng/ml  Barbiturates 200 ng/ml  Amphetamines 1000 ng/ml  Marijuana metabs (THC) 50 ng/ml  Phencyclidine (PCP) 25 ng/ml    This is a screening test. If results do not correlate with clinical   presentation, then a confirmatory send out test is advised.     This report is intended for use in clinical monitoring and management   of   patients. It is not intended for use in employment related drug   testing.           WBC, UA       >100                                Significant Imaging: I have reviewed all pertinent imaging results/findings within the past 24 hours.  Assessment/Plan:     * Acute encephalopathy  Presenting with increased lethargy over the past 1 day  Recent stroke in late November 2023 which has caused residual right sided deficits and dysarthria  Daughter reports increased lethargy over the past 1 day; Pt has also had watery diarrhea for about 2 days with reduced PO intake    Mild leukocytosis  Ammonia WNL, tylenol, ethanol, UDS negative  UA concerning for infection, UCx pending; BCx pending  CXR without obvious consolidation, no O2 requirement  CT head and MRI brain without  acute infarcts, both show remote infarcts     DDx: UTI vs bacteremia vs aspiration PNA vs diarrheal illness vs seizures vs related to hyperglycemia     Plan:  - continue ceftriaxone; no respiratory symptoms or focal consolidation on CXR so did not cover for aspiration PNA  - f/u UCx and BCx   - Brain imaging showing remote infarcts - likely recrudescence of stroke symptoms in setting of likely infection  - EEG  - check TSH, B12, folate   - stool culture and c diff   - NPO until SLP evaluation  - q4h neurochecks  - delirium precautions  - PT/OT evaluation, anticipate may need acute care placement    ELIZABETH (acute kidney injury)  Baseline sCr ~ 1.0  sCr on admission 1.5  UA concerning for infection  ELIZABETH could be 2/2 infection vs poor PO intake     Plan:  - f/u UCx  - start ceftriaxone  - urine lytes  - US retroperitoneal  - strict input/output  - renally dose adjust medications  - hold home ARB in setting of ELIZABETH    Hemiparesis, right  See CVA    History of CVA (cerebrovascular accident)  Recent admission end of November 2023 for L MCA; presented with dysarthria and right sided deficits  Discharged to rehab and then went home with her daughter  Daughter reports she's been dysarthric since her stroke and that her speech hasn't improved, has continued to be slurred; also has residual right sided deficits worse in her right leg  CT head showing remote infarcts, MRI brain also showing subacute infarcts, nothing acute   ED spoke with vascular neurology who reviewed imaging, agreed no acute infarcts; recrudescence of stroke symptoms likely in setting of infection  Finished DAPT course end of December - continue aspirin monotherapy and statin  NPO until SLP evaluation of swallow  PT/OT evaluation    Stage 3a chronic kidney disease  See ELIZABETH    Multinodular goiter  Not on any thyroid medication  Check TSH/T4    Hyperlipidemia  Continue statin    History of hepatitis C, SVR as of 8-2016   S/p tx, complete 12 weeks  Michelle    Gastroesophageal reflux disease with esophagitis  Continue pantoprazole    Type 2 diabetes mellitus with hyperglycemia, with long-term current use of insulin  Poorly controlled  Last HbA1c 9.6 November 2023  Takes long and short acting insulin and ozempic at home  Initial BGL on presentation 311, then increased to 456  Beta hydroxy elevated but no AG, normal bicarb, and normal pH  Hypokalemic but potassium replaced  Started on insulin gtt by ED  Continue insulin gtt for now; remain NPO  Q1h BGL checks  BMP q4h to monitor potassium    Essential hypertension  Takes nifedipine, candesartan, and coreg at home  Hypertensive on presentation  Continue nifedipine and coreg  Hold ARB in setting of ELIZABETH  Monitor BP      VTE Risk Mitigation (From admission, onward)           Ordered     heparin (porcine) injection 5,000 Units  Every 8 hours         01/09/24 0128     IP VTE HIGH RISK PATIENT  Once         01/09/24 0128     Place sequential compression device  Until discontinued         01/09/24 0111                  On 01/09/2024, patient should be placed in hospital observation services under my care in collaboration with Dr. Stoddard.         Pallavi Mckeon MD  Department of Hospital Medicine  [facility] - Telemetry Stepdown

## 2024-01-09 NOTE — PROCEDURES
Date of service  01/09/2024    Introduction  Electroencephalographic (EEG) recording is performed with electrodes placed according to the International 10-20 placement system. Thirty two (32) channels of digital signal (sampling rate of 512/sec) including T1 and T2 was simultaneously recorded from the scalp and may include EKG, EMG, and/or eye monitors. Recording band pass was 0.1 to 512 Hz. Digital video recording of the patient is simultaneously recorded with the EEG. The patient is instructed to report clinical symptoms which may occur during the recording session. EEG and video recording is stored and archived in digital format. Activation procedures which include photic stimulation, hyperventilation and instructing patients to perform simple tasks are done in selected patients.    The EEG is displayed on a monitor screen and can be reviewed using different montages. Computer assisted analysis is employed to detect spike and electrographic seizure activity. The entire record is submitted for computer analysis. The entire recording is visually reviewed and, the times identified by computer analysis as being spikes or seizures are reviewed again.     Compressed spectral analysis (CSA) is also performed on the activity recorded from each individual channel. This is displayed as a power display of frequencies from 0 to 30 Hz over time. The CSA is reviewed looking for asymmetries in power between homologous areas of the scalp and then compared with the original EEG recording.     Findings  The short-term video EEG recording during wakefulness contains diffuse 4-7 Hz delta to theta slowing with up to 8 Hz alpha activity over the posterior head regions.                    Photic stimulation and hyperventilation were not performed.     During the recording, the patient fell asleep spontaneously with poorly formed sleep architecture.     The EKG channel showed regular rhythm.    Interpretation  The short-term video EEG  shows a moderate degree of diffuse nonspecific slowing of the background, consistent with encephalopathy but nonspecific for etiology.  No potentially epileptiform activity was seen.

## 2024-01-09 NOTE — ASSESSMENT & PLAN NOTE
Poorly controlled  Last HbA1c 9.6 November 2023  Takes long and short acting insulin and ozempic at home  Initial BGL on presentation 311, then increased to 456  Beta hydroxy elevated but no AG, normal bicarb, and normal pH  Hypokalemic but potassium replaced  Started on insulin gtt by ED  Continue insulin gtt for now; remain NPO  Q1h BGL checks  BMP q4h to monitor potassium

## 2024-01-09 NOTE — NURSING
Nurses Note -- 4 Eyes      1/9/2024   4:10 AM      Skin assessed during: Admit      [] No Altered Skin Integrity Present    []Prevention Measures Documented      [x] Yes- Altered Skin Integrity Present or Discovered   [x] LDA Added if Not in Epic (Describe Wound)   [x] New Altered Skin Integrity was Present on Admit and Documented in LDA   [x] Wound Image Taken    Wound Care Consulted? Yes    Attending Nurse:  Torres WELLINGTON    Second RN/Staff Member:  Naila HOOD

## 2024-01-09 NOTE — ADDENDUM NOTE
Addended by: KEON ROWE on: 12/29/2023 04:09 PM     Modules accepted: Orders    
Addended by: MARCO ANTONIO GALAVIZ on: 1/9/2024 01:07 PM     Modules accepted: Orders    
[FreeTextEntry4] : mood is fairly stable  mild insomnia at times, will try trazodone  anxiety is chronic, mild.  health ok, reminded to update labs, physical  goal to remain stable, free of SI, function well at work

## 2024-01-10 PROBLEM — E11.10 DIABETIC KETOACIDOSIS: Status: ACTIVE | Noted: 2024-01-01

## 2024-01-10 PROBLEM — I73.89 HHS (HYPOTHENAR HAMMER SYNDROME): Status: RESOLVED | Noted: 2024-01-01 | Resolved: 2024-01-01

## 2024-01-10 PROBLEM — E87.0 HYPERNATREMIA: Status: ACTIVE | Noted: 2024-01-01

## 2024-01-10 NOTE — SUBJECTIVE & OBJECTIVE
Medications:  Continuous Infusions:   sodium chloride 0.9%      dextrose 5 % and 0.45 % NaCl      insulin regular 1 units/mL infusion orderable (DKA)       Scheduled Meds:   aspirin  81 mg Oral Daily    atorvastatin  80 mg Oral Daily    carvediloL  12.5 mg Oral BID    [START ON 1/11/2024] clopidogreL  75 mg Oral Daily    dextrose 10%  50 g Intravenous Once    And    insulin regular  10 Units Intravenous Once    dextrose 10%  50 g Intravenous Once    And    insulin regular  0.1 Units/kg Intravenous Once    heparin (porcine)  5,000 Units Subcutaneous Q8H    NIFEdipine  90 mg Oral Daily    pantoprazole  40 mg Oral Daily    piperacillin-tazobactam (Zosyn) IV (PEDS and ADULTS) (extended infusion is not appropriate)  4.5 g Intravenous Q8H     PRN Meds:acetaminophen, dextrose 10%, dextrose 10%, dextrose 10%, dextrose 10%, dextrose 10%, dextrose 10% **AND** dextrose 10% **AND** insulin regular, dextrose 10%, dextrose 10% **AND** dextrose 10% **AND** insulin regular, dextrose 5 % and 0.45 % NaCl, glucagon (human recombinant), glucose, glucose, hydrALAZINE, melatonin, naloxone, ondansetron, potassium chloride **AND** potassium chloride **AND** potassium chloride, sodium chloride 0.9%, sodium chloride 0.9%, sodium chloride 0.9%, Pharmacy to dose Vancomycin consult **AND** vancomycin - pharmacy to dose     Objective:     Vital Signs (Most Recent):  Temp: 98.7 °F (37.1 °C) (01/10/24 1629)  Pulse: 66 (01/10/24 1629)  Resp: 20 (01/10/24 1629)  BP: (!) 101/51 (01/10/24 1629)  SpO2: (!) 92 % (01/10/24 1629) Vital Signs (24h Range):  Temp:  [97.9 °F (36.6 °C)-98.7 °F (37.1 °C)] 98.7 °F (37.1 °C)  Pulse:  [] 66  Resp:  [18-20] 20  SpO2:  [92 %-100 %] 92 %  BP: (101-194)/(51-87) 101/51     Date 01/10/24 0700 - 01/11/24 0659   Shift 6292-2424 4862-6950 9765-0161 24 Hour Total   INTAKE   P.O. 0   0   Shift Total(mL/kg) 0(0)   0(0)   OUTPUT   Urine(mL/kg/hr)  400  400   Shift Total(mL/kg)  400(4.8)  400(4.8)   Weight (kg) 84.2  "84.2 84.2 84.2        Physical Exam  Constitutional:       Appearance: She is ill-appearing.   HENT:      Head: Normocephalic and atraumatic.   Cardiovascular:      Rate and Rhythm: Normal rate.      Pulses:           Femoral pulses are 2+ on the right side and 2+ on the left side.     Comments: R: Popliteal pulse biphasic on doppler, monophasic DP pulse, unable to doppler PT pulse    R: Palpable DP pulse, unable to doppler PT pulse.   Pulmonary:      Effort: No respiratory distress.   Neurological:      Mental Status: She is disoriented.      Motor: Weakness present.      Coordination: Coordination abnormal.          Significant Labs:  BMP:   Recent Labs   Lab 01/10/24  0533 01/10/24  0753 01/10/24  1131   *  267*   < > 302*     140   < > 140   K 3.1*  3.1*   < > 3.5     107   < > 112*   CO2 21*  21*   < > 15*   BUN 13  13   < > 14   CREATININE 1.0  1.0   < > 1.1   CALCIUM 8.6*  8.6*   < > 8.6*   MG 1.7  --   --     < > = values in this interval not displayed.     CBC:   Recent Labs   Lab 01/10/24  0533   WBC 16.12*   RBC 4.01   HGB 11.0*   HCT 34.9*      MCV 87   MCH 27.4   MCHC 31.5*     CMP:   Recent Labs   Lab 01/10/24  0533 01/10/24  0753 01/10/24  1131   *  267*   < > 302*   CALCIUM 8.6*  8.6*   < > 8.6*   ALBUMIN 1.8*  --   --    PROT 5.9*  --   --      140   < > 140   K 3.1*  3.1*   < > 3.5   CO2 21*  21*   < > 15*     107   < > 112*   BUN 13  13   < > 14   CREATININE 1.0  1.0   < > 1.1   ALKPHOS 130  --   --    ALT 41  --   --    AST 47*  --   --    BILITOT 0.3  --   --     < > = values in this interval not displayed.     Coagulation: No results for input(s): "LABPROT", "INR", "APTT" in the last 48 hours.    Significant Diagnostics:  I have reviewed and interpreted all pertinent imaging results/findings within the past 24 hours.  "

## 2024-01-10 NOTE — ASSESSMENT & PLAN NOTE
79 y/o female with a PMHx of  PAD, recurrent CVA's with right sided deficits, Hep C, T2DM, HTN presnted to the ED on 1/8 for AMS and was admitted to the floor. Patient was found to have laboratory findings with HHS, ELIZABETH and UTI. Family noted that the patient was starting to have right foot changes over the past few weeks and pulses were unable to be palpated. Vascular was consulted for concern over acute on chronic PAD of the right lower extremity.     -- Will follow up results of arterial ultrasound   -- DREAD and PVR ordered  -- Patient not a candidate for revascularization due to poor performance status  -- Will discuss more with family regarding amputation

## 2024-01-10 NOTE — HPI
77 y/o female with a PMHx of  PAD, recurrent CVA's with right sided deficits, Hep C, T2DM, HTN presnted to the ED on 1/8 for AMS and was admitted to the floor. Patient was found to have laboratory findings with HHS, ELIZABETH and UTI. Family noted that the patient was starting to have right foot changes over the past few weeks and pulses were unable to be palpated. Vascular was consulted for concern over acute on chronic PAD of the right lower extremity.     On assessment the patient is AF, slightly hypotensive at 101/51, HR 66. Physical exam reveled right foot with diffuse dry gangrene present on the distal aspects of all digits and right heel. Bilateral femoral pulses are palpable. Biphasic right popliteal pulse, monophasic right DP pulse, nondopplerable PT pulse. Left pulses showed palpable left DP pulse, unable to doppler PT pulse. She is non verbal, unable to follow commands and has minimal movement of her extremities. Her family states that the patient has bilateral femoral stents, but there are no records to verify. Patient is on ASA and Plavix at home.     Patient does not have an elevated WBC, Hg stable at 11.     Patient has no recent vascular imaging. US LE pending.

## 2024-01-10 NOTE — CONSULTS
[facility] - Telemetry Stepdown  Vascular Surgery  Consult Note    Inpatient consult to Vascular Surgery  Consult performed by: Evelio Salas DO  Consult ordered by: Cameron Stoddard MD        Subjective:     Chief Complaint/Reason for Admission: HHS, dry gangrene of right lower extremity    History of Present Illness: 77 y/o female with a PMHx of  PAD, recurrent CVA's with right sided deficits, Hep C, T2DM, HTN presnted to the ED on 1/8 for AMS and was admitted to the floor. Patient was found to have laboratory findings with HHS, ELIZABETH and UTI. Family noted that the patient was starting to have right foot changes over the past few weeks and pulses were unable to be palpated. Vascular was consulted for concern over acute on chronic PAD of the right lower extremity.     On assessment the patient is AF, slightly hypotensive at 101/51, HR 66. Physical exam reveled right foot with diffuse dry gangrene present on the distal aspects of all digits and right heel. Bilateral femoral pulses are palpable. Biphasic right popliteal pulse, monophasic right DP pulse, nondopplerable PT pulse. Left pulses showed palpable left DP pulse, unable to doppler PT pulse. She is non verbal, unable to follow commands and has minimal movement of her extremities. Her family states that the patient has bilateral femoral stents, but there are no records to verify. Patient is on ASA and Plavix at home.     Patient does not have an elevated WBC, Hg stable at 11.     Patient has no recent vascular imaging. US LE pending.         Medications:  Continuous Infusions:   sodium chloride 0.9%      dextrose 5 % and 0.45 % NaCl      insulin regular 1 units/mL infusion orderable (DKA)       Scheduled Meds:   aspirin  81 mg Oral Daily    atorvastatin  80 mg Oral Daily    carvediloL  12.5 mg Oral BID    [START ON 1/11/2024] clopidogreL  75 mg Oral Daily    dextrose 10%  50 g Intravenous Once    And    insulin regular  10 Units Intravenous Once    dextrose  10%  50 g Intravenous Once    And    insulin regular  0.1 Units/kg Intravenous Once    heparin (porcine)  5,000 Units Subcutaneous Q8H    NIFEdipine  90 mg Oral Daily    pantoprazole  40 mg Oral Daily    piperacillin-tazobactam (Zosyn) IV (PEDS and ADULTS) (extended infusion is not appropriate)  4.5 g Intravenous Q8H     PRN Meds:acetaminophen, dextrose 10%, dextrose 10%, dextrose 10%, dextrose 10%, dextrose 10%, dextrose 10% **AND** dextrose 10% **AND** insulin regular, dextrose 10%, dextrose 10% **AND** dextrose 10% **AND** insulin regular, dextrose 5 % and 0.45 % NaCl, glucagon (human recombinant), glucose, glucose, hydrALAZINE, melatonin, naloxone, ondansetron, potassium chloride **AND** potassium chloride **AND** potassium chloride, sodium chloride 0.9%, sodium chloride 0.9%, sodium chloride 0.9%, Pharmacy to dose Vancomycin consult **AND** vancomycin - pharmacy to dose     Objective:     Vital Signs (Most Recent):  Temp: 98.7 °F (37.1 °C) (01/10/24 1629)  Pulse: 66 (01/10/24 1629)  Resp: 20 (01/10/24 1629)  BP: (!) 101/51 (01/10/24 1629)  SpO2: (!) 92 % (01/10/24 1629) Vital Signs (24h Range):  Temp:  [97.9 °F (36.6 °C)-98.7 °F (37.1 °C)] 98.7 °F (37.1 °C)  Pulse:  [] 66  Resp:  [18-20] 20  SpO2:  [92 %-100 %] 92 %  BP: (101-194)/(51-87) 101/51     Date 01/10/24 0700 - 01/11/24 0659   Shift 3718-2069 3801-2614 8170-5027 24 Hour Total   INTAKE   P.O. 0   0   Shift Total(mL/kg) 0(0)   0(0)   OUTPUT   Urine(mL/kg/hr)  400  400   Shift Total(mL/kg)  400(4.8)  400(4.8)   Weight (kg) 84.2 84.2 84.2 84.2        Physical Exam  Constitutional:       Appearance: She is ill-appearing.   HENT:      Head: Normocephalic and atraumatic.   Cardiovascular:      Rate and Rhythm: Normal rate.      Pulses:           Femoral pulses are 2+ on the right side and 2+ on the left side.     Comments: R: Popliteal pulse biphasic on doppler, monophasic DP pulse, unable to doppler PT pulse    L: Palpable DP pulse, unable to doppler  "PT pulse.   Pulmonary:      Effort: No respiratory distress.   Neurological:      Mental Status: She is disoriented.      Motor: Weakness present.      Coordination: Coordination abnormal.                    Significant Labs:  BMP:   Recent Labs   Lab 01/10/24  0533 01/10/24  0753 01/10/24  1131   *  267*   < > 302*     140   < > 140   K 3.1*  3.1*   < > 3.5     107   < > 112*   CO2 21*  21*   < > 15*   BUN 13  13   < > 14   CREATININE 1.0  1.0   < > 1.1   CALCIUM 8.6*  8.6*   < > 8.6*   MG 1.7  --   --     < > = values in this interval not displayed.     CBC:   Recent Labs   Lab 01/10/24  0533   WBC 16.12*   RBC 4.01   HGB 11.0*   HCT 34.9*      MCV 87   MCH 27.4   MCHC 31.5*     CMP:   Recent Labs   Lab 01/10/24  0533 01/10/24  0753 01/10/24  1131   *  267*   < > 302*   CALCIUM 8.6*  8.6*   < > 8.6*   ALBUMIN 1.8*  --   --    PROT 5.9*  --   --      140   < > 140   K 3.1*  3.1*   < > 3.5   CO2 21*  21*   < > 15*     107   < > 112*   BUN 13  13   < > 14   CREATININE 1.0  1.0   < > 1.1   ALKPHOS 130  --   --    ALT 41  --   --    AST 47*  --   --    BILITOT 0.3  --   --     < > = values in this interval not displayed.     Coagulation: No results for input(s): "LABPROT", "INR", "APTT" in the last 48 hours.    Significant Diagnostics:  I have reviewed and interpreted all pertinent imaging results/findings within the past 24 hours.  Assessment/Plan:     PAD (peripheral artery disease)  79 y/o female with a PMHx of  PAD, recurrent CVA's with right sided deficits, Hep C, T2DM, HTN presnted to the ED on 1/8 for AMS and was admitted to the floor. Patient was found to have laboratory findings with HHS, ELIZABETH and UTI. Family noted that the patient was starting to have right foot changes over the past few weeks and pulses were unable to be palpated. Vascular was consulted for concern over acute on chronic PAD of the right lower extremity.     --Condition of foot " unlikely to be an acute process. Most likely developed over time.   -- Will follow up results of arterial ultrasound   -- DREAD and PVR ordered  -- Patient not a candidate for revascularization due to poor performance status  -- Will discuss more with family regarding amputation         Thank you for your consult.     Evelio Salas, DO  Vascular Surgery  [facility] - Telemetry Stepdown

## 2024-01-10 NOTE — PLAN OF CARE
Problem: Physical Therapy  Goal: Physical Therapy Goal  Description: Goals to be met by: 24     Patient will increase functional independence with mobility by performin. Supine to sit with Moderate Assistance  2. Sit to supine with Moderate Assistance  3. Rolling to Right with Minimal Assistance.  4. Rolling to Left with Moderate Assistance.  5. Bed to chair transfer with Maximum Assistance with or without LRAD  6. Sitting at edge of bed 10 minutes with Stand-by Assistance  7. Lower extremity exercise program x20 reps per handout, with assistance as needed    Outcome: Ongoing, Progressing    Evaluation Complete. Goals Appropriate.

## 2024-01-10 NOTE — PLAN OF CARE
Problem: Adult Inpatient Plan of Care  Goal: Plan of Care Review  Outcome: Ongoing, Progressing     Problem: Adult Inpatient Plan of Care  Goal: Optimal Comfort and Wellbeing  Outcome: Ongoing, Progressing     Problem: Oral Intake Inadequate (Acute Kidney Injury/Impairment)  Goal: Optimal Nutrition Intake  Outcome: Ongoing, Progressing     Problem: Infection  Goal: Absence of Infection Signs and Symptoms  Outcome: Ongoing, Progressing   VSS. Denies any pain. Patient alert to self. Blood pressure was elevated at 190's systolic, MD notified and PRN hydralazine given. Blood glucose checked Q4, last check was 272 and insulin given as ordered. Bed alarm set and call light within reach.

## 2024-01-10 NOTE — PT/OT/SLP EVAL
Physical Therapy Evaluation    Patient Name:  Buck Ibarra   MRN:  [id number]    Recommendations:     Discharge Recommendations: Low Intensity Therapy   Discharge Equipment Recommendations: glucometer   Barriers to discharge: Decreased caregiver support and Increased skilled assistance required    Assessment:   Co-evaluation/treatment performed due to patient's multiple deficits requiring two skilled therapists to appropriately and safely assess patient's strength, endurance, functional mobility, and ADL performance while facilitating functional tasks in addition to accommodating for patient's activity tolerance and medical acuity.    Buck Ibarra is a 78 y.o. female admitted with a medical diagnosis of Acute encephalopathy.  She presents with the following impairments/functional limitations: pain, weakness, impaired endurance, impaired self care skills, impaired functional mobility, impaired balance, impaired cognition, decreased lower extremity function, decreased upper extremity function, impaired coordination, impaired skin, impaired sensation. Evaluation significantly limited by patient's orientation status and distal RLE pain this date. Patient did become more interactive with all persons in the room upon sitting up at EOB, including PT/OT conversations and activities. Based upon information gained throughout session, Patient will greatly benefit from low intensity skilled physical therapy services post-acutely    Rehab Prognosis: Good; patient would benefit from acute skilled PT services to address these deficits and reach maximum level of function.    Recent Surgery: * No surgery found *      Plan:     During this hospitalization, patient to be seen 3 x/week to address the identified rehab impairments via gait training, therapeutic activities, therapeutic exercises, neuromuscular re-education and progress toward the following goals:    Plan of Care Expires:  02/09/24    Subjective     Chief  "Complaint: Pain  Patient/Family Comments/goals: None stated  Pain/Comfort:  Pain Rating 1: Pain present, but not rated  Location - Side 1: Right  Location - Orientation 1: lower  Location 1: leg and foot  Pain Addressed 1: Reposition, Distraction  Pain Rating Post-Intervention 1: Unchanged    Patients cultural, spiritual, Mandaeism conflicts given the current situation: no    Social History: Obtained from patient's daughter, Olga, who was present in the room.  Residence: Patient lives with their child(annabelle) in  a duplex  with number of outside stair(s): TTE . Patient lived on 1st floor with Daughter, Venessa. Patient's Daughter Olga lives on 2nd floor. Pt's bathroom has a T/S combo & a WIS.  Equipment Owned (not using): Rollator  Equipment Used: shower chair, wheelchair, slide board, hospital bed, and lift device  Prior level of function:  Prior to admission, patient was non-ambulatory and required assistance for mobility and ADLs.  Roles: Caregiver to Daughter  Assistance Upon Discharge: family, however not 24/7    Objective:     Communicated with RN prior to session. Patient found HOB elevated with PureWick, telemetry, peripheral IV and   upon PT entry to room.    General Precautions: Standard, fall, diabetic, special contact   Orthopedic Precautions:N/A   Braces: N/A   Body mass index is 31.01 kg/m².  Oxygen Device: Room Air  Vitals: BP (!) 172/74 (BP Location: Left arm, Patient Position: Lying)   Pulse 76   Temp 98.6 °F (37 °C) (Axillary)   Resp 18   Ht 5' 7" (1.702 m)   Wt 89.8 kg (198 lb)   SpO2 99%   BMI 31.01 kg/m²     Exams:  Cognition:   Oriented X 1, Alert, and Cooperative   Patient is oriented to Person  Command following: Follows one-step verbal commands  Fluency: Impaired 2/2 orientation status  Skin Integrity:   Localized, Small-diameter, and Dark Ecchymosis on dorsum of R foot, with diffuse discoloration on mid-distal R shank and foot.  R mid-distal shank and foot cool to touch. "   Medium-diameter, Shallow, Dark, and Dry wound on R heel  Postural Assessment:   Sitting: slouched posture, rounded shoulders, forward head, increased kyphosis, and posterior lean  Physical Exam:    Left LE Right LE   Sensation Not assessed Heightened to light touch on dorsal aspect of foot and mid-distal shank.  Diminished to light touch at palmar surface of foot and digits.    Coordination not tested due to strength or pain deficits not tested due to strength or pain deficits       BLE ROM: Deficits: Grossly , R>L    BLE Strength: Limited functional assessment performed, see below    Functional Mobility:  Bed Mobility:     Rolling Left: x1 (attempt), Total Assistancex2 with HOB Elevated.   Rolling Right: x1, Total Assistancex2 with HOB Elevated. Pt endorsing increased RLE pain. Max VC/TC/Visual Cues for sequencing of task. Max facilitation of LUE to perform cross-body reaching to target  EOB Scooting:   Anterior: Total Assistancex2  Boosting: Total Assistancex2, and additional person to support RLE, with HOB Flat  Supine>Sit (R): x1, Total Assistancex2 with HOB Elevated. Max VC/TC/Visual Cues for sequencing of task. Increased time required d/t RLE pain  Sit>Supine (R): x1, Total Assistancex2 with HOB Flat    Balance:   Static Sitting: Minimal Assistance - Stand-by Assistance  Dynamic Sitting: Minimal Assistance - Contact Guard Assistance  Total Time Sitting: ~30 mins EOB    AM-PAC 6 CLICK MOBILITY  Total Score:10     Treatment & Education:  Patient completed OT-specific activities, BUE multi-planar functional reaching, and LAQs (completion L ; attempted R) while sitting EOB to assess patient's static, reactive, and dynamic balance.    Patient Education Provided on:  The role of physical therapy and how the patient can benefit from skilled services  The importance of contacting RN, via call light, for mobility throughout the day  Pt white board updated with current therapists name and level of mobility assistance  needed.     Patient unable to Verbalize and/or Demonstrate understanding of all topics touched on this date, however patient's daughter did. All questions answered within the PT scope of practice    Patient left right sidelying with all lines intact, RN notified, and Medical Professional & Daughter present.    GOALS:   Multidisciplinary Problems       Physical Therapy Goals          Problem: Physical Therapy    Goal Priority Disciplines Outcome Goal Variances Interventions   Physical Therapy Goal     PT, PT/OT Ongoing, Progressing     Description: Goals to be met by: 24     Patient will increase functional independence with mobility by performin. Supine to sit with Moderate Assistance  2. Sit to supine with Moderate Assistance  3. Rolling to Right with Minimal Assistance.  4. Rolling to Left with Moderate Assistance.  5. Bed to chair transfer with Maximum Assistance with or without LRAD  6. Sitting at edge of bed 10 minutes with Stand-by Assistance  7. Lower extremity exercise program x20 reps per handout, with assistance as needed                         History:     Past Medical History:   Diagnosis Date    Allergy     Cataract     Current every day smoker     Diabetes mellitus type II     Gastritis     with gastric ulcer    GERD (gastroesophageal reflux disease)     H. pylori infection     History of hepatitis C, SVR as of 2015    Harvoni 12 wks completed.  SVR(12) as of 16 SVR(24) as of 10-     Hypertension     Osteopenia     Type 2 diabetes mellitus with diabetic polyneuropathy        Past Surgical History:   Procedure Laterality Date    COLONOSCOPY      COLONOSCOPY N/A 2016    Procedure: COLONOSCOPY;  Surgeon: Emeka Ahn MD;  Location: [address]);  Service: Endoscopy;  Laterality: N/A;    COLONOSCOPY N/A 2019    Procedure: COLONOSCOPY;  Surgeon: Susan Dia MD;  Location: [address]);  Service: Endoscopy;  Laterality: N/A;  appt  confirmed-rb    HYSTERECTOMY      LIVER BIOPSY      OOPHORECTOMY      PERIPHERAL ANGIOGRAPHY Left 5/5/2021    Procedure: Peripheral angiography;  Surgeon: Randolph Toribio MD;  Location: [facility] CATH LAB;  Service: Cardiology;  Laterality: Left;    PERIPHERAL ANGIOGRAPHY N/A 6/21/2021    Procedure: Peripheral angiography;  Surgeon: Randolph Toribio MD;  Location: [facility] CATH LAB;  Service: Cardiology;  Laterality: N/A;    UPPER GASTROINTESTINAL ENDOSCOPY         Time Tracking:     PT Received On: 01/09/24  PT Start Time: 1401     PT Stop Time: 1501  PT Total Time (min): 60 min     Billable Minutes: Evaluation 22 and Neuromuscular Re-education 38      01/09/2024

## 2024-01-10 NOTE — CARE UPDATE
Discussed case with family at bedside, they are concerned for her right foot which does appear cool to the touch with skin color changes in her toes.  They these changes have progressed since Ortho eval for septic arthritis last admission.  Mentation appears to wax and wane but is approaching baseline per family.  I discussed vascular surgery consult with Imaging to assess for blood flow and MRI to assess for signs of infection.  Labs reviewed and glucose rising from this AM despite minimal PO intake, BMP with decrease in bicarb so will assess for DKA and potential need for insulin drip.        Cameron Stoddard M.D.  Hospital Medicine  Pager [phone]

## 2024-01-10 NOTE — ASSESSMENT & PLAN NOTE
Poorly controlled  Last HbA1c 9.6 November 2023  Takes long and short acting insulin and ozempic at home  Initial BGL on presentation 311, then increased to 456  On admission, Beta hydroxy elevated but no AG, normal bicarb, and normal pH  Hypokalemic but potassium replaced  Started on insulin gtt by ED  Q1h BGL checks  BMP q4h to monitor potassium

## 2024-01-10 NOTE — ASSESSMENT & PLAN NOTE
Patient has hypernatremia which is controlled. The hypernatremia is due to Dehydration. We will aim to correct the sodium by 8-10mEq in 24 hours. The patient's sodium results have been reviewed and are listed below.  Recent Labs   Lab 01/10/24  1131

## 2024-01-10 NOTE — PLAN OF CARE
Problem: Adult Inpatient Plan of Care  Goal: Plan of Care Review  Outcome: Ongoing, Progressing  Goal: Patient-Specific Goal (Individualized)  Outcome: Ongoing, Progressing  Goal: Absence of Hospital-Acquired Illness or Injury  Outcome: Ongoing, Progressing  Goal: Optimal Comfort and Wellbeing  Outcome: Ongoing, Progressing  Goal: Readiness for Transition of Care  Outcome: Ongoing, Progressing     Problem: Diabetes Comorbidity  Goal: Blood Glucose Level Within Targeted Range  Outcome: Ongoing, Progressing     Problem: Fluid and Electrolyte Imbalance (Acute Kidney Injury/Impairment)  Goal: Fluid and Electrolyte Balance  Outcome: Ongoing, Progressing     Problem: Oral Intake Inadequate (Acute Kidney Injury/Impairment)  Goal: Optimal Nutrition Intake  Outcome: Ongoing, Progressing     Problem: Renal Function Impairment (Acute Kidney Injury/Impairment)  Goal: Effective Renal Function  Outcome: Ongoing, Progressing     Problem: Infection  Goal: Absence of Infection Signs and Symptoms  Outcome: Ongoing, Progressing     Problem: Impaired Wound Healing  Goal: Optimal Wound Healing  Outcome: Ongoing, Progressing     Problem: Skin Injury Risk Increased  Goal: Skin Health and Integrity  Outcome: Ongoing, Progressing     Problem: Fall Injury Risk  Goal: Absence of Fall and Fall-Related Injury  Outcome: Ongoing, Progressing

## 2024-01-10 NOTE — PROGRESS NOTES
[facility] - Telemetry [facility]  Progress Note    Patient Name: Buck Ibarra  MRN: [id number]  Patient Class: IP- Inpatient   Admission Date: 1/8/2024  Length of Stay: 1 days  Attending Physician: Cameron Stoddard, *  Primary Care Provider: Ericka Cortez MD        Subjective:     Principal Problem:Acute encephalopathy        HPI:  78 year old female with CVA with right hemiparesis, T2DM, HTN, GERD, hepatitis C s/p tx, CKD3a, multinodular goiter, HLD, PAD presenting with altered mental status. Unable to obtain history from Pt 2/2 altered mentation. Pt had a recent admission end of November 2023 for a L MCA stroke - presented after 3 days of right-sided weakness and dysarthria, found to have a stroke of the L internal capsule. Stroke etiology thought to be small vessel disease; discharged to rehab on DAPTx30 days followed by ASA monotherapy and statin. Phone call to daughter Olga - reports she brought her mother to hospital due to altered mentation. Noted she has been less responsive over the past 1 day. OT was at their house and also noted she was more lethargic. Also noted difficulty swallowing over the past 1 day, spitting out her medications/gagging. Hasn't noticed any coughing/spluttering after eating but also noticed reduced appetite. States her mother has been dysarthric since her stroke in November; and has residual right sided deficits. Denies fevers or cough. No vomiting but has had watery diarrhea for about 2 days. Denies recent antibiotic use. Daughter reports trouble getting her mother's insulin lately, thought her altered mentation could be blood glucose related. BGL ~400 when checked by EMS.     On arrival to ED, afebrile, HR ~ 80, /78, saturating 96% on RA. Hb 11.7 (baseline ~11-12), WBC 13, Plt 329. Na 143, K 3.4, Cl 108, bicarb 24, BUN 28, sCr 1.6 (baseline ~1.0), . Ammonia normal. Tylenol level negative, ethanol negative, UDS negative. Beta-hydroxy 0.9, pH normal.  UA concerning for infective. EKG with NSR and PVCs. CXR without consoldiation. CT head showing remote infarcts. MRI brain showing subacute infarcts in L corona radiata and left thalamus. In ED, started on insulin gtt. Admit to hospital medicine for further evaluation and management.     Overview/Hospital Course:  No notes on file    Interval History: NAEON. Patient remains lethargic and disoriented. This afternoon, patient with evidence of DKA with elevated BHB, anion gap, metabolic acidosis, and elevated glucose to 300+. DKA pathway started with insulin, potassium, fluids. Right foot appeared mottled, cold and pulseless; consulted Vascular Surgery and ordered MRI right foot W WO contrast.     Review of Systems  Objective:     Vital Signs (Most Recent):  Temp: 98.7 °F (37.1 °C) (01/10/24 1629)  Pulse: 66 (01/10/24 1629)  Resp: 20 (01/10/24 1629)  BP: (!) 101/51 (01/10/24 1629)  SpO2: (!) 92 % (01/10/24 1629) Vital Signs (24h Range):  Temp:  [97.9 °F (36.6 °C)-98.7 °F (37.1 °C)] 98.7 °F (37.1 °C)  Pulse:  [] 66  Resp:  [18-20] 20  SpO2:  [92 %-100 %] 92 %  BP: (101-194)/(51-87) 101/51     Weight: 84.2 kg (185 lb 10 oz)  Body mass index is 29.07 kg/m².    Intake/Output Summary (Last 24 hours) at 1/10/2024 1718  Last data filed at 1/10/2024 1629  Gross per 24 hour   Intake 300 ml   Output 1250 ml   Net -950 ml         Physical Exam  Constitutional:       Appearance: She is ill-appearing.   HENT:      Head: Normocephalic and atraumatic.      Mouth/Throat:      Mouth: Mucous membranes are dry.      Pharynx: Oropharynx is clear.   Eyes:      Extraocular Movements: Extraocular movements intact.      Conjunctiva/sclera: Conjunctivae normal.      Pupils: Pupils are equal, round, and reactive to light.   Cardiovascular:      Rate and Rhythm: Normal rate and regular rhythm.      Pulses: Normal pulses.      Heart sounds: Normal heart sounds.   Pulmonary:      Effort: Pulmonary effort is normal. No respiratory distress.       Breath sounds: Normal breath sounds. No wheezing.   Abdominal:      General: Abdomen is flat. There is no distension.      Palpations: Abdomen is soft.      Tenderness: There is no abdominal tenderness.   Musculoskeletal:         General: Tenderness present.   Skin:     General: Skin is warm and dry.      Comments: Mottled appearance to right foot, cold to the touch and non-palpable pulses   Neurological:      Mental Status: She is disoriented.             Significant Labs: All pertinent labs within the past 24 hours have been reviewed.    Significant Imaging: I have reviewed all pertinent imaging results/findings within the past 24 hours.    Assessment/Plan:      * Acute encephalopathy  Presenting with increased lethargy over the past 1 day  Recent stroke in late November 2023 which has caused residual right sided deficits and dysarthria  Daughter reports increased lethargy over the past 1 day; Pt has also had watery diarrhea for about 2 days with reduced PO intake    Mild leukocytosis  Ammonia WNL, tylenol, ethanol, UDS negative  UA concerning for infection, UCx positive for GNR's; BCx pending  CXR without obvious consolidation, no O2 requirement  CT head and MRI brain without acute infarcts, both show remote infarcts     DDx: UTI vs bacteremia vs aspiration PNA vs diarrheal illness vs seizures vs related to hyperglycemia     Plan:  - started Zosyn; no respiratory symptoms or focal consolidation on CXR so did not cover for aspiration PNA  - f/u Bcx, Ucx with GNR's  - Brain imaging showing remote infarcts - likely recrudescence of stroke symptoms in setting of likely infection  - EEG  - Right foot with mottled appearance and cold to touch; MRI right foot to assess for Osteo  - CRP/ESR  - check TSH, B12, folate   - stool culture and c diff   - q4h neurochecks  - delirium precautions  - PT/OT evaluation, anticipate may need acute care placement    Diabetic ketoacidosis  Patient's FSGs are uncontrolled due to  hyperglycemia on current medication regimen.  Last A1c reviewed-   Lab Results   Component Value Date    HGBA1C 8.8 (H) 01/09/2024     Most recent fingerstick glucose reviewed-   Recent Labs   Lab 01/10/24  0034 01/10/24  0734 01/10/24  1127 01/10/24  1626   POCTGLUCOSE 287* 245* 332* 308*     Current correctional scale  Low  Maintain anti-hyperglycemic dose as follows-   Antihyperglycemics (From admission, onward)      Start     Stop Route Frequency Ordered    01/10/24 1715  insulin regular in 0.9 % NaCl 100 unit/100 mL (1 unit/mL) infusion        Question Answer Comment   Initial dose (DO NOT CHANGE): 0.1 units/kg/hr    Insulin Rate Adjustment (DO NOT MODIFY ANSWER) \\[email or web address]\[email or web address]\Images\Pharmacy\InsulinInfusions\INSULIN ADJUSTMENT DKA version [id number]        -- IV Continuous 01/10/24 1700          Hold Oral hypoglycemics while patient is in the hospital.    Hypernatremia  Patient has hypernatremia which is controlled. The hypernatremia is due to Dehydration. We will aim to correct the sodium by 8-10mEq in 24 hours. The patient's sodium results have been reviewed and are listed below.  Recent Labs   Lab 01/10/24  1131          ELIZABETH (acute kidney injury)  Baseline sCr ~ 1.0  sCr on admission 1.5  UA concerning for infection  ELIZABETH could be 2/2 infection vs poor PO intake     Plan:  - urine lytes  - US retroperitoneal  - strict input/output  - renally dose adjust medications  - hold home ARB in setting of ELIZABETH    Hemiparesis, right  See CVA    History of CVA (cerebrovascular accident)  Recent admission end of November 2023 for L MCA; presented with dysarthria and right sided deficits  Discharged to rehab and then went home with her daughter  Daughter reports she's been dysarthric since her stroke and that her speech hasn't improved, has continued to be slurred; also has residual right sided deficits worse in her right leg  CT head showing remote infarcts, MRI brain also showing subacute infarcts, nothing  acute   ED spoke with vascular neurology who reviewed imaging, agreed no acute infarcts; recrudescence of stroke symptoms likely in setting of infection  Finished DAPT course end of December - continue aspirin monotherapy and statin  NPO until SLP evaluation of swallow  PT/OT evaluation    Stage 3a chronic kidney disease  See ELIZABETH    Multinodular goiter  Not on any thyroid medication  Check TSH/T4    Hyperlipidemia  Continue statin    History of hepatitis C, SVR as of 8-2016   S/p tx, complete 12 weeks Harvoni    Gastroesophageal reflux disease with esophagitis  Continue pantoprazole    Type 2 diabetes mellitus with hyperglycemia, with long-term current use of insulin  Poorly controlled  Last HbA1c 9.6 November 2023  Takes long and short acting insulin and ozempic at home  Initial BGL on presentation 311, then increased to 456  On admission, Beta hydroxy elevated but no AG, normal bicarb, and normal pH  Hypokalemic but potassium replaced  Started on insulin gtt by ED  Q1h BGL checks  BMP q4h to monitor potassium    Essential hypertension  Takes nifedipine, candesartan, and coreg at home  Hypertensive on presentation  Continue nifedipine and coreg  Hold ARB in setting of ELIZABETH  Monitor BP      VTE Risk Mitigation (From admission, onward)           Ordered     heparin (porcine) injection 5,000 Units  Every 8 hours         01/09/24 0128     IP VTE HIGH RISK PATIENT  Once         01/09/24 0128     Place sequential compression device  Until discontinued         01/09/24 0111                    Discharge Planning   AMADO: 1/12/2024     Code Status: Full Code   Is the patient medically ready for discharge?:     Reason for patient still in hospital (select all that apply): Patient unstable, Patient new problem, Patient trending condition, Treatment, and Imaging  Discharge Plan A: Home Health                  Dwayne Ramos DO  Department of Hospital Medicine   [facility] - Telemetry Stepdown

## 2024-01-10 NOTE — ASSESSMENT & PLAN NOTE
Presenting with increased lethargy over the past 1 day  Recent stroke in late November 2023 which has caused residual right sided deficits and dysarthria  Daughter reports increased lethargy over the past 1 day; Pt has also had watery diarrhea for about 2 days with reduced PO intake    Mild leukocytosis  Ammonia WNL, tylenol, ethanol, UDS negative  UA concerning for infection, UCx positive for GNR's; BCx pending  CXR without obvious consolidation, no O2 requirement  CT head and MRI brain without acute infarcts, both show remote infarcts     DDx: UTI vs bacteremia vs aspiration PNA vs diarrheal illness vs seizures vs related to hyperglycemia     Plan:  - started Zosyn; no respiratory symptoms or focal consolidation on CXR so did not cover for aspiration PNA  - f/u Bcx, Ucx with GNR's  - Brain imaging showing remote infarcts - likely recrudescence of stroke symptoms in setting of likely infection  - EEG  - Right foot with mottled appearance and cold to touch; MRI right foot to assess for Osteo  - CRP/ESR  - check TSH, B12, folate   - stool culture and c diff   - q4h neurochecks  - delirium precautions  - PT/OT evaluation, anticipate may need acute care placement

## 2024-01-10 NOTE — PLAN OF CARE
[facility] - Telemetry Stepdown  Initial Discharge Assessment       Primary Care Provider: Ericka Cortez MD    Admission Diagnosis: Tachycardia [R00.0]  Chest pain [R07.9]  Altered mental status, unspecified altered mental status type [R41.82]  Acute encephalopathy [G93.40]    Admission Date: 1/8/2024  Expected Discharge Date: 1/12/2024    Transition of Care Barriers: None    Payor: [employer] [facility] / Plan: [employer] / Product Type: Medicare Advantage /     Extended Emergency Contact Information  Primary Emergency Contact: Olga Ibarra   [place]  Mobile Phone: [phone]  Relation: Daughter    Discharge Plan A: Home Health  Discharge Plan B: Home with family      [facility]  [address]  [facility] [address]  Phone: [phone] Fax: [phone]    [facility] - [place], [place] - [address]  [address]  [address]  [place] [address]  Phone: [phone] Fax: [phone]      Initial Assessment (most recent)       Adult Discharge Assessment - 01/10/24 1543          Discharge Assessment    Assessment Type Discharge Planning Assessment     Confirmed/corrected address, phone number and insurance Yes     Confirmed Demographics Correct on Facesheet     Source of Information family     If unable to respond/provide information was family/caregiver contacted? Yes     Contact Name/Number Daughter: Yumi Ibarra [phone]     Communicated AMADO with patient/caregiver Date not available/Unable to determine     Reason For Admission Acute encephalopathy     People in Home child(annabelle), adult;grandchild(annabelle)     Do you expect to return to your current living situation? Yes     Do you have help at home or someone to help you manage your care at home? Yes     Who are your caregiver(s) and their phone number(s)? Daughter: Yumi Ibarra [phone]     Prior to hospitilization cognitive status: Unable to Assess     Current cognitive status: Unable to Assess      Walking or Climbing Stairs Difficulty yes     Walking or Climbing Stairs ambulation difficulty, requires equipment     Mobility Management RW     Home Accessibility wheelchair accessible     Equipment Currently Used at Home wheelchair;shower chair;walker, rolling;lift device;hospital bed;slide board     Patient currently being followed by outpatient case management? No     Do you currently have service(s) that help you manage your care at home? Yes     Name and Contact number of agency [facility]  [phone]     Is the pt/caregiver preference to resume services with current agency Yes     Do you take prescription medications? Yes     Do you have prescription coverage? Yes     Coverage PHN & Medicaid     Do you have any problems affording any of your prescribed medications? No     Is the patient taking medications as prescribed? yes     Who is going to help you get home at discharge? Daughter: Yumi Ibarra [phone]     How do you get to doctors appointments? family or friend will provide     Are you on dialysis? No     Do you take coumadin? No     Discharge Plan A Home Health     Discharge Plan B Home with family     DME Needed Upon Discharge  other (see comments)   tbd    Discharge Plan discussed with: Adult children   Daughter    Transition of Care Barriers None                   SW met with the patient's daughter at the bedside and discussed the discharge plan. Patient was lying in bed in and out of sleep.  Patient's daughter  verified her name , , PCP, Insurance and Pharmacy . Stated she lives with her and has 1 steps to point of entry . Prior to admission patient was completely dependent with all ADLS and [facility] . She is not on coumadin nor is she a dialysis patient . DME's include:hospital bed, lift device, shower chair, sliding board, RW, and wheelchair.  She takes  medication as prescribed and has no problems getting  medication. Family will provide  transportation at [place].      KLEBER Golden  Department of Case Management   [facility]  [address] [place] [address]  Phone : [phone]      Discharge Plan A and Plan B have been determined by review of patient's clinical status, future medical and therapeutic needs, and coverage/benefits for post-acute care in coordination with multidisciplinary team members.               Render Post-Care Instructions In Note?: yes Number Of Freeze-Thaw Cycles: 2 freeze-thaw cycles Post-Care Instructions: I reviewed with the patient in detail post-care instructions. Patient is to wear sunprotection, and avoid picking at any of the treated lesions. Pt may apply Vaseline to crusted or scabbing areas. Consent: The patient's consent was obtained including but not limited to risks of crusting, scabbing, blistering, scarring, darker or lighter pigmentary change, recurrence, incomplete removal and infection. Detail Level: Zone Render Note In Bullet Format When Appropriate: No Duration Of Freeze Thaw-Cycle (Seconds): 7

## 2024-01-10 NOTE — SUBJECTIVE & OBJECTIVE
Interval History: NAEON. Patient remains lethargic and disoriented. This afternoon, patient with evidence of DKA with elevated BHB, anion gap, metabolic acidosis, and elevated glucose to 300+. DKA pathway started with insulin, potassium, fluids. Right foot appeared mottled, cold and pulseless; consulted Vascular Surgery and ordered MRI right foot W WO contrast.     Review of Systems  Objective:     Vital Signs (Most Recent):  Temp: 98.7 °F (37.1 °C) (01/10/24 1629)  Pulse: 66 (01/10/24 1629)  Resp: 20 (01/10/24 1629)  BP: (!) 101/51 (01/10/24 1629)  SpO2: (!) 92 % (01/10/24 1629) Vital Signs (24h Range):  Temp:  [97.9 °F (36.6 °C)-98.7 °F (37.1 °C)] 98.7 °F (37.1 °C)  Pulse:  [] 66  Resp:  [18-20] 20  SpO2:  [92 %-100 %] 92 %  BP: (101-194)/(51-87) 101/51     Weight: 84.2 kg (185 lb 10 oz)  Body mass index is 29.07 kg/m².    Intake/Output Summary (Last 24 hours) at 1/10/2024 1718  Last data filed at 1/10/2024 1629  Gross per 24 hour   Intake 300 ml   Output 1250 ml   Net -950 ml         Physical Exam  Constitutional:       Appearance: She is ill-appearing.   HENT:      Head: Normocephalic and atraumatic.      Mouth/Throat:      Mouth: Mucous membranes are dry.      Pharynx: Oropharynx is clear.   Eyes:      Extraocular Movements: Extraocular movements intact.      Conjunctiva/sclera: Conjunctivae normal.      Pupils: Pupils are equal, round, and reactive to light.   Cardiovascular:      Rate and Rhythm: Normal rate and regular rhythm.      Pulses: Normal pulses.      Heart sounds: Normal heart sounds.   Pulmonary:      Effort: Pulmonary effort is normal. No respiratory distress.      Breath sounds: Normal breath sounds. No wheezing.   Abdominal:      General: Abdomen is flat. There is no distension.      Palpations: Abdomen is soft.      Tenderness: There is no abdominal tenderness.   Musculoskeletal:         General: Tenderness present.   Skin:     General: Skin is warm and dry.      Comments: Mottled  appearance to right foot, cold to the touch and non-palpable pulses   Neurological:      Mental Status: She is disoriented.             Significant Labs: All pertinent labs within the past 24 hours have been reviewed.    Significant Imaging: I have reviewed all pertinent imaging results/findings within the past 24 hours.

## 2024-01-10 NOTE — ASSESSMENT & PLAN NOTE
Baseline sCr ~ 1.0  sCr on admission 1.5  UA concerning for infection  ELIZABETH could be 2/2 infection vs poor PO intake     Plan:  - urine lytes  - US retroperitoneal  - strict input/output  - renally dose adjust medications  - hold home ARB in setting of ELIZABETH

## 2024-01-10 NOTE — PT/OT/SLP PROGRESS
Speech Language Pathology Treatment    Patient Name:  Buck Ibarra   MRN:  [id number]  Admitting Diagnosis: Acute encephalopathy    Recommendations:                 General Recommendations:  Dysphagia therapy  Diet recommendations:  Minced & Moist Diet - IDDSI Level 5, Liquid Diet Level: Thin liquids - IDDSI Level 0   Aspiration Precautions: 1 bite/sip at a time, Assistance with meals, Eliminate distractions, Feed only when awake/alert, Frequent oral care, HOB to 90 degrees, Meds crushed in puree, Small bites/sips, and Strict aspiration precautions   General Precautions: Standard, fall, aspiration  Communication strategies:  none    Assessment:     Buck Ibarra is a 78 y.o. female with an SLP diagnosis of Dysphagia.  She presents with poor alertness/poor po acceptance.    Subjective     SLP communicated with RN prior to entry and received clearance for therapy this date.   Pt asleep upon SLP entry.     Pain/Comfort:  Pain Rating 1: 0/10    Respiratory Status: Room air    Objective:     Has the patient been evaluated by SLP for swallowing?   Yes  Keep patient NPO? No     Pt was lethargic throughout session this date. She required ongoing tactile/verbal stim to maintain appropriate SHIVAM. Pt was minimally conversant and provided limited verbal responses across prompts. Her vocal quality was clear with reduced intensity. Poor po acceptance demonstrated across trial attempts. Pt notably turning her head away from most trials and requiring max encouragement. She finally accepted x3 straw sips water and x1/2 tsp pudding. No overt s/s airway threat noted. A-P transit and swallow initiation appeared timely. Pt presents with significantly impacted endurance and likely will not maintain nutrition/hydration by po intake alone while in her current condition. SLP rec: maintain minced and moist diet with thin liquids provided 1:1A and following strict aspiration precautions. Continue ST per POC.    Goals:    Multidisciplinary Problems       SLP Goals          Problem: SLP    Goal Priority Disciplines Outcome   SLP Goal     SLP Ongoing, Progressing   Description: Speech Language Pathology Goals  Goals expected to be met by 1/23    1. Pt will tolerate least restrictive PO diet without any overt s/sx of airway compromise.   2. Pt will complete cognitive-linguistic assessment to determine additional therapeutic needs.                                  Plan:     Patient to be seen:  4 x/week   Plan of Care expires:  02/08/24  Plan of Care reviewed with:  patient   SLP Follow-Up:  Yes       Discharge recommendations:  Low Intensity Therapy   Barriers to Discharge:  None per SLP    Time Tracking:     SLP Treatment Date:   01/10/24  Speech Start Time:  0800  Speech Stop Time:  0813     Speech Total Time (min):  13 min    Billable Minutes: Treatment Swallowing Dysfunction 5 and Self Care/Home Management Training 8    01/10/2024

## 2024-01-10 NOTE — ASSESSMENT & PLAN NOTE
Patient's FSGs are uncontrolled due to hyperglycemia on current medication regimen.  Last A1c reviewed-   Lab Results   Component Value Date    HGBA1C 8.8 (H) 01/09/2024     Most recent fingerstick glucose reviewed-   Recent Labs   Lab 01/10/24  0034 01/10/24  0734 01/10/24  1127 01/10/24  1626   POCTGLUCOSE 287* 245* 332* 308*     Current correctional scale  Low  Maintain anti-hyperglycemic dose as follows-   Antihyperglycemics (From admission, onward)      Start     Stop Route Frequency Ordered    01/10/24 1715  insulin regular in 0.9 % NaCl 100 unit/100 mL (1 unit/mL) infusion        Question Answer Comment   Initial dose (DO NOT CHANGE): 0.1 units/kg/hr    Insulin Rate Adjustment (DO NOT MODIFY ANSWER) [email or web address]\Images\Pharmacy\InsulinInfusions\INSULIN ADJUSTMENT DKA version [id number]        -- IV Continuous 01/10/24 1700          Hold Oral hypoglycemics while patient is in the hospital.

## 2024-01-10 NOTE — PLAN OF CARE
Problem: Adult Inpatient Plan of Care  Goal: Plan of Care Review  Outcome: Ongoing, Progressing  Goal: Patient-Specific Goal (Individualized)  Outcome: Ongoing, Progressing  Goal: Absence of Hospital-Acquired Illness or Injury  Outcome: Ongoing, Progressing  Goal: Optimal Comfort and Wellbeing  Outcome: Ongoing, Progressing     No acute distress noted at this time. Safety measures maintained.Call light within reach. Will continue to monitor.

## 2024-01-11 PROBLEM — I70.269 ATHEROSCLEROTIC PERIPHERAL VASCULAR DISEASE WITH GANGRENE: Status: ACTIVE | Noted: 2021-03-04

## 2024-01-11 NOTE — CONSULTS
Nutrition consult received regarding Diabetic diet education (A1C 8.8).  Pt not appropriate for diabetic diet education 2/2 disorientation.   Of note, pt educated x 2 last year, most recently 11/27 & verbalized understanding.     Pt currently NPO; SLP rec'd Minced & Moist diet (level 5) yesterday.    RD to attach additional education material to pt's discharge paperwork.    Thanks!  Patricia MS, RD, LDN

## 2024-01-11 NOTE — CARE UPDATE
US venous and arterial showed clots in both systems, more likely chronic than acute given collateral vessel development  Will start heparin gtt; noted to have a drop in Hb from 11 to 9; no obvious bleed, HDS with normal BP and HR  Pt has received IV fluids today so could be dilutional  Phone call to daughter Olga and explained US findings and plan for anticoagulation but noted Hb drop; explained risks and benefits of anticoagulation. Risk of treating blood clots with AC is worsening a potential bleed (if it exists) while not treating could result in worsening of her clots  Daughter would like us to proceed with anticoagulation, acknowledging that if her mother did have an occult bleed it could make it worse and make her unstable  Will also check hemolysis labs and repeat CBC in a few hours    Pallavi Mckeon MD  Internal Medicine PGY-3

## 2024-01-11 NOTE — PT/OT/SLP PROGRESS
Physical Therapy Treatment    Patient Name:  Buck Ibarra   MRN:  [id number]    Recommendations:     Discharge Recommendations: Low Intensity Therapy  Discharge Equipment Recommendations: glucometer  Barriers to discharge: Decreased caregiver support, Increased skilled assistance required, and Evolving medical status    Assessment:   Co-evaluation/treatment performed due to patient's multiple deficits requiring two skilled therapists to appropriately and safely assess patient's strength, endurance, functional mobility, and ADL performance while facilitating functional tasks in addition to accommodating for patient's activity tolerance and medical acuity.    Buck Ibarra is a 78 y.o. female admitted with a medical diagnosis of Acute encephalopathy.  She presents with the following impairments/functional limitations: impaired skin, weakness, impaired endurance, impaired sensation, impaired self care skills, impaired functional mobility, impaired balance, edema, pain, impaired cognition, decreased upper extremity function, decreased lower extremity function, impaired coordination Session emphasis placed on sitting balance and Head/Arms/Trunk control & neutral positioning. LE WB activities not attempted due to current status of RLE. Patient continues to demonstrate the need for moderate intensity therapy on a daily basis exhibited by decreased independence with self-care and functional mobility    Rehab Prognosis: Fair; patient would benefit from acute skilled PT services to address these deficits and reach maximum level of function.    Recent Surgery: Procedure(s) (LRB):  AMPUTATION, ABOVE KNEE (Right)      Plan:     During this hospitalization, patient to be seen 3 x/week to address the identified rehab impairments via gait training, therapeutic activities, therapeutic exercises, neuromuscular re-education and progress toward the following goals:    Plan of Care Expires:  02/09/24    Subjective     Chief  "Complaint: Pain behaviors  Patient/Family Comments/goals: None stated  Pain/Comfort:  Pain Rating 1: Pain behaviors present  Location - Side 1: Right  Location - Orientation 1: lower  Location 1: leg and foot  Pain Addressed 1: Reposition, Distraction  Pain Rating Post-Intervention 1: Unchanged      Objective:     Communicated with RN prior to session.  Patient found HOB elevated with PureWick, peripheral IV, pressure relief boots, telemetry upon PT entry to room.     General Precautions: Standard, fall, diabetic, aspiration   Orthopedic Precautions:N/A   Braces: N/A   Body mass index is 29.07 kg/m².  Oxygen Device: Room Air  Vitals: /60 (Patient Position: Lying)   Pulse 64   Temp 97.4 °F (36.3 °C) (Oral)   Resp 20   Ht 5' 7" (1.702 m)   Wt 84.2 kg (185 lb 10 oz)   SpO2 97%   BMI 29.07 kg/m²     Exams:  Posture:  Supine: Increased Cervical flexion, R lateral flexion, and L rotation  Sitting: slouched posture, rounded shoulders, increased kyphosis, and posterio-lateral lean to L     Functional Mobility:  Bed Mobility:     Rolling Left: x1, Total Assistancex2 with HOB Elevated. Pt demonstrating increased pain behaviors. Max VC/TC/Visual Cues for sequencing of task. Max facilitation of RUE cross-body reaching to target   EOB Scooting:   Anterior: Total Assistancex2  Boosting: Total Assistancex2 with HOB Flat  Supine>Sit (L): x1, Total Assistancex2 with HOB Elevated. Pt demonstrating increased pain behaviors. Max VC/TC/Visual Cues for sequencing of task.  Sit>Supine (L): x1, Total Assistancex2 with HOB Flat. Pt demonstrating increased pain behaviors. Max VC/TC/Visual Cues for sequencing of task.    Balance:   Static Sitting: Minimal Assistance - Stand-by Assistance  Dynamic Sitting:   Functional Reaching: Total Assistance at trunk *Provided to allow for isolation of UE of interest  Sit>Propped SL R: Minimal Assistance - Contact Guard Assistance    AM-PAC 6 CLICK MOBILITY  Turning over in bed (including " adjusting bedclothes, sheets and blankets)?: 2  Sitting down on and standing up from a chair with arms (e.g., wheelchair, bedside commode, etc.): 2  Moving from lying on back to sitting on the side of the bed?: 2  Moving to and from a bed to a chair (including a wheelchair)?: 1  Need to walk in hospital room?: 1  Climbing 3-5 steps with a railing?: 1  Basic Mobility Total Score: 9     Treatment & Education:  Patient participated in and completed activities to address patient's posturing, core strength/endurance/control and static/reactive/dynamic balance, such as:  Sitting EOB  Corrective WS & postural adjustment  Corrective Cervical PROM and Stretching for neutral/upright head position  Cervical PROM/AAROM for mobility  BUE multi-planar functional reaching (unilaterally & simultaneously)  sit<>propped SL R  Supine  BUE multi-planar functional reaching (unilaterally & simultaneously)  Utilized VC, TC, and Open & Closed Visual cues with variable and spontaneous follow through.     Patient Education Provided on:  The role of physical therapy and how the patient can benefit from skilled services  The importance of contacting RN, via call light, for mobility throughout the day  Pt white board updated with current therapists name and level of mobility assistance needed.     Patient Demonstrated and Granddaughter Verbalized and Demonstrated understanding of all topics touched on this date. All patient questions answered within the PT scope of practice    Patient left HOB elevated with all lines intact, call button in reach, RN notified, and Granddaughter present. Pillows and rolled towel positioned to promote neutral head positioning    GOALS:   Multidisciplinary Problems       Physical Therapy Goals          Problem: Physical Therapy    Goal Priority Disciplines Outcome Goal Variances Interventions   Physical Therapy Goal     PT, PT/OT Ongoing, Progressing     Description: Goals to be met by: 01/23/24     Patient will  increase functional independence with mobility by performin. Supine to sit with Moderate Assistance  2. Sit to supine with Moderate Assistance  3. Rolling to Right with Minimal Assistance.  4. Rolling to Left with Moderate Assistance.  5. Bed to chair transfer with Maximum Assistance with or without LRAD  6. Sitting at edge of bed 10 minutes with Stand-by Assistance  7. Lower extremity exercise program x20 reps per handout, with assistance as needed                         Time Tracking:     PT Received On: 24  PT Start Time: 1316     PT Stop Time: 1356  PT Total Time (min): 40 min     Billable Minutes: Neuromuscular Re-education 40    Treatment Type: Treatment  PT/PTA: PT     Number of PTA visits since last PT visit: 0     2024

## 2024-01-11 NOTE — PLAN OF CARE
Problem: Physical Therapy  Goal: Physical Therapy Goal  Description: Goals to be met by: 24     Patient will increase functional independence with mobility by performin. Supine to sit with Moderate Assistance  2. Sit to supine with Moderate Assistance  3. Rolling to Right with Minimal Assistance.  4. Rolling to Left with Moderate Assistance.  5. Bed to chair transfer with Maximum Assistance with or without LRAD  6. Sitting at edge of bed 10 minutes with Stand-by Assistance  7. Lower extremity exercise program x20 reps per handout, with assistance as needed    Outcome: Ongoing, Progressing

## 2024-01-11 NOTE — PROGRESS NOTES
Pharmacokinetic Initial Assessment: IV Vancomycin    Assessment/Plan:    Initiate intravenous vancomycin with loading dose of 1750 mg once with subsequent doses when random concentrations are less than 20 mcg/mL  Desired empiric serum trough concentration is 15 to 20 mcg/mL  Draw vancomycin random level on 01/11 at 04:30.  Patient on ELIZABETH.   Pharmacy will continue to follow and monitor vancomycin.      Please contact pharmacy at extension [phone] with any questions regarding this assessment.     Thank you for the consult,   Alecia Isbellte       Patient brief summary:  Buck Ibarra is a 78 y.o. female initiated on antimicrobial therapy with IV Vancomycin for treatment of suspected bone/joint infection    Drug Allergies:   Review of patient's allergies indicates:   Allergen Reactions    Amlodipine Swelling    Erythromycin Anaphylaxis    Erythropoietin analogues Anaphylaxis    Pegasys [peginterferon ruben-2a] Anaphylaxis    Lisinopril Hives       Actual Body Weight:   84.2 Kg.     Renal Function:   Estimated Creatinine Clearance: 41 mL/min (based on SCr of 1.3 mg/dL).,     Dialysis Method (if applicable):  N/A    CBC (last 72 hours):  Recent Labs   Lab Result Units 01/08/24  1628 01/09/24  0401 01/09/24  0548 01/10/24  0533   WBC K/uL 13.00*  --  15.22* 16.12*   Hemoglobin g/dL 11.7*  --  11.1* 11.0*   Hemoglobin A1C %  --  8.8*  --   --    Hematocrit % 37.3  --  35.8* 34.9*   Platelets K/uL 329  --  330 300   Gran % % 74.8*  --  67.5 75.1*   Lymph % % 16.2*  --  19.8 15.1*   Mono % % 7.7  --  11.1 8.2   Eosinophil % % 0.2  --  0.2 0.5   Basophil % % 0.2  --  0.2 0.2   Differential Method  Automated  --  Automated Automated       Metabolic Panel (last 72 hours):  Recent Labs   Lab Result Units 01/08/24  1628 01/08/24  2006 01/08/24  2036 01/09/24  0548 01/09/24  0941 01/09/24  1539 01/09/24  1944 01/10/24  0533 01/10/24  0753 01/10/24  1131 01/10/24  1548   Sodium mmol/L 146* 143  --  145  145  142 145 141 142 140  " 140 136 140 141   Sodium, Urine mmol/L  --   --  50  --   --   --   --   --   --   --   --    Potassium mmol/L 3.0* 3.4*  --  2.7*  2.7*  2.7* 3.2* 4.3 3.6 3.1*  3.1* 3.3* 3.5 3.3*   Chloride mmol/L 112* 108  --  112*  112*  112* 113* 114* 113* 107  107 107 112* 111*   CO2 mmol/L 23 24  --  23  23  23 22* 16* 18* 21*  21* 19* 15* 19*   Glucose mg/dL 311* 456*  --  61*  61*  63* 148* 222* 296* 267*  267* 251* 302* 315*   Glucose, UA   --   --  Trace*  --   --   --   --   --   --   --   --    BUN mg/dL 30* 28*  --  23  23  23 22 21 20 13  13 12 14 17   Creatinine mg/dL 1.5* 1.6*  --  1.1  1.1  1.1 1.1 1.1 1.1 1.0  1.0 0.9 1.1 1.3   Creatinine, Urine mg/dL  --   --  90.0  --   --   --   --   --   --   --   --    Albumin g/dL 2.1*  --   --  2.0*  --   --   --  1.8*  --   --   --    Total Bilirubin mg/dL 0.5  --   --  0.4  --   --   --  0.3  --   --   --    Alkaline Phosphatase U/L 102  --   --  97  --   --   --  130  --   --   --    AST U/L 51*  --   --  58*  --   --   --  47*  --   --   --    ALT U/L 40  --   --  43  --   --   --  41  --   --   --    Magnesium mg/dL 2.0  --   --  1.7  --   --   --  1.7  --   --   --    Phosphorus mg/dL  --   --   --  1.0*  --   --   --  2.3*  --   --   --        Drug levels (last 3 results):  No results for input(s): "VANCOMYCINRA", "VANCORANDOM", "VANCOMYCINPE", "VANCOPEAK", "VANCOMYCINTR", "VANCOTROUGH" in the last 72 hours.    Microbiologic Results:  Microbiology Results (last 7 days)       Procedure Component Value Units Date/Time    Blood culture x two cultures. Draw prior to antibiotics. [[id number]] Collected: 01/08/24 1628    Order Status: Completed Specimen: Blood from Peripheral, Antecubital, Left Updated: 01/10/24 1812     Blood Culture, Routine No Growth to date      No Growth to date      No Growth to date    Narrative:      Aerobic and anaerobic    Blood culture x two cultures. Draw prior to antibiotics. [[id number]] Collected: 01/08/24 1624    Order " Status: Completed Specimen: Blood from Peripheral, Forearm, Left Updated: 01/10/24 1812     Blood Culture, Routine No Growth to date      No Growth to date      No Growth to date    Narrative:      Aerobic and anaerobic    Urine culture [[id number]]  (Abnormal) Collected: 01/08/24 2036    Order Status: Completed Specimen: Urine Updated: 01/10/24 0226     Urine Culture, Routine GRAM NEGATIVE YULIET  10,000 - 49,999 cfu/ml  No other significant isolate      Narrative:      ADD ON URINE SODIUM PER DR DEVEN CABRERA/ORDER# [id number] @ 1:58AM    Specimen Source->Urine    Clostridium difficile EIA [[id number]]     Order Status: Canceled Specimen: Stool     Stool culture [[id number]]     Order Status: No result Specimen: Stool     Influenza A & B by Molecular [[id number]] Collected: 01/08/24 1649    Order Status: Completed Specimen: Nasopharyngeal Swab Updated: 01/08/24 1823     Influenza A, Molecular Negative     Influenza B, Molecular Negative     Flu A & B Source Nasal swab

## 2024-01-11 NOTE — ASSESSMENT & PLAN NOTE
77 y/o female with a PMHx of  PAD, recurrent CVA's with right sided deficits, Hep C, T2DM, HTN presnted to the ED on 1/8 for AMS and was admitted to the floor. Patient was found to have laboratory findings with HHS, ELIZABETH and UTI. Family noted that the patient was starting to have right foot changes over the past few weeks and pulses were unable to be palpated. Vascular was consulted for concern over acute on chronic PAD of the right lower extremity.     -- Arterial ultrasound showed multiple occlusions  -- Venous ultra showed RLE DVT and incomplete compressibility of RFV and RSV  -- Patient not a candidate for revascularization due to poor performance status  -- Will discuss more with family regarding amputation

## 2024-01-11 NOTE — PROGRESS NOTES
[facility] - Telemetry Stepdown  Vascular Surgery  Progress Note    Patient Name: Buck Ibarra  MRN: [id number]  Admission Date: 1/8/2024  Primary Care Provider: Ericka Cortez MD    Subjective:     Interval History: AFVSS. Mentation slightly improved. Still only able to respond with yes or no intermittently and recoil from pain. Dry gangrenous wounds stable from exam yesterday.      Post-Op Info:  * No surgery found *         Medications:  Continuous Infusions:   dextrose 5 % and 0.45 % NaCl Stopped (01/11/24 0426)    dextrose 5 % and 0.45 % NaCl with KCl 20 mEq 100 mL/hr at 01/11/24 0426    heparin (porcine) in D5W 14 Units/kg/hr (01/11/24 0657)    insulin regular 1 units/mL infusion orderable (DKA) 0.02 Units/kg/hr (01/11/24 0625)     Scheduled Meds:   aspirin  81 mg Oral Daily    atorvastatin  80 mg Oral Daily    carvediloL  12.5 mg Oral BID    NIFEdipine  90 mg Oral Daily    pantoprazole  40 mg Oral Daily    piperacillin-tazobactam (Zosyn) IV (PEDS and ADULTS) (extended infusion is not appropriate)  4.5 g Intravenous Q8H     PRN Meds:acetaminophen, dextrose 10%, dextrose 10%, dextrose 10%, dextrose 10%, dextrose 5 % and 0.45 % NaCl, glucagon (human recombinant), glucose, glucose, heparin (PORCINE), heparin (PORCINE), hydrALAZINE, melatonin, naloxone, ondansetron, sodium chloride 0.9%, sodium chloride 0.9%, sodium chloride 0.9%, Pharmacy to dose Vancomycin consult **AND** vancomycin - pharmacy to dose     Objective:     Vital Signs (Most Recent):  Temp: 98.1 °F (36.7 °C) (01/11/24 0454)  Pulse: 67 (01/11/24 0454)  Resp: 17 (01/11/24 0454)  BP: 127/61 (01/11/24 0454)  SpO2: 98 % (01/11/24 0454) Vital Signs (24h Range):  Temp:  [97.9 °F (36.6 °C)-98.7 °F (37.1 °C)] 98.1 °F (36.7 °C)  Pulse:  [63-74] 67  Resp:  [17-20] 17  SpO2:  [92 %-100 %] 98 %  BP: (101-194)/(51-83) 127/61          Physical Exam  Constitutional:       Appearance: She is ill-appearing.   HENT:      Head: Normocephalic and atraumatic.    Cardiovascular:      Rate and Rhythm: Normal rate.      Pulses:           Femoral pulses are 2+ on the right side and 2+ on the left side.     Comments: R: Popliteal pulse biphasic on doppler, monophasic DP pulse, unable to doppler PT pulse    L: Palpable DP pulse, unable to doppler PT pulse.   Pulmonary:      Effort: No respiratory distress.   Neurological:      Mental Status: She is disoriented.      Motor: Weakness present.      Coordination: Coordination abnormal.                    Significant Labs:  All pertinent labs from the last 24 hours have been reviewed.    Significant Diagnostics:  I have reviewed and interpreted all pertinent imaging results/findings within the past 24 hours.  Assessment/Plan:     PAD (peripheral artery disease)  79 y/o female with a PMHx of  PAD, recurrent CVA's with right sided deficits, Hep C, T2DM, HTN presnted to the ED on 1/8 for AMS and was admitted to the floor. Patient was found to have laboratory findings with HHS, ELIZABETH and UTI. Family noted that the patient was starting to have right foot changes over the past few weeks and pulses were unable to be palpated. Vascular was consulted for concern over acute on chronic PAD of the right lower extremity.     -- Arterial ultrasound showed multiple occlusions  -- Venous ultra showed RLE DVT and incomplete compressibility of RFV and RSV  -- Patient not a candidate for revascularization due to poor performance status  -- Will discuss more with family regarding possible amputation         Evelio Salas, DO  Vascular Surgery  [facility] - Telemetry Stepdown

## 2024-01-11 NOTE — PLAN OF CARE
Pt AAOx1, c/o of pain on Right foot. R foot is black and necrotic on some toes, cool to touch, no pulses detected with doppler, MD aware. Heel boots in used. Insulin gtt @0.05 unit/kg/hr and Heparin @18 u/kg/hr infusing. VSS, no acute distress noted. MRI pending. POC on going.    Problem: Adult Inpatient Plan of Care  Goal: Plan of Care Review  Outcome: Ongoing, Progressing  Goal: Absence of Hospital-Acquired Illness or Injury  Outcome: Ongoing, Progressing  Goal: Optimal Comfort and Wellbeing  Outcome: Ongoing, Progressing  Goal: Readiness for Transition of Care  Outcome: Ongoing, Progressing     Problem: Diabetes Comorbidity  Goal: Blood Glucose Level Within Targeted Range  Outcome: Ongoing, Progressing     Problem: Infection  Goal: Absence of Infection Signs and Symptoms  Outcome: Ongoing, Progressing     Problem: Impaired Wound Healing  Goal: Optimal Wound Healing  Outcome: Ongoing, Progressing     Problem: Skin Injury Risk Increased  Goal: Skin Health and Integrity  Outcome: Ongoing, Progressing     Problem: Fall Injury Risk  Goal: Absence of Fall and Fall-Related Injury  Outcome: Ongoing, Progressing     Problem: Diabetic Ketoacidosis  Goal: Fluid and Electrolyte Balance with Absence of Ketosis  Outcome: Ongoing, Progressing

## 2024-01-11 NOTE — NURSING
Clarified with Dr Mckeon regarding D51/2NS with 20KCL to run @ 100ml/hr instead of 125ml/hr. Cont to monitor.

## 2024-01-11 NOTE — PT/OT/SLP PROGRESS
"Speech Language Pathology Treatment    Patient Name:  Buck Ibarra   MRN:  [id number]  Admitting Diagnosis: Acute encephalopathy    Recommendations:                 General Recommendations:  Dysphagia therapy  Diet recommendations:  Minced & Moist Diet - IDDSI Level 5, Liquid Diet Level: Thin liquids - IDDSI Level 0   Aspiration Precautions: Standard aspiration precautions   General Precautions: Standard, fall, aspiration  Communication strategies:  none    Assessment:     Buck Ibarra is a 78 y.o. female with an SLP diagnosis of Dysphagia.  She presents with poor po acceptance.    Subjective     "I don't want you to keep trying t shove that sh** down my throat"  Patient goals: did not state     Pain/Comfort:  Pain Rating 1: 0/10  Pain Rating Post-Intervention 1: 0/10    Respiratory Status: Room air    Objective:     Has the patient been evaluated by SLP for swallowing?   Yes  Keep patient NPO? No   Current Respiratory Status:        Nursing cleared pt for session. She reported pt has been npo for IV drip but cleared pt for po trials. Pt agreed to drink sips of water from a straw. No difficulty noted. However, when pt offered pudding she refused all trails. Family reported pt with poor appetite and refusing most attempts. Does not appear to have any pharyngeal dysphagia based on limited trials. May want to consider adding a supplemental shake to increase caloric intake. Also considered offering small mini meals during the day. Family expressed understanding. Pt will need ongoing education.     Goals:   Multidisciplinary Problems       SLP Goals          Problem: SLP    Goal Priority Disciplines Outcome   SLP Goal     SLP Ongoing, Progressing   Description: Speech Language Pathology Goals  Goals expected to be met by 1/23    1. Pt will tolerate least restrictive PO diet without any overt s/sx of airway compromise.   2. Pt will complete cognitive-linguistic assessment to determine additional therapeutic " needs.                                  Plan:     Patient to be seen:  4 x/week   Plan of Care expires:  02/08/24  Plan of Care reviewed with:  patient, family   SLP Follow-Up:  Yes       Discharge recommendations:  Low Intensity Therapy     Time Tracking:     SLP Treatment Date:   01/11/24  Speech Start Time:  1031  Speech Stop Time:  1047     Speech Total Time (min):  16 min    Billable Minutes: Treatment Swallowing Dysfunction 8 and Self Care/Home Management Training 8    01/11/2024

## 2024-01-11 NOTE — SUBJECTIVE & OBJECTIVE
Medications:  Continuous Infusions:   dextrose 5 % and 0.45 % NaCl Stopped (01/11/24 0426)    dextrose 5 % and 0.45 % NaCl with KCl 20 mEq 100 mL/hr at 01/11/24 0426    heparin (porcine) in D5W 14 Units/kg/hr (01/11/24 0657)    insulin regular 1 units/mL infusion orderable (DKA) 0.02 Units/kg/hr (01/11/24 0625)     Scheduled Meds:   aspirin  81 mg Oral Daily    atorvastatin  80 mg Oral Daily    carvediloL  12.5 mg Oral BID    NIFEdipine  90 mg Oral Daily    pantoprazole  40 mg Oral Daily    piperacillin-tazobactam (Zosyn) IV (PEDS and ADULTS) (extended infusion is not appropriate)  4.5 g Intravenous Q8H     PRN Meds:acetaminophen, dextrose 10%, dextrose 10%, dextrose 10%, dextrose 10%, dextrose 5 % and 0.45 % NaCl, glucagon (human recombinant), glucose, glucose, heparin (PORCINE), heparin (PORCINE), hydrALAZINE, melatonin, naloxone, ondansetron, sodium chloride 0.9%, sodium chloride 0.9%, sodium chloride 0.9%, Pharmacy to dose Vancomycin consult **AND** vancomycin - pharmacy to dose     Objective:     Vital Signs (Most Recent):  Temp: 98.1 °F (36.7 °C) (01/11/24 0454)  Pulse: 67 (01/11/24 0454)  Resp: 17 (01/11/24 0454)  BP: 127/61 (01/11/24 0454)  SpO2: 98 % (01/11/24 0454) Vital Signs (24h Range):  Temp:  [97.9 °F (36.6 °C)-98.7 °F (37.1 °C)] 98.1 °F (36.7 °C)  Pulse:  [63-74] 67  Resp:  [17-20] 17  SpO2:  [92 %-100 %] 98 %  BP: (101-194)/(51-83) 127/61          Physical Exam  Constitutional:       Appearance: She is ill-appearing.   HENT:      Head: Normocephalic and atraumatic.   Cardiovascular:      Rate and Rhythm: Normal rate.      Pulses:           Femoral pulses are 2+ on the right side and 2+ on the left side.     Comments: R: Popliteal pulse biphasic on doppler, monophasic DP pulse, unable to doppler PT pulse    L: Palpable DP pulse, unable to doppler PT pulse.   Pulmonary:      Effort: No respiratory distress.   Neurological:      Mental Status: She is disoriented.      Motor: Weakness present.       Coordination: Coordination abnormal.                    Significant Labs:  All pertinent labs from the last 24 hours have been reviewed.    Significant Diagnostics:  I have reviewed and interpreted all pertinent imaging results/findings within the past 24 hours.

## 2024-01-11 NOTE — PT/OT/SLP PROGRESS
Occupational Therapy  Co- Treatment    Name: Buck Ibarra  MRN: [id number]  Admitting Diagnosis:  Acute encephalopathy     Co-treatment performed due to patient's multiple deficits requiring two skilled therapists to appropriately and safely assess patient's strength and endurance while facilitating functional tasks in addition to accommodating for patient's activity tolerance.     Recommendations:     Discharge Recommendations: Low Intensity Therapy  Discharge Equipment Recommendations:  glucometer  Barriers to discharge:  Other (Comment) (requires significant assist)    Assessment:     Buck Ibarra is a 78 y.o. female with a medical diagnosis of Acute encephalopathy.  She presents with deficits in mobility, balance, significant pain in RLE as well as impaired skin. Pt. Participated as able on this date but required significant assist for all mobility. Pt. With left lateral posterior lean noted in sit EOB as well as cervical rotation to the left and forward head. Patient would benefit from continued OT services to maximize level of safety and independence with self-care tasks.   . Performance deficits affecting function are weakness, impaired endurance, impaired self care skills, impaired functional mobility, impaired balance, decreased coordination, decreased upper extremity function, decreased lower extremity function, decreased ROM, pain, edema.     Rehab Prognosis:  Good; patient would benefit from acute skilled OT services to address these deficits and reach maximum level of function.       Plan:     Patient to be seen 3 x/week to address the above listed problems via self-care/home management, therapeutic activities, therapeutic exercises, neuromuscular re-education  Plan of Care Expires: 02/09/24  Plan of Care Reviewed with: patient, daughter    Subjective     Chief Complaint: Pt. With pain in right foot/lower leg to any touch/handling  Patient/Family Comments/goals: to get better    Pain/Comfort:  Pain Rating 1:  (did not rate but appeared significant with activity)  Location - Side 1: Right  Location 1: foot  Pain Addressed 1: Reposition, Distraction  Pain Rating Post-Intervention 1:  (remained painful but more so with activity)    Objective:     Communicated with: nurse prior to session.  Patient found supine with PureWick, peripheral IV, pressure relief boots, telemetry upon OT entry to room.  Granddaughter present    General Precautions: Standard, fall, aspiration    Orthopedic Precautions:N/A  Braces: N/A  Respiratory Status: Room air     Occupational Performance:     Bed Mobility:    Patient completed Rolling/Turning to Left with  total assistance and 2 persons  Patient completed Supine to Sit with total assistance and 2 persons  Patient completed Sit to Supine with total assistance and 2 persons     Functional Mobility/Transfers:  Drawsheet x 2 to HOB    Activities of Daily Living:  Grooming: maximal assistance to wash face with use of RUE seated EOB and Total A for postural control       [facility] 6 Click ADL: 10    Treatment & Education:  Pt. Engaged in activities to assist with improving functional use of RUE and postural control when seated EOB. Pt. Performed WB into RUE on propped forearm and was able to return to midline with Min A on 1st trial and CGA on second trial.   Pt. Attempted to reach for Kleenex from box positioned on pt. Right side of bed by leaning to the right and advancing RUE on bed in direction of box with min /Mod A for task completion  Pt. Tolerated gentle AAROM there ex to RUE for shoulder flexion and abduction  Pt. Tolerated gentle cervical stretches for rotation and left lateral flexion  Pt. Was able to turn head to the right to focus on granddaughter x 5 trials when supine in bed and encouraged to perform this task throughout the day to improve head positioning/control  Pt. Attempted to grasp small towel/kleenex in right hand to grasp and throw into garbage can  placed in front of patient and difficulty noted with forward motion of RUE to complete task against gravity    Patient left supine with all lines intact, call button in reach, nurse notified, family present, and RUE elevated on 2 pillows and towel and pillow placed to assist with maintaining midline orientation of head.    GOALS:   Multidisciplinary Problems       Occupational Therapy Goals          Problem: Occupational Therapy    Goal Priority Disciplines Outcome Interventions   Occupational Therapy Goal     OT, PT/OT Ongoing, Progressing    Description: Goals to be met by: 1/30/2024    Patient will increase functional independence with ADLs by performing:    UE Dressing with Stand-by Assistance.  Grooming while seated with Stand-by Assistance.  Sitting at edge of bed x10 minutes with Supervision.  Supine to sit with Contact Guard Assistance.  Upper extremity exercise program 2x10 reps, with supervision.                         Time Tracking:     OT Date of Treatment: 01/11/24  OT Start Time: 1316  OT Stop Time: 1356  OT Total Time (min): 40 min    Billable Minutes:Self Care/Home Management 10  Therapeutic Activity 20  Therapeutic Exercise 10    OT/PREETHI: OT          1/11/2024

## 2024-01-12 NOTE — SUBJECTIVE & OBJECTIVE
Interval History: Overnight, patient went into DKA and was started on insulin drip, potassium repletion, IVF, and BMP Q4H. Additionally, Vascular surgery was consulted for acute on chronic ischemic changes to right foot. Arterial and venous US were ordered that showed extensive clot/occlusion burden in both systems. Patient was not deemed a good revascularization candidate due to her limited mobility/ability to ambulate.     Today, DKA resolved with closed anion gap, resolution of Metabolic acidosis, and improved Hyperglycemia, and patient was transitioned back to SC insulin and off the insulin gtt. Additionally, vascular surgery spoke with patient and family regarding decision over possible AKA of right LE. After a lengthy discussion and GOC discussion, patient and family decided to pursue home with hospice.   Review of Systems  Objective:     Vital Signs (Most Recent):  Temp: 97.4 °F (36.3 °C) (01/11/24 1509)  Pulse: 64 (01/11/24 1509)  Resp: 20 (01/11/24 1509)  BP: 121/60 (01/11/24 1509)  SpO2: 97 % (01/11/24 1509) Vital Signs (24h Range):  Temp:  [97.4 °F (36.3 °C)-98.5 °F (36.9 °C)] 97.4 °F (36.3 °C)  Pulse:  [58-73] 64  Resp:  [17-20] 20  SpO2:  [95 %-99 %] 97 %  BP: ()/(54-61) 121/60     Weight: 84.2 kg (185 lb 10 oz)  Body mass index is 29.07 kg/m².    Intake/Output Summary (Last 24 hours) at 1/11/2024 1812  Last data filed at 1/11/2024 0616  Gross per 24 hour   Intake --   Output 350 ml   Net -350 ml         Physical Exam  Constitutional:       Appearance: She is ill-appearing.   HENT:      Head: Normocephalic and atraumatic.      Mouth/Throat:      Mouth: Mucous membranes are dry.      Pharynx: Oropharynx is clear.   Eyes:      Extraocular Movements: Extraocular movements intact.      Conjunctiva/sclera: Conjunctivae normal.      Pupils: Pupils are equal, round, and reactive to light.   Cardiovascular:      Rate and Rhythm: Normal rate and regular rhythm.      Pulses: Normal pulses.      Heart  sounds: Normal heart sounds.   Pulmonary:      Effort: Pulmonary effort is normal. No respiratory distress.      Breath sounds: Normal breath sounds. No wheezing.   Abdominal:      General: Abdomen is flat. There is no distension.      Palpations: Abdomen is soft.      Tenderness: There is no abdominal tenderness.   Musculoskeletal:         General: Tenderness present.   Skin:     General: Skin is warm and dry.      Comments: Mottled appearance to right foot, cold to the touch and non-palpable pulses   Neurological:      Mental Status: She is alert. She is disoriented.      Comments: At neurological baseline with baseline dementia             Significant Labs: All pertinent labs within the past 24 hours have been reviewed.    Significant Imaging: I have reviewed all pertinent imaging results/findings within the past 24 hours.

## 2024-01-12 NOTE — ASSESSMENT & PLAN NOTE
Poorly controlled  Last HbA1c 9.6 November 2023  Takes long and short acting insulin and ozempic at home  Initial BGL on presentation 311, then increased to 456  On admission, Beta hydroxy elevated but no AG, normal bicarb, and normal pH  Hypokalemic but potassium replaced  Started on insulin gtt by ED    - BMP Q4H   - POCT Glucose q4h  - Insulin Detemir 12 units daily  - LDSSI

## 2024-01-12 NOTE — PROGRESS NOTES
VANCOMYCIN DOSING BY PHARMACY DISCONTINUATION NOTE    Buck Ibarra is a 78 y.o. female who had been consulted for vancomycin dosing.    The pharmacy consult for vancomycin dosing has been discontinued.     Vancomycin Dosing by Pharmacy Consult will sign-off. Please reconsult if necessary. Thank you for allowing us to participate in this patient's care.     Shira Rivera, PharmD, BCPS  [id number]

## 2024-01-12 NOTE — PLAN OF CARE
Problem: Adult Inpatient Plan of Care  Goal: Plan of Care Review  Outcome: Ongoing, Progressing     Problem: Adult Inpatient Plan of Care  Goal: Optimal Comfort and Wellbeing  Outcome: Ongoing, Progressing     Pt accompanied by family had the conversation about going home with hospice. CM to follow up with agencies tomorrow.

## 2024-01-12 NOTE — NURSING
Pt is aaox1 just to self. Pt is unaware of what is going on. Pt was asking me to get a pizza out of her oven. Pt refuse to open her mouth to swallow medications that were crushed with the pudding. Pt thinks she is at home working in her kitchen and is not sure why I am trying to feed her.

## 2024-01-12 NOTE — PROGRESS NOTES
[facility] - Telemetry [facility] Medicine  Progress Note    Patient Name: Buck Ibarra  MRN: [id number]  Patient Class: IP- Inpatient   Admission Date: 1/8/2024  Length of Stay: 2 days  Attending Physician: Cameron Stoddard, *  Primary Care Provider: Ericka Cortez MD        Subjective:     Principal Problem:Acute encephalopathy        HPI:  78 year old female with CVA with right hemiparesis, T2DM, HTN, GERD, hepatitis C s/p tx, CKD3a, multinodular goiter, HLD, PAD presenting with altered mental status. Unable to obtain history from Pt 2/2 altered mentation. Pt had a recent admission end of November 2023 for a L MCA stroke - presented after 3 days of right-sided weakness and dysarthria, found to have a stroke of the L internal capsule. Stroke etiology thought to be small vessel disease; discharged to rehab on DAPTx30 days followed by ASA monotherapy and statin. Phone call to daughter Olga - reports she brought her mother to hospital due to altered mentation. Noted she has been less responsive over the past 1 day. OT was at their house and also noted she was more lethargic. Also noted difficulty swallowing over the past 1 day, spitting out her medications/gagging. Hasn't noticed any coughing/spluttering after eating but also noticed reduced appetite. States her mother has been dysarthric since her stroke in November; and has residual right sided deficits. Denies fevers or cough. No vomiting but has had watery diarrhea for about 2 days. Denies recent antibiotic use. Daughter reports trouble getting her mother's insulin lately, thought her altered mentation could be blood glucose related. BGL ~400 when checked by EMS.     On arrival to ED, afebrile, HR ~ 80, /78, saturating 96% on RA. Hb 11.7 (baseline ~11-12), WBC 13, Plt 329. Na 143, K 3.4, Cl 108, bicarb 24, BUN 28, sCr 1.6 (baseline ~1.0), . Ammonia normal. Tylenol level negative, ethanol negative, UDS negative. Beta-hydroxy 0.9, pH normal.  UA concerning for infective. EKG with NSR and PVCs. CXR without consoldiation. CT head showing remote infarcts. MRI brain showing subacute infarcts in L corona radiata and left thalamus. In ED, started on insulin gtt. Admit to hospital medicine for further evaluation and management.     Overview/Hospital Course:  No notes on file    Interval History: Overnight, patient went into DKA and was started on insulin drip, potassium repletion, IVF, and BMP Q4H. Additionally, Vascular surgery was consulted for acute on chronic ischemic changes to right foot. Arterial and venous US were ordered that showed extensive clot/occlusion burden in both systems. Patient was not deemed a good revascularization candidate due to her limited mobility/ability to ambulate.     Today, DKA resolved with closed anion gap, resolution of Metabolic acidosis, and improved Hyperglycemia, and patient was transitioned back to SC insulin and off the insulin gtt. Additionally, vascular surgery spoke with patient and family regarding decision over possible AKA of right LE. After a lengthy discussion and GOC discussion, patient and family decided to pursue home with hospice.   Review of Systems  Objective:     Vital Signs (Most Recent):  Temp: 97.4 °F (36.3 °C) (01/11/24 1509)  Pulse: 64 (01/11/24 1509)  Resp: 20 (01/11/24 1509)  BP: 121/60 (01/11/24 1509)  SpO2: 97 % (01/11/24 1509) Vital Signs (24h Range):  Temp:  [97.4 °F (36.3 °C)-98.5 °F (36.9 °C)] 97.4 °F (36.3 °C)  Pulse:  [58-73] 64  Resp:  [17-20] 20  SpO2:  [95 %-99 %] 97 %  BP: ()/(54-61) 121/60     Weight: 84.2 kg (185 lb 10 oz)  Body mass index is 29.07 kg/m².    Intake/Output Summary (Last 24 hours) at 1/11/2024 1812  Last data filed at 1/11/2024 0616  Gross per 24 hour   Intake --   Output 350 ml   Net -350 ml         Physical Exam  Constitutional:       Appearance: She is ill-appearing.   HENT:      Head: Normocephalic and atraumatic.      Mouth/Throat:      Mouth: Mucous membranes  are dry.      Pharynx: Oropharynx is clear.   Eyes:      Extraocular Movements: Extraocular movements intact.      Conjunctiva/sclera: Conjunctivae normal.      Pupils: Pupils are equal, round, and reactive to light.   Cardiovascular:      Rate and Rhythm: Normal rate and regular rhythm.      Pulses: Normal pulses.      Heart sounds: Normal heart sounds.   Pulmonary:      Effort: Pulmonary effort is normal. No respiratory distress.      Breath sounds: Normal breath sounds. No wheezing.   Abdominal:      General: Abdomen is flat. There is no distension.      Palpations: Abdomen is soft.      Tenderness: There is no abdominal tenderness.   Musculoskeletal:         General: Tenderness present.   Skin:     General: Skin is warm and dry.      Comments: Mottled appearance to right foot, cold to the touch and non-palpable pulses   Neurological:      Mental Status: She is alert. She is disoriented.      Comments: At neurological baseline with baseline dementia             Significant Labs: All pertinent labs within the past 24 hours have been reviewed.    Significant Imaging: I have reviewed all pertinent imaging results/findings within the past 24 hours.    Assessment/Plan:      * Acute encephalopathy  Presenting with increased lethargy over the past 1 day  Recent stroke in late November 2023 which has caused residual right sided deficits and dysarthria  Daughter reports increased lethargy over the past 1 day; Pt has also had watery diarrhea for about 2 days with reduced PO intake    Mild leukocytosis  Ammonia WNL, tylenol, ethanol, UDS negative  UA concerning for infection, UCx positive for GNR's; BCx pending  CXR without obvious consolidation, no O2 requirement  CT head and MRI brain without acute infarcts, both show remote infarcts     DDx: UTI vs bacteremia vs aspiration PNA vs diarrheal illness vs seizures vs related to hyperglycemia     Plan:  - started Zosyn; no respiratory symptoms or focal consolidation on CXR so  did not cover for aspiration PNA  - f/u Bcx, Ucx with GNR's  - Brain imaging showing remote infarcts - likely recrudescence of stroke symptoms in setting of likely infection  - EEG  - Right foot with mottled appearance and cold to touch; MRI right foot to assess for Osteo  - CRP/ESR  - check TSH, B12, folate   - stool culture and c diff   - q4h neurochecks  - delirium precautions  - PT/OT evaluation, anticipate may need acute care placement    Diabetic ketoacidosis  DKA has resolved for now with closed anion gap, resolution of Metabolic acidosis, and controlled hyperglycemia  Patient's FSGs are controlled on current medication regimen.  Last A1c reviewed-   Lab Results   Component Value Date    HGBA1C 8.8 (H) 01/09/2024     Most recent fingerstick glucose reviewed-   Recent Labs   Lab 01/11/24  1043 01/11/24  1156 01/11/24  1425 01/11/24  1641   POCTGLUCOSE 158* 154* 234* 259*       Current correctional scale  Low  Maintain anti-hyperglycemic dose as follows-   Antihyperglycemics (From admission, onward)      Start     Stop Route Frequency Ordered    01/11/24 1426  insulin aspart U-100 pen 0-5 Units         -- SubQ Before meals & nightly PRN 01/11/24 1326    01/11/24 1000  insulin detemir U-100 (Levemir) pen 12 Units         -- SubQ Daily 01/11/24 0938          Hold Oral hypoglycemics while patient is in the hospital.    Hypernatremia  Patient has hypernatremia which is controlled. The hypernatremia is due to Dehydration. We will aim to correct the sodium by 8-10mEq in 24 hours. The patient's sodium results have been reviewed and are listed below.  Recent Labs   Lab 01/10/24  1131          ELIZABETH (acute kidney injury)  Baseline sCr ~ 1.0  sCr on admission 1.5  UA concerning for infection  ELIZABETH could be 2/2 infection vs poor PO intake     Plan:  - urine lytes  - US retroperitoneal  - strict input/output  - renally dose adjust medications  - hold home ARB in setting of ELIZABETH    Hemiparesis, right  See CVA    History  of CVA (cerebrovascular accident)  Recent admission end of November 2023 for L MCA; presented with dysarthria and right sided deficits  Discharged to rehab and then went home with her daughter  Daughter reports she's been dysarthric since her stroke and that her speech hasn't improved, has continued to be slurred; also has residual right sided deficits worse in her right leg  CT head showing remote infarcts, MRI brain also showing subacute infarcts, nothing acute   ED spoke with vascular neurology who reviewed imaging, agreed no acute infarcts; recrudescence of stroke symptoms likely in setting of infection  Finished DAPT course end of December - continue aspirin monotherapy and statin  NPO until SLP evaluation of swallow  PT/OT evaluation    Atherosclerotic peripheral vascular disease with gangrene  Long standing history of PAD with reported history of multiple stents in bilateral lower extremities. During admission, patient's right foot with acute on chronic ischemic changes consisting of mottled appearance, cold skin, and pulseless.   Arterial and Venous US significant for acute/chronic venous clot burden and acute/chronic arterial occlusion.     - Vascular surgery consulted: not recommending revascularization as patient is poor candidate with limited ability to ambulate  - Heparin gtt  - Holding DAPT  - Resume home statin  - Vascular held discussion with patient/family regarding possible R AKA  - Patient and family opting for home with hospice      Stage 3a chronic kidney disease  See ELIZABETH    Multinodular goiter  Not on any thyroid medication  Check TSH/T4    Hyperlipidemia  Continue statin    History of hepatitis C, SVR as of 8-2016   S/p tx, complete 12 weeks Harvoni    Gastroesophageal reflux disease with esophagitis  Continue pantoprazole    Type 2 diabetes mellitus with hyperglycemia, with long-term current use of insulin  Poorly controlled  Last HbA1c 9.6 November 2023  Takes long and short acting insulin  and ozempic at home  Initial BGL on presentation 311, then increased to 456  On admission, Beta hydroxy elevated but no AG, normal bicarb, and normal pH  Hypokalemic but potassium replaced  Started on insulin gtt by ED    - BMP Q4H   - POCT Glucose q4h  - Insulin Detemir 12 units daily  - LDSSI        Essential hypertension  Takes nifedipine, candesartan, and coreg at home  Hypertensive on presentation  Continue nifedipine and coreg  Hold ARB in setting of ELIZABETH  Monitor BP      VTE Risk Mitigation (From admission, onward)           Ordered     heparin 25,000 units in dextrose 5% (100 units/ml) IV bolus from bag - ADDITIONAL PRN BOLUS - 60 units/kg  As needed (PRN)        Question:  Heparin Infusion Adjustment (DO NOT MODIFY ANSWER)  Answer:  \\[email or web address]\epic\Images\Pharmacy\HeparinInfusions\heparin HIGH INTENSITY nomogram for [facility] [id number]    01/10/24 2038     heparin 25,000 units in dextrose 5% (100 units/ml) IV bolus from bag - ADDITIONAL PRN BOLUS - 30 units/kg  As needed (PRN)        Question:  Heparin Infusion Adjustment (DO NOT MODIFY ANSWER)  Answer:  \\[email or web address]\epic\Images\Pharmacy\HeparinInfusions\heparin HIGH INTENSITY nomogram for [facility] [id number]    01/10/24 2038     heparin 25,000 units in dextrose 5% 250 mL (100 units/mL) infusion HIGH INTENSITY nomogram - [facility]  Continuous        Question:  Begin at (units/kg/hr)  Answer:  18    01/10/24 2038     IP VTE HIGH RISK PATIENT  Once         01/09/24 0128     Place sequential compression device  Until discontinued         01/09/24 0111                    Discharge Planning   AMADO: 1/15/2024     Code Status: Full Code   Is the patient medically ready for discharge?: No    Reason for patient still in hospital (select all that apply): Patient unstable, Patient trending condition, and Treatment  Discharge Plan A: Home Health                  Dwayne Ramos DO  Department of Hospital Medicine   [facility] - Telemetry Stepdown

## 2024-01-12 NOTE — PLAN OF CARE
01/12/24 1238   Post-Acute Status   Post-Acute Authorization Hospice   Hospice Status Referrals Sent   Coverage [facility] - [facility] SECURE SNP -   Discharge Delays None known at this time   Discharge Plan   Discharge Plan A Hospice/home   Discharge Plan B Hospice/home     Met with pt/family to review discharge recommendation of home w/hospice and is agreeable to plan. Pt/family would like to move forward with [facility].     Patient/family provided list of facilities in-network with patient's payor plan. Providers that are owned, operated, or affiliated with [facility] are included on the list.     Notified that referral sent to below listed facilities from in-network list based on proximity to home/family support:     [facility] Phone: [phone]    Patient/family instructed to identify preference.    Preferred Facility: (if more than 1, listed in order of descending preference)    [facility] Phone: [phone]    If an additional preferred facility not listed above is identified, additional referral to be sent. If above facilities unable to accept, will send additional referrals to in-network providers.     Discharge Plan A and Plan B have been determined by review of patient's clinical status, future medical and therapeutic needs, and coverage/benefits for post-acute care in coordination with multidisciplinary team members.    CHAI Bradshaw, LMSW    Case Management Department  [facility]

## 2024-01-12 NOTE — PT/OT/SLP DISCHARGE
Occupational Therapy Discharge Summary    Buck Ibarra  MRN: [id number]   Principal Problem: Acute encephalopathy      Patient Discharged from acute Occupational Therapy on 1/12/24.  Please refer to prior OT note dated 1/11/24 for functional status.    Assessment:      Patient has not met goals.    Objective:     GOALS:   Multidisciplinary Problems       Occupational Therapy Goals          Problem: Occupational Therapy    Goal Priority Disciplines Outcome Interventions   Occupational Therapy Goal     OT, PT/OT Ongoing, Progressing    Description: Goals to be met by: 1/30/2024    Patient will increase functional independence with ADLs by performing:    UE Dressing with Stand-by Assistance.  Grooming while seated with Stand-by Assistance.  Sitting at edge of bed x10 minutes with Supervision.  Supine to sit with Contact Guard Assistance.  Upper extremity exercise program 2x10 reps, with supervision.                         Reasons for Discontinuation of Therapy Services  Pt transitioning to hospice care.      Plan:     Patient Discharged to: no further OT on acute.    1/12/2024

## 2024-01-12 NOTE — NURSING
Spoke to Dr Bragg regarding 2 sets of sliding scale on insulin PRN order q4h, MD ordered to ignore the premeal sliding scale for now and MD will clarify with am team.

## 2024-01-12 NOTE — HOSPITAL COURSE
Patient was admitted with HHS, ELIZABETH, and concern for UTI.  Initially she improved following treatment of HHS, but then she developed DKA on day 2 of admission and became much more lethargic, similar to initial presentation. Following DKA protocol and treatment, she returned to her mental baseline, and she improved clinically. She was later found to have acute on chronic right limb ischemia, and US with multiple arterial occlusions and DV of right lower extremity.  Vascular surgery deemed her an unacceptable revascularization candidate due to her limited ability to ambulate and offered amputation instead. This was not consistent with patient's wishes, and after discussion with the team with family present the patient would like to pursue hospice as her wish is to die peacefully at home while avoiding interventions such as amputation that do not provide her quality of life. Advanced goals of care discussion was held with the patient and family, and the family formally decided to pursue home with hospice. On 01/12, patient was discharged home with hospice.

## 2024-01-12 NOTE — PLAN OF CARE
Spoke with Emma ([phone]) in admissions at [facility] who states she will touch base with Brock to check status. Spoke with Brock ([phone]) who states patient's transport can be schedule for 6:30pm/7:00pm and family is aware. Verified address. RN also spoke with Brock. Sent hospice orders to [facility] in [facility].    SW arranged stretcher transport via [facility]. Requested  time is 6:30 PM. Requested  time does not guarantee arrival time.    Ekta Villanueva, LCSW [facility]  Ext. [phone]

## 2024-01-12 NOTE — ACP (ADVANCE CARE PLANNING)
Advance Care Planning     Date: 01/11/2024    Code Status  In light of the patients advanced and life limiting illness, I engaged the patient and family in a voluntary conversation about the patient's preferences for care at the very end of life. The patient wishes to have a natural, peaceful death.  Along those lines, the patient does not wish to have CPR or other invasive treatments performed when her heart and/or breathing stops. I communicated to the patient and family that a DNR order would be placed in her medical record to reflect this preference.  I spent a total of 20 minutes engaging the patient in this advance care planning discussion.    Ericka Wei MD  PGYIII  Internal Medicine

## 2024-01-12 NOTE — PT/OT/SLP PROGRESS
Speech Language Pathology      Buck Ibarra  MRN: [id number]    Per chart review, decision has been made to transition to hospice care at this time. No further skilled SLP service warranted as focus shifts towards comfort measures.   1/12/2024

## 2024-01-12 NOTE — PT/OT/SLP DISCHARGE
Physical Therapy Discharge Summary    Name: Buck Ibarra  MRN: [id number]   Principal Problem: Acute encephalopathy     Patient Discharged from acute Physical Therapy on 24  .  Please refer to prior PT noted date on  for functional status.     Assessment:     Patient appropriate for care in another setting.    Objective:     GOALS:   Multidisciplinary Problems       Physical Therapy Goals          Problem: Physical Therapy    Goal Priority Disciplines Outcome Goal Variances Interventions   Physical Therapy Goal     PT, PT/OT Ongoing, Progressing     Description: Goals to be met by: 24     Patient will increase functional independence with mobility by performin. Supine to sit with Moderate Assistance  2. Sit to supine with Moderate Assistance  3. Rolling to Right with Minimal Assistance.  4. Rolling to Left with Moderate Assistance.  5. Bed to chair transfer with Maximum Assistance with or without LRAD  6. Sitting at edge of bed 10 minutes with Stand-by Assistance  7. Lower extremity exercise program x20 reps per handout, with assistance as needed                         Reasons for Discontinuation of Therapy Services  Patient is unable to continue work toward goals because of medical or psychosocial complications.      Plan:     Patient Discharged to: Palliative Care/Hospice.      2024

## 2024-01-12 NOTE — ASSESSMENT & PLAN NOTE
Long standing history of PAD with reported history of multiple stents in bilateral lower extremities. During admission, patient's right foot with acute on chronic ischemic changes consisting of mottled appearance, cold skin, and pulseless.   Arterial and Venous US significant for acute/chronic venous clot burden and acute/chronic arterial occlusion.     - Vascular surgery consulted: not recommending revascularization as patient is poor candidate with limited ability to ambulate  - Heparin gtt  - Holding DAPT  - Resume home statin  - Vascular held discussion with patient/family regarding possible R AKA  - Patient and family opting for home with hospice

## 2024-01-12 NOTE — PLAN OF CARE
Problem: Fluid and Electrolyte Imbalance (Acute Kidney Injury/Impairment)  Goal: Fluid and Electrolyte Balance  Outcome: Ongoing, Progressing     Problem: Oral Intake Inadequate (Acute Kidney Injury/Impairment)  Goal: Optimal Nutrition Intake  Outcome: Ongoing, Progressing     Problem: Adult Inpatient Plan of Care  Goal: Optimal Comfort and Wellbeing  Outcome: Ongoing, Progressing

## 2024-01-12 NOTE — DISCHARGE SUMMARY
[facility] - Telemetry [facility] Medicine  Discharge Summary      Patient Name: Buck Ibarra  MRN: [id number]  ASH: [id number]  Patient Class: IP- Inpatient  Admission Date: 1/8/2024  Hospital Length of Stay: 3 days  Discharge Date and Time:  01/12/2024 4:45 PM  Attending Physician: Cameron Stoddard, *   Discharging Provider: Dwayne Ramos DO  Primary Care Provider: Ericka Cortez MD  [facility] Medicine Team: [facility] Dwayne Ramos DO  Primary Care Team: [facility]    HPI:   78 year old female with CVA with right hemiparesis, T2DM, HTN, GERD, hepatitis C s/p tx, CKD3a, multinodular goiter, HLD, PAD presenting with altered mental status. Unable to obtain history from Pt 2/2 altered mentation. Pt had a recent admission end of November 2023 for a L MCA stroke - presented after 3 days of right-sided weakness and dysarthria, found to have a stroke of the L internal capsule. Stroke etiology thought to be small vessel disease; discharged to rehab on DAPTx30 days followed by ASA monotherapy and statin. Phone call to daughter Olga - reports she brought her mother to hospital due to altered mentation. Noted she has been less responsive over the past 1 day. OT was at their house and also noted she was more lethargic. Also noted difficulty swallowing over the past 1 day, spitting out her medications/gagging. Hasn't noticed any coughing/spluttering after eating but also noticed reduced appetite. States her mother has been dysarthric since her stroke in November; and has residual right sided deficits. Denies fevers or cough. No vomiting but has had watery diarrhea for about 2 days. Denies recent antibiotic use. Daughter reports trouble getting her mother's insulin lately, thought her altered mentation could be blood glucose related. BGL ~400 when checked by EMS.     On arrival to ED, afebrile, HR ~ 80, /78, saturating 96% on RA. Hb 11.7 (baseline ~11-12), WBC 13, Plt 329. Na 143, K 3.4, Cl 108, bicarb  24, BUN 28, sCr 1.6 (baseline ~1.0), . Ammonia normal. Tylenol level negative, ethanol negative, UDS negative. Beta-hydroxy 0.9, pH normal. UA concerning for infective. EKG with NSR and PVCs. CXR without consoldiation. CT head showing remote infarcts. MRI brain showing subacute infarcts in L corona radiata and left thalamus. In ED, started on insulin gtt. Admit to hospital medicine for further evaluation and management.     Procedure(s) (LRB):  AMPUTATION, ABOVE KNEE (Right)      Hospital Course:   Patient was admitted with HHS, ELIZABETH, and concern for UTI.  Initially she improved following treatment of HHS, but then she developed DKA on day 2 of admission and became much more lethargic, similar to initial presentation. Following DKA protocol and treatment, she returned to her mental baseline, and she improved clinically. She was later found to have acute on chronic right limb ischemia, and US with multiple arterial occlusions and DV of right lower extremity.  Vascular surgery deemed her an unacceptable revascularization candidate due to her limited ability to ambulate and offered amputation instead. This was not consistent with patient's wishes, and after discussion with the team with family present the patient would like to pursue hospice as her wish is to die peacefully at home while avoiding interventions such as amputation that do not provide her quality of life. Advanced goals of care discussion was held with the patient and family, and the family formally decided to pursue home with hospice. On 01/12, patient was discharged home with hospice.   Physical Exam  Constitutional:       Appearance: She is ill-appearing.   HENT:      Head: Normocephalic and atraumatic.      Mouth/Throat:      Mouth: Mucous membranes are dry.      Pharynx: Oropharynx is clear.   Eyes:      Extraocular Movements: Extraocular movements intact.      Conjunctiva/sclera: Conjunctivae normal.      Pupils: Pupils are equal, round, and  reactive to light.   Cardiovascular:      Rate and Rhythm: Normal rate and regular rhythm.      Pulses: Normal pulses.      Heart sounds: Normal heart sounds.   Pulmonary:      Effort: Pulmonary effort is normal. No respiratory distress.      Breath sounds: Normal breath sounds. No wheezing.   Abdominal:      General: Abdomen is flat. There is no distension.      Palpations: Abdomen is soft.      Tenderness: There is no abdominal tenderness.   Musculoskeletal:         General: Tenderness present.   Skin:     General: Skin is warm and dry.      Comments: Mottled appearance to right foot, cold to the touch and non-palpable pulses   Neurological:      Mental Status: She is alert. She is disoriented.      Comments: At neurological baseline with baseline dementia   Goals of Care Treatment Preferences:  Code Status: DNR    Health care agent: [facility] agent number: [phone]    Living Will: Yes              Consults:   Consults (From admission, onward)          Status Ordering Provider     Inpatient consult to Registered Dietitian/Nutritionist  Once        Provider:  (Not yet assigned)    Completed IDA CROFT     Inpatient consult to Vascular Surgery  Once        Provider:  (Not yet assigned)    Completed DEVEN CABRERA     Inpatient consult to Midline team  Once        Provider:  (Not yet assigned)    Completed DEVEN CABRERA     Inpatient consult to Midline team  Once        Provider:  (Not yet assigned)    Completed DEVEN CABRERA            No new Assessment & Plan notes have been filed under this hospital service since the last note was generated.  Service: Hospital Medicine    Final Active Diagnoses:    Diagnosis Date Noted POA    PRINCIPAL PROBLEM:  Acute encephalopathy [G93.40] 01/09/2024 Yes    Hypernatremia [E87.0] 01/10/2024 Yes    Diabetic ketoacidosis [E11.10] 01/10/2024 No    ELIZABETH (acute kidney injury) [N17.9] 01/09/2024 Yes    Hemiparesis, right [G81.91] 11/26/2023 Yes     "History of CVA (cerebrovascular accident) [Z86.73] 10/24/2023 Not Applicable     Chronic    Atherosclerotic peripheral vascular disease with gangrene [I70.269] 03/04/2021 Yes    Stage 3a chronic kidney disease [N18.31] 01/21/2016 Yes     Chronic    Multinodular goiter [E04.2] 09/01/2015 Yes    Gastroesophageal reflux disease with esophagitis [K21.00] 08/25/2015 Yes     Chronic    Hyperlipidemia [E78.5] 08/25/2015 Yes     Chronic    History of hepatitis C, SVR as of 8-2016  [Z86.19] 08/25/2015 Yes     Chronic    Essential hypertension [I10] 07/22/2015 Yes     Chronic    Type 2 diabetes mellitus with hyperglycemia, with long-term current use of insulin [E11.65, Z79.4] 07/22/2015 Not Applicable     Chronic      Problems Resolved During this Admission:    Diagnosis Date Noted Date Resolved POA    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    Significant Diagnostic Studies: N/A    Pending Diagnostic Studies:       Procedure Component Value Units Date/Time    Basic metabolic panel [[id number]]     Order Status: Sent Lab Status: No result     Specimen: Blood     Basic metabolic panel [[id number]]     Order Status: Sent Lab Status: No result     Specimen: Blood            Medications:  Reconciled Home Medications:      Medication List        CONTINUE taking these medications      BD INSULIN SYRINGE ULTRA-FINE 0.5 mL 31 gauge x 5/16" Syrg  Generic drug: insulin syringe-needle U-100  1 each by Misc.(Non-Drug; Combo Route) route 4 (four) times daily as needed (insulin administration).     blood sugar diagnostic Strp  1 each by Misc.(Non-Drug; Combo Route) route 4 (four) times daily as needed (blood glucose check).     blood-glucose meter kit  Use as " "instructed     carvediloL 12.5 MG tablet  Commonly known as: COREG  Take 1 tablet (12.5 mg total) by mouth in the morning and 1 tablet (12.5 mg total) in the evening.     DEXCOM G6  Misc  Generic drug: blood-glucose meter,continuous  Use as directed.     DEXCOM G6 SENSOR Rashida  Generic drug: blood-glucose sensor  Change every 10 days. 90 day e 11.65     DEXCOM G6 TRANSMITTER Rashida  Generic drug: blood-glucose transmitter  Change every 3 months.     doxepin 10 MG capsule  Commonly known as: SINEQUAN  Take 1 capsule (10 mg total) by mouth nightly as needed (insomnia).     gabapentin 300 MG capsule  Commonly known as: NEURONTIN  Take 1 capsule (300 mg total) by mouth 2 (two) times daily.     lancing device with lancets Kit  1 each by Misc.(Non-Drug; Combo Route) route 4 (four) times daily as needed (blood glucose testing).     LANTUS U-100 INSULIN 100 unit/mL injection  Generic drug: insulin glargine  Inject 8 Units under the skin in the morning. Indications: Type 2 Diabetes.     modafiniL 100 MG Tab  Commonly known as: PROVIGIL  Take 1 tablet (100 mg total) by mouth in the morning.     NIFEdipine 90 MG (OSM) 24 hr tablet  Commonly known as: PROCARDIA-XL  Take 1 tablet (90 mg total) by mouth once daily.     pantoprazole 40 MG tablet  Commonly known as: PROTONIX  Take 1 tablet (40 mg total) by mouth daily as needed (heartburn or reflux).     pen needle, diabetic 32 gauge x 5/32" Ndle  Commonly known as: BD ULTRA-FINE MILAN PEN NEEDLE  Use as directed with insulin pens            STOP taking these medications      aspirin 81 MG EC tablet  Commonly known as: ECOTRIN     candesartan 32 MG tablet  Commonly known as: ATACAND     clopidogreL 75 mg tablet  Commonly known as: PLAVIX     hydrALAZINE 25 MG tablet  Commonly known as: APRESOLINE     insulin aspart U-100 100 unit/mL (3 mL) Inpn pen  Commonly known as: NovoLOG     OZEMPIC 2 mg/dose (8 mg/3 mL) Pnij  Generic drug: semaglutide     rosuvastatin 40 MG Tab  Commonly " known as: CRESTOR              Indwelling Lines/Drains at time of discharge:   Lines/Drains/Airways       Drain  Duration             Female External Urinary Catheter w/ Suction 01/08/24 1404 3 days                    Time spent on the discharge of patient: 35 minutes         Dwayne Ramos DO  Department of Hospital Medicine  [facility] - Telemetry Stepdown

## 2024-01-12 NOTE — NURSING
Dr Bragg notified APTT 106.1, Heparin is on hold for 2 hours per protocol. Will reduce rate by 4 u/kg/hr in 2 hours to 12u/kg/hr.

## 2024-01-12 NOTE — PLAN OF CARE
[facility]  Department of Hospital Medicine  [address] [place] [address]  [phone] after hours  [phone] fax    HOSPICE  ORDERS    01/12/2024    Admit to Hospice:  Home Service     Diagnoses:   Active Hospital Problems    Diagnosis  POA    *Acute encephalopathy [G93.40]  Yes    Hypernatremia [E87.0]  Yes    Diabetic ketoacidosis [E11.10]  No    ELIZABETH (acute kidney injury) [N17.9]  Yes    Hemiparesis, right [G81.91]  Yes    History of CVA (cerebrovascular accident) [Z86.73]  Not Applicable     Chronic    Atherosclerotic peripheral vascular disease with gangrene [I70.269]  Yes    Stage 3a chronic kidney disease [N18.31]  Yes     Chronic    Multinodular goiter [E04.2]  Yes    Gastroesophageal reflux disease with esophagitis [K21.00]  Yes     Chronic    Hyperlipidemia [E78.5]  Yes     Chronic    History of hepatitis C, SVR as of 8-2016  [Z86.19]  Yes     Chronic     Harvoni 12 wks completed.   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    Code Status: DNR    Allergies:   Review of patient's allergies indicates:   Allergen Reactions    Amlodipine Swelling    Erythromycin Anaphylaxis    Erythropoietin analogues Anaphylaxis    Pegasys [peginterferon ruben-2a] Anaphylaxis    Lisinopril  "Hives       Diet: Adult Regular Diet  Activities: As tolerated    Goals of Care Treatment Preferences:  Code Status: DNR    Health care agent: Olga  [facility] care agent number: [phone]    Living Will: Yes              Nursing: Per Hospice Routine.     Kwok Care: Empty Kwok bag Q shift and PRN.  Change Kwok every month.    Routine Skin for Bedridden Patients: Apply moisture barrier cream to all skin folds and   wet areas in perineal area daily and after baths and all bowel movements.            Medications:        Medication List        CONTINUE taking these medications      BD INSULIN SYRINGE ULTRA-FINE 0.5 mL 31 gauge x 5/16" Syrg  Generic drug: insulin syringe-needle U-100  1 each by Misc.(Non-Drug; Combo Route) route 4 (four) times daily as needed (insulin administration).     blood sugar diagnostic Strp  1 each by Misc.(Non-Drug; Combo Route) route 4 (four) times daily as needed (blood glucose check).     blood-glucose meter kit  Use as instructed     carvediloL 12.5 MG tablet  Commonly known as: COREG  Take 1 tablet (12.5 mg total) by mouth in the morning and 1 tablet (12.5 mg total) in the evening.     DEXCOM G6  Misc  Generic drug: blood-glucose meter,continuous  Use as directed.     DEXCOM G6 SENSOR Rashida  Generic drug: blood-glucose sensor  Change every 10 days. 90 day e 11.65     DEXCOM G6 TRANSMITTER Rashida  Generic drug: blood-glucose transmitter  Change every 3 months.     doxepin 10 MG capsule  Commonly known as: SINEQUAN  Take 1 capsule (10 mg total) by mouth nightly as needed (insomnia).     gabapentin 300 MG capsule  Commonly known as: NEURONTIN  Take 1 capsule (300 mg total) by mouth 2 (two) times daily.     lancing device with lancets Kit  1 each by Misc.(Non-Drug; Combo Route) route 4 (four) times daily as needed (blood glucose testing).     LANTUS U-100 INSULIN 100 unit/mL injection  Generic drug: insulin glargine  Inject 8 Units under the skin in the morning. Indications: Type 2 " "Diabetes.     modafiniL 100 MG Tab  Commonly known as: PROVIGIL  Take 1 tablet (100 mg total) by mouth in the morning.     NIFEdipine 90 MG (OSM) 24 hr tablet  Commonly known as: PROCARDIA-XL  Take 1 tablet (90 mg total) by mouth once daily.     pantoprazole 40 MG tablet  Commonly known as: PROTONIX  Take 1 tablet (40 mg total) by mouth daily as needed (heartburn or reflux).     pen needle, diabetic 32 gauge x 5/32" Ndle  Commonly known as: BD ULTRA-FINE MILAN PEN NEEDLE  Use as directed with insulin pens            STOP taking these medications      aspirin 81 MG EC tablet  Commonly known as: ECOTRIN     candesartan 32 MG tablet  Commonly known as: ATACAND     clopidogreL 75 mg tablet  Commonly known as: PLAVIX     hydrALAZINE 25 MG tablet  Commonly known as: APRESOLINE     insulin aspart U-100 100 unit/mL (3 mL) Inpn pen  Commonly known as: NovoLOG     OZEMPIC 2 mg/dose (8 mg/3 mL) Pnij  Generic drug: semaglutide     rosuvastatin 40 MG Tab  Commonly known as: CRESTOR                DIABETES CARE: Nurse to perform and educate diabetic management with blood glucose monitoring:              Fingerstick blood sugar AC and HS        Report CBG < 60 or > 350 to physician.         Insulin Sliding Scale         Glucose  Novolog Insulin Subcutaneous        0 - 60   Orange juice or glucose tablet      No insulin   201-250  2 units   251-300  4 units   301-350  6 units   351-400  8 units   >400   10 units then call physician      Future Orders:  Hospice Medical Director may dictate new orders for comfortable care measures & sign death certificate.        _________________________________  Dwayne Ramos,   01/12/2024     "

## 2024-01-12 NOTE — PLAN OF CARE
[facility] - Telemetry Stepdown  Discharge Final Note    SW observed d/c orders for pt. Pt to d/c home w/hospice. [facility] has accepted pt. [facility] transport via ambulance scheduled for today at 6:30pm to pt's home. All questions answered.    Primary Care Provider: Ericka Cortez MD    Expected Discharge Date: 1/12/2024    Final Discharge Note (most recent)       Final Note - 01/12/24 1620          Final Note    Assessment Type Final Discharge Note (P)      Anticipated Discharge Disposition Hospice/Home (P)      What phone number can be called within the next 1-3 days to see how you are doing after discharge? [phone] (P)         Post-Acute Status    Post-Acute Authorization Hospice (P)      Hospice Status Set-up Complete/Auth obtained (P)      Coverage [facility] SECURE SNP - (P)      Discharge Delays None known at this time (P)                      Important Message from Medicare             Roxane Garcia, CHAI, LMSW    Case Management Department  [facility]

## 2024-01-12 NOTE — ASSESSMENT & PLAN NOTE
DKA has resolved for now with closed anion gap, resolution of Metabolic acidosis, and controlled hyperglycemia  Patient's FSGs are controlled on current medication regimen.  Last A1c reviewed-   Lab Results   Component Value Date    HGBA1C 8.8 (H) 01/09/2024     Most recent fingerstick glucose reviewed-   Recent Labs   Lab 01/11/24  1043 01/11/24  1156 01/11/24  1425 01/11/24  1641   POCTGLUCOSE 158* 154* 234* 259*       Current correctional scale  Low  Maintain anti-hyperglycemic dose as follows-   Antihyperglycemics (From admission, onward)      Start     Stop Route Frequency Ordered    01/11/24 1426  insulin aspart U-100 pen 0-5 Units         -- SubQ Before meals & nightly PRN 01/11/24 1326    01/11/24 1000  insulin detemir U-100 (Levemir) pen 12 Units         -- SubQ Daily 01/11/24 0938          Hold Oral hypoglycemics while patient is in the hospital.

## 2024-01-12 NOTE — PLAN OF CARE
Pt AAOx4, c/o of pain on RLE, right foot if black and necrotic, blister noted, no pulses detected with doppler to the right foot. Heparin gtt infusing @ 16u/kg/hr. VSS, no acute distress noted. POC on going.    Problem: Adult Inpatient Plan of Care  Goal: Plan of Care Review  1/12/2024 0302 by Hunter Anne RN  Outcome: Ongoing, Progressing  1/12/2024 0302 by Hunter Anne RN  Outcome: Ongoing, Progressing  Goal: Absence of Hospital-Acquired Illness or Injury  1/12/2024 0302 by Hunter Anne RN  Outcome: Ongoing, Progressing  1/12/2024 0302 by Hunter Anne RN  Outcome: Ongoing, Progressing  Goal: Optimal Comfort and Wellbeing  1/12/2024 0302 by Hunter Anne RN  Outcome: Ongoing, Progressing  1/12/2024 0302 by Hunter Anne RN  Outcome: Ongoing, Progressing  Goal: Readiness for Transition of Care  Outcome: Ongoing, Progressing     Problem: Diabetes Comorbidity  Goal: Blood Glucose Level Within Targeted Range  Outcome: Ongoing, Progressing     Problem: Impaired Wound Healing  Goal: Optimal Wound Healing  Outcome: Ongoing, Progressing     Problem: Skin Injury Risk Increased  Goal: Skin Health and Integrity  Outcome: Ongoing, Progressing     Problem: Fall Injury Risk  Goal: Absence of Fall and Fall-Related Injury  Outcome: Ongoing, Progressing     Problem: Diabetic Ketoacidosis  Goal: Fluid and Electrolyte Balance with Absence of Ketosis  Outcome: Ongoing, Progressing

## 2024-01-13 NOTE — NURSING
Abbyian at bedside picking up pt for discharge via stretcher. All belongings are sent with family members.

## 2024-01-18 NOTE — TELEPHONE ENCOUNTER
----- Message from Jared Land MD sent at 1/17/2024  4:51 PM CST -----  D/c'd home w/ hospice 1/13.  Could one of you check in on how she's doing and if they need anything?

## 2024-01-19 NOTE — TELEPHONE ENCOUNTER
Spoke w/ daughter and daughter said that pt is at home on hospice , and is having a good day today, comfortable. Hospice has been good, and taking care of everything for pt. Advised daughter to call if there is anything needed for pt that we can assist with.

## 2024-02-01 ENCOUNTER — EXTERNAL HOME HEALTH (OUTPATIENT)
Dept: HOME HEALTH SERVICES | Facility: HOSPITAL | Age: 79
End: 2024-02-01
Payer: MEDICARE

## 2024-02-07 ENCOUNTER — DOCUMENT SCAN (OUTPATIENT)
Dept: HOME HEALTH SERVICES | Facility: HOSPITAL | Age: 79
End: 2024-02-07
Payer: MEDICARE

## 2024-03-15 ENCOUNTER — DOCUMENTATION ONLY (OUTPATIENT)
Dept: NEUROLOGY | Facility: HOSPITAL | Age: 79
End: 2024-03-15
Payer: MEDICARE

## 2024-10-21 ENCOUNTER — DOCUMENT SCAN (OUTPATIENT)
Dept: HOME HEALTH SERVICES | Facility: HOSPITAL | Age: 79
End: 2024-10-21
Payer: MEDICARE

## 2024-12-12 ENCOUNTER — DOCUMENT SCAN (OUTPATIENT)
Dept: HOME HEALTH SERVICES | Facility: HOSPITAL | Age: 79
End: 2024-12-12
Payer: MEDICARE

## (undated) DEVICE — VISIPAQUE 320 200ML +PK

## (undated) DEVICE — KIT PROBE COVER WITH GEL

## (undated) DEVICE — GUIDEWIRE STF .035X180CM ANG

## (undated) DEVICE — CATH BLLN SEEKER .035IN 135CM

## (undated) DEVICE — PROTECTION STATION PLUS

## (undated) DEVICE — DEVICE MYNX GRIP 6/7F

## (undated) DEVICE — COVER BAND BAG 40 X 40

## (undated) DEVICE — KIT MICROINTRO 4F .018X40X7CM

## (undated) DEVICE — CATH SEEKER .035IN 90CM

## (undated) DEVICE — INFLATOR ENCORE 26 BLLN INFL

## (undated) DEVICE — LINE INJECTION 30IN 25/BX

## (undated) DEVICE — GUIDEWIRE EMERALD 150CM PTFE

## (undated) DEVICE — CATH ULTRAVERSE 035 5X200X130

## (undated) DEVICE — CATH LUTONIX DCB 035X130X5X220

## (undated) DEVICE — CATH IMA INFINITI 4FRX100CM

## (undated) DEVICE — Device

## (undated) DEVICE — SEE MEDLINE ITEM 156894

## (undated) DEVICE — SHEATH DESTINATION 6FR 45CM

## (undated) DEVICE — CATH ULTRAVERSE 035 5X100X130

## (undated) DEVICE — SHEATH INTRODUCER 6FR 11CM

## (undated) DEVICE — GUIDEWIRE AMPLATZ .035X260

## (undated) DEVICE — SHEATH INTRODUCER 7FR 11CM

## (undated) DEVICE — GUIDEWIRE SUPRA CORE 035 190CM

## (undated) DEVICE — SPIKE CONTRAST CONTROLLER

## (undated) DEVICE — KIT CUSTOM MANIFOLD

## (undated) DEVICE — VISE RADIFOCUS MULTI TORQUE